# Patient Record
Sex: MALE | Race: WHITE | NOT HISPANIC OR LATINO | Employment: FULL TIME | URBAN - METROPOLITAN AREA
[De-identification: names, ages, dates, MRNs, and addresses within clinical notes are randomized per-mention and may not be internally consistent; named-entity substitution may affect disease eponyms.]

---

## 2019-10-17 PROBLEM — I73.9 CLAUDICATION: Status: ACTIVE | Noted: 2019-10-17

## 2019-11-22 ENCOUNTER — TELEPHONE (OUTPATIENT)
Dept: CARDIOTHORACIC SURGERY | Facility: CLINIC | Age: 59
End: 2019-11-22

## 2019-11-25 ENCOUNTER — TELEPHONE (OUTPATIENT)
Dept: VASCULAR SURGERY | Facility: CLINIC | Age: 59
End: 2019-11-25

## 2019-11-25 NOTE — TELEPHONE ENCOUNTER
----- Message from Joao Pulliam RN sent at 11/25/2019 12:32 PM CST -----  Contact: Pt wife Patricia    I believe this goes to Vascular Surgery..... Thanks.........SS    ----- Message -----  From: Renetta Grimm  Sent: 11/25/2019  12:06 PM CST  To: , #    Reason: Pt wife is looking to schedule pt a soon appt because he has a blockage on the left leg groin area and right as well. Pt is in a lot of pain may someone please call.     Communication:  [phone]

## 2019-11-26 ENCOUNTER — TELEPHONE (OUTPATIENT)
Dept: CARDIOTHORACIC SURGERY | Facility: CLINIC | Age: 59
End: 2019-11-26

## 2019-11-26 NOTE — TELEPHONE ENCOUNTER
Spoke with pt's wife and informed her the appt that's  scheduled with Dr. Gomez[facility] is the correct department and pt does not need to be seen in CV. Pts wife verbalized understanding.            ----- Message from Britney Lyle sent at 11/26/2019  1:24 PM CST -----  Contact: Pt request via [facility]   Message     Appointment Request From: Renan Britton    With Provider: Obdulio    Preferred Date Range: 11/25/2019 - 11/27/2019    Preferred Times: Any time    Reason for visit: Surgery consult    Comments:  Surgery for blocked artery in left leg

## 2019-12-04 ENCOUNTER — OFFICE VISIT (OUTPATIENT)
Dept: VASCULAR SURGERY | Facility: CLINIC | Age: 59
End: 2019-12-04
Attending: SURGERY
Payer: COMMERCIAL

## 2019-12-04 VITALS
BODY MASS INDEX: 37.65 KG/M2 | HEART RATE: 75 BPM | SYSTOLIC BLOOD PRESSURE: 166 MMHG | TEMPERATURE: 99 F | HEIGHT: 71 IN | DIASTOLIC BLOOD PRESSURE: 84 MMHG | WEIGHT: 268.94 LBS

## 2019-12-04 DIAGNOSIS — I70.202 FEMORAL ARTERY OCCLUSION, LEFT: Primary | ICD-10-CM

## 2019-12-04 DIAGNOSIS — I73.9 CLAUDICATION: Primary | ICD-10-CM

## 2019-12-04 DIAGNOSIS — I73.9 PAD (PERIPHERAL ARTERY DISEASE): ICD-10-CM

## 2019-12-04 DIAGNOSIS — Z01.818 OTHER SPECIFIED PRE-OPERATIVE EXAMINATION: ICD-10-CM

## 2019-12-04 DIAGNOSIS — I74.5 ILIAC ARTERY OCCLUSION, LEFT: ICD-10-CM

## 2019-12-04 PROCEDURE — 99244 OFF/OP CNSLTJ NEW/EST MOD 40: CPT | Mod: S$GLB,,, | Performed by: SURGERY

## 2019-12-04 PROCEDURE — 99244 PR OFFICE CONSULTATION,LEVEL IV: ICD-10-PCS | Mod: S$GLB,,, | Performed by: SURGERY

## 2019-12-04 PROCEDURE — 99999 PR PBB SHADOW E&M-EST. PATIENT-LVL III: CPT | Mod: PBBFAC,,, | Performed by: SURGERY

## 2019-12-04 PROCEDURE — 99999 PR PBB SHADOW E&M-EST. PATIENT-LVL III: ICD-10-PCS | Mod: PBBFAC,,, | Performed by: SURGERY

## 2019-12-04 NOTE — PROGRESS NOTES
Renankarolyn Britton  12/04/2019    HPI:  Mr. Britton is a 59 y.o. male with a h/o HLD, PAD, and aortic stenosis who is here today for evaluation of bilateral lower extremity claudication.  The patient states that he is only able to walk a hundred feet before he starts getting pain in his legs.  The left is worse than the right but they both start at the same time.  The pain begins in the calf and works its way up to his thighs.  It is relieved with rest.  He sometimes gets pain after standing for long periods of time.  He had a angiogram performed showing occlusion in the left common iliac and bilateral common femorals.    Denies any MI/stroke  Tobacco use: quit 8 years ago    Past Medical History:   Diagnosis Date    Diabetes mellitus     H/O brain tumor     HLD (hyperlipidemia)     Hypertension     PAD (peripheral artery disease)     Seizures     R/T BRAIN TUMOR- SURGICALLY EXCISED     Past Surgical History:   Procedure Laterality Date    BRAIN SURGERY  01/2011    TUMOR EXCISED    PERCUTANEOUS TRANSLUMINAL ANGIOPLASTY (PTA) OF PERIPHERAL VESSEL Left 10/17/2019    Procedure: PTA, PERIPHERAL VESSEL;  Surgeon: Rao Fisher MD;  [facility] C[facility];  Service: Cardiology;  Laterality: Left;     Family History   Problem Relation Age of Onset    Diabetes Mother     Hypertension Mother     Stroke Mother     Cancer Father         LUNG    Heart disease Father     Cancer Brother     Heart disease Brother     Heart disease Brother     Heart disease Brother      Social History     Socioeconomic History    Marital status:      Spouse name: Not on file    Number of children: Not on file    Years of education: Not on file    Highest education level: Not on file   Occupational History    Not on file   Social Needs    Financial resource strain: Not on file    Food insecurity:     Worry: Not on file     Inability: Not on file    Transportation needs:     Medical: Not on file     Non-medical: Not on  file   Tobacco Use    Smoking status: Former Smoker     Last attempt to quit: 2011     Years since quittin.8    Smokeless tobacco: Never Used   Substance and Sexual Activity    Alcohol use: Yes     Comment: 2-4 PER DAY    Drug use: Never    Sexual activity: Not on file   Lifestyle    Physical activity:     Days per week: Not on file     Minutes per session: Not on file    Stress: Not on file   Relationships    Social connections:     Talks on phone: Not on file     Gets together: Not on file     Attends Confucianist service: Not on file     Active member of club or organization: Not on file     Attends meetings of clubs or organizations: Not on file     Relationship status: Not on file   Other Topics Concern    Not on file   Social History Narrative    Not on file       Current Outpatient Medications:     aspirin (ECOTRIN) 81 MG EC tablet, Take 81 mg by mouth once daily., Disp: , Rfl:     atorvastatin (LIPITOR) 40 MG tablet, Take 40 mg by mouth once daily., Disp: , Rfl:     cetirizine 10 mg chewable tablet, Take 10 mg by mouth as needed., Disp: , Rfl:     cilostazol (PLETAL) 100 MG Tab, Take 100 mg by mouth 2 (two) times daily. , Disp: , Rfl:     coenzyme Q10 10 mg capsule, Take 10 mg by mouth once daily., Disp: , Rfl:     glyBURIDE-metformin 2.5-500 mg (GLUCOVANCE) 2.5-500 mg per tablet, Take 1 tablet by mouth 2 (two) times daily with meals., Disp: , Rfl:     magnesium chloride (SLOW-MAG) 71.5 mg TbEC, Take 2 tablets by mouth once., Disp: , Rfl:     mupirocin (BACTROBAN) 2 % ointment, Apply topically 2 (two) times daily. Swab inside each nare twice a day beginning 2 days before surgery., Disp: 15 g, Rfl: 0    pregabalin (LYRICA) 100 MG capsule, Take 100 mg by mouth 2 (two) times daily., Disp: , Rfl:     valsartan (DIOVAN) 160 MG tablet, Take 160 mg by mouth once daily., Disp: , Rfl:      REVIEW OF SYSTEMS:  General: negative; Respiratory: no cough, shortness of breath, or wheezing;  Cardiovascular: no chest pain or dyspnea on exertion; Gastrointestinal: no abdominal pain, change in bowel habits, or bloody stools; Genito-Urinary: no dysuria, trouble voiding, or hematuria; Musculoskeletal: no weakness or decreased range of motion, Neurological: no TIA or stroke symptoms; Psychiatric: no nervousness, anxiety or depression.    PHYSICAL EXAM:   Right Arm BP - Sittin/84 (19 1437)  Left Arm BP - Sittin/83 (19 1437)  Pulse: 75  Temp: 98.5 °F (36.9 °C)    General appearance: Alert, well-appearing, and in no distress.  Oriented to person, place, and time  Neurological: Normal speech, no focal findings noted; CN II - XII grossly intact  Musculoskeletal:Digits/nail without cyanosis/clubbing.  Normal muscle strength/tone.  Neck: Supple, no significant adenopathy, no carotid bruit can be auscultated  Chest:Clear to auscultation, no wheezes, rales or rhonchi, symmetric air entry, No use of accessory muscles   Cardiac:Normal rate and regular rhythm, S1 and S2 normal, Systolic ejection murmur  Abdomen:Soft, nontender, nondistended, no masses or organomegaly, No rebound tenderness noted; bowel sounds normal,  No groin adenopathy   Extremities: 2+ femoral pulses bilaterally, no pedal pulses palpable.no pre-tibial edema. No ulcerations,  LLE: monophasic PT signal, no DP signal found  RLE: Monophasic DP signal, Biphasic PT signal    LAB RESULTS:  No results found for: K, CREATININE  No results found for: WBC, HCT, PLT  No results found for: HGBA1C  IMAGING:  OSH CT reveiwed - high grade stenoses of L ICA, L CFA, R CFA, R SFA  LYNN R 0.55 L 0.45    IMP/PLAN:  59 y.o. male with Bilateral lower extremity claudication left worse than right and CT evidence of mult-level severe PAD that has prevented him from walking, ADL and working.  Will require complex hybrid intervention including bilateral common femoral endarterectomy, bilateral common iliac artery stent and possible R SFA stent.  OSH CTA is  incomplete, will require updated imaging.        - Will obtain a CTA with runnoff of bilateral lower extremity for pre-operative planning  - Will proceed with surgery - risks, including bleeding, infection, embolization, need for reoperation discussed in detail - the patient and his wife wish to proceed.     Kelby Gomez MD  Vascular & Endovascular Surgery

## 2019-12-04 NOTE — H&P (VIEW-ONLY)
Renankarolyn Britton  12/04/2019    HPI:  Mr. Britton is a 59 y.o. male with a h/o HLD, PAD, and aortic stenosis who is here today for evaluation of bilateral lower extremity claudication.  The patient states that he is only able to walk a hundred feet before he starts getting pain in his legs.  The left is worse than the right but they both start at the same time.  The pain begins in the calf and works its way up to his thighs.  It is relieved with rest.  He sometimes gets pain after standing for long periods of time.  He had a angiogram performed showing occlusion in the left common iliac and bilateral common femorals.    Denies any MI/stroke  Tobacco use: quit 8 years ago    Past Medical History:   Diagnosis Date    Diabetes mellitus     H/O brain tumor     HLD (hyperlipidemia)     Hypertension     PAD (peripheral artery disease)     Seizures     R/T BRAIN TUMOR- SURGICALLY EXCISED     Past Surgical History:   Procedure Laterality Date    BRAIN SURGERY  01/2011    TUMOR EXCISED    PERCUTANEOUS TRANSLUMINAL ANGIOPLASTY (PTA) OF PERIPHERAL VESSEL Left 10/17/2019    Procedure: PTA, PERIPHERAL VESSEL;  Surgeon: Rao Fisher MD;  Location: [facility] CATH;  Service: Cardiology;  Laterality: Left;     Family History   Problem Relation Age of Onset    Diabetes Mother     Hypertension Mother     Stroke Mother     Cancer Father         LUNG    Heart disease Father     Cancer Brother     Heart disease Brother     Heart disease Brother     Heart disease Brother      Social History     Socioeconomic History    Marital status:      Spouse name: Not on file    Number of children: Not on file    Years of education: Not on file    Highest education level: Not on file   Occupational History    Not on file   Social Needs    Financial resource strain: Not on file    Food insecurity:     Worry: Not on file     Inability: Not on file    Transportation needs:     Medical: Not on file     Non-medical: Not on  file   Tobacco Use    Smoking status: Former Smoker     Last attempt to quit: 2011     Years since quittin.8    Smokeless tobacco: Never Used   Substance and Sexual Activity    Alcohol use: Yes     Comment: 2-4 PER DAY    Drug use: Never    Sexual activity: Not on file   Lifestyle    Physical activity:     Days per week: Not on file     Minutes per session: Not on file    Stress: Not on file   Relationships    Social connections:     Talks on phone: Not on file     Gets together: Not on file     Attends Tenriism service: Not on file     Active member of club or organization: Not on file     Attends meetings of clubs or organizations: Not on file     Relationship status: Not on file   Other Topics Concern    Not on file   Social History Narrative    Not on file       Current Outpatient Medications:     aspirin (ECOTRIN) 81 MG EC tablet, Take 81 mg by mouth once daily., Disp: , Rfl:     atorvastatin (LIPITOR) 40 MG tablet, Take 40 mg by mouth once daily., Disp: , Rfl:     cetirizine 10 mg chewable tablet, Take 10 mg by mouth as needed., Disp: , Rfl:     cilostazol (PLETAL) 100 MG Tab, Take 100 mg by mouth 2 (two) times daily. , Disp: , Rfl:     coenzyme Q10 10 mg capsule, Take 10 mg by mouth once daily., Disp: , Rfl:     glyBURIDE-metformin 2.5-500 mg (GLUCOVANCE) 2.5-500 mg per tablet, Take 1 tablet by mouth 2 (two) times daily with meals., Disp: , Rfl:     magnesium chloride (SLOW-MAG) 71.5 mg TbEC, Take 2 tablets by mouth once., Disp: , Rfl:     mupirocin (BACTROBAN) 2 % ointment, Apply topically 2 (two) times daily. Swab inside each nare twice a day beginning 2 days before surgery., Disp: 15 g, Rfl: 0    pregabalin (LYRICA) 100 MG capsule, Take 100 mg by mouth 2 (two) times daily., Disp: , Rfl:     valsartan (DIOVAN) 160 MG tablet, Take 160 mg by mouth once daily., Disp: , Rfl:      REVIEW OF SYSTEMS:  General: negative; Respiratory: no cough, shortness of breath, or wheezing;  Cardiovascular: no chest pain or dyspnea on exertion; Gastrointestinal: no abdominal pain, change in bowel habits, or bloody stools; Genito-Urinary: no dysuria, trouble voiding, or hematuria; Musculoskeletal: no weakness or decreased range of motion, Neurological: no TIA or stroke symptoms; Psychiatric: no nervousness, anxiety or depression.    PHYSICAL EXAM:   Right Arm BP - Sittin/84 (19 1437)  Left Arm BP - Sittin/83 (19 1437)  Pulse: 75  Temp: 98.5 °F (36.9 °C)    General appearance: Alert, well-appearing, and in no distress.  Oriented to person, place, and time  Neurological: Normal speech, no focal findings noted; CN II - XII grossly intact  Musculoskeletal:Digits/nail without cyanosis/clubbing.  Normal muscle strength/tone.  Neck: Supple, no significant adenopathy, no carotid bruit can be auscultated  Chest:Clear to auscultation, no wheezes, rales or rhonchi, symmetric air entry, No use of accessory muscles   Cardiac:Normal rate and regular rhythm, S1 and S2 normal, Systolic ejection murmur  Abdomen:Soft, nontender, nondistended, no masses or organomegaly, No rebound tenderness noted; bowel sounds normal,  No groin adenopathy   Extremities: 2+ femoral pulses bilaterally, no pedal pulses palpable.no pre-tibial edema. No ulcerations,  LLE: monophasic PT signal, no DP signal found  RLE: Monophasic DP signal, Biphasic PT signal    LAB RESULTS:  No results found for: K, CREATININE  No results found for: WBC, HCT, PLT  No results found for: HGBA1C  IMAGING:  OSH CT reveiwed - high grade stenoses of L ICA, L CFA, R CFA, R SFA  LYNN R 0.55 L 0.45    IMP/PLAN:  59 y.o. male with Bilateral lower extremity claudication left worse than right and CT evidence of mult-level severe PAD that has prevented him from walking, ADL and working.  Will require complex hybrid intervention including bilateral common femoral endarterectomy, bilateral common iliac artery stent and possible R SFA stent.  OSH CTA is  incomplete, will require updated imaging.        - Will obtain a CTA with runnoff of bilateral lower extremity for pre-operative planning  - Will proceed with surgery - risks, including bleeding, infection, embolization, need for reoperation discussed in detail - the patient and his wife wish to proceed.     Kelby Gomez MD  Vascular & Endovascular Surgery

## 2019-12-06 ENCOUNTER — TELEPHONE (OUTPATIENT)
Dept: VASCULAR SURGERY | Facility: CLINIC | Age: 59
End: 2019-12-06

## 2019-12-06 NOTE — TELEPHONE ENCOUNTER
----- Message from Renetta Grimm sent at 12/6/2019  9:12 AM CST -----  Contact: Pt wife Patricia  Communication: Calling to see If you guys received a fax from [facility] in [place] and also do wife need to bring pt disc if so she stated she can bring it on today.    Communication: [phone]

## 2019-12-11 ENCOUNTER — TELEPHONE (OUTPATIENT)
Dept: VASCULAR SURGERY | Facility: CLINIC | Age: 59
End: 2019-12-11

## 2019-12-11 DIAGNOSIS — I74.3: Primary | ICD-10-CM

## 2019-12-19 ENCOUNTER — ANESTHESIA EVENT (OUTPATIENT)
Dept: SURGERY | Facility: HOSPITAL | Age: 59
DRG: 254 | End: 2019-12-19
Payer: COMMERCIAL

## 2019-12-19 ENCOUNTER — TELEPHONE (OUTPATIENT)
Dept: VASCULAR SURGERY | Facility: CLINIC | Age: 59
End: 2019-12-19

## 2019-12-19 NOTE — ANESTHESIA PREPROCEDURE EVALUATION
[facility]  Anesthesia Pre-Operative Evaluation         Patient Name: Renan Britton  YOB: 1960  MRN: [id number]    SUBJECTIVE:     Pre-operative evaluation for Procedure(s) (LRB):  ENDARTERECTOMY, FEMORAL (Bilateral)     12/19/2019    Renan Britton is a 59 y.o. male w/ a significant PMHx of HTN (on valsartan), HLD, T2DM, obesity (BMI 38), former smoker, PAD and moderate aortic stenosis.  Pt presented with bilateral lower extremity claudication and was started on cilostazol.  CTA with runoff showed greater than 90% stenosis of left common femoral artery and 50-75% stenosis lesions of right common femoral artery.    Patient now presents for the above procedure(s).    TTE on 9/24/2019: EF 50%, PASP 19 mmHg, aortic valve area 1.4 cm^2.    LDA: None documented.     Prev airway: None documented.    Drips: None documented.      Patient Active Problem List   Diagnosis    Claudication       Review of patient's allergies indicates:   Allergen Reactions    Penicillins Hives       Current Inpatient Medications:      No current facility-administered medications on file prior to encounter.      Current Outpatient Medications on File Prior to Encounter   Medication Sig Dispense Refill    aspirin (ECOTRIN) 81 MG EC tablet Take 81 mg by mouth once daily.      coenzyme Q10 10 mg capsule Take 10 mg by mouth once daily.      glyBURIDE-metformin 2.5-500 mg (GLUCOVANCE) 2.5-500 mg per tablet Take 1 tablet by mouth 2 (two) times daily with meals.      magnesium chloride (SLOW-MAG) 71.5 mg TbEC Take 2 tablets by mouth once.      pregabalin (LYRICA) 100 MG capsule Take 100 mg by mouth 2 (two) times daily.      atorvastatin (LIPITOR) 40 MG tablet Take 40 mg by mouth once daily.      cetirizine 10 mg chewable tablet Take 10 mg by mouth as needed.      cilostazol (PLETAL) 100 MG Tab Take 100 mg by mouth 2 (two) times daily.       mupirocin (BACTROBAN) 2 % ointment Apply topically 2 (two)  times daily. Swab inside each nare twice a day beginning 2 days before surgery. 15 g 0    valsartan (DIOVAN) 160 MG tablet Take 160 mg by mouth once daily.         Past Surgical History:   Procedure Laterality Date    BRAIN SURGERY  2011    TUMOR EXCISED    PERCUTANEOUS TRANSLUMINAL ANGIOPLASTY (PTA) OF PERIPHERAL VESSEL Left 10/17/2019    Procedure: PTA, PERIPHERAL VESSEL;  Surgeon: Rao Fisher MD;  Location: [facility];  Service: Cardiology;  Laterality: Left;       Social History     Socioeconomic History    Marital status:      Spouse name: Not on file    Number of children: Not on file    Years of education: Not on file    Highest education level: Not on file   Occupational History    Not on file   Social Needs    Financial resource strain: Not on file    Food insecurity:     Worry: Not on file     Inability: Not on file    Transportation needs:     Medical: Not on file     Non-medical: Not on file   Tobacco Use    Smoking status: Former Smoker     Last attempt to quit: 2011     Years since quittin.9    Smokeless tobacco: Never Used   Substance and Sexual Activity    Alcohol use: Yes     Comment: 2-4 PER DAY    Drug use: Never    Sexual activity: Not on file   Lifestyle    Physical activity:     Days per week: Not on file     Minutes per session: Not on file    Stress: Not on file   Relationships    Social connections:     Talks on phone: Not on file     Gets together: Not on file     Attends Cheondoism service: Not on file     Active member of club or organization: Not on file     Attends meetings of clubs or organizations: Not on file     Relationship status: Not on file   Other Topics Concern    Not on file   Social History Narrative    Not on file       OBJECTIVE:     Vital Signs Range (Last 24H):         Significant Labs:  No results found for: WBC, HGB, HCT, PLT, CHOL, TRIG, HDL, LDLDIRECT, ALT, AST, NA, K, CL, CREATININE, BUN, CO2, TSH, PSA, INR, GLUF, HGBA1C,  MICROALBUR    Diagnostic Studies:  EXAMINATION:  CTA RUNOFF ABD PEL BILAT LOWER EXT    CLINICAL HISTORY:  Ricco LE oclussion; Peripheral vascular disease, unspecified    TECHNIQUE:  1.25 mm contiguous axial images are performed through the abdominal aorta and both lower extremities following administration of IV contrast    COMPARISON:  None    FINDINGS:  CTA abdominal aorta:    The abdominal aorta is normal in caliber with significant atherosclerotic plaque within the infrarenal aspect.  The celiac axis, SMA, and DILLAN are patent.  There are single bilateral main renal arteries originating from the aorta without significant stenosis.      Impression     Impression:1.  Moderate atherosclerotic plaque within normal caliber abdominal aorta    CTA lower extremities:    The distal abdominal aorta and common iliac arteries show moderate atherosclerotic plaque.  There is a short segment significant 75% stenosis within the proximal left common iliac artery.    Both left and right external iliac arteries show multiple non critical stenosis, left and right common femoral arteries show extensive calcified plaque, with a greater than 90% stenosis of left common femoral artery, and multiple tandem 50-75% stenotic lesions within the right common femoral division.    The right superficial femoral artery demonstrates mild plaque formation proximal, with significant greater than 75% stenosis of the distal SFA in the region of the adductor canal.  The right popliteal artery shows a 75% stenosis, with trifurcation and three-vessel runoff to the level of ankle.    The left superficial femoral artery the shows calcified plaque throughout.  In the distal left SFA at the region of adductor canal there is a short segment 90% stenosis with tandem 75-90% lesions superior to popliteal fossa.  Left popliteal artery is patent showing moderate atherosclerotic plaque and trifurcation with three-vessel runoff to the level of ankle    Impression:1.   Bilateral extensive inflow stenosis including 75% stenosis of proximal left common iliac artery, 90% stenosis left common femoral artery, multiple tandem 50-75% stenotic lesions of right common femoral artery.    2.  Bilateral outflow stenosis with tandem 75-90% stenotic lesions in distal left and right SFA at level of adductor canal    CT abdomen and pelvis:    Scans through the lung bases are unremarkable.  Examination of the upper abdomen in early arterial phase imaging only shows a normal sized liver with mild nodularity to liver surface raising possibility of underlying cirrhosis.  The gallbladder is present containing gallstones there is no biliary dilatation.    The spleen, and pancreas are normal in size.  There are enlarged periportal lymph nodes.    Both adrenal glands are normal in size and shape.    Both kidneys are normal in size without evidence of hydronephrosis.    CT scan pelvis    Scans of the lower abdomen and pelvis show the large and small bowel to be normal in caliber.  Calcification of the vas deferens suggestive of underlying diabetes is noted.  Moderate prostatic enlargement consistent with age is present.    Bone windows appear normal for age.    Impression:1.  Nodularity to liver contour raising possibility of underlying cirrhosis, with enlarged periportal lymph nodes.      Electronically signed by: Larry Wellington MD  Date: 12/13/2019  Time: 12:10         EKG:   No results found for this or any previous visit.    2D ECHO:  TTE:  No results found for this or any previous visit.    MERI:  No results found for this or any previous visit.    ASSESSMENT/PLAN:     12/19/2019  Renan Britton is a 59 y.o., male.    Anesthesia Evaluation    I have reviewed the Patient Summary Reports.    I have reviewed the Nursing Notes.   I have reviewed the Medications.     Review of Systems  Anesthesia Hx:  No problems with previous Anesthesia  History of prior surgery of interest to airway management or  planning: Denies Family Hx of Anesthesia complications.   Denies Personal Hx of Anesthesia complications.   Social:  Former Smoker, Social Alcohol Use    Cardiovascular:   Hypertension, poorly controlled        Physical Exam  General:  Well nourished, Obesity    Airway/Jaw/Neck:  Airway Findings: Mouth Opening: Normal Tongue: Normal  General Airway Assessment: Adult, Possible difficult intubation, Possible difficult mask airway  Mallampati: III  Jaw/Neck Findings:  Neck ROM: Normal ROM  Neck Findings:  Girth Increased      Dental:  Dental Findings: In tact   Chest/Lungs:  Chest/Lungs Findings: Clear to auscultation, Normal Respiratory Rate         Mental Status:  Mental Status Findings:  Cooperative, Alert and Oriented         Anesthesia Plan  Type of Anesthesia, risks & benefits discussed:  Anesthesia Type:  general  Patient's Preference:   Intra-op Monitoring Plan: standard ASA monitors and arterial line  Intra-op Monitoring Plan Comments:   Post Op Pain Control Plan: multimodal analgesia, IV/PO Opioids PRN and per primary service following discharge from PACU  Post Op Pain Control Plan Comments:   Induction:   IV  Beta Blocker:  Patient is not currently on a Beta-Blocker (No further documentation required).       Informed Consent: Patient understands risks and agrees with Anesthesia plan.  Questions answered. Anesthesia consent signed with patient.  ASA Score: 3     Day of Surgery Review of History & Physical:    H&P update referred to the surgeon.         Ready For Surgery From Anesthesia Perspective.

## 2019-12-20 ENCOUNTER — ANESTHESIA (OUTPATIENT)
Dept: SURGERY | Facility: HOSPITAL | Age: 59
DRG: 254 | End: 2019-12-20
Payer: COMMERCIAL

## 2019-12-20 ENCOUNTER — HOSPITAL ENCOUNTER (INPATIENT)
Facility: HOSPITAL | Age: 59
LOS: 4 days | Discharge: HOME HEALTH | DRG: 254 | End: 2019-12-24
Attending: SURGERY | Admitting: SURGERY
Payer: COMMERCIAL

## 2019-12-20 DIAGNOSIS — I73.9 PAD (PERIPHERAL ARTERY DISEASE): Primary | ICD-10-CM

## 2019-12-20 LAB
ABO + RH BLD: NORMAL
BLD GP AB SCN CELLS X3 SERPL QL: NORMAL
GLUCOSE SERPL-MCNC: 126 MG/DL (ref 70–110)
HCO3 UR-SCNC: 24.1 MMOL/L (ref 24–28)
HCT VFR BLD CALC: 45 %PCV (ref 36–54)
PCO2 BLDA: 38.3 MMHG (ref 35–45)
PH SMN: 7.41 [PH] (ref 7.35–7.45)
PO2 BLDA: 189 MMHG (ref 80–100)
POC ACTIVATED CLOTTING TIME K: 120 SEC (ref 74–137)
POC ACTIVATED CLOTTING TIME K: 125 SEC (ref 74–137)
POC ACTIVATED CLOTTING TIME K: 197 SEC (ref 74–137)
POC ACTIVATED CLOTTING TIME K: 213 SEC (ref 74–137)
POC ACTIVATED CLOTTING TIME K: 224 SEC (ref 74–137)
POC ACTIVATED CLOTTING TIME K: 235 SEC (ref 74–137)
POC ACTIVATED CLOTTING TIME K: 241 SEC (ref 74–137)
POC ACTIVATED CLOTTING TIME K: 252 SEC (ref 74–137)
POC BE: -1 MMOL/L
POC IONIZED CALCIUM: 1.18 MMOL/L (ref 1.06–1.42)
POC SATURATED O2: 100 % (ref 95–100)
POC TCO2: 25 MMOL/L (ref 23–27)
POCT GLUCOSE: 132 MG/DL (ref 70–110)
POCT GLUCOSE: 174 MG/DL (ref 70–110)
POTASSIUM BLD-SCNC: 4.2 MMOL/L (ref 3.5–5.1)
SAMPLE: ABNORMAL
SAMPLE: NORMAL
SAMPLE: NORMAL
SODIUM BLD-SCNC: 141 MMOL/L (ref 136–145)

## 2019-12-20 PROCEDURE — 20600001 HC STEP DOWN PRIVATE ROOM

## 2019-12-20 PROCEDURE — 25000003 PHARM REV CODE 250: Performed by: SURGERY

## 2019-12-20 PROCEDURE — 94761 N-INVAS EAR/PLS OXIMETRY MLT: CPT

## 2019-12-20 PROCEDURE — 27201037 HC PRESSURE MONITORING SET UP

## 2019-12-20 PROCEDURE — 63600175 PHARM REV CODE 636 W HCPCS: Performed by: STUDENT IN AN ORGANIZED HEALTH CARE EDUCATION/TRAINING PROGRAM

## 2019-12-20 PROCEDURE — C1725 CATH, TRANSLUMIN NON-LASER: HCPCS | Performed by: SURGERY

## 2019-12-20 PROCEDURE — S0077 INJECTION, CLINDAMYCIN PHOSP: HCPCS | Performed by: STUDENT IN AN ORGANIZED HEALTH CARE EDUCATION/TRAINING PROGRAM

## 2019-12-20 PROCEDURE — 27201423 OPTIME MED/SURG SUP & DEVICES STERILE SUPPLY: Performed by: SURGERY

## 2019-12-20 PROCEDURE — 37221 PR REVASCULARIZE ILIAC ARTERY,ANGIOPLASTY/STENT, INITIAL VESSEL: ICD-10-PCS | Mod: 51,LT,, | Performed by: SURGERY

## 2019-12-20 PROCEDURE — C1887 CATHETER, GUIDING: HCPCS | Performed by: SURGERY

## 2019-12-20 PROCEDURE — C1769 GUIDE WIRE: HCPCS | Performed by: SURGERY

## 2019-12-20 PROCEDURE — C1874 STENT, COATED/COV W/DEL SYS: HCPCS | Performed by: SURGERY

## 2019-12-20 PROCEDURE — D9220A PRA ANESTHESIA: Mod: ,,, | Performed by: ANESTHESIOLOGY

## 2019-12-20 PROCEDURE — 25000003 PHARM REV CODE 250: Performed by: STUDENT IN AN ORGANIZED HEALTH CARE EDUCATION/TRAINING PROGRAM

## 2019-12-20 PROCEDURE — 36000706: Performed by: SURGERY

## 2019-12-20 PROCEDURE — 36620 INSERTION CATHETER ARTERY: CPT | Mod: 59,,, | Performed by: ANESTHESIOLOGY

## 2019-12-20 PROCEDURE — 71000033 HC RECOVERY, INTIAL HOUR: Performed by: SURGERY

## 2019-12-20 PROCEDURE — C1894 INTRO/SHEATH, NON-LASER: HCPCS | Performed by: SURGERY

## 2019-12-20 PROCEDURE — 36410 VNPNXR 3YR/> PHY/QHP DX/THER: CPT | Mod: 59,,, | Performed by: ANESTHESIOLOGY

## 2019-12-20 PROCEDURE — 36000707: Performed by: SURGERY

## 2019-12-20 PROCEDURE — 36410 PR VENIPUNC NEED PHYS SKILL,DX OR RX: ICD-10-PCS | Mod: 59,,, | Performed by: ANESTHESIOLOGY

## 2019-12-20 PROCEDURE — 25000003 PHARM REV CODE 250

## 2019-12-20 PROCEDURE — 86850 RBC ANTIBODY SCREEN: CPT

## 2019-12-20 PROCEDURE — 35371 RECHANNELING OF ARTERY: CPT | Mod: 50,,, | Performed by: SURGERY

## 2019-12-20 PROCEDURE — 63600175 PHARM REV CODE 636 W HCPCS: Performed by: SURGERY

## 2019-12-20 PROCEDURE — 37000008 HC ANESTHESIA 1ST 15 MINUTES: Performed by: SURGERY

## 2019-12-20 PROCEDURE — D9220A PRA ANESTHESIA: ICD-10-PCS | Mod: ,,, | Performed by: ANESTHESIOLOGY

## 2019-12-20 PROCEDURE — 82962 GLUCOSE BLOOD TEST: CPT | Performed by: SURGERY

## 2019-12-20 PROCEDURE — 37221 PR REVASCULARIZE ILIAC ARTERY,ANGIOPLASTY/STENT, INITIAL VESSEL: CPT | Mod: 51,LT,, | Performed by: SURGERY

## 2019-12-20 PROCEDURE — C1768 GRAFT, VASCULAR: HCPCS | Performed by: SURGERY

## 2019-12-20 PROCEDURE — 63600175 PHARM REV CODE 636 W HCPCS

## 2019-12-20 PROCEDURE — 35371 PR THROMBOENDARTECTMY FEMORAL COMMON: ICD-10-PCS | Mod: 50,,, | Performed by: SURGERY

## 2019-12-20 PROCEDURE — 25500020 PHARM REV CODE 255: Performed by: SURGERY

## 2019-12-20 PROCEDURE — 36620 ARTERIAL: ICD-10-PCS | Mod: 59,,, | Performed by: ANESTHESIOLOGY

## 2019-12-20 PROCEDURE — 37000009 HC ANESTHESIA EA ADD 15 MINS: Performed by: SURGERY

## 2019-12-20 DEVICE — GRAFT VAS GUARD 0.8X8CM BOVINE: Type: IMPLANTABLE DEVICE | Site: ARTERIAL | Status: FUNCTIONAL

## 2019-12-20 RX ORDER — HYDROMORPHONE HYDROCHLORIDE 1 MG/ML
0.2 INJECTION, SOLUTION INTRAMUSCULAR; INTRAVENOUS; SUBCUTANEOUS EVERY 5 MIN PRN
Status: DISCONTINUED | OUTPATIENT
Start: 2019-12-20 | End: 2019-12-20

## 2019-12-20 RX ORDER — IBUPROFEN 200 MG
16 TABLET ORAL
Status: DISCONTINUED | OUTPATIENT
Start: 2019-12-20 | End: 2019-12-24 | Stop reason: HOSPADM

## 2019-12-20 RX ORDER — ATORVASTATIN CALCIUM 20 MG/1
40 TABLET, FILM COATED ORAL DAILY
Status: DISCONTINUED | OUTPATIENT
Start: 2019-12-20 | End: 2019-12-24 | Stop reason: HOSPADM

## 2019-12-20 RX ORDER — IBUPROFEN 200 MG
24 TABLET ORAL
Status: DISCONTINUED | OUTPATIENT
Start: 2019-12-20 | End: 2019-12-24 | Stop reason: HOSPADM

## 2019-12-20 RX ORDER — PREGABALIN 50 MG/1
100 CAPSULE ORAL 2 TIMES DAILY
Status: DISCONTINUED | OUTPATIENT
Start: 2019-12-20 | End: 2019-12-24 | Stop reason: HOSPADM

## 2019-12-20 RX ORDER — MIDAZOLAM HYDROCHLORIDE 1 MG/ML
INJECTION, SOLUTION INTRAMUSCULAR; INTRAVENOUS
Status: DISCONTINUED | OUTPATIENT
Start: 2019-12-20 | End: 2019-12-20

## 2019-12-20 RX ORDER — LABETALOL HYDROCHLORIDE 5 MG/ML
INJECTION, SOLUTION INTRAVENOUS
Status: DISCONTINUED | OUTPATIENT
Start: 2019-12-20 | End: 2019-12-20

## 2019-12-20 RX ORDER — LABETALOL HCL 20 MG/4 ML
10 SYRINGE (ML) INTRAVENOUS EVERY 4 HOURS PRN
Status: DISCONTINUED | OUTPATIENT
Start: 2019-12-20 | End: 2019-12-24 | Stop reason: HOSPADM

## 2019-12-20 RX ORDER — NALOXONE HCL 0.4 MG/ML
0.02 VIAL (ML) INJECTION
Status: DISCONTINUED | OUTPATIENT
Start: 2019-12-20 | End: 2019-12-24 | Stop reason: HOSPADM

## 2019-12-20 RX ORDER — PROTAMINE SULFATE 10 MG/ML
INJECTION, SOLUTION INTRAVENOUS
Status: DISCONTINUED | OUTPATIENT
Start: 2019-12-20 | End: 2019-12-20

## 2019-12-20 RX ORDER — LABETALOL HCL 20 MG/4 ML
SYRINGE (ML) INTRAVENOUS
Status: COMPLETED
Start: 2019-12-20 | End: 2019-12-20

## 2019-12-20 RX ORDER — ACETAMINOPHEN 500 MG
1000 TABLET ORAL
Status: COMPLETED | OUTPATIENT
Start: 2019-12-20 | End: 2019-12-20

## 2019-12-20 RX ORDER — GLYCOPYRROLATE 0.2 MG/ML
INJECTION INTRAMUSCULAR; INTRAVENOUS
Status: DISCONTINUED | OUTPATIENT
Start: 2019-12-20 | End: 2019-12-20

## 2019-12-20 RX ORDER — MUPIROCIN 20 MG/G
1 OINTMENT TOPICAL 2 TIMES DAILY
Status: DISCONTINUED | OUTPATIENT
Start: 2019-12-20 | End: 2019-12-24 | Stop reason: HOSPADM

## 2019-12-20 RX ORDER — CILOSTAZOL 50 MG/1
100 TABLET ORAL 2 TIMES DAILY
Status: DISCONTINUED | OUTPATIENT
Start: 2019-12-20 | End: 2019-12-24 | Stop reason: HOSPADM

## 2019-12-20 RX ORDER — LIDOCAINE HYDROCHLORIDE 10 MG/ML
1 INJECTION, SOLUTION EPIDURAL; INFILTRATION; INTRACAUDAL; PERINEURAL ONCE
Status: COMPLETED | OUTPATIENT
Start: 2019-12-20 | End: 2019-12-20

## 2019-12-20 RX ORDER — CLINDAMYCIN PHOSPHATE 900 MG/50ML
900 INJECTION, SOLUTION INTRAVENOUS ONCE
Status: COMPLETED | OUTPATIENT
Start: 2019-12-20 | End: 2019-12-20

## 2019-12-20 RX ORDER — ONDANSETRON 2 MG/ML
INJECTION INTRAMUSCULAR; INTRAVENOUS
Status: DISCONTINUED | OUTPATIENT
Start: 2019-12-20 | End: 2019-12-20

## 2019-12-20 RX ORDER — PROPOFOL 10 MG/ML
VIAL (ML) INTRAVENOUS
Status: DISCONTINUED | OUTPATIENT
Start: 2019-12-20 | End: 2019-12-20

## 2019-12-20 RX ORDER — CLOPIDOGREL BISULFATE 75 MG/1
75 TABLET ORAL DAILY
Status: DISCONTINUED | OUTPATIENT
Start: 2019-12-21 | End: 2019-12-24 | Stop reason: HOSPADM

## 2019-12-20 RX ORDER — ONDANSETRON 2 MG/ML
4 INJECTION INTRAMUSCULAR; INTRAVENOUS EVERY 12 HOURS PRN
Status: DISCONTINUED | OUTPATIENT
Start: 2019-12-20 | End: 2019-12-24 | Stop reason: HOSPADM

## 2019-12-20 RX ORDER — PHENYLEPHRINE HYDROCHLORIDE 10 MG/ML
INJECTION INTRAVENOUS
Status: DISCONTINUED | OUTPATIENT
Start: 2019-12-20 | End: 2019-12-20

## 2019-12-20 RX ORDER — HYDROMORPHONE HCL IN 0.9% NACL 6 MG/30 ML
PATIENT CONTROLLED ANALGESIA SYRINGE INTRAVENOUS CONTINUOUS
Status: DISCONTINUED | OUTPATIENT
Start: 2019-12-20 | End: 2019-12-21

## 2019-12-20 RX ORDER — IODIXANOL 320 MG/ML
INJECTION, SOLUTION INTRAVASCULAR
Status: DISCONTINUED | OUTPATIENT
Start: 2019-12-20 | End: 2019-12-20 | Stop reason: HOSPADM

## 2019-12-20 RX ORDER — ACETAMINOPHEN 500 MG
1000 TABLET ORAL EVERY 8 HOURS
Status: DISCONTINUED | OUTPATIENT
Start: 2019-12-20 | End: 2019-12-24 | Stop reason: HOSPADM

## 2019-12-20 RX ORDER — SODIUM CHLORIDE 0.9 % (FLUSH) 0.9 %
10 SYRINGE (ML) INJECTION
Status: DISCONTINUED | OUTPATIENT
Start: 2019-12-20 | End: 2019-12-20

## 2019-12-20 RX ORDER — HEPARIN SODIUM 5000 [USP'U]/ML
5000 INJECTION, SOLUTION INTRAVENOUS; SUBCUTANEOUS EVERY 8 HOURS
Status: DISCONTINUED | OUTPATIENT
Start: 2019-12-21 | End: 2019-12-24 | Stop reason: HOSPADM

## 2019-12-20 RX ORDER — KETAMINE HCL IN 0.9 % NACL 50 MG/5 ML
SYRINGE (ML) INTRAVENOUS
Status: DISCONTINUED | OUTPATIENT
Start: 2019-12-20 | End: 2019-12-20

## 2019-12-20 RX ORDER — HEPARIN SODIUM 1000 [USP'U]/ML
INJECTION, SOLUTION INTRAVENOUS; SUBCUTANEOUS
Status: DISCONTINUED | OUTPATIENT
Start: 2019-12-20 | End: 2019-12-20

## 2019-12-20 RX ORDER — ASPIRIN 81 MG/1
81 TABLET ORAL DAILY
Status: DISCONTINUED | OUTPATIENT
Start: 2019-12-20 | End: 2019-12-24 | Stop reason: HOSPADM

## 2019-12-20 RX ORDER — SODIUM CHLORIDE 9 MG/ML
INJECTION, SOLUTION INTRAVENOUS CONTINUOUS PRN
Status: DISCONTINUED | OUTPATIENT
Start: 2019-12-20 | End: 2019-12-20

## 2019-12-20 RX ORDER — HYDRALAZINE HYDROCHLORIDE 20 MG/ML
10 INJECTION INTRAMUSCULAR; INTRAVENOUS EVERY 4 HOURS PRN
Status: DISCONTINUED | OUTPATIENT
Start: 2019-12-20 | End: 2019-12-24 | Stop reason: HOSPADM

## 2019-12-20 RX ORDER — GLUCAGON 1 MG
1 KIT INJECTION
Status: DISCONTINUED | OUTPATIENT
Start: 2019-12-20 | End: 2019-12-24 | Stop reason: HOSPADM

## 2019-12-20 RX ORDER — HYDROMORPHONE HCL IN 0.9% NACL 6 MG/30 ML
PATIENT CONTROLLED ANALGESIA SYRINGE INTRAVENOUS
Status: COMPLETED
Start: 2019-12-20 | End: 2019-12-20

## 2019-12-20 RX ORDER — FENTANYL CITRATE 50 UG/ML
INJECTION, SOLUTION INTRAMUSCULAR; INTRAVENOUS
Status: DISCONTINUED | OUTPATIENT
Start: 2019-12-20 | End: 2019-12-20

## 2019-12-20 RX ORDER — ROCURONIUM BROMIDE 10 MG/ML
INJECTION, SOLUTION INTRAVENOUS
Status: DISCONTINUED | OUTPATIENT
Start: 2019-12-20 | End: 2019-12-20

## 2019-12-20 RX ORDER — VALSARTAN 160 MG/1
160 TABLET ORAL DAILY
Status: DISCONTINUED | OUTPATIENT
Start: 2019-12-21 | End: 2019-12-24 | Stop reason: HOSPADM

## 2019-12-20 RX ORDER — HEPARIN SODIUM 1000 [USP'U]/ML
INJECTION, SOLUTION INTRAVENOUS; SUBCUTANEOUS
Status: DISCONTINUED | OUTPATIENT
Start: 2019-12-20 | End: 2019-12-20 | Stop reason: HOSPADM

## 2019-12-20 RX ORDER — OXYCODONE HYDROCHLORIDE 5 MG/1
5 TABLET ORAL EVERY 4 HOURS PRN
Status: DISCONTINUED | OUTPATIENT
Start: 2019-12-20 | End: 2019-12-20

## 2019-12-20 RX ORDER — BACITRACIN 50000 [IU]/1
INJECTION, POWDER, FOR SOLUTION INTRAMUSCULAR
Status: DISCONTINUED | OUTPATIENT
Start: 2019-12-20 | End: 2019-12-20 | Stop reason: HOSPADM

## 2019-12-20 RX ORDER — SODIUM CHLORIDE 9 MG/ML
INJECTION, SOLUTION INTRAVENOUS CONTINUOUS
Status: DISCONTINUED | OUTPATIENT
Start: 2019-12-20 | End: 2019-12-20

## 2019-12-20 RX ORDER — DEXAMETHASONE SODIUM PHOSPHATE 4 MG/ML
INJECTION, SOLUTION INTRA-ARTICULAR; INTRALESIONAL; INTRAMUSCULAR; INTRAVENOUS; SOFT TISSUE
Status: DISCONTINUED | OUTPATIENT
Start: 2019-12-20 | End: 2019-12-20

## 2019-12-20 RX ORDER — INSULIN ASPART 100 [IU]/ML
1-10 INJECTION, SOLUTION INTRAVENOUS; SUBCUTANEOUS
Status: DISCONTINUED | OUTPATIENT
Start: 2019-12-20 | End: 2019-12-24 | Stop reason: HOSPADM

## 2019-12-20 RX ORDER — LIDOCAINE HCL/PF 100 MG/5ML
SYRINGE (ML) INTRAVENOUS
Status: DISCONTINUED | OUTPATIENT
Start: 2019-12-20 | End: 2019-12-20

## 2019-12-20 RX ADMIN — ROCURONIUM BROMIDE 30 MG: 10 INJECTION, SOLUTION INTRAVENOUS at 11:12

## 2019-12-20 RX ADMIN — HEPARIN SODIUM 4000 UNITS: 1000 INJECTION, SOLUTION INTRAVENOUS; SUBCUTANEOUS at 12:12

## 2019-12-20 RX ADMIN — Medication 20 MG: at 08:12

## 2019-12-20 RX ADMIN — FENTANYL CITRATE 25 MCG: 50 INJECTION, SOLUTION INTRAMUSCULAR; INTRAVENOUS at 01:12

## 2019-12-20 RX ADMIN — PHENYLEPHRINE HYDROCHLORIDE 50 MCG: 10 INJECTION INTRAVENOUS at 12:12

## 2019-12-20 RX ADMIN — ROCURONIUM BROMIDE 10 MG: 10 INJECTION, SOLUTION INTRAVENOUS at 10:12

## 2019-12-20 RX ADMIN — GLYCOPYRROLATE 0.1 MG: 0.2 INJECTION, SOLUTION INTRAMUSCULAR; INTRAVENOUS at 01:12

## 2019-12-20 RX ADMIN — CILOSTAZOL 100 MG: 50 TABLET ORAL at 09:12

## 2019-12-20 RX ADMIN — FENTANYL CITRATE 100 MCG: 50 INJECTION, SOLUTION INTRAMUSCULAR; INTRAVENOUS at 08:12

## 2019-12-20 RX ADMIN — LABETALOL HYDROCHLORIDE 10 MG: 5 INJECTION, SOLUTION INTRAVENOUS at 02:12

## 2019-12-20 RX ADMIN — ROCURONIUM BROMIDE 10 MG: 10 INJECTION, SOLUTION INTRAVENOUS at 12:12

## 2019-12-20 RX ADMIN — SODIUM CHLORIDE: 0.9 INJECTION, SOLUTION INTRAVENOUS at 01:12

## 2019-12-20 RX ADMIN — LABETALOL HCL IV SOLN PREFILLED SYRINGE 20 MG/4ML (5 MG/ML) 10 MG: 20/4 SOLUTION PREFILLED SYRINGE at 04:12

## 2019-12-20 RX ADMIN — PROTAMINE SULFATE 10 MG: 10 INJECTION, SOLUTION INTRAVENOUS at 01:12

## 2019-12-20 RX ADMIN — ATORVASTATIN CALCIUM 40 MG: 20 TABLET, FILM COATED ORAL at 03:12

## 2019-12-20 RX ADMIN — PROPOFOL 200 MG: 10 INJECTION, EMULSION INTRAVENOUS at 08:12

## 2019-12-20 RX ADMIN — ASPIRIN 81 MG: 81 TABLET, COATED ORAL at 03:12

## 2019-12-20 RX ADMIN — HEPARIN SODIUM 2000 UNITS: 1000 INJECTION, SOLUTION INTRAVENOUS; SUBCUTANEOUS at 11:12

## 2019-12-20 RX ADMIN — Medication: at 08:12

## 2019-12-20 RX ADMIN — SODIUM CHLORIDE, SODIUM GLUCONATE, SODIUM ACETATE, POTASSIUM CHLORIDE, MAGNESIUM CHLORIDE, SODIUM PHOSPHATE, DIBASIC, AND POTASSIUM PHOSPHATE: .53; .5; .37; .037; .03; .012; .00082 INJECTION, SOLUTION INTRAVENOUS at 10:12

## 2019-12-20 RX ADMIN — PHENYLEPHRINE HYDROCHLORIDE 50 MCG: 10 INJECTION INTRAVENOUS at 11:12

## 2019-12-20 RX ADMIN — Medication: at 03:12

## 2019-12-20 RX ADMIN — ACETAMINOPHEN 1000 MG: 500 TABLET ORAL at 07:12

## 2019-12-20 RX ADMIN — ACETAMINOPHEN 1000 MG: 500 TABLET ORAL at 09:12

## 2019-12-20 RX ADMIN — CLINDAMYCIN IN 5 PERCENT DEXTROSE 900 MG: 18 INJECTION, SOLUTION INTRAVENOUS at 09:12

## 2019-12-20 RX ADMIN — PHENYLEPHRINE HYDROCHLORIDE 100 MCG: 10 INJECTION INTRAVENOUS at 09:12

## 2019-12-20 RX ADMIN — PROTAMINE SULFATE 70 MG: 10 INJECTION, SOLUTION INTRAVENOUS at 01:12

## 2019-12-20 RX ADMIN — Medication 5 MG: at 02:12

## 2019-12-20 RX ADMIN — FENTANYL CITRATE 50 MCG: 50 INJECTION, SOLUTION INTRAMUSCULAR; INTRAVENOUS at 09:12

## 2019-12-20 RX ADMIN — ROCURONIUM BROMIDE 50 MG: 10 INJECTION, SOLUTION INTRAVENOUS at 08:12

## 2019-12-20 RX ADMIN — Medication 10 MG: at 09:12

## 2019-12-20 RX ADMIN — MUPIROCIN 1 G: 20 OINTMENT TOPICAL at 09:12

## 2019-12-20 RX ADMIN — FENTANYL CITRATE 25 MCG: 50 INJECTION, SOLUTION INTRAMUSCULAR; INTRAVENOUS at 11:12

## 2019-12-20 RX ADMIN — HEPARIN SODIUM 10000 UNITS: 1000 INJECTION, SOLUTION INTRAVENOUS; SUBCUTANEOUS at 10:12

## 2019-12-20 RX ADMIN — DEXAMETHASONE SODIUM PHOSPHATE 8 MG: 4 INJECTION, SOLUTION INTRAMUSCULAR; INTRAVENOUS at 09:12

## 2019-12-20 RX ADMIN — Medication 20 MG: at 01:12

## 2019-12-20 RX ADMIN — INSULIN ASPART 2 UNITS: 100 INJECTION, SOLUTION INTRAVENOUS; SUBCUTANEOUS at 03:12

## 2019-12-20 RX ADMIN — Medication 10 MG: at 11:12

## 2019-12-20 RX ADMIN — SODIUM CHLORIDE: 0.9 INJECTION, SOLUTION INTRAVENOUS at 07:12

## 2019-12-20 RX ADMIN — LIDOCAINE HYDROCHLORIDE 0.2 MG: 10 INJECTION, SOLUTION EPIDURAL; INFILTRATION; INTRACAUDAL; PERINEURAL at 07:12

## 2019-12-20 RX ADMIN — ROCURONIUM BROMIDE 20 MG: 10 INJECTION, SOLUTION INTRAVENOUS at 09:12

## 2019-12-20 RX ADMIN — SODIUM CHLORIDE, SODIUM GLUCONATE, SODIUM ACETATE, POTASSIUM CHLORIDE, MAGNESIUM CHLORIDE, SODIUM PHOSPHATE, DIBASIC, AND POTASSIUM PHOSPHATE: .53; .5; .37; .037; .03; .012; .00082 INJECTION, SOLUTION INTRAVENOUS at 09:12

## 2019-12-20 RX ADMIN — PREGABALIN 100 MG: 50 CAPSULE ORAL at 09:12

## 2019-12-20 RX ADMIN — ONDANSETRON 4 MG: 2 INJECTION INTRAMUSCULAR; INTRAVENOUS at 01:12

## 2019-12-20 RX ADMIN — LIDOCAINE HYDROCHLORIDE 100 MG: 20 INJECTION, SOLUTION INTRAVENOUS at 08:12

## 2019-12-20 RX ADMIN — SUGAMMADEX 200 MG: 100 INJECTION, SOLUTION INTRAVENOUS at 02:12

## 2019-12-20 RX ADMIN — HYDRALAZINE HYDROCHLORIDE 10 MG: 20 INJECTION INTRAMUSCULAR; INTRAVENOUS at 04:12

## 2019-12-20 RX ADMIN — ROCURONIUM BROMIDE 20 MG: 10 INJECTION, SOLUTION INTRAVENOUS at 11:12

## 2019-12-20 RX ADMIN — MIDAZOLAM HYDROCHLORIDE 2 MG: 1 INJECTION, SOLUTION INTRAMUSCULAR; INTRAVENOUS at 08:12

## 2019-12-20 NOTE — ANESTHESIA PROCEDURE NOTES
Peripheral IV Insertion    Diagnosis: I99.8 Other disorder of circulatory system    Patient location during procedure: OR  Procedure start time: 12/20/2019 9:10 AM  Timeout: 12/20/2019 9:10 AM  Procedure end time: 12/20/2019 9:12 AM    Staffing  Authorizing Provider: Papito Clinton MD  Performing Provider: Papito Clinton MD    Staffing  Other anesthesia staff: Andrey Mason MD  Anesthesiologist was present at the time of the procedure.    Preanesthetic Checklist  Completed: patient identified, site marked, surgical consent, pre-op evaluation, timeout performed, IV checked, risks and benefits discussed, monitors and equipment checked and anesthesia consent givenPeripheral IV Insertion  Skin Prep: chlorhexidine gluconate  Local Infiltration: none  Orientation: left  Location: hand  Catheter Size: 18 G  Catheter placement by Anatomical landmarks. Heme positive aspiration all ports.Insertion Attempts: 1  Assessment  Dressing: secured with tape and tegaderm  Patient: Tolerated well  Line flushed easily.

## 2019-12-20 NOTE — INTERVAL H&P NOTE
The patient has been examined and the H&P has been reviewed:    I concur with the findings and no changes have occurred since H&P was written.    Anesthesia/Surgery risks, benefits and alternative options discussed and understood by patient/family.          Active Hospital Problems    Diagnosis  POA    PAD (peripheral artery disease) [I73.9]  Yes      Resolved Hospital Problems   No resolved problems to display.

## 2019-12-20 NOTE — ANESTHESIA PROCEDURE NOTES
Intubation  Performed by: Andrey Mason MD  Authorized by: Papito Clinton MD     Intubation:     Induction:  Intravenous    Intubated:  Postinduction    Mask Ventilation:  Moderately difficult with oral airway    Attempts:  1    Attempted By:  Resident anesthesiologist    Method of Intubation:  Direct    Blade:  Friedman 2    Laryngeal View Grade: Grade IIb - only the arytenoids and epiglottis seen      Difficult Airway Encountered?: No      Complications:  None    Airway Device:  Oral endotracheal tube    Airway Device Size:  7.5    Style/Cuff Inflation:  Cuffed (inflated to minimal occlusive pressure)    Inflation Amount (mL):  8    Tube secured:  23    Secured at:  The lips    Placement Verified By:  Capnometry (ETT condensation)    Complicating Factors:  Small mouth, short neck, obesity, anterior larynx and oropharyngeal edema or fat    Findings Post-Intubation:  BS equal bilateral  Notes:      Ramp used to position patient with effect.  VL available on standby.

## 2019-12-20 NOTE — ANESTHESIA PROCEDURE NOTES
Arterial    Diagnosis: Femoral thrombosis    Patient location during procedure: done in OR  Procedure start time: 12/20/2019 9:15 AM  Timeout: 12/20/2019 9:15 AM  Procedure end time: 12/20/2019 9:17 AM    Staffing  Authorizing Provider: Papito Clinton MD  Performing Provider: Andrey Mason MD    Anesthesiologist was present at the time of the procedure.    Preanesthetic Checklist  Completed: patient identified, site marked, surgical consent, pre-op evaluation, timeout performed, IV checked, risks and benefits discussed, monitors and equipment checked and anesthesia consent givenArterial  Skin Prep: chlorhexidine gluconate  Local Infiltration: none  Orientation: right  Location: radial  Catheter Size: 20 G  Catheter placement by Anatomical landmarks. Heme positive aspiration all ports.Insertion Attempts: 1  Assessment  Dressing: secured with tape and tegaderm  Patient: Tolerated well

## 2019-12-20 NOTE — TRANSFER OF CARE
Anesthesia Transfer of Care Note    Patient: Renan Britton    Procedure(s) Performed: Procedure(s) (LRB):  ENDARTERECTOMY, FEMORAL (Bilateral)  ANGIOGRAM, ABDOMINAL AORTA W/ EXTREMITY RUNOFF (Bilateral)  STENT, ILIAC ARTERY (Left)    Patient location: PACU    Anesthesia Type: general    Transport from OR: Transported from OR on 6-10 L/min O2 by face mask with adequate spontaneous ventilation    Post pain: adequate analgesia    Post assessment: no apparent anesthetic complications and tolerated procedure well    Post vital signs: stable    Level of consciousness: lethargic and responds to stimulation    Nausea/Vomiting: no nausea/vomiting    Complications: none    Transfer of care protocol was followed      Last vitals:   Visit Vitals  BP (!) 182/98 (BP Location: Left arm, Patient Position: Lying)   Pulse 70   Temp 36.4 °C (97.5 °F) (Oral)   Resp 20   Wt 121.6 kg (268 lb)   SpO2 97%   BMI 37.38 kg/m²

## 2019-12-20 NOTE — BRIEF OP NOTE
Brief Operative Note  Date: 12/20/2019    Surgeon(s) and Role:     * Kelby Gomez MD - Primary     * Mynor Feliz MD - Resident - Assisting     * Carolyn Pat MD - Fellow    Pre-op Diagnosis: Bilateral critical femoral artery stenosis; critical left common iliac artery stenosis    Post-op Diagnosis:  Same    Procedure(s):    1) ENDARTERECTOMY, FEMORAL (Bilateral)  2) ANGIOGRAM, PELVIC  3) Left common iliac artery PTA (6 x 40 [place])  4) Left common iliac artery stent (GORE VBX 10 x 39mm)  5) Prevena VAC placement, bilateral groins    Surgeon: Kelby Gomez MD  Vascular & Endovascular Surgery     Assistant: ODIN Pat MD    Anesthesia: General/MAC    Findings/Key Components:  Successful treatment of Bilateral critical femoral artery stenosis; critical left common iliac artery stenosis.  Biphasic bilateral pedal signals    EBL: 100cc         Specimens (From admission, onward)    None          I attest to being present for the procedure and performing the case.  Kelby Gomez MD  Vascular & Endovascular Surgery

## 2019-12-21 LAB
ANION GAP SERPL CALC-SCNC: 9 MMOL/L (ref 8–16)
BASOPHILS # BLD AUTO: 0.02 K/UL (ref 0–0.2)
BASOPHILS NFR BLD: 0.2 % (ref 0–1.9)
BUN SERPL-MCNC: 19 MG/DL (ref 6–20)
CALCIUM SERPL-MCNC: 8.8 MG/DL (ref 8.7–10.5)
CHLORIDE SERPL-SCNC: 103 MMOL/L (ref 95–110)
CO2 SERPL-SCNC: 26 MMOL/L (ref 23–29)
CREAT SERPL-MCNC: 0.8 MG/DL (ref 0.5–1.4)
DIFFERENTIAL METHOD: ABNORMAL
EOSINOPHIL # BLD AUTO: 0 K/UL (ref 0–0.5)
EOSINOPHIL NFR BLD: 0 % (ref 0–8)
ERYTHROCYTE [DISTWIDTH] IN BLOOD BY AUTOMATED COUNT: 12.9 % (ref 11.5–14.5)
EST. GFR  (AFRICAN AMERICAN): >60 ML/MIN/1.73 M^2
EST. GFR  (NON AFRICAN AMERICAN): >60 ML/MIN/1.73 M^2
GLUCOSE SERPL-MCNC: 149 MG/DL (ref 70–110)
HCT VFR BLD AUTO: 50.1 % (ref 40–54)
HGB BLD-MCNC: 16.2 G/DL (ref 14–18)
IMM GRANULOCYTES # BLD AUTO: 0.05 K/UL (ref 0–0.04)
IMM GRANULOCYTES NFR BLD AUTO: 0.4 % (ref 0–0.5)
LYMPHOCYTES # BLD AUTO: 0.9 K/UL (ref 1–4.8)
LYMPHOCYTES NFR BLD: 7.4 % (ref 18–48)
MCH RBC QN AUTO: 30.4 PG (ref 27–31)
MCHC RBC AUTO-ENTMCNC: 32.3 G/DL (ref 32–36)
MCV RBC AUTO: 94 FL (ref 82–98)
MONOCYTES # BLD AUTO: 0.9 K/UL (ref 0.3–1)
MONOCYTES NFR BLD: 6.8 % (ref 4–15)
NEUTROPHILS # BLD AUTO: 10.8 K/UL (ref 1.8–7.7)
NEUTROPHILS NFR BLD: 85.2 % (ref 38–73)
NRBC BLD-RTO: 0 /100 WBC
PLATELET # BLD AUTO: 167 K/UL (ref 150–350)
PMV BLD AUTO: 11.1 FL (ref 9.2–12.9)
POCT GLUCOSE: 142 MG/DL (ref 70–110)
POTASSIUM SERPL-SCNC: 4.5 MMOL/L (ref 3.5–5.1)
RBC # BLD AUTO: 5.33 M/UL (ref 4.6–6.2)
SODIUM SERPL-SCNC: 138 MMOL/L (ref 136–145)
WBC # BLD AUTO: 12.64 K/UL (ref 3.9–12.7)

## 2019-12-21 PROCEDURE — 63600175 PHARM REV CODE 636 W HCPCS: Performed by: STUDENT IN AN ORGANIZED HEALTH CARE EDUCATION/TRAINING PROGRAM

## 2019-12-21 PROCEDURE — 94770 HC EXHALED C02 TEST: CPT

## 2019-12-21 PROCEDURE — 25000003 PHARM REV CODE 250: Performed by: STUDENT IN AN ORGANIZED HEALTH CARE EDUCATION/TRAINING PROGRAM

## 2019-12-21 PROCEDURE — 99900035 HC TECH TIME PER 15 MIN (STAT)

## 2019-12-21 PROCEDURE — 85025 COMPLETE CBC W/AUTO DIFF WBC: CPT

## 2019-12-21 PROCEDURE — 20600001 HC STEP DOWN PRIVATE ROOM

## 2019-12-21 PROCEDURE — 80048 BASIC METABOLIC PNL TOTAL CA: CPT

## 2019-12-21 PROCEDURE — 94761 N-INVAS EAR/PLS OXIMETRY MLT: CPT

## 2019-12-21 PROCEDURE — S0077 INJECTION, CLINDAMYCIN PHOSP: HCPCS | Performed by: STUDENT IN AN ORGANIZED HEALTH CARE EDUCATION/TRAINING PROGRAM

## 2019-12-21 PROCEDURE — 36415 COLL VENOUS BLD VENIPUNCTURE: CPT

## 2019-12-21 RX ORDER — OXYCODONE HYDROCHLORIDE 5 MG/1
5 TABLET ORAL EVERY 4 HOURS PRN
Status: DISCONTINUED | OUTPATIENT
Start: 2019-12-21 | End: 2019-12-24 | Stop reason: HOSPADM

## 2019-12-21 RX ORDER — CLINDAMYCIN PHOSPHATE 900 MG/50ML
900 INJECTION, SOLUTION INTRAVENOUS
Status: COMPLETED | OUTPATIENT
Start: 2019-12-21 | End: 2019-12-21

## 2019-12-21 RX ORDER — HYDROMORPHONE HYDROCHLORIDE 1 MG/ML
0.5 INJECTION, SOLUTION INTRAMUSCULAR; INTRAVENOUS; SUBCUTANEOUS EVERY 6 HOURS PRN
Status: DISCONTINUED | OUTPATIENT
Start: 2019-12-21 | End: 2019-12-24 | Stop reason: HOSPADM

## 2019-12-21 RX ORDER — OXYCODONE HYDROCHLORIDE 10 MG/1
10 TABLET ORAL EVERY 4 HOURS PRN
Status: DISCONTINUED | OUTPATIENT
Start: 2019-12-21 | End: 2019-12-24 | Stop reason: HOSPADM

## 2019-12-21 RX ADMIN — HEPARIN SODIUM 5000 UNITS: 5000 INJECTION, SOLUTION INTRAVENOUS; SUBCUTANEOUS at 02:12

## 2019-12-21 RX ADMIN — ATORVASTATIN CALCIUM 40 MG: 20 TABLET, FILM COATED ORAL at 09:12

## 2019-12-21 RX ADMIN — LABETALOL HCL IV SOLN PREFILLED SYRINGE 20 MG/4ML (5 MG/ML) 10 MG: 20/4 SOLUTION PREFILLED SYRINGE at 07:12

## 2019-12-21 RX ADMIN — OXYCODONE HYDROCHLORIDE 10 MG: 10 TABLET ORAL at 05:12

## 2019-12-21 RX ADMIN — CILOSTAZOL 100 MG: 50 TABLET ORAL at 10:12

## 2019-12-21 RX ADMIN — CLOPIDOGREL BISULFATE 75 MG: 75 TABLET ORAL at 09:12

## 2019-12-21 RX ADMIN — MUPIROCIN 1 G: 20 OINTMENT TOPICAL at 09:12

## 2019-12-21 RX ADMIN — CLINDAMYCIN IN 5 PERCENT DEXTROSE 900 MG: 18 INJECTION, SOLUTION INTRAVENOUS at 05:12

## 2019-12-21 RX ADMIN — ACETAMINOPHEN 1000 MG: 500 TABLET ORAL at 09:12

## 2019-12-21 RX ADMIN — HYDRALAZINE HYDROCHLORIDE 10 MG: 20 INJECTION INTRAMUSCULAR; INTRAVENOUS at 02:12

## 2019-12-21 RX ADMIN — PREGABALIN 100 MG: 50 CAPSULE ORAL at 09:12

## 2019-12-21 RX ADMIN — HEPARIN SODIUM 5000 UNITS: 5000 INJECTION, SOLUTION INTRAVENOUS; SUBCUTANEOUS at 10:12

## 2019-12-21 RX ADMIN — OXYCODONE HYDROCHLORIDE 10 MG: 10 TABLET ORAL at 09:12

## 2019-12-21 RX ADMIN — CLINDAMYCIN IN 5 PERCENT DEXTROSE 900 MG: 18 INJECTION, SOLUTION INTRAVENOUS at 09:12

## 2019-12-21 RX ADMIN — VALSARTAN 160 MG: 160 TABLET ORAL at 11:12

## 2019-12-21 RX ADMIN — HYDROMORPHONE HYDROCHLORIDE 0.5 MG: 1 INJECTION, SOLUTION INTRAMUSCULAR; INTRAVENOUS; SUBCUTANEOUS at 07:12

## 2019-12-21 RX ADMIN — ACETAMINOPHEN 1000 MG: 500 TABLET ORAL at 02:12

## 2019-12-21 RX ADMIN — ASPIRIN 81 MG: 81 TABLET, COATED ORAL at 09:12

## 2019-12-21 RX ADMIN — CILOSTAZOL 100 MG: 50 TABLET ORAL at 09:12

## 2019-12-21 RX ADMIN — MUPIROCIN 1 G: 20 OINTMENT TOPICAL at 10:12

## 2019-12-21 RX ADMIN — OXYCODONE HYDROCHLORIDE 10 MG: 10 TABLET ORAL at 01:12

## 2019-12-21 RX ADMIN — ONDANSETRON 4 MG: 2 INJECTION INTRAMUSCULAR; INTRAVENOUS at 09:12

## 2019-12-21 NOTE — ASSESSMENT & PLAN NOTE
58yo male s/p bilateral femoral endarterectomy with left common iliac artery PTA (6 x 40 [place]), left common iliac artery stent (GORE VBX 10 x 39mm)    - nausea overnight, distention; NPO, ok for sips and ice chips  - aspirin, statin, plavix  - home meds  - biphasic R DP, will obtain arterial US of R leg; keep genaro for now  - mIVF  - d/c PCA, start PO pain meds  - q2hr neurovascular checks  - foy removed this morning, needs to void  - daily labs  - HTN overnight - restart home BP meds, PRN HTN meds for SBP>160  - encourage up and ambulation - PT/OT  - bowel reg    Dispo: continue care in PCU, will follow up arterial US

## 2019-12-21 NOTE — SUBJECTIVE & OBJECTIVE
Medications:  Continuous Infusions:   hydromorphone in 0.9 % NaCl 6 mg/30 ml       Scheduled Meds:   acetaminophen  1,000 mg Oral Q8H    aspirin  81 mg Oral Daily    atorvastatin  40 mg Oral Daily    cilostazol  100 mg Oral BID    clindamycin (CLEOCIN) IVPB  900 mg Intravenous Q8H    clopidogrel  75 mg Oral Daily    heparin (porcine)  5,000 Units Subcutaneous Q8H    mupirocin  1 g Nasal BID    pregabalin  100 mg Oral BID    valsartan  160 mg Oral Daily     PRN Meds:Dextrose 10% Bolus, Dextrose 10% Bolus, glucagon (human recombinant), glucose, glucose, hydrALAZINE, insulin aspart U-100, labetalol, naloxone, ondansetron     Objective:     Vital Signs (Most Recent):  Temp: 97.6 °F (36.4 °C) (12/21/19 0725)  Pulse: 78 (12/21/19 0757)  Resp: 12 (12/21/19 0757)  BP: (!) 187/93 (12/21/19 0725)  SpO2: 96 % (12/21/19 0757) Vital Signs (24h Range):  Temp:  [96.4 °F (35.8 °C)-98.4 °F (36.9 °C)] 97.6 °F (36.4 °C)  Pulse:  [78-93] 78  Resp:  [10-22] 12  SpO2:  [90 %-100 %] 96 %  BP: (126-187)/(72-94) 187/93  Arterial Line BP: (127-174)/(63-86) 163/78         Physical Exam   Constitutional: He is oriented to person, place, and time. He appears well-developed and well-nourished.   HENT:   Head: Normocephalic and atraumatic.   Cardiovascular: Normal rate.   Pulmonary/Chest: Effort normal.   Abdominal: Soft. He exhibits distension. There is no tenderness.   Musculoskeletal:   2+ L PT, biphasic L DP    Strong biphasic R PT,     Mild bilateral lower extremity edema       Neurological: He is alert and oriented to person, place, and time.   Skin: Skin is warm and dry.   Psychiatric: He has a normal mood and affect.   Nursing note and vitals reviewed.      Significant Labs:  CBC:   Recent Labs   Lab 12/21/19  0455   WBC 12.64   RBC 5.33   HGB 16.2   HCT 50.1      MCV 94   MCH 30.4   MCHC 32.3     CMP:   Recent Labs   Lab 12/21/19  0455   *   CALCIUM 8.8      K 4.5   CO2 26      BUN 19   CREATININE  0.8       Significant Diagnostics:  I have reviewed all pertinent imaging results/findings within the past 24 hours.

## 2019-12-21 NOTE — NURSING TRANSFER
Nursing Transfer Note      12/20/2019     Transfer to 1027 from PACU    Transfer via bed    Transfer with cardiac monitoring; Prevena Plus, holders and power cords x 2      Transported by PACU RN    Medicines sent:     Chart send with patient: Yes    Notified: son

## 2019-12-21 NOTE — CARE UPDATE
Rapid Response Respiratory Therapy ETCO2 Check     Time of visit: 075    Code Status: Prior   : 1960  Age: 59 y.o.  Weight:   Wt Readings from Last 1 Encounters:   19 121.6 kg (268 lb)     Sex: male  Race: White   Bed: [facility] A:   MRN: [id number]    SITUATION     Evaluated patient for: ETCO2 Compliance     BACKGROUND     Patient has a past medical history of Diabetes mellitus, H/O brain tumor, HLD (hyperlipidemia), Hypertension, PAD (peripheral artery disease), and Seizures.    ASSESSMENT     Is the ETCO2 monitor on? (Yes/No)  Yes  Is the patient wearing a cannula? (Yes/No) Yes  Are ETCO2 orders placed? (Yes/No) Yes  Is the patient on a PCA pump? (Yes/No) Yes  ETCO2 monitored: ETCO2 (mmHg): 43 mmHg  O2 Device/Concentration: RA  Pulse: 72 Respiratory rate: 12Temperature: Temp: 97.6 °F (36.4 °C) BP: BP: (!) 187/93 SpO2: 96  Level of Consciousness: Level of Consciousness (AVPU): alert  Respiratory Effort: Respiratory Effort: Unlabored  Expansion/Accessory Muscle Usage: Expansion/Accessory Muscles/Retractions: expansion symmetric, no retractions, no use of accessory muscles  All Lung Field Breath Sounds: All Lung Fields Breath Sounds: clear  Most recent blood gas:   Recent Labs     19  0941   PH 7.407   PCO2 38.3   PO2 189*   HCO3 24.1   POCSATURATED 100   BE -1     Ambu at bedside: Ambu bag with the patient?: Yes, Adult Ambu    INTERVENTIONS/RECOMMENDATIONS     Continue current POC and call with any acute issues.       PHYSICIAN ESCALATION     Physician Escalation (Yes/No): No     Care discussed with: N/A  Discussed plan of care primary RTGenesis     FOLLOW-UP     Please call back the Rapid Response RTMichelle CRT at [phone] for any questions or concerns.

## 2019-12-21 NOTE — OP NOTE
12/20/2019      Pre-operative Diagnosis:  Bilateral critical femoral artery stenosis; critical left common iliac artery stenosis  Post-operative Diagnosis: same.    Procedures:  1) ENDARTERECTOMY, FEMORAL (Bilateral)  2) ANGIOGRAM, PELVIC  3) Left common iliac artery PTA (6 x 40 [place])  4) Left common iliac artery stent (GORE VBX 10 x 39mm)  5) Prevena VAC placement, bilateral groins    Surgeon: Kelby Gomez MD    Assistants: MD Mynor Thomas MD    Anesthesia: General    Findings/Key elements:   Successful treatment of Bilateral critical femoral artery stenosis; critical left common iliac artery stenosis.  Biphasic bilateral pedal signals    Implants:   Implant Name Type Inv. Item Serial No.  Lot No. LRB No. Used   GRAFT VAS GUARD 0.8X8CM BOVINE - [id number]  GRAFT VAS GUARD 0.8X8CM BOVINE  [employer] [id number] Left 1   GRAFT VAS GUARD 0.8X8CM BOVINE - [id number]  GRAFT VAS GUARD 0.8X8CM BOVINE  [employer] [id number] Left 1   Snyder Viabahn VBX balloon expandable endoprosthesis   [id number]   Left 1             Indications: 59 y M with LLE rest pain, L MICHAEL stenosis, bilateral femoral artery stenosis, and R SFA stenosis. He presents for treatment    Procedure Details:  The patient was seen in the Holding Room. The risks, benefits, complications, treatment options, and expected outcomes were discussed with the patient. The patient concurred with the proposed plan, giving informed consent.  The site of surgery properly noted/marked.     Patient was brought to the OR and positioned supine on the table. General anesthesia was administered by anesthesia team. Patient's bilateral groins was prepped and draped in usual sterile fashion. A Time Out was held and the above information confirmed.    A longitudinal incision was made directly over the left common femoral artery and the dissection carried down the electrocautery to the level of the common  femoral with proximal extension to the level of the distal external iliac .  Dissection was carried distal to the level of the SFA and profunda and each was encircled with a vessel loop as well. The same was done for the right common femoral artery, SFA, and profunda. Systemic heparin was administered and ACT confirmed adequate anticoagulation. Vesseloops were used for occlusion of the left superficial femoral and profunda arteries. A Karshner clamp was placed on the L distal EIA proximally. An 11 blade used to make an arteriotomy and a densely calcified common femoral extended with the Jackman scissors with proximal extension up to the level of the proximal CFA and distal extension to the level of the distal SFA. The Lihue elevator was used to perform extensive endarterectomy in extensive amount of plaque was removed. Smooth end points were obtained proximally and distally. There was widely patent flow to the SFA and profunda. The arteriotomy was closed utilizing a bovine pericardial patch and a running continuous 5-0 prolene suture and prior to completion the arteriotomy site was allowed to backbleed and flushed antegrade and the closure completed. The same was then repeated on the R CFA, again performing a thorough endarterectomy and closing with a bovine pericardial patch and running continuous 5-0 prolene suture. Next the endovascular portion was prepared. A pursestring suture was pre-emptively placed, then the L CFA patch was accessed with a micropuncture needle, mandril wire, and micropuncture sheath. Fluoroscopy was used to watch the wire advanced to the L MICHAEL. The sheath was then upsized to 7 Fr, and the stenosis crossed using a stiff angled glidewire and angled glide cath. Angiogram confirmed location. The lesion was pre-dilated with a 6 mm balloon. The sheath was then upsized to 8 Fr and advanced across the stenosis. A 10x39 VBX was then positioned at the stenosis, and the sheath pulled back. The stent was  then deployed, inflating the balloon to 11 etta. Completion angiogram showed excellent flow. He had a palpable pulse at the femoral patch. Meticulous hemostasis was obtained with thrombin Gelfoam. The incision site was closed with multiple interrupted 2-0 Vicryl sutures followed by 3-0 Vicryl and the skin closed with monocryl. A prevena vac was applied. He had biphasic pedal signals bilaterally at the completion of surgery.    EBL:  100 mL           Complications:  None; patient tolerated the procedure well.           Disposition: Stable to recovery.      Dr Gomez was scrubbed and present for the entire procedure.    Carolyn Pat MD   Fellow, Vascular/Endovascular Surgery

## 2019-12-21 NOTE — PROGRESS NOTES
[facility]  Vascular Surgery  Progress Note    Patient Name: Renan Britton  MRN: [id number]  Admission Date: 12/20/2019  Primary Care Provider: Eren Becker MD    Subjective:     Interval History: No acute events overnight, had some nausea - no emesis. Patient states he feels bloated. Pain well controlled on dilaudid PCA. Packer removed this morning, has not voided.    Post-Op Info:  Procedure(s) (LRB):  ENDARTERECTOMY, FEMORAL (Bilateral)  ANGIOGRAM, ABDOMINAL AORTA W/ EXTREMITY RUNOFF (Bilateral)  STENT, ILIAC ARTERY (Left)   1 Day Post-Op       Medications:  Continuous Infusions:   hydromorphone in 0.9 % NaCl 6 mg/30 ml       Scheduled Meds:   acetaminophen  1,000 mg Oral Q8H    aspirin  81 mg Oral Daily    atorvastatin  40 mg Oral Daily    cilostazol  100 mg Oral BID    clindamycin (CLEOCIN) IVPB  900 mg Intravenous Q8H    clopidogrel  75 mg Oral Daily    heparin (porcine)  5,000 Units Subcutaneous Q8H    mupirocin  1 g Nasal BID    pregabalin  100 mg Oral BID    valsartan  160 mg Oral Daily     PRN Meds:Dextrose 10% Bolus, Dextrose 10% Bolus, glucagon (human recombinant), glucose, glucose, hydrALAZINE, insulin aspart U-100, labetalol, naloxone, ondansetron     Objective:     Vital Signs (Most Recent):  Temp: 97.6 °F (36.4 °C) (12/21/19 0725)  Pulse: 78 (12/21/19 0757)  Resp: 12 (12/21/19 0757)  BP: (!) 187/93 (12/21/19 0725)  SpO2: 96 % (12/21/19 0757) Vital Signs (24h Range):  Temp:  [96.4 °F (35.8 °C)-98.4 °F (36.9 °C)] 97.6 °F (36.4 °C)  Pulse:  [78-93] 78  Resp:  [10-22] 12  SpO2:  [90 %-100 %] 96 %  BP: (126-187)/(72-94) 187/93  Arterial Line BP: (127-174)/(63-86) 163/78         Physical Exam   Constitutional: He is oriented to person, place, and time. He appears well-developed and well-nourished.   HENT:   Head: Normocephalic and atraumatic.   Cardiovascular: Normal rate.   Pulmonary/Chest: Effort normal.   Abdominal: Soft. He exhibits distension. There is no tenderness.    Musculoskeletal:   2+ L PT, biphasic L DP    Strong biphasic R PT, weak biphasic DP    Trace bilateral lower extremity edema       Neurological: He is alert and oriented to person, place, and time.   Skin: Skin is warm and dry.   Psychiatric: He has a normal mood and affect.   Nursing note and vitals reviewed.      Significant Labs:  CBC:   Recent Labs   Lab 12/21/19  0455   WBC 12.64   RBC 5.33   HGB 16.2   HCT 50.1      MCV 94   MCH 30.4   MCHC 32.3     CMP:   Recent Labs   Lab 12/21/19  0455   *   CALCIUM 8.8      K 4.5   CO2 26      BUN 19   CREATININE 0.8       Significant Diagnostics:  I have reviewed all pertinent imaging results/findings within the past 24 hours.    Assessment/Plan:     PAD (peripheral artery disease)  58yo male s/p bilateral femoral endarterectomy with left common iliac artery PTA (6 x 40 [place]), left common iliac artery stent (GORE VBX 10 x 39mm)    - nausea overnight, distention; NPO, ok for sips and ice chips  - aspirin, statin, plavix  - home meds  - biphasic R DP; keep genaro for now  - mIVF  - d/c PCA, start PO pain meds  - q2hr neurovascular checks  - foy removed this morning, needs to void  - daily labs  - HTN overnight - restart home BP meds, PRN HTN meds for SBP>160  - encourage up and ambulation - PT/OT  - bowel reg    Dispo: continue care in PCU        Adelina Arboleda MD  Vascular Surgery  [facility]

## 2019-12-22 LAB
ANION GAP SERPL CALC-SCNC: 8 MMOL/L (ref 8–16)
BASOPHILS # BLD AUTO: 0.03 K/UL (ref 0–0.2)
BASOPHILS NFR BLD: 0.3 % (ref 0–1.9)
BUN SERPL-MCNC: 22 MG/DL (ref 6–20)
CALCIUM SERPL-MCNC: 8.9 MG/DL (ref 8.7–10.5)
CHLORIDE SERPL-SCNC: 102 MMOL/L (ref 95–110)
CO2 SERPL-SCNC: 25 MMOL/L (ref 23–29)
CREAT SERPL-MCNC: 0.8 MG/DL (ref 0.5–1.4)
DIFFERENTIAL METHOD: ABNORMAL
EOSINOPHIL # BLD AUTO: 0.1 K/UL (ref 0–0.5)
EOSINOPHIL NFR BLD: 0.7 % (ref 0–8)
ERYTHROCYTE [DISTWIDTH] IN BLOOD BY AUTOMATED COUNT: 12.9 % (ref 11.5–14.5)
EST. GFR  (AFRICAN AMERICAN): >60 ML/MIN/1.73 M^2
EST. GFR  (NON AFRICAN AMERICAN): >60 ML/MIN/1.73 M^2
GLUCOSE SERPL-MCNC: 126 MG/DL (ref 70–110)
HCT VFR BLD AUTO: 46.3 % (ref 40–54)
HGB BLD-MCNC: 15 G/DL (ref 14–18)
IMM GRANULOCYTES # BLD AUTO: 0.02 K/UL (ref 0–0.04)
IMM GRANULOCYTES NFR BLD AUTO: 0.2 % (ref 0–0.5)
LYMPHOCYTES # BLD AUTO: 1.9 K/UL (ref 1–4.8)
LYMPHOCYTES NFR BLD: 19.3 % (ref 18–48)
MCH RBC QN AUTO: 30.6 PG (ref 27–31)
MCHC RBC AUTO-ENTMCNC: 32.4 G/DL (ref 32–36)
MCV RBC AUTO: 95 FL (ref 82–98)
MONOCYTES # BLD AUTO: 1 K/UL (ref 0.3–1)
MONOCYTES NFR BLD: 10.3 % (ref 4–15)
NEUTROPHILS # BLD AUTO: 6.9 K/UL (ref 1.8–7.7)
NEUTROPHILS NFR BLD: 69.2 % (ref 38–73)
NRBC BLD-RTO: 0 /100 WBC
PLATELET # BLD AUTO: 149 K/UL (ref 150–350)
PMV BLD AUTO: 11.3 FL (ref 9.2–12.9)
POCT GLUCOSE: 109 MG/DL (ref 70–110)
POCT GLUCOSE: 111 MG/DL (ref 70–110)
POCT GLUCOSE: 128 MG/DL (ref 70–110)
POCT GLUCOSE: 140 MG/DL (ref 70–110)
POTASSIUM SERPL-SCNC: 3.9 MMOL/L (ref 3.5–5.1)
RBC # BLD AUTO: 4.9 M/UL (ref 4.6–6.2)
SODIUM SERPL-SCNC: 135 MMOL/L (ref 136–145)
WBC # BLD AUTO: 9.99 K/UL (ref 3.9–12.7)

## 2019-12-22 PROCEDURE — 63600175 PHARM REV CODE 636 W HCPCS: Performed by: STUDENT IN AN ORGANIZED HEALTH CARE EDUCATION/TRAINING PROGRAM

## 2019-12-22 PROCEDURE — 97161 PT EVAL LOW COMPLEX 20 MIN: CPT

## 2019-12-22 PROCEDURE — 85025 COMPLETE CBC W/AUTO DIFF WBC: CPT

## 2019-12-22 PROCEDURE — 25000003 PHARM REV CODE 250: Performed by: STUDENT IN AN ORGANIZED HEALTH CARE EDUCATION/TRAINING PROGRAM

## 2019-12-22 PROCEDURE — 20600001 HC STEP DOWN PRIVATE ROOM

## 2019-12-22 PROCEDURE — 94761 N-INVAS EAR/PLS OXIMETRY MLT: CPT

## 2019-12-22 PROCEDURE — 80048 BASIC METABOLIC PNL TOTAL CA: CPT

## 2019-12-22 PROCEDURE — 97110 THERAPEUTIC EXERCISES: CPT

## 2019-12-22 PROCEDURE — 36415 COLL VENOUS BLD VENIPUNCTURE: CPT

## 2019-12-22 RX ORDER — DIPHENHYDRAMINE HCL 25 MG
25 CAPSULE ORAL EVERY 6 HOURS PRN
Status: DISCONTINUED | OUTPATIENT
Start: 2019-12-22 | End: 2019-12-24 | Stop reason: HOSPADM

## 2019-12-22 RX ORDER — AMOXICILLIN 250 MG
1 CAPSULE ORAL DAILY
Status: DISCONTINUED | OUTPATIENT
Start: 2019-12-22 | End: 2019-12-24 | Stop reason: HOSPADM

## 2019-12-22 RX ORDER — POLYETHYLENE GLYCOL 3350 17 G/17G
17 POWDER, FOR SOLUTION ORAL DAILY
Status: DISCONTINUED | OUTPATIENT
Start: 2019-12-22 | End: 2019-12-24 | Stop reason: HOSPADM

## 2019-12-22 RX ORDER — CETIRIZINE HYDROCHLORIDE 10 MG/1
10 TABLET ORAL DAILY
Status: DISCONTINUED | OUTPATIENT
Start: 2019-12-22 | End: 2019-12-24 | Stop reason: HOSPADM

## 2019-12-22 RX ADMIN — PREGABALIN 100 MG: 50 CAPSULE ORAL at 09:12

## 2019-12-22 RX ADMIN — CLOPIDOGREL BISULFATE 75 MG: 75 TABLET ORAL at 09:12

## 2019-12-22 RX ADMIN — HEPARIN SODIUM 5000 UNITS: 5000 INJECTION, SOLUTION INTRAVENOUS; SUBCUTANEOUS at 02:12

## 2019-12-22 RX ADMIN — ACETAMINOPHEN 1000 MG: 500 TABLET ORAL at 09:12

## 2019-12-22 RX ADMIN — OXYCODONE HYDROCHLORIDE 10 MG: 10 TABLET ORAL at 02:12

## 2019-12-22 RX ADMIN — CILOSTAZOL 100 MG: 50 TABLET ORAL at 09:12

## 2019-12-22 RX ADMIN — HEPARIN SODIUM 5000 UNITS: 5000 INJECTION, SOLUTION INTRAVENOUS; SUBCUTANEOUS at 05:12

## 2019-12-22 RX ADMIN — OXYCODONE HYDROCHLORIDE 10 MG: 10 TABLET ORAL at 07:12

## 2019-12-22 RX ADMIN — ASPIRIN 81 MG: 81 TABLET, COATED ORAL at 09:12

## 2019-12-22 RX ADMIN — OXYCODONE HYDROCHLORIDE 5 MG: 5 TABLET ORAL at 11:12

## 2019-12-22 RX ADMIN — MUPIROCIN 1 G: 20 OINTMENT TOPICAL at 09:12

## 2019-12-22 RX ADMIN — SENNOSIDES AND DOCUSATE SODIUM 1 TABLET: 8.6; 5 TABLET ORAL at 09:12

## 2019-12-22 RX ADMIN — ACETAMINOPHEN 1000 MG: 500 TABLET ORAL at 02:12

## 2019-12-22 RX ADMIN — OXYCODONE HYDROCHLORIDE 10 MG: 10 TABLET ORAL at 06:12

## 2019-12-22 RX ADMIN — ATORVASTATIN CALCIUM 40 MG: 20 TABLET, FILM COATED ORAL at 09:12

## 2019-12-22 RX ADMIN — ACETAMINOPHEN 1000 MG: 500 TABLET ORAL at 05:12

## 2019-12-22 RX ADMIN — HEPARIN SODIUM 5000 UNITS: 5000 INJECTION, SOLUTION INTRAVENOUS; SUBCUTANEOUS at 09:12

## 2019-12-22 RX ADMIN — VALSARTAN 160 MG: 160 TABLET ORAL at 09:12

## 2019-12-22 RX ADMIN — OXYCODONE HYDROCHLORIDE 10 MG: 10 TABLET ORAL at 10:12

## 2019-12-22 RX ADMIN — POLYETHYLENE GLYCOL 3350 17 G: 17 POWDER, FOR SOLUTION ORAL at 09:12

## 2019-12-22 RX ADMIN — CETIRIZINE HYDROCHLORIDE 10 MG: 10 TABLET, FILM COATED ORAL at 09:12

## 2019-12-22 NOTE — PROGRESS NOTES
[facility]  Vascular Surgery  Progress Note    Patient Name: Renan Britton  MRN: [id number]  Admission Date: 12/20/2019  Primary Care Provider: Eren Becker MD    Subjective:     Interval History: No acute events overnight. Tolerated sips, no nausea/vomiting; passing gas now. Voided. Pain well controlled on PO PRN pain meds. Strong biphasic R PT and DP this morning.    Post-Op Info:  Procedure(s) (LRB):  ENDARTERECTOMY, FEMORAL (Bilateral)  ANGIOGRAM, ABDOMINAL AORTA W/ EXTREMITY RUNOFF (Bilateral)  STENT, ILIAC ARTERY (Left)   2 Days Post-Op       Medications:  Continuous Infusions:    Scheduled Meds:   acetaminophen  1,000 mg Oral Q8H    aspirin  81 mg Oral Daily    atorvastatin  40 mg Oral Daily    cetirizine  10 mg Oral Daily    cilostazol  100 mg Oral BID    clopidogrel  75 mg Oral Daily    heparin (porcine)  5,000 Units Subcutaneous Q8H    mupirocin  1 g Nasal BID    pregabalin  100 mg Oral BID    valsartan  160 mg Oral Daily     PRN Meds:artificial tears, Dextrose 10% Bolus, Dextrose 10% Bolus, diphenhydrAMINE, glucagon (human recombinant), glucose, glucose, hydrALAZINE, HYDROmorphone, insulin aspart U-100, labetalol, naloxone, ondansetron, oxyCODONE, oxyCODONE     Objective:     Vital Signs (Most Recent):  Temp: 96.9 °F (36.1 °C) (12/22/19 0455)  Pulse: 87 (12/22/19 0455)  Resp: 18 (12/22/19 0455)  BP: (!) 144/70 (12/22/19 0455)  SpO2: 95 % (12/22/19 0455) Vital Signs (24h Range):  Temp:  [96.7 °F (35.9 °C)-98.3 °F (36.8 °C)] 96.9 °F (36.1 °C)  Pulse:  [69-90] 87  Resp:  [12-20] 18  SpO2:  [95 %-97 %] 95 %  BP: (133-187)/(64-93) 144/70         Physical Exam   Constitutional: He is oriented to person, place, and time. He appears well-developed and well-nourished.   HENT:   Head: Normocephalic and atraumatic.   Cardiovascular: Normal rate.   Pulmonary/Chest: Effort normal.   Abdominal: Soft. He exhibits no distension. There is no tenderness.   Musculoskeletal:   2+ L PT,  biphasic L DP    Strong biphasic R PT/DP    No lower extremity edema. Provena wound vacs in place to groin bilaterally, adequate suction.       Neurological: He is alert and oriented to person, place, and time.   Skin: Skin is warm and dry.   Psychiatric: He has a normal mood and affect.   Nursing note and vitals reviewed.      Significant Labs:  CBC:   Recent Labs   Lab 12/21/19  0455   WBC 12.64   RBC 5.33   HGB 16.2   HCT 50.1      MCV 94   MCH 30.4   MCHC 32.3     CMP:   Recent Labs   Lab 12/21/19  0455   *   CALCIUM 8.8      K 4.5   CO2 26      BUN 19   CREATININE 0.8       Significant Diagnostics:  I have reviewed all pertinent imaging results/findings within the past 24 hours.    Assessment/Plan:     PAD (peripheral artery disease)  58yo male s/p bilateral femoral endarterectomy with left common iliac artery PTA (6 x 40 [place]), left common iliac artery stent (GORE VBX 10 x 39mm)    - advance to CLD, can advance to regular later today if tolerating clears  - aspirin, statin, plavix  - home meds  - d/c genaro   - PO PRN pain meds  - q4hr neurovascular checks   - daily labs  - home BP meds, PRN HTN meds for SBP>160  - encourage up and ambulation - PT/OT  - bowel reg    Dispo: sigifredo/[place] lam, stepdown to floor        Adelina Arboleda MD  Vascular Surgery  [facility]

## 2019-12-22 NOTE — PLAN OF CARE
Pt is A&Ox4 injury free. Breathing regular equal and unlabored bilaterally during the night shift. Wife spent the night. Pain was controlled with oral and IV medications. Legs were elevated during night shift. The right foot is cooler then the left. The dorsal pulse on the right foot is very weak and difficult to locate.

## 2019-12-22 NOTE — ASSESSMENT & PLAN NOTE
60yo male s/p bilateral femoral endarterectomy with left common iliac artery PTA (6 x 40 [place]), left common iliac artery stent (GORE VBX 10 x 39mm)    - advance to CLD, can advance to regular later today if tolerating clears  - aspirin, statin, plavix  - home meds  - d/c genaro   - PO PRN pain meds  - q2hr neurovascular checks - will discuss with staff stretching out to q4  - daily labs  - home BP meds, PRN HTN meds for SBP>160  - encourage up and ambulation - PT/OT  - bowel reg    Dispo: d/c genaro, possible stepdown to floor pending discussion with staff

## 2019-12-22 NOTE — PLAN OF CARE
Problem: Physical Therapy Goal  Goal: Physical Therapy Goal  Description  Goals to be met by: 2020     Patient will increase functional independence with mobility by performin. Supine to sit with Set-up [place]  2. Sit to stand transfer with Modified [place]  3. Gait  x 150 feet with Supervision using LRAD   4. Ascend/descend 3 stair with no Handrails Stand-by Assistance using LRAD     Outcome: Ongoing, Progressing    Pt evaluated and appropriate goals established.     Jennifer Boss, PT, DPT  2019  [phone]

## 2019-12-22 NOTE — PT/OT/SLP EVAL
"Physical Therapy Evaluation and Treatment    Patient Name:  Renan Britton   MRN:  [id number]    Recommendations:     Discharge Recommendations:  home health PT   Discharge Equipment Recommendations: none   Barriers to discharge: None    Assessment:     Renan Britton is a 59 y.o. male admitted with a medical diagnosis of PAD (peripheral artery disease).  He presents with the following impairments/functional limitations:  weakness, impaired functional mobilty, gait instability, impaired balance, pain. Pt tolerated activity with stand by assistance to transfer to chair. Pt participated in exercises, session limited by RN preparing to remove arterial line. Pt encouraged to ambulate with nursing staff or family daily. Pt would continue to benefit from acute skilled therapy intervention to address deficits and progress toward prior level of function.       Rehab Prognosis: Good; patient would benefit from acute skilled PT services to address these deficits and reach maximum level of function.    Recent Surgery: Procedure(s) (LRB):  ENDARTERECTOMY, FEMORAL (Bilateral)  ANGIOGRAM, ABDOMINAL AORTA W/ EXTREMITY RUNOFF (Bilateral)  STENT, ILIAC ARTERY (Left) 2 Days Post-Op    Plan:     During this hospitalization, patient to be seen 4 x/week to address the identified rehab impairments via gait training, therapeutic activities, therapeutic exercises, neuromuscular re-education and progress toward the following goals:    · Plan of Care Expires:  01/22/20    Subjective     Chief Complaint: pt states "I needed some help to get up yesterday"   Patient/Family Comments/goals: to get better and return home   Pain/Comfort:  · Pain Rating 1: 4/10  · Location - Side 1: Bilateral  · Location - Orientation 1: generalized  · Location 1: leg  · Pain Addressed 1: Reposition, Distraction, Cessation of Activity  · Pain Rating Post-Intervention 1: 4/10    Patients cultural, spiritual, Episcopal conflicts given the current situation: " no    Living Environment:  Pt lives with spouse in a H with 3 MILAN with no HR.   Prior to admission, patients level of function was independent with mobility and ADLs, was working.  Equipment used at home: none.  DME owned (not currently used): none.  Upon discharge, patient will have assistance from spouse.    Objective:     Communicated with RN prior to session.  Patient found HOB elevated with arterial line, blood pressure cuff, pulse ox (continuous), telemetry  upon PT entry to room.    General Precautions: Standard, fall   Orthopedic Precautions:N/A   Braces: N/A     Exams:  · Cognitive Exam:  Patient is AAOx4, followed all commands, communicates clearly and fluently  · Gross Motor Coordination:  WFL  · RUE ROM: WFL  · RUE Strength: WFL  · LUE ROM: WFL  · LUE Strength: WFL  · RLE ROM: WFL  · RLE Strength: WFL  · LLE ROM: WFL  · LLE Strength: WFL    Functional Mobility:  · Bed Mobility:     · Supine to Sit: minimum assistance  · Transfers:     · Sit to Stand:  stand by assistance with no AD  · Bed to Chair: stand by assistance with  no AD  using  Step Transfer  · Gait: Pt ambulated 8 steps from bed to chair with no AD and stand by assistance. Pt with no LOB, no SOB, no dizziness. Cuing required to prevent pt from holding breath.       Therapeutic Activities and Exercises:   Arterial line reading BP low following transfer to chair. While pt in chair, pt performed 10x B LAQ, 10x B seated marching. RN notified of BP, arrived and reported plan to remove arterial line. Session terminated 2/2 RN to remove arterial line.   Pt encouraged to ambulate daily with assistance/supervision from nursing/therapy. Pt agreeable.  Pt educated on seated exercises to perform 20x, 3x/day. Exercises included bilateral LAQ, marching, ankle DF/PF      AM-PAC 6 CLICK MOBILITY  Total Score:17     Patient left up in chair with all lines intact, call button in reach and RN present.    GOALS:   Multidisciplinary Problems     Physical  Therapy Goals        Problem: Physical Therapy Goal    Goal Priority Disciplines Outcome Goal Variances Interventions   Physical Therapy Goal     PT, PT/OT Ongoing, Progressing     Description:  Goals to be met by: 2020     Patient will increase functional independence with mobility by performin. Supine to sit with Set-up [place]  2. Sit to stand transfer with Modified [place]  3. Gait  x 150 feet with Supervision using LRAD   4. Ascend/descend 3 stair with no Handrails Stand-by Assistance using LRAD                      History:     Past Medical History:   Diagnosis Date    Diabetes mellitus     H/O brain tumor     HLD (hyperlipidemia)     Hypertension     PAD (peripheral artery disease)     Seizures     R/T BRAIN TUMOR- SURGICALLY EXCISED       Past Surgical History:   Procedure Laterality Date    BRAIN SURGERY  2011    TUMOR EXCISED    PERCUTANEOUS TRANSLUMINAL ANGIOPLASTY (PTA) OF PERIPHERAL VESSEL Left 10/17/2019    Procedure: PTA, PERIPHERAL VESSEL;  Surgeon: Rao Fisher MD;  [facility]: [facility] C[facility];  Service: Cardiology;  Laterality: Left;       Time Tracking:     PT Received On: 19  PT Start Time: 0833     PT Stop Time: 0858  PT Total Time (min): 25 min     Billable Minutes: Evaluation 10 mins  and Therapeutic Exercise 10 mins       Jennifer Boss PT  2019

## 2019-12-22 NOTE — SUBJECTIVE & OBJECTIVE
Medications:  Continuous Infusions:    Scheduled Meds:   acetaminophen  1,000 mg Oral Q8H    aspirin  81 mg Oral Daily    atorvastatin  40 mg Oral Daily    cetirizine  10 mg Oral Daily    cilostazol  100 mg Oral BID    clopidogrel  75 mg Oral Daily    heparin (porcine)  5,000 Units Subcutaneous Q8H    mupirocin  1 g Nasal BID    pregabalin  100 mg Oral BID    valsartan  160 mg Oral Daily     PRN Meds:artificial tears, Dextrose 10% Bolus, Dextrose 10% Bolus, diphenhydrAMINE, glucagon (human recombinant), glucose, glucose, hydrALAZINE, HYDROmorphone, insulin aspart U-100, labetalol, naloxone, ondansetron, oxyCODONE, oxyCODONE     Objective:     Vital Signs (Most Recent):  Temp: 96.9 °F (36.1 °C) (12/22/19 0455)  Pulse: 87 (12/22/19 0455)  Resp: 18 (12/22/19 0455)  BP: (!) 144/70 (12/22/19 0455)  SpO2: 95 % (12/22/19 0455) Vital Signs (24h Range):  Temp:  [96.7 °F (35.9 °C)-98.3 °F (36.8 °C)] 96.9 °F (36.1 °C)  Pulse:  [69-90] 87  Resp:  [12-20] 18  SpO2:  [95 %-97 %] 95 %  BP: (133-187)/(64-93) 144/70         Physical Exam   Constitutional: He is oriented to person, place, and time. He appears well-developed and well-nourished.   HENT:   Head: Normocephalic and atraumatic.   Cardiovascular: Normal rate.   Pulmonary/Chest: Effort normal.   Abdominal: Soft. He exhibits no distension. There is no tenderness.   Musculoskeletal:   2+ L PT, biphasic L DP    Strong biphasic R PT/DP    No lower extremity edema. Provena wound vacs in place to groin bilaterally, adequate suction.       Neurological: He is alert and oriented to person, place, and time.   Skin: Skin is warm and dry.   Psychiatric: He has a normal mood and affect.   Nursing note and vitals reviewed.      Significant Labs:  CBC:   Recent Labs   Lab 12/21/19 0455   WBC 12.64   RBC 5.33   HGB 16.2   HCT 50.1      MCV 94   MCH 30.4   MCHC 32.3     CMP:   Recent Labs   Lab 12/21/19  0455   *   CALCIUM 8.8      K 4.5   CO2 26       BUN 19   CREATININE 0.8       Significant Diagnostics:  I have reviewed all pertinent imaging results/findings within the past 24 hours.

## 2019-12-22 NOTE — NURSING
Patient stepped down from Progressive status today. A-line discontinued. Placed on a telemetry box. Ambulating in hallway with standby assistance. Tolerating 50% of clear liquid diet with no reported nausea. Pain controlled with medication and positioning. Bilateral Wound Vacs in place. Neurovascular checks done Q4h. Accuchecks done AC/HS. VSS. On room air. PT worked with patient today. He has been up in chair since 0830. RAMÓN.

## 2019-12-22 NOTE — NURSING
Bilateral groin incisions with each a Prevena wound vac with no drainage noted.  Pt up to chair twice through shift for few hrs each time. PCA d/c'd with oral Oxy IR administered as ordered with adequate pain control. NPO except sips with meds and ice. One small emesis episode early this morning, no nausea rest of shift. NV Q2 hrs- right foot cool to touch with difficulty locating dorsal ped but +1 pulse to post tib, MD aware with no change all shift. Left dorsal and post tib pulses doppler. Right radial A-line remains intact for SBP monitoring, keep <160, PRN IV Hydralazine and IV Labetalol PRN. No acute distress or needs at this time. Wife and son at bedside. WCTM

## 2019-12-23 LAB
ANION GAP SERPL CALC-SCNC: 8 MMOL/L (ref 8–16)
BASOPHILS # BLD AUTO: 0.04 K/UL (ref 0–0.2)
BASOPHILS NFR BLD: 0.4 % (ref 0–1.9)
BUN SERPL-MCNC: 16 MG/DL (ref 6–20)
CALCIUM SERPL-MCNC: 9.3 MG/DL (ref 8.7–10.5)
CHLORIDE SERPL-SCNC: 100 MMOL/L (ref 95–110)
CO2 SERPL-SCNC: 27 MMOL/L (ref 23–29)
CREAT SERPL-MCNC: 0.8 MG/DL (ref 0.5–1.4)
DIFFERENTIAL METHOD: ABNORMAL
EOSINOPHIL # BLD AUTO: 0.1 K/UL (ref 0–0.5)
EOSINOPHIL NFR BLD: 1.5 % (ref 0–8)
ERYTHROCYTE [DISTWIDTH] IN BLOOD BY AUTOMATED COUNT: 12.6 % (ref 11.5–14.5)
EST. GFR  (AFRICAN AMERICAN): >60 ML/MIN/1.73 M^2
EST. GFR  (NON AFRICAN AMERICAN): >60 ML/MIN/1.73 M^2
GLUCOSE SERPL-MCNC: 139 MG/DL (ref 70–110)
HCT VFR BLD AUTO: 45.4 % (ref 40–54)
HGB BLD-MCNC: 15.1 G/DL (ref 14–18)
IMM GRANULOCYTES # BLD AUTO: 0.02 K/UL (ref 0–0.04)
IMM GRANULOCYTES NFR BLD AUTO: 0.2 % (ref 0–0.5)
LYMPHOCYTES # BLD AUTO: 1.2 K/UL (ref 1–4.8)
LYMPHOCYTES NFR BLD: 13.2 % (ref 18–48)
MCH RBC QN AUTO: 31 PG (ref 27–31)
MCHC RBC AUTO-ENTMCNC: 33.3 G/DL (ref 32–36)
MCV RBC AUTO: 93 FL (ref 82–98)
MONOCYTES # BLD AUTO: 0.9 K/UL (ref 0.3–1)
MONOCYTES NFR BLD: 10 % (ref 4–15)
NEUTROPHILS # BLD AUTO: 6.7 K/UL (ref 1.8–7.7)
NEUTROPHILS NFR BLD: 74.7 % (ref 38–73)
NRBC BLD-RTO: 0 /100 WBC
PLATELET # BLD AUTO: 149 K/UL (ref 150–350)
PMV BLD AUTO: 11.1 FL (ref 9.2–12.9)
POCT GLUCOSE: 117 MG/DL (ref 70–110)
POCT GLUCOSE: 119 MG/DL (ref 70–110)
POCT GLUCOSE: 90 MG/DL (ref 70–110)
POCT GLUCOSE: 99 MG/DL (ref 70–110)
POTASSIUM SERPL-SCNC: 4.2 MMOL/L (ref 3.5–5.1)
RBC # BLD AUTO: 4.87 M/UL (ref 4.6–6.2)
SODIUM SERPL-SCNC: 135 MMOL/L (ref 136–145)
WBC # BLD AUTO: 8.96 K/UL (ref 3.9–12.7)

## 2019-12-23 PROCEDURE — 36415 COLL VENOUS BLD VENIPUNCTURE: CPT

## 2019-12-23 PROCEDURE — 20600001 HC STEP DOWN PRIVATE ROOM

## 2019-12-23 PROCEDURE — 25000003 PHARM REV CODE 250: Performed by: STUDENT IN AN ORGANIZED HEALTH CARE EDUCATION/TRAINING PROGRAM

## 2019-12-23 PROCEDURE — 80048 BASIC METABOLIC PNL TOTAL CA: CPT

## 2019-12-23 PROCEDURE — 85025 COMPLETE CBC W/AUTO DIFF WBC: CPT

## 2019-12-23 PROCEDURE — 97165 OT EVAL LOW COMPLEX 30 MIN: CPT

## 2019-12-23 PROCEDURE — 63600175 PHARM REV CODE 636 W HCPCS: Performed by: STUDENT IN AN ORGANIZED HEALTH CARE EDUCATION/TRAINING PROGRAM

## 2019-12-23 RX ORDER — BISACODYL 10 MG
10 SUPPOSITORY, RECTAL RECTAL DAILY
Status: DISCONTINUED | OUTPATIENT
Start: 2019-12-23 | End: 2019-12-24 | Stop reason: HOSPADM

## 2019-12-23 RX ADMIN — HEPARIN SODIUM 5000 UNITS: 5000 INJECTION, SOLUTION INTRAVENOUS; SUBCUTANEOUS at 11:12

## 2019-12-23 RX ADMIN — ONDANSETRON 4 MG: 2 INJECTION INTRAMUSCULAR; INTRAVENOUS at 04:12

## 2019-12-23 RX ADMIN — PREGABALIN 100 MG: 50 CAPSULE ORAL at 08:12

## 2019-12-23 RX ADMIN — VALSARTAN 160 MG: 160 TABLET ORAL at 08:12

## 2019-12-23 RX ADMIN — ACETAMINOPHEN 1000 MG: 500 TABLET ORAL at 11:12

## 2019-12-23 RX ADMIN — CLOPIDOGREL BISULFATE 75 MG: 75 TABLET ORAL at 08:12

## 2019-12-23 RX ADMIN — ACETAMINOPHEN 1000 MG: 500 TABLET ORAL at 05:12

## 2019-12-23 RX ADMIN — MUPIROCIN 1 G: 20 OINTMENT TOPICAL at 08:12

## 2019-12-23 RX ADMIN — BISACODYL 10 MG: 10 SUPPOSITORY RECTAL at 06:12

## 2019-12-23 RX ADMIN — CETIRIZINE HYDROCHLORIDE 10 MG: 10 TABLET, FILM COATED ORAL at 08:12

## 2019-12-23 RX ADMIN — HEPARIN SODIUM 5000 UNITS: 5000 INJECTION, SOLUTION INTRAVENOUS; SUBCUTANEOUS at 01:12

## 2019-12-23 RX ADMIN — SENNOSIDES AND DOCUSATE SODIUM 1 TABLET: 8.6; 5 TABLET ORAL at 08:12

## 2019-12-23 RX ADMIN — OXYCODONE HYDROCHLORIDE 10 MG: 10 TABLET ORAL at 08:12

## 2019-12-23 RX ADMIN — ATORVASTATIN CALCIUM 40 MG: 20 TABLET, FILM COATED ORAL at 08:12

## 2019-12-23 RX ADMIN — MUPIROCIN 1 G: 20 OINTMENT TOPICAL at 09:12

## 2019-12-23 RX ADMIN — HEPARIN SODIUM 5000 UNITS: 5000 INJECTION, SOLUTION INTRAVENOUS; SUBCUTANEOUS at 05:12

## 2019-12-23 RX ADMIN — ASPIRIN 81 MG: 81 TABLET, COATED ORAL at 08:12

## 2019-12-23 RX ADMIN — OXYCODONE HYDROCHLORIDE 10 MG: 10 TABLET ORAL at 05:12

## 2019-12-23 RX ADMIN — OXYCODONE HYDROCHLORIDE 10 MG: 10 TABLET ORAL at 11:12

## 2019-12-23 RX ADMIN — OXYCODONE HYDROCHLORIDE 10 MG: 10 TABLET ORAL at 04:12

## 2019-12-23 RX ADMIN — POLYETHYLENE GLYCOL 3350 17 G: 17 POWDER, FOR SOLUTION ORAL at 09:12

## 2019-12-23 RX ADMIN — CILOSTAZOL 100 MG: 50 TABLET ORAL at 09:12

## 2019-12-23 RX ADMIN — ACETAMINOPHEN 1000 MG: 500 TABLET ORAL at 01:12

## 2019-12-23 RX ADMIN — CILOSTAZOL 100 MG: 50 TABLET ORAL at 08:12

## 2019-12-23 NOTE — ASSESSMENT & PLAN NOTE
58yo male s/p bilateral femoral endarterectomy with left common iliac artery PTA (6 x 40 Brodnax), left common iliac artery stent (GORE VBX 10 x 39mm)    - regular diet  - aspirin, statin, plavix  - home meds  - PO PRN pain meds  - q4hr neurovascular checks   - daily labs  - home BP meds, PRN HTN meds for SBP>160  - continue provena wound vacs to suction, will likely remove prior to discharge tomorrow  - encourage up and ambulation - PT/OT  - bowel reg    Dispo: patient with some nausea this morning, will keep overnight to ensure tolerating diet - likely d/c home with home health tomorrow

## 2019-12-23 NOTE — PROGRESS NOTES
[facility]  Vascular Surgery  Progress Note    Patient Name: Renan Britton  MRN: [id number]  Admission Date: 12/20/2019  Primary Care Provider: Eren Becker MD    Subjective:     Interval History: No acute events overnight. Some nausea this morning, tolerated clears yesterday well. Passing gas. Walked the halls yesterday x4.    Post-Op Info:  Procedure(s) (LRB):  ENDARTERECTOMY, FEMORAL (Bilateral)  ANGIOGRAM, ABDOMINAL AORTA W/ EXTREMITY RUNOFF (Bilateral)  STENT, ILIAC ARTERY (Left)   3 Days Post-Op       Medications:  Continuous Infusions:    Scheduled Meds:   acetaminophen  1,000 mg Oral Q8H    aspirin  81 mg Oral Daily    atorvastatin  40 mg Oral Daily    bisacodyl  10 mg Rectal Daily    cetirizine  10 mg Oral Daily    cilostazol  100 mg Oral BID    clopidogrel  75 mg Oral Daily    heparin (porcine)  5,000 Units Subcutaneous Q8H    mupirocin  1 g Nasal BID    polyethylene glycol  17 g Oral Daily    pregabalin  100 mg Oral BID    senna-docusate 8.6-50 mg  1 tablet Oral Daily    valsartan  160 mg Oral Daily     PRN Meds:artificial tears, Dextrose 10% Bolus, Dextrose 10% Bolus, diphenhydrAMINE, glucagon (human recombinant), glucose, glucose, hydrALAZINE, HYDROmorphone, insulin aspart U-100, labetalol, naloxone, ondansetron, oxyCODONE, oxyCODONE     Objective:     Vital Signs (Most Recent):  Temp: 97.2 °F (36.2 °C) (12/23/19 0814)  Pulse: 100 (12/23/19 1113)  Resp: 18 (12/23/19 0814)  BP: 139/76 (12/23/19 0814)  SpO2: (!) 94 % (12/23/19 0814) Vital Signs (24h Range):  Temp:  [97.2 °F (36.2 °C)-99.1 °F (37.3 °C)] 97.2 °F (36.2 °C)  Pulse:  [] 100  Resp:  [16-18] 18  SpO2:  [94 %-97 %] 94 %  BP: (119-151)/(68-80) 139/76         Physical Exam   Constitutional: He is oriented to person, place, and time. He appears well-developed and well-nourished.   HENT:   Head: Normocephalic and atraumatic.   Cardiovascular: Normal rate.   Pulmonary/Chest: Effort normal.   Abdominal: Soft.  He exhibits no distension. There is no tenderness.   Musculoskeletal:   2+ L PT, biphasic L DP    Strong biphasic R PT/DP    No lower extremity edema. Provena wound vacs in place to groin bilaterally, adequate suction.       Neurological: He is alert and oriented to person, place, and time.   Skin: Skin is warm and dry.   Psychiatric: He has a normal mood and affect.   Nursing note and vitals reviewed.      Significant Labs:  CBC:   Recent Labs   Lab 12/23/19  0606   WBC 8.96   RBC 4.87   HGB 15.1   HCT 45.4   *   MCV 93   MCH 31.0   MCHC 33.3     CMP:   Recent Labs   Lab 12/23/19  0606   *   CALCIUM 9.3   *   K 4.2   CO2 27      BUN 16   CREATININE 0.8       Significant Diagnostics:  I have reviewed all pertinent imaging results/findings within the past 24 hours.  Medications:  Continuous Infusions:    Scheduled Meds:   acetaminophen  1,000 mg Oral Q8H    aspirin  81 mg Oral Daily    atorvastatin  40 mg Oral Daily    bisacodyl  10 mg Rectal Daily    cetirizine  10 mg Oral Daily    cilostazol  100 mg Oral BID    clopidogrel  75 mg Oral Daily    heparin (porcine)  5,000 Units Subcutaneous Q8H    mupirocin  1 g Nasal BID    polyethylene glycol  17 g Oral Daily    pregabalin  100 mg Oral BID    senna-docusate 8.6-50 mg  1 tablet Oral Daily    valsartan  160 mg Oral Daily     PRN Meds:artificial tears, Dextrose 10% Bolus, Dextrose 10% Bolus, diphenhydrAMINE, glucagon (human recombinant), glucose, glucose, hydrALAZINE, HYDROmorphone, insulin aspart U-100, labetalol, naloxone, ondansetron, oxyCODONE, oxyCODONE     Objective:     Vital Signs (Most Recent):  Temp: 97.2 °F (36.2 °C) (12/23/19 0421)  Pulse: 81 (12/23/19 0717)  Resp: 16 (12/23/19 0421)  BP: (!) 148/77 (12/23/19 0421)  SpO2: 95 % (12/23/19 0421) Vital Signs (24h Range):  Temp:  [97.1 °F (36.2 °C)-99.1 °F (37.3 °C)] 97.2 °F (36.2 °C)  Pulse:  [73-96] 81  Resp:  [15-18] 16  SpO2:  [95 %-97 %] 95 %  BP: (119-159)/(67-80)  148/77         Physical Exam   Constitutional: He is oriented to person, place, and time. He appears well-developed and well-nourished.   HENT:   Head: Normocephalic and atraumatic.   Cardiovascular: Normal rate.   Pulmonary/Chest: Effort normal.   Abdominal: Soft. He exhibits no distension. There is no tenderness.   Musculoskeletal:   2+ L PT, biphasic L DP    R PT 1+, DP biphasic    No lower extremity edema. Provena wound vacs in place to groin bilaterally, adequate suction.       Neurological: He is alert and oriented to person, place, and time.   Skin: Skin is warm and dry.   Psychiatric: He has a normal mood and affect.   Nursing note and vitals reviewed.      Significant Labs:  CBC:   Recent Labs   Lab 12/23/19  0606   WBC 8.96   RBC 4.87   HGB 15.1   HCT 45.4   *   MCV 93   MCH 31.0   MCHC 33.3     CMP:   Recent Labs   Lab 12/23/19  0606   *   CALCIUM 9.3   *   K 4.2   CO2 27      BUN 16   CREATININE 0.8       Significant Diagnostics:  I have reviewed all pertinent imaging results/findings within the past 24 hours.    Assessment/Plan:     * PAD (peripheral artery disease)  60yo male s/p bilateral femoral endarterectomy with left common iliac artery PTA (6 x 40 [place]), left common iliac artery stent (GORE VBX 10 x 39mm)    - regular diet  - aspirin, statin, plavix  - home meds  - PO PRN pain meds  - q4hr neurovascular checks   - daily labs  - home BP meds, PRN HTN meds for SBP>160  - continue provena wound vacs to suction, will likely remove prior to discharge tomorrow  - encourage up and ambulation - PT/OT  - bowel reg    Dispo: patient with some nausea this morning, will keep overnight to ensure tolerating diet - likely d/c home with home health tomorrow        Adelina Arboleda MD  Vascular Surgery  [facility]

## 2019-12-23 NOTE — PLAN OF CARE
[facility]    HOME HEALTH ORDERS  FACE TO FACE ENCOUNTER    Patient Name: Renan Britton  YOB: 1960    PCP: Eren Becker MD   PCP Address: [address] [place] [address] / [place] [id number]  PCP Phone Number: [phone]  PCP Fax: [phone]    Encounter Date: 12/23/2019    Admit to Home Health    Diagnoses:  Active Hospital Problems    Diagnosis  POA    *PAD (peripheral artery disease) [I73.9]  Yes      Resolved Hospital Problems   No resolved problems to display.       No future appointments.        I have seen and examined this patient face to face today. My clinical findings that support the need for the home health skilled services and home bound status are the following:  Weakness/numbness causing balance and gait disturbance due to Surgery making it taxing to leave home.    Allergies:  Review of patient's allergies indicates:   Allergen Reactions    Penicillins Hives       Diet: diabetic diet: 2000 calorie    Activities: activity as tolerated, ambulate in house, ambulate with assistance, no lifting, Driving, or Strenuous exercise for 2 weeks, no driving for 2 weeks, no driving while on analgesics and no heavy lifting over 10 lbs. for 2 weeks    Nursing:   SN to complete comprehensive assessment including routine vital signs. Instruct on disease process and s/s of complications to report to MD. Review/verify medication list sent home with the patient at time of discharge  and instruct patient/caregiver as needed. Frequency may be adjusted depending on start of care date.    Notify MD if SBP > 160 or < 90; DBP > 90 or < 50; HR > 120 or < 50; Temp > 101; Other: If there is breakdown of groin incisions.       CONSULTS:    Physical Therapy to evaluate and treat. Evaluate for home safety and equipment needs; Establish/upgrade home exercise program. Perform / instruct on therapeutic exercises, gait training, transfer training, and Range of Motion.  Occupational Therapy to evaluate and  treat. Evaluate home environment for safety and equipment needs. Perform/Instruct on transfers, ADL training, ROM, and therapeutic exercises.   to evaluate for community resources/long-range planning.    MISCELLANEOUS CARE:  N/A    WOUND CARE ORDERS  Place dry gauze dressing to groin incision two times a day.      Medications: Review discharge medications with patient and family and provide education.      Current Discharge Medication List      CONTINUE these medications which have NOT CHANGED    Details   aspirin (ECOTRIN) 81 MG EC tablet Take 81 mg by mouth once daily.      cetirizine 10 mg chewable tablet Take 10 mg by mouth as needed.      coenzyme Q10 10 mg capsule Take 10 mg by mouth once daily.      glyBURIDE-metformin 2.5-500 mg (GLUCOVANCE) 2.5-500 mg per tablet Take 1 tablet by mouth 2 (two) times daily with meals.      magnesium chloride (SLOW-MAG) 71.5 mg TbEC Take 2 tablets by mouth once.      pregabalin (LYRICA) 100 MG capsule Take 100 mg by mouth 2 (two) times daily.      valsartan (DIOVAN) 160 MG tablet Take 160 mg by mouth once daily.      atorvastatin (LIPITOR) 40 MG tablet Take 40 mg by mouth once daily.      cilostazol (PLETAL) 100 MG Tab Take 100 mg by mouth 2 (two) times daily.       mupirocin (BACTROBAN) 2 % ointment Apply topically 2 (two) times daily. Swab inside each nare twice a day beginning 2 days before surgery.  Qty: 15 g, Refills: 0    Associated Diagnoses: PVD (peripheral vascular disease)             I certify that this patient is confined to his home and needs intermittent skilled nursing care and physical therapy.

## 2019-12-23 NOTE — PLAN OF CARE
12/23/19 1049   Post-Acute Status   Post-Acute Authorization Home Health/Hospice   Home Health/Hospice Status Referrals Sent     Elle Rivero LMSW  [facility]  [id number]

## 2019-12-23 NOTE — PT/OT/SLP EVAL
Occupational Therapy   Evaluation and Discharge Note    Name: Renan Britton  MRN: [id number]  Admitting Diagnosis:  PAD (peripheral artery disease) 3 Days Post-Op  Procedure(s) (LRB):  ENDARTERECTOMY, FEMORAL (Bilateral)  ANGIOGRAM, ABDOMINAL AORTA W/ EXTREMITY RUNOFF (Bilateral)  STENT, ILIAC ARTERY (Left)    Recommendations:     Discharge Recommendations: home health PT  Discharge Equipment Recommendations:  none  Barriers to discharge:  None    Assessment:     Renan Britton is a 59 y.o. male with a medical diagnosis of PAD (peripheral artery disease). At this time, patient is functioning at their prior level of function and does not require further acute OT services.     Plan:     During this hospitalization, patient does not require further acute OT services.  Please re-consult if situation changes.    · Plan of Care Reviewed with: patient, spouse    Subjective     Chief Complaint: Discomfort incision sites  Patient/Family Comments/goals: Return home    Occupational Profile:  Living Environment: Pt lives with spouse in 1-story house with 3 MILAN.  Previous level of function: (I) with ADLs/IADLs and mobility; working  Equipment Used at home:  none  Assistance upon Discharge: Wife is able to assist    Pain/Comfort:  · Pain Rating 1: (Did not rate)  · Location - Side 1: Bilateral  · Location 1: groin(incision site)  · Pain Addressed 1: Reposition    Patients cultural, spiritual, Episcopalian conflicts given the current situation: no    Objective:     Communicated with: RN prior to session.  Patient found ambulating hallway with telemetry, peripheral IV(wound vacs x 2) upon OT entry to room, spouse present.    General Precautions: Standard, fall   Orthopedic Precautions:N/A   Braces: N/A     Occupational Performance:    Bed Mobility:    · NT    Functional Mobility/Transfers:  · Patient completed Sit <> Stand Transfer with supervision with no assistive device   · Functional Mobility: Within room and hallway  household distance with supervision no AD-- pt reports ambulating multiple times this morning    Activities of Daily Living:  · Max A to don socks due to B groin pain at incision site; avoid excessive hip flexion per pt  · Mod I for toileting as pt has been ambulating to bathroom    Cognitive/Visual Perceptual:  Intact    Physical Exam:  Pt demo good sitting and standing balance  B UE ROM and MMT WFL with intact coordination and sensation    AMPAC 6 Click ADL:  AMPAC Total Score: 21    Treatment & Education:  OT eval; educated on OT role and POC including no further acute OT needs; discussed mobility and ADL techniques to avoid excessive hip flexion  Education:    Patient left up in chair with all lines intact, call button in reach and spouse present    GOALS:   Multidisciplinary Problems     Occupational Therapy Goals     Not on file          Multidisciplinary Problems (Resolved)        Problem: Occupational Therapy Goal    Goal Priority Disciplines Outcome Interventions   Occupational Therapy Goal   (Resolved)     OT, PT/OT Met                    History:     Past Medical History:   Diagnosis Date    Diabetes mellitus     H/O brain tumor     HLD (hyperlipidemia)     Hypertension     PAD (peripheral artery disease)     Seizures     R/T BRAIN TUMOR- SURGICALLY EXCISED       Past Surgical History:   Procedure Laterality Date    BRAIN SURGERY  01/2011    TUMOR EXCISED    PERCUTANEOUS TRANSLUMINAL ANGIOPLASTY (PTA) OF PERIPHERAL VESSEL Left 10/17/2019    Procedure: PTA, PERIPHERAL VESSEL;  Surgeon: Rao Fisher MD;  Location: [facility] CATH;  Service: Cardiology;  Laterality: Left;       Time Tracking:     OT Date of Treatment: 12/23/19  OT Start Time: 1036  OT Stop Time: 1050  OT Total Time (min): 14 min    Billable Minutes:Evaluation 14 minutes    JESSIE Moscoso  12/23/2019

## 2019-12-23 NOTE — PLAN OF CARE
Plan of care reviewed with pt: AAOx4, on RA, VS stable. Neurovascular checked q4h, DP and PT pulses +2 with Doppler, feet warm, no discoloration, have numbness and tingling present which is his baseline. Bilateral groin incision to portable woundvac intact. Complained of pain around the incision site, pain under control with Oxy 10 q4h. Tolerated his diet well with no complains of nausea, consumed 25-50% each meal. Voided with no difficulty. Passed gas but no bowel movement. Ambulated in hallway with assistance standby multiple times. IS encouraged to use. Call light in reach. WCTM.

## 2019-12-23 NOTE — PLAN OF CARE
Plan of care reviewed with patient, who verbalized understanding. Pt describes pain as well controlled with regular administration of oral narcotic pain medications. Pt was able to ambulate in the halls several times during the day. Pt is tolerating his Clear diet without nausea or discomfort, but has not had a bowel movement since before surgery. Increased ambulation and use of the incentive spirometer should be encouraged again this morning after a restful sleep.

## 2019-12-23 NOTE — SUBJECTIVE & OBJECTIVE
Medications:  Continuous Infusions:    Scheduled Meds:   acetaminophen  1,000 mg Oral Q8H    aspirin  81 mg Oral Daily    atorvastatin  40 mg Oral Daily    bisacodyl  10 mg Rectal Daily    cetirizine  10 mg Oral Daily    cilostazol  100 mg Oral BID    clopidogrel  75 mg Oral Daily    heparin (porcine)  5,000 Units Subcutaneous Q8H    mupirocin  1 g Nasal BID    polyethylene glycol  17 g Oral Daily    pregabalin  100 mg Oral BID    senna-docusate 8.6-50 mg  1 tablet Oral Daily    valsartan  160 mg Oral Daily     PRN Meds:artificial tears, Dextrose 10% Bolus, Dextrose 10% Bolus, diphenhydrAMINE, glucagon (human recombinant), glucose, glucose, hydrALAZINE, HYDROmorphone, insulin aspart U-100, labetalol, naloxone, ondansetron, oxyCODONE, oxyCODONE     Objective:     Vital Signs (Most Recent):  Temp: 97.2 °F (36.2 °C) (12/23/19 0814)  Pulse: 100 (12/23/19 1113)  Resp: 18 (12/23/19 0814)  BP: 139/76 (12/23/19 0814)  SpO2: (!) 94 % (12/23/19 0814) Vital Signs (24h Range):  Temp:  [97.2 °F (36.2 °C)-99.1 °F (37.3 °C)] 97.2 °F (36.2 °C)  Pulse:  [] 100  Resp:  [16-18] 18  SpO2:  [94 %-97 %] 94 %  BP: (119-151)/(68-80) 139/76         Physical Exam   Constitutional: He is oriented to person, place, and time. He appears well-developed and well-nourished.   HENT:   Head: Normocephalic and atraumatic.   Cardiovascular: Normal rate.   Pulmonary/Chest: Effort normal.   Abdominal: Soft. He exhibits no distension. There is no tenderness.   Musculoskeletal:   2+ L PT, biphasic L DP    Strong biphasic R PT/DP    No lower extremity edema. Provena wound vacs in place to groin bilaterally, adequate suction.       Neurological: He is alert and oriented to person, place, and time.   Skin: Skin is warm and dry.   Psychiatric: He has a normal mood and affect.   Nursing note and vitals reviewed.      Significant Labs:  CBC:   Recent Labs   Lab 12/23/19  0606   WBC 8.96   RBC 4.87   HGB 15.1   HCT 45.4   *   MCV 93    MCH 31.0   MCHC 33.3     CMP:   Recent Labs   Lab 12/23/19  0606   *   CALCIUM 9.3   *   K 4.2   CO2 27      BUN 16   CREATININE 0.8       Significant Diagnostics:  I have reviewed all pertinent imaging results/findings within the past 24 hours.  Medications:  Continuous Infusions:    Scheduled Meds:   acetaminophen  1,000 mg Oral Q8H    aspirin  81 mg Oral Daily    atorvastatin  40 mg Oral Daily    bisacodyl  10 mg Rectal Daily    cetirizine  10 mg Oral Daily    cilostazol  100 mg Oral BID    clopidogrel  75 mg Oral Daily    heparin (porcine)  5,000 Units Subcutaneous Q8H    mupirocin  1 g Nasal BID    polyethylene glycol  17 g Oral Daily    pregabalin  100 mg Oral BID    senna-docusate 8.6-50 mg  1 tablet Oral Daily    valsartan  160 mg Oral Daily     PRN Meds:artificial tears, Dextrose 10% Bolus, Dextrose 10% Bolus, diphenhydrAMINE, glucagon (human recombinant), glucose, glucose, hydrALAZINE, HYDROmorphone, insulin aspart U-100, labetalol, naloxone, ondansetron, oxyCODONE, oxyCODONE     Objective:     Vital Signs (Most Recent):  Temp: 97.2 °F (36.2 °C) (12/23/19 0421)  Pulse: 81 (12/23/19 0717)  Resp: 16 (12/23/19 0421)  BP: (!) 148/77 (12/23/19 0421)  SpO2: 95 % (12/23/19 0421) Vital Signs (24h Range):  Temp:  [97.1 °F (36.2 °C)-99.1 °F (37.3 °C)] 97.2 °F (36.2 °C)  Pulse:  [73-96] 81  Resp:  [15-18] 16  SpO2:  [95 %-97 %] 95 %  BP: (119-159)/(67-80) 148/77         Physical Exam   Constitutional: He is oriented to person, place, and time. He appears well-developed and well-nourished.   HENT:   Head: Normocephalic and atraumatic.   Cardiovascular: Normal rate.   Pulmonary/Chest: Effort normal.   Abdominal: Soft. He exhibits no distension. There is no tenderness.   Musculoskeletal:   2+ L PT, biphasic L DP    R PT 1+, DP biphasic    No lower extremity edema. Provena wound vacs in place to groin bilaterally, adequate suction.       Neurological: He is alert and oriented to person,  place, and time.   Skin: Skin is warm and dry.   Psychiatric: He has a normal mood and affect.   Nursing note and vitals reviewed.      Significant Labs:  CBC:   Recent Labs   Lab 12/23/19  0606   WBC 8.96   RBC 4.87   HGB 15.1   HCT 45.4   *   MCV 93   MCH 31.0   MCHC 33.3     CMP:   Recent Labs   Lab 12/23/19  0606   *   CALCIUM 9.3   *   K 4.2   CO2 27      BUN 16   CREATININE 0.8       Significant Diagnostics:  I have reviewed all pertinent imaging results/findings within the past 24 hours.

## 2019-12-23 NOTE — ANESTHESIA POSTPROCEDURE EVALUATION
Anesthesia Post Evaluation    Patient: Renan Britton    Procedure(s) Performed: Procedure(s) (LRB):  ENDARTERECTOMY, FEMORAL (Bilateral)  ANGIOGRAM, ABDOMINAL AORTA W/ EXTREMITY RUNOFF (Bilateral)  STENT, ILIAC ARTERY (Left)    Final Anesthesia Type: general    Patient location during evaluation: PACU  Patient participation: Yes- Able to Participate  Level of consciousness: awake and alert and oriented  Post-procedure vital signs: reviewed and stable  Pain management: adequate  Airway patency: patent  KAMALA mitigation strategies: Intraoperative administration of CPAP, nasopharyngeal airway, or oral appliance during sedation, Multimodal analgesia, Extubation while patient is awake, Verification of full reversal of neuromuscular block and Extubation and recovery carried out in lateral, semiupright, or other nonsupine position  PONV status at discharge: No PONV  Anesthetic complications: no      Cardiovascular status: hemodynamically stable  Respiratory status: unassisted  Hydration status: euvolemic  Follow-up not needed.          Vitals Value Taken Time   /77 12/23/2019  4:21 AM   Temp 36.2 °C (97.2 °F) 12/23/2019  4:21 AM   Pulse 81 12/23/2019  7:17 AM   Resp 16 12/23/2019  4:21 AM   SpO2 95 % 12/23/2019  4:21 AM         Event Time     Out of Recovery 15:30:00          Pain/Cristy Score: Pain Rating Prior to Med Admin: 7 (12/23/2019  5:40 AM)  Pain Rating Post Med Admin: 0 (12/23/2019  4:21 AM)

## 2019-12-24 VITALS
OXYGEN SATURATION: 96 % | HEIGHT: 71 IN | RESPIRATION RATE: 18 BRPM | DIASTOLIC BLOOD PRESSURE: 75 MMHG | TEMPERATURE: 97 F | WEIGHT: 268 LBS | SYSTOLIC BLOOD PRESSURE: 134 MMHG | HEART RATE: 81 BPM | BODY MASS INDEX: 37.52 KG/M2

## 2019-12-24 DIAGNOSIS — I73.9 PAD (PERIPHERAL ARTERY DISEASE): Primary | ICD-10-CM

## 2019-12-24 LAB
ANION GAP SERPL CALC-SCNC: 8 MMOL/L (ref 8–16)
BASOPHILS # BLD AUTO: 0.04 K/UL (ref 0–0.2)
BASOPHILS NFR BLD: 0.6 % (ref 0–1.9)
BUN SERPL-MCNC: 20 MG/DL (ref 6–20)
CALCIUM SERPL-MCNC: 8.6 MG/DL (ref 8.7–10.5)
CHLORIDE SERPL-SCNC: 100 MMOL/L (ref 95–110)
CO2 SERPL-SCNC: 28 MMOL/L (ref 23–29)
CREAT SERPL-MCNC: 0.8 MG/DL (ref 0.5–1.4)
DIFFERENTIAL METHOD: ABNORMAL
EOSINOPHIL # BLD AUTO: 0.3 K/UL (ref 0–0.5)
EOSINOPHIL NFR BLD: 3.8 % (ref 0–8)
ERYTHROCYTE [DISTWIDTH] IN BLOOD BY AUTOMATED COUNT: 12.4 % (ref 11.5–14.5)
EST. GFR  (AFRICAN AMERICAN): >60 ML/MIN/1.73 M^2
EST. GFR  (NON AFRICAN AMERICAN): >60 ML/MIN/1.73 M^2
GLUCOSE SERPL-MCNC: 113 MG/DL (ref 70–110)
HCT VFR BLD AUTO: 42.1 % (ref 40–54)
HGB BLD-MCNC: 14 G/DL (ref 14–18)
IMM GRANULOCYTES # BLD AUTO: 0.03 K/UL (ref 0–0.04)
IMM GRANULOCYTES NFR BLD AUTO: 0.4 % (ref 0–0.5)
LYMPHOCYTES # BLD AUTO: 1.6 K/UL (ref 1–4.8)
LYMPHOCYTES NFR BLD: 23.3 % (ref 18–48)
MCH RBC QN AUTO: 31.1 PG (ref 27–31)
MCHC RBC AUTO-ENTMCNC: 33.3 G/DL (ref 32–36)
MCV RBC AUTO: 94 FL (ref 82–98)
MONOCYTES # BLD AUTO: 0.9 K/UL (ref 0.3–1)
MONOCYTES NFR BLD: 12.6 % (ref 4–15)
NEUTROPHILS # BLD AUTO: 4.1 K/UL (ref 1.8–7.7)
NEUTROPHILS NFR BLD: 59.3 % (ref 38–73)
NRBC BLD-RTO: 0 /100 WBC
PLATELET # BLD AUTO: 145 K/UL (ref 150–350)
PMV BLD AUTO: 11.3 FL (ref 9.2–12.9)
POCT GLUCOSE: 119 MG/DL (ref 70–110)
POTASSIUM SERPL-SCNC: 3.9 MMOL/L (ref 3.5–5.1)
RBC # BLD AUTO: 4.5 M/UL (ref 4.6–6.2)
SODIUM SERPL-SCNC: 136 MMOL/L (ref 136–145)
WBC # BLD AUTO: 6.92 K/UL (ref 3.9–12.7)

## 2019-12-24 PROCEDURE — 85025 COMPLETE CBC W/AUTO DIFF WBC: CPT

## 2019-12-24 PROCEDURE — 80048 BASIC METABOLIC PNL TOTAL CA: CPT

## 2019-12-24 PROCEDURE — 63600175 PHARM REV CODE 636 W HCPCS: Performed by: STUDENT IN AN ORGANIZED HEALTH CARE EDUCATION/TRAINING PROGRAM

## 2019-12-24 PROCEDURE — 25000003 PHARM REV CODE 250: Performed by: STUDENT IN AN ORGANIZED HEALTH CARE EDUCATION/TRAINING PROGRAM

## 2019-12-24 PROCEDURE — 36415 COLL VENOUS BLD VENIPUNCTURE: CPT

## 2019-12-24 RX ORDER — OXYCODONE HYDROCHLORIDE 5 MG/1
5 TABLET ORAL EVERY 4 HOURS PRN
Qty: 40 TABLET | Refills: 0 | Status: ON HOLD | OUTPATIENT
Start: 2019-12-24 | End: 2020-01-07 | Stop reason: SDUPTHER

## 2019-12-24 RX ORDER — CLOPIDOGREL BISULFATE 75 MG/1
75 TABLET ORAL DAILY
Qty: 30 TABLET | Refills: 11 | Status: ON HOLD | OUTPATIENT
Start: 2019-12-25 | End: 2020-01-07 | Stop reason: HOSPADM

## 2019-12-24 RX ADMIN — ATORVASTATIN CALCIUM 40 MG: 20 TABLET, FILM COATED ORAL at 08:12

## 2019-12-24 RX ADMIN — SENNOSIDES AND DOCUSATE SODIUM 1 TABLET: 8.6; 5 TABLET ORAL at 08:12

## 2019-12-24 RX ADMIN — HEPARIN SODIUM 5000 UNITS: 5000 INJECTION, SOLUTION INTRAVENOUS; SUBCUTANEOUS at 06:12

## 2019-12-24 RX ADMIN — ACETAMINOPHEN 1000 MG: 500 TABLET ORAL at 06:12

## 2019-12-24 RX ADMIN — CILOSTAZOL 100 MG: 50 TABLET ORAL at 08:12

## 2019-12-24 RX ADMIN — ASPIRIN 81 MG: 81 TABLET, COATED ORAL at 08:12

## 2019-12-24 RX ADMIN — VALSARTAN 160 MG: 160 TABLET ORAL at 08:12

## 2019-12-24 RX ADMIN — OXYCODONE HYDROCHLORIDE 10 MG: 10 TABLET ORAL at 08:12

## 2019-12-24 RX ADMIN — MUPIROCIN 1 G: 20 OINTMENT TOPICAL at 08:12

## 2019-12-24 RX ADMIN — PREGABALIN 100 MG: 50 CAPSULE ORAL at 08:12

## 2019-12-24 RX ADMIN — CETIRIZINE HYDROCHLORIDE 10 MG: 10 TABLET, FILM COATED ORAL at 08:12

## 2019-12-24 RX ADMIN — OXYCODONE HYDROCHLORIDE 10 MG: 10 TABLET ORAL at 03:12

## 2019-12-24 RX ADMIN — CLOPIDOGREL BISULFATE 75 MG: 75 TABLET ORAL at 08:12

## 2019-12-24 NOTE — DISCHARGE SUMMARY
[facility]  General Surgery  Discharge Summary      Patient Name: Renan Britton  MRN: [id number]  Admission Date: 12/20/2019  Hospital Length of Stay: 4 days  Discharge Date and Time: 12/24/2019 11:03 AM  Attending Physician: Kelby Gomez MD   Discharging Provider: Mynor Feliz MD  Primary Care Provider: Eren Becker MD     HPI: Mr. Britton is a 59 y.o. male with a h/o HLD, PAD, and aortic stenosis who is here today for evaluation of bilateral lower extremity claudication.  The patient states that he is only able to walk a hundred feet before he starts getting pain in his legs.  The left is worse than the right but they both start at the same time.  The pain begins in the calf and works its way up to his thighs.  It is relieved with rest.  He sometimes gets pain after standing for long periods of time.  He had a angiogram performed showing occlusion in the left common iliac and bilateral common femorals.     Denies any MI/stroke  Tobacco use: quit 8 years ago    Procedure(s) (LRB):  ENDARTERECTOMY, FEMORAL (Bilateral)  ANGIOGRAM, ABDOMINAL AORTA W/ EXTREMITY RUNOFF (Bilateral)  STENT, ILIAC ARTERY (Left)     Hospital Course: Patient went to the OR on 12/20 for a bilateral femoral endarterectomy and left common iliac PTA and stent placement secondary to PAD.  He tolerated the procedure well and was transferred to the PACU in stable condition.  His post op course was uncomplicated.  His provena vac was removed on POD 4 with the groin incisions clean, dry, and intact.  The patients pain was controlled with PO medications.  Ambulating without issue.  Voiding without issue with adequate urine output. Passing gas and stool.  Tolerating diet without nausea or vomiting. Discharged on POD 4 on 12/24/19 to home.    Physical Exam   Constitutional: He is oriented to person, place, and time and well-developed, well-nourished, and in no distress.   HENT:   Head: Normocephalic and atraumatic.    Eyes: EOM are normal.   Neck: Normal range of motion.   Cardiovascular: Normal rate, regular rhythm and normal heart sounds.   Pulmonary/Chest: Effort normal and breath sounds normal.   Abdominal: Soft. He exhibits no distension. There is no tenderness.   Musculoskeletal: Normal range of motion.   Groin incisions clean, dry, and intact  2+ L PT, biphasic L DP  Strong biphasic R PT/DP  No appreciable lower extremity edema   Neurological: He is alert and oriented to person, place, and time.   Skin: Skin is warm and dry.   Psychiatric: Mood, affect and judgment normal.     Significant Diagnostic Studies: Labs:   BMP:   Recent Labs   Lab 12/23/19  0606 12/24/19  0309   * 113*   * 136   K 4.2 3.9    100   CO2 27 28   BUN 16 20   CREATININE 0.8 0.8   CALCIUM 9.3 8.6*   , CMP   Recent Labs   Lab 12/23/19  0606 12/24/19  0309   * 136   K 4.2 3.9    100   CO2 27 28   * 113*   BUN 16 20   CREATININE 0.8 0.8   CALCIUM 9.3 8.6*   ANIONGAP 8 8   ESTGFRAFRICA >60.0 >60.0   EGFRNONAA >60.0 >60.0    and CBC   Recent Labs   Lab 12/23/19  0606 12/24/19  0309   WBC 8.96 6.92   HGB 15.1 14.0   HCT 45.4 42.1   * 145*       Pending Diagnostic Studies:     None        Final Active Diagnoses:    Diagnosis Date Noted POA    PRINCIPAL PROBLEM:  PAD (peripheral artery disease) [I73.9] 12/20/2019 Yes      Problems Resolved During this Admission:      Discharged Condition: good    Disposition: Home or Self Care    Follow Up:  Follow-up Information     Kelby Gomez MD In 4 weeks.    Specialty:  Vascular Surgery  Why:  For wound re-check.  For post op follow up.  Contact information:  [address]  [facility] [address] [phone]                 Patient Instructions:      Diet diabetic     Lifting restrictions   Order Comments: Don't lift more than 10lbs for 2 weeks.     No driving until:   Order Comments: No driving while using narcotics.     Notify your health care provider if you  experience any of the following:  temperature >100.4     Notify your health care provider if you experience any of the following:  persistent nausea and vomiting or diarrhea     Notify your health care provider if you experience any of the following:  severe uncontrolled pain     Notify your health care provider if you experience any of the following:  redness, tenderness, or signs of infection (pain, swelling, redness, odor or green/yellow discharge around incision site)     Notify your health care provider if you experience any of the following:  difficulty breathing or increased cough     Notify your health care provider if you experience any of the following:  severe persistent headache     Notify your health care provider if you experience any of the following:  worsening rash     Notify your health care provider if you experience any of the following:  persistent dizziness, light-headedness, or visual disturbances     Notify your health care provider if you experience any of the following:  increased confusion or weakness     Change dressing (specify)   Order Comments: Dressing change: 2 times per day using dry gauze and tape.  Keep incision site clean and dry.     Activity as tolerated     Shower on day dressing removed (No bath)   Order Comments: May shower and let water run over the incision site.  Do not soak in a bath, pool, lake, etc for at least 2 weeks.     Medications:  Reconciled Home Medications:      Medication List      START taking these medications    clopidogrel 75 mg tablet  Commonly known as:  PLAVIX  Take 1 tablet (75 mg total) by mouth once daily.  Start taking on:  December 25, 2019     oxyCODONE 5 MG immediate release tablet  Commonly known as:  ROXICODONE  Take 1 tablet (5 mg total) by mouth every 4 (four) hours as needed.        CONTINUE taking these medications    aspirin 81 MG EC tablet  Commonly known as:  ECOTRIN  Take 81 mg by mouth once daily.     atorvastatin 40 MG  tablet  Commonly known as:  LIPITOR  Take 40 mg by mouth once daily.     cetirizine 10 mg chewable tablet  Take 10 mg by mouth as needed.     cilostazol 100 MG Tab  Commonly known as:  PLETAL  Take 100 mg by mouth 2 (two) times daily.     coenzyme Q10 10 mg capsule  Take 10 mg by mouth once daily.     glyBURIDE-metformin 2.5-500 mg 2.5-500 mg per tablet  Commonly known as:  GLUCOVANCE  Take 1 tablet by mouth 2 (two) times daily with meals.     mupirocin 2 % ointment  Commonly known as:  BACTROBAN  Apply topically 2 (two) times daily. Swab inside each nare twice a day beginning 2 days before surgery.     pregabalin 100 MG capsule  Commonly known as:  LYRICA  Take 100 mg by mouth 2 (two) times daily.     Slow-Mag 71.5 mg Tbec  Generic drug:  magnesium chloride  Take 2 tablets by mouth once.     valsartan 160 MG tablet  Commonly known as:  DIOVAN  Take 160 mg by mouth once daily.            Mynor Feliz MD  General Surgery  [facility]

## 2019-12-24 NOTE — PLAN OF CARE
Plan of care reviewed with patient. Patient verbalized understanding. Patient is oriented x4. Patient wife is at bedside and active in patient's care. Patient received prn pain medication per MD mar. Patient ambulated to bathroom and inside room prn for ADL's. Patient tolerated diet. Patient bilateral groin incision remained intact to WV throughout shift. Patient NV checks were done q4. Patient achs BG was done without any coverage needed. Patient remained free of any falls or acute events. VSS, WCTM

## 2019-12-24 NOTE — NURSING
Discharge instructions given to pt and spouse. MD removed pressure woundvac this morning prior to discharge, incision sites cover with gauze and tape. Supplies including gauze, tape, glove given to the pt for dressing change at home. Medicine getting filled prior discharge. No further questions at this time.

## 2019-12-24 NOTE — PLAN OF CARE
12/24/19 1153   Final Note   Assessment Type Final Discharge Note   Anticipated Discharge Disposition Home-Health     Pt discharged home with family and [facility].    Elle Rivero, JANAK  [facility]  [id number]\

## 2019-12-27 ENCOUNTER — HOSPITAL ENCOUNTER (EMERGENCY)
Facility: HOSPITAL | Age: 59
Discharge: HOME | End: 2019-12-27
Attending: EMERGENCY MEDICINE
Payer: COMMERCIAL

## 2019-12-27 ENCOUNTER — PATIENT MESSAGE (OUTPATIENT)
Dept: VASCULAR SURGERY | Facility: CLINIC | Age: 59
End: 2019-12-27

## 2019-12-27 VITALS
HEIGHT: 71 IN | RESPIRATION RATE: 18 BRPM | BODY MASS INDEX: 37.52 KG/M2 | OXYGEN SATURATION: 98 % | HEART RATE: 95 BPM | SYSTOLIC BLOOD PRESSURE: 171 MMHG | TEMPERATURE: 99 F | WEIGHT: 268 LBS | DIASTOLIC BLOOD PRESSURE: 80 MMHG

## 2019-12-27 DIAGNOSIS — L03.90 CELLULITIS, UNSPECIFIED CELLULITIS SITE: Primary | ICD-10-CM

## 2019-12-27 LAB
BASOPHILS # BLD AUTO: 0.04 K/UL (ref 0–0.2)
BASOPHILS NFR BLD: 0.5 % (ref 0–1.9)
DIFFERENTIAL METHOD: ABNORMAL
EOSINOPHIL # BLD AUTO: 0.2 K/UL (ref 0–0.5)
EOSINOPHIL NFR BLD: 2.6 % (ref 0–8)
ERYTHROCYTE [DISTWIDTH] IN BLOOD BY AUTOMATED COUNT: 12.3 % (ref 11.5–14.5)
HCT VFR BLD AUTO: 42.8 % (ref 40–54)
HGB BLD-MCNC: 14.4 G/DL (ref 14–18)
IMM GRANULOCYTES # BLD AUTO: 0.03 K/UL (ref 0–0.04)
IMM GRANULOCYTES NFR BLD AUTO: 0.3 % (ref 0–0.5)
LYMPHOCYTES # BLD AUTO: 1.4 K/UL (ref 1–4.8)
LYMPHOCYTES NFR BLD: 15.8 % (ref 18–48)
MCH RBC QN AUTO: 30.8 PG (ref 27–31)
MCHC RBC AUTO-ENTMCNC: 33.6 G/DL (ref 32–36)
MCV RBC AUTO: 92 FL (ref 82–98)
MONOCYTES # BLD AUTO: 0.9 K/UL (ref 0.3–1)
MONOCYTES NFR BLD: 10.6 % (ref 4–15)
NEUTROPHILS # BLD AUTO: 6.2 K/UL (ref 1.8–7.7)
NEUTROPHILS NFR BLD: 70.2 % (ref 38–73)
NRBC BLD-RTO: 0 /100 WBC
PLATELET # BLD AUTO: 186 K/UL (ref 150–350)
PMV BLD AUTO: 10 FL (ref 9.2–12.9)
RBC # BLD AUTO: 4.68 M/UL (ref 4.6–6.2)
WBC # BLD AUTO: 8.76 K/UL (ref 3.9–12.7)

## 2019-12-27 PROCEDURE — 99284 PR EMERGENCY DEPT VISIT,LEVEL IV: ICD-10-PCS | Mod: ,,, | Performed by: EMERGENCY MEDICINE

## 2019-12-27 PROCEDURE — 99284 EMERGENCY DEPT VISIT MOD MDM: CPT | Mod: ,,, | Performed by: EMERGENCY MEDICINE

## 2019-12-27 PROCEDURE — 99283 EMERGENCY DEPT VISIT LOW MDM: CPT

## 2019-12-27 PROCEDURE — G0180 MD CERTIFICATION HHA PATIENT: HCPCS | Mod: ,,, | Performed by: SURGERY

## 2019-12-27 PROCEDURE — 85025 COMPLETE CBC W/AUTO DIFF WBC: CPT

## 2019-12-27 PROCEDURE — G0180 PR HOME HEALTH MD CERTIFICATION: ICD-10-PCS | Mod: ,,, | Performed by: SURGERY

## 2019-12-27 RX ORDER — CLINDAMYCIN HYDROCHLORIDE 300 MG/1
300 CAPSULE ORAL EVERY 6 HOURS
Qty: 28 CAPSULE | Refills: 0 | Status: SHIPPED | OUTPATIENT
Start: 2019-12-27 | End: 2019-12-27 | Stop reason: SDUPTHER

## 2019-12-27 RX ORDER — CLINDAMYCIN HYDROCHLORIDE 300 MG/1
300 CAPSULE ORAL EVERY 6 HOURS
Qty: 28 CAPSULE | Refills: 0 | Status: ON HOLD | OUTPATIENT
Start: 2019-12-27 | End: 2020-01-07 | Stop reason: HOSPADM

## 2019-12-27 NOTE — ED PROVIDER NOTES
Encounter Date: 12/27/2019    SCRIBE #1 NOTE: I, June Hester, am scribing for, and in the presence of,  Dr. Guallpa . I have scribed the entire note.       History     Chief Complaint   Patient presents with    Post-op Problem     femoral endarterectomy one week ago, 5 inch incision on each groin, left forming hematoma.      Time patient was seen by the provider: 2:05 PM      The patient is a 59 y.o. male with co-morbidities including: Pas, HLD, HTN, diabetes mellitus, who presents to the ED with a complaint of redness around surgical wound. Patient is s/p bilateral femoral endarterectomy from 7 days ago performed by vascular surgery. He states he began developing redness, warmth and swelling around the surgical sites by the groin bilaterally, however the left groin is worse than the right. There is a blood blister present to the left groin site. No pain to the calves. Denies any fever.     The history is provided by the patient, medical records and the spouse.     Review of patient's allergies indicates:   Allergen Reactions    Penicillins Hives     Past Medical History:   Diagnosis Date    Diabetes mellitus     H/O brain tumor     HLD (hyperlipidemia)     Hypertension     PAD (peripheral artery disease)     Seizures     R/T BRAIN TUMOR- SURGICALLY EXCISED     Past Surgical History:   Procedure Laterality Date    BRAIN SURGERY  01/2011    TUMOR EXCISED    ENDARTERECTOMY OF FEMORAL ARTERY Bilateral 12/20/2019    Procedure: ENDARTERECTOMY, FEMORAL;  Surgeon: Kelby Gomez MD;  Location: [facility] OR [address] [place] [address];  Service: Peripheral Vascular;  Laterality: Bilateral;  natalya common femoral endarterectomy with natalya. iliac stent placement    PERCUTANEOUS TRANSLUMINAL ANGIOPLASTY (PTA) OF PERIPHERAL VESSEL Left 10/17/2019    Procedure: PTA, PERIPHERAL VESSEL;  Surgeon: Rao Fisher MD;  Location: [facility] CATH;  Service: Cardiology;  Laterality: Left;     Family History   Problem Relation Age of Onset    Diabetes  Mother     Hypertension Mother     Stroke Mother     Cancer Father         LUNG    Heart disease Father     Cancer Brother     Heart disease Brother     Heart disease Brother     Heart disease Brother      Social History     Tobacco Use    Smoking status: Former Smoker     Packs/day: 2.00     Types: Cigarettes     Last attempt to quit: 2011     Years since quittin.9    Smokeless tobacco: Never Used   Substance Use Topics    Alcohol use: Yes     Comment: 2-4 PER DAY    Drug use: Never     Review of Systems   Constitutional: Negative for fever.   HENT: Negative for sore throat.    Respiratory: Negative for shortness of breath.    Cardiovascular: Negative for chest pain.   Gastrointestinal: Negative for nausea.   Genitourinary: Negative for dysuria.   Musculoskeletal: Negative for back pain.   Skin: Positive for color change (redness to the groin) and wound (to groin area). Negative for rash.   Neurological: Negative for weakness.   Hematological: Does not bruise/bleed easily.       Physical Exam     Initial Vitals [19 1353]   BP Pulse Resp Temp SpO2   (!) 145/74 107 18 98.4 °F (36.9 °C) 97 %      MAP       --         Physical Exam    Nursing note and vitals reviewed.  Constitutional: He appears well-developed and well-nourished. He is not diaphoretic. No distress.   HENT:   Head: Normocephalic and atraumatic.   Mouth/Throat: Oropharynx is clear and moist.   Eyes: Conjunctivae and EOM are normal. Pupils are equal, round, and reactive to light.   Neck: Normal range of motion. Neck supple. No JVD present.   Cardiovascular: Normal rate, regular rhythm and normal heart sounds.   No murmur heard.  He has palpable DP pulses bilaterally    Pulmonary/Chest: Breath sounds normal. No respiratory distress. He has no wheezes. He has no rhonchi. He has no rales.   Abdominal: Soft. Bowel sounds are normal. He exhibits no distension and no mass. There is no tenderness. There is no rebound and no guarding.    Musculoskeletal: Normal range of motion. He exhibits no edema or tenderness.   Legs are well perfused.    Neurological: He is alert and oriented to person, place, and time. He has normal strength. No cranial nerve deficit or sensory deficit.   Skin: Skin is warm and dry. No rash and no abscess noted. There is erythema.   Left groin incision surrounding erythema with no fluctuance or abscess appreciated. Minimal erythema to the right wound.    Psychiatric: He has a normal mood and affect.         ED Course   Procedures  Labs Reviewed   CBC W/ AUTO DIFFERENTIAL - Abnormal; Notable for the following components:       Result Value    Lymph% 15.8 (*)     All other components within normal limits          Imaging Results    None          Medical Decision Making:   History:   Old Medical Records: I decided to obtain old medical records.  Old Records Summarized: records from previous admission(s) and other records.       <> Summary of Records: Patient had a femoral endarterectomy 7 days ago by Dr. Gomez vascular surgery.   Initial Assessment:   Patient with erythema around surgical wound. Will check CBC and discuss with vascular surgery.   Clinical Tests:   Lab Tests: Ordered and Reviewed  ED Management:  5:59 PM   Vascular surgery recommend discharge home on clindamycin and iodine topical and follow up with them on Monday.   Other:   I have discussed this case with another health care provider.       <> Summary of the Discussion: Vascular surgery            Scribe Attestation:   Scribe #1: I performed the above scribed service and the documentation accurately describes the services I performed. I attest to the accuracy of the note.                          Clinical Impression:       ICD-10-CM ICD-9-CM   1. Cellulitis, unspecified cellulitis site L03.90 682.9         Disposition:   Disposition: Discharged  Condition: Stable                     Ten Guallpa MD  12/27/19 5008

## 2019-12-27 NOTE — ED NOTES
oriented to CCR area, wife at bedside, waiting for vascular consult. Reclining in recliner and resting quietly. Will,continue to monitor

## 2019-12-27 NOTE — PROGRESS NOTES
Full note to follow.  Seen with Dr. Prieto.  Superficial cellulitis of left groin s/p BLE CFA endarterectomy and L iliac stent.  Will Rx with Clinda PO x 7 days and topical iodine.  Follow up in clinic with Dr. Prieto Monday to check groins.    Jesus Judd MD PGY6  Vascular and Endovascular Surgery Fellow  12/27/2019

## 2019-12-27 NOTE — ED TRIAGE NOTES
Renan Britton, an 59 y.o. male presents to the ED with complaints of warmth to the incision site of his left groin secondary to a femoral endardectomy a week ago on the 20th.  Patient reports 5/10 pain to the site.        Review of patient's allergies indicates:   Allergen Reactions    Penicillins Hives     Chief Complaint   Patient presents with    Post-op Problem     femoral endarterectomy one week ago, 5 inch incision on each groin, left forming hematoma.      Past Medical History:   Diagnosis Date    Diabetes mellitus     H/O brain tumor     HLD (hyperlipidemia)     Hypertension     PAD (peripheral artery disease)     Seizures     R/T BRAIN TUMOR- SURGICALLY EXCISED

## 2019-12-30 ENCOUNTER — HOSPITAL ENCOUNTER (OUTPATIENT)
Dept: RADIOLOGY | Facility: HOSPITAL | Age: 59
Discharge: HOME | End: 2019-12-30
Attending: SURGERY
Payer: COMMERCIAL

## 2019-12-30 ENCOUNTER — OFFICE VISIT (OUTPATIENT)
Dept: VASCULAR SURGERY | Facility: CLINIC | Age: 59
End: 2019-12-30
Attending: SURGERY
Payer: COMMERCIAL

## 2019-12-30 ENCOUNTER — PATIENT MESSAGE (OUTPATIENT)
Dept: VASCULAR SURGERY | Facility: CLINIC | Age: 59
End: 2019-12-30

## 2019-12-30 ENCOUNTER — TELEPHONE (OUTPATIENT)
Dept: VASCULAR SURGERY | Facility: CLINIC | Age: 59
End: 2019-12-30

## 2019-12-30 VITALS
WEIGHT: 267.63 LBS | RESPIRATION RATE: 18 BRPM | BODY MASS INDEX: 37.47 KG/M2 | DIASTOLIC BLOOD PRESSURE: 77 MMHG | SYSTOLIC BLOOD PRESSURE: 147 MMHG | TEMPERATURE: 100 F | HEART RATE: 95 BPM | HEIGHT: 71 IN

## 2019-12-30 DIAGNOSIS — I73.9 PAD (PERIPHERAL ARTERY DISEASE): Primary | ICD-10-CM

## 2019-12-30 DIAGNOSIS — I73.9 PAD (PERIPHERAL ARTERY DISEASE): ICD-10-CM

## 2019-12-30 PROCEDURE — 72193 CT PELVIS W/DYE: CPT | Mod: 26,,, | Performed by: RADIOLOGY

## 2019-12-30 PROCEDURE — 25500020 PHARM REV CODE 255: Performed by: SURGERY

## 2019-12-30 PROCEDURE — 99024 PR POST-OP FOLLOW-UP VISIT: ICD-10-PCS | Mod: S$GLB,,, | Performed by: SURGERY

## 2019-12-30 PROCEDURE — 99999 PR PBB SHADOW E&M-EST. PATIENT-LVL III: ICD-10-PCS | Mod: PBBFAC,,, | Performed by: SURGERY

## 2019-12-30 PROCEDURE — 99999 PR PBB SHADOW E&M-EST. PATIENT-LVL III: CPT | Mod: PBBFAC,,, | Performed by: SURGERY

## 2019-12-30 PROCEDURE — 72193 CT PELVIS WITH  CONTRAST: ICD-10-PCS | Mod: 26,,, | Performed by: RADIOLOGY

## 2019-12-30 PROCEDURE — 72193 CT PELVIS W/DYE: CPT | Mod: TC

## 2019-12-30 PROCEDURE — 99024 POSTOP FOLLOW-UP VISIT: CPT | Mod: S$GLB,,, | Performed by: SURGERY

## 2019-12-30 RX ORDER — HYDROCODONE BITARTRATE AND ACETAMINOPHEN 5; 325 MG/1; MG/1
1 TABLET ORAL EVERY 6 HOURS PRN
Qty: 30 TABLET | Refills: 0 | Status: ON HOLD | OUTPATIENT
Start: 2019-12-30 | End: 2020-03-09

## 2019-12-30 RX ADMIN — IOHEXOL 15 ML: 350 INJECTION, SOLUTION INTRAVENOUS at 06:12

## 2019-12-30 RX ADMIN — IOHEXOL 75 ML: 350 INJECTION, SOLUTION INTRAVENOUS at 06:12

## 2019-12-30 NOTE — TELEPHONE ENCOUNTER
----- Message from ST Ronla sent at 12/30/2019  9:46 AM CST -----  Contact: pt wife [phone]      ----- Message -----  From: Ca Helton  Sent: 12/30/2019   9:29 AM CST  To: Conner Levi Staff    Pt wife called in requesting a same day appt for pt stated that Dr. Prieto told her that pt would be seen today by him.  Pt is Establish pt of Dr. Gomez.  Please give a call back

## 2019-12-31 ENCOUNTER — HOSPITAL ENCOUNTER (INPATIENT)
Facility: HOSPITAL | Age: 59
LOS: 7 days | Discharge: HOME HEALTH | DRG: 902 | End: 2020-01-07
Attending: SURGERY | Admitting: SURGERY
Payer: COMMERCIAL

## 2019-12-31 DIAGNOSIS — T81.31XA SURGICAL WOUND BREAKDOWN: ICD-10-CM

## 2019-12-31 DIAGNOSIS — I73.9 PAD (PERIPHERAL ARTERY DISEASE): Primary | ICD-10-CM

## 2019-12-31 DIAGNOSIS — I73.9 CLAUDICATION: ICD-10-CM

## 2019-12-31 LAB
ANION GAP SERPL CALC-SCNC: 8 MMOL/L (ref 8–16)
BUN SERPL-MCNC: 12 MG/DL (ref 6–20)
CALCIUM SERPL-MCNC: 10 MG/DL (ref 8.7–10.5)
CHLORIDE SERPL-SCNC: 98 MMOL/L (ref 95–110)
CO2 SERPL-SCNC: 31 MMOL/L (ref 23–29)
CREAT SERPL-MCNC: 0.9 MG/DL (ref 0.5–1.4)
EST. GFR  (AFRICAN AMERICAN): >60 ML/MIN/1.73 M^2
EST. GFR  (NON AFRICAN AMERICAN): >60 ML/MIN/1.73 M^2
GLUCOSE SERPL-MCNC: 123 MG/DL (ref 70–110)
POTASSIUM SERPL-SCNC: 4.9 MMOL/L (ref 3.5–5.1)
SODIUM SERPL-SCNC: 137 MMOL/L (ref 136–145)

## 2019-12-31 PROCEDURE — 63600175 PHARM REV CODE 636 W HCPCS: Performed by: SURGERY

## 2019-12-31 PROCEDURE — 80048 BASIC METABOLIC PNL TOTAL CA: CPT

## 2019-12-31 PROCEDURE — 25000003 PHARM REV CODE 250: Performed by: STUDENT IN AN ORGANIZED HEALTH CARE EDUCATION/TRAINING PROGRAM

## 2019-12-31 PROCEDURE — 20600001 HC STEP DOWN PRIVATE ROOM

## 2019-12-31 PROCEDURE — 36415 COLL VENOUS BLD VENIPUNCTURE: CPT

## 2019-12-31 RX ORDER — LIDOCAINE HYDROCHLORIDE 10 MG/ML
1 INJECTION, SOLUTION EPIDURAL; INFILTRATION; INTRACAUDAL; PERINEURAL ONCE
Status: DISCONTINUED | OUTPATIENT
Start: 2019-12-31 | End: 2020-01-07 | Stop reason: HOSPADM

## 2019-12-31 RX ORDER — TALC
6 POWDER (GRAM) TOPICAL NIGHTLY PRN
Status: DISCONTINUED | OUTPATIENT
Start: 2019-12-31 | End: 2020-01-07 | Stop reason: HOSPADM

## 2019-12-31 RX ORDER — ONDANSETRON 8 MG/1
8 TABLET, ORALLY DISINTEGRATING ORAL EVERY 8 HOURS PRN
Status: DISCONTINUED | OUTPATIENT
Start: 2019-12-31 | End: 2020-01-07 | Stop reason: HOSPADM

## 2019-12-31 RX ORDER — ACETAMINOPHEN 325 MG/1
650 TABLET ORAL EVERY 4 HOURS PRN
Status: DISCONTINUED | OUTPATIENT
Start: 2019-12-31 | End: 2020-01-07 | Stop reason: HOSPADM

## 2019-12-31 RX ORDER — ACETAMINOPHEN 325 MG/1
650 TABLET ORAL EVERY 8 HOURS PRN
Status: DISCONTINUED | OUTPATIENT
Start: 2019-12-31 | End: 2020-01-07 | Stop reason: HOSPADM

## 2019-12-31 RX ORDER — OXYCODONE HYDROCHLORIDE 5 MG/1
5 TABLET ORAL EVERY 4 HOURS PRN
Status: DISCONTINUED | OUTPATIENT
Start: 2019-12-31 | End: 2020-01-07 | Stop reason: HOSPADM

## 2019-12-31 RX ORDER — VANCOMYCIN 1.75 GRAM/500 ML IN 0.9 % SODIUM CHLORIDE INTRAVENOUS
15 ONCE
Status: COMPLETED | OUTPATIENT
Start: 2019-12-31 | End: 2019-12-31

## 2019-12-31 RX ORDER — VANCOMYCIN 1.75 GRAM/500 ML IN 0.9 % SODIUM CHLORIDE INTRAVENOUS
15
Status: DISCONTINUED | OUTPATIENT
Start: 2020-01-01 | End: 2020-01-02

## 2019-12-31 RX ORDER — SODIUM CHLORIDE 0.9 % (FLUSH) 0.9 %
10 SYRINGE (ML) INJECTION
Status: DISCONTINUED | OUTPATIENT
Start: 2019-12-31 | End: 2020-01-07 | Stop reason: HOSPADM

## 2019-12-31 RX ADMIN — OXYCODONE HYDROCHLORIDE 5 MG: 5 TABLET ORAL at 10:12

## 2019-12-31 RX ADMIN — VANCOMYCIN HYDROCHLORIDE 1750 MG: 100 INJECTION, POWDER, LYOPHILIZED, FOR SOLUTION INTRAVENOUS at 07:12

## 2019-12-31 NOTE — PROGRESS NOTES
VASCULAR SURGERY NOTE    Patient ID: Renan Britton is a 59 y.o. male.    I. HISTORY     Chief Complaint: bilateral groin incisions    HPI: Renan Britton is a 59 y.o. male who is here today for follow up appointment.  I saw him in the ER 3 days ago for complaints of bilateral pain in his groin incisions. He had bilateral femoral endarterectomies with Dr. Gomez on 19.  In the ER he was noted to have erythema and tenderness around his left groin incision with mild induration.  He also had cutaneous skin necrosis at the incision borders bilaterally. He was discharged from the ER with a prescription for p.o. Clindamycin.  He has been taking the medication as instructed.  He has noticed decreased tenderness around the groin incisions since starting antibiotics; however, he has also noticed increasing clear drainage from the left groin incision. He has not noticed any claudication symptoms since the operation.        Past Medical History:   Diagnosis Date    Diabetes mellitus     H/O brain tumor     HLD (hyperlipidemia)     Hypertension     PAD (peripheral artery disease)     Seizures     R/T BRAIN TUMOR- SURGICALLY EXCISED        Past Surgical History:   Procedure Laterality Date    BRAIN SURGERY  2011    TUMOR EXCISED    ENDARTERECTOMY OF FEMORAL ARTERY Bilateral 2019    Procedure: ENDARTERECTOMY, FEMORAL;  Surgeon: Kelby Gomez MD;  [facility] [place] [address] [place] [address];  Service: Peripheral Vascular;  Laterality: Bilateral;  natalya common femoral endarterectomy with natalya. iliac stent placement    PERCUTANEOUS TRANSLUMINAL ANGIOPLASTY (PTA) OF PERIPHERAL VESSEL Left 10/17/2019    Procedure: PTA, PERIPHERAL VESSEL;  Surgeon: Rao Fisher MD;  Location: [facility] CATH;  Service: Cardiology;  Laterality: Left;       Social History     Tobacco Use   Smoking Status Former Smoker    Packs/day: 2.00    Types: Cigarettes    Last attempt to quit: 2011    Years since quittin.9   Smokeless  Tobacco Never Used        II. PHYSICAL EXAM     Physical Exam  Left groin incision: fibrinous material along most of the incision with cutaneous skin necrosis at the most inferior portion, serous drainage on dressing, blanching erythema extending 2-3cm around the incision, mild induration on the medial aspect    Right groin incision: Fibrinous exudate along the inferior aspect of the incision, remainder of the incision intact with minimal exudate, mild erythema at inferior lateral aspect of the incision      III. ASSESSMENT & PLAN (MEDICAL DECISION MAKING)     1. PAD (peripheral artery disease)          Assessment/Diagnosis and Plan:  59 y.o. male with apparent soft tissue infection/inflammation around his left groin incision which has improved clinically (decreased pain/tenderness) since initiation of antibiotics.  I debrided the fibrinous exudate from both groin incisions in the office today. The remaining wounds were 0.5cm deep and 1cm wide. Both wound beds clean at base with no apparent necrotic tissue or purulence.  In light of the increasing serous drainage from the left groin, I felt that would be best to get a CT Pelvis with IV contrast to detect any underlying fluid collection. If significant fluid collection is detected then he will likely have to return for operative intervention. If not will have him follow up with Dr. Gomez next week.    -Dry 4x4  gauze to left groin wound changed at least twice daily and PRN for fluid saturation  -Moist to dry 4x4 gauze to right groin wound. Dry gauze over intact portion of the incision  -CT Pelvis with IV contrast (will call patient tomorrow with results and further instructions)  -Instructed to RTC if fluid from left groin increases or changing dressing more than 4x per day    SEBAS Prieto II, MD, VI  Vascular Surgeon  [facility]

## 2019-12-31 NOTE — PROGRESS NOTES
Pharmacokinetic Initial Assessment: IV Vancomycin    Assessment/Plan:  Initiate intravenous vancomycin 1750 mg IV Q12H  Desired empiric serum trough concentration is 10 to 20 mcg/mL  Obtain trough prior to fourth dose:  1/2 @ 0730    Pharmacy will continue to follow and monitor vancomycin.      Thank you for the consult,   Marcie Phelps, PharmD, [facility]  [id number]       Patient brief summary:  Renan Britton is a 59 y.o. male initiated on antimicrobial therapy with IV Vancomycin for treatment of suspected skin & soft tissue infection    Drug Allergies:   Review of patient's allergies indicates:   Allergen Reactions    Penicillins Hives       Actual Body Weight:   120.5 kg    Renal Function:   CrCl cannot be calculated (Patient's most recent lab result is older than the maximum 7 days allowed.).,     CBC (last 72 hours):  No results for input(s): WHITE BLOOD CELL COUNT, HEMOGLOBIN, HEMATOCRIT, PLATELETS, GRAN%, LYMPH%, MONO%, EOSINOPHIL%, BASOPHIL%, DIFFERENTIAL METHOD in the last 72 hours.    Metabolic Panel (last 72 hours):  No results for input(s): SODIUM, POTASSIUM, CHLORIDE, CO2, GLUCOSE, BUN BLD, CREATININE, ALBUMIN, BILIRUBIN TOTAL, ALK PHOS, AST, ALT, MAGNESIUM, PHOSPHORUS in the last 72 hours.    Drug levels (last 3 results):  No results for input(s): VANCOMYCINRA, VANCOMYCINPE, VANCOMYCINTR in the last 72 hours.    Microbiologic Results:  Microbiology Results (last 7 days)     ** No results found for the last 168 hours. **

## 2019-12-31 NOTE — H&P
[facility]  Vascular Surgery  History & Physical    Patient Name: Renan Britton  MRN: [id number]  Admission Date: 12/31/19  Attending Physician: SEBAS Prieto II, MD   Primary Care Provider: Eren Becker MD    Patient information was obtained from patient.     Subjective:     Chief Complaint/Reason for Admission: Bilateral Groin Incisions    History of Present Illness:  Patient is a 59 y.o. male who on 12/20/19 underwent a bilateral femoral endarterectomy with Dr. Gomez.  His post op course at that time was uncomplicated and he was discharged on 12/24/19.  However on 12/27/19 the patient returned to the ED with concerns of redness and pain at the groin incisions sites.  The left worse than the right.  He was sent home at that time with clindamycin and had a follow up appointment the next Monday.  On follow up the groin incisions were less tender but began to have increased clear drainage from the groin incisions.  The skin necrosis was also worse at that time.  He had the skin debrided in the office and he was subsequently sent for a CT scan.  The scan resulted with a fluid collection at the bilateral groin incision sites.  Likely a seroma collection.  He denies any fevers or chills.  No night sweats.  Is able to ambulate without issues.      Current Facility-Administered Medications on File Prior to Encounter   Medication    [COMPLETED] iohexol (OMNIPAQUE 350) injection 75 mL    [COMPLETED] iohexol (OMNIPAQUE) oral solution 15 mL    [COMPLETED] iohexol (OMNIPAQUE) oral solution 15 mL     Current Outpatient Medications on File Prior to Encounter   Medication Sig    aspirin (ECOTRIN) 81 MG EC tablet Take 81 mg by mouth once daily.    atorvastatin (LIPITOR) 40 MG tablet Take 40 mg by mouth once daily.    cetirizine 10 mg chewable tablet Take 10 mg by mouth as needed.    cilostazol (PLETAL) 100 MG Tab Take 100 mg by mouth 2 (two) times daily.     clindamycin (CLEOCIN) 300 MG  capsule Take 1 capsule (300 mg total) by mouth every 6 (six) hours. for 7 days    clindamycin (CLEOCIN) 300 MG capsule Take 1 capsule by mouth every 6 hours for 7 days starting today    clopidogrel (PLAVIX) 75 mg tablet Take 1 tablet (75 mg total) by mouth once daily.    coenzyme Q10 10 mg capsule Take 10 mg by mouth once daily.    glyBURIDE-metformin 2.5-500 mg (GLUCOVANCE) 2.5-500 mg per tablet Take 1 tablet by mouth 2 (two) times daily with meals.    HYDROcodone-acetaminophen (NORCO) 5-325 mg per tablet Take 1 tablet by mouth every 6 (six) hours as needed for Pain.    magnesium chloride (SLOW-MAG) 71.5 mg TbEC Take 2 tablets by mouth once.    mupirocin (BACTROBAN) 2 % ointment Apply topically 2 (two) times daily. Swab inside each nare twice a day beginning 2 days before surgery.    oxyCODONE (ROXICODONE) 5 MG immediate release tablet Take 1 tablet (5 mg total) by mouth every 4 (four) hours as needed.    pregabalin (LYRICA) 100 MG capsule Take 100 mg by mouth 2 (two) times daily.    valsartan (DIOVAN) 160 MG tablet Take 160 mg by mouth once daily.       Review of patient's allergies indicates:   Allergen Reactions    Penicillins Hives       Past Medical History:   Diagnosis Date    Diabetes mellitus     H/O brain tumor     HLD (hyperlipidemia)     Hypertension     PAD (peripheral artery disease)     Seizures     R/T BRAIN TUMOR- SURGICALLY EXCISED     Past Surgical History:   Procedure Laterality Date    BRAIN SURGERY  01/2011    TUMOR EXCISED    ENDARTERECTOMY OF FEMORAL ARTERY Bilateral 12/20/2019    Procedure: ENDARTERECTOMY, FEMORAL;  Surgeon: Kelby Gomez MD;  Location: [facility] OR [address] [place] [address];  Service: Peripheral Vascular;  Laterality: Bilateral;  natalya common femoral endarterectomy with natalya. iliac stent placement    PERCUTANEOUS TRANSLUMINAL ANGIOPLASTY (PTA) OF PERIPHERAL VESSEL Left 10/17/2019    Procedure: PTA, PERIPHERAL VESSEL;  Surgeon: Rao Fisher MD;  Location: [facility];   Service: Cardiology;  Laterality: Left;     Family History     Problem Relation (Age of Onset)    Cancer Father, Brother    Diabetes Mother    Heart disease Father, Brother, Brother, Brother    Hypertension Mother    Stroke Mother        Tobacco Use    Smoking status: Former Smoker     Packs/day: 2.00     Types: Cigarettes     Last attempt to quit: 2011     Years since quittin.9    Smokeless tobacco: Never Used   Substance and Sexual Activity    Alcohol use: Yes     Comment: 2-4 PER DAY    Drug use: Never    Sexual activity: Not on file     Review of Systems   Constitutional: Negative for chills, fatigue and fever.   HENT: Negative for congestion and nosebleeds.    Eyes: Negative for discharge and visual disturbance.   Respiratory: Negative for cough, chest tightness and shortness of breath.    Cardiovascular: Negative for chest pain, palpitations and leg swelling.   Gastrointestinal: Negative for abdominal distention, abdominal pain, blood in stool, diarrhea, nausea and vomiting.   Endocrine: Negative for cold intolerance, heat intolerance and polydipsia.   Genitourinary: Negative for difficulty urinating and hematuria.   Musculoskeletal: Negative for arthralgias and back pain.   Skin: Positive for wound (bilateral groin incisions). Negative for rash.   Neurological: Negative for dizziness, syncope, light-headedness and headaches.   Hematological: Does not bruise/bleed easily.     Objective:     Vital Signs (Most Recent):    Vital Signs (24h Range):           There is no height or weight on file to calculate BMI.    Physical Exam   Constitutional: He is oriented to person, place, and time. He appears well-developed and well-nourished.   HENT:   Head: Normocephalic and atraumatic.   Eyes: EOM are normal.   Neck: Normal range of motion.   Cardiovascular: Normal rate and regular rhythm.   Pulmonary/Chest: Effort normal and breath sounds normal.   Abdominal: Soft. He exhibits no distension. There is no  tenderness.   Musculoskeletal: Normal range of motion.   Neurological: He is alert and oriented to person, place, and time.   Skin: Skin is warm and dry.   Left groin incision: fibrinous material along most of the incision with cutaneous skin necrosis at the most inferior portion, serous drainage on dressing, blanching erythema extending 2-3cm around the incision, mild induration on the medial aspect  Right groin incision: Fibrinous exudate along the inferior aspect of the incision, remainder of the incision intact with minimal exudate, mild erythema at inferior lateral aspect of the incision   Psychiatric: He has a normal mood and affect.       Significant Labs:  CBC:   Recent Labs   Lab 12/27/19  1434   WBC 8.76   RBC 4.68   HGB 14.4   HCT 42.8      MCV 92   MCH 30.8   MCHC 33.6     BMP: No results for input(s): GLU, NA, K, CL, CO2, BUN, CREATININE, CALCIUM, MG in the last 168 hours.  CMP: No results for input(s): GLU, CALCIUM, ALBUMIN, PROT, NA, K, CO2, CL, BUN, CREATININE, ALKPHOS, ALT, AST, BILITOT in the last 168 hours.  LFTs: No results for input(s): ALT, AST, ALKPHOS, BILITOT, PROT, ALBUMIN in the last 168 hours.  ABGs: No results for input(s): PH, PCO2, PO2, HCO3, POCSATURATED, BE in the last 168 hours.    Significant Diagnostics:  I have reviewed all pertinent imaging results/findings within the past 24 hours.    Assessment/Plan:     There are no hospital problems to display for this patient.    VTE Risk Mitigation (From admission, onward)    None        - Admit to inpatient  - Consult plastic surgery for evaluation with possible groin flaps  - Diabetic diet  - PRN pain/nausea meds  - IS  - Ambulate, OOBTC  - Wet to dry dressing over groin incisions change at least 2x daily, change as needed for if saturated    Mynor Feliz MD  Vascular Surgery  [facility]

## 2019-12-31 NOTE — H&P
[facility]  Vascular Surgery  History & Physical    Patient Name: Renan Britton  MRN: [id number]  Admission Date: 12/31/19  Attending Physician: SEBAS Prieto II, MD   Primary Care Provider: Eren Becker MD    Patient information was obtained from patient.     Subjective:     Chief Complaint/Reason for Admission: Bilateral Groin Incisions    History of Present Illness:  Patient is a 59 y.o. male who on 12/20/19 underwent a bilateral femoral endarterectomy with Dr. Gomez.  His post op course at that time was uncomplicated and he was discharged on 12/24/19.  However on 12/27/19 the patient returned to the ED with concerns of redness and pain at the groin incisions sites.  The left worse than the right.  He was sent home at that time with clindamycin and had a follow up appointment the next Monday.  On follow up the groin incisions were less tender but began to have increased clear drainage from the groin incisions.  The skin necrosis was also worse at that time.  He had the skin debrided in the office and he was subsequently sent for a CT scan.  The scan resulted with a fluid collection at the bilateral groin incision sites.  Likely a seroma collection.  He denies any fevers or chills.  No night sweats.  Is able to ambulate without issues.      Current Facility-Administered Medications on File Prior to Encounter   Medication    [COMPLETED] iohexol (OMNIPAQUE 350) injection 75 mL    [COMPLETED] iohexol (OMNIPAQUE) oral solution 15 mL    [COMPLETED] iohexol (OMNIPAQUE) oral solution 15 mL     Current Outpatient Medications on File Prior to Encounter   Medication Sig    aspirin (ECOTRIN) 81 MG EC tablet Take 81 mg by mouth once daily.    atorvastatin (LIPITOR) 40 MG tablet Take 40 mg by mouth once daily.    cetirizine 10 mg chewable tablet Take 10 mg by mouth as needed.    cilostazol (PLETAL) 100 MG Tab Take 100 mg by mouth 2 (two) times daily.     clindamycin (CLEOCIN) 300 MG  capsule Take 1 capsule (300 mg total) by mouth every 6 (six) hours. for 7 days    clindamycin (CLEOCIN) 300 MG capsule Take 1 capsule by mouth every 6 hours for 7 days starting today    clopidogrel (PLAVIX) 75 mg tablet Take 1 tablet (75 mg total) by mouth once daily.    coenzyme Q10 10 mg capsule Take 10 mg by mouth once daily.    glyBURIDE-metformin 2.5-500 mg (GLUCOVANCE) 2.5-500 mg per tablet Take 1 tablet by mouth 2 (two) times daily with meals.    HYDROcodone-acetaminophen (NORCO) 5-325 mg per tablet Take 1 tablet by mouth every 6 (six) hours as needed for Pain.    magnesium chloride (SLOW-MAG) 71.5 mg TbEC Take 2 tablets by mouth once.    mupirocin (BACTROBAN) 2 % ointment Apply topically 2 (two) times daily. Swab inside each nare twice a day beginning 2 days before surgery.    oxyCODONE (ROXICODONE) 5 MG immediate release tablet Take 1 tablet (5 mg total) by mouth every 4 (four) hours as needed.    pregabalin (LYRICA) 100 MG capsule Take 100 mg by mouth 2 (two) times daily.    valsartan (DIOVAN) 160 MG tablet Take 160 mg by mouth once daily.       Review of patient's allergies indicates:   Allergen Reactions    Penicillins Hives       Past Medical History:   Diagnosis Date    Diabetes mellitus     H/O brain tumor     HLD (hyperlipidemia)     Hypertension     PAD (peripheral artery disease)     Seizures     R/T BRAIN TUMOR- SURGICALLY EXCISED     Past Surgical History:   Procedure Laterality Date    BRAIN SURGERY  01/2011    TUMOR EXCISED    ENDARTERECTOMY OF FEMORAL ARTERY Bilateral 12/20/2019    Procedure: ENDARTERECTOMY, FEMORAL;  Surgeon: Kelby Gomez MD;  Location: [facility] OR [address] [place] [address];  Service: Peripheral Vascular;  Laterality: Bilateral;  natalya common femoral endarterectomy with natalya. iliac stent placement    PERCUTANEOUS TRANSLUMINAL ANGIOPLASTY (PTA) OF PERIPHERAL VESSEL Left 10/17/2019    Procedure: PTA, PERIPHERAL VESSEL;  Surgeon: Rao Fisher MD;  Location: [facility];   Service: Cardiology;  Laterality: Left;     Family History     Problem Relation (Age of Onset)    Cancer Father, Brother    Diabetes Mother    Heart disease Father, Brother, Brother, Brother    Hypertension Mother    Stroke Mother        Tobacco Use    Smoking status: Former Smoker     Packs/day: 2.00     Types: Cigarettes     Last attempt to quit: 2011     Years since quittin.9    Smokeless tobacco: Never Used   Substance and Sexual Activity    Alcohol use: Yes     Comment: 2-4 PER DAY    Drug use: Never    Sexual activity: Not on file     Review of Systems   Constitutional: Negative for chills, fatigue and fever.   HENT: Negative for congestion and nosebleeds.    Eyes: Negative for discharge and visual disturbance.   Respiratory: Negative for cough, chest tightness and shortness of breath.    Cardiovascular: Negative for chest pain, palpitations and leg swelling.   Gastrointestinal: Negative for abdominal distention, abdominal pain, blood in stool, diarrhea, nausea and vomiting.   Endocrine: Negative for cold intolerance, heat intolerance and polydipsia.   Genitourinary: Negative for difficulty urinating and hematuria.   Musculoskeletal: Negative for arthralgias and back pain.   Skin: Positive for wound (bilateral groin incisions). Negative for rash.   Neurological: Negative for dizziness, syncope, light-headedness and headaches.   Hematological: Does not bruise/bleed easily.     Objective:     Vital Signs (Most Recent):    Vital Signs (24h Range):           There is no height or weight on file to calculate BMI.    Physical Exam   Constitutional: He is oriented to person, place, and time. He appears well-developed and well-nourished.   HENT:   Head: Normocephalic and atraumatic.   Eyes: EOM are normal.   Neck: Normal range of motion.   Cardiovascular: Normal rate and regular rhythm.   Pulmonary/Chest: Effort normal and breath sounds normal.   Abdominal: Soft. He exhibits no distension. There is no  tenderness.   Musculoskeletal: Normal range of motion.   Neurological: He is alert and oriented to person, place, and time.   Skin: Skin is warm and dry.   Left groin incision: fibrinous material along most of the incision with cutaneous skin necrosis at the most inferior portion, serous drainage on dressing, blanching erythema extending 2-3cm around the incision, mild induration on the medial aspect  Right groin incision: Fibrinous exudate along the inferior aspect of the incision, remainder of the incision intact with minimal exudate, mild erythema at inferior lateral aspect of the incision   Psychiatric: He has a normal mood and affect.       Significant Labs:  CBC:   Recent Labs   Lab 12/27/19  1434   WBC 8.76   RBC 4.68   HGB 14.4   HCT 42.8      MCV 92   MCH 30.8   MCHC 33.6     BMP: No results for input(s): GLU, NA, K, CL, CO2, BUN, CREATININE, CALCIUM, MG in the last 168 hours.  CMP: No results for input(s): GLU, CALCIUM, ALBUMIN, PROT, NA, K, CO2, CL, BUN, CREATININE, ALKPHOS, ALT, AST, BILITOT in the last 168 hours.  LFTs: No results for input(s): ALT, AST, ALKPHOS, BILITOT, PROT, ALBUMIN in the last 168 hours.  ABGs: No results for input(s): PH, PCO2, PO2, HCO3, POCSATURATED, BE in the last 168 hours.    Significant Diagnostics:  I have reviewed all pertinent imaging results/findings within the past 24 hours.    Assessment/Plan:     Active Diagnoses:    Diagnosis Date Noted POA    Surgical wound breakdown [T81.31XA] 12/31/2019 Yes      Problems Resolved During this Admission:     VTE Risk Mitigation (From admission, onward)         Ordered     IP VTE LOW RISK PATIENT  Once      12/31/19 1712     Place sequential compression device  Until discontinued      12/31/19 1712              - Admit to inpatient  - Consult plastic surgery for evaluation with possible groin flaps  - Diabetic diet  - PRN pain/nausea meds  - IS  - Ambulate, OOBTC  - Wet to dry dressing over groin incisions change at least 2x  daily, change as needed for if saturated    Adelina Arboleda MD  Vascular Surgery  [facility]

## 2020-01-01 ENCOUNTER — ANESTHESIA (OUTPATIENT)
Dept: SURGERY | Facility: HOSPITAL | Age: 60
DRG: 902 | End: 2020-01-01
Payer: COMMERCIAL

## 2020-01-01 ENCOUNTER — ANESTHESIA EVENT (OUTPATIENT)
Dept: SURGERY | Facility: HOSPITAL | Age: 60
DRG: 902 | End: 2020-01-01
Payer: COMMERCIAL

## 2020-01-01 LAB
ALBUMIN SERPL BCP-MCNC: 3 G/DL (ref 3.5–5.2)
ALP SERPL-CCNC: 43 U/L (ref 55–135)
ALT SERPL W/O P-5'-P-CCNC: 31 U/L (ref 10–44)
ANION GAP SERPL CALC-SCNC: 6 MMOL/L (ref 8–16)
AST SERPL-CCNC: 24 U/L (ref 10–40)
BASOPHILS # BLD AUTO: 0.04 K/UL (ref 0–0.2)
BASOPHILS NFR BLD: 0.5 % (ref 0–1.9)
BILIRUB SERPL-MCNC: 0.9 MG/DL (ref 0.1–1)
BUN SERPL-MCNC: 10 MG/DL (ref 6–20)
CALCIUM SERPL-MCNC: 10.1 MG/DL (ref 8.7–10.5)
CHLORIDE SERPL-SCNC: 99 MMOL/L (ref 95–110)
CO2 SERPL-SCNC: 31 MMOL/L (ref 23–29)
CREAT SERPL-MCNC: 0.9 MG/DL (ref 0.5–1.4)
DIFFERENTIAL METHOD: ABNORMAL
EOSINOPHIL # BLD AUTO: 0.3 K/UL (ref 0–0.5)
EOSINOPHIL NFR BLD: 4.3 % (ref 0–8)
ERYTHROCYTE [DISTWIDTH] IN BLOOD BY AUTOMATED COUNT: 11.9 % (ref 11.5–14.5)
EST. GFR  (AFRICAN AMERICAN): >60 ML/MIN/1.73 M^2
EST. GFR  (NON AFRICAN AMERICAN): >60 ML/MIN/1.73 M^2
GLUCOSE SERPL-MCNC: 139 MG/DL (ref 70–110)
GRAM STN SPEC: NORMAL
GRAM STN SPEC: NORMAL
HCT VFR BLD AUTO: 43.5 % (ref 40–54)
HGB BLD-MCNC: 14.3 G/DL (ref 14–18)
IMM GRANULOCYTES # BLD AUTO: 0.03 K/UL (ref 0–0.04)
IMM GRANULOCYTES NFR BLD AUTO: 0.4 % (ref 0–0.5)
LYMPHOCYTES # BLD AUTO: 0.9 K/UL (ref 1–4.8)
LYMPHOCYTES NFR BLD: 11.7 % (ref 18–48)
MAGNESIUM SERPL-MCNC: 2 MG/DL (ref 1.6–2.6)
MCH RBC QN AUTO: 30.2 PG (ref 27–31)
MCHC RBC AUTO-ENTMCNC: 32.9 G/DL (ref 32–36)
MCV RBC AUTO: 92 FL (ref 82–98)
MONOCYTES # BLD AUTO: 0.7 K/UL (ref 0.3–1)
MONOCYTES NFR BLD: 8.8 % (ref 4–15)
NEUTROPHILS # BLD AUTO: 5.5 K/UL (ref 1.8–7.7)
NEUTROPHILS NFR BLD: 74.3 % (ref 38–73)
NRBC BLD-RTO: 0 /100 WBC
PHOSPHATE SERPL-MCNC: 3.7 MG/DL (ref 2.7–4.5)
PLATELET # BLD AUTO: 276 K/UL (ref 150–350)
PMV BLD AUTO: 9.7 FL (ref 9.2–12.9)
POCT GLUCOSE: 108 MG/DL (ref 70–110)
POCT GLUCOSE: 118 MG/DL (ref 70–110)
POTASSIUM SERPL-SCNC: 4.2 MMOL/L (ref 3.5–5.1)
PROT SERPL-MCNC: 7.5 G/DL (ref 6–8.4)
RBC # BLD AUTO: 4.74 M/UL (ref 4.6–6.2)
SODIUM SERPL-SCNC: 136 MMOL/L (ref 136–145)
WBC # BLD AUTO: 7.36 K/UL (ref 3.9–12.7)

## 2020-01-01 PROCEDURE — 20600001 HC STEP DOWN PRIVATE ROOM

## 2020-01-01 PROCEDURE — 11042 PR DEBRIDEMENT, SKIN, SUB-Q TISSUE,=<20 SQ CM: ICD-10-PCS | Mod: 78,,, | Performed by: SURGERY

## 2020-01-01 PROCEDURE — 25000003 PHARM REV CODE 250: Performed by: SURGERY

## 2020-01-01 PROCEDURE — 80053 COMPREHEN METABOLIC PANEL: CPT

## 2020-01-01 PROCEDURE — 87147 CULTURE TYPE IMMUNOLOGIC: CPT

## 2020-01-01 PROCEDURE — 94761 N-INVAS EAR/PLS OXIMETRY MLT: CPT

## 2020-01-01 PROCEDURE — 36000707: Performed by: SURGERY

## 2020-01-01 PROCEDURE — 97605 PR NEG PRESS WOUND THERAPY (NPWT) W/NON-DISPOSABLE WOUND VAC DEVICE (DME), <=50 CM: ICD-10-PCS | Mod: ,,, | Performed by: SURGERY

## 2020-01-01 PROCEDURE — D9220A PRA ANESTHESIA: ICD-10-PCS | Mod: ,,, | Performed by: ANESTHESIOLOGY

## 2020-01-01 PROCEDURE — 84100 ASSAY OF PHOSPHORUS: CPT

## 2020-01-01 PROCEDURE — 37000009 HC ANESTHESIA EA ADD 15 MINS: Performed by: SURGERY

## 2020-01-01 PROCEDURE — 36000706: Performed by: SURGERY

## 2020-01-01 PROCEDURE — 37000008 HC ANESTHESIA 1ST 15 MINUTES: Performed by: SURGERY

## 2020-01-01 PROCEDURE — 87077 CULTURE AEROBIC IDENTIFY: CPT

## 2020-01-01 PROCEDURE — 25000003 PHARM REV CODE 250: Performed by: STUDENT IN AN ORGANIZED HEALTH CARE EDUCATION/TRAINING PROGRAM

## 2020-01-01 PROCEDURE — 25000003 PHARM REV CODE 250: Performed by: NURSE ANESTHETIST, CERTIFIED REGISTERED

## 2020-01-01 PROCEDURE — 36415 COLL VENOUS BLD VENIPUNCTURE: CPT

## 2020-01-01 PROCEDURE — D9220A PRA ANESTHESIA: Mod: ,,, | Performed by: ANESTHESIOLOGY

## 2020-01-01 PROCEDURE — 71000033 HC RECOVERY, INTIAL HOUR: Performed by: SURGERY

## 2020-01-01 PROCEDURE — 87186 SC STD MICRODIL/AGAR DIL: CPT

## 2020-01-01 PROCEDURE — 82962 GLUCOSE BLOOD TEST: CPT | Performed by: SURGERY

## 2020-01-01 PROCEDURE — 87116 MYCOBACTERIA CULTURE: CPT

## 2020-01-01 PROCEDURE — 99900035 HC TECH TIME PER 15 MIN (STAT)

## 2020-01-01 PROCEDURE — 11042 DBRDMT SUBQ TIS 1ST 20SQCM/<: CPT | Mod: 78,,, | Performed by: SURGERY

## 2020-01-01 PROCEDURE — 85025 COMPLETE CBC W/AUTO DIFF WBC: CPT

## 2020-01-01 PROCEDURE — 63600175 PHARM REV CODE 636 W HCPCS: Performed by: NURSE ANESTHETIST, CERTIFIED REGISTERED

## 2020-01-01 PROCEDURE — 87205 SMEAR GRAM STAIN: CPT

## 2020-01-01 PROCEDURE — 97605 NEG PRS WND THER DME<=50SQCM: CPT | Mod: ,,, | Performed by: SURGERY

## 2020-01-01 PROCEDURE — 83735 ASSAY OF MAGNESIUM: CPT

## 2020-01-01 PROCEDURE — 87102 FUNGUS ISOLATION CULTURE: CPT

## 2020-01-01 PROCEDURE — 63600175 PHARM REV CODE 636 W HCPCS: Performed by: SURGERY

## 2020-01-01 PROCEDURE — 87070 CULTURE OTHR SPECIMN AEROBIC: CPT

## 2020-01-01 PROCEDURE — 25000003 PHARM REV CODE 250: Performed by: ANESTHESIOLOGY

## 2020-01-01 PROCEDURE — 87075 CULTR BACTERIA EXCEPT BLOOD: CPT

## 2020-01-01 PROCEDURE — 87206 SMEAR FLUORESCENT/ACID STAI: CPT

## 2020-01-01 RX ORDER — PROCHLORPERAZINE EDISYLATE 5 MG/ML
5 INJECTION INTRAMUSCULAR; INTRAVENOUS
Status: DISCONTINUED | OUTPATIENT
Start: 2020-01-01 | End: 2020-01-01

## 2020-01-01 RX ORDER — MEPERIDINE HYDROCHLORIDE 50 MG/ML
12.5 INJECTION INTRAMUSCULAR; INTRAVENOUS; SUBCUTANEOUS EVERY 10 MIN PRN
Status: DISCONTINUED | OUTPATIENT
Start: 2020-01-01 | End: 2020-01-01

## 2020-01-01 RX ORDER — ASPIRIN 81 MG/1
81 TABLET ORAL DAILY
Status: DISCONTINUED | OUTPATIENT
Start: 2020-01-01 | End: 2020-01-07 | Stop reason: HOSPADM

## 2020-01-01 RX ORDER — PREGABALIN 50 MG/1
100 CAPSULE ORAL 2 TIMES DAILY
Status: DISCONTINUED | OUTPATIENT
Start: 2020-01-01 | End: 2020-01-07 | Stop reason: HOSPADM

## 2020-01-01 RX ORDER — OXYCODONE HYDROCHLORIDE 5 MG/1
10 TABLET ORAL ONCE AS NEEDED
Status: COMPLETED | OUTPATIENT
Start: 2020-01-01 | End: 2020-01-01

## 2020-01-01 RX ORDER — GLUCAGON 1 MG
1 KIT INJECTION
Status: DISCONTINUED | OUTPATIENT
Start: 2020-01-01 | End: 2020-01-07 | Stop reason: HOSPADM

## 2020-01-01 RX ORDER — INSULIN ASPART 100 [IU]/ML
1-10 INJECTION, SOLUTION INTRAVENOUS; SUBCUTANEOUS EVERY 6 HOURS PRN
Status: DISCONTINUED | OUTPATIENT
Start: 2020-01-01 | End: 2020-01-07 | Stop reason: HOSPADM

## 2020-01-01 RX ORDER — ROCURONIUM BROMIDE 10 MG/ML
INJECTION, SOLUTION INTRAVENOUS
Status: DISCONTINUED | OUTPATIENT
Start: 2020-01-01 | End: 2020-01-01

## 2020-01-01 RX ORDER — PROPOFOL 10 MG/ML
VIAL (ML) INTRAVENOUS
Status: DISCONTINUED | OUTPATIENT
Start: 2020-01-01 | End: 2020-01-01

## 2020-01-01 RX ORDER — METOCLOPRAMIDE HYDROCHLORIDE 5 MG/ML
10 INJECTION INTRAMUSCULAR; INTRAVENOUS EVERY 10 MIN PRN
Status: DISCONTINUED | OUTPATIENT
Start: 2020-01-01 | End: 2020-01-01

## 2020-01-01 RX ORDER — HYDROMORPHONE HYDROCHLORIDE 1 MG/ML
0.2 INJECTION, SOLUTION INTRAMUSCULAR; INTRAVENOUS; SUBCUTANEOUS EVERY 5 MIN PRN
Status: DISCONTINUED | OUTPATIENT
Start: 2020-01-01 | End: 2020-01-03

## 2020-01-01 RX ORDER — SUCCINYLCHOLINE CHLORIDE 20 MG/ML
INJECTION INTRAMUSCULAR; INTRAVENOUS
Status: DISCONTINUED | OUTPATIENT
Start: 2020-01-01 | End: 2020-01-01

## 2020-01-01 RX ORDER — FENTANYL CITRATE 50 UG/ML
50 INJECTION, SOLUTION INTRAMUSCULAR; INTRAVENOUS EVERY 5 MIN PRN
Status: DISCONTINUED | OUTPATIENT
Start: 2020-01-01 | End: 2020-01-01

## 2020-01-01 RX ORDER — ATORVASTATIN CALCIUM 20 MG/1
40 TABLET, FILM COATED ORAL DAILY
Status: DISCONTINUED | OUTPATIENT
Start: 2020-01-01 | End: 2020-01-07 | Stop reason: HOSPADM

## 2020-01-01 RX ORDER — MIDAZOLAM HYDROCHLORIDE 1 MG/ML
INJECTION, SOLUTION INTRAMUSCULAR; INTRAVENOUS
Status: DISCONTINUED | OUTPATIENT
Start: 2020-01-01 | End: 2020-01-01

## 2020-01-01 RX ORDER — FENTANYL CITRATE 50 UG/ML
INJECTION, SOLUTION INTRAMUSCULAR; INTRAVENOUS
Status: DISCONTINUED | OUTPATIENT
Start: 2020-01-01 | End: 2020-01-01

## 2020-01-01 RX ORDER — BACITRACIN 50000 [IU]/1
INJECTION, POWDER, FOR SOLUTION INTRAMUSCULAR
Status: DISCONTINUED | OUTPATIENT
Start: 2020-01-01 | End: 2020-01-01 | Stop reason: HOSPADM

## 2020-01-01 RX ORDER — GLYCOPYRROLATE 0.2 MG/ML
INJECTION INTRAMUSCULAR; INTRAVENOUS
Status: DISCONTINUED | OUTPATIENT
Start: 2020-01-01 | End: 2020-01-01

## 2020-01-01 RX ORDER — ETOMIDATE 2 MG/ML
INJECTION INTRAVENOUS
Status: DISCONTINUED | OUTPATIENT
Start: 2020-01-01 | End: 2020-01-01

## 2020-01-01 RX ADMIN — FENTANYL CITRATE 25 MCG: 50 INJECTION, SOLUTION INTRAMUSCULAR; INTRAVENOUS at 10:01

## 2020-01-01 RX ADMIN — DOCUSATE SODIUM 50 MG: 50 CAPSULE, LIQUID FILLED ORAL at 10:01

## 2020-01-01 RX ADMIN — MIDAZOLAM HYDROCHLORIDE 2 MG: 1 INJECTION, SOLUTION INTRAMUSCULAR; INTRAVENOUS at 08:01

## 2020-01-01 RX ADMIN — GLYCOPYRROLATE 0.4 MG: 0.2 INJECTION, SOLUTION INTRAMUSCULAR; INTRAVENOUS at 08:01

## 2020-01-01 RX ADMIN — SUCCINYLCHOLINE CHLORIDE 120 MG: 20 INJECTION, SOLUTION INTRAMUSCULAR; INTRAVENOUS at 09:01

## 2020-01-01 RX ADMIN — ASPIRIN 81 MG: 81 TABLET, COATED ORAL at 08:01

## 2020-01-01 RX ADMIN — VANCOMYCIN 1.75 GRAM/500 ML IN 0.9 % SODIUM CHLORIDE INTRAVENOUS 1750 MG: at 08:01

## 2020-01-01 RX ADMIN — ROCURONIUM BROMIDE 20 MG: 10 INJECTION, SOLUTION INTRAVENOUS at 09:01

## 2020-01-01 RX ADMIN — ACETAMINOPHEN 650 MG: 325 TABLET ORAL at 08:01

## 2020-01-01 RX ADMIN — FENTANYL CITRATE 50 MCG: 50 INJECTION, SOLUTION INTRAMUSCULAR; INTRAVENOUS at 08:01

## 2020-01-01 RX ADMIN — ETOMIDATE 30 MG: 2 INJECTION, SOLUTION INTRAVENOUS at 09:01

## 2020-01-01 RX ADMIN — ATORVASTATIN CALCIUM 40 MG: 20 TABLET, FILM COATED ORAL at 08:01

## 2020-01-01 RX ADMIN — PREGABALIN 100 MG: 50 CAPSULE ORAL at 08:01

## 2020-01-01 RX ADMIN — OXYCODONE HYDROCHLORIDE 10 MG: 5 TABLET ORAL at 11:01

## 2020-01-01 RX ADMIN — OXYCODONE HYDROCHLORIDE 5 MG: 5 TABLET ORAL at 08:01

## 2020-01-01 RX ADMIN — FENTANYL CITRATE 50 MCG: 50 INJECTION, SOLUTION INTRAMUSCULAR; INTRAVENOUS at 09:01

## 2020-01-01 RX ADMIN — SODIUM CHLORIDE, SODIUM GLUCONATE, SODIUM ACETATE, POTASSIUM CHLORIDE, MAGNESIUM CHLORIDE, SODIUM PHOSPHATE, DIBASIC, AND POTASSIUM PHOSPHATE: .53; .5; .37; .037; .03; .012; .00082 INJECTION, SOLUTION INTRAVENOUS at 08:01

## 2020-01-01 RX ADMIN — PROPOFOL 50 MG: 10 INJECTION, EMULSION INTRAVENOUS at 09:01

## 2020-01-01 NOTE — ANESTHESIA PREPROCEDURE EVALUATION
[facility]  Anesthesia Pre-Operative Evaluation         Patient Name: Renan Britton  YOB: 1960  MRN: [id number]    SUBJECTIVE:     Pre-operative evaluation for Procedure(s) (LRB):  Procedure(s) (LRB):  WASHOUT (Bilateral)     01/02/2020    Renan Britton is a 59 y.o. male w/ a significant PMHx of HTN, HLD, DM2, obesity, former smoker, PAD, and moderate aortic stenosis. Underwent a bilateral femoral endarterectomy on 12/20 for claudication. Postop course was uncomplicated and he was discharged 12/24. Presented to the ED on 12/27 with redness and swelling and was sent home with clindamycin. Follow up CT shows fluid collections at both groin sitse. S/p I&D w/wound vac placement 1/1.     Patient now presents for the above procedure(s).    LDA       Peripheral IV - Single Lumen 12/31/19 1759 18 G Anterior;Left Forearm (Active)   Site Assessment Clean;Dry;Intact;No redness;No swelling 1/2/2020 11:00 AM   Line Status Flushed;Saline locked 1/2/2020 11:00 AM   Dressing Status Clean;Dry;Intact 1/2/2020 11:00 AM   Dressing Change Due 01/04/20 1/2/2020 11:00 AM   Site Change Due 01/04/20 1/2/2020 11:00 AM   Reason Not Rotated Not due 1/2/2020 11:00 AM   Number of days: 1            Peripheral IV - Single Lumen 01/01/20 1037 18 G Right Forearm (Active)   Site Assessment Clean;Dry;Intact;No redness;No swelling 1/2/2020 11:00 AM   Line Status Flushed;Saline locked 1/2/2020 11:00 AM   Dressing Status Clean;Dry;Intact 1/2/2020 11:00 AM   Dressing Change Due 01/05/20 1/2/2020 11:00 AM   Site Change Due 01/05/20 1/2/2020 11:00 AM   Reason Not Rotated Not due 1/2/2020 11:00 AM   Number of days: 1         Prev airway: Placement Date: 01/01/20; Placement Time: 0903; Method of Intubation: [place]; Inserted by: CRNA; Airway Device: Endotracheal Tube; Intubated: Postinduction; Airway Device Size: 7.5; Style: Cuffed; Cuff Inflation: Minimal occlusive pressure; Inflation Amount: 5; Placement Verified  By: Auscultation, Capnometry, Colorimetric EtCO2 device; Grade: Grade I; Complicating Factors: Anterior larynx, Large/Floppy epiglottis, Short neck, Obesity; Intubation Findings: Positive EtCO2, Bilateral breath sounds, Atraumatic/Condition of teeth unchanged;  Depth of Insertion: 23; Securment: Lips; Complications: None; Breath Sounds: Equal Bilateral; Insertion Attempts: 1  Drips: None documented.      Patient Active Problem List   Diagnosis    Claudication    PAD (peripheral artery disease)    Surgical wound breakdown       Review of patient's allergies indicates:   Allergen Reactions    Penicillins Hives       Current Inpatient Medications:   aspirin  81 mg Oral Daily    atorvastatin  40 mg Oral Daily    ceFEPime (MAXIPIME) IVPB  2 g Intravenous Q8H    docusate sodium  50 mg Oral Daily    lidocaine (PF) 10 mg/ml (1%)  1 mL Other Once    pregabalin  100 mg Oral BID    vancomycin (VANCOCIN) IVPB  15 mg/kg (Dosing Weight) Intravenous Q12H       No current facility-administered medications on file prior to encounter.      Current Outpatient Medications on File Prior to Encounter   Medication Sig Dispense Refill    aspirin (ECOTRIN) 81 MG EC tablet Take 81 mg by mouth once daily.      atorvastatin (LIPITOR) 40 MG tablet Take 40 mg by mouth once daily.      cetirizine 10 mg chewable tablet Take 10 mg by mouth as needed.      cilostazol (PLETAL) 100 MG Tab Take 100 mg by mouth 2 (two) times daily.       clindamycin (CLEOCIN) 300 MG capsule Take 1 capsule (300 mg total) by mouth every 6 (six) hours. for 7 days 28 capsule 0    clindamycin (CLEOCIN) 300 MG capsule Take 1 capsule by mouth every 6 hours for 7 days starting today 28 capsule 0    clopidogrel (PLAVIX) 75 mg tablet Take 1 tablet (75 mg total) by mouth once daily. 30 tablet 11    coenzyme Q10 10 mg capsule Take 10 mg by mouth once daily.      glyBURIDE-metformin 2.5-500 mg (GLUCOVANCE) 2.5-500 mg per tablet Take 1 tablet by mouth 2 (two)  times daily with meals.      HYDROcodone-acetaminophen (NORCO) 5-325 mg per tablet Take 1 tablet by mouth every 6 (six) hours as needed for Pain. 30 tablet 0    magnesium chloride (SLOW-MAG) 71.5 mg TbEC Take 2 tablets by mouth once.      mupirocin (BACTROBAN) 2 % ointment Apply topically 2 (two) times daily. Swab inside each nare twice a day beginning 2 days before surgery. 15 g 0    oxyCODONE (ROXICODONE) 5 MG immediate release tablet Take 1 tablet (5 mg total) by mouth every 4 (four) hours as needed. 40 tablet 0    pregabalin (LYRICA) 100 MG capsule Take 100 mg by mouth 2 (two) times daily.      valsartan (DIOVAN) 160 MG tablet Take 160 mg by mouth once daily.         Past Surgical History:   Procedure Laterality Date    BRAIN SURGERY  2011    TUMOR EXCISED    ENDARTERECTOMY OF FEMORAL ARTERY Bilateral 2019    Procedure: ENDARTERECTOMY, FEMORAL;  Surgeon: Kelby Gomez MD;  Location: [address];  Service: Peripheral Vascular;  Laterality: Bilateral;  natalya common femoral endarterectomy with natalya. iliac stent placement    PERCUTANEOUS TRANSLUMINAL ANGIOPLASTY (PTA) OF PERIPHERAL VESSEL Left 10/17/2019    Procedure: PTA, PERIPHERAL VESSEL;  Surgeon: Rao Fisher MD;  Location: [facility];  Service: Cardiology;  Laterality: Left;       Social History     Socioeconomic History    Marital status:      Spouse name: Not on file    Number of children: Not on file    Years of education: Not on file    Highest education level: Not on file   Occupational History    Not on file   Social Needs    Financial resource strain: Not on file    Food insecurity:     Worry: Not on file     Inability: Not on file    Transportation needs:     Medical: Not on file     Non-medical: Not on file   Tobacco Use    Smoking status: Former Smoker     Packs/day: 2.00     Types: Cigarettes     Last attempt to quit: 2011     Years since quittin.9    Smokeless tobacco: Never Used   Substance and  Sexual Activity    Alcohol use: Yes     Comment: 2-4 PER DAY    Drug use: Never    Sexual activity: Not on file   Lifestyle    Physical activity:     Days per week: Not on file     Minutes per session: Not on file    Stress: Not on file   Relationships    Social connections:     Talks on phone: Not on file     Gets together: Not on file     Attends Yarsani service: Not on file     Active member of club or organization: Not on file     Attends meetings of clubs or organizations: Not on file     Relationship status: Not on file   Other Topics Concern    Not on file   Social History Narrative    Not on file       OBJECTIVE:     Vital Signs Range (Last 24H):  Temp:  [36.9 °C (98.4 °F)-37.8 °C (100 °F)]   Pulse:  [80-99]   Resp:  [17-19]   BP: (128-171)/(69-86)   SpO2:  [93 %-99 %]       Significant Labs:  Lab Results   Component Value Date    WBC 8.49 01/02/2020    HGB 13.4 (L) 01/02/2020    HCT 39.7 (L) 01/02/2020     01/02/2020    ALT 25 01/02/2020    AST 28 01/02/2020     (L) 01/02/2020    K 4.0 01/02/2020    CL 99 01/02/2020    CREATININE 0.8 01/02/2020    BUN 10 01/02/2020    CO2 26 01/02/2020       Diagnostic Studies: No relevant studies.    EKG: No results found for this or any previous visit.    ECHOCARDIOGRAM:  No results found for this or any previous visit.        ASSESSMENT/PLAN:         Anesthesia Evaluation    I have reviewed the Patient Summary Reports.    I have reviewed the Nursing Notes.   I have reviewed the Medications.     Review of Systems  Anesthesia Hx:  No problems with previous Anesthesia  History of prior surgery of interest to airway management or planning: Denies Family Hx of Anesthesia complications.   Denies Personal Hx of Anesthesia complications.   Social:  Former Smoker, Social Alcohol Use    Cardiovascular:   Hypertension, poorly controlled        Physical Exam  General:  Well nourished, Obesity    Airway/Jaw/Neck:  Airway Findings: Mouth Opening: Normal Tongue:  Normal  General Airway Assessment: Adult, Possible difficult intubation, Possible difficult mask airway  Mallampati: III  Jaw/Neck Findings:  Neck ROM: Normal ROM  Neck Findings:  Girth Increased      Dental:  Dental Findings: In tact   Chest/Lungs:  Chest/Lungs Findings: Clear to auscultation, Normal Respiratory Rate         Mental Status:  Mental Status Findings:  Cooperative, Alert and Oriented         Anesthesia Plan  Type of Anesthesia, risks & benefits discussed:  Anesthesia Type:  general  Patient's Preference: General   Intra-op Monitoring Plan: standard ASA monitors  Intra-op Monitoring Plan Comments: Per discussions with Vascular surgery, recommend holding off on a-line.  Post Op Pain Control Plan: multimodal analgesia, IV/PO Opioids PRN and per primary service following discharge from PACU  Post Op Pain Control Plan Comments:   Induction:   IV  Beta Blocker:  Patient is not currently on a Beta-Blocker (No further documentation required).       Informed Consent: Patient understands risks and agrees with Anesthesia plan.  Questions answered. Anesthesia consent signed with patient.  ASA Score: 3     Day of Surgery Review of History & Physical:    H&P update referred to the surgeon.     Anesthesia Plan Notes: NPO confirmed.   Thick neck noted. Intubated with single attempt via videolaryngoscope this admission.  Patient has two 18ga PIVs which should be adequate for expected 2hr case with low blood loss.          Ready For Surgery From Anesthesia Perspective.

## 2020-01-01 NOTE — HPI
Renan Britton is a 59 y.o. male with PMHx of bilateral lower extremity common femoral endarterectomy and Left iliac stent with Vascular surgery on 12/20/19. Patient had uneventful postop course and was discharged home. Patient presented to ER on 12/27 for bilateral groin cellulitis, was sent home with Clindamycin and f/u in Vascular clinic on 12/30. In clinic patient had new onset serous drainage from incision sites and skin necrosis. The wound was debrided in clinic and CT was ordered that showed bilateral fluid collections below incisions likely consistent with seromas. Patient denies fevers/chills, CP, SOB, abd pain, bowel/bladder changes, numbness/tingling or cold extremities.

## 2020-01-01 NOTE — ASSESSMENT & PLAN NOTE
58yo male with hx of PAD s/p bilateral femoral endarterectomies on 12/20 now with bilateral wound breakdown and concern for infected fluid collection L groin    - NPO  - to OR today for L groin washout and R groin debridement  - restart home meds - lyrica, daily aspirin 91mg, statin  - SSI  - hold plavix  - plastic surgery consult for muscle flap to L groin, eval for muscle flap to R groin - appreciate their assistance  - daily labs  - IV vanc daily, will obtain cultures in OR today and tailor antibiotics appropriately  - plan to place wound vacs to bilateral groin incisions in OR today    Dispo: to OR today for washout to bilateral groins, likely will require muscle flaps to bilateral groin incisions per plastics

## 2020-01-01 NOTE — SUBJECTIVE & OBJECTIVE
No current facility-administered medications on file prior to encounter.      Current Outpatient Medications on File Prior to Encounter   Medication Sig    aspirin (ECOTRIN) 81 MG EC tablet Take 81 mg by mouth once daily.    atorvastatin (LIPITOR) 40 MG tablet Take 40 mg by mouth once daily.    cetirizine 10 mg chewable tablet Take 10 mg by mouth as needed.    cilostazol (PLETAL) 100 MG Tab Take 100 mg by mouth 2 (two) times daily.     clindamycin (CLEOCIN) 300 MG capsule Take 1 capsule (300 mg total) by mouth every 6 (six) hours. for 7 days    clindamycin (CLEOCIN) 300 MG capsule Take 1 capsule by mouth every 6 hours for 7 days starting today    clopidogrel (PLAVIX) 75 mg tablet Take 1 tablet (75 mg total) by mouth once daily.    coenzyme Q10 10 mg capsule Take 10 mg by mouth once daily.    glyBURIDE-metformin 2.5-500 mg (GLUCOVANCE) 2.5-500 mg per tablet Take 1 tablet by mouth 2 (two) times daily with meals.    HYDROcodone-acetaminophen (NORCO) 5-325 mg per tablet Take 1 tablet by mouth every 6 (six) hours as needed for Pain.    magnesium chloride (SLOW-MAG) 71.5 mg TbEC Take 2 tablets by mouth once.    mupirocin (BACTROBAN) 2 % ointment Apply topically 2 (two) times daily. Swab inside each nare twice a day beginning 2 days before surgery.    oxyCODONE (ROXICODONE) 5 MG immediate release tablet Take 1 tablet (5 mg total) by mouth every 4 (four) hours as needed.    pregabalin (LYRICA) 100 MG capsule Take 100 mg by mouth 2 (two) times daily.    valsartan (DIOVAN) 160 MG tablet Take 160 mg by mouth once daily.       Review of patient's allergies indicates:   Allergen Reactions    Penicillins Hives       Past Medical History:   Diagnosis Date    Diabetes mellitus     H/O brain tumor     HLD (hyperlipidemia)     Hypertension     PAD (peripheral artery disease)     Seizures     R/T BRAIN TUMOR- SURGICALLY EXCISED     Past Surgical History:   Procedure Laterality Date    BRAIN SURGERY  01/2011     TUMOR EXCISED    ENDARTERECTOMY OF FEMORAL ARTERY Bilateral 2019    Procedure: ENDARTERECTOMY, FEMORAL;  Surgeon: Kelby Gomez MD;  Location: [facility] OR [address] [place] [address];  Service: Peripheral Vascular;  Laterality: Bilateral;  natalya common femoral endarterectomy with natalya. iliac stent placement    PERCUTANEOUS TRANSLUMINAL ANGIOPLASTY (PTA) OF PERIPHERAL VESSEL Left 10/17/2019    Procedure: PTA, PERIPHERAL VESSEL;  Surgeon: Rao Fisher MD;  Location: [facility] CATH;  Service: Cardiology;  Laterality: Left;     Family History     Problem Relation (Age of Onset)    Cancer Father, Brother    Diabetes Mother    Heart disease Father, Brother, Brother, Brother    Hypertension Mother    Stroke Mother        Tobacco Use    Smoking status: Former Smoker     Packs/day: 2.00     Types: Cigarettes     Last attempt to quit: 2011     Years since quittin.9    Smokeless tobacco: Never Used   Substance and Sexual Activity    Alcohol use: Yes     Comment: 2-4 PER DAY    Drug use: Never    Sexual activity: Not on file     Review of Systems   Review of systems negative except as pertinent positive and negatives listed above    Objective:     Vital Signs (Most Recent):  Temp: 97 °F (36.1 °C) (19)  Pulse: 91 (19)  Resp: 18 (19)  BP: (!) 141/80 (19)  SpO2: 96 % (19) Vital Signs (24h Range):  Temp:  [97 °F (36.1 °C)-98.4 °F (36.9 °C)] 97 °F (36.1 °C)  Pulse:  [91-95] 91  Resp:  [14-18] 18  SpO2:  [96 %-97 %] 96 %  BP: (141-163)/(80-83) 141/80     Weight: 120.5 kg (265 lb 10.5 oz)  Body mass index is 37.05 kg/m².    Physical Exam   Constitutional: He appears well-developed and well-nourished.   HENT:   Head: Normocephalic and atraumatic.   Mouth/Throat: No oropharyngeal exudate.   Eyes: EOM are normal.   Neck: Normal range of motion.   Cardiovascular: Normal rate and regular rhythm.   2+ DP and PTs bilaterally   Pulmonary/Chest: Effort normal.   Abdominal: Soft. He exhibits no  distension.   Musculoskeletal:   RLE: post debrided open incision with serous and scant purulent drainage with packing in place of endarterectomy incision. Erythematous borders. Mildly TTP. Good cap refill. No palpable fluid collections. SILT throughout and AROM intact throughout.     LLE: post debirded open incision with serous and scant purulent drainage with packing in place of endarterectomy incision that is more open than contralateral side. Erythematous borders. More TTP than contralateral side  Good cap refill. No palpable fluid collections. SILT throughout and AROM intact throughout.        Significant Labs:  CMP:   Recent Labs   Lab 12/31/19  1803   *   CALCIUM 10.0      K 4.9   CO2 31*   CL 98   BUN 12   CREATININE 0.9     CBC AM draw    Significant Diagnostics:  CT: I have reviewed all pertinent results/findings within the past 24 hours and my personal findings are:  Bilateral common femoral fluid collections at bifurcations (L>R)

## 2020-01-01 NOTE — TRANSFER OF CARE
"Anesthesia Transfer of Care Note    Patient: Renan Britton    Procedure(s) Performed: Procedure(s) (LRB):  IRRIGATION AND DEBRIDEMENT (Bilateral)  APPLICATION, WOUND VAC (Bilateral)    Patient location: PACU    Anesthesia Type: general    Transport from OR: Transported from OR on 6-10 L/min O2 by face mask with adequate spontaneous ventilation    Post pain: adequate analgesia    Post assessment: tolerated procedure well    Post vital signs: stable    Level of consciousness: awake and alert    Nausea/Vomiting: no nausea/vomiting    Complications: none    Transfer of care protocol was followed      Last vitals:   Visit Vitals  /77 (BP Location: Left arm, Patient Position: Sitting)   Pulse 102   Temp 36.2 °C (97.2 °F) (Temporal)   Resp 18   Ht 5' 11" (1.803 m)   Wt 120.5 kg (265 lb 10.5 oz)   SpO2 100%   BMI 37.05 kg/m²     "

## 2020-01-01 NOTE — PROGRESS NOTES
[facility]  Vascular Surgery  Progress Note    Patient Name: Renan Britton  MRN: [id number]  Admission Date: 12/31/2019  Primary Care Provider: Eren Becker MD    Subjective:     Interval History: No acute events overnight. NPO in anticipation of going to OR today for washout. Home meds restarted.    Post-Op Info:  Procedure(s) (LRB):  IRRIGATION AND DEBRIDEMENT, LOWER EXTREMITY (Left)   Day of Surgery       Medications:  Continuous Infusions:  Scheduled Meds:   aspirin  81 mg Oral Daily    atorvastatin  40 mg Oral Daily    lidocaine (PF) 10 mg/ml (1%)  1 mL Other Once    pregabalin  100 mg Oral BID    vancomycin (VANCOCIN) IVPB  15 mg/kg (Dosing Weight) Intravenous Q12H     PRN Meds:acetaminophen, acetaminophen, Dextrose 10% Bolus, glucagon (human recombinant), insulin aspart U-100, melatonin, ondansetron, oxyCODONE, sodium chloride 0.9%, Pharmacy to dose Vancomycin consult **AND** vancomycin - pharmacy to dose     Objective:     Vital Signs (Most Recent):  Temp: 98 °F (36.7 °C) (01/01/20 0719)  Pulse: 91 (01/01/20 0719)  Resp: 18 (01/01/20 0719)  BP: 125/77 (01/01/20 0719)  SpO2: 96 % (01/01/20 0719) Vital Signs (24h Range):  Temp:  [97 °F (36.1 °C)-98.4 °F (36.9 °C)] 98 °F (36.7 °C)  Pulse:  [84-95] 91  Resp:  [14-18] 18  SpO2:  [95 %-100 %] 96 %  BP: (125-163)/(75-83) 125/77         Physical Exam   Constitutional: He is oriented to person, place, and time. He appears well-developed and well-nourished.   HENT:   Head: Normocephalic and atraumatic.   Cardiovascular: Normal rate.   Pulmonary/Chest: Effort normal.   Abdominal: Soft.   Musculoskeletal:   Left groin incision: fibrinous material along most of the incision with cutaneous skin necrosis at the most inferior portion, serous drainage on dressing, blanching erythema extending 2-3cm around the incision, mild induration on the medial aspect  Right groin incision: Fibrinous exudate along the inferior aspect of the incision,  remainder of the incision intact with minimal exudate, mild erythema at inferior lateral aspect of the incision    Neurological: He is alert and oriented to person, place, and time.   Skin: Skin is warm and dry.   Nursing note and vitals reviewed.      Significant Labs:  BMP:   Recent Labs   Lab 01/01/20  0702   *      K 4.2   CL 99   CO2 31*   BUN 10   CREATININE 0.9   CALCIUM 10.1   MG 2.0     CBC:   Recent Labs   Lab 01/01/20  0702   WBC 7.36   RBC 4.74   HGB 14.3   HCT 43.5      MCV 92   MCH 30.2   MCHC 32.9       Significant Diagnostics:  I have reviewed all pertinent imaging results/findings within the past 24 hours.    Assessment/Plan:     Surgical wound breakdown  58yo male with hx of PAD s/p bilateral femoral endarterectomies on 12/20 now with bilateral wound breakdown and concern for infected fluid collection L groin    - NPO  - to OR today for L groin washout and R groin debridement  - restart home meds - lyrica, daily aspirin 91mg, statin  - SSI  - hold plavix  - plastic surgery consult for muscle flap to L groin, eval for muscle flap to R groin - appreciate their assistance  - daily labs  - IV vanc daily, will obtain cultures in OR today and tailor antibiotics appropriately  - plan to place wound vacs to bilateral groin incisions in OR today    Dispo: to OR today for washout to bilateral groins, likely will require muscle flaps to bilateral groin incisions per plastics        Adelina Arboleda MD  Vascular Surgery  [facility]

## 2020-01-01 NOTE — ANESTHESIA POSTPROCEDURE EVALUATION
Anesthesia Post Evaluation    Patient: Renan Britton    Procedure(s) Performed: Procedure(s) (LRB):  IRRIGATION AND DEBRIDEMENT (Bilateral)  APPLICATION, WOUND VAC (Bilateral)    Final Anesthesia Type: general    Patient location during evaluation: PACU  Patient participation: Yes- Able to Participate  Level of consciousness: awake and alert  Post-procedure vital signs: reviewed and stable  Pain management: adequate  Airway patency: patent    PONV status at discharge: No PONV  Anesthetic complications: no      Cardiovascular status: blood pressure returned to baseline  Respiratory status: unassisted  Hydration status: euvolemic  Follow-up not needed.          Vitals Value Taken Time   /86 1/1/2020 11:35 AM   Temp 36.4 °C (97.5 °F) 1/1/2020 11:35 AM   Pulse 85 1/1/2020 12:15 PM   Resp 16 1/1/2020 12:15 PM   SpO2 98 % 1/1/2020 12:15 PM         Event Time     Out of Recovery 01/01/2020 11:00:00          Pain/Cristy Score: Pain Rating Prior to Med Admin: 4 (1/1/2020 11:27 AM)  Pain Rating Post Med Admin: 5 (1/1/2020 12:16 PM)  Cristy Score: 10 (1/1/2020 11:27 AM)

## 2020-01-01 NOTE — ANESTHESIA POSTPROCEDURE EVALUATION
Anesthesia Post Evaluation    Patient: Renan Britton    Procedure(s) Performed: Procedure(s) (LRB):  IRRIGATION AND DEBRIDEMENT (Bilateral)  APPLICATION, WOUND VAC (Bilateral)    [facility] Anesthesia Post Op Evaluation      Vitals Value Taken Time   /86 1/1/2020 11:35 AM   Temp 36.4 °C (97.5 °F) 1/1/2020 11:35 AM   Pulse 85 1/1/2020 12:15 PM   Resp 16 1/1/2020 12:15 PM   SpO2 98 % 1/1/2020 12:15 PM         Event Time     Out of Recovery 01/01/2020 11:00:00          Pain/Cristy Score: Pain Rating Prior to Med Admin: 4 (1/1/2020 11:27 AM)  Pain Rating Post Med Admin: 5 (1/1/2020 12:16 PM)  Cristy Score: 10 (1/1/2020 11:27 AM)

## 2020-01-01 NOTE — SUBJECTIVE & OBJECTIVE
Medications:  Continuous Infusions:  Scheduled Meds:   aspirin  81 mg Oral Daily    atorvastatin  40 mg Oral Daily    lidocaine (PF) 10 mg/ml (1%)  1 mL Other Once    pregabalin  100 mg Oral BID    vancomycin (VANCOCIN) IVPB  15 mg/kg (Dosing Weight) Intravenous Q12H     PRN Meds:acetaminophen, acetaminophen, Dextrose 10% Bolus, glucagon (human recombinant), insulin aspart U-100, melatonin, ondansetron, oxyCODONE, sodium chloride 0.9%, Pharmacy to dose Vancomycin consult **AND** vancomycin - pharmacy to dose     Objective:     Vital Signs (Most Recent):  Temp: 98 °F (36.7 °C) (01/01/20 0719)  Pulse: 91 (01/01/20 0719)  Resp: 18 (01/01/20 0719)  BP: 125/77 (01/01/20 0719)  SpO2: 96 % (01/01/20 0719) Vital Signs (24h Range):  Temp:  [97 °F (36.1 °C)-98.4 °F (36.9 °C)] 98 °F (36.7 °C)  Pulse:  [84-95] 91  Resp:  [14-18] 18  SpO2:  [95 %-100 %] 96 %  BP: (125-163)/(75-83) 125/77         Physical Exam   Constitutional: He is oriented to person, place, and time. He appears well-developed and well-nourished.   HENT:   Head: Normocephalic and atraumatic.   Cardiovascular: Normal rate.   Pulmonary/Chest: Effort normal.   Abdominal: Soft.   Musculoskeletal:   Left groin incision: fibrinous material along most of the incision with cutaneous skin necrosis at the most inferior portion, serous drainage on dressing, blanching erythema extending 2-3cm around the incision, mild induration on the medial aspect  Right groin incision: Fibrinous exudate along the inferior aspect of the incision, remainder of the incision intact with minimal exudate, mild erythema at inferior lateral aspect of the incision    Neurological: He is alert and oriented to person, place, and time.   Skin: Skin is warm and dry.   Nursing note and vitals reviewed.      Significant Labs:  BMP:   Recent Labs   Lab 01/01/20 0702   *      K 4.2   CL 99   CO2 31*   BUN 10   CREATININE 0.9   CALCIUM 10.1   MG 2.0     CBC:   Recent Labs   Lab  01/01/20  0702   WBC 7.36   RBC 4.74   HGB 14.3   HCT 43.5      MCV 92   MCH 30.2   MCHC 32.9       Significant Diagnostics:  I have reviewed all pertinent imaging results/findings within the past 24 hours.

## 2020-01-01 NOTE — PROGRESS NOTES
Report called to Ace RN, pt made ready for transport to room via hosp bed per transport,AA&Ox4,VSS,resp unlabored, bilat groin sites and wound vac intact, pt osvaldo po water and pain med, wife updated on pt status via telephone,stable at transport.

## 2020-01-01 NOTE — ANESTHESIA PREPROCEDURE EVALUATION
[facility]  Anesthesia Pre-Operative Evaluation         Patient Name: Renan Britton  YOB: 1960  MRN: [id number]    SUBJECTIVE:     Pre-operative evaluation for Procedure(s) (LRB):  IRRIGATION AND DEBRIDEMENT, LOWER EXTREMITY (Left)     01/01/2020    Renan Britton is a 59 y.o. male w/ a significant PMHx of HTN, HLD, DM2, obesity, former smoker, PAD, and moderate aortic stenosis. Underwent a bilateral femoral endarterectomy on 12/20 for claudication. Postop course was uncomplicated and he was discharged 12/24. Presented to the ED on 12/27 with redness and swelling and was sent home with clindamycin. Follow up CT shows fluid collections at both groin sitse..    Patient now presents for the above procedure(s).      LDA:        Peripheral IV - Single Lumen 12/30/19 1820 20 G Left Antecubital (Active)       Prev airway:   Placement Date: 12/20/19; Placement Time: 1024 (created via procedure documentation); Method of Intubation: Direct laryngoscopy; Inserted by: Anesthesia Resident; Airway Device: Endotracheal Tube; Mask Ventilation: Mod Diff - oral; Intubated: Postinduction; Blade: Friedman #2; Airway Device Size: 7.5; Style: Cuffed; Cuff Inflation: Minimal occlusive pressure; Inflation Amount: 8; Placement Verified By: Capnometry (ETT condensation); Grade:  (Grade IIB); Complicating Factors: Anterior larynx, Small mouth, Oropharyngeal edema or fat, Obesity, Short neck; Intubation Findings: Bilateral breath sounds;  Depth of Insertion: 23; Securment: Lips; Complications: None; Breath Sounds: Equal Bilateral; Insertion Attempts: 1; Removal Date: 12/20/19;  Removal Time: 1439     Drips: None documented.      Patient Active Problem List   Diagnosis    Claudication    PAD (peripheral artery disease)    Surgical wound breakdown       Review of patient's allergies indicates:   Allergen Reactions    Penicillins Hives       Current Inpatient Medications:   lidocaine (PF) 10 mg/ml (1%)   1 mL Other Once    vancomycin (VANCOCIN) IVPB  15 mg/kg (Dosing Weight) Intravenous Q12H       No current facility-administered medications on file prior to encounter.      Current Outpatient Medications on File Prior to Encounter   Medication Sig Dispense Refill    aspirin (ECOTRIN) 81 MG EC tablet Take 81 mg by mouth once daily.      atorvastatin (LIPITOR) 40 MG tablet Take 40 mg by mouth once daily.      cetirizine 10 mg chewable tablet Take 10 mg by mouth as needed.      cilostazol (PLETAL) 100 MG Tab Take 100 mg by mouth 2 (two) times daily.       clindamycin (CLEOCIN) 300 MG capsule Take 1 capsule (300 mg total) by mouth every 6 (six) hours. for 7 days 28 capsule 0    clindamycin (CLEOCIN) 300 MG capsule Take 1 capsule by mouth every 6 hours for 7 days starting today 28 capsule 0    clopidogrel (PLAVIX) 75 mg tablet Take 1 tablet (75 mg total) by mouth once daily. 30 tablet 11    coenzyme Q10 10 mg capsule Take 10 mg by mouth once daily.      glyBURIDE-metformin 2.5-500 mg (GLUCOVANCE) 2.5-500 mg per tablet Take 1 tablet by mouth 2 (two) times daily with meals.      HYDROcodone-acetaminophen (NORCO) 5-325 mg per tablet Take 1 tablet by mouth every 6 (six) hours as needed for Pain. 30 tablet 0    magnesium chloride (SLOW-MAG) 71.5 mg TbEC Take 2 tablets by mouth once.      mupirocin (BACTROBAN) 2 % ointment Apply topically 2 (two) times daily. Swab inside each nare twice a day beginning 2 days before surgery. 15 g 0    oxyCODONE (ROXICODONE) 5 MG immediate release tablet Take 1 tablet (5 mg total) by mouth every 4 (four) hours as needed. 40 tablet 0    pregabalin (LYRICA) 100 MG capsule Take 100 mg by mouth 2 (two) times daily.      valsartan (DIOVAN) 160 MG tablet Take 160 mg by mouth once daily.         Past Surgical History:   Procedure Laterality Date    BRAIN SURGERY  01/2011    TUMOR EXCISED    ENDARTERECTOMY OF FEMORAL ARTERY Bilateral 12/20/2019    Procedure: ENDARTERECTOMY, FEMORAL;   Surgeon: Kelby Gomez MD;  Location: [facility] FLR;  Service: Peripheral Vascular;  Laterality: Bilateral;  natalya common femoral endarterectomy with natalya. iliac stent placement    PERCUTANEOUS TRANSLUMINAL ANGIOPLASTY (PTA) OF PERIPHERAL VESSEL Left 10/17/2019    Procedure: PTA, PERIPHERAL VESSEL;  Surgeon: Rao Fisher MD;  Location: [facility] CATH;  Service: Cardiology;  Laterality: Left;       Social History     Socioeconomic History    Marital status:      Spouse name: Not on file    Number of children: Not on file    Years of education: Not on file    Highest education level: Not on file   Occupational History    Not on file   Social Needs    Financial resource strain: Not on file    Food insecurity:     Worry: Not on file     Inability: Not on file    Transportation needs:     Medical: Not on file     Non-medical: Not on file   Tobacco Use    Smoking status: Former Smoker     Packs/day: 2.00     Types: Cigarettes     Last attempt to quit: 2011     Years since quittin.9    Smokeless tobacco: Never Used   Substance and Sexual Activity    Alcohol use: Yes     Comment: 2-4 PER DAY    Drug use: Never    Sexual activity: Not on file   Lifestyle    Physical activity:     Days per week: Not on file     Minutes per session: Not on file    Stress: Not on file   Relationships    Social connections:     Talks on phone: Not on file     Gets together: Not on file     Attends Anabaptist service: Not on file     Active member of club or organization: Not on file     Attends meetings of clubs or organizations: Not on file     Relationship status: Not on file   Other Topics Concern    Not on file   Social History Narrative    Not on file       OBJECTIVE:     Vital Signs Range (Last 24H):  Temp:  [36.1 °C (97 °F)-36.9 °C (98.4 °F)]   Pulse:  [84-95]   Resp:  [14-18]   BP: (138-163)/(75-83)   SpO2:  [95 %-100 %]       Significant Labs:  Lab Results   Component Value Date    WBC 8.76 2019     HGB 14.4 12/27/2019    HCT 42.8 12/27/2019     12/27/2019     12/31/2019    K 4.9 12/31/2019    CL 98 12/31/2019    CREATININE 0.9 12/31/2019    BUN 12 12/31/2019    CO2 31 (H) 12/31/2019       Diagnostic Studies: No relevant studies.    EKG: No results found for this or any previous visit.    ECHOCARDIOGRAM:  No results found for this or any previous visit.        ASSESSMENT/PLAN:         Anesthesia Evaluation    I have reviewed the Patient Summary Reports.    I have reviewed the Nursing Notes.   I have reviewed the Medications.     Review of Systems  Anesthesia Hx:  No problems with previous Anesthesia  History of prior surgery of interest to airway management or planning: Denies Family Hx of Anesthesia complications.   Denies Personal Hx of Anesthesia complications.   Social:  Former Smoker, Social Alcohol Use    Cardiovascular:   Hypertension, poorly controlled    Neurological:   History of brain tumor s/p remote resection   Endocrine:   Diabetes, well controlled, type 2        Physical Exam  General:  Well nourished, Obesity    Airway/Jaw/Neck:  Airway Findings: Mouth Opening: Normal Tongue: Normal  General Airway Assessment: Adult, Possible difficult intubation, Possible difficult mask airway  Mallampati: III  Jaw/Neck Findings:  Neck ROM: Normal ROM  Neck Findings:  Girth Increased      Dental:  Dental Findings: In tact   Chest/Lungs:  Chest/Lungs Findings: Clear to auscultation, Normal Respiratory Rate         Mental Status:  Mental Status Findings:  Cooperative, Alert and Oriented         Anesthesia Plan  Type of Anesthesia, risks & benefits discussed:  Anesthesia Type:  general  Patient's Preference:   Intra-op Monitoring Plan: standard ASA monitors and arterial line  Intra-op Monitoring Plan Comments:   Post Op Pain Control Plan: multimodal analgesia, IV/PO Opioids PRN and per primary service following discharge from PACU  Post Op Pain Control Plan Comments:   Induction:   IV  Beta  Blocker:  Patient is not currently on a Beta-Blocker (No further documentation required).       Informed Consent: Patient understands risks and agrees with Anesthesia plan.  Questions answered. Anesthesia consent signed with patient.  ASA Score: 3     Day of Surgery Review of History & Physical:    H&P update referred to the surgeon.         Ready For Surgery From Anesthesia Perspective.

## 2020-01-01 NOTE — PLAN OF CARE
Plan of care reviewed with pt/spouse verbalized understanding, vss, patient voids per urinal @ bedside, c/o of incisional pain controlled with prn pain medications, bandages to left and rt groin changed this shift, call-light within reach, will continue to monitor.

## 2020-01-01 NOTE — NURSING
I&D of bilateral groin incisions complete with wound vac placement to bilateral wounds. Advanced and tolerating 2000 ADA diet with no c/o nausea. Moderate pain managed well on oral Oxy as ordered. Afebrile with VSS on RA. Wife at bedside to assist patient's needs-- wife spoon feeds pt as total feed but pt has full ROM and only standby assist to bathroom. Adequate UOP via urinal. No acute distress or needs at this time. WCTM

## 2020-01-01 NOTE — CONSULTS
[facility]  Plastic Surgery  Consult Note    Patient Name: Renan Britton  MRN: [id number]  Code Status: Full Code  Admission Date: 12/31/2019  Hospital Length of Stay: 1 days  Attending Physician: SEBAS Prieto II, MD  Primary Care Provider: Eren Becker MD    Inpatient consult to Plastic Surgery  Consult performed by: Isael Jones MD  Consult ordered by: Adelina Arboleda MD        Subjective:     Principal Problem: <principal problem not specified>    History of Present Illness: Renan Britton is a 59 y.o. male with PMHx of bilateral lower extremity common femoral endarterectomy and Left iliac stent with Vascular surgery on 12/20/19. Patient had uneventful postop course and was discharged home. Patient presented to ER on 12/27 for bilateral groin cellulitis, was sent home with Clindamycin and f/u in Vascular clinic on 12/30. In clinic patient had new onset serous drainage from incision sites and skin necrosis. The wound was debrided in clinic and CT was ordered that showed bilateral fluid collections below incisions likely consistent with seromas. Patient denies fevers/chills, CP, SOB, abd pain, bowel/bladder changes, numbness/tingling or cold extremities.       No current facility-administered medications on file prior to encounter.      Current Outpatient Medications on File Prior to Encounter   Medication Sig    aspirin (ECOTRIN) 81 MG EC tablet Take 81 mg by mouth once daily.    atorvastatin (LIPITOR) 40 MG tablet Take 40 mg by mouth once daily.    cetirizine 10 mg chewable tablet Take 10 mg by mouth as needed.    cilostazol (PLETAL) 100 MG Tab Take 100 mg by mouth 2 (two) times daily.     clindamycin (CLEOCIN) 300 MG capsule Take 1 capsule (300 mg total) by mouth every 6 (six) hours. for 7 days    clindamycin (CLEOCIN) 300 MG capsule Take 1 capsule by mouth every 6 hours for 7 days starting today    clopidogrel (PLAVIX) 75 mg tablet Take 1 tablet (75 mg total) by  mouth once daily.    coenzyme Q10 10 mg capsule Take 10 mg by mouth once daily.    glyBURIDE-metformin 2.5-500 mg (GLUCOVANCE) 2.5-500 mg per tablet Take 1 tablet by mouth 2 (two) times daily with meals.    HYDROcodone-acetaminophen (NORCO) 5-325 mg per tablet Take 1 tablet by mouth every 6 (six) hours as needed for Pain.    magnesium chloride (SLOW-MAG) 71.5 mg TbEC Take 2 tablets by mouth once.    mupirocin (BACTROBAN) 2 % ointment Apply topically 2 (two) times daily. Swab inside each nare twice a day beginning 2 days before surgery.    oxyCODONE (ROXICODONE) 5 MG immediate release tablet Take 1 tablet (5 mg total) by mouth every 4 (four) hours as needed.    pregabalin (LYRICA) 100 MG capsule Take 100 mg by mouth 2 (two) times daily.    valsartan (DIOVAN) 160 MG tablet Take 160 mg by mouth once daily.       Review of patient's allergies indicates:   Allergen Reactions    Penicillins Hives       Past Medical History:   Diagnosis Date    Diabetes mellitus     H/O brain tumor     HLD (hyperlipidemia)     Hypertension     PAD (peripheral artery disease)     Seizures     R/T BRAIN TUMOR- SURGICALLY EXCISED     Past Surgical History:   Procedure Laterality Date    BRAIN SURGERY  01/2011    TUMOR EXCISED    ENDARTERECTOMY OF FEMORAL ARTERY Bilateral 12/20/2019    Procedure: ENDARTERECTOMY, FEMORAL;  Surgeon: Kelby Gomez MD;  Location: [address];  Service: Peripheral Vascular;  Laterality: Bilateral;  natalya common femoral endarterectomy with natalya. iliac stent placement    PERCUTANEOUS TRANSLUMINAL ANGIOPLASTY (PTA) OF PERIPHERAL VESSEL Left 10/17/2019    Procedure: PTA, PERIPHERAL VESSEL;  Surgeon: Rao Fisher MD;  Location: [facility] CATH;  Service: Cardiology;  Laterality: Left;     Family History     Problem Relation (Age of Onset)    Cancer Father, Brother    Diabetes Mother    Heart disease Father, Brother, Brother, Brother    Hypertension Mother    Stroke Mother        Tobacco Use     Smoking status: Former Smoker     Packs/day: 2.00     Types: Cigarettes     Last attempt to quit: 2011     Years since quittin.9    Smokeless tobacco: Never Used   Substance and Sexual Activity    Alcohol use: Yes     Comment: 2-4 PER DAY    Drug use: Never    Sexual activity: Not on file     Review of Systems   Review of systems negative except as pertinent positive and negatives listed above    Objective:     Vital Signs (Most Recent):  Temp: 97 °F (36.1 °C) (19)  Pulse: 91 (19)  Resp: 18 (19)  BP: (!) 141/80 (19)  SpO2: 96 % (19) Vital Signs (24h Range):  Temp:  [97 °F (36.1 °C)-98.4 °F (36.9 °C)] 97 °F (36.1 °C)  Pulse:  [91-95] 91  Resp:  [14-18] 18  SpO2:  [96 %-97 %] 96 %  BP: (141-163)/(80-83) 141/80     Weight: 120.5 kg (265 lb 10.5 oz)  Body mass index is 37.05 kg/m².    Physical Exam   Constitutional: He appears well-developed and well-nourished.   HENT:   Head: Normocephalic and atraumatic.   Mouth/Throat: No oropharyngeal exudate.   Eyes: EOM are normal.   Neck: Normal range of motion.   Cardiovascular: Normal rate and regular rhythm.   2+ DP and PTs bilaterally   Pulmonary/Chest: Effort normal.   Abdominal: Soft. He exhibits no distension.   Musculoskeletal:   RLE: post debrided open incision with serous and scant purulent drainage with packing in place of endarterectomy incision. Erythematous borders. Mildly TTP. Good cap refill. No palpable fluid collections. SILT throughout and AROM intact throughout.     LLE: post debirded open incision with serous and scant purulent drainage with packing in place of endarterectomy incision that is more open than contralateral side. Erythematous borders. More TTP than contralateral side  Good cap refill. No palpable fluid collections. SILT throughout and AROM intact throughout.        Significant Labs:  CMP:   Recent Labs   Lab 19  1803   *   CALCIUM 10.0      K 4.9   CO2 31*    CL 98   BUN 12   CREATININE 0.9     CBC AM draw    Significant Diagnostics:  CT: I have reviewed all pertinent results/findings within the past 24 hours and my personal findings are:  Bilateral common femoral fluid collections at bifurcations (L>R)    Assessment/Plan:     Surgical wound breakdown  59M s/p BLE CFE and L iliac stent with vascular surgery on 12/20/19 presents with post-op wound breakdown and drainage concerning for draining seroma. Plans for bilateral wound exploration and debridement in OR with Vascular surgery today. Plastic surgery consulted for possible bilateral rectus flaps s/p debridement.   - Pending findings intraop with vascular surgery, initial plan would recommend to place wound vacs after surgery today, then plan for bilateral rectus flaps to provide adequate soft tissue coverage to fill likely tissue void at a later date.   - Will continue to follow and will discuss with staff.       VTE Risk Mitigation (From admission, onward)         Ordered     IP VTE LOW RISK PATIENT  Once      12/31/19 1712     Place sequential compression device  Until discontinued      12/31/19 1712                Isael Jones MD  Plastic Surgery  [facility]

## 2020-01-01 NOTE — NURSING
Pt transferred to Pre-Op for surgery via bed. VSS with NAD noted on RA. IV Vanc infusing as ordered. Pt voided prior to transfer with adequate UOP per urinal. NPO. WCTM upon return from PACU.

## 2020-01-01 NOTE — ASSESSMENT & PLAN NOTE
59M s/p BLE CFE and L iliac stent with vascular surgery on 12/20/19 presents with post-op wound breakdown and drainage concerning for draining seroma. Plans for bilateral wound exploration and debridement in OR with Vascular surgery today. Plastic surgery consulted for possible bilateral rectus flaps s/p debridement.   - Pending findings intraop with vascular surgery, initial plan would recommend to place wound vacs after surgery today, then plan for bilateral rectus flaps to provide adequate soft tissue coverage to fill likely tissue void at a later date.   - Will continue to follow and will discuss with staff.

## 2020-01-02 LAB
ALBUMIN SERPL BCP-MCNC: 2.7 G/DL (ref 3.5–5.2)
ALP SERPL-CCNC: 44 U/L (ref 55–135)
ALT SERPL W/O P-5'-P-CCNC: 25 U/L (ref 10–44)
ANION GAP SERPL CALC-SCNC: 9 MMOL/L (ref 8–16)
AST SERPL-CCNC: 28 U/L (ref 10–40)
BASOPHILS # BLD AUTO: 0.04 K/UL (ref 0–0.2)
BASOPHILS NFR BLD: 0.5 % (ref 0–1.9)
BILIRUB SERPL-MCNC: 0.8 MG/DL (ref 0.1–1)
BUN SERPL-MCNC: 10 MG/DL (ref 6–20)
CALCIUM SERPL-MCNC: 9.3 MG/DL (ref 8.7–10.5)
CHLORIDE SERPL-SCNC: 99 MMOL/L (ref 95–110)
CO2 SERPL-SCNC: 26 MMOL/L (ref 23–29)
CREAT SERPL-MCNC: 0.8 MG/DL (ref 0.5–1.4)
DIFFERENTIAL METHOD: ABNORMAL
EOSINOPHIL # BLD AUTO: 0.2 K/UL (ref 0–0.5)
EOSINOPHIL NFR BLD: 2.2 % (ref 0–8)
ERYTHROCYTE [DISTWIDTH] IN BLOOD BY AUTOMATED COUNT: 12 % (ref 11.5–14.5)
EST. GFR  (AFRICAN AMERICAN): >60 ML/MIN/1.73 M^2
EST. GFR  (NON AFRICAN AMERICAN): >60 ML/MIN/1.73 M^2
GLUCOSE SERPL-MCNC: 121 MG/DL (ref 70–110)
HCT VFR BLD AUTO: 39.7 % (ref 40–54)
HGB BLD-MCNC: 13.4 G/DL (ref 14–18)
IMM GRANULOCYTES # BLD AUTO: 0.03 K/UL (ref 0–0.04)
IMM GRANULOCYTES NFR BLD AUTO: 0.4 % (ref 0–0.5)
LYMPHOCYTES # BLD AUTO: 0.6 K/UL (ref 1–4.8)
LYMPHOCYTES NFR BLD: 7.3 % (ref 18–48)
MAGNESIUM SERPL-MCNC: 1.8 MG/DL (ref 1.6–2.6)
MCH RBC QN AUTO: 31 PG (ref 27–31)
MCHC RBC AUTO-ENTMCNC: 33.8 G/DL (ref 32–36)
MCV RBC AUTO: 92 FL (ref 82–98)
MONOCYTES # BLD AUTO: 0.8 K/UL (ref 0.3–1)
MONOCYTES NFR BLD: 9.8 % (ref 4–15)
NEUTROPHILS # BLD AUTO: 6.8 K/UL (ref 1.8–7.7)
NEUTROPHILS NFR BLD: 79.8 % (ref 38–73)
NRBC BLD-RTO: 0 /100 WBC
PHOSPHATE SERPL-MCNC: 3.6 MG/DL (ref 2.7–4.5)
PLATELET # BLD AUTO: 258 K/UL (ref 150–350)
PMV BLD AUTO: 9.9 FL (ref 9.2–12.9)
POCT GLUCOSE: 112 MG/DL (ref 70–110)
POCT GLUCOSE: 120 MG/DL (ref 70–110)
POCT GLUCOSE: 143 MG/DL (ref 70–110)
POCT GLUCOSE: 98 MG/DL (ref 70–110)
POTASSIUM SERPL-SCNC: 4 MMOL/L (ref 3.5–5.1)
PROT SERPL-MCNC: 6.7 G/DL (ref 6–8.4)
RBC # BLD AUTO: 4.32 M/UL (ref 4.6–6.2)
SODIUM SERPL-SCNC: 134 MMOL/L (ref 136–145)
VANCOMYCIN TROUGH SERPL-MCNC: 8.6 UG/ML (ref 10–22)
WBC # BLD AUTO: 8.49 K/UL (ref 3.9–12.7)

## 2020-01-02 PROCEDURE — 83735 ASSAY OF MAGNESIUM: CPT

## 2020-01-02 PROCEDURE — 94761 N-INVAS EAR/PLS OXIMETRY MLT: CPT

## 2020-01-02 PROCEDURE — 25000003 PHARM REV CODE 250: Performed by: STUDENT IN AN ORGANIZED HEALTH CARE EDUCATION/TRAINING PROGRAM

## 2020-01-02 PROCEDURE — 85025 COMPLETE CBC W/AUTO DIFF WBC: CPT

## 2020-01-02 PROCEDURE — 80053 COMPREHEN METABOLIC PANEL: CPT

## 2020-01-02 PROCEDURE — 36415 COLL VENOUS BLD VENIPUNCTURE: CPT

## 2020-01-02 PROCEDURE — 20600001 HC STEP DOWN PRIVATE ROOM

## 2020-01-02 PROCEDURE — 80202 ASSAY OF VANCOMYCIN: CPT

## 2020-01-02 PROCEDURE — 63600175 PHARM REV CODE 636 W HCPCS: Performed by: SURGERY

## 2020-01-02 PROCEDURE — 84100 ASSAY OF PHOSPHORUS: CPT

## 2020-01-02 RX ORDER — LANOLIN ALCOHOL/MO/W.PET/CERES
800 CREAM (GRAM) TOPICAL ONCE
Status: COMPLETED | OUTPATIENT
Start: 2020-01-02 | End: 2020-01-02

## 2020-01-02 RX ORDER — SULFAMETHOXAZOLE AND TRIMETHOPRIM 800; 160 MG/1; MG/1
1 TABLET ORAL 2 TIMES DAILY
Status: DISCONTINUED | OUTPATIENT
Start: 2020-01-02 | End: 2020-01-02

## 2020-01-02 RX ORDER — CEFEPIME HYDROCHLORIDE 2 G/1
2 INJECTION, POWDER, FOR SOLUTION INTRAVENOUS
Status: DISCONTINUED | OUTPATIENT
Start: 2020-01-02 | End: 2020-01-02

## 2020-01-02 RX ORDER — CEFEPIME HYDROCHLORIDE 2 G/1
2 INJECTION, POWDER, FOR SOLUTION INTRAVENOUS
Status: DISCONTINUED | OUTPATIENT
Start: 2020-01-02 | End: 2020-01-05

## 2020-01-02 RX ORDER — VANCOMYCIN 2 GRAM/500 ML IN 0.9 % SODIUM CHLORIDE INTRAVENOUS
2000
Status: DISCONTINUED | OUTPATIENT
Start: 2020-01-03 | End: 2020-01-03

## 2020-01-02 RX ADMIN — OXYCODONE HYDROCHLORIDE 5 MG: 5 TABLET ORAL at 03:01

## 2020-01-02 RX ADMIN — OXYCODONE HYDROCHLORIDE 5 MG: 5 TABLET ORAL at 06:01

## 2020-01-02 RX ADMIN — CEFEPIME 2 G: 2 INJECTION, POWDER, FOR SOLUTION INTRAVENOUS at 11:01

## 2020-01-02 RX ADMIN — PREGABALIN 100 MG: 50 CAPSULE ORAL at 09:01

## 2020-01-02 RX ADMIN — VANCOMYCIN 1.75 GRAM/500 ML IN 0.9 % SODIUM CHLORIDE INTRAVENOUS 1750 MG: at 09:01

## 2020-01-02 RX ADMIN — ATORVASTATIN CALCIUM 40 MG: 20 TABLET, FILM COATED ORAL at 08:01

## 2020-01-02 RX ADMIN — VANCOMYCIN 1.75 GRAM/500 ML IN 0.9 % SODIUM CHLORIDE INTRAVENOUS 1750 MG: at 08:01

## 2020-01-02 RX ADMIN — DOCUSATE SODIUM 50 MG: 50 CAPSULE, LIQUID FILLED ORAL at 08:01

## 2020-01-02 RX ADMIN — ASPIRIN 81 MG: 81 TABLET, COATED ORAL at 08:01

## 2020-01-02 RX ADMIN — PREGABALIN 100 MG: 50 CAPSULE ORAL at 08:01

## 2020-01-02 RX ADMIN — OXYCODONE HYDROCHLORIDE 5 MG: 5 TABLET ORAL at 07:01

## 2020-01-02 RX ADMIN — Medication 800 MG: at 08:01

## 2020-01-02 RX ADMIN — CEFEPIME 2 G: 2 INJECTION, POWDER, FOR SOLUTION INTRAVENOUS at 07:01

## 2020-01-02 NOTE — PROGRESS NOTES
Pharmacist Renal Dose Adjustment Note    Renan Britton is a 59 y.o. male being treated with the medication cefepime    Patient Data:    Vital Signs (Most Recent):  Temp: 98.8 °F (37.1 °C) (01/02/20 0921)  Pulse: 88 (01/02/20 0749)  Resp: 19 (01/02/20 0749)  BP: 128/78 (01/02/20 0749)  SpO2: 96 % (01/02/20 0749) Vital Signs (72h Range):  Temp:  [97 °F (36.1 °C)-100 °F (37.8 °C)]   Pulse:  []   Resp:  [14-19]   BP: (125-171)/(69-89)   SpO2:  [93 %-100 %]      Recent Labs   Lab 12/31/19  1803 01/01/20  0702 01/02/20  0430   CREATININE 0.9 0.9 0.8     Serum creatinine: 0.8 mg/dL 01/02/20 0430  Estimated creatinine clearance: 134.6 mL/min    Medication: Cefepime 2 g IV q12h will be changed to cefepime 2 g IV q8h   Pharmacist's Name: Fred Ariza  Pharmacist's Extension: [phone]

## 2020-01-02 NOTE — SUBJECTIVE & OBJECTIVE
Interval History: NAEON. AFVSS. POD1 s/p superficial I&D of R wound and deeper seroma cavity evacuation I&D on L. Vacs in place. Intraop Cxs growing Proteus. Plans for OR tomorrow for repeat I&D with poss abx bead placement in L incision. Sitting up in chair this AM.     Medications:  Continuous Infusions:  Scheduled Meds:   aspirin  81 mg Oral Daily    atorvastatin  40 mg Oral Daily    ceFEPime (MAXIPIME) IVPB  2 g Intravenous Q8H    docusate sodium  50 mg Oral Daily    lidocaine (PF) 10 mg/ml (1%)  1 mL Other Once    pregabalin  100 mg Oral BID    vancomycin (VANCOCIN) IVPB  15 mg/kg (Dosing Weight) Intravenous Q12H     PRN Meds:acetaminophen, acetaminophen, Dextrose 10% Bolus, glucagon (human recombinant), HYDROmorphone, insulin aspart U-100, melatonin, ondansetron, oxyCODONE, sodium chloride 0.9%, Pharmacy to dose Vancomycin consult **AND** vancomycin - pharmacy to dose     Review of patient's allergies indicates:   Allergen Reactions    Penicillins Hives     Objective:     Vital Signs (Most Recent):  Temp: 98.8 °F (37.1 °C) (01/02/20 0921)  Pulse: 88 (01/02/20 0749)  Resp: 19 (01/02/20 0749)  BP: 128/78 (01/02/20 0749)  SpO2: 96 % (01/02/20 0749) Vital Signs (24h Range):  Temp:  [97.5 °F (36.4 °C)-100 °F (37.8 °C)] 98.8 °F (37.1 °C)  Pulse:  [80-99] 88  Resp:  [16-19] 19  SpO2:  [93 %-99 %] 96 %  BP: (128-171)/(69-86) 128/78     Weight: 120.5 kg (265 lb 10.5 oz)  Body mass index is 37.05 kg/m².    Intake/Output - Last 3 Shifts       12/31 0700 - 01/01 0659 01/01 0700 - 01/02 0659 01/02 0700 - 01/03 0659    P.O. 600 720     IV Piggyback 500 500     Total Intake(mL/kg) 1100 (9.1) 1220 (10.1)     Urine (mL/kg/hr) 1250 2200 (0.8)     Other  30     Stool 0 0     Total Output 1250 2230     Net -150 -1010            Stool Occurrence 0 x 1 x           Physical Exam   Gen:  No acute distress  CV:  Peripherally well-perfused.    Lungs:  Normal respiratory effort.  Head/Neck:  Normocephalic.  Atraumatic.    Groin:  Left- Vac in place over open incision w SS output, mild erythema, but no palpable fluid collections. Warm and well perfused extremity.  Right- Vac in place over open incision w SS output. More erythematous areas around incision compared to contralateral side. No palpable fluid collections. Warm and well perfused extremity.     Significant Labs:  CBC:   Recent Labs   Lab 01/02/20  0430   WBC 8.49   RBC 4.32*   HGB 13.4*   HCT 39.7*      MCV 92   MCH 31.0   MCHC 33.8     CMP:   Recent Labs   Lab 01/02/20  0430   *   CALCIUM 9.3   ALBUMIN 2.7*   PROT 6.7   *   K 4.0   CO2 26   CL 99   BUN 10   CREATININE 0.8   ALKPHOS 44*   ALT 25   AST 28   BILITOT 0.8       Significant Diagnostics:  I have reviewed all pertinent imaging results/findings within the past 24 hours.

## 2020-01-02 NOTE — PLAN OF CARE
01/02/20 1242   Post-Acute Status   Post-Acute Authorization Other   Other Status No Post-Acute Service Needs

## 2020-01-02 NOTE — PROGRESS NOTES
[facility]  Vascular Surgery  Progress Note    Patient Name: Renan Britton  MRN: [id number]  Admission Date: 12/31/2019  Primary Care Provider: Eren Becker MD    Subjective:     Interval History: No acute events overnight. Pain well controlled on PO pain meds. Bilateral wound vacs in place to adequate suction. Intraop cultures positive for proteus.    Post-Op Info:  Procedure(s) (LRB):  IRRIGATION AND DEBRIDEMENT (Bilateral)  APPLICATION, WOUND VAC (Bilateral)   1 Day Post-Op       Medications:  Continuous Infusions:  Scheduled Meds:   aspirin  81 mg Oral Daily    atorvastatin  40 mg Oral Daily    docusate sodium  50 mg Oral Daily    lidocaine (PF) 10 mg/ml (1%)  1 mL Other Once    magnesium oxide  800 mg Oral Once    pregabalin  100 mg Oral BID    vancomycin (VANCOCIN) IVPB  15 mg/kg (Dosing Weight) Intravenous Q12H     PRN Meds:acetaminophen, acetaminophen, Dextrose 10% Bolus, glucagon (human recombinant), HYDROmorphone, insulin aspart U-100, melatonin, ondansetron, oxyCODONE, sodium chloride 0.9%, Pharmacy to dose Vancomycin consult **AND** vancomycin - pharmacy to dose     Objective:     Vital Signs (Most Recent):  Temp: 98.4 °F (36.9 °C) (01/02/20 0514)  Pulse: 88 (01/02/20 0749)  Resp: 19 (01/02/20 0749)  BP: 128/78 (01/02/20 0749)  SpO2: 96 % (01/02/20 0749) Vital Signs (24h Range):  Temp:  [97.2 °F (36.2 °C)-100 °F (37.8 °C)] 98.4 °F (36.9 °C)  Pulse:  [] 88  Resp:  [16-19] 19  SpO2:  [93 %-100 %] 96 %  BP: (128-171)/(69-89) 128/78         Physical Exam   Constitutional: He is oriented to person, place, and time. He appears well-developed and well-nourished.   HENT:   Head: Normocephalic and atraumatic.   Cardiovascular: Normal rate.   Pulmonary/Chest: Effort normal.   Abdominal: Soft.   Musculoskeletal:   Bilateral wound vacs to groin incisions to adequate suction    1+ palpable L PT, biphasic L DP  Multiphasic R DP/PT   Neurological: He is alert and oriented to  person, place, and time.   Skin: Skin is warm and dry.   Nursing note and vitals reviewed.      Significant Labs:  BMP:   Recent Labs   Lab 01/02/20  0430   *   *   K 4.0   CL 99   CO2 26   BUN 10   CREATININE 0.8   CALCIUM 9.3   MG 1.8     CBC:   Recent Labs   Lab 01/02/20  0430   WBC 8.49   RBC 4.32*   HGB 13.4*   HCT 39.7*      MCV 92   MCH 31.0   MCHC 33.8       Significant Diagnostics:  I have reviewed all pertinent imaging results/findings within the past 24 hours.    Assessment/Plan:     Surgical wound breakdown  60yo male with hx of PAD s/p bilateral femoral endarterectomies on 12/20 now with bilateral wound breakdown and concern for infected fluid collection L groin now s/p bilateral groin washout with superficial debridement of R groin and placement of bilateral wound vacs 1/1/20    - diabetic diet  - wound vacs in place to bilateral groins - plan for wound vac change 1/3  - continue home meds - lyrica, daily aspirin 91mg, statin  - SSI  - hold plavix pending plastic surgery recommendations - will restart if not planning on doing muscle flaps  - plastic surgery consult for muscle flap to L groin, eval for muscle flap to R groin - appreciate their assistance  - daily labs  - intraoperative cultures positive for proteus - susceptibilities pending; will add cefepime and tailor antibiotics once susceptibilities and final cultures have resulted    Dispo: continue care in [facility], f/u plastic surgery recs        Adelina Arboleda MD  Vascular Surgery  [facility]

## 2020-01-02 NOTE — ASSESSMENT & PLAN NOTE
60yo male with hx of PAD s/p bilateral femoral endarterectomies on 12/20 now with bilateral wound breakdown and concern for infected fluid collection L groin now s/p bilateral groin washout with superficial debridement of R groin and placement of bilateral wound vacs 1/1/20    - diabetic diet  - wound vacs in place to bilateral groins - plan for wound vac change 1/3  - continue home meds - lyrica, daily aspirin 91mg, statin  - SSI  - hold plavix pending plastic surgery recommendations - will restart if not planning on doing muscle flaps  - plastic surgery consult for muscle flap to L groin, eval for muscle flap to R groin - appreciate their assistance  - daily labs  - intraoperative cultures positive for proteus - susceptibilities pending; will add cefepime and tailor antibiotics once susceptibilities and final cultures have resulted    Dispo: continue care in [facility], f/u plastic surgery recs

## 2020-01-02 NOTE — ASSESSMENT & PLAN NOTE
59M s/p BLE CFE and L iliac stent with vascular surgery on 12/20/19 presents with post-op wound breakdown and drainage concerning for draining seroma s/p bilateral groin incision I&D and wound vac placement with vascular surgery on 1/1/20. In OR superficial fat necrosis on R groin and deep serous fluid collection that was released on L. Per primary wound with limited physical signs of infection. Patient would likely benefit from possible rectus myocutaneous flap for L groin wound.   - Primary team to return to OR for wound vac removal, repeat I&Ds, and possible Abx bead placement in L groin wound tomorrow.  - Intraop Cxs positive for Proteus - on Vanc and Cefepime per primary  - Recommend continuing to hold home plavix  - Plan for possible rectus myocutaneous flap for L groin wound tomorrow in OR after vascular washout.   - Consent obtained at the bedside.

## 2020-01-02 NOTE — PROGRESS NOTES
[facility]  Plastic Surgery  Progress Note    Subjective:     History of Present Illness:  Renan Britton is a 59 y.o. male with PMHx of bilateral lower extremity common femoral endarterectomy and Left iliac stent with Vascular surgery on 12/20/19. Patient had uneventful postop course and was discharged home. Patient presented to ER on 12/27 for bilateral groin cellulitis, was sent home with Clindamycin and f/u in Vascular clinic on 12/30. In clinic patient had new onset serous drainage from incision sites and skin necrosis. The wound was debrided in clinic and CT was ordered that showed bilateral fluid collections below incisions likely consistent with seromas. Patient denies fevers/chills, CP, SOB, abd pain, bowel/bladder changes, numbness/tingling or cold extremities.       Post-Op Info:  Procedure(s) (LRB):  IRRIGATION AND DEBRIDEMENT (Bilateral)  APPLICATION, WOUND VAC (Bilateral)   1 Day Post-Op     Interval History: NAEON. AFVSS. POD1 s/p superficial I&D of R wound and deeper seroma cavity evacuation I&D on L. Vacs in place. Intraop Cxs growing Proteus. Plans for OR tomorrow for repeat I&D with poss abx bead placement in L incision. Sitting up in chair this AM.     Medications:  Continuous Infusions:  Scheduled Meds:   aspirin  81 mg Oral Daily    atorvastatin  40 mg Oral Daily    ceFEPime (MAXIPIME) IVPB  2 g Intravenous Q8H    docusate sodium  50 mg Oral Daily    lidocaine (PF) 10 mg/ml (1%)  1 mL Other Once    pregabalin  100 mg Oral BID    vancomycin (VANCOCIN) IVPB  15 mg/kg (Dosing Weight) Intravenous Q12H     PRN Meds:acetaminophen, acetaminophen, Dextrose 10% Bolus, glucagon (human recombinant), HYDROmorphone, insulin aspart U-100, melatonin, ondansetron, oxyCODONE, sodium chloride 0.9%, Pharmacy to dose Vancomycin consult **AND** vancomycin - pharmacy to dose     Review of patient's allergies indicates:   Allergen Reactions    Penicillins Hives     Objective:     Vital  Signs (Most Recent):  Temp: 98.8 °F (37.1 °C) (01/02/20 0921)  Pulse: 88 (01/02/20 0749)  Resp: 19 (01/02/20 0749)  BP: 128/78 (01/02/20 0749)  SpO2: 96 % (01/02/20 0749) Vital Signs (24h Range):  Temp:  [97.5 °F (36.4 °C)-100 °F (37.8 °C)] 98.8 °F (37.1 °C)  Pulse:  [80-99] 88  Resp:  [16-19] 19  SpO2:  [93 %-99 %] 96 %  BP: (128-171)/(69-86) 128/78     Weight: 120.5 kg (265 lb 10.5 oz)  Body mass index is 37.05 kg/m².    Intake/Output - Last 3 Shifts       12/31 0700 - 01/01 0659 01/01 0700 - 01/02 0659 01/02 0700 - 01/03 0659    P.O. 600 720     IV Piggyback 500 500     Total Intake(mL/kg) 1100 (9.1) 1220 (10.1)     Urine (mL/kg/hr) 1250 2200 (0.8)     Other  30     Stool 0 0     Total Output 1250 2230     Net -150 -1010            Stool Occurrence 0 x 1 x           Physical Exam   Gen:  No acute distress  CV:  Peripherally well-perfused.    Lungs:  Normal respiratory effort.  Head/Neck:  Normocephalic.  Atraumatic.    Groin:  Left- Vac in place over open incision w SS output, mild erythema, but no palpable fluid collections. Warm and well perfused extremity.  Right- Vac in place over open incision w SS output. More erythematous areas around incision compared to contralateral side. No palpable fluid collections. Warm and well perfused extremity.     Significant Labs:  CBC:   Recent Labs   Lab 01/02/20  0430   WBC 8.49   RBC 4.32*   HGB 13.4*   HCT 39.7*      MCV 92   MCH 31.0   MCHC 33.8     CMP:   Recent Labs   Lab 01/02/20  0430   *   CALCIUM 9.3   ALBUMIN 2.7*   PROT 6.7   *   K 4.0   CO2 26   CL 99   BUN 10   CREATININE 0.8   ALKPHOS 44*   ALT 25   AST 28   BILITOT 0.8       Significant Diagnostics:  I have reviewed all pertinent imaging results/findings within the past 24 hours.    Assessment/Plan:     Surgical wound breakdown  59M s/p BLE CFE and L iliac stent with vascular surgery on 12/20/19 presents with post-op wound breakdown and drainage concerning for draining seroma s/p bilateral  groin incision I&D and wound vac placement with vascular surgery on 1/1/20. In OR superficial fat necrosis on R groin and deep serous fluid collection that was released on L. Per primary wound with limited physical signs of infection. Patient would likely benefit from possible rectus myocutaneous flap for L groin wound.   - Primary team to return to OR for wound vac removal, repeat I&Ds, and possible Abx bead placement in L groin wound tomorrow.  - Intraop Cxs positive for Proteus - on Vanc and Cefepime per primary  - Recommend continuing to hold home plavix  - Plan for possible rectus myocutaneous flap for L groin wound tomorrow in OR after vascular washout.   - Consent obtained at the bedside.         Isael Jones MD  Plastic Surgery  [facility]

## 2020-01-02 NOTE — PLAN OF CARE
Plan of care reviewed with pt/spouse verbalized understanding, vss, wound vac to both leftn and right groin intact draining serosanguneoius drainage per canister, patient c/o of pain controlled with prn pain medications.

## 2020-01-02 NOTE — OP NOTE
Vascular Surgery Op Note    Date of Operation/Procedure: 1/1/20    Pre-operative Diagnosis:  Left groin seroma right groin incision breakdown    Post-operative Diagnosis:  Same    Anesthesia:  General    Operation/Procedure Performed:   1.  Left groin irrigation and debridement  2. Left groin evacuation of seroma.  3.  Right groin irrigation and debridement  4. Bilateral groin woundvac placement    Attending Surgeon: SEBAS Prieto II, MD    Resident/Fellow:  Jesus Judd MD PGY6    Indications:  59-year-old patient who had bilateral common femoral endarterectomy on 12/20/2019. He presented 12/27/2019 with pain and redness around his left groin with cutaneous skin necrosis on bilateral groin incisions. He was started on clindamycin for suspected soft tissue infection.  He returned to my clinic on 12/30/2019.  At this time he had new redness at his right groin and increasing drainage from his left groin site, therefore a CT was ordered of his pelvis.  This showed fluid collection around his left common femoral artery.  The patient returned and was admitted to the hospital for IV antibiotics and left groin exploration with right groin irrigation and debridement.  Risks benefits and alternatives were explained to the patient and his family who expressed full understanding wished to proceed.    Procedure in Detail:   After informed consent was not obtained the patient taken to the operating room in supine position.  Appropriate hemodynamic monitors were placed by the anesthesia team.  General anesthesia was administered by the anesthesia team.  Preoperative antibiotics were administered.  A time-out was performed to confirm patient identification, procedure, laterality.  Bilateral groins were prepped and draped in normal sterile fashion with chlorhexidine.    We opened up the left groin by dividing the sutures with Metzenbaum scissors.  We debrided the superficial necrotic and fibrinous tissue. We continued to  divide the sutures down the left groin incision until we encountered a large pocket of fluid which was just above the common femoral artery. We took wound cultures from the area of fluid. We could visualize the common femoral artery patch after the fluid was evacuated. There was palpable pulse in the common femoral artery. We took cultures from the deep tissues and from the fluid.  We then thoroughly irrigated the groin incision with brown volcano.  All fibrinous and necrotic tissues were debrided.  We placed several silk figure-of-eight sutures in the areas areas where we saw lymphatic drainage. Other areas were also treated with electrocautery. There was copious oozing from the raw tissue surfaces which was treated with electrocautery as best as possible. We again irrigated the entire wound with from volcano and then saline. We closed the femoral sheath and the layer just above it the with interrupted Vicryl sutures to cover the artery.  We then placed a wound VAC in the wound to suction at -125mmHg.    In the right groin we debrided the superficial fibrinous tissue. There was some fat necrosis beneath this which was debrideds down to a depth of approximately 1 cm.  The wound edges were freshened with a 15 blade.  There was healthy bleeding from the wound edges.  We irrigated the wound with from volcano and then saline.  We placed a wound VAC in the wound and connected to suction at -125mmHg.    All sponge needle instrument counts were correct at the close the case.  The patient tolerated the procedure well and was transported to the postoperative area for recovery.    Estimated Blood loss: 80ml    Drains: woundkervin Prieto II, MD, RPVI  Vascular Surgeon  [facility]

## 2020-01-02 NOTE — PLAN OF CARE
01/02/20 1243   Discharge Assessment   Assessment Type Discharge Planning Assessment   Confirmed/corrected address and phone number on facesheet? Yes   Assessment information obtained from? Patient;Caregiver  (Patricia wife)   Expected Length of Stay (days) 3   Communicated expected length of stay with patient/caregiver yes   Prior to hospitilization cognitive status: Alert/Oriented   Prior to hospitalization functional status: Independent   Current cognitive status: Alert/Oriented   Current Functional Status: Independent   Lives With spouse   Able to Return to Prior Arrangements yes   Is patient able to care for self after discharge? Unable to determine at this time (comments)   Who are your caregiver(s) and their phone number(s)? Patricia Britton spouse [phone]   Patient's perception of discharge disposition home or selfcare   Patient currently being followed by outpatient case management? Unable to determine (comments)   Patient currently receives any other outside agency services? No   Equipment Currently Used at Home none   Do you have any problems affording any of your prescribed medications? No   Is the patient taking medications as prescribed? yes   Does the patient have transportation home? Yes   Transportation Anticipated family or friend will provide   Dialysis Name and Scheduled days n/a   Does the patient receive services at the Coumadin Clinic? No   Discharge Plan A Home with family   Discharge Plan B Home Health;Home with family   DME Needed Upon Discharge  none   Patient/Family in Agreement with Plan yes

## 2020-01-03 LAB
ALBUMIN SERPL BCP-MCNC: 2.6 G/DL (ref 3.5–5.2)
ALP SERPL-CCNC: 42 U/L (ref 55–135)
ALT SERPL W/O P-5'-P-CCNC: 29 U/L (ref 10–44)
ANION GAP SERPL CALC-SCNC: 8 MMOL/L (ref 8–16)
AST SERPL-CCNC: 27 U/L (ref 10–40)
BACTERIA SPEC AEROBE CULT: ABNORMAL
BACTERIA SPEC AEROBE CULT: ABNORMAL
BASOPHILS # BLD AUTO: 0.04 K/UL (ref 0–0.2)
BASOPHILS NFR BLD: 0.4 % (ref 0–1.9)
BILIRUB SERPL-MCNC: 0.8 MG/DL (ref 0.1–1)
BUN SERPL-MCNC: 9 MG/DL (ref 6–20)
CALCIUM SERPL-MCNC: 9.5 MG/DL (ref 8.7–10.5)
CHLORIDE SERPL-SCNC: 102 MMOL/L (ref 95–110)
CO2 SERPL-SCNC: 29 MMOL/L (ref 23–29)
CREAT SERPL-MCNC: 0.9 MG/DL (ref 0.5–1.4)
DIFFERENTIAL METHOD: ABNORMAL
EOSINOPHIL # BLD AUTO: 0.3 K/UL (ref 0–0.5)
EOSINOPHIL NFR BLD: 3.5 % (ref 0–8)
ERYTHROCYTE [DISTWIDTH] IN BLOOD BY AUTOMATED COUNT: 11.9 % (ref 11.5–14.5)
EST. GFR  (AFRICAN AMERICAN): >60 ML/MIN/1.73 M^2
EST. GFR  (NON AFRICAN AMERICAN): >60 ML/MIN/1.73 M^2
GLUCOSE SERPL-MCNC: 117 MG/DL (ref 70–110)
HCT VFR BLD AUTO: 40.9 % (ref 40–54)
HGB BLD-MCNC: 13.4 G/DL (ref 14–18)
IMM GRANULOCYTES # BLD AUTO: 0.04 K/UL (ref 0–0.04)
IMM GRANULOCYTES NFR BLD AUTO: 0.4 % (ref 0–0.5)
LYMPHOCYTES # BLD AUTO: 0.9 K/UL (ref 1–4.8)
LYMPHOCYTES NFR BLD: 9.8 % (ref 18–48)
MAGNESIUM SERPL-MCNC: 2 MG/DL (ref 1.6–2.6)
MCH RBC QN AUTO: 30.8 PG (ref 27–31)
MCHC RBC AUTO-ENTMCNC: 32.8 G/DL (ref 32–36)
MCV RBC AUTO: 94 FL (ref 82–98)
MONOCYTES # BLD AUTO: 1 K/UL (ref 0.3–1)
MONOCYTES NFR BLD: 10 % (ref 4–15)
NEUTROPHILS # BLD AUTO: 7.2 K/UL (ref 1.8–7.7)
NEUTROPHILS NFR BLD: 75.9 % (ref 38–73)
NRBC BLD-RTO: 0 /100 WBC
PHOSPHATE SERPL-MCNC: 3.8 MG/DL (ref 2.7–4.5)
PLATELET # BLD AUTO: 245 K/UL (ref 150–350)
PMV BLD AUTO: 9.6 FL (ref 9.2–12.9)
POCT GLUCOSE: 103 MG/DL (ref 70–110)
POCT GLUCOSE: 115 MG/DL (ref 70–110)
POCT GLUCOSE: 116 MG/DL (ref 70–110)
POCT GLUCOSE: 131 MG/DL (ref 70–110)
POTASSIUM SERPL-SCNC: 4.7 MMOL/L (ref 3.5–5.1)
PROT SERPL-MCNC: 6.9 G/DL (ref 6–8.4)
RBC # BLD AUTO: 4.35 M/UL (ref 4.6–6.2)
SODIUM SERPL-SCNC: 139 MMOL/L (ref 136–145)
WBC # BLD AUTO: 9.47 K/UL (ref 3.9–12.7)

## 2020-01-03 PROCEDURE — 71000033 HC RECOVERY, INTIAL HOUR: Performed by: SURGERY

## 2020-01-03 PROCEDURE — 15002 PR WOUND PREP, PED, TRK/ARM/LG 1ST 100 CM: ICD-10-PCS | Mod: ,,, | Performed by: SURGERY

## 2020-01-03 PROCEDURE — 11042 DBRDMT SUBQ TIS 1ST 20SQCM/<: CPT | Mod: 78,,, | Performed by: SURGERY

## 2020-01-03 PROCEDURE — 20600001 HC STEP DOWN PRIVATE ROOM

## 2020-01-03 PROCEDURE — 15002 WOUND PREP TRK/ARM/LEG: CPT | Mod: ,,, | Performed by: SURGERY

## 2020-01-03 PROCEDURE — 15734 MUSCLE-SKIN GRAFT TRUNK: CPT | Mod: 51,,, | Performed by: SURGERY

## 2020-01-03 PROCEDURE — 15734 PR MUSCLE-SKIN FLAP,TRUNK: ICD-10-PCS | Mod: 51,,, | Performed by: SURGERY

## 2020-01-03 PROCEDURE — 11042 PR DEBRIDEMENT, SKIN, SUB-Q TISSUE,=<20 SQ CM: ICD-10-PCS | Mod: 78,,, | Performed by: SURGERY

## 2020-01-03 PROCEDURE — 25000003 PHARM REV CODE 250: Performed by: STUDENT IN AN ORGANIZED HEALTH CARE EDUCATION/TRAINING PROGRAM

## 2020-01-03 PROCEDURE — 37000009 HC ANESTHESIA EA ADD 15 MINS: Performed by: SURGERY

## 2020-01-03 PROCEDURE — 63600175 PHARM REV CODE 636 W HCPCS: Performed by: STUDENT IN AN ORGANIZED HEALTH CARE EDUCATION/TRAINING PROGRAM

## 2020-01-03 PROCEDURE — C1729 CATH, DRAINAGE: HCPCS | Performed by: SURGERY

## 2020-01-03 PROCEDURE — D9220A PRA ANESTHESIA: Mod: ,,, | Performed by: ANESTHESIOLOGY

## 2020-01-03 PROCEDURE — 63600175 PHARM REV CODE 636 W HCPCS: Performed by: NURSE ANESTHETIST, CERTIFIED REGISTERED

## 2020-01-03 PROCEDURE — 36000707: Performed by: SURGERY

## 2020-01-03 PROCEDURE — 84100 ASSAY OF PHOSPHORUS: CPT

## 2020-01-03 PROCEDURE — 25000003 PHARM REV CODE 250: Performed by: NURSE ANESTHETIST, CERTIFIED REGISTERED

## 2020-01-03 PROCEDURE — 97605 PR NEG PRESS WOUND THERAPY (NPWT) W/NON-DISPOSABLE WOUND VAC DEVICE (DME), <=50 CM: ICD-10-PCS | Mod: ,,, | Performed by: SURGERY

## 2020-01-03 PROCEDURE — D9220A PRA ANESTHESIA: ICD-10-PCS | Mod: ,,, | Performed by: ANESTHESIOLOGY

## 2020-01-03 PROCEDURE — 63600175 PHARM REV CODE 636 W HCPCS: Performed by: SURGERY

## 2020-01-03 PROCEDURE — 36415 COLL VENOUS BLD VENIPUNCTURE: CPT

## 2020-01-03 PROCEDURE — 37000008 HC ANESTHESIA 1ST 15 MINUTES: Performed by: SURGERY

## 2020-01-03 PROCEDURE — 15757 FREE SKIN FLAP MICROVASC: CPT | Mod: ,,, | Performed by: SURGERY

## 2020-01-03 PROCEDURE — 85025 COMPLETE CBC W/AUTO DIFF WBC: CPT

## 2020-01-03 PROCEDURE — 80053 COMPREHEN METABOLIC PANEL: CPT

## 2020-01-03 PROCEDURE — 36000706: Performed by: SURGERY

## 2020-01-03 PROCEDURE — 83735 ASSAY OF MAGNESIUM: CPT

## 2020-01-03 PROCEDURE — 82962 GLUCOSE BLOOD TEST: CPT | Performed by: SURGERY

## 2020-01-03 PROCEDURE — 97605 NEG PRS WND THER DME<=50SQCM: CPT | Mod: ,,, | Performed by: SURGERY

## 2020-01-03 PROCEDURE — 27201423 OPTIME MED/SURG SUP & DEVICES STERILE SUPPLY: Performed by: SURGERY

## 2020-01-03 PROCEDURE — 15757 PR FREE SKIN FLAP W MICROVASC ANAST: ICD-10-PCS | Mod: ,,, | Performed by: SURGERY

## 2020-01-03 PROCEDURE — 94761 N-INVAS EAR/PLS OXIMETRY MLT: CPT

## 2020-01-03 RX ORDER — PAPAVERINE HYDROCHLORIDE 30 MG/ML
INJECTION INTRAMUSCULAR; INTRAVENOUS
Status: DISCONTINUED | OUTPATIENT
Start: 2020-01-03 | End: 2020-01-03 | Stop reason: HOSPADM

## 2020-01-03 RX ORDER — SODIUM CHLORIDE, SODIUM LACTATE, POTASSIUM CHLORIDE, CALCIUM CHLORIDE 600; 310; 30; 20 MG/100ML; MG/100ML; MG/100ML; MG/100ML
INJECTION, SOLUTION INTRAVENOUS CONTINUOUS PRN
Status: DISCONTINUED | OUTPATIENT
Start: 2020-01-03 | End: 2020-01-03

## 2020-01-03 RX ORDER — SODIUM CHLORIDE, SODIUM LACTATE, POTASSIUM CHLORIDE, CALCIUM CHLORIDE 600; 310; 30; 20 MG/100ML; MG/100ML; MG/100ML; MG/100ML
INJECTION, SOLUTION INTRAVENOUS CONTINUOUS
Status: DISCONTINUED | OUTPATIENT
Start: 2020-01-03 | End: 2020-01-04

## 2020-01-03 RX ORDER — ACETAMINOPHEN 10 MG/ML
INJECTION, SOLUTION INTRAVENOUS
Status: DISCONTINUED | OUTPATIENT
Start: 2020-01-03 | End: 2020-01-03

## 2020-01-03 RX ORDER — LIDOCAINE HCL/PF 100 MG/5ML
SYRINGE (ML) INTRAVENOUS
Status: DISCONTINUED | OUTPATIENT
Start: 2020-01-03 | End: 2020-01-03

## 2020-01-03 RX ORDER — HYDROMORPHONE HYDROCHLORIDE 1 MG/ML
0.5 INJECTION, SOLUTION INTRAMUSCULAR; INTRAVENOUS; SUBCUTANEOUS
Status: DISCONTINUED | OUTPATIENT
Start: 2020-01-03 | End: 2020-01-07 | Stop reason: HOSPADM

## 2020-01-03 RX ORDER — FENTANYL CITRATE 50 UG/ML
INJECTION, SOLUTION INTRAMUSCULAR; INTRAVENOUS
Status: DISCONTINUED | OUTPATIENT
Start: 2020-01-03 | End: 2020-01-03

## 2020-01-03 RX ORDER — SODIUM CHLORIDE 9 MG/ML
INJECTION, SOLUTION INTRAVENOUS CONTINUOUS PRN
Status: DISCONTINUED | OUTPATIENT
Start: 2020-01-03 | End: 2020-01-03

## 2020-01-03 RX ORDER — NEOSTIGMINE METHYLSULFATE 1 MG/ML
INJECTION, SOLUTION INTRAVENOUS
Status: DISCONTINUED | OUTPATIENT
Start: 2020-01-03 | End: 2020-01-03

## 2020-01-03 RX ORDER — ONDANSETRON 2 MG/ML
INJECTION INTRAMUSCULAR; INTRAVENOUS
Status: DISCONTINUED | OUTPATIENT
Start: 2020-01-03 | End: 2020-01-03

## 2020-01-03 RX ORDER — ROCURONIUM BROMIDE 10 MG/ML
INJECTION, SOLUTION INTRAVENOUS
Status: DISCONTINUED | OUTPATIENT
Start: 2020-01-03 | End: 2020-01-03

## 2020-01-03 RX ORDER — SUCCINYLCHOLINE CHLORIDE 20 MG/ML
INJECTION INTRAMUSCULAR; INTRAVENOUS
Status: DISCONTINUED | OUTPATIENT
Start: 2020-01-03 | End: 2020-01-03

## 2020-01-03 RX ORDER — GLYCOPYRROLATE 0.2 MG/ML
INJECTION INTRAMUSCULAR; INTRAVENOUS
Status: DISCONTINUED | OUTPATIENT
Start: 2020-01-03 | End: 2020-01-03

## 2020-01-03 RX ORDER — CEFAZOLIN SODIUM 1 G/3ML
INJECTION, POWDER, FOR SOLUTION INTRAMUSCULAR; INTRAVENOUS
Status: DISCONTINUED | OUTPATIENT
Start: 2020-01-03 | End: 2020-01-03

## 2020-01-03 RX ORDER — PHENYLEPHRINE HYDROCHLORIDE 10 MG/ML
INJECTION INTRAVENOUS
Status: DISCONTINUED | OUTPATIENT
Start: 2020-01-03 | End: 2020-01-03

## 2020-01-03 RX ORDER — KETAMINE HCL IN 0.9 % NACL 50 MG/5 ML
SYRINGE (ML) INTRAVENOUS
Status: DISCONTINUED | OUTPATIENT
Start: 2020-01-03 | End: 2020-01-03

## 2020-01-03 RX ORDER — SODIUM CHLORIDE 0.9 % (FLUSH) 0.9 %
3 SYRINGE (ML) INJECTION
Status: DISCONTINUED | OUTPATIENT
Start: 2020-01-03 | End: 2020-01-03 | Stop reason: HOSPADM

## 2020-01-03 RX ORDER — MIDAZOLAM HYDROCHLORIDE 1 MG/ML
INJECTION, SOLUTION INTRAMUSCULAR; INTRAVENOUS
Status: DISCONTINUED | OUTPATIENT
Start: 2020-01-03 | End: 2020-01-03

## 2020-01-03 RX ORDER — OXYCODONE HYDROCHLORIDE 10 MG/1
10 TABLET ORAL EVERY 4 HOURS PRN
Status: DISCONTINUED | OUTPATIENT
Start: 2020-01-03 | End: 2020-01-07 | Stop reason: HOSPADM

## 2020-01-03 RX ORDER — HEPARIN SODIUM 1000 [USP'U]/ML
INJECTION, SOLUTION INTRAVENOUS; SUBCUTANEOUS
Status: DISCONTINUED | OUTPATIENT
Start: 2020-01-03 | End: 2020-01-03 | Stop reason: HOSPADM

## 2020-01-03 RX ORDER — PROPOFOL 10 MG/ML
VIAL (ML) INTRAVENOUS
Status: DISCONTINUED | OUTPATIENT
Start: 2020-01-03 | End: 2020-01-03

## 2020-01-03 RX ADMIN — OXYCODONE HYDROCHLORIDE 10 MG: 5 TABLET ORAL at 06:01

## 2020-01-03 RX ADMIN — ONDANSETRON 4 MG: 2 INJECTION INTRAMUSCULAR; INTRAVENOUS at 05:01

## 2020-01-03 RX ADMIN — CEFEPIME 2 G: 2 INJECTION, POWDER, FOR SOLUTION INTRAVENOUS at 11:01

## 2020-01-03 RX ADMIN — SODIUM CHLORIDE, SODIUM LACTATE, POTASSIUM CHLORIDE, AND CALCIUM CHLORIDE: .6; .31; .03; .02 INJECTION, SOLUTION INTRAVENOUS at 08:01

## 2020-01-03 RX ADMIN — SUCCINYLCHOLINE CHLORIDE 200 MG: 20 INJECTION, SOLUTION INTRAMUSCULAR; INTRAVENOUS at 01:01

## 2020-01-03 RX ADMIN — Medication 10 MG: at 04:01

## 2020-01-03 RX ADMIN — PHENYLEPHRINE HYDROCHLORIDE 100 MCG: 10 INJECTION INTRAVENOUS at 02:01

## 2020-01-03 RX ADMIN — PREGABALIN 100 MG: 50 CAPSULE ORAL at 09:01

## 2020-01-03 RX ADMIN — Medication 10 MG: at 03:01

## 2020-01-03 RX ADMIN — Medication 10 MG: at 05:01

## 2020-01-03 RX ADMIN — FENTANYL CITRATE 25 MCG: 50 INJECTION, SOLUTION INTRAMUSCULAR; INTRAVENOUS at 05:01

## 2020-01-03 RX ADMIN — DOCUSATE SODIUM 50 MG: 50 CAPSULE, LIQUID FILLED ORAL at 09:01

## 2020-01-03 RX ADMIN — ACETAMINOPHEN 1000 MG: 10 INJECTION, SOLUTION INTRAVENOUS at 02:01

## 2020-01-03 RX ADMIN — PROPOFOL 200 MG: 10 INJECTION, EMULSION INTRAVENOUS at 01:01

## 2020-01-03 RX ADMIN — MIDAZOLAM HYDROCHLORIDE 2 MG: 1 INJECTION, SOLUTION INTRAMUSCULAR; INTRAVENOUS at 01:01

## 2020-01-03 RX ADMIN — CEFEPIME 2 G: 2 INJECTION, POWDER, FOR SOLUTION INTRAVENOUS at 08:01

## 2020-01-03 RX ADMIN — ROCURONIUM BROMIDE 5 MG: 10 INJECTION, SOLUTION INTRAVENOUS at 01:01

## 2020-01-03 RX ADMIN — ASPIRIN 81 MG: 81 TABLET, COATED ORAL at 09:01

## 2020-01-03 RX ADMIN — PHENYLEPHRINE HYDROCHLORIDE 100 MCG: 10 INJECTION INTRAVENOUS at 01:01

## 2020-01-03 RX ADMIN — GLYCOPYRROLATE 0.6 MG: 0.2 INJECTION, SOLUTION INTRAMUSCULAR; INTRAVENOUS at 05:01

## 2020-01-03 RX ADMIN — SODIUM CHLORIDE, SODIUM LACTATE, POTASSIUM CHLORIDE, AND CALCIUM CHLORIDE: .6; .31; .03; .02 INJECTION, SOLUTION INTRAVENOUS at 09:01

## 2020-01-03 RX ADMIN — OXYCODONE HYDROCHLORIDE 5 MG: 5 TABLET ORAL at 08:01

## 2020-01-03 RX ADMIN — ROCURONIUM BROMIDE 20 MG: 10 INJECTION, SOLUTION INTRAVENOUS at 03:01

## 2020-01-03 RX ADMIN — SODIUM CHLORIDE, SODIUM LACTATE, POTASSIUM CHLORIDE, AND CALCIUM CHLORIDE: 600; 310; 30; 20 INJECTION, SOLUTION INTRAVENOUS at 01:01

## 2020-01-03 RX ADMIN — SODIUM CHLORIDE, SODIUM GLUCONATE, SODIUM ACETATE, POTASSIUM CHLORIDE, MAGNESIUM CHLORIDE, SODIUM PHOSPHATE, DIBASIC, AND POTASSIUM PHOSPHATE: .53; .5; .37; .037; .03; .012; .00082 INJECTION, SOLUTION INTRAVENOUS at 04:01

## 2020-01-03 RX ADMIN — ATORVASTATIN CALCIUM 40 MG: 20 TABLET, FILM COATED ORAL at 09:01

## 2020-01-03 RX ADMIN — PREGABALIN 100 MG: 50 CAPSULE ORAL at 08:01

## 2020-01-03 RX ADMIN — ROCURONIUM BROMIDE 45 MG: 10 INJECTION, SOLUTION INTRAVENOUS at 01:01

## 2020-01-03 RX ADMIN — HYDROMORPHONE HYDROCHLORIDE 0.5 MG: 1 INJECTION, SOLUTION INTRAMUSCULAR; INTRAVENOUS; SUBCUTANEOUS at 09:01

## 2020-01-03 RX ADMIN — CEFEPIME 2 G: 2 INJECTION, POWDER, FOR SOLUTION INTRAVENOUS at 03:01

## 2020-01-03 RX ADMIN — NEOSTIGMINE METHYLSULFATE 4 MG: 1 INJECTION INTRAVENOUS at 05:01

## 2020-01-03 RX ADMIN — FENTANYL CITRATE 50 MCG: 50 INJECTION, SOLUTION INTRAMUSCULAR; INTRAVENOUS at 02:01

## 2020-01-03 RX ADMIN — FENTANYL CITRATE 100 MCG: 50 INJECTION, SOLUTION INTRAMUSCULAR; INTRAVENOUS at 01:01

## 2020-01-03 RX ADMIN — CEFAZOLIN 3 G: 330 INJECTION, POWDER, FOR SOLUTION INTRAMUSCULAR; INTRAVENOUS at 02:01

## 2020-01-03 RX ADMIN — SODIUM CHLORIDE: 0.9 INJECTION, SOLUTION INTRAVENOUS at 01:01

## 2020-01-03 RX ADMIN — ROCURONIUM BROMIDE 20 MG: 10 INJECTION, SOLUTION INTRAVENOUS at 02:01

## 2020-01-03 RX ADMIN — ROCURONIUM BROMIDE 10 MG: 10 INJECTION, SOLUTION INTRAVENOUS at 03:01

## 2020-01-03 RX ADMIN — Medication 40 MG: at 02:01

## 2020-01-03 RX ADMIN — LIDOCAINE HYDROCHLORIDE 100 MG: 20 INJECTION, SOLUTION INTRAVENOUS at 01:01

## 2020-01-03 NOTE — PROGRESS NOTES
VANCOMYCIN DOSING BY PHARMACY DISCONTINUATION NOTE    Renan Britton is a 59 y.o. male who had been consulted for vancomycin dosing.    The pharmacy consult for vancomycin dosing has been discontinued.     Vancomycin Dosing by Pharmacy Consult will sign-off. Please reconsult if necessary. Thank you for allowing us to participate in this patient's care.       Rut BrightD  Ext [phone]

## 2020-01-03 NOTE — ANESTHESIA PROCEDURE NOTES
Intubation  Performed by: Dom Law MD  Authorized by: Idania Cook MD     Intubation:     Induction:  Rapid sequence induction    Intubated:  Postinduction    Mask Ventilation:  N/a    Attempts:  1    Attempted By:  Resident anesthesiologist    Method of Intubation:  Video laryngoscopy    Blade:  Glidescope 3    Laryngeal View Grade: Grade I - full view of chords      Difficult Airway Encountered?: No      Complications:  None    Airway Device:  Oral endotracheal tube    Airway Device Size:  7.5    Style/Cuff Inflation:  Cuffed    Tube secured:  22    Secured at:  The lips    Placement Verified By:  Capnometry    Complicating Factors:  None    Findings Post-Intubation:  BS equal bilateral

## 2020-01-03 NOTE — PROGRESS NOTES
[facility]  Plastic Surgery  Progress Note    Subjective:     History of Present Illness:  Renan Britton is a 59 y.o. male with PMHx of bilateral lower extremity common femoral endarterectomy and Left iliac stent with Vascular surgery on 12/20/19. Patient had uneventful postop course and was discharged home. Patient presented to ER on 12/27 for bilateral groin cellulitis, was sent home with Clindamycin and f/u in Vascular clinic on 12/30. In clinic patient had new onset serous drainage from incision sites and skin necrosis. The wound was debrided in clinic and CT was ordered that showed bilateral fluid collections below incisions likely consistent with seromas. Patient denies fevers/chills, CP, SOB, abd pain, bowel/bladder changes, numbness/tingling or cold extremities.       Post-Op Info:  Procedure(s) (LRB):  IRRIGATION AND DEBRIDEMENT (Bilateral)  APPLICATION, WOUND VAC (Bilateral)   2 Days Post-Op     Interval History: NAEON. AFVSS. NPO since midnight. To OR today for L rectus flap.    Medications:  Continuous Infusions:   lactated ringers       Scheduled Meds:   aspirin  81 mg Oral Daily    atorvastatin  40 mg Oral Daily    ceFEPime (MAXIPIME) IVPB  2 g Intravenous Q8H    docusate sodium  50 mg Oral Daily    lidocaine (PF) 10 mg/ml (1%)  1 mL Other Once    pregabalin  100 mg Oral BID    vancomycin (VANCOCIN) IVPB  2,000 mg Intravenous Q12H     PRN Meds:acetaminophen, acetaminophen, Dextrose 10% Bolus, glucagon (human recombinant), HYDROmorphone, insulin aspart U-100, melatonin, ondansetron, oxyCODONE, sodium chloride 0.9%, Pharmacy to dose Vancomycin consult **AND** vancomycin - pharmacy to dose     Review of patient's allergies indicates:   Allergen Reactions    Penicillins Hives     Objective:     Vital Signs (Most Recent):  Temp: 97.2 °F (36.2 °C) (01/03/20 0414)  Pulse: 78 (01/03/20 0414)  Resp: 16 (01/03/20 0414)  BP: (!) 141/76 (01/03/20 0414)  SpO2: 95 % (01/03/20 0414)  Vital Signs (24h Range):  Temp:  [97.2 °F (36.2 °C)-98.8 °F (37.1 °C)] 97.2 °F (36.2 °C)  Pulse:  [78-93] 78  Resp:  [12-19] 16  SpO2:  [94 %-98 %] 95 %  BP: (128-159)/(74-82) 141/76     Weight: 120.5 kg (265 lb 10.5 oz)  Body mass index is 37.05 kg/m².    Intake/Output - Last 3 Shifts       01/01 0700 - 01/02 0659 01/02 0700 - 01/03 0659    P.O. 720     IV Piggyback 500     Total Intake(mL/kg) 1220 (10.1)     Urine (mL/kg/hr) 2200 (0.8) 0 (0)    Other 30 30    Stool 0     Total Output 2230 30    Net -1010 -30          Urine Occurrence  2 x    Stool Occurrence 1 x 2 x          Physical Exam     Gen:  No acute distress  CV:  Peripherally well-perfused.    Lungs:  Normal respiratory effort.  Head/Neck:  Normocephalic.  Atraumatic.   Groin:  Left- Vac in place over open incision w SS output, mild erythema, but no palpable fluid collections. Warm and well perfused extremity.  Right- Vac in place over open incision w SS output. More erythematous areas around incision compared to contralateral side. No palpable fluid collections. Warm and well perfused extremity.     Significant Labs:  CBC:   Recent Labs   Lab 01/03/20  0506   WBC 9.47   RBC 4.35*   HGB 13.4*   HCT 40.9      MCV 94   MCH 30.8   MCHC 32.8     CMP:   Recent Labs   Lab 01/02/20  0430   *   CALCIUM 9.3   ALBUMIN 2.7*   PROT 6.7   *   K 4.0   CO2 26   CL 99   BUN 10   CREATININE 0.8   ALKPHOS 44*   ALT 25   AST 28   BILITOT 0.8       Significant Diagnostics:  I have reviewed all pertinent imaging results/findings within the past 24 hours.    Assessment/Plan:     Surgical wound breakdown  59M s/p BLE CFE and L iliac stent with vascular surgery on 12/20/19 presents with post-op wound breakdown and drainage concerning for draining seroma s/p bilateral groin incision I&D and wound vac placement with vascular surgery on 1/1/20. In OR superficial fat necrosis on R groin and deep serous fluid collection that was released on L. Per primary wound  with limited physical signs of infection. Patient would likely benefit from possible rectus myocutaneous flap for L groin wound.   - Primary team to return to OR for wound vac removal, repeat I&Ds, and possible Abx bead placement in L groin wound today.  - Intraop Cxs positive for Proteus - on Vanc and Cefepime per primary  - Recommend continuing to hold home plavix  - Possible rectus myocutaneous flap for L groin wound today in OR after vascular washout.   - NPO since midnight; okay for a diet after surgery from plastics perspective        Isael Jones MD  Plastic Surgery  [facility]

## 2020-01-03 NOTE — ASSESSMENT & PLAN NOTE
60yo male with hx of PAD s/p bilateral femoral endarterectomies on 12/20 now with bilateral wound breakdown and concern for infected fluid collection L groin now s/p bilateral groin washout with superficial debridement of R groin and placement of bilateral wound vacs 1/1/20    - NPO  - wound vacs in place to bilateral groins  - continue home meds - lyrica, daily aspirin 91mg, statin  - SSI  - hold plavix pending plastic surgery recommendations - will restart if not planning on doing muscle flaps  - plastic surgery consult for muscle flap to L groin, eval for muscle flap to R groin - appreciate their assistance  - daily labs  - intraoperative cultures positive for proteus - susceptibilities pending; continue cefepime and tailor antibiotics once susceptibilities and final cultures have resulted - d/c vanc  - to OR today for groin washout, plastics available to help    Dispo: continue care in [facility], OR today for groin washout

## 2020-01-03 NOTE — PLAN OF CARE
POC reviewed with patient, states understanding. AOx4. VS WDL. Diabetic diet, tolerated well. NPO after midnight. Wound vac to bilateral groin. Voids per urinal, stand by assist to bathroom. No complaints of nausea. Pain effectively managed per MAR. Significant other at bedside. Will continue to manage POC.

## 2020-01-03 NOTE — PLAN OF CARE
POC reviewed patient and spouse. Patient AAOX4. Patient sat up in chair today, toelrated fair. Wound vac in place with serosanguious drainage to bilateral groin.  Ivs intact and patent, saline flushed. Blood sugars monitored as ordered, no coverage needed. Patient instructed Npo after midnight due to surgery in am. Will receive Vancomycin trough tonight at 2015. Tolerating diabetic diet well. Pain managed with PRN pain meds with relief. Bed in low and locked position. Call light in reach. Wife at bedside. Will continue to monitor.

## 2020-01-03 NOTE — SUBJECTIVE & OBJECTIVE
Medications:  Continuous Infusions:   lactated ringers       Scheduled Meds:   aspirin  81 mg Oral Daily    atorvastatin  40 mg Oral Daily    ceFEPime (MAXIPIME) IVPB  2 g Intravenous Q8H    docusate sodium  50 mg Oral Daily    lidocaine (PF) 10 mg/ml (1%)  1 mL Other Once    pregabalin  100 mg Oral BID    vancomycin (VANCOCIN) IVPB  2,000 mg Intravenous Q12H     PRN Meds:acetaminophen, acetaminophen, Dextrose 10% Bolus, glucagon (human recombinant), HYDROmorphone, insulin aspart U-100, melatonin, ondansetron, oxyCODONE, sodium chloride 0.9%, Pharmacy to dose Vancomycin consult **AND** vancomycin - pharmacy to dose     Objective:     Vital Signs (Most Recent):  Temp: 97.6 °F (36.4 °C) (01/03/20 0800)  Pulse: 80 (01/03/20 0800)  Resp: 18 (01/03/20 0800)  BP: 131/72 (01/03/20 0800)  SpO2: 95 % (01/03/20 0800) Vital Signs (24h Range):  Temp:  [97.2 °F (36.2 °C)-98.8 °F (37.1 °C)] 97.6 °F (36.4 °C)  Pulse:  [78-93] 80  Resp:  [12-18] 18  SpO2:  [94 %-98 %] 95 %  BP: (131-159)/(72-82) 131/72         Physical Exam   Constitutional: He is oriented to person, place, and time. He appears well-developed and well-nourished.   HENT:   Head: Normocephalic and atraumatic.   Cardiovascular: Normal rate.   Pulmonary/Chest: Effort normal.   Abdominal: Soft.   Musculoskeletal:   Bilateral wound vacs to groin incisions to adequate suction       Neurological: He is alert and oriented to person, place, and time.   Skin: Skin is warm and dry.   Nursing note and vitals reviewed.      Significant Labs:  BMP:   Recent Labs   Lab 01/03/20  0506   *      K 4.7      CO2 29   BUN 9   CREATININE 0.9   CALCIUM 9.5   MG 2.0     CBC:   Recent Labs   Lab 01/03/20  0506   WBC 9.47   RBC 4.35*   HGB 13.4*   HCT 40.9      MCV 94   MCH 30.8   MCHC 32.8       Significant Diagnostics:  I have reviewed all pertinent imaging results/findings within the past 24 hours.

## 2020-01-03 NOTE — ASSESSMENT & PLAN NOTE
59M s/p BLE CFE and L iliac stent with vascular surgery on 12/20/19 presents with post-op wound breakdown and drainage concerning for draining seroma s/p bilateral groin incision I&D and wound vac placement with vascular surgery on 1/1/20. In OR superficial fat necrosis on R groin and deep serous fluid collection that was released on L. Per primary wound with limited physical signs of infection. Patient would likely benefit from possible rectus myocutaneous flap for L groin wound.   - Primary team to return to OR for wound vac removal, repeat I&Ds, and possible Abx bead placement in L groin wound today.  - Intraop Cxs positive for Proteus - on Vanc and Cefepime per primary  - Recommend continuing to hold home plavix  - Possible rectus myocutaneous flap for L groin wound today in OR after vascular washout.   - NPO since midnight; okay for a diet after surgery from plastics perspective

## 2020-01-03 NOTE — SUBJECTIVE & OBJECTIVE
Interval History: NAEON. AFVSS. NPO since midnight. To OR today for L rectus flap.    Medications:  Continuous Infusions:   lactated ringers       Scheduled Meds:   aspirin  81 mg Oral Daily    atorvastatin  40 mg Oral Daily    ceFEPime (MAXIPIME) IVPB  2 g Intravenous Q8H    docusate sodium  50 mg Oral Daily    lidocaine (PF) 10 mg/ml (1%)  1 mL Other Once    pregabalin  100 mg Oral BID    vancomycin (VANCOCIN) IVPB  2,000 mg Intravenous Q12H     PRN Meds:acetaminophen, acetaminophen, Dextrose 10% Bolus, glucagon (human recombinant), HYDROmorphone, insulin aspart U-100, melatonin, ondansetron, oxyCODONE, sodium chloride 0.9%, Pharmacy to dose Vancomycin consult **AND** vancomycin - pharmacy to dose     Review of patient's allergies indicates:   Allergen Reactions    Penicillins Hives     Objective:     Vital Signs (Most Recent):  Temp: 97.2 °F (36.2 °C) (01/03/20 0414)  Pulse: 78 (01/03/20 0414)  Resp: 16 (01/03/20 0414)  BP: (!) 141/76 (01/03/20 0414)  SpO2: 95 % (01/03/20 0414) Vital Signs (24h Range):  Temp:  [97.2 °F (36.2 °C)-98.8 °F (37.1 °C)] 97.2 °F (36.2 °C)  Pulse:  [78-93] 78  Resp:  [12-19] 16  SpO2:  [94 %-98 %] 95 %  BP: (128-159)/(74-82) 141/76     Weight: 120.5 kg (265 lb 10.5 oz)  Body mass index is 37.05 kg/m².    Intake/Output - Last 3 Shifts       01/01 0700 - 01/02 0659 01/02 0700 - 01/03 0659    P.O. 720     IV Piggyback 500     Total Intake(mL/kg) 1220 (10.1)     Urine (mL/kg/hr) 2200 (0.8) 0 (0)    Other 30 30    Stool 0     Total Output 2230 30    Net -1010 -30          Urine Occurrence  2 x    Stool Occurrence 1 x 2 x          Physical Exam     Gen:  No acute distress  CV:  Peripherally well-perfused.    Lungs:  Normal respiratory effort.  Head/Neck:  Normocephalic.  Atraumatic.   Groin:  Left- Vac in place over open incision w SS output, mild erythema, but no palpable fluid collections. Warm and well perfused extremity.  Right- Vac in place over open incision w SS output. More  erythematous areas around incision compared to contralateral side. No palpable fluid collections. Warm and well perfused extremity.     Significant Labs:  CBC:   Recent Labs   Lab 01/03/20  0506   WBC 9.47   RBC 4.35*   HGB 13.4*   HCT 40.9      MCV 94   MCH 30.8   MCHC 32.8     CMP:   Recent Labs   Lab 01/02/20  0430   *   CALCIUM 9.3   ALBUMIN 2.7*   PROT 6.7   *   K 4.0   CO2 26   CL 99   BUN 10   CREATININE 0.8   ALKPHOS 44*   ALT 25   AST 28   BILITOT 0.8       Significant Diagnostics:  I have reviewed all pertinent imaging results/findings within the past 24 hours.

## 2020-01-03 NOTE — PROGRESS NOTES
[facility]  Vascular Surgery  Progress Note    Patient Name: Renan Britton  MRN: [id number]  Admission Date: 12/31/2019  Primary Care Provider: Eren Becker MD    Subjective:     Interval History: No acute events overnight. NPO since midnight in anticipation of groin washout today given proteus growth. Patient walking the halls this morning, minimal pain. Wound vacs to adequate suction.    Post-Op Info:  Procedure(s) (LRB):  IRRIGATION AND DEBRIDEMENT (Bilateral)  APPLICATION, WOUND VAC (Bilateral)   2 Days Post-Op       Medications:  Continuous Infusions:   lactated ringers       Scheduled Meds:   aspirin  81 mg Oral Daily    atorvastatin  40 mg Oral Daily    ceFEPime (MAXIPIME) IVPB  2 g Intravenous Q8H    docusate sodium  50 mg Oral Daily    lidocaine (PF) 10 mg/ml (1%)  1 mL Other Once    pregabalin  100 mg Oral BID    vancomycin (VANCOCIN) IVPB  2,000 mg Intravenous Q12H     PRN Meds:acetaminophen, acetaminophen, Dextrose 10% Bolus, glucagon (human recombinant), HYDROmorphone, insulin aspart U-100, melatonin, ondansetron, oxyCODONE, sodium chloride 0.9%, Pharmacy to dose Vancomycin consult **AND** vancomycin - pharmacy to dose     Objective:     Vital Signs (Most Recent):  Temp: 97.6 °F (36.4 °C) (01/03/20 0800)  Pulse: 80 (01/03/20 0800)  Resp: 18 (01/03/20 0800)  BP: 131/72 (01/03/20 0800)  SpO2: 95 % (01/03/20 0800) Vital Signs (24h Range):  Temp:  [97.2 °F (36.2 °C)-98.8 °F (37.1 °C)] 97.6 °F (36.4 °C)  Pulse:  [78-93] 80  Resp:  [12-18] 18  SpO2:  [94 %-98 %] 95 %  BP: (131-159)/(72-82) 131/72         Physical Exam   Constitutional: He is oriented to person, place, and time. He appears well-developed and well-nourished.   HENT:   Head: Normocephalic and atraumatic.   Cardiovascular: Normal rate.   Pulmonary/Chest: Effort normal.   Abdominal: Soft.   Musculoskeletal:   Bilateral wound vacs to groin incisions to adequate suction       Neurological: He is alert and oriented to  person, place, and time.   Skin: Skin is warm and dry.   Nursing note and vitals reviewed.      Significant Labs:  BMP:   Recent Labs   Lab 01/03/20  0506   *      K 4.7      CO2 29   BUN 9   CREATININE 0.9   CALCIUM 9.5   MG 2.0     CBC:   Recent Labs   Lab 01/03/20  0506   WBC 9.47   RBC 4.35*   HGB 13.4*   HCT 40.9      MCV 94   MCH 30.8   MCHC 32.8       Significant Diagnostics:  I have reviewed all pertinent imaging results/findings within the past 24 hours.    Assessment/Plan:     Surgical wound breakdown  60yo male with hx of PAD s/p bilateral femoral endarterectomies on 12/20 now with bilateral wound breakdown and concern for infected fluid collection L groin now s/p bilateral groin washout with superficial debridement of R groin and placement of bilateral wound vacs 1/1/20    - NPO  - wound vacs in place to bilateral groins  - continue home meds - lyrica, daily aspirin 91mg, statin  - SSI  - hold plavix pending plastic surgery recommendations - will restart if not planning on doing muscle flaps  - plastic surgery consult for muscle flap to L groin, eval for muscle flap to R groin - appreciate their assistance  - daily labs  - intraoperative cultures positive for proteus - susceptibilities pending; continue cefepime and tailor antibiotics once susceptibilities and final cultures have resulted - d/c vanc  - to OR today for groin washout, plastics available to help    Dispo: continue care in [facility], OR today for groin washout        Adelina Arboleda MD  Vascular Surgery  [facility]

## 2020-01-03 NOTE — PROGRESS NOTES
Pharmacokinetic Assessment Follow Up: IV Vancomycin    Vancomycin serum concentration assessment(s):    · The trough level was drawn correctly and can be used to guide therapy at this time. The measurement is below the desired definitive target range of 10 to 20 mcg/mL.    Vancomycin Regimen Plan:    · Change regimen to Vancomycin 2000 mg IV every 12 hours with next serum trough concentration measured at 2030 prior to fourth dose on 1/4/20    Drug levels (last 3 results):  Recent Labs   Lab Result Units 01/02/20  1955   Vancomycin-Trough ug/mL 8.6*       Pharmacy will continue to follow and monitor vancomycin.    Please contact pharmacy at extension [phone] for questions regarding this assessment.    Thank you for the consult,   Ilsa Barry       Patient brief summary:  Renan Britton is a 59 y.o. male initiated on antimicrobial therapy with IV Vancomycin for treatment of skin & soft tissue infection    The patient's current regimen is Vancomycin 1750 mg every 12 hours    Drug Allergies:   Review of patient's allergies indicates:   Allergen Reactions    Penicillins Hives       Actual Body Weight:   120.5 kg    Renal Function:   Estimated Creatinine Clearance: 134.6 mL/min (based on SCr of 0.8 mg/dL).,     CBC (last 72 hours):  Recent Labs   Lab Result Units 01/01/20  0702 01/02/20  0430   WBC K/uL 7.36 8.49   Hemoglobin g/dL 14.3 13.4*   Hematocrit % 43.5 39.7*   Platelets K/uL 276 258   Gran% % 74.3* 79.8*   Lymph% % 11.7* 7.3*   Mono% % 8.8 9.8   Eosinophil% % 4.3 2.2   Basophil% % 0.5 0.5   Differential Method  Automated Automated       Metabolic Panel (last 72 hours):  Recent Labs   Lab Result Units 12/31/19  1803 01/01/20  0702 01/02/20  0430   Sodium mmol/L 137 136 134*   Potassium mmol/L 4.9 4.2 4.0   Chloride mmol/L 98 99 99   CO2 mmol/L 31* 31* 26   Glucose mg/dL 123* 139* 121*   BUN, Bld mg/dL 12 10 10   Creatinine mg/dL 0.9 0.9 0.8   Albumin g/dL  --  3.0* 2.7*   Total Bilirubin mg/dL  --  0.9 0.8    Alkaline Phosphatase U/L  --  43* 44*   AST U/L  --  24 28   ALT U/L  --  31 25   Magnesium mg/dL  --  2.0 1.8   Phosphorus mg/dL  --  3.7 3.6       Vancomycin Administrations:  vancomycin given in the last 96 hours                   vancomycin 1750 mg in 0.9% sodium chloride 500 mL IVPB (mg) 1,750 mg New Bag 01/02/20 2100     1,750 mg New Bag  0841     1,750 mg New Bag 01/01/20 2037     1,750 mg New Bag  0837    vancomycin 1750 mg in 0.9% sodium chloride 500 mL IVPB (mg) 1,750 mg New Bag 12/31/19 1933                Microbiologic Results:  Microbiology Results (last 7 days)     Procedure Component Value Units Date/Time    AFB Culture & Smear [[id number]] Collected:  01/01/20 1012    Order Status:  Completed Specimen:  Incision site from Groin Updated:  01/02/20 1544     AFB CULTURE STAIN No acid fast bacilli seen.    Narrative:       Left groin surgical wound/incision site    Fungus culture [[id number]] Collected:  01/01/20 1012    Order Status:  Completed Specimen:  Incision site from Groin Updated:  01/02/20 0954     Fungus (Mycology) Culture Culture in progress    Narrative:       Left groin surgical wound/incision site    Aerobic culture [[id number]]  (Abnormal) Collected:  01/01/20 1012    Order Status:  Completed Specimen:  Incision site from Groin Updated:  01/02/20 0753     Aerobic Bacterial Culture PRESUMPTIVE PROTEUS SPECIES  Few  Identification and susceptibility pending      Narrative:       Left groin surgical wound/incision site    Culture, Anaerobe [[id number]] Collected:  01/01/20 1012    Order Status:  Completed Specimen:  Incision site from Groin Updated:  01/02/20 0722     Anaerobic Culture Culture in progress    Narrative:       Left groin surgical wound/incision site    Gram stain [[id number]] Collected:  01/01/20 1012    Order Status:  Completed Specimen:  Incision site from Groin Updated:  01/01/20 1150     Gram Stain Result No WBC's      No organisms seen    Narrative:       Left groin surgical  wound/incision site

## 2020-01-03 NOTE — BRIEF OP NOTE
[facility]  Brief Operative Note    SUMMARY     Surgery Date: 1/3/2020     Surgeon(s) and Role:  Panel 1:     * SEBAS Prieto II, MD - Primary     * Carolyn Pat MD - Fellow  Panel 2:     * Brian Wong MD - Primary     * Papito Deng MD - Fellow    Assisting Surgeon: None    Pre-op Diagnosis:  Surgical wound breakdown [T81.31XA]    Post-op Diagnosis:  Post-Op Diagnosis Codes:     * Surgical wound breakdown [T81.31XA]    Procedure(s) (LRB):  WASHOUT (Bilateral)  CREATION, FLAP, MUSCLE ROTATION (N/A)  APPLICATION, WOUND VAC (N/A)    Anesthesia: General    Description of Procedure: pedicled ALT to left groin.    Description of the findings of the procedure: healthy ALT fasciocutaneous flap to left groin.    Estimated Blood Loss: 50 mL         Specimens:   Specimen (12h ago, onward)    None

## 2020-01-04 LAB
ALBUMIN SERPL BCP-MCNC: 2.3 G/DL (ref 3.5–5.2)
ALP SERPL-CCNC: 41 U/L (ref 55–135)
ALT SERPL W/O P-5'-P-CCNC: 21 U/L (ref 10–44)
ANION GAP SERPL CALC-SCNC: 8 MMOL/L (ref 8–16)
AST SERPL-CCNC: 20 U/L (ref 10–40)
BASOPHILS # BLD AUTO: 0.03 K/UL (ref 0–0.2)
BASOPHILS NFR BLD: 0.3 % (ref 0–1.9)
BILIRUB SERPL-MCNC: 0.7 MG/DL (ref 0.1–1)
BUN SERPL-MCNC: 10 MG/DL (ref 6–20)
CALCIUM SERPL-MCNC: 8.9 MG/DL (ref 8.7–10.5)
CHLORIDE SERPL-SCNC: 102 MMOL/L (ref 95–110)
CO2 SERPL-SCNC: 26 MMOL/L (ref 23–29)
CREAT SERPL-MCNC: 0.7 MG/DL (ref 0.5–1.4)
DIFFERENTIAL METHOD: ABNORMAL
EOSINOPHIL # BLD AUTO: 0.2 K/UL (ref 0–0.5)
EOSINOPHIL NFR BLD: 2.2 % (ref 0–8)
ERYTHROCYTE [DISTWIDTH] IN BLOOD BY AUTOMATED COUNT: 11.7 % (ref 11.5–14.5)
EST. GFR  (AFRICAN AMERICAN): >60 ML/MIN/1.73 M^2
EST. GFR  (NON AFRICAN AMERICAN): >60 ML/MIN/1.73 M^2
GLUCOSE SERPL-MCNC: 130 MG/DL (ref 70–110)
HCT VFR BLD AUTO: 37 % (ref 40–54)
HGB BLD-MCNC: 12.1 G/DL (ref 14–18)
IMM GRANULOCYTES # BLD AUTO: 0.03 K/UL (ref 0–0.04)
IMM GRANULOCYTES NFR BLD AUTO: 0.3 % (ref 0–0.5)
LYMPHOCYTES # BLD AUTO: 1 K/UL (ref 1–4.8)
LYMPHOCYTES NFR BLD: 10.2 % (ref 18–48)
MAGNESIUM SERPL-MCNC: 1.8 MG/DL (ref 1.6–2.6)
MCH RBC QN AUTO: 30.3 PG (ref 27–31)
MCHC RBC AUTO-ENTMCNC: 32.7 G/DL (ref 32–36)
MCV RBC AUTO: 93 FL (ref 82–98)
MONOCYTES # BLD AUTO: 0.7 K/UL (ref 0.3–1)
MONOCYTES NFR BLD: 7 % (ref 4–15)
NEUTROPHILS # BLD AUTO: 8 K/UL (ref 1.8–7.7)
NEUTROPHILS NFR BLD: 80 % (ref 38–73)
NRBC BLD-RTO: 0 /100 WBC
PHOSPHATE SERPL-MCNC: 3.5 MG/DL (ref 2.7–4.5)
PLATELET # BLD AUTO: 261 K/UL (ref 150–350)
PMV BLD AUTO: 9.7 FL (ref 9.2–12.9)
POCT GLUCOSE: 114 MG/DL (ref 70–110)
POCT GLUCOSE: 118 MG/DL (ref 70–110)
POCT GLUCOSE: 146 MG/DL (ref 70–110)
POCT GLUCOSE: 92 MG/DL (ref 70–110)
POTASSIUM SERPL-SCNC: 3.9 MMOL/L (ref 3.5–5.1)
PROT SERPL-MCNC: 6 G/DL (ref 6–8.4)
RBC # BLD AUTO: 4 M/UL (ref 4.6–6.2)
SODIUM SERPL-SCNC: 136 MMOL/L (ref 136–145)
WBC # BLD AUTO: 10.01 K/UL (ref 3.9–12.7)

## 2020-01-04 PROCEDURE — 80053 COMPREHEN METABOLIC PANEL: CPT

## 2020-01-04 PROCEDURE — 36415 COLL VENOUS BLD VENIPUNCTURE: CPT

## 2020-01-04 PROCEDURE — 25000003 PHARM REV CODE 250: Performed by: STUDENT IN AN ORGANIZED HEALTH CARE EDUCATION/TRAINING PROGRAM

## 2020-01-04 PROCEDURE — 83735 ASSAY OF MAGNESIUM: CPT

## 2020-01-04 PROCEDURE — 20600001 HC STEP DOWN PRIVATE ROOM

## 2020-01-04 PROCEDURE — 85025 COMPLETE CBC W/AUTO DIFF WBC: CPT

## 2020-01-04 PROCEDURE — 94761 N-INVAS EAR/PLS OXIMETRY MLT: CPT

## 2020-01-04 PROCEDURE — 63600175 PHARM REV CODE 636 W HCPCS: Performed by: SURGERY

## 2020-01-04 PROCEDURE — 84100 ASSAY OF PHOSPHORUS: CPT

## 2020-01-04 PROCEDURE — 63600175 PHARM REV CODE 636 W HCPCS: Performed by: STUDENT IN AN ORGANIZED HEALTH CARE EDUCATION/TRAINING PROGRAM

## 2020-01-04 PROCEDURE — 97161 PT EVAL LOW COMPLEX 20 MIN: CPT

## 2020-01-04 RX ORDER — LANOLIN ALCOHOL/MO/W.PET/CERES
800 CREAM (GRAM) TOPICAL ONCE
Status: COMPLETED | OUTPATIENT
Start: 2020-01-04 | End: 2020-01-04

## 2020-01-04 RX ORDER — VALSARTAN 160 MG/1
160 TABLET ORAL DAILY
Status: DISCONTINUED | OUTPATIENT
Start: 2020-01-04 | End: 2020-01-07 | Stop reason: HOSPADM

## 2020-01-04 RX ORDER — VALSARTAN 160 MG/1
160 TABLET ORAL DAILY
Status: DISCONTINUED | OUTPATIENT
Start: 2020-01-05 | End: 2020-01-04

## 2020-01-04 RX ORDER — POTASSIUM CHLORIDE 750 MG/1
10 CAPSULE, EXTENDED RELEASE ORAL ONCE
Status: COMPLETED | OUTPATIENT
Start: 2020-01-04 | End: 2020-01-04

## 2020-01-04 RX ADMIN — ATORVASTATIN CALCIUM 40 MG: 20 TABLET, FILM COATED ORAL at 09:01

## 2020-01-04 RX ADMIN — POTASSIUM CHLORIDE 10 MEQ: 750 CAPSULE, EXTENDED RELEASE ORAL at 09:01

## 2020-01-04 RX ADMIN — CEFEPIME 2 G: 2 INJECTION, POWDER, FOR SOLUTION INTRAVENOUS at 08:01

## 2020-01-04 RX ADMIN — DOCUSATE SODIUM 50 MG: 50 CAPSULE, LIQUID FILLED ORAL at 09:01

## 2020-01-04 RX ADMIN — Medication 800 MG: at 09:01

## 2020-01-04 RX ADMIN — CEFEPIME 2 G: 2 INJECTION, POWDER, FOR SOLUTION INTRAVENOUS at 10:01

## 2020-01-04 RX ADMIN — OXYCODONE HYDROCHLORIDE 5 MG: 5 TABLET ORAL at 05:01

## 2020-01-04 RX ADMIN — VALSARTAN 160 MG: 160 TABLET, FILM COATED ORAL at 04:01

## 2020-01-04 RX ADMIN — OXYCODONE HYDROCHLORIDE 5 MG: 5 TABLET ORAL at 07:01

## 2020-01-04 RX ADMIN — OXYCODONE HYDROCHLORIDE 5 MG: 5 TABLET ORAL at 02:01

## 2020-01-04 RX ADMIN — ASPIRIN 81 MG: 81 TABLET, COATED ORAL at 09:01

## 2020-01-04 RX ADMIN — PREGABALIN 100 MG: 50 CAPSULE ORAL at 09:01

## 2020-01-04 RX ADMIN — PREGABALIN 100 MG: 50 CAPSULE ORAL at 08:01

## 2020-01-04 RX ADMIN — CEFEPIME 2 G: 2 INJECTION, POWDER, FOR SOLUTION INTRAVENOUS at 03:01

## 2020-01-04 RX ADMIN — OXYCODONE HYDROCHLORIDE 5 MG: 5 TABLET ORAL at 12:01

## 2020-01-04 RX ADMIN — OXYCODONE HYDROCHLORIDE 5 MG: 5 TABLET ORAL at 10:01

## 2020-01-04 NOTE — SUBJECTIVE & OBJECTIVE
Medications:  Continuous Infusions:    Scheduled Meds:   aspirin  81 mg Oral Daily    atorvastatin  40 mg Oral Daily    ceFEPime (MAXIPIME) IVPB  2 g Intravenous Q8H    docusate sodium  50 mg Oral Daily    lidocaine (PF) 10 mg/ml (1%)  1 mL Other Once    magnesium oxide  800 mg Oral Once    potassium chloride  10 mEq Oral Once    pregabalin  100 mg Oral BID     PRN Meds:acetaminophen, acetaminophen, Dextrose 10% Bolus, glucagon (human recombinant), HYDROmorphone, influenza, insulin aspart U-100, melatonin, ondansetron, oxyCODONE, oxyCODONE, sodium chloride 0.9%     Objective:     Vital Signs (Most Recent):  Temp: 98.9 °F (37.2 °C) (01/04/20 0751)  Pulse: 74 (01/04/20 0751)  Resp: 18 (01/04/20 0751)  BP: (!) 158/77 (01/04/20 0751)  SpO2: (!) 94 % (01/04/20 0751) Vital Signs (24h Range):  Temp:  [97 °F (36.1 °C)-98.9 °F (37.2 °C)] 98.9 °F (37.2 °C)  Pulse:  [74-94] 74  Resp:  [13-18] 18  SpO2:  [94 %-98 %] 94 %  BP: (131-192)/(70-91) 158/77     Date 01/04/20 0700 - 01/05/20 0659   Shift 1027-9312 3508-9224 6209-2956 24 Hour Total   INTAKE   P.O. 120   120   I.V.(mL/kg) 250(2.1)   250(2.1)   Shift Total(mL/kg) 370(3.1)   370(3.1)   OUTPUT   Shift Total(mL/kg)       Weight (kg) 120.5 120.5 120.5 120.5       Physical Exam   Constitutional: He is oriented to person, place, and time. He appears well-developed and well-nourished.   HENT:   Head: Normocephalic and atraumatic.   Cardiovascular: Normal rate.   Pulmonary/Chest: Effort normal.   Abdominal: Soft.   Musculoskeletal:   L groin flap clean/dry/intact; L leg lateral incision covered with dressing - not saturated; two drains in place with serosanguinous output    R groin with wound vac in place to adequate suction       Neurological: He is alert and oriented to person, place, and time.   Skin: Skin is warm and dry.   Nursing note and vitals reviewed.      Significant Labs:  BMP:   Recent Labs   Lab 01/04/20  0456   *      K 3.9      CO2 26    BUN 10   CREATININE 0.7   CALCIUM 8.9   MG 1.8     CBC:   Recent Labs   Lab 01/04/20  0456   WBC 10.01   RBC 4.00*   HGB 12.1*   HCT 37.0*      MCV 93   MCH 30.3   MCHC 32.7       Significant Diagnostics:  I have reviewed all pertinent imaging results/findings within the past 24 hours.

## 2020-01-04 NOTE — PROGRESS NOTES
Plastic Surgery Progress Note    Renan Britton is a 59 y.o. male POD4 s/p ALT pedicled flap to right groin.    No acute issues overnight      Vitals:    01/03/20 2017 01/04/20 0131 01/04/20 0536 01/04/20 0751   BP: (!) 168/84 (!) 155/70 (!) 143/71 (!) 158/77   BP Location: Left arm Left arm Left arm Right arm   Patient Position: Lying Lying Lying Lying   Pulse: 88 74 83 74   Resp: 16 14 14 18   Temp: 97 °F (36.1 °C) 97.9 °F (36.6 °C) 98 °F (36.7 °C) 98.9 °F (37.2 °C)   TempSrc: Oral Oral Oral Oral   SpO2: 96% 97% 95% (!) 94%   Weight:       Height:           I/O last 3 completed shifts:  In: 3360 [P.O.:240; I.V.:3120]  Out: 1137.5 [Urine:960; Drains:127.5; Blood:50]  I/O this shift:  In: 370 [P.O.:120; I.V.:250]  Out: 375 [Urine:300; Drains:75]    Flap with biphasic signal in skin paddle at marked location.  Flap with mild edema, normal color and cap refill.  Flap edges approximated.  No evidence of congestion or dehiscence.    Lab Results   Component Value Date    WBC 10.01 01/04/2020    HGB 12.1 (L) 01/04/2020    HCT 37.0 (L) 01/04/2020    MCV 93 01/04/2020     01/04/2020     Lab Results   Component Value Date    CREATININE 0.7 01/04/2020    BUN 10 01/04/2020     01/04/2020    K 3.9 01/04/2020     01/04/2020    CO2 26 01/04/2020       A/P  60 yo male as above  -- may flex at the hip(sit up) three times a day for no more than 30 minutes.   --may get out of bed to use bed side toilet  --may work with PT only restricted activity is hip flexion     --if continues to do well will loosen hip flexion restrictions on 6 Jan    NICHOLAS Tai MD, FACS  Plastic Surgery Fellow

## 2020-01-04 NOTE — PLAN OF CARE
POC reviewed with patient, states understanding. AOx4. VS WDL. Diabetic diet, tolerated well. Wound vac to right  groin. Flap to left groin WDL, flap checks and neurovascular checks q2h. Voids per urinal. HOB maintained flat, patient compliant with POC. No complaints of nausea. Pain effectively managed per MAR. Significant other at bedside. Will continue to manage POC.

## 2020-01-04 NOTE — TRANSFER OF CARE
"Anesthesia Transfer of Care Note    Patient: Renan Britton    Procedure(s) Performed: Procedure(s) (LRB):  WASHOUT (Bilateral)  CREATION, FLAP, MUSCLE ROTATION (N/A)  APPLICATION, WOUND VAC (N/A)    Patient location: PACU    Anesthesia Type: general    Transport from OR: Transported from OR on 6-10 L/min O2 by face mask with adequate spontaneous ventilation    Post pain: adequate analgesia    Post assessment: no apparent anesthetic complications    Post vital signs: stable    Level of consciousness: awake and alert    Nausea/Vomiting: no nausea/vomiting    Complications: none    Transfer of care protocol was followed      Last vitals:   Visit Vitals  BP (!) 183/91   Pulse 94   Temp 36.7 °C (98.1 °F) (Temporal)   Resp 17   Ht 5' 11" (1.803 m)   Wt 120.5 kg (265 lb 10.5 oz)   SpO2 98%   BMI 37.05 kg/m²     "

## 2020-01-04 NOTE — ASSESSMENT & PLAN NOTE
58yo male with hx of PAD s/p bilateral femoral endarterectomies on 12/20 now with bilateral wound breakdown and concern for infected fluid collection L groin now s/p bilateral groin washout with superficial debridement of R groin and placement of bilateral wound vacs 1/1/20 now s/p L rectus muscle flap per plastics on 1/3    - diabetic diet  - wound vac in place to R groin - keep to 125mmHg suction  - q2hr flap checks per plastic surgery on the L, strip and record drain output; keep flap per plastics - appreciate their assistance; wound care per plastics on the L  - continue home meds - lyrica, daily aspirin 91mg, statin  - SSI  - hold plavix pending plastic surgery recommendations - restart pending their recommendations  - daily labs  - intraoperative cultures positive for proteus - pan sensitive; continue cefepime, plan to d/c on bactrim    Dispo: step up to PCU for flap checks

## 2020-01-04 NOTE — PROGRESS NOTES
[facility]  Vascular Surgery  Progress Note    Patient Name: Renan Britton  MRN: [id number]  Admission Date: 12/31/2019  Primary Care Provider: Eren Becker MD    Subjective:     Interval History: No acute events overnight. q2hr flap checks. Pain well controlled on PO pain meds.    Post-Op Info:  Procedure(s) (LRB):  WASHOUT (Bilateral)  CREATION, FLAP, MUSCLE ROTATION (N/A)  APPLICATION, WOUND VAC (N/A)   1 Day Post-Op       Medications:  Continuous Infusions:    Scheduled Meds:   aspirin  81 mg Oral Daily    atorvastatin  40 mg Oral Daily    ceFEPime (MAXIPIME) IVPB  2 g Intravenous Q8H    docusate sodium  50 mg Oral Daily    lidocaine (PF) 10 mg/ml (1%)  1 mL Other Once    magnesium oxide  800 mg Oral Once    potassium chloride  10 mEq Oral Once    pregabalin  100 mg Oral BID     PRN Meds:acetaminophen, acetaminophen, Dextrose 10% Bolus, glucagon (human recombinant), HYDROmorphone, influenza, insulin aspart U-100, melatonin, ondansetron, oxyCODONE, oxyCODONE, sodium chloride 0.9%     Objective:     Vital Signs (Most Recent):  Temp: 98.9 °F (37.2 °C) (01/04/20 0751)  Pulse: 74 (01/04/20 0751)  Resp: 18 (01/04/20 0751)  BP: (!) 158/77 (01/04/20 0751)  SpO2: (!) 94 % (01/04/20 0751) Vital Signs (24h Range):  Temp:  [97 °F (36.1 °C)-98.9 °F (37.2 °C)] 98.9 °F (37.2 °C)  Pulse:  [74-94] 74  Resp:  [13-18] 18  SpO2:  [94 %-98 %] 94 %  BP: (131-192)/(70-91) 158/77     Date 01/04/20 0700 - 01/05/20 0659   Shift 4451-6675 4457-9774 9028-9055 24 Hour Total   INTAKE   P.O. 120   120   I.V.(mL/kg) 250(2.1)   250(2.1)   Shift Total(mL/kg) 370(3.1)   370(3.1)   OUTPUT   Shift Total(mL/kg)       Weight (kg) 120.5 120.5 120.5 120.5       Physical Exam   Constitutional: He is oriented to person, place, and time. He appears well-developed and well-nourished.   HENT:   Head: Normocephalic and atraumatic.   Cardiovascular: Normal rate.   Pulmonary/Chest: Effort normal.   Abdominal: Soft.    Musculoskeletal:   L groin flap clean/dry/intact; L leg lateral incision covered with dressing - not saturated; two drains in place with serosanguinous output    R groin with wound vac in place to adequate suction       Neurological: He is alert and oriented to person, place, and time.   Skin: Skin is warm and dry.   Nursing note and vitals reviewed.      Significant Labs:  BMP:   Recent Labs   Lab 01/04/20  0456   *      K 3.9      CO2 26   BUN 10   CREATININE 0.7   CALCIUM 8.9   MG 1.8     CBC:   Recent Labs   Lab 01/04/20  0456   WBC 10.01   RBC 4.00*   HGB 12.1*   HCT 37.0*      MCV 93   MCH 30.3   MCHC 32.7       Significant Diagnostics:  I have reviewed all pertinent imaging results/findings within the past 24 hours.    Assessment/Plan:     * Surgical wound breakdown  58yo male with hx of PAD s/p bilateral femoral endarterectomies on 12/20 now with bilateral wound breakdown and concern for infected fluid collection L groin now s/p bilateral groin washout with superficial debridement of R groin and placement of bilateral wound vacs 1/1/20 now s/p L rectus muscle flap per plastics on 1/3    - diabetic diet  - wound vac in place to R groin - keep to 125mmHg suction  - q2hr flap checks per plastic surgery on the L, strip and record drain output; keep flap per plastics - appreciate their assistance; wound care per plastics on the L  - continue home meds - lyrica, daily aspirin 91mg, statin  - SSI  - hold plavix pending plastic surgery recommendations - restart pending their recommendations  - daily labs  - intraoperative cultures positive for proteus - pan sensitive; continue cefepime, plan to d/c on bactrim    Dispo: step up to PCU for flap checks        Adelina Arboleda MD  Vascular Surgery  [facility]

## 2020-01-04 NOTE — OP NOTE
[facility]  Plastic Surgery  Operative Note    SUMMARY     Date of Procedure: 1/3/2020     Procedure: Procedure(s) (LRB):  WASHOUT (Bilateral)  CREATION, FLAP, MUSCLE ROTATION (N/A)  APPLICATION, WOUND VAC (N/A)     1.  Sartorius muscle flap for deep space closure  2.  ALT flap pedicled for skin closure  3.  Excision of scar in preparation of flap 12 x 5 cm    Surgeon(s) and Role:  Panel 1:     * SEBAS Prieto II, MD - Primary     * Carolyn Pat MD - Fellow  Panel 2:     * Brian Wong MD - Primary     * Papito Deng MD - Fellow    Pre-Operative Diagnosis:   Surgical wound breakdown [T81.31XA]  History of endarterectomy of groin  History of seroma of left groin  Delayed healing of right groin wound    Post-Operative Diagnosis:   Same    Anesthesia:   General    Indications:   59 year old male with history of endarterectomy, complicated by delayed wound healing.  Consented for flap coverage of wound.  Risks benefits and alternatives were discussed including risk of bleeding, infection, hematoma, seroma, delayed healing, partial or complete flap loss, venous thromboembolism, need for further surgery.  We discussed activity modification post operatively.      Procedure:   I was called into the operating room following washout of left groin.  There was significant space over the endarterectomy site.  There was no residual grossly non viable fluid.  I proceeded with flap closure.      I elevated subcutaneous flaps over the sartorius muscle exposing its overlying fascia.  The fascia over it was mobilized.  The muscle was medialized into the groin defect to obliterate space and was anchored with interrupted 2-0 PDS, avoiding inguinal structures to jeovanny's in the wound margin.      We then raised an ALT in standard fashion, isolating it on a b type .  Retrograde dissection was performed and a healthy flap was raised and tunneled into the defect.  Vastus motor branches were  preserved.  Lateral femoral cutaneous nerve was spared.  A healthy flap with an audible skin signal was preserved.      Meticulous hemostasis was achieved.  surgicell was placed in the surgical sites.  Two jasen drains were placed, one in the flap donor site and one under the flap between the ALT and the sartorius.      Scar in the intervening skin bridge was released and a 5 x 5 cm full thickness section of skin was excised.  Phelgmon 12 x 10 cm was gently debrided.  The wound was washed out.  The flap was inset in a tension free manner with interrupted 2-0 PDS and 2-0 nylon.      A 15 [place] drain was placed in the thigh and secured with a 3-0 nylon suture.  Bulb suction was secured.  An aquacel ag surgical dressing was placed over the thigh.      All counts were correct.  He was transferred to recovery for ongoing management with limited hip flexion positioning.      1.  The health of the flap was confirmed in pacu  2.  Limited sitting no more than 30 minutes at meal times.    3.  JASEN drain care  4.  Bedrest for 24 hours.  5.  Will continue to follow    Complications: No    Estimated Blood Loss (EBL): 50 mL           Implants: * No implants in log *    Specimens:   Specimen (12h ago, onward)    None                  Condition: Good    Disposition: PACU - hemodynamically stable.    Attestation: I was present and scrubbed for the entire procedure.

## 2020-01-04 NOTE — PROGRESS NOTES
Dr Wogn @ bedside. Plan for pt will be to doppler flap pulses q2h and to keep pt as flat as possible in the bed to prevent compressing blood flow to the flap. Pt verbalizes understanding of this. Pt can sit up for very brief periods to eat, or in case of emergency, but must return flat ASAP.

## 2020-01-04 NOTE — PLAN OF CARE
Problem: Physical Therapy Goal  Goal: Physical Therapy Goal  Description  Goals to be met by: 20    Patient will increase functional independence with mobility by performin. Supine to sit with Contact Guard Assistance -not met  2. Sit to stand transfer with Contact Guard Assistance -not met  3. Gait  x 150 feet with Contact Guard Assistance using AD if needed.-not met       Outcome: Ongoing, Progressing   Evaluation completed and goals appropriate. Johanny Boggs, PT  2020

## 2020-01-04 NOTE — PT/OT/SLP EVAL
Physical Therapy Evaluation    Patient Name:  Renan Britton   MRN:  [id number]    Recommendations:     Discharge Recommendations:  home health PT   Discharge Equipment Recommendations: (will determine DME needs closer to discharge)   Barriers to discharge: None    Assessment:     Renan Britton is a 59 y.o. male admitted with a medical diagnosis of Surgical wound breakdown.  He presents with the following impairments/functional limitations:  impaired functional mobilty, gait instability, impaired endurance, impaired balance, decreased lower extremity function pt sovaldo treatment well but bleeding from graft site decreased functional mobility. Pt will benefit from cont skilled PT 4x/wk to progress physically. Pt should be able to discharge home with HHPT and DME to be determined closer to discharge.     Rehab Prognosis: Good; patient would benefit from acute skilled PT services to address these deficits and reach maximum level of function.    Recent Surgery: Procedure(s) (LRB):  WASHOUT (Bilateral)  CREATION, FLAP, MUSCLE ROTATION (N/A)  APPLICATION, WOUND VAC (N/A) 1 Day Post-Op    Plan:     During this hospitalization, patient to be seen 4 x/week to address the identified rehab impairments via gait training, therapeutic activities and progress toward the following goals:    · Plan of Care Expires:  02/03/20    Subjective     Chief Complaint: pt c/o slight pain during treatment.   Patient/Family Comments/goals:  To get better and go home.   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 They live in [address] [facility].   Prior to admission, patients level of function was independent .  Equipment used at home: none.  DME owned (not currently used): none.  Upon discharge,  patient will have assistance from wife who will take time off of work. .    Objective:     Communicated with nurse prior to session.  Patient found supine with KATHLEEN drain, SCD, wound vac, telemetry(hep lock IV)  upon PT entry to room.    General Precautions: Standard, fall(sit up 3x/day no more than 30 min)   Orthopedic Precautions:    Braces:       Exams:  · Cognitive Exam:  Patient is oriented to Person, Place, Time and Situation    Functional Mobility:  · Bed Mobility:  Pt needed verbal cues for hand placement and sequencing for functional mobility.    · Rolling Left:  moderate assistance  · Supine to Sit: moderate assistance  · Sit to Supine: moderate assistance    Pt was not able to come to upright sitting on EOB when R thigh skin graft had serousanguinous fluid pour out of wound. Pt was immediately returned to supine in bed and RN notified who came and assessed graft.  PT session was ended.         AM-PAC 6 CLICK MOBILITY  Total Score:9     Patient left supine with all lines intact, call button in reach and wife present.    GOALS:   Multidisciplinary Problems     Physical Therapy Goals        Problem: Physical Therapy Goal    Goal Priority Disciplines Outcome Goal Variances Interventions   Physical Therapy Goal     PT, PT/OT Ongoing, Progressing     Description:  Goals to be met by: 20    Patient will increase functional independence with mobility by performin. Supine to sit with Contact Guard Assistance -not met  2. Sit to stand transfer with Contact Guard Assistance -not met  3. Gait  x 150 feet with Contact Guard Assistance using AD if needed.-not met                        History:     Past Medical History:   Diagnosis Date    Diabetes mellitus     H/O brain tumor     HLD (hyperlipidemia)     Hypertension     PAD (peripheral artery disease)     Seizures     R/T BRAIN TUMOR- SURGICALLY EXCISED       Past Surgical History:   Procedure Laterality Date    APPLICATION OF WOUND VACUUM-ASSISTED  CLOSURE DEVICE Bilateral 1/1/2020    Procedure: APPLICATION, WOUND VAC;  Surgeon: SEBAS Prieto II, MD;  Location: [facility] OR [address] [place] [address];  Service: Cardiovascular;  Laterality: Bilateral;    BRAIN SURGERY  01/2011    TUMOR EXCISED    ENDARTERECTOMY OF FEMORAL ARTERY Bilateral 12/20/2019    Procedure: ENDARTERECTOMY, FEMORAL;  Surgeon: Kelby Gomez MD;  Location: [facility] OR [address] [place] [address];  Service: Peripheral Vascular;  Laterality: Bilateral;  natalya common femoral endarterectomy with natalya. iliac stent placement    PERCUTANEOUS TRANSLUMINAL ANGIOPLASTY (PTA) OF PERIPHERAL VESSEL Left 10/17/2019    Procedure: PTA, PERIPHERAL VESSEL;  Surgeon: Rao Fisher MD;  Location: [facility] CATH;  Service: Cardiology;  Laterality: Left;       Time Tracking:     PT Received On: 01/04/20  PT Start Time: 1428     PT Stop Time: 1442  PT Total Time (min): 14 min     Billable Minutes: Evaluation 14 min      Johanny Boggs, PT  01/04/2020

## 2020-01-04 NOTE — NURSING TRANSFER
Nursing Transfer Note      1/3/2020     Transfer To: 1041    Transfer via stretcher    Transfer with n/a    Transported by RN    Medicines sent: none    Chart send with patient: Yes    Notified: family @ bedside     Patient reassessed at: 1/3/2020 1900    Upon arrival to floor: Flap assessed and pulse checked with accepting nurse

## 2020-01-04 NOTE — ANESTHESIA POSTPROCEDURE EVALUATION
Anesthesia Post Evaluation    Patient: Renan Britton    Procedure(s) Performed: Procedure(s) (LRB):  WASHOUT (Bilateral)  CREATION, FLAP, MUSCLE ROTATION (N/A)  APPLICATION, WOUND VAC (N/A)    Final Anesthesia Type: general    Patient location during evaluation: PACU  Patient participation: Yes- Able to Participate  Level of consciousness: awake and alert and oriented  Post-procedure vital signs: reviewed and stable  Pain management: adequate  Airway patency: patent    PONV status at discharge: No PONV  Anesthetic complications: no      Cardiovascular status: blood pressure returned to baseline  Respiratory status: unassisted and spontaneous ventilation  Hydration status: euvolemic  Follow-up not needed.          Vitals Value Taken Time   /77 1/4/2020  7:51 AM   Temp 37.2 °C (98.9 °F) 1/4/2020  7:51 AM   Pulse 74 1/4/2020  7:51 AM   Resp 18 1/4/2020  7:51 AM   SpO2 94 % 1/4/2020  7:51 AM         Event Time     Out of Recovery 01/03/2020 18:15:00          Pain/Cristy Score: Pain Rating Prior to Med Admin: 4 (1/4/2020 10:39 AM)  Cristy Score: 10 (1/3/2020  7:00 PM)

## 2020-01-05 LAB
ALBUMIN SERPL BCP-MCNC: 2.5 G/DL (ref 3.5–5.2)
ALP SERPL-CCNC: 46 U/L (ref 55–135)
ALT SERPL W/O P-5'-P-CCNC: 24 U/L (ref 10–44)
ANION GAP SERPL CALC-SCNC: 7 MMOL/L (ref 8–16)
AST SERPL-CCNC: 26 U/L (ref 10–40)
BASOPHILS # BLD AUTO: 0.04 K/UL (ref 0–0.2)
BASOPHILS NFR BLD: 0.5 % (ref 0–1.9)
BILIRUB SERPL-MCNC: 0.6 MG/DL (ref 0.1–1)
BUN SERPL-MCNC: 9 MG/DL (ref 6–20)
CALCIUM SERPL-MCNC: 9.1 MG/DL (ref 8.7–10.5)
CHLORIDE SERPL-SCNC: 100 MMOL/L (ref 95–110)
CO2 SERPL-SCNC: 31 MMOL/L (ref 23–29)
CREAT SERPL-MCNC: 0.7 MG/DL (ref 0.5–1.4)
DIFFERENTIAL METHOD: ABNORMAL
EOSINOPHIL # BLD AUTO: 0.3 K/UL (ref 0–0.5)
EOSINOPHIL NFR BLD: 3.1 % (ref 0–8)
ERYTHROCYTE [DISTWIDTH] IN BLOOD BY AUTOMATED COUNT: 11.7 % (ref 11.5–14.5)
EST. GFR  (AFRICAN AMERICAN): >60 ML/MIN/1.73 M^2
EST. GFR  (NON AFRICAN AMERICAN): >60 ML/MIN/1.73 M^2
GLUCOSE SERPL-MCNC: 120 MG/DL (ref 70–110)
HCT VFR BLD AUTO: 37.1 % (ref 40–54)
HGB BLD-MCNC: 12.6 G/DL (ref 14–18)
IMM GRANULOCYTES # BLD AUTO: 0.03 K/UL (ref 0–0.04)
IMM GRANULOCYTES NFR BLD AUTO: 0.3 % (ref 0–0.5)
LYMPHOCYTES # BLD AUTO: 1 K/UL (ref 1–4.8)
LYMPHOCYTES NFR BLD: 11.4 % (ref 18–48)
MAGNESIUM SERPL-MCNC: 2.1 MG/DL (ref 1.6–2.6)
MCH RBC QN AUTO: 31 PG (ref 27–31)
MCHC RBC AUTO-ENTMCNC: 34 G/DL (ref 32–36)
MCV RBC AUTO: 91 FL (ref 82–98)
MONOCYTES # BLD AUTO: 0.8 K/UL (ref 0.3–1)
MONOCYTES NFR BLD: 8.6 % (ref 4–15)
NEUTROPHILS # BLD AUTO: 6.7 K/UL (ref 1.8–7.7)
NEUTROPHILS NFR BLD: 76.1 % (ref 38–73)
NRBC BLD-RTO: 0 /100 WBC
PHOSPHATE SERPL-MCNC: 3.9 MG/DL (ref 2.7–4.5)
PLATELET # BLD AUTO: 298 K/UL (ref 150–350)
PMV BLD AUTO: 9.6 FL (ref 9.2–12.9)
POCT GLUCOSE: 111 MG/DL (ref 70–110)
POCT GLUCOSE: 113 MG/DL (ref 70–110)
POCT GLUCOSE: 90 MG/DL (ref 70–110)
POCT GLUCOSE: 91 MG/DL (ref 70–110)
POTASSIUM SERPL-SCNC: 3.7 MMOL/L (ref 3.5–5.1)
PROT SERPL-MCNC: 6.8 G/DL (ref 6–8.4)
RBC # BLD AUTO: 4.07 M/UL (ref 4.6–6.2)
SODIUM SERPL-SCNC: 138 MMOL/L (ref 136–145)
WBC # BLD AUTO: 8.75 K/UL (ref 3.9–12.7)

## 2020-01-05 PROCEDURE — 99223 1ST HOSP IP/OBS HIGH 75: CPT | Mod: ,,, | Performed by: INTERNAL MEDICINE

## 2020-01-05 PROCEDURE — 99223 PR INITIAL HOSPITAL CARE,LEVL III: ICD-10-PCS | Mod: ,,, | Performed by: INTERNAL MEDICINE

## 2020-01-05 PROCEDURE — 63600175 PHARM REV CODE 636 W HCPCS: Performed by: STUDENT IN AN ORGANIZED HEALTH CARE EDUCATION/TRAINING PROGRAM

## 2020-01-05 PROCEDURE — 20600001 HC STEP DOWN PRIVATE ROOM

## 2020-01-05 PROCEDURE — 97530 THERAPEUTIC ACTIVITIES: CPT

## 2020-01-05 PROCEDURE — 84100 ASSAY OF PHOSPHORUS: CPT

## 2020-01-05 PROCEDURE — 83735 ASSAY OF MAGNESIUM: CPT

## 2020-01-05 PROCEDURE — 80053 COMPREHEN METABOLIC PANEL: CPT

## 2020-01-05 PROCEDURE — 25000003 PHARM REV CODE 250: Performed by: STUDENT IN AN ORGANIZED HEALTH CARE EDUCATION/TRAINING PROGRAM

## 2020-01-05 PROCEDURE — 63600175 PHARM REV CODE 636 W HCPCS: Performed by: PHYSICIAN ASSISTANT

## 2020-01-05 PROCEDURE — 97165 OT EVAL LOW COMPLEX 30 MIN: CPT

## 2020-01-05 PROCEDURE — 36415 COLL VENOUS BLD VENIPUNCTURE: CPT

## 2020-01-05 PROCEDURE — 85025 COMPLETE CBC W/AUTO DIFF WBC: CPT

## 2020-01-05 RX ORDER — LABETALOL HCL 20 MG/4 ML
20 SYRINGE (ML) INTRAVENOUS EVERY 4 HOURS PRN
Status: DISCONTINUED | OUTPATIENT
Start: 2020-01-05 | End: 2020-01-07 | Stop reason: HOSPADM

## 2020-01-05 RX ORDER — POTASSIUM CHLORIDE 750 MG/1
30 CAPSULE, EXTENDED RELEASE ORAL ONCE
Status: COMPLETED | OUTPATIENT
Start: 2020-01-05 | End: 2020-01-05

## 2020-01-05 RX ADMIN — POTASSIUM CHLORIDE 30 MEQ: 750 CAPSULE, EXTENDED RELEASE ORAL at 08:01

## 2020-01-05 RX ADMIN — PREGABALIN 100 MG: 50 CAPSULE ORAL at 08:01

## 2020-01-05 RX ADMIN — VALSARTAN 160 MG: 160 TABLET, FILM COATED ORAL at 08:01

## 2020-01-05 RX ADMIN — OXYCODONE HYDROCHLORIDE 5 MG: 5 TABLET ORAL at 04:01

## 2020-01-05 RX ADMIN — DOCUSATE SODIUM 50 MG: 50 CAPSULE, LIQUID FILLED ORAL at 08:01

## 2020-01-05 RX ADMIN — CEFTRIAXONE 2 G: 2 INJECTION, SOLUTION INTRAVENOUS at 12:01

## 2020-01-05 RX ADMIN — OXYCODONE HYDROCHLORIDE 5 MG: 5 TABLET ORAL at 01:01

## 2020-01-05 RX ADMIN — OXYCODONE HYDROCHLORIDE 5 MG: 5 TABLET ORAL at 08:01

## 2020-01-05 RX ADMIN — ASPIRIN 81 MG: 81 TABLET, COATED ORAL at 08:01

## 2020-01-05 RX ADMIN — CEFEPIME 2 G: 2 INJECTION, POWDER, FOR SOLUTION INTRAVENOUS at 04:01

## 2020-01-05 RX ADMIN — ATORVASTATIN CALCIUM 40 MG: 20 TABLET, FILM COATED ORAL at 08:01

## 2020-01-05 RX ADMIN — OXYCODONE HYDROCHLORIDE 5 MG: 5 TABLET ORAL at 12:01

## 2020-01-05 RX ADMIN — PREGABALIN 100 MG: 50 CAPSULE ORAL at 09:01

## 2020-01-05 NOTE — ASSESSMENT & PLAN NOTE
59 year old male with history of PAD s/p bilateral femoral endarterectomies December, 2019 c/b wound breakdown bilaterally. CT showed fluid collections bilaterally, L>R. S/p washout and flap left groin. Cultures with proteus mirabilis and group F strep.    Plan:  1. D/c cefepime and start rocephin 2 gram IV q 24 hours  2. Recommend 6 weeks of antibiotics for endovascular infection (estimated end date: 2/14/20); will need PICC line  3. Check weekly CBC, CMP, ESR, and CRP with results faxed to ID at [phone]  4. F/u with ID in clinic  5. Will sign off

## 2020-01-05 NOTE — SUBJECTIVE & OBJECTIVE
Past Medical History:   Diagnosis Date    Diabetes mellitus     H/O brain tumor     HLD (hyperlipidemia)     Hypertension     PAD (peripheral artery disease)     Seizures     R/T BRAIN TUMOR- SURGICALLY EXCISED       Past Surgical History:   Procedure Laterality Date    APPLICATION OF WOUND VACUUM-ASSISTED CLOSURE DEVICE Bilateral 2020    Procedure: APPLICATION, WOUND VAC;  Surgeon: SEBAS Prieto II, MD;  Location: [facility] OR [address] [place] [address];  Service: Cardiovascular;  Laterality: Bilateral;    BRAIN SURGERY  2011    TUMOR EXCISED    ENDARTERECTOMY OF FEMORAL ARTERY Bilateral 2019    Procedure: ENDARTERECTOMY, FEMORAL;  Surgeon: Kelby Gomez MD;  Location: [facility] OR [address] [place] [address];  Service: Peripheral Vascular;  Laterality: Bilateral;  natalya common femoral endarterectomy with natalya. iliac stent placement    PERCUTANEOUS TRANSLUMINAL ANGIOPLASTY (PTA) OF PERIPHERAL VESSEL Left 10/17/2019    Procedure: PTA, PERIPHERAL VESSEL;  Surgeon: Rao Fisher MD;  Location: [facility] CATH;  Service: Cardiology;  Laterality: Left;       Review of patient's allergies indicates:   Allergen Reactions    Penicillins Hives     Patient currently on cefepime without complications       Medications:  Medications Prior to Admission   Medication Sig    aspirin (ECOTRIN) 81 MG EC tablet Take 81 mg by mouth once daily.    atorvastatin (LIPITOR) 40 MG tablet Take 40 mg by mouth once daily.    cetirizine 10 mg chewable tablet Take 10 mg by mouth as needed.    cilostazol (PLETAL) 100 MG Tab Take 100 mg by mouth 2 (two) times daily.     [] clindamycin (CLEOCIN) 300 MG capsule Take 1 capsule (300 mg total) by mouth every 6 (six) hours. for 7 days    clindamycin (CLEOCIN) 300 MG capsule Take 1 capsule by mouth every 6 hours for 7 days starting today    clopidogrel (PLAVIX) 75 mg tablet Take 1 tablet (75 mg total) by mouth once daily.    coenzyme Q10 10 mg capsule Take 10 mg by mouth once daily.    glyBURIDE-metformin 2.5-500  mg (GLUCOVANCE) 2.5-500 mg per tablet Take 1 tablet by mouth 2 (two) times daily with meals.    HYDROcodone-acetaminophen (NORCO) 5-325 mg per tablet Take 1 tablet by mouth every 6 (six) hours as needed for Pain.    magnesium chloride (SLOW-MAG) 71.5 mg TbEC Take 2 tablets by mouth once.    mupirocin (BACTROBAN) 2 % ointment Apply topically 2 (two) times daily. Swab inside each nare twice a day beginning 2 days before surgery.    oxyCODONE (ROXICODONE) 5 MG immediate release tablet Take 1 tablet (5 mg total) by mouth every 4 (four) hours as needed.    pregabalin (LYRICA) 100 MG capsule Take 100 mg by mouth 2 (two) times daily.    valsartan (DIOVAN) 160 MG tablet Take 160 mg by mouth once daily.     Antibiotics (From admission, onward)    Start     Stop Route Frequency Ordered    20 1130  cefTRIAXone (ROCEPHIN) 2 g in dextrose 5 % 50 mL IVPB      -- IV Every 24 hours (non-standard times) 20 1017        Antifungals (From admission, onward)    None        Antivirals (From admission, onward)    None           There is no immunization history for the selected administration types on file for this patient.    Family History     Problem Relation (Age of Onset)    Cancer Father, Brother    Diabetes Mother    Heart disease Father, Brother, Brother, Brother    Hypertension Mother    Stroke Mother        Social History     Socioeconomic History    Marital status:      Spouse name: Not on file    Number of children: Not on file    Years of education: Not on file    Highest education level: Not on file   Occupational History    Not on file   Social Needs    Financial resource strain: Not on file    Food insecurity:     Worry: Not on file     Inability: Not on file    Transportation needs:     Medical: Not on file     Non-medical: Not on file   Tobacco Use    Smoking status: Former Smoker     Packs/day: 2.00     Types: Cigarettes     Last attempt to quit: 2011     Years since quittin.9     Smokeless tobacco: Never Used   Substance and Sexual Activity    Alcohol use: Yes     Comment: 2-4 PER DAY    Drug use: Never    Sexual activity: Not on file   Lifestyle    Physical activity:     Days per week: Not on file     Minutes per session: Not on file    Stress: Not on file   Relationships    Social connections:     Talks on phone: Not on file     Gets together: Not on file     Attends Anabaptist service: Not on file     Active member of club or organization: Not on file     Attends meetings of clubs or organizations: Not on file     Relationship status: Not on file   Other Topics Concern    Not on file   Social History Narrative    Not on file     Review of Systems   Constitutional: Negative for chills and fever.   Respiratory: Negative for shortness of breath.    Cardiovascular: Negative for chest pain.   Gastrointestinal: Negative for abdominal pain.   Skin: Positive for wound. Negative for rash.   Neurological: Negative for dizziness, weakness and numbness.     Objective:     Vital Signs (Most Recent):  Temp: 97.2 °F (36.2 °C) (01/05/20 0711)  Pulse: 73 (01/05/20 0711)  Resp: 18 (01/05/20 0711)  BP: (!) 183/91 (01/05/20 0711)  SpO2: 97 % (01/05/20 0711) Vital Signs (24h Range):  Temp:  [97.2 °F (36.2 °C)-100.1 °F (37.8 °C)] 97.2 °F (36.2 °C)  Pulse:  [71-86] 73  Resp:  [17-20] 18  SpO2:  [95 %-98 %] 97 %  BP: (139-183)/(77-91) 183/91     Weight: 120.5 kg (265 lb 10.5 oz)  Body mass index is 37.05 kg/m².    Estimated Creatinine Clearance: 153.8 mL/min (based on SCr of 0.7 mg/dL).    Physical Exam   Constitutional: He is oriented to person, place, and time. He appears well-developed and well-nourished. No distress.   Cardiovascular: Normal rate and regular rhythm.   No murmur heard.  Pulmonary/Chest: Effort normal and breath sounds normal. No respiratory distress.   Abdominal: Soft. Bowel sounds are normal. He exhibits no distension.   Musculoskeletal: Normal range of motion. He exhibits no  edema.   Neurological: He is alert and oriented to person, place, and time.   Skin: Skin is warm and dry.        Psychiatric: He has a normal mood and affect. His behavior is normal.       Significant Labs:   Blood Culture: No results for input(s): LABBLOO in the last 4320 hours.  CBC:   Recent Labs   Lab 01/04/20 0456 01/05/20  0352   WBC 10.01 8.75   HGB 12.1* 12.6*   HCT 37.0* 37.1*    298     CMP:   Recent Labs   Lab 01/04/20 0456 01/05/20  0352    138   K 3.9 3.7    100   CO2 26 31*   * 120*   BUN 10 9   CREATININE 0.7 0.7   CALCIUM 8.9 9.1   PROT 6.0 6.8   ALBUMIN 2.3* 2.5*   BILITOT 0.7 0.6   ALKPHOS 41* 46*   AST 20 26   ALT 21 24   ANIONGAP 8 7*   EGFRNONAA >60.0 >60.0     Wound Culture:   Recent Labs   Lab 01/01/20  1012   LABAERO PROTEUS MIRABILIS  Few  *  STREPTOCOCCUS GROUP F  Few  Beta-hemolytic streptococci are routinely susceptible to   penicillins,cephalosporins and carbapenems.  Susceptibility testing not routinely performed  *       Significant Imaging: I have reviewed all pertinent imaging results/findings within the past 24 hours.

## 2020-01-05 NOTE — PLAN OF CARE
Problem: Occupational Therapy Goal  Goal: Occupational Therapy Goal  Description  Goals to be met by: 1/12/20     Patient will increase functional independence with ADLs by performing:    LE Dressing with Stand-by Assistance and Assistive Devices as needed.  Toileting from bedside commode with SBA  for hygiene and clothing management.   Toilet transfer to bedside commode with Modified [place].     Outcome: Ongoing, Progressing   OT eval completed, and above goals established. JESSIE Chaudhary  1/5/2020

## 2020-01-05 NOTE — PLAN OF CARE
[facility]    HOME HEALTH ORDERS  FACE TO FACE ENCOUNTER    Patient Name: Renan Britton  YOB: 1960    PCP: Eren Becker MD   PCP Address: [facility] [address] [place] [address]  PCP Phone Number: [phone]  PCP Fax: [phone]    Encounter Date: 01/05/2020    Admit to Home Health    Diagnoses:  Active Hospital Problems    Diagnosis  POA    *Surgical wound breakdown [T81.31XA]  Yes      Resolved Hospital Problems   No resolved problems to display.       Future Appointments   Date Time Provider Department Center   1/22/2020  1:00 PM VASCULAR, LAB [facility]   1/22/2020  2:00 PM VASCULAR, LAB [facility]   1/22/2020  2:30 PM Kelby Gomez MD [facility]           I have seen and examined this patient face to face today. My clinical findings that support the need for the home health skilled services and home bound status are the following:  Weakness/numbness causing balance and gait disturbance due to Surgery making it taxing to leave home.    Allergies:  Review of patient's allergies indicates:   Allergen Reactions    Penicillins Hives     Patient currently on cefepime without complications       Diet: diabetic diet: 2000 calorie    Activities: activity as tolerated and may flex at the hip three times per day for no more than 30min    Nursing:   SN to complete comprehensive assessment including routine vital signs. Instruct on disease process and s/s of complications to report to MD. Review/verify medication list sent home with the patient at time of discharge  and instruct patient/caregiver as needed. Frequency may be adjusted depending on start of care date. If patient has enteral feeding tube (NG, PEG, J-tube, G-tube), flush tube before and after feeding and/or medication administration with 20-30 mL of water.    Notify MD if SBP > 160 or < 90; DBP > 90 or < 50; HR > 120 or < 50; Temp > 101       CONSULTS:    Physical Therapy to  evaluate and treat. Evaluate for home safety and equipment needs; Establish/upgrade home exercise program. Perform / instruct on therapeutic exercises, gait training, transfer training, and Range of Motion.  Occupational Therapy to evaluate and treat. Evaluate home environment for safety and equipment needs. Perform/Instruct on transfers, ADL training, ROM, and therapeutic exercises.   to evaluate for community resources/long-range planning.    MISCELLANEOUS CARE:  LABS:   SN to perform labs:  CBC, CMP, ESR and CRP;  Frequency: Weekly ; Duration: 6 week(s). Please fax results to [phone].    HOME INFUSION THERAPY:   SN to perform Infusion Therapy/Central Line Care.  Review Central Line Care & Central Line Flush with patient.    Administer (drug and dose): rocephin 2 gram IV q 24 hours   Anticipated course: Recommend 6 weeks of antibiotics for endovascular infection (estimated end date: 2/14/20)    Scrub the Hub: Prior to accessing the line, perform hand hygiene and always perform a 15-30 second chlor-hexadine scrub  Each lumen of the central line is to be flushed at least daily with 10 mL Normal Saline and 3 mL Heparin flush (100 units/mL)  Skilled Nurse (SN) may draw blood from IV access  Blood Draw Procedure:   - Aspirate at least 10 mL of blood   - Discard   - Obtain specimen   - Change posiflow cap   - Flush with 10 mL Normal Saline followed by a                 3-5 mL Heparin flush (100 units/mL)  Central :   - Sterile dressing changes are done weekly and as needed.   - Use chlor-hexadine scrub to cleanse site, apply Biopatch to insertion site,       apply securement device dressing   - Posi-flow caps are changed weekly and after EVERY lab draw.   - If sterile gauze is under dressing to control oozing,                 dressing change must be performed every 24 hours until gauze is not needed.    WOUND CARE ORDERS  yes:  If drain exists assess drain site for signs and symptoms of  infection. Empty drain and record output daily and PRN. Monitor for signs and symptoms of infection. Report as necessary. For peripheral wounds, wound spray or saline for wound(s) cleaning with all dressing changes. SN to assess wound(s) with each visit.  Instruct/teach patient/caregiver on wound care protocol.   Surgical Wound:  Location: left groin    Consult ET nurse        Apply the following to wound:   Other: leave left groin incision open to air - keep covered as needed if drainage present (frequency) and Wound Vac:    Location:  Right groin           Foam type: Black        Dressing changes every Monday, Wednesday and Friday.        If foam dressing loses suction, instruct family on wet to dry gauze until next nurse visit.        ET Consult      Medications: Review discharge medications with patient and family and provide education.      Current Discharge Medication List      CONTINUE these medications which have NOT CHANGED    Details   aspirin (ECOTRIN) 81 MG EC tablet Take 81 mg by mouth once daily.      atorvastatin (LIPITOR) 40 MG tablet Take 40 mg by mouth once daily.      cetirizine 10 mg chewable tablet Take 10 mg by mouth as needed.      cilostazol (PLETAL) 100 MG Tab Take 100 mg by mouth 2 (two) times daily.       clindamycin (CLEOCIN) 300 MG capsule Take 1 capsule by mouth every 6 hours for 7 days starting today  Qty: 28 capsule, Refills: 0      clopidogrel (PLAVIX) 75 mg tablet Take 1 tablet (75 mg total) by mouth once daily.  Qty: 30 tablet, Refills: 11      coenzyme Q10 10 mg capsule Take 10 mg by mouth once daily.      glyBURIDE-metformin 2.5-500 mg (GLUCOVANCE) 2.5-500 mg per tablet Take 1 tablet by mouth 2 (two) times daily with meals.      HYDROcodone-acetaminophen (NORCO) 5-325 mg per tablet Take 1 tablet by mouth every 6 (six) hours as needed for Pain.  Qty: 30 tablet, Refills: 0    Comments: Quantity prescribed more than 7 day supply? No  Associated Diagnoses: PAD (peripheral artery  disease)      magnesium chloride (SLOW-MAG) 71.5 mg TbEC Take 2 tablets by mouth once.      mupirocin (BACTROBAN) 2 % ointment Apply topically 2 (two) times daily. Swab inside each nare twice a day beginning 2 days before surgery.  Qty: 15 g, Refills: 0    Associated Diagnoses: PVD (peripheral vascular disease)      oxyCODONE (ROXICODONE) 5 MG immediate release tablet Take 1 tablet (5 mg total) by mouth every 4 (four) hours as needed.  Qty: 40 tablet, Refills: 0    Comments: Quantity prescribed more than 7 day supply? No      pregabalin (LYRICA) 100 MG capsule Take 100 mg by mouth 2 (two) times daily.      valsartan (DIOVAN) 160 MG tablet Take 160 mg by mouth once daily.             I certify that this patient is confined to his home and needs intermittent skilled nursing care, physical therapy and occupational therapy.    Adelina Arboleda MD  Pager: [phone]  General Surgery PGY-III  [facility]

## 2020-01-05 NOTE — PLAN OF CARE
POC reviewed with pt and pt's spouse, both verbalized understanding. NV Q2H. VSS on room air. Valsartan restarted. Pt remains flat, turning with pillow support. Pain controlled with prn pain meds. Flap checks Q2H, strong pulses. Sutures intact to flap, serosang drainage from flap when pt OOB to bedside commode, drainage controlled with gauze dressing. No skin breakdown. Spouse at bedside. Tolerating diet. BG Q6H, no coverage needed. Voiding per urinal. + flatus, no BM.

## 2020-01-05 NOTE — CONSULTS
[facility]  Infectious Disease  Consult Note    Patient Name: Renan Britton  MRN: [id number]  Admission Date: 12/31/2019  Hospital Length of Stay: 5 days  Attending Physician: SEBAS Prieto II, MD  Primary Care Provider: Eren Becker MD     Isolation Status: No active isolations    Patient information was obtained from patient and past medical records.      Inpatient consult to Infectious Diseases  Consult performed by: SRAVANI Vasquez  Consult ordered by: Adelina Arboleda MD        Assessment/Plan:     * Surgical wound breakdown  59 year old male with history of PAD s/p bilateral femoral endarterectomies December, 2019 c/b wound breakdown bilaterally. CT showed fluid collections bilaterally, L>R. S/p washout and flap left groin. Cultures with proteus mirabilis and group F strep.    Plan:  1. D/c cefepime and start rocephin 2 gram IV q 24 hours  2. Recommend 6 weeks of antibiotics for endovascular infection (estimated end date: 2/14/20); will need PICC line  3. Check weekly CBC, CMP, ESR, and CRP with results faxed to ID at [phone]  4. F/u with ID in clinic  5. Will sign off        Thank you for your consult. I will sign off. Please contact us if you have any additional questions.    SRAVANI Knox Pager: [phone]    Infectious Disease  [facility]    Subjective:     Principal Problem: Surgical wound breakdown    HPI: Patient is a 58 y/o male  with history of PAD s/p   bilateral femoral endarterectomy with Dr. Gomez 12/20/19.  His post op course at that time was uncomplicated and he was discharged on 12/24/19.  However on 12/27/19 the patient returned to the ED with concerns of redness and pain at the groin incisions sites, left worse than the right.  He was sent home at that time with clindamycin and had a follow up appointment the next Monday.   he had a CT scan that showed bilateral fluid collections at groin incision sites. He was admitted and is now  s/p washout of both sides with superficial debridement on right and s/p rectus flap on left with plastics. Cultures show proteus and group F strep.     Pt is afebrile without leukocytosis. He is currently on cefepime. ID consulted for further recs.    Past Medical History:   Diagnosis Date    Diabetes mellitus     H/O brain tumor     HLD (hyperlipidemia)     Hypertension     PAD (peripheral artery disease)     Seizures     R/T BRAIN TUMOR- SURGICALLY EXCISED       Past Surgical History:   Procedure Laterality Date    APPLICATION OF WOUND VACUUM-ASSISTED CLOSURE DEVICE Bilateral 2020    Procedure: APPLICATION, WOUND VAC;  Surgeon: SEBAS Prieto II, MD;  Location: [facility] OR [address] [place] [address];  Service: Cardiovascular;  Laterality: Bilateral;    BRAIN SURGERY  2011    TUMOR EXCISED    ENDARTERECTOMY OF FEMORAL ARTERY Bilateral 2019    Procedure: ENDARTERECTOMY, FEMORAL;  Surgeon: Kelby Gomez MD;  Location: [facility] OR [address] [place] [address];  Service: Peripheral Vascular;  Laterality: Bilateral;  natalya common femoral endarterectomy with natalya. iliac stent placement    PERCUTANEOUS TRANSLUMINAL ANGIOPLASTY (PTA) OF PERIPHERAL VESSEL Left 10/17/2019    Procedure: PTA, PERIPHERAL VESSEL;  Surgeon: Rao Fisher MD;  Location: [facility] CATH;  Service: Cardiology;  Laterality: Left;       Review of patient's allergies indicates:   Allergen Reactions    Penicillins Hives     Patient currently on cefepime without complications       Medications:  Medications Prior to Admission   Medication Sig    aspirin (ECOTRIN) 81 MG EC tablet Take 81 mg by mouth once daily.    atorvastatin (LIPITOR) 40 MG tablet Take 40 mg by mouth once daily.    cetirizine 10 mg chewable tablet Take 10 mg by mouth as needed.    cilostazol (PLETAL) 100 MG Tab Take 100 mg by mouth 2 (two) times daily.     [] clindamycin (CLEOCIN) 300 MG capsule Take 1 capsule (300 mg total) by mouth every 6 (six) hours. for 7 days    clindamycin (CLEOCIN) 300 MG  capsule Take 1 capsule by mouth every 6 hours for 7 days starting today    clopidogrel (PLAVIX) 75 mg tablet Take 1 tablet (75 mg total) by mouth once daily.    coenzyme Q10 10 mg capsule Take 10 mg by mouth once daily.    glyBURIDE-metformin 2.5-500 mg (GLUCOVANCE) 2.5-500 mg per tablet Take 1 tablet by mouth 2 (two) times daily with meals.    HYDROcodone-acetaminophen (NORCO) 5-325 mg per tablet Take 1 tablet by mouth every 6 (six) hours as needed for Pain.    magnesium chloride (SLOW-MAG) 71.5 mg TbEC Take 2 tablets by mouth once.    mupirocin (BACTROBAN) 2 % ointment Apply topically 2 (two) times daily. Swab inside each nare twice a day beginning 2 days before surgery.    oxyCODONE (ROXICODONE) 5 MG immediate release tablet Take 1 tablet (5 mg total) by mouth every 4 (four) hours as needed.    pregabalin (LYRICA) 100 MG capsule Take 100 mg by mouth 2 (two) times daily.    valsartan (DIOVAN) 160 MG tablet Take 160 mg by mouth once daily.     Antibiotics (From admission, onward)    Start     Stop Route Frequency Ordered    01/05/20 1130  cefTRIAXone (ROCEPHIN) 2 g in dextrose 5 % 50 mL IVPB      -- IV Every 24 hours (non-standard times) 01/05/20 1017        Antifungals (From admission, onward)    None        Antivirals (From admission, onward)    None           There is no immunization history for the selected administration types on file for this patient.    Family History     Problem Relation (Age of Onset)    Cancer Father, Brother    Diabetes Mother    Heart disease Father, Brother, Brother, Brother    Hypertension Mother    Stroke Mother        Social History     Socioeconomic History    Marital status:      Spouse name: Not on file    Number of children: Not on file    Years of education: Not on file    Highest education level: Not on file   Occupational History    Not on file   Social Needs    Financial resource strain: Not on file    Food insecurity:     Worry: Not on file      Inability: Not on file    Transportation needs:     Medical: Not on file     Non-medical: Not on file   Tobacco Use    Smoking status: Former Smoker     Packs/day: 2.00     Types: Cigarettes     Last attempt to quit: 2011     Years since quittin.9    Smokeless tobacco: Never Used   Substance and Sexual Activity    Alcohol use: Yes     Comment: 2-4 PER DAY    Drug use: Never    Sexual activity: Not on file   Lifestyle    Physical activity:     Days per week: Not on file     Minutes per session: Not on file    Stress: Not on file   Relationships    Social connections:     Talks on phone: Not on file     Gets together: Not on file     Attends Sikhism service: Not on file     Active member of club or organization: Not on file     Attends meetings of clubs or organizations: Not on file     Relationship status: Not on file   Other Topics Concern    Not on file   Social History Narrative    Not on file     Review of Systems   Constitutional: Negative for chills and fever.   Respiratory: Negative for shortness of breath.    Cardiovascular: Negative for chest pain.   Gastrointestinal: Negative for abdominal pain.   Skin: Positive for wound. Negative for rash.   Neurological: Negative for dizziness, weakness and numbness.     Objective:     Vital Signs (Most Recent):  Temp: 97.2 °F (36.2 °C) (20)  Pulse: 73 (20)  Resp: 18 (20)  BP: (!) 183/91 (20)  SpO2: 97 % (20) Vital Signs (24h Range):  Temp:  [97.2 °F (36.2 °C)-100.1 °F (37.8 °C)] 97.2 °F (36.2 °C)  Pulse:  [71-86] 73  Resp:  [17-20] 18  SpO2:  [95 %-98 %] 97 %  BP: (139-183)/(77-91) 183/91     Weight: 120.5 kg (265 lb 10.5 oz)  Body mass index is 37.05 kg/m².    Estimated Creatinine Clearance: 153.8 mL/min (based on SCr of 0.7 mg/dL).    Physical Exam   Constitutional: He is oriented to person, place, and time. He appears well-developed and well-nourished. No distress.   Cardiovascular: Normal  rate and regular rhythm.   No murmur heard.  Pulmonary/Chest: Effort normal and breath sounds normal. No respiratory distress.   Abdominal: Soft. Bowel sounds are normal. He exhibits no distension.   Musculoskeletal: Normal range of motion. He exhibits no edema.   Neurological: He is alert and oriented to person, place, and time.   Skin: Skin is warm and dry.        Psychiatric: He has a normal mood and affect. His behavior is normal.       Significant Labs:   Blood Culture: No results for input(s): LABBLOO in the last 4320 hours.  CBC:   Recent Labs   Lab 01/04/20 0456 01/05/20  0352   WBC 10.01 8.75   HGB 12.1* 12.6*   HCT 37.0* 37.1*    298     CMP:   Recent Labs   Lab 01/04/20 0456 01/05/20  0352    138   K 3.9 3.7    100   CO2 26 31*   * 120*   BUN 10 9   CREATININE 0.7 0.7   CALCIUM 8.9 9.1   PROT 6.0 6.8   ALBUMIN 2.3* 2.5*   BILITOT 0.7 0.6   ALKPHOS 41* 46*   AST 20 26   ALT 21 24   ANIONGAP 8 7*   EGFRNONAA >60.0 >60.0     Wound Culture:   Recent Labs   Lab 01/01/20  1012   LABAERO PROTEUS MIRABILIS  Few  *  STREPTOCOCCUS GROUP F  Few  Beta-hemolytic streptococci are routinely susceptible to   penicillins,cephalosporins and carbapenems.  Susceptibility testing not routinely performed  *       Significant Imaging: I have reviewed all pertinent imaging results/findings within the past 24 hours.

## 2020-01-05 NOTE — HPI
Patient is a 60 y/o male with history of PAD s/p  bilateral femoral endarterectomy with Dr. Gomez 12/20/19.  His post op course at that time was uncomplicated and he was discharged on 12/24/19.  However on 12/27/19 the patient returned to the ED with concerns of redness and pain at the groin incisions sites, left worse than the right.  He was sent home at that time with clindamycin and had a follow up appointment the next Monday.  he had a CT scan that showed bilateral fluid collections at groin incision sites. He was admitted and is now s/p washout of both sides with superficial debridement on right and s/p rectus flap on left with plastics. Cultures show proteus and group F strep.     Pt is afebrile without leukocytosis. He is currently on cefepime. ID consulted for further recs.

## 2020-01-05 NOTE — ASSESSMENT & PLAN NOTE
58yo male with hx of PAD s/p bilateral femoral endarterectomies on 12/20 now with bilateral wound breakdown and concern for infected fluid collection L groin now s/p bilateral groin washout with superficial debridement of R groin and placement of bilateral wound vacs 1/1/20 now s/p L rectus muscle flap per plastics on 1/3    - diabetic diet  - wound vac in place to R groin - keep to 125mmHg suction, plan for vac change per wound care nursing (consult placed) three times per week starting tomorrow  - q2hr flap checks per plastic surgery on the L, strip and record drain output; keep flap per plastics - appreciate their assistance; wound care per plastics on the L  - continue home meds - lyrica, daily aspirin 91mg, statin  - SSI  - hold plavix pending plastic surgery recommendations - restart pending their recommendations  - PT/OT - only restriction is to avoid hip flexion  - daily labs  - intraoperative cultures positive for proteus - pan sensitive; continue cefepime, ID consult for eval for need for IV antibiotics    Dispo: continue care in PCU for q2 flap checks

## 2020-01-05 NOTE — PROGRESS NOTES
[facility]  Vascular Surgery  Progress Note    Patient Name: Renan Britton  MRN: [id number]  Admission Date: 12/31/2019  Primary Care Provider: Eren Becker MD    Subjective:     Interval History: No acute events overnight. R groin wound vac to adequate suction. Pain well controlled. Has been able to get up and ambulate. PT recommending home health. Continues on q2hr flap checks.    Post-Op Info:  Procedure(s) (LRB):  WASHOUT (Bilateral)  CREATION, FLAP, MUSCLE ROTATION (N/A)  APPLICATION, WOUND VAC (N/A)   2 Days Post-Op       Medications:  Continuous Infusions:    Scheduled Meds:   aspirin  81 mg Oral Daily    atorvastatin  40 mg Oral Daily    ceFEPime (MAXIPIME) IVPB  2 g Intravenous Q8H    docusate sodium  50 mg Oral Daily    lidocaine (PF) 10 mg/ml (1%)  1 mL Other Once    pregabalin  100 mg Oral BID    valsartan  160 mg Oral Daily     PRN Meds:acetaminophen, acetaminophen, Dextrose 10% Bolus, glucagon (human recombinant), HYDROmorphone, influenza, insulin aspart U-100, melatonin, ondansetron, oxyCODONE, oxyCODONE, sodium chloride 0.9%     Objective:     Vital Signs (Most Recent):  Temp: 97.2 °F (36.2 °C) (01/05/20 0711)  Pulse: 73 (01/05/20 0711)  Resp: 18 (01/05/20 0711)  BP: (!) 183/91 (01/05/20 0711)  SpO2: 97 % (01/05/20 0711) Vital Signs (24h Range):  Temp:  [97.2 °F (36.2 °C)-100.1 °F (37.8 °C)] 97.2 °F (36.2 °C)  Pulse:  [71-86] 73  Resp:  [17-20] 18  SpO2:  [95 %-98 %] 97 %  BP: (139-183)/(77-91) 183/91         Physical Exam   Constitutional: He is oriented to person, place, and time. He appears well-developed and well-nourished.   HENT:   Head: Normocephalic and atraumatic.   Cardiovascular: Normal rate.   Pulmonary/Chest: Effort normal.   Abdominal: Soft.   Musculoskeletal:   L groin flap clean/dry/intact; L leg lateral incision covered with dressing - not saturated; two drains in place with serosanguinous output    R groin with wound vac in place to adequate  suction    2+ L PT, monophasic L DP  Monophasic R PT, monophasic R DP   Neurological: He is alert and oriented to person, place, and time.   Skin: Skin is warm and dry.   Nursing note and vitals reviewed.      Significant Labs:  BMP:   Recent Labs   Lab 01/05/20  0352   *      K 3.7      CO2 31*   BUN 9   CREATININE 0.7   CALCIUM 9.1   MG 2.1     CBC:   Recent Labs   Lab 01/05/20  0352   WBC 8.75   RBC 4.07*   HGB 12.6*   HCT 37.1*      MCV 91   MCH 31.0   MCHC 34.0       Significant Diagnostics:  I have reviewed all pertinent imaging results/findings within the past 24 hours.    Assessment/Plan:     * Surgical wound breakdown  60yo male with hx of PAD s/p bilateral femoral endarterectomies on 12/20 now with bilateral wound breakdown and concern for infected fluid collection L groin now s/p bilateral groin washout with superficial debridement of R groin and placement of bilateral wound vacs 1/1/20 now s/p L rectus muscle flap per plastics on 1/3    - diabetic diet  - wound vac in place to R groin - keep to 125mmHg suction, plan for vac change per wound care nursing (consult placed) three times per week starting tomorrow  - q2hr flap checks per plastic surgery on the L, strip and record drain output; keep flap per plastics - appreciate their assistance; wound care per plastics on the L  - continue home meds - lyrica, daily aspirin 91mg, statin  - SSI  - hold plavix pending plastic surgery recommendations - restart pending their recommendations  - PT/OT - only restriction is to avoid hip flexion  - daily labs  - intraoperative cultures positive for proteus - pan sensitive; continue cefepime, ID consult for eval for need for IV antibiotics    Dispo: continue care in PCU for q2 flap checks      Adelina Arboleda MD  Vascular Surgery  [facility]

## 2020-01-05 NOTE — PT/OT/SLP EVAL
"Occupational Therapy   Evaluation    Name: Renan Britton  MRN: [id number]  Admitting Diagnosis:  Surgical wound breakdown 2 Days Post-Op    Recommendations:     Discharge Recommendations: home health OT, home health PT  Discharge Equipment Recommendations:  walker, rolling, bedside commode  Barriers to discharge:  None    Assessment:     Renan Britton is a 59 y.o. male with a medical diagnosis of Surgical wound breakdown.  He presents with s/p washout, rectus flap to groin and wound vac placement . Performance deficits affecting function: impaired self care skills, impaired functional mobilty, impaired cardiopulmonary response to activity, pain, impaired skin.      Rehab Prognosis: Good; patient would benefit from acute skilled OT services to address these deficits and reach maximum level of function.       Plan:     Patient to be seen 3 x/week to address the above listed problems via self-care/home management, therapeutic activities, therapeutic exercises  · Plan of Care Expires: 02/04/20  · Plan of Care Reviewed with: patient, spouse    Subjective     Chief Complaint: "They said it is going to drain."  Patient/Family Comments/goals: to get better and go home    Occupational Profile:  Living Environment: lives with spouse in 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  Patient found supine in reverse trendelenburg with wound vac, telemetry, KATHLEEN drain, peripheral IV upon OT entry to room.    General Precautions: Standard, fall(avoid hip flexion; no sitting longer than 30 min )   Orthopedic Precautions:    Braces:   "     Occupational Performance:    Bed Mobility:    · Patient completed Supine to Sit with minimum assistance and with side rail  · Patient completed Sit to Supine with minimum assistance and with side rail    Functional Mobility/Transfers:  · Patient completed Sit <> Stand Transfer with contact guard assistance  with  rolling walker   · Functional Mobility: SBA using RW around room; limited 2* wound drainage    Activities of Daily Living:  · Feeding:  independence    · Grooming: set-up A supine in bed    · Upper Body Dressing: stand by assistance    · Lower Body Dressing: total assistance    · Toileting: minimum assistance      Cognitive/Visual Perceptual:  Oriented to: Person, Place, Time and Situation  Follows Commands/attention: Follows multistep  commands  Communication: clear/fluent  Memory:  No Deficits noted  Safety awareness/insight to disability: intact  Coping skills/emotional control: Appropriate to situation    Physical Exam:  Postural examination/scapula alignment:    -       No postural abnormalities identified  Sensation:    -       Intact  Upper Extremity Range of Motion:     -       Right Upper Extremity: WFL  -       Left Upper Extremity: WFL  Upper Extremity Strength:    -       Right Upper Extremity: WFL  -       Left Upper Extremity: WFL   Strength:    -       Right Upper Extremity: WFL  -       Left Upper Extremity: WFL  Fine Motor Coordination:    -       Intact  Gross motor coordination:   WFL    AMPAC 6 Click ADL:  AMPAC Total Score: 18    Treatment & Education:  Pt ed on OT POC  Pt sat briefly at EOB with posterior lean to avoid hip flexion; minimal serosanguinous drainage note; wife/pt state that MD says this will happen  During functional mobility around room, fransisco blood draining from L groin incision  Pt returned to bed and RN notified  Pt/spouse ed on continued mobility around room when up to bedside commode once bandage reinforced  Education:    Patient left supine in reverse  trendelenburg with all lines intact, call button in reach, RN notified and spouse present    GOALS:   Multidisciplinary Problems     Occupational Therapy Goals        Problem: Occupational Therapy Goal    Goal Priority Disciplines Outcome Interventions   Occupational Therapy Goal     OT, PT/OT Ongoing, Progressing    Description:  Goals to be met by: 1/12/20     Patient will increase functional independence with ADLs by performing:    LE Dressing with Stand-by Assistance and Assistive Devices as needed.  Toileting from bedside commode with SBA  for hygiene and clothing management.   Toilet transfer to bedside commode with Modified [place].                      History:     Past Medical History:   Diagnosis Date    Diabetes mellitus     H/O brain tumor     HLD (hyperlipidemia)     Hypertension     PAD (peripheral artery disease)     Seizures     R/T BRAIN TUMOR- SURGICALLY EXCISED       Past Surgical History:   Procedure Laterality Date    APPLICATION OF WOUND VACUUM-ASSISTED CLOSURE DEVICE Bilateral 1/1/2020    Procedure: APPLICATION, WOUND VAC;  Surgeon: SEBAS Prieto II, MD;  Location: [facility] OR [address] [place] [address];  Service: Cardiovascular;  Laterality: Bilateral;    BRAIN SURGERY  01/2011    TUMOR EXCISED    ENDARTERECTOMY OF FEMORAL ARTERY Bilateral 12/20/2019    Procedure: ENDARTERECTOMY, FEMORAL;  Surgeon: Kelby Gomez MD;  Location: [facility] OR [address] [place] [address];  Service: Peripheral Vascular;  Laterality: Bilateral;  natalya common femoral endarterectomy with natalya. iliac stent placement    PERCUTANEOUS TRANSLUMINAL ANGIOPLASTY (PTA) OF PERIPHERAL VESSEL Left 10/17/2019    Procedure: PTA, PERIPHERAL VESSEL;  Surgeon: Rao Fisher MD;  Location: [facility] CATH;  Service: Cardiology;  Laterality: Left;       Time Tracking:     OT Date of Treatment: 01/05/20  OT Start Time: 1115  OT Stop Time: 1140  OT Total Time (min): 25 min    Billable Minutes:Evaluation 10  Therapeutic Activity 15    Kerry Forte  LOTR  1/5/2020

## 2020-01-05 NOTE — SUBJECTIVE & OBJECTIVE
Medications:  Continuous Infusions:    Scheduled Meds:   aspirin  81 mg Oral Daily    atorvastatin  40 mg Oral Daily    ceFEPime (MAXIPIME) IVPB  2 g Intravenous Q8H    docusate sodium  50 mg Oral Daily    lidocaine (PF) 10 mg/ml (1%)  1 mL Other Once    pregabalin  100 mg Oral BID    valsartan  160 mg Oral Daily     PRN Meds:acetaminophen, acetaminophen, Dextrose 10% Bolus, glucagon (human recombinant), HYDROmorphone, influenza, insulin aspart U-100, melatonin, ondansetron, oxyCODONE, oxyCODONE, sodium chloride 0.9%     Objective:     Vital Signs (Most Recent):  Temp: 97.2 °F (36.2 °C) (01/05/20 0711)  Pulse: 73 (01/05/20 0711)  Resp: 18 (01/05/20 0711)  BP: (!) 183/91 (01/05/20 0711)  SpO2: 97 % (01/05/20 0711) Vital Signs (24h Range):  Temp:  [97.2 °F (36.2 °C)-100.1 °F (37.8 °C)] 97.2 °F (36.2 °C)  Pulse:  [71-86] 73  Resp:  [17-20] 18  SpO2:  [95 %-98 %] 97 %  BP: (139-183)/(77-91) 183/91         Physical Exam   Constitutional: He is oriented to person, place, and time. He appears well-developed and well-nourished.   HENT:   Head: Normocephalic and atraumatic.   Cardiovascular: Normal rate.   Pulmonary/Chest: Effort normal.   Abdominal: Soft.   Musculoskeletal:   L groin flap clean/dry/intact; L leg lateral incision covered with dressing - not saturated; two drains in place with serosanguinous output    R groin with wound vac in place to adequate suction    2+ L PT, monophasic L DP  Monophasic R PT, monophasic R DP   Neurological: He is alert and oriented to person, place, and time.   Skin: Skin is warm and dry.   Nursing note and vitals reviewed.      Significant Labs:  BMP:   Recent Labs   Lab 01/05/20  0352   *      K 3.7      CO2 31*   BUN 9   CREATININE 0.7   CALCIUM 9.1   MG 2.1     CBC:   Recent Labs   Lab 01/05/20  0352   WBC 8.75   RBC 4.07*   HGB 12.6*   HCT 37.1*      MCV 91   MCH 31.0   MCHC 34.0       Significant Diagnostics:  I have reviewed all pertinent imaging  results/findings within the past 24 hours.

## 2020-01-06 LAB
ALBUMIN SERPL BCP-MCNC: 2.5 G/DL (ref 3.5–5.2)
ALP SERPL-CCNC: 43 U/L (ref 55–135)
ALT SERPL W/O P-5'-P-CCNC: 30 U/L (ref 10–44)
ANION GAP SERPL CALC-SCNC: 9 MMOL/L (ref 8–16)
AST SERPL-CCNC: 30 U/L (ref 10–40)
BACTERIA SPEC ANAEROBE CULT: NORMAL
BASOPHILS # BLD AUTO: 0.06 K/UL (ref 0–0.2)
BASOPHILS NFR BLD: 0.8 % (ref 0–1.9)
BILIRUB SERPL-MCNC: 0.5 MG/DL (ref 0.1–1)
BUN SERPL-MCNC: 10 MG/DL (ref 6–20)
CALCIUM SERPL-MCNC: 9.4 MG/DL (ref 8.7–10.5)
CHLORIDE SERPL-SCNC: 101 MMOL/L (ref 95–110)
CO2 SERPL-SCNC: 31 MMOL/L (ref 23–29)
CREAT SERPL-MCNC: 0.8 MG/DL (ref 0.5–1.4)
DIFFERENTIAL METHOD: ABNORMAL
EOSINOPHIL # BLD AUTO: 0.4 K/UL (ref 0–0.5)
EOSINOPHIL NFR BLD: 5.1 % (ref 0–8)
ERYTHROCYTE [DISTWIDTH] IN BLOOD BY AUTOMATED COUNT: 11.7 % (ref 11.5–14.5)
EST. GFR  (AFRICAN AMERICAN): >60 ML/MIN/1.73 M^2
EST. GFR  (NON AFRICAN AMERICAN): >60 ML/MIN/1.73 M^2
GLUCOSE SERPL-MCNC: 116 MG/DL (ref 70–110)
HCT VFR BLD AUTO: 38.2 % (ref 40–54)
HGB BLD-MCNC: 12.6 G/DL (ref 14–18)
IMM GRANULOCYTES # BLD AUTO: 0.04 K/UL (ref 0–0.04)
IMM GRANULOCYTES NFR BLD AUTO: 0.5 % (ref 0–0.5)
LYMPHOCYTES # BLD AUTO: 1.4 K/UL (ref 1–4.8)
LYMPHOCYTES NFR BLD: 17.8 % (ref 18–48)
MAGNESIUM SERPL-MCNC: 2.1 MG/DL (ref 1.6–2.6)
MCH RBC QN AUTO: 30.1 PG (ref 27–31)
MCHC RBC AUTO-ENTMCNC: 33 G/DL (ref 32–36)
MCV RBC AUTO: 91 FL (ref 82–98)
MONOCYTES # BLD AUTO: 0.7 K/UL (ref 0.3–1)
MONOCYTES NFR BLD: 8.5 % (ref 4–15)
NEUTROPHILS # BLD AUTO: 5.3 K/UL (ref 1.8–7.7)
NEUTROPHILS NFR BLD: 67.3 % (ref 38–73)
NRBC BLD-RTO: 0 /100 WBC
PHOSPHATE SERPL-MCNC: 3.9 MG/DL (ref 2.7–4.5)
PLATELET # BLD AUTO: 317 K/UL (ref 150–350)
PMV BLD AUTO: 9.3 FL (ref 9.2–12.9)
POCT GLUCOSE: 106 MG/DL (ref 70–110)
POCT GLUCOSE: 111 MG/DL (ref 70–110)
POCT GLUCOSE: 96 MG/DL (ref 70–110)
POTASSIUM SERPL-SCNC: 4.1 MMOL/L (ref 3.5–5.1)
PROT SERPL-MCNC: 6.8 G/DL (ref 6–8.4)
RBC # BLD AUTO: 4.19 M/UL (ref 4.6–6.2)
SODIUM SERPL-SCNC: 141 MMOL/L (ref 136–145)
WBC # BLD AUTO: 7.81 K/UL (ref 3.9–12.7)

## 2020-01-06 PROCEDURE — 63600175 PHARM REV CODE 636 W HCPCS: Performed by: STUDENT IN AN ORGANIZED HEALTH CARE EDUCATION/TRAINING PROGRAM

## 2020-01-06 PROCEDURE — 25000003 PHARM REV CODE 250: Performed by: STUDENT IN AN ORGANIZED HEALTH CARE EDUCATION/TRAINING PROGRAM

## 2020-01-06 PROCEDURE — 85025 COMPLETE CBC W/AUTO DIFF WBC: CPT

## 2020-01-06 PROCEDURE — 63600175 PHARM REV CODE 636 W HCPCS: Performed by: PHYSICIAN ASSISTANT

## 2020-01-06 PROCEDURE — 36415 COLL VENOUS BLD VENIPUNCTURE: CPT

## 2020-01-06 PROCEDURE — 97535 SELF CARE MNGMENT TRAINING: CPT

## 2020-01-06 PROCEDURE — 25000003 PHARM REV CODE 250: Performed by: SURGERY

## 2020-01-06 PROCEDURE — C1751 CATH, INF, PER/CENT/MIDLINE: HCPCS

## 2020-01-06 PROCEDURE — 94761 N-INVAS EAR/PLS OXIMETRY MLT: CPT

## 2020-01-06 PROCEDURE — 97530 THERAPEUTIC ACTIVITIES: CPT | Mod: CQ

## 2020-01-06 PROCEDURE — 76937 US GUIDE VASCULAR ACCESS: CPT

## 2020-01-06 PROCEDURE — 97116 GAIT TRAINING THERAPY: CPT | Mod: CQ

## 2020-01-06 PROCEDURE — 36573 INSJ PICC RS&I 5 YR+: CPT

## 2020-01-06 PROCEDURE — 83735 ASSAY OF MAGNESIUM: CPT

## 2020-01-06 PROCEDURE — 20600001 HC STEP DOWN PRIVATE ROOM

## 2020-01-06 PROCEDURE — 84100 ASSAY OF PHOSPHORUS: CPT

## 2020-01-06 PROCEDURE — A4216 STERILE WATER/SALINE, 10 ML: HCPCS | Performed by: SURGERY

## 2020-01-06 PROCEDURE — 80053 COMPREHEN METABOLIC PANEL: CPT

## 2020-01-06 RX ORDER — SODIUM CHLORIDE 0.9 % (FLUSH) 0.9 %
10 SYRINGE (ML) INJECTION EVERY 6 HOURS
Status: DISCONTINUED | OUTPATIENT
Start: 2020-01-06 | End: 2020-01-07 | Stop reason: HOSPADM

## 2020-01-06 RX ORDER — SODIUM CHLORIDE 0.9 % (FLUSH) 0.9 %
10 SYRINGE (ML) INJECTION
Status: DISCONTINUED | OUTPATIENT
Start: 2020-01-06 | End: 2020-01-07 | Stop reason: HOSPADM

## 2020-01-06 RX ADMIN — VALSARTAN 160 MG: 160 TABLET, FILM COATED ORAL at 09:01

## 2020-01-06 RX ADMIN — PREGABALIN 100 MG: 50 CAPSULE ORAL at 09:01

## 2020-01-06 RX ADMIN — OXYCODONE HYDROCHLORIDE 5 MG: 5 TABLET ORAL at 02:01

## 2020-01-06 RX ADMIN — CEFTRIAXONE 2 G: 2 INJECTION, SOLUTION INTRAVENOUS at 12:01

## 2020-01-06 RX ADMIN — OXYCODONE HYDROCHLORIDE 5 MG: 5 TABLET ORAL at 06:01

## 2020-01-06 RX ADMIN — Medication 10 ML: at 12:01

## 2020-01-06 RX ADMIN — DOCUSATE SODIUM 50 MG: 50 CAPSULE, LIQUID FILLED ORAL at 09:01

## 2020-01-06 RX ADMIN — ASPIRIN 81 MG: 81 TABLET, COATED ORAL at 09:01

## 2020-01-06 RX ADMIN — Medication 10 ML: at 06:01

## 2020-01-06 RX ADMIN — ATORVASTATIN CALCIUM 40 MG: 20 TABLET, FILM COATED ORAL at 09:01

## 2020-01-06 RX ADMIN — OXYCODONE HYDROCHLORIDE 5 MG: 5 TABLET ORAL at 09:01

## 2020-01-06 RX ADMIN — HYDROMORPHONE HYDROCHLORIDE 0.5 MG: 1 INJECTION, SOLUTION INTRAMUSCULAR; INTRAVENOUS; SUBCUTANEOUS at 06:01

## 2020-01-06 NOTE — PROGRESS NOTES
[facility]  Plastic Surgery  Progress Note    Subjective:     History of Present Illness:  Renan Britton is a 59 y.o. male with PMHx of bilateral lower extremity common femoral endarterectomy and Left iliac stent with Vascular surgery on 12/20/19. Patient had uneventful postop course and was discharged home. Patient presented to ER on 12/27 for bilateral groin cellulitis, was sent home with Clindamycin and f/u in Vascular clinic on 12/30. In clinic patient had new onset serous drainage from incision sites and skin necrosis. The wound was debrided in clinic and CT was ordered that showed bilateral fluid collections below incisions likely consistent with seromas. Patient denies fevers/chills, CP, SOB, abd pain, bowel/bladder changes, numbness/tingling or cold extremities.       Post-Op Info:  Procedure(s) (LRB):  WASHOUT (Bilateral)  CREATION, FLAP, MUSCLE ROTATION (N/A)  APPLICATION, WOUND VAC (N/A)   3 Days Post-Op     Interval History: NOAH. KAILEE. PICC line today for home IV antibiotics    Medications:  Continuous Infusions:    Scheduled Meds:   aspirin  81 mg Oral Daily    atorvastatin  40 mg Oral Daily    cefTRIAXone (ROCEPHIN) IVPB  2 g Intravenous Q24H    docusate sodium  50 mg Oral Daily    lidocaine (PF) 10 mg/ml (1%)  1 mL Other Once    pregabalin  100 mg Oral BID    valsartan  160 mg Oral Daily     PRN Meds:acetaminophen, acetaminophen, Dextrose 10% Bolus, glucagon (human recombinant), HYDROmorphone, influenza, insulin aspart U-100, labetalol, melatonin, ondansetron, oxyCODONE, oxyCODONE, sodium chloride 0.9%     Review of patient's allergies indicates:   Allergen Reactions    Penicillins Hives     Patient currently on cefepime without complications     Objective:     Vital Signs (Most Recent):  Temp: 96.4 °F (35.8 °C) (01/06/20 0340)  Pulse: 75 (01/06/20 0340)  Resp: 17 (01/06/20 0340)  BP: (!) 160/87 (01/06/20 0340)  SpO2: 99 % (01/06/20 0340) Vital Signs (24h  Range):  Temp:  [96.4 °F (35.8 °C)-98.2 °F (36.8 °C)] 96.4 °F (35.8 °C)  Pulse:  [73-86] 75  Resp:  [17-18] 17  SpO2:  [96 %-99 %] 99 %  BP: (145-194)/(82-91) 160/87     Weight: 120.5 kg (265 lb 10.5 oz)  Body mass index is 37.05 kg/m².    Intake/Output - Last 3 Shifts       01/04 0700 - 01/05 0659 01/05 0700 - 01/06 0659 01/06 0700 - 01/07 0659    P.O. 1120 1210     I.V. (mL/kg) 250 (2.1)      IV Piggyback  50     Total Intake(mL/kg) 1370 (11.4) 1260 (10.5)     Urine (mL/kg/hr) 840 (0.3) 1350 (0.5)     Drains 298 90     Other 25 50     Stool 0      Blood       Total Output 1163 1490     Net +207 -230            Urine Occurrence 2 x      Stool Occurrence 0 x            Physical Exam     Gen:  No acute distress  CV:  Peripherally well-perfused.    Lungs:  Normal respiratory effort.  Head/Neck:  Normocephalic.  Atraumatic.   Groin:  Left- 2 drains in place SS output, mild erythema, but no palpable fluid collections. Warm and well perfused extremity.  Right- Vac in place over open incision w SS output. More erythematous areas around incision compared to contralateral side. No palpable fluid collections. Warm and well perfused extremity.     Significant Labs:  CBC:   Recent Labs   Lab 01/06/20  0412   WBC 7.81   RBC 4.19*   HGB 12.6*   HCT 38.2*      MCV 91   MCH 30.1   MCHC 33.0     CMP:   Recent Labs   Lab 01/06/20  0412   *   CALCIUM 9.4   ALBUMIN 2.5*   PROT 6.8      K 4.1   CO2 31*      BUN 10   CREATININE 0.8   ALKPHOS 43*   ALT 30   AST 30   BILITOT 0.5       Significant Diagnostics:  I have reviewed all pertinent imaging results/findings within the past 24 hours.    Assessment/Plan:     * Surgical wound breakdown  59M s/p BLE CFE and L iliac stent with vascular surgery on 12/20/19 presents with post-op wound breakdown and drainage concerning for draining seroma s/p bilateral groin incision I&D and wound vac placement with vascular surgery on 1/1/20. In OR superficial fat necrosis on R  groin and deep serous fluid collection that was released on L. Per primary wound with limited physical signs of infection. Now s/p Sartorius muscle flap for deep space closure, ALT flap pedicled for skin closure on 1/3    Flap healthy good doppler signal  Drains to suction, no hematoma. Will discharge with drains. No need for additional antibiotics  Ok for ambulation. Ok to stand for 30 minutes/day, three times daily.   Discharge per primary, cleared from plastics stand point.         Julianna Campbell MD  Plastic Surgery  [facility]

## 2020-01-06 NOTE — PLAN OF CARE
GLORY assigned to case today 1/6/2020. GLORY will assist team with DC needs. GLORY in communication with CM.    GLORY sent referral for Home Health to patient's previous HH agency: [facility]. [facility] accepted the patient.     GLORY sent referral for IVAB to [facility]. GLORY spoke to Monico who will have the office run the p[patient's insurance to check if they are in network.    GLORY spoke to Beth with [facility] (ph: [phone]) regarding the patient's wound vac. GLORY provided Beth with FS, H&P, OP Note and Progress Notes. Beth will have the wound vac form filled out. GLORY will have first page of wound vac form filled out by physician and send to Beth.      01/06/20 1534   Post-Acute Status   Post-Acute Authorization Home Health/Hospice;HME;Medications   HME Status Referrals Sent   Home Health/Hospice Status Set-up Complete   Medication Status   (Referral sent to [facility])     Aminah Rojas LCSW  [facility]  [id number]

## 2020-01-06 NOTE — PLAN OF CARE
Patient AOX4, all VSS, RA, Afebrile. Left flap with strong pulse doppler Q2. Serous drainage from incision noted. Changed Q4. Right groin with wound vac intact. Setting @100. 50 ml SS output. KATHLEEN to left leg intact. With minimal SS drainage. Surgical aqua cell dressing intact with small amount dried drainage marked. Patient ambulates with walker 1 assist. Bedrest maintained during night. Care clustered. Call light in reach. Wife at bedside. RN RAMÓN

## 2020-01-06 NOTE — ASSESSMENT & PLAN NOTE
59M s/p BLE CFE and L iliac stent with vascular surgery on 12/20/19 presents with post-op wound breakdown and drainage concerning for draining seroma s/p bilateral groin incision I&D and wound vac placement with vascular surgery on 1/1/20. In OR superficial fat necrosis on R groin and deep serous fluid collection that was released on L. Per primary wound with limited physical signs of infection. Now s/p Sartorius muscle flap for deep space closure, ALT flap pedicled for skin closure on 1/3    Flap healthy good doppler signal  Drains to suction, no hematoma. Will discharge with drains. No need for additional antibiotics  Ok for ambulation. Ok to stand for 30 minutes/day, three times daily.   Discharge per primary, cleared from plastics stand point.

## 2020-01-06 NOTE — PROGRESS NOTES
Plastic Update    Up out of bed today  Flap healthy good doppler signal  Drains to suction, no hematoma    Ok for ambulation  Limit sitting to no more than 1 hour at meal times    Will consider advancing sitting on POD 4.    Expect patient will be able to be discharged this week from my standpoint.    Plastic & Reconstructive Surgery  [facility]  c/o Brian Wong M.D.  [facility]  [address]  [address] [place] [address]    Work [phone]  Toll free [phone]  If no answer [phone]\

## 2020-01-06 NOTE — CONSULTS
Double lumen PICC placed in right brachial vein, 42 cm in length, 0 cm exposed with arm circumference 36 cm. Lot# [id number]

## 2020-01-06 NOTE — PT/OT/SLP PROGRESS
"Occupational Therapy   Treatment    Name: Renan Britton  MRN: [id number]  Admitting Diagnosis:  Surgical wound breakdown  3 Days Post-Op   WASHOUT (Bilateral)  CREATION, FLAP, MUSCLE ROTATION (N/A)  APPLICATION, WOUND VAC (N/A)     Recommendations:     Discharge Recommendations: home health OT, home health PT  Discharge Equipment Recommendations:  walker, rolling, bedside commode  Barriers to discharge:  None    Assessment:     Renan Britton is a 59 y.o. male with a medical diagnosis of Surgical wound breakdown.  He presents with good effort this day and tolerates session well. Patient is pleasant throughout therapy. Performance deficits affecting function are impaired self care skills, impaired functional mobilty, gait instability, impaired balance, impaired skin, impaired cardiopulmonary response to activity, impaired endurance.     Rehab Prognosis:  Good; patient would benefit from acute skilled OT services to address these deficits and reach maximum level of function.       Plan:     Patient to be seen 3 x/week to address the above listed problems via self-care/home management, therapeutic activities, therapeutic exercises  · Plan of Care Expires: 02/04/20  · Plan of Care Reviewed with: patient, spouse    Subjective     Wife states "His sarcasm is coming back, he must be feeling better!"    Pain/Comfort:  · Pain Rating 1: other (see comments)(Did not rate pain)    Objective:     Communicated with: RN prior to session.  Patient found sitting on BSC with telemetry, wound vac, KATHLEEN drain(hep lock IV) upon OT entry to room.    General Precautions: Standard, fall   Orthopedic Precautions:N/A   Braces: N/A     Occupational Performance:     Bed Mobility:    · Patient completed Sit to Supine with stand by assistance     Functional Mobility/Transfers:  · Patient completed Sit > Stand Transfer from [facility] with contact guard assistance  with  rolling walker   · Functional Mobility: Patient completed functional mobility " ~15' within room with CGA and RW. No LOB/SOB noted.  · Patient completed Stand > Sit Transfer onto bed with stand by assistance with rolling walker    Activities of Daily Living:  · Grooming: stand by assistance Patient participated in oral hygiene, face wash, and shave while standing at the sink in the restroom with SBA. Patient intermittently utilized unilateral UE support on counter.  · Toileting: Patient found sitting up on [facility] attempting to have BM but reported being unable to at this time and stated he just passed gas.      [facility] 6 Click ADL: 18    Treatment & Education:  Role of OT/POC  Call button for assistance    Patient left supine with all lines intact, call button in reach, RN notified and wife presentEducation:      GOALS:   Multidisciplinary Problems     Occupational Therapy Goals        Problem: Occupational Therapy Goal    Goal Priority Disciplines Outcome Interventions   Occupational Therapy Goal     OT, PT/OT Ongoing, Progressing    Description:  Goals to be met by: 1/12/20     Patient will increase functional independence with ADLs by performing:    LE Dressing with Stand-by Assistance and Assistive Devices as needed.  Toileting from bedside commode with SBA  for hygiene and clothing management.   Toilet transfer to bedside commode with Modified [place].                      Time Tracking:     OT Date of Treatment: 01/06/20  OT Start Time: 0905  OT Stop Time: 0928  OT Total Time (min): 23 min    Billable Minutes:Self Care/Home Management 23 minutes    Anitha Luna OT  1/6/2020

## 2020-01-06 NOTE — PROCEDURES
"Renan Britton is a 59 y.o. male patient.    Temp: 96.5 °F (35.8 °C) (01/06/20 0756)  Pulse: 78 (01/06/20 0756)  Resp: 16 (01/06/20 0756)  BP: (!) 140/89 (01/06/20 0756)  SpO2: (!) 94 % (01/06/20 0756)  Weight: 120.5 kg (265 lb 10.5 oz) (01/03/20 1212)  Height: 5' 11" (180.3 cm) (01/03/20 1212)    PICC  Performed by: John Moralez RN  Assisting provider: Joleen Mcdonald RN  Time out: Immediately prior to procedure a time out was called to verify the correct patient, procedure, equipment, support staff and site/side marked as required  Indications: med administration and vascular access  Anesthesia: local infiltration  Local anesthetic: lidocaine 1% without epinephrine  Anesthetic Total (mL): 2  Preparation: skin prepped with ChloraPrep  Skin prep agent dried: skin prep agent completely dried prior to procedure  Sterile barriers: all five maximum sterile barriers used - cap, mask, sterile gown, sterile gloves, and large sterile sheet  Hand hygiene: hand hygiene performed prior to central venous catheter insertion  Location details: right brachial  Catheter type: double lumen  Catheter size: 5 Fr  Catheter Length: 42cm    Ultrasound guidance: yes  Vessel Caliber: medium and patent, compressibility normal  Needle advanced into vessel with real time Ultrasound guidance.  Guidewire confirmed in vessel.  Image recorded and saved.  Sterile sheath used.  Number of attempts: 1  Post-procedure: blood return through all ports, chlorhexidine patch and sterile dressing applied  Technical procedures used: 3CG  Specimens: No  Implants: No  Assessment: placement verified by x-ray  Complications: none          Joleen Mcdonald  1/6/2020  "

## 2020-01-06 NOTE — SUBJECTIVE & OBJECTIVE
Medications:  Continuous Infusions:    Scheduled Meds:   aspirin  81 mg Oral Daily    atorvastatin  40 mg Oral Daily    cefTRIAXone (ROCEPHIN) IVPB  2 g Intravenous Q24H    docusate sodium  50 mg Oral Daily    lidocaine (PF) 10 mg/ml (1%)  1 mL Other Once    pregabalin  100 mg Oral BID    valsartan  160 mg Oral Daily     PRN Meds:acetaminophen, acetaminophen, Dextrose 10% Bolus, glucagon (human recombinant), HYDROmorphone, influenza, insulin aspart U-100, labetalol, melatonin, ondansetron, oxyCODONE, oxyCODONE, sodium chloride 0.9%     Objective:     Vital Signs (Most Recent):  Temp: 96.5 °F (35.8 °C) (01/06/20 0756)  Pulse: 78 (01/06/20 0756)  Resp: 16 (01/06/20 0756)  BP: (!) 140/89 (01/06/20 0756)  SpO2: (!) 94 % (01/06/20 0756) Vital Signs (24h Range):  Temp:  [96.4 °F (35.8 °C)-98.2 °F (36.8 °C)] 96.5 °F (35.8 °C)  Pulse:  [75-86] 78  Resp:  [16-18] 16  SpO2:  [94 %-99 %] 94 %  BP: (140-194)/(82-90) 140/89         Physical Exam   Constitutional: He is oriented to person, place, and time. He appears well-developed and well-nourished.   HENT:   Head: Normocephalic and atraumatic.   Cardiovascular: Normal rate.   Pulmonary/Chest: Effort normal.   Abdominal: Soft.   Musculoskeletal:   L groin flap clean/dry/intact; L leg lateral incision covered with dressing - not saturated; two drains in place with serosanguinous output    R groin with wound vac in place to adequate suction    2+ L PT, monophasic L DP  Monophasic R PT, monophasic R DP   Neurological: He is alert and oriented to person, place, and time.   Skin: Skin is warm and dry.   Nursing note and vitals reviewed.      Significant Labs:  BMP:   Recent Labs   Lab 01/06/20  0412   *      K 4.1      CO2 31*   BUN 10   CREATININE 0.8   CALCIUM 9.4   MG 2.1     CBC:   Recent Labs   Lab 01/06/20  0412   WBC 7.81   RBC 4.19*   HGB 12.6*   HCT 38.2*      MCV 91   MCH 30.1   MCHC 33.0       Significant Diagnostics:  I have reviewed  all pertinent imaging results/findings within the past 24 hours.

## 2020-01-06 NOTE — PLAN OF CARE
Problem: Occupational Therapy Goal  Goal: Occupational Therapy Goal  Description  Goals to be met by: 1/12/20     Patient will increase functional independence with ADLs by performing:    LE Dressing with Stand-by Assistance and Assistive Devices as needed.  Toileting from bedside commode with SBA  for hygiene and clothing management.   Toilet transfer to bedside commode with Modified [place].     Outcome: Ongoing, Progressing    Continue with POC.  Anitha Luna OT  1/6/2020

## 2020-01-06 NOTE — PLAN OF CARE
All goals met. Will inform and consult with PT for further goals of care for acute therapy needs.     Problem: Physical Therapy Goal  Goal: Physical Therapy Goal  Description  Goals to be met by: 20    Patient will increase functional independence with mobility by performin. Supine to sit with Contact Guard Assistance - met  2. Sit to stand transfer with Contact Guard Assistance - met  3. Gait  x 150 feet with Contact Guard Assistance using AD if needed.- met        Outcome: Met

## 2020-01-06 NOTE — PROGRESS NOTES
[facility]  Vascular Surgery  Progress Note    Patient Name: Renan Britton  MRN: [id number]  Admission Date: 12/31/2019  Primary Care Provider: Eren Becker MD    Subjective:     Interval History: NAEON. R groin WV remains in place with good suction, no leak. Pain well controlled on current regimen. Ambulated the hallways twice yesterday without difficulty. Home health orders placed for home IV antibiotics, WV and PT. PICC team to place PICC today.     Post-Op Info:  Procedure(s) (LRB):  WASHOUT (Bilateral)  CREATION, FLAP, MUSCLE ROTATION (N/A)  APPLICATION, WOUND VAC (N/A)   3 Days Post-Op       Medications:  Continuous Infusions:    Scheduled Meds:   aspirin  81 mg Oral Daily    atorvastatin  40 mg Oral Daily    cefTRIAXone (ROCEPHIN) IVPB  2 g Intravenous Q24H    docusate sodium  50 mg Oral Daily    lidocaine (PF) 10 mg/ml (1%)  1 mL Other Once    pregabalin  100 mg Oral BID    valsartan  160 mg Oral Daily     PRN Meds:acetaminophen, acetaminophen, Dextrose 10% Bolus, glucagon (human recombinant), HYDROmorphone, influenza, insulin aspart U-100, labetalol, melatonin, ondansetron, oxyCODONE, oxyCODONE, sodium chloride 0.9%     Objective:     Vital Signs (Most Recent):  Temp: 96.5 °F (35.8 °C) (01/06/20 0756)  Pulse: 78 (01/06/20 0756)  Resp: 16 (01/06/20 0756)  BP: (!) 140/89 (01/06/20 0756)  SpO2: (!) 94 % (01/06/20 0756) Vital Signs (24h Range):  Temp:  [96.4 °F (35.8 °C)-98.2 °F (36.8 °C)] 96.5 °F (35.8 °C)  Pulse:  [75-86] 78  Resp:  [16-18] 16  SpO2:  [94 %-99 %] 94 %  BP: (140-194)/(82-90) 140/89         Physical Exam   Constitutional: He is oriented to person, place, and time. He appears well-developed and well-nourished.   HENT:   Head: Normocephalic and atraumatic.   Cardiovascular: Normal rate.   Pulmonary/Chest: Effort normal.   Abdominal: Soft.   Musculoskeletal:   L groin flap clean/dry/intact; L leg lateral incision covered with dressing - not saturated; two drains in  place with serosanguinous output    R groin with wound vac in place to adequate suction    2+ L PT, monophasic L DP  Monophasic R PT, monophasic R DP   Neurological: He is alert and oriented to person, place, and time.   Skin: Skin is warm and dry.   Nursing note and vitals reviewed.      Significant Labs:  BMP:   Recent Labs   Lab 01/06/20  0412   *      K 4.1      CO2 31*   BUN 10   CREATININE 0.8   CALCIUM 9.4   MG 2.1     CBC:   Recent Labs   Lab 01/06/20  0412   WBC 7.81   RBC 4.19*   HGB 12.6*   HCT 38.2*      MCV 91   MCH 30.1   MCHC 33.0       Significant Diagnostics:  I have reviewed all pertinent imaging results/findings within the past 24 hours.    Assessment/Plan:     * Surgical wound breakdown  60yo male with hx of PAD s/p bilateral femoral endarterectomies on 12/20 now with bilateral wound breakdown and concern for infected fluid collection L groin now s/p bilateral groin washout with superficial debridement of R groin and placement of bilateral wound vacs 1/1/20 now s/p L rectus muscle flap per plastics on 1/3    - diabetic diet  - wound vac in place to R groin - keep to 125mmHg suction, plan for vac change per wound care nursing (consult placed) three times per week starting tomorrow  - q2hr flap checks per plastic surgery on the L, strip and record drain output; keep flap per plastics - appreciate their assistance; wound care per plastics on the L  - continue home meds - lyrica, daily aspirin 91mg, statin  - SSI  - hold plavix pending plastic surgery recommendations - restart pending their recommendations  - PT/OT - only restriction is to avoid hip flexion  - daily labs  - intraoperative cultures positive for proteus - pan sensitive; continue cefepime, ID following: recommends home with IV Ceftriaxone.  - home health orders for home ceftriaxone per ID recs and PT, PICC consult placed   - WC consult placed for WV changes while remains in hospital    Dispo: continue care in  PCU for q2 flap checks        Mela Stauffer MD  Vascular Surgery  [facility]

## 2020-01-06 NOTE — PHYSICIAN QUERY
"PT Name: Renan Britton  MR #: [id number]    Physician Query Form - Procedure Clarification     CDS/: Julianna Bermeo               Contact information:[email or web address]  This form is a permanent document in the medical record.     Query Date: January 6, 2020  By submitting this query, we are merely seeking further clarification of documentation. Please utilize your independent clinical judgment when addressing the question(s) below.    The Medical record contains the following:     Indicators       Supporting Clinical Findings   Location in Medical Record   x Documentation of "Debridement"   Operation/Procedure Performed:   1.  Left groin irrigation and debridement  2. Left groin evacuation of seroma.  3.  Right groin irrigation and debridement  4. Bilateral groin woundvac placement     Op note 1-2   x Documentation of "I & D" We opened up the left groin by dividing the sutures with Metzenbaum scissors.  We debrided the superficial necrotic and fibrinous tissue    In the right groin we debrided the superficial fibrinous tissue. There was some fat necrosis beneath this which was debrideds down to a depth of approximately 1 cm.  The wound edges were freshened with a 15 blade.  There was healthy bleeding from the wound edges   Op note 1-2   x EBL = EBL=80ml Op note 1-2    Other:       Excisional debridement is a surgical removal of  nonvitalized tissue, necrosis or slough. The use of a sharp instrument does not always indicate that an excisional debridement was performed.  Non excisional debridement is the scraping, washing, irrigating, brushing away or removal of loose tissue fragments.    Provider, please specify type of procedure(s) performed:    [ x   ]  Excisional Debridement (Specify site and depth of tissue removed)       * Site: (Specify) __groins____       *Depth of tissue excised:1cm       [    ] Skin [  x  ] Subcutaneous Tissue/Fascia [    ] Muscle [    ] Tendon [    ] Bone   [   "  ]  Non-excisional Debridement    *Site : (Specify): _______________       *Depth of tissue excised:       [    ] Skin [    ] Subcutaneous Tissue/Fascia [    ] Muscle [    ] Tendon [    ] Bone   [    ] Incision and Drainage only (specify site of drainage): ____wound vac_________________        [    ] Other Procedure (Specify) ________         [  ] Clinically Undetermined

## 2020-01-06 NOTE — SUBJECTIVE & OBJECTIVE
Interval History: NOAH. ALLISONS. PICC line today for home IV antibiotics    Medications:  Continuous Infusions:    Scheduled Meds:   aspirin  81 mg Oral Daily    atorvastatin  40 mg Oral Daily    cefTRIAXone (ROCEPHIN) IVPB  2 g Intravenous Q24H    docusate sodium  50 mg Oral Daily    lidocaine (PF) 10 mg/ml (1%)  1 mL Other Once    pregabalin  100 mg Oral BID    valsartan  160 mg Oral Daily     PRN Meds:acetaminophen, acetaminophen, Dextrose 10% Bolus, glucagon (human recombinant), HYDROmorphone, influenza, insulin aspart U-100, labetalol, melatonin, ondansetron, oxyCODONE, oxyCODONE, sodium chloride 0.9%     Review of patient's allergies indicates:   Allergen Reactions    Penicillins Hives     Patient currently on cefepime without complications     Objective:     Vital Signs (Most Recent):  Temp: 96.4 °F (35.8 °C) (01/06/20 0340)  Pulse: 75 (01/06/20 0340)  Resp: 17 (01/06/20 0340)  BP: (!) 160/87 (01/06/20 0340)  SpO2: 99 % (01/06/20 0340) Vital Signs (24h Range):  Temp:  [96.4 °F (35.8 °C)-98.2 °F (36.8 °C)] 96.4 °F (35.8 °C)  Pulse:  [73-86] 75  Resp:  [17-18] 17  SpO2:  [96 %-99 %] 99 %  BP: (145-194)/(82-91) 160/87     Weight: 120.5 kg (265 lb 10.5 oz)  Body mass index is 37.05 kg/m².    Intake/Output - Last 3 Shifts       01/04 0700 - 01/05 0659 01/05 0700 - 01/06 0659 01/06 0700 - 01/07 0659    P.O. 1120 1210     I.V. (mL/kg) 250 (2.1)      IV Piggyback  50     Total Intake(mL/kg) 1370 (11.4) 1260 (10.5)     Urine (mL/kg/hr) 840 (0.3) 1350 (0.5)     Drains 298 90     Other 25 50     Stool 0      Blood       Total Output 1163 1490     Net +207 -230            Urine Occurrence 2 x      Stool Occurrence 0 x            Physical Exam     Gen:  No acute distress  CV:  Peripherally well-perfused.    Lungs:  Normal respiratory effort.  Head/Neck:  Normocephalic.  Atraumatic.   Groin:  Left- 2 drains in place SS output, mild erythema, but no palpable fluid collections. Warm and well perfused  extremity.  Right- Vac in place over open incision w SS output. More erythematous areas around incision compared to contralateral side. No palpable fluid collections. Warm and well perfused extremity.     Significant Labs:  CBC:   Recent Labs   Lab 01/06/20 0412   WBC 7.81   RBC 4.19*   HGB 12.6*   HCT 38.2*      MCV 91   MCH 30.1   MCHC 33.0     CMP:   Recent Labs   Lab 01/06/20 0412   *   CALCIUM 9.4   ALBUMIN 2.5*   PROT 6.8      K 4.1   CO2 31*      BUN 10   CREATININE 0.8   ALKPHOS 43*   ALT 30   AST 30   BILITOT 0.5       Significant Diagnostics:  I have reviewed all pertinent imaging results/findings within the past 24 hours.

## 2020-01-06 NOTE — PT/OT/SLP PROGRESS
Physical Therapy Treatment    Patient Name:  Renan Britton   MRN:  [id number]    Recommendations:     Discharge Recommendations:  home health PT   Discharge Equipment Recommendations: walker, rolling, bedside commode   Barriers to discharge: None    Assessment:     Renan Britton is a 59 y.o. male admitted with a medical diagnosis of Surgical wound breakdown.  He presents with the following impairments/functional limitations:  impaired endurance, impaired self care skills, impaired functional mobilty, gait instability, impaired balance, decreased lower extremity function, impaired skin, impaired cardiopulmonary response to activity, weakness. Pt tolerated session well with focus on bed mobility, transfers, and gait training. Pt with minimal drainage from LLE wound site. Pt able to ambulate over 200 ft with RW and Supervision this day. Pt will continue to benefit from therapy services to address impairments listed above.     Rehab Prognosis: Good; patient would benefit from acute skilled PT services to address these deficits and reach maximum level of function.    Recent Surgery: Procedure(s) (LRB):  WASHOUT (Bilateral)  CREATION, FLAP, MUSCLE ROTATION (N/A)  APPLICATION, WOUND VAC (N/A) 3 Days Post-Op    Plan:     During this hospitalization, patient to be seen 4 x/week to address the identified rehab impairments via gait training, therapeutic activities and progress toward the following goals:    · Plan of Care Expires:  02/03/20    Subjective     Chief Complaint: no c/o  Pain/Comfort:  · Pain Rating 1: 0/10  · Pain Rating Post-Intervention 1: 0/10      Objective:     Communicated with NSG prior to session.  Patient found supine HOB minimally elevated with telemetry, wound vac, KATHLEEN drain upon PTA entry to room.     General Precautions: Standard, fall   Orthopedic Precautions:N/A   Braces: N/A     Functional Mobility:  · Bed Mobility:     · Supine to Sit: modified independence  · Sit to Supine: modified  independence  · Transfers:     · Sit to Stand:  stand by assistance with rolling walker; requires RW for leverage to elevate from EOB and maintain limited hip flexion  · Gait: Pt ambulates over 200 ft with RW and Supervision. Pt with antalgic step-to gait pattern. No LOB or instability noted.       AM-PAC 6 CLICK MOBILITY  Turning over in bed (including adjusting bedclothes, sheets and blankets)?: 4  Sitting down on and standing up from a chair with arms (e.g., wheelchair, bedside commode, etc.): 3  Moving from lying on back to sitting on the side of the bed?: 4  Moving to and from a bed to a chair (including a wheelchair)?: 3  Need to walk in hospital room?: 3  Climbing 3-5 steps with a railing?: 3  Basic Mobility Total Score: 20       Therapeutic Activities and Exercises:  Pt assisted with functional mobility as noted above.   Pt and spouse educated on curb step asc/dec with use of RW.   Questions and concerns regarding AD and mobility within home environment addressed within PTA scope.       Patient left supine with HOB minimally elevated with all lines intact, call button in reach and spouse present.    GOALS:   Multidisciplinary Problems     Physical Therapy Goals     Not on file          Multidisciplinary Problems (Resolved)        Problem: Physical Therapy Goal    Goal Priority Disciplines Outcome Goal Variances Interventions   Physical Therapy Goal   (Resolved)     PT, PT/OT Met     Description:  Goals to be met by: 20    Patient will increase functional independence with mobility by performin. Supine to sit with Contact Guard Assistance - met  2. Sit to stand transfer with Contact Guard Assistance - met  3. Gait  x 150 feet with Contact Guard Assistance using AD if needed.- met                         Time Tracking:     PT Received On: 20  PT Start Time: 1129     PT Stop Time: 1154  PT Total Time (min): 25 min     Billable Minutes: Gait Training 15 and Therapeutic Activity 10    Treatment  Type: Treatment  PT/PTA: PTA     PTA Visit Number: 1     Lauro Weston, PTA  01/06/2020

## 2020-01-06 NOTE — NURSING
Left groin FLAP with strong pulses, warm and health. FLAP checks Q2hr. Right groin site with wound vac closure CDI with no drainage. KATHLEEN x 2 to left leg intact with ss drainage. Left lateral leg incision dressed with surgical Aquacel dressing per MD. Pt ambulates with walker and standby assist. Small amounts of Serosanguinous drainage noted down pt's leg and on floor while standing and ambulating.. Mild pain managed well with Oral Oxy. VSS on RA. Advanced and tolerating 2000 ADA diet with no c/o nausea. Wife at bedside. No acute distress or needs at this time. WCTM

## 2020-01-06 NOTE — ASSESSMENT & PLAN NOTE
58yo male with hx of PAD s/p bilateral femoral endarterectomies on 12/20 now with bilateral wound breakdown and concern for infected fluid collection L groin now s/p bilateral groin washout with superficial debridement of R groin and placement of bilateral wound vacs 1/1/20 now s/p L rectus muscle flap per plastics on 1/3    - diabetic diet  - wound vac in place to R groin - keep to 125mmHg suction, plan for vac change per wound care nursing (consult placed) three times per week starting tomorrow  - q2hr flap checks per plastic surgery on the L, strip and record drain output; keep flap per plastics - appreciate their assistance; wound care per plastics on the L  - continue home meds - lyrica, daily aspirin 91mg, statin  - SSI  - hold plavix pending plastic surgery recommendations - restart pending their recommendations  - PT/OT - only restriction is to avoid hip flexion  - daily labs  - intraoperative cultures positive for proteus - pan sensitive; continue cefepime, ID following: recommends home with IV Ceftriaxone.  - home health orders for home ceftriaxone per ID recs and PT, PICC consult placed   - WC consult placed for WV changes while remains in hospital    Dispo: continue care in PCU for q2 flap checks

## 2020-01-06 NOTE — OP NOTE
Vascular Surgery Op Note    Date of Operation/Procedure: 1/3/20    Pre-operative Diagnosis: bilateral groin wounds    Post-operative Diagnosis: same    Anesthesia: general    Operation/Procedure Performed: bilateral groin irrigation and debridement, right groin woundvac application    Attending Surgeon: SEBAS Prieto II, MD    Resident/Fellow: Carolyn Pat MD PGY7    Indications: 60yo M with bilateral groin incision breakdown.  The right groin had superficial wound breakdown which did not penetrate to the deeper tissues.  The left groin had a deep seroma.  He had both groins I&D two days prior with woundvac application. There was no signs of infection although he had positive wound cultures. We took him back to the OR for washout with potential flap closure with plastic surgery.     Procedure in Detail:   The patient was brought to the operating room in supine position. General anesthesia was administered by the anesthesia team.  Preoperative antibiotics were administered.  Bilateral wound vacs were removed from the wounds.  The bilateral groins and left thigh was prepped and draped in normal sterile fashion in the whence prepped with Betadine.  The superficial fibrinous tissue was debrided from the right groin wound. The wound was irrigated with Betadine peroxide solution followed by saline. The tissue at the base of the wound appeared healthy.  The wound VAC was reapplied to the right groin.  The left groin wound was explored and the sutures holding the deep tissue together were divided.  There was no evidence of infection at the base of the wound.  The patch could be visualized at the base of the wound. There were some areas where we could visualize some serous drainage in these were treated with electrocautery and figure-of-eight silk sutures. The wound was then irrigated thoroughly with brown volcano solution followed by saline. The tissues at the base of the wound appeared healthy.  At this point we would  defer the remainder of the case to the Plastic surgery team for flap closure of the left groin. The remainder of the case will be dictated by the plastic surgery team.    Estimated Blood loss: 100ml

## 2020-01-07 VITALS
BODY MASS INDEX: 37.19 KG/M2 | TEMPERATURE: 98 F | SYSTOLIC BLOOD PRESSURE: 151 MMHG | HEIGHT: 71 IN | RESPIRATION RATE: 12 BRPM | HEART RATE: 79 BPM | WEIGHT: 265.63 LBS | DIASTOLIC BLOOD PRESSURE: 76 MMHG | OXYGEN SATURATION: 96 %

## 2020-01-07 LAB
ALBUMIN SERPL BCP-MCNC: 2.5 G/DL (ref 3.5–5.2)
ALP SERPL-CCNC: 46 U/L (ref 55–135)
ALT SERPL W/O P-5'-P-CCNC: 39 U/L (ref 10–44)
ANION GAP SERPL CALC-SCNC: 4 MMOL/L (ref 8–16)
AST SERPL-CCNC: 31 U/L (ref 10–40)
BASOPHILS # BLD AUTO: 0.05 K/UL (ref 0–0.2)
BASOPHILS NFR BLD: 0.7 % (ref 0–1.9)
BILIRUB SERPL-MCNC: 0.5 MG/DL (ref 0.1–1)
BUN SERPL-MCNC: 12 MG/DL (ref 6–20)
CALCIUM SERPL-MCNC: 9.2 MG/DL (ref 8.7–10.5)
CHLORIDE SERPL-SCNC: 100 MMOL/L (ref 95–110)
CO2 SERPL-SCNC: 32 MMOL/L (ref 23–29)
CREAT SERPL-MCNC: 0.7 MG/DL (ref 0.5–1.4)
DIFFERENTIAL METHOD: ABNORMAL
EOSINOPHIL # BLD AUTO: 0.4 K/UL (ref 0–0.5)
EOSINOPHIL NFR BLD: 5.8 % (ref 0–8)
ERYTHROCYTE [DISTWIDTH] IN BLOOD BY AUTOMATED COUNT: 11.8 % (ref 11.5–14.5)
EST. GFR  (AFRICAN AMERICAN): >60 ML/MIN/1.73 M^2
EST. GFR  (NON AFRICAN AMERICAN): >60 ML/MIN/1.73 M^2
GLUCOSE SERPL-MCNC: 110 MG/DL (ref 70–110)
HCT VFR BLD AUTO: 38.8 % (ref 40–54)
HGB BLD-MCNC: 12.8 G/DL (ref 14–18)
IMM GRANULOCYTES # BLD AUTO: 0.06 K/UL (ref 0–0.04)
IMM GRANULOCYTES NFR BLD AUTO: 0.9 % (ref 0–0.5)
LYMPHOCYTES # BLD AUTO: 1.3 K/UL (ref 1–4.8)
LYMPHOCYTES NFR BLD: 19.5 % (ref 18–48)
MAGNESIUM SERPL-MCNC: 1.9 MG/DL (ref 1.6–2.6)
MCH RBC QN AUTO: 30 PG (ref 27–31)
MCHC RBC AUTO-ENTMCNC: 33 G/DL (ref 32–36)
MCV RBC AUTO: 91 FL (ref 82–98)
MONOCYTES # BLD AUTO: 0.5 K/UL (ref 0.3–1)
MONOCYTES NFR BLD: 7.6 % (ref 4–15)
NEUTROPHILS # BLD AUTO: 4.5 K/UL (ref 1.8–7.7)
NEUTROPHILS NFR BLD: 65.5 % (ref 38–73)
NRBC BLD-RTO: 0 /100 WBC
PHOSPHATE SERPL-MCNC: 4.1 MG/DL (ref 2.7–4.5)
PLATELET # BLD AUTO: 335 K/UL (ref 150–350)
PMV BLD AUTO: 9.4 FL (ref 9.2–12.9)
POCT GLUCOSE: 114 MG/DL (ref 70–110)
POCT GLUCOSE: 121 MG/DL (ref 70–110)
POCT GLUCOSE: 124 MG/DL (ref 70–110)
POTASSIUM SERPL-SCNC: 3.9 MMOL/L (ref 3.5–5.1)
PROT SERPL-MCNC: 7 G/DL (ref 6–8.4)
RBC # BLD AUTO: 4.26 M/UL (ref 4.6–6.2)
SODIUM SERPL-SCNC: 136 MMOL/L (ref 136–145)
WBC # BLD AUTO: 6.88 K/UL (ref 3.9–12.7)

## 2020-01-07 PROCEDURE — A4216 STERILE WATER/SALINE, 10 ML: HCPCS | Performed by: SURGERY

## 2020-01-07 PROCEDURE — 25000003 PHARM REV CODE 250: Performed by: STUDENT IN AN ORGANIZED HEALTH CARE EDUCATION/TRAINING PROGRAM

## 2020-01-07 PROCEDURE — 63600175 PHARM REV CODE 636 W HCPCS: Performed by: STUDENT IN AN ORGANIZED HEALTH CARE EDUCATION/TRAINING PROGRAM

## 2020-01-07 PROCEDURE — 83735 ASSAY OF MAGNESIUM: CPT

## 2020-01-07 PROCEDURE — 25000003 PHARM REV CODE 250: Performed by: SURGERY

## 2020-01-07 PROCEDURE — 80053 COMPREHEN METABOLIC PANEL: CPT

## 2020-01-07 PROCEDURE — 84100 ASSAY OF PHOSPHORUS: CPT

## 2020-01-07 PROCEDURE — 85025 COMPLETE CBC W/AUTO DIFF WBC: CPT

## 2020-01-07 RX ORDER — OXYCODONE HYDROCHLORIDE 5 MG/1
5 TABLET ORAL EVERY 4 HOURS PRN
Qty: 30 TABLET | Refills: 0 | Status: ON HOLD | OUTPATIENT
Start: 2020-01-07 | End: 2020-03-09

## 2020-01-07 RX ADMIN — VALSARTAN 160 MG: 160 TABLET, FILM COATED ORAL at 09:01

## 2020-01-07 RX ADMIN — Medication 10 ML: at 05:01

## 2020-01-07 RX ADMIN — Medication 10 ML: at 12:01

## 2020-01-07 RX ADMIN — ATORVASTATIN CALCIUM 40 MG: 20 TABLET, FILM COATED ORAL at 09:01

## 2020-01-07 RX ADMIN — ASPIRIN 81 MG: 81 TABLET, COATED ORAL at 09:01

## 2020-01-07 RX ADMIN — PREGABALIN 100 MG: 50 CAPSULE ORAL at 09:01

## 2020-01-07 RX ADMIN — DOCUSATE SODIUM 50 MG: 50 CAPSULE, LIQUID FILLED ORAL at 09:01

## 2020-01-07 RX ADMIN — OXYCODONE HYDROCHLORIDE 5 MG: 5 TABLET ORAL at 02:01

## 2020-01-07 RX ADMIN — OXYCODONE HYDROCHLORIDE 5 MG: 5 TABLET ORAL at 07:01

## 2020-01-07 RX ADMIN — Medication 10 ML: at 07:01

## 2020-01-07 RX ADMIN — CEFTRIAXONE 2 G: 2 INJECTION, SOLUTION INTRAVENOUS at 12:01

## 2020-01-07 NOTE — PROGRESS NOTES
[facility]  Plastic Surgery  Progress Note    Subjective:     History of Present Illness:  Renan Britton is a 59 y.o. male with PMHx of bilateral lower extremity common femoral endarterectomy and Left iliac stent with Vascular surgery on 12/20/19. Patient had uneventful postop course and was discharged home. Patient presented to ER on 12/27 for bilateral groin cellulitis, was sent home with Clindamycin and f/u in Vascular clinic on 12/30. In clinic patient had new onset serous drainage from incision sites and skin necrosis. The wound was debrided in clinic and CT was ordered that showed bilateral fluid collections below incisions likely consistent with seromas. Patient denies fevers/chills, CP, SOB, abd pain, bowel/bladder changes, numbness/tingling or cold extremities.       Post-Op Info:  Procedure(s) (LRB):  WASHOUT (Bilateral)  CREATION, FLAP, MUSCLE ROTATION (N/A)  APPLICATION, WOUND VAC (N/A)   4 Days Post-Op     Interval History: NAEO. AF/VSS. Pain controlled    Medications:  Continuous Infusions:  Scheduled Meds:   aspirin  81 mg Oral Daily    atorvastatin  40 mg Oral Daily    cefTRIAXone (ROCEPHIN) IVPB  2 g Intravenous Q24H    docusate sodium  50 mg Oral Daily    lidocaine (PF) 10 mg/ml (1%)  1 mL Other Once    pregabalin  100 mg Oral BID    sodium chloride 0.9%  10 mL Intravenous Q6H    valsartan  160 mg Oral Daily     PRN Meds:acetaminophen, acetaminophen, Dextrose 10% Bolus, glucagon (human recombinant), HYDROmorphone, influenza, insulin aspart U-100, labetalol, melatonin, ondansetron, oxyCODONE, oxyCODONE, sodium chloride 0.9%, Flushing PICC Protocol **AND** sodium chloride 0.9% **AND** sodium chloride 0.9%     Review of patient's allergies indicates:   Allergen Reactions    Penicillins Hives     Patient currently on cefepime without complications     Objective:     Vital Signs (Most Recent):  Temp: 97.6 °F (36.4 °C) (01/07/20 0751)  Pulse: 71 (01/07/20 0751)  Resp: 17  (01/07/20 0751)  BP: (!) 170/86 (01/07/20 0751)  SpO2: 99 % (01/07/20 0751) Vital Signs (24h Range):  Temp:  [97.3 °F (36.3 °C)-98.4 °F (36.9 °C)] 97.6 °F (36.4 °C)  Pulse:  [71-79] 71  Resp:  [12-18] 17  SpO2:  [96 %-99 %] 99 %  BP: (149-170)/(77-86) 170/86     Weight: 120.5 kg (265 lb 10.5 oz)  Body mass index is 37.05 kg/m².    Intake/Output - Last 3 Shifts       01/05 0700 - 01/06 0659 01/06 0700 - 01/07 0659 01/07 0700 - 01/08 0659    P.O. 1210      I.V. (mL/kg)       IV Piggyback 50 50     Total Intake(mL/kg) 1260 (10.5) 50 (0.4)     Urine (mL/kg/hr) 1350 (0.5) 0 (0)     Drains 90 165     Other 50      Stool  0     Total Output 1490 165     Net -230 -115            Urine Occurrence  4 x     Stool Occurrence  0 x           Physical Exam   Constitutional: He is oriented to person, place, and time. He appears well-developed and well-nourished.   HENT:   Head: Normocephalic and atraumatic.   Cardiovascular: Normal rate and regular rhythm.   Pulmonary/Chest: Effort normal. No respiratory distress.   Genitourinary:   Genitourinary Comments: Left- 2 drains in place SS output, mild erythema, but no palpable fluid collections. Warm and well perfused extremity.  Right- Vac in place over open incision. No palpable fluid collections. Warm and well perfused extremity.    Neurological: He is alert and oriented to person, place, and time.   Skin: Skin is warm and dry.   Psychiatric: He has a normal mood and affect. His behavior is normal.   Nursing note and vitals reviewed.      Significant Labs:  CBC:   Recent Labs   Lab 01/07/20  0501   WBC 6.88   RBC 4.26*   HGB 12.8*   HCT 38.8*      MCV 91   MCH 30.0   MCHC 33.0     CMP:   Recent Labs   Lab 01/07/20  0501      CALCIUM 9.2   ALBUMIN 2.5*   PROT 7.0      K 3.9   CO2 32*      BUN 12   CREATININE 0.7   ALKPHOS 46*   ALT 39   AST 31   BILITOT 0.5       Significant Diagnostics:  none    Assessment/Plan:     * Surgical wound breakdown  59M s/p BLE CFE  and L iliac stent with vascular surgery on 12/20/19 presents with post-op wound breakdown and drainage concerning for draining seroma s/p bilateral groin incision I&D and wound vac placement with vascular surgery on 1/1/20. In OR superficial fat necrosis on R groin and deep serous fluid collection that was released on L. Per primary wound with limited physical signs of infection. Now s/p Sartorius muscle flap for deep space closure, ALT flap pedicled for skin closure on 1/3    Flap healthy good doppler signal  Drains to suction, no hematoma. Will discharge with drains. No need for additional antibiotics  Ok for ambulation. Ok to stand for 30 minutes/day, three times daily.    Discharge per primary, cleared from plastics stand point. Follow up in clinic in 1 week with Dr. Marvin Campbell MD  Plastic Surgery  [facility]

## 2020-01-07 NOTE — PLAN OF CARE
Patient A/O x4. Tolerating diet. Flap checks q2. Call light placed in reach. Hourly rounding done. Will continue to monitor.

## 2020-01-07 NOTE — PLAN OF CARE
01/07/20 1556   Post-Acute Status   Post-Acute Authorization HME;Home Health/Hospice   HME Status Pending Delivery Hospital  (wound vac auth pending; home infusion meds and supplies delivered to bedside)   Home Health/Hospice Status Set-up Complete  ([facility])     As of 3:50pm, [employer] still waiting for ins auth for wound vac. Vac will not be delivered until auth is received.    IV antibiotics and supplies for home have been delivered to bedside by [facility].     Jessica Graves, BREANA [phone]

## 2020-01-07 NOTE — PLAN OF CARE
01/07/20 1322   Post-Acute Status   Post-Acute Authorization HME;Home Health/Hospice   HME Status Pending Delivery Hospital  (IV antibiotics and wound vac)   Home Health/Hospice Status Set-up Complete  ([facility])       SW informed pt is ready for d/c today. Wound vac form sent to [facility], pending auth and bedside delivery. IV antibiotic for home will be provided by [employer]. Pts wife has been taught, pending bedside delviery.  set up completed, [facility] [place]. GLORY notified [facility] of d/c today.    Jessica Graves, [facility] [phone]

## 2020-01-07 NOTE — SUBJECTIVE & OBJECTIVE
Medications:  Continuous Infusions:    Scheduled Meds:   aspirin  81 mg Oral Daily    atorvastatin  40 mg Oral Daily    cefTRIAXone (ROCEPHIN) IVPB  2 g Intravenous Q24H    docusate sodium  50 mg Oral Daily    lidocaine (PF) 10 mg/ml (1%)  1 mL Other Once    pregabalin  100 mg Oral BID    sodium chloride 0.9%  10 mL Intravenous Q6H    valsartan  160 mg Oral Daily     PRN Meds:acetaminophen, acetaminophen, Dextrose 10% Bolus, glucagon (human recombinant), HYDROmorphone, influenza, insulin aspart U-100, labetalol, melatonin, ondansetron, oxyCODONE, oxyCODONE, sodium chloride 0.9%, Flushing PICC Protocol **AND** sodium chloride 0.9% **AND** sodium chloride 0.9%     Objective:     Vital Signs (Most Recent):  Temp: 97.7 °F (36.5 °C) (01/06/20 2308)  Pulse: 76 (01/06/20 2308)  Resp: 17 (01/06/20 2308)  BP: (!) 166/84 (01/06/20 2308)  SpO2: 97 % (01/06/20 2308) Vital Signs (24h Range):  Temp:  [96.5 °F (35.8 °C)-98.4 °F (36.9 °C)] 97.7 °F (36.5 °C)  Pulse:  [74-79] 76  Resp:  [12-18] 17  SpO2:  [94 %-98 %] 97 %  BP: (140-166)/(77-89) 166/84         Physical Exam   Constitutional: He is oriented to person, place, and time. He appears well-developed and well-nourished.   HENT:   Head: Normocephalic and atraumatic.   Cardiovascular: Normal rate.   Pulmonary/Chest: Effort normal.   Abdominal: Soft.   Musculoskeletal:   L groin flap clean/dry/intact; L leg lateral incision covered with dressing - not saturated; two drains in place with serosanguinous output    R groin with wound vac in place to adequate suction    2+ L PT, monophasic L DP  Monophasic R PT, monophasic R DP   Neurological: He is alert and oriented to person, place, and time.   Skin: Skin is warm and dry.   Nursing note and vitals reviewed.      Significant Labs:  BMP:   Recent Labs   Lab 01/07/20  0501         K 3.9      CO2 32*   BUN 12   CREATININE 0.7   CALCIUM 9.2   MG 1.9     CBC:   Recent Labs   Lab 01/07/20  0501   WBC 6.88    RBC 4.26*   HGB 12.8*   HCT 38.8*      MCV 91   MCH 30.0   MCHC 33.0       Significant Diagnostics:  I have reviewed all pertinent imaging results/findings within the past 24 hours.

## 2020-01-07 NOTE — PROGRESS NOTES
Wound care consult received to begin managing vac changes to R groin wound.  Spoke with  who reports vac was changed yesterday and approved future vac changes to be performed 2x/wk, Monday and Thursday.  Will assess R groin wound and change vac dressing on Thursday, 1/9.  [id number]

## 2020-01-07 NOTE — PLAN OF CARE
Covering sw informed pt will need wound vac for d/c; pt is ready for d/c today. Wound vac form placed in chart at nurses station.    GLORY f/u with Monico with [employer] for home iv antibiotics-she will meet with pt today to discuss his insurance benefits.     set up complete with [facility].    Jessica Graves, BREANA [id number]

## 2020-01-07 NOTE — ASSESSMENT & PLAN NOTE
59M s/p BLE CFE and L iliac stent with vascular surgery on 12/20/19 presents with post-op wound breakdown and drainage concerning for draining seroma s/p bilateral groin incision I&D and wound vac placement with vascular surgery on 1/1/20. In OR superficial fat necrosis on R groin and deep serous fluid collection that was released on L. Per primary wound with limited physical signs of infection. Now s/p Sartorius muscle flap for deep space closure, ALT flap pedicled for skin closure on 1/3    Flap healthy good doppler signal  Drains to suction, no hematoma. Will discharge with drains. No need for additional antibiotics  Ok for ambulation. Ok to stand for 30 minutes/day, three times daily.    Discharge per primary, cleared from plastics stand point. Follow up in clinic in 1 week with Dr. Wong

## 2020-01-07 NOTE — ASSESSMENT & PLAN NOTE
58yo male with hx of PAD s/p bilateral femoral endarterectomies on 12/20 now with bilateral wound breakdown and concern for infected fluid collection L groin now s/p bilateral groin washout with superficial debridement of R groin and placement of bilateral wound vacs 1/1/20 now s/p L rectus muscle flap per plastics on 1/3    - diabetic diet  - wound vac in place to R groin - keep to 125mmHg suction, plan for vac change per wound care nursing (consult placed) three times per week   - q4h flap checks on the L, strip and record drain output; keep flap per plastics - appreciate their assistance; wound care per plastics on the L  - continue home meds - lyrica, daily aspirin 91mg, statin  - SSI  - hold plavix pending plastic surgery recommendations - restart pending their recommendations  - PT/OT - only restriction is to avoid hip flexion  - daily labs  - intraoperative cultures positive for proteus - pan sensitive; continue cefepime, ID following: recommends home with IV Ceftriaxone.  - home health orders for home ceftriaxone per ID recs and PT, PICC placed  - WC consult placed for [place] changes while remains in hospital    Dispo: anticipate DC home once HH vac change, abx, and PT arranged

## 2020-01-07 NOTE — CARE UPDATE
Asked by vascular team to changed pts wound vac. Saw patient at bedside and discussed plan. He reported his pain is currently well controlled, but that previous dressing changes have been painful. Asked nurse to pre-medicate patient with 0.5 mg dilaudid.     Upon evaluation, his right groin wound has a wound vac in place with a single small black sponge. Minimal SS output noted in the cannister. Dressing taken down revealing fibrinous exudate and healthy appearing granulation tissue. Wound vac exchanged and placed back to suction, no leak noted. Extra cannister left in the room. Pt tolerated vac exchange well.    Nathaly Babb MD  General Surgery, PGY-1

## 2020-01-07 NOTE — PLAN OF CARE
01/07/20 1723   Post-Acute Status   Post-Acute Authorization HME   HME Status Pending Delivery Hospital       Was called by the floor nurse to see if and when the wound vac would be delivered.  Called [facility] and talked with OCTAVIANO at [phone]. She stated that it was approved and she will have a local rep from [facility] call me. She gave me reference #[id number]. Called the floor nurse to inform him of the situation. As soon as I hear from [facility] I will let the nurse know.  SW in contact with CM and Medical staff. Will continue to follow and offer support as needed.     Rip Garcia, JANAK  [facility]   Ext. [phone]

## 2020-01-07 NOTE — SUBJECTIVE & OBJECTIVE
Interval History: NAEO. AF/VSS. Pain controlled    Medications:  Continuous Infusions:  Scheduled Meds:   aspirin  81 mg Oral Daily    atorvastatin  40 mg Oral Daily    cefTRIAXone (ROCEPHIN) IVPB  2 g Intravenous Q24H    docusate sodium  50 mg Oral Daily    lidocaine (PF) 10 mg/ml (1%)  1 mL Other Once    pregabalin  100 mg Oral BID    sodium chloride 0.9%  10 mL Intravenous Q6H    valsartan  160 mg Oral Daily     PRN Meds:acetaminophen, acetaminophen, Dextrose 10% Bolus, glucagon (human recombinant), HYDROmorphone, influenza, insulin aspart U-100, labetalol, melatonin, ondansetron, oxyCODONE, oxyCODONE, sodium chloride 0.9%, Flushing PICC Protocol **AND** sodium chloride 0.9% **AND** sodium chloride 0.9%     Review of patient's allergies indicates:   Allergen Reactions    Penicillins Hives     Patient currently on cefepime without complications     Objective:     Vital Signs (Most Recent):  Temp: 97.6 °F (36.4 °C) (01/07/20 0751)  Pulse: 71 (01/07/20 0751)  Resp: 17 (01/07/20 0751)  BP: (!) 170/86 (01/07/20 0751)  SpO2: 99 % (01/07/20 0751) Vital Signs (24h Range):  Temp:  [97.3 °F (36.3 °C)-98.4 °F (36.9 °C)] 97.6 °F (36.4 °C)  Pulse:  [71-79] 71  Resp:  [12-18] 17  SpO2:  [96 %-99 %] 99 %  BP: (149-170)/(77-86) 170/86     Weight: 120.5 kg (265 lb 10.5 oz)  Body mass index is 37.05 kg/m².    Intake/Output - Last 3 Shifts       01/05 0700 - 01/06 0659 01/06 0700 - 01/07 0659 01/07 0700 - 01/08 0659    P.O. 1210      I.V. (mL/kg)       IV Piggyback 50 50     Total Intake(mL/kg) 1260 (10.5) 50 (0.4)     Urine (mL/kg/hr) 1350 (0.5) 0 (0)     Drains 90 165     Other 50      Stool  0     Total Output 1490 165     Net -230 -115            Urine Occurrence  4 x     Stool Occurrence  0 x           Physical Exam   Constitutional: He is oriented to person, place, and time. He appears well-developed and well-nourished.   HENT:   Head: Normocephalic and atraumatic.   Cardiovascular: Normal rate and regular rhythm.    Pulmonary/Chest: Effort normal. No respiratory distress.   Genitourinary:   Genitourinary Comments: Left- 2 drains in place SS output, mild erythema, but no palpable fluid collections. Warm and well perfused extremity.  Right- Vac in place over open incision. No palpable fluid collections. Warm and well perfused extremity.    Neurological: He is alert and oriented to person, place, and time.   Skin: Skin is warm and dry.   Psychiatric: He has a normal mood and affect. His behavior is normal.   Nursing note and vitals reviewed.      Significant Labs:  CBC:   Recent Labs   Lab 01/07/20  0501   WBC 6.88   RBC 4.26*   HGB 12.8*   HCT 38.8*      MCV 91   MCH 30.0   MCHC 33.0     CMP:   Recent Labs   Lab 01/07/20  0501      CALCIUM 9.2   ALBUMIN 2.5*   PROT 7.0      K 3.9   CO2 32*      BUN 12   CREATININE 0.7   ALKPHOS 46*   ALT 39   AST 31   BILITOT 0.5       Significant Diagnostics:  none

## 2020-01-07 NOTE — PROGRESS NOTES
[facility]  Vascular Surgery  Progress Note    Patient Name: Renan Britton  MRN: [id number]  Admission Date: 12/31/2019  Primary Care Provider: Eren Becker MD    Subjective:     Interval History: no events. Vac changed yesterday. Pain controlled    Post-Op Info:  Procedure(s) (LRB):  WASHOUT (Bilateral)  CREATION, FLAP, MUSCLE ROTATION (N/A)  APPLICATION, WOUND VAC (N/A)   4 Days Post-Op       Medications:  Continuous Infusions:    Scheduled Meds:   aspirin  81 mg Oral Daily    atorvastatin  40 mg Oral Daily    cefTRIAXone (ROCEPHIN) IVPB  2 g Intravenous Q24H    docusate sodium  50 mg Oral Daily    lidocaine (PF) 10 mg/ml (1%)  1 mL Other Once    pregabalin  100 mg Oral BID    sodium chloride 0.9%  10 mL Intravenous Q6H    valsartan  160 mg Oral Daily     PRN Meds:acetaminophen, acetaminophen, Dextrose 10% Bolus, glucagon (human recombinant), HYDROmorphone, influenza, insulin aspart U-100, labetalol, melatonin, ondansetron, oxyCODONE, oxyCODONE, sodium chloride 0.9%, Flushing PICC Protocol **AND** sodium chloride 0.9% **AND** sodium chloride 0.9%     Objective:     Vital Signs (Most Recent):  Temp: 97.7 °F (36.5 °C) (01/06/20 2308)  Pulse: 76 (01/06/20 2308)  Resp: 17 (01/06/20 2308)  BP: (!) 166/84 (01/06/20 2308)  SpO2: 97 % (01/06/20 2308) Vital Signs (24h Range):  Temp:  [96.5 °F (35.8 °C)-98.4 °F (36.9 °C)] 97.7 °F (36.5 °C)  Pulse:  [74-79] 76  Resp:  [12-18] 17  SpO2:  [94 %-98 %] 97 %  BP: (140-166)/(77-89) 166/84         Physical Exam   Constitutional: He is oriented to person, place, and time. He appears well-developed and well-nourished.   HENT:   Head: Normocephalic and atraumatic.   Cardiovascular: Normal rate.   Pulmonary/Chest: Effort normal.   Abdominal: Soft.   Musculoskeletal:   L groin flap clean/dry/intact; L leg lateral incision covered with dressing - not saturated; two drains in place with serosanguinous output    R groin with wound vac in place to adequate  suction    2+ L PT, monophasic L DP  Monophasic R PT, monophasic R DP   Neurological: He is alert and oriented to person, place, and time.   Skin: Skin is warm and dry.   Nursing note and vitals reviewed.      Significant Labs:  BMP:   Recent Labs   Lab 01/07/20  0501         K 3.9      CO2 32*   BUN 12   CREATININE 0.7   CALCIUM 9.2   MG 1.9     CBC:   Recent Labs   Lab 01/07/20  0501   WBC 6.88   RBC 4.26*   HGB 12.8*   HCT 38.8*      MCV 91   MCH 30.0   MCHC 33.0       Significant Diagnostics:  I have reviewed all pertinent imaging results/findings within the past 24 hours.    Assessment/Plan:     * Surgical wound breakdown  60yo male with hx of PAD s/p bilateral femoral endarterectomies on 12/20 now with bilateral wound breakdown and concern for infected fluid collection L groin now s/p bilateral groin washout with superficial debridement of R groin and placement of bilateral wound vacs 1/1/20 now s/p L rectus muscle flap per plastics on 1/3    - diabetic diet  - wound vac in place to R groin - keep to 125mmHg suction, plan for vac change per wound care nursing (consult placed) three times per week   - q4h flap checks on the L, strip and record drain output; keep flap per plastics - appreciate their assistance; wound care per plastics on the L  - continue home meds - lyrica, daily aspirin 91mg, statin  - SSI  - hold plavix pending plastic surgery recommendations - restart pending their recommendations  - PT/OT - only restriction is to avoid hip flexion  - daily labs  - intraoperative cultures positive for proteus - pan sensitive; continue cefepime, ID following: recommends home with IV Ceftriaxone.  - home health orders for home ceftriaxone per ID recs and PT, PICC placed  - WC consult placed for [place] changes while remains in hospital    Dispo: anticipate DC home once HH vac change, abx, and PT arranged        Carolyn Pat MD  Vascular Surgery  [facility]

## 2020-01-08 ENCOUNTER — TELEPHONE (OUTPATIENT)
Dept: INFECTIOUS DISEASES | Facility: CLINIC | Age: 60
End: 2020-01-08

## 2020-01-08 NOTE — PHYSICIAN QUERY
"PT Name: Renan Britton  MR #: [id number]    Physician Query Form - Procedure Clarification     CDS/: Julianna Bermeo               Contact information:[email or web address]  This form is a permanent document in the medical record.     Query Date: January 8, 2020  By submitting this query, we are merely seeking further clarification of documentation. Please utilize your independent clinical judgment when addressing the question(s) below.    The Medical record contains the following:     Indicators       Supporting Clinical Findings   Location in Medical Record   x Documentation of "Debridement"   The superficial fibrinous tissue was debrided from the right groin wound. Op note 1-3    Documentation of "I & D"     x EBL = EBL=100 Op note 1-3   x Other: The patient was brought to the operating room in supine position. General anesthesia was administered by the anesthesia team.  Preoperative antibiotics were administered.  Bilateral wound vacs were removed from the wounds.  The bilateral groins and left thigh was prepped and draped in normal sterile fashion in the whence prepped with Betadine.  The superficial fibrinous tissue was debrided from the right groin wound. The wound was irrigated with Betadine peroxide solution followed by saline. The tissue at the base of the wound appeared healthy.  The wound VAC was reapplied to the right groin.  The left groin wound was explored and the sutures holding the deep tissue together were divided.  There was no evidence of infection at the base of the wound.  The patch could be visualized at the base of the wound. There were some areas where we could visualize some serous drainage in these were treated with electrocautery and figure-of-eight silk sutures.  Op note 1-3     Excisional debridement is a surgical removal of  nonvitalized tissue, necrosis or slough. The use of a sharp instrument does not always indicate that an excisional debridement was " performed.  Non excisional debridement is the scraping, washing, irrigating, brushing away or removal of loose tissue fragments.    Provider, please specify type of procedure(s) performed:    [ x   ]  Excisional Debridement (Specify site and depth of tissue removed)       * Site: (Specify) __bilateral groin___       *Depth of tissue excised: 1cm       [    ] Skin [ x   ] Subcutaneous Tissue/Fascia [    ] Muscle [    ] Tendon [    ] Bone   [    ]  Non-excisional Debridement    *Site : (Specify): _______________       *Depth of tissue excised:       [    ] Skin [    ] Subcutaneous Tissue/Fascia [    ] Muscle [    ] Tendon [    ] Bone   [    ] Incision and Drainage only (specify site of drainage): _____________________        [    ] Other Procedure (Specify) ________         [  ] Clinically Undetermined

## 2020-01-08 NOTE — TELEPHONE ENCOUNTER
----- Message from Bhargavi Carrizales MA sent at 1/8/2020  9:39 AM CST -----  Contact: [phone]  Pt wife is requesting a call in reference to moving the PT appt to 1/22 along with his other appts

## 2020-01-08 NOTE — PLAN OF CARE
Patient discharged home 1/7/2020 with [facility], IV antibiotics from [facility], I wound vac.    Future Appointments   Date Time Provider Department Center   1/17/2020  9:45 AM Brian Wong MD [facility] PLASTE [facility]   1/22/2020  1:00 PM VASCULAR, LAB [facility] VASCLAB [facility]   1/22/2020  2:00 PM VASCULAR, LAB [facility] VASCLAB [facility]   1/22/2020  2:30 PM Kelby Gomez MD [facility] JOCELYNEUR [facility]   1/27/2020 11:30 AM Marcelle Hunt MD [facility] ID [facility]          01/08/20 0739   Final Note   Assessment Type Final Discharge Note   Anticipated Discharge Disposition Home-Health   Hospital Follow Up  Appt(s) scheduled? Yes   [facility] Referral Info   Post Acute Recommendation Home-care

## 2020-01-08 NOTE — NURSING
KATHLEEN drain care and measurement. PICC line flushes with antibiotic administration education with return demo successful per wife and patient. Drain and wound dressing supplies given patient for home use prior to follow-up appointment. Wound vac delivered and placed for discharge home with wife.Home health setup per GLORY. No acute distress or needs at this time. Transport off unit with wheelchair per transport staff with cart.

## 2020-01-09 ENCOUNTER — PATIENT OUTREACH (OUTPATIENT)
Dept: ADMINISTRATIVE | Facility: CLINIC | Age: 60
End: 2020-01-09

## 2020-01-09 NOTE — PATIENT INSTRUCTIONS
Sepsis     To treat sepsis, antibiotics and fluids may by given through an intravenous (IV) line.     Sepsis happens when your body responds with widespread inflammation to a bad infection or bacteremia--the presence of bacteria in your bloodstream. Sepsis can be deadly. Blood pressure may drop and the lungs and kidneys may start to fail. Emergency care for sepsis is crucial.  Risk factors  Those most at risk for sepsis are:  · Infants or older adults  · People who have an illness that weakens their immune system, such as cancer, AIDS, or diabetes  · People being treated with chemotherapy medicines or radiation, which weakens the immune system  · People who have had a transplant  · People with a very severe infection such as pneumonia, meningitis, or a urinary tract infection  When to go to the emergency department (ED)  Sepsis is an emergency. Go to the nearest ED if you have a fever with any of these symptoms:  · Chills and shaking  · Rapid heartbeat and breathing  · Trouble breathing  · Severe nausea or uncontrolled vomiting  · Confusion, disorientation, drowsiness, or dizziness  · Decreased urination  · Severe pain, including in the back or joints   What to expect in the ED  · Blood and urine tests are done to look for bacteria. They also check for organ failure.  · Blood, urine, or sputum cultures may be taken. The samples are sent to a lab. They are placed in a special container. Any bacteria should grow in 24 hours.  · X-rays or other imaging tests may be done.  A person with sepsis will be admitted to the hospital and treated with antibiotics. Treatment may also include oxygen and intravenous fluids.  Date Last Reviewed: 10/1/2016  © 3902-3552 [employer]. [address] [place] [address], [place] [address]. All rights reserved. This information is not intended as a substitute for professional medical care. Always follow your healthcare professional's instructions.

## 2020-01-10 ENCOUNTER — EXTERNAL HOME HEALTH (OUTPATIENT)
Dept: HOME HEALTH SERVICES | Facility: HOSPITAL | Age: 60
End: 2020-01-10
Payer: COMMERCIAL

## 2020-01-10 NOTE — DISCHARGE SUMMARY
[facility]  Vascular Surgery  Discharge Summary      Patient Name: Renan Lindsey  MRN: [id number]  Admission Date: 12/31/2019  Hospital Length of Stay: 7 days  Discharge Date and Time: 1/7/2020  7:53 PM  Attending Physician: No att. providers found   Discharging Provider: Mela Stauffer MD  Primary Care Provider: Eren Becker MD    HPI:   Patient is a 59 y.o. male who on 12/20/19 underwent a bilateral femoral endarterectomy with Dr. Gomez.  His post op course at that time was uncomplicated and he was discharged on 12/24/19.  However on 12/27/19 the patient returned to the ED with concerns of redness and pain at the groin incisions sites.  The left worse than the right.  He was sent home at that time with clindamycin and had a follow up appointment the next Monday.  On follow up the groin incisions were less tender but began to have increased clear drainage from the groin incisions.  The skin necrosis was also worse at that time.  He had the skin debrided in the office and he was subsequently sent for a CT scan.  The scan resulted with a fluid collection at the bilateral groin incision sites.  Likely a seroma collection.  He denies any fevers or chills.  No night sweats.  Is able to ambulate without issues.     Procedure(s) (LRB):  WASHOUT (Bilateral)  CREATION, FLAP, MUSCLE ROTATION (N/A)  APPLICATION, WOUND VAC (N/A)     Hospital Course: RENAN LINDSEY 59 y.o.male underwent: Procedure(s) (LRB):  WASHOUT (Bilateral)  CREATION, FLAP, MUSCLE ROTATION (N/A)  APPLICATION, WOUND VAC (N/A) for bilateral groin surgical wound breakdown. Tolerated procedure well, was transferred to  then to regular floor post op. Please see the dictated operative note for further procedure details.   Hospital course was uncomplicated, he underwent multiple washouts of bilateral wounds until cultures were clear, he was treated with appropriate antibiotics based on culture sensitivites and recommendations  from infectious disease team.   Vitals remained stable, afebrile. Labs were reviewed and electrolytes replaced appropriately.   Physical exam was appropriate for post operative state.   Was able to tolerate a regular diet as it was advanced in an appropriate surgical manner.   Was able to ambulate and void without difficulty prior to discharge, following movement restrictions placed by plastic surgery team.  Pain and nausea controlled with PRN medications.   Deemed suitable for discharge on 7 Days Post-Op        Consults:   Consults (From admission, onward)        Status Ordering Provider     Inpatient consult to Infectious Diseases  Once     Provider:  (Not yet assigned)    Completed NATE GUERRERO     Inpatient consult to PICC team ([facility])  Once     Provider:  (Not yet assigned)    Completed PANKAJ BELTRE     Inpatient consult to Plastic Surgery  Once     Provider:  (Not yet assigned)    Completed NATE GUERRERO          Significant Diagnostic Studies: Labs: CMP No results for input(s): NA, K, CL, CO2, GLU, BUN, CREATININE, CALCIUM, PROT, ALBUMIN, BILITOT, ALKPHOS, AST, ALT, ANIONGAP, ESTGFRAFRICA, EGFRNONAA in the last 48 hours. and CBC No results for input(s): WBC, HGB, HCT, PLT in the last 48 hours.    Pending Diagnostic Studies:     None        Final Active Diagnoses:    Diagnosis Date Noted POA    PRINCIPAL PROBLEM:  Surgical wound breakdown [T81.31XA] 12/31/2019 Yes      Problems Resolved During this Admission:      Discharged Condition: good    Disposition: [place]-Health Care [facility]    Follow Up:  Follow-up Information     Brian Wong MD In 1 week.    Specialty:  Plastic Surgery  Contact information:  [address] [place] [address]  [phone]             SEBAS Prieto II, MD. Schedule an appointment as soon as possible for a visit in 2 weeks.    Specialty:  Vascular Surgery  Why:  Post-operative follow up with LYNN  Contact information:  [facility]  [address]  [phone]             [facility]. Schedule an appointment as soon as possible for a visit in 6 weeks.    Specialty:  Infectious Diseases  Why:  Follow up,   Contact information:  [address] [place]  [place] [address]  [phone]                   Patient Instructions:      US Lower Extrem Arteries Bilat with LYNN (xpd)   Standing Status: Future Standing Exp. Date: 01/07/21     Order Specific Question Answer Comments   May the Radiologist modify the order per protocol to meet the clinical needs of the patient? Yes      Diet diabetic     No driving until:   Order Comments: No driving while still taking pain medications daily and while drains are in place.   Take a stool softener while taking pain medications daily.     Other restrictions (specify):   Order Comments: Okay for ambulation. Okay to stand for 30 minutes/day, three times daily.     Notify your health care provider if you experience any of the following:  increased confusion or weakness     Notify your health care provider if you experience any of the following:  persistent dizziness, light-headedness, or visual disturbances     Notify your health care provider if you experience any of the following:  worsening rash     Notify your health care provider if you experience any of the following:  severe persistent headache     Notify your health care provider if you experience any of the following:  difficulty breathing or increased cough     Notify your health care provider if you experience any of the following:  redness, tenderness, or signs of infection (pain, swelling, redness, odor or green/yellow discharge around incision site)     Notify your health care provider if you experience any of the following:  severe uncontrolled pain     Notify your health care provider if you experience any of the following:  persistent nausea and vomiting or diarrhea     Notify your health care provider if you experience any of the following:   temperature >100.4     Leave dressing on - Keep it clean, dry, and intact until clinic visit   Order Comments: Leave dressing on that is currently covering the outside of your left hip (donor site)     Medications:  Reconciled Home Medications:      Medication List      CONTINUE taking these medications    aspirin 81 MG EC tablet  Commonly known as:  ECOTRIN  Take 81 mg by mouth once daily.     atorvastatin 40 MG tablet  Commonly known as:  LIPITOR  Take 40 mg by mouth once daily.     cetirizine 10 mg chewable tablet  Take 10 mg by mouth as needed.     coenzyme Q10 10 mg capsule  Take 10 mg by mouth once daily.     glyBURIDE-metformin 2.5-500 mg 2.5-500 mg per tablet  Commonly known as:  GLUCOVANCE  Take 1 tablet by mouth 2 (two) times daily with meals.     HYDROcodone-acetaminophen 5-325 mg per tablet  Commonly known as:  NORCO  Take 1 tablet by mouth every 6 (six) hours as needed for Pain.     oxyCODONE 5 MG immediate release tablet  Commonly known as:  ROXICODONE  Take 1 tablet (5 mg total) by mouth every 4 (four) hours as needed.     pregabalin 100 MG capsule  Commonly known as:  LYRICA  Take 100 mg by mouth 2 (two) times daily.     valsartan 160 MG tablet  Commonly known as:  DIOVAN  Take 160 mg by mouth once daily.        STOP taking these medications    cilostazol 100 MG Tab  Commonly known as:  PLETAL     clindamycin 300 MG capsule  Commonly known as:  CLEOCIN     clopidogrel 75 mg tablet  Commonly known as:  PLAVIX     mupirocin 2 % ointment  Commonly known as:  BACTROBAN     Slow-Mag 71.5 mg Tbec  Generic drug:  magnesium chloride            Mela Stauffer MD  Vascular Surgery  [facility]

## 2020-01-10 NOTE — HPI
Patient is a 59 y.o. male who on 12/20/19 underwent a bilateral femoral endarterectomy with Dr. Gomez.  His post op course at that time was uncomplicated and he was discharged on 12/24/19.  However on 12/27/19 the patient returned to the ED with concerns of redness and pain at the groin incisions sites.  The left worse than the right.  He was sent home at that time with clindamycin and had a follow up appointment the next Monday.  On follow up the groin incisions were less tender but began to have increased clear drainage from the groin incisions.  The skin necrosis was also worse at that time.  He had the skin debrided in the office and he was subsequently sent for a CT scan.  The scan resulted with a fluid collection at the bilateral groin incision sites.  Likely a seroma collection.  He denies any fevers or chills.  No night sweats.  Is able to ambulate without issues.

## 2020-01-10 NOTE — HOSPITAL COURSE
JAIRO LINDSEY 59 y.o.male underwent: Procedure(s) (LRB):  WASHOUT (Bilateral)  CREATION, FLAP, MUSCLE ROTATION (N/A)  APPLICATION, WOUND VAC (N/A) for bilateral groin surgical wound breakdown. Tolerated procedure well, was transferred to  then to regular floor post op. Please see the dictated operative note for further procedure details.   Hospital course was uncomplicated, he underwent multiple washouts of bilateral wounds until cultures were clear, he was treated with appropriate antibiotics based on culture sensitivites and recommendations from infectious disease team.   Vitals remained stable, afebrile. Labs were reviewed and electrolytes replaced appropriately.   Physical exam was appropriate for post operative state.   Was able to tolerate a regular diet as it was advanced in an appropriate surgical manner.   Was able to ambulate and void without difficulty prior to discharge, following movement restrictions placed by plastic surgery team.  Pain and nausea controlled with PRN medications.   Deemed suitable for discharge on 7 Days Post-Op

## 2020-01-15 ENCOUNTER — TELEPHONE (OUTPATIENT)
Dept: VASCULAR SURGERY | Facility: CLINIC | Age: 60
End: 2020-01-15

## 2020-01-15 NOTE — TELEPHONE ENCOUNTER
Contacted patient's wife in response to message. Notified wife that patient does not need to be seen for FU by both Dr. Gomez and Dr. Prieto and that appts with Dr. Gomez will suffice. Wife verbalized understanding.. ----- Message from Jeanie Dickens sent at 1/15/2020  3:08 PM CST -----  Contact: Wife Patricia  Good Afternoon,    Patricia Britton called on behalf of her , Renan Britton. He had a procedure with Dr. Gomez and have an appointment to see him on the 22nd. She wanted to know if he will still need to see Dr. Prieto. She can be reached at [phone].    Thanks,    Jeanie Dickens

## 2020-01-17 ENCOUNTER — OFFICE VISIT (OUTPATIENT)
Dept: PLASTIC SURGERY | Facility: CLINIC | Age: 60
End: 2020-01-17
Payer: COMMERCIAL

## 2020-01-17 VITALS
BODY MASS INDEX: 34.45 KG/M2 | DIASTOLIC BLOOD PRESSURE: 94 MMHG | WEIGHT: 247 LBS | SYSTOLIC BLOOD PRESSURE: 147 MMHG | HEART RATE: 79 BPM

## 2020-01-17 DIAGNOSIS — T81.31XD DEHISCENCE OF OPERATIVE WOUND, SUBSEQUENT ENCOUNTER: Primary | ICD-10-CM

## 2020-01-17 PROCEDURE — 99999 PR PBB SHADOW E&M-EST. PATIENT-LVL III: CPT | Mod: PBBFAC,,, | Performed by: SURGERY

## 2020-01-17 PROCEDURE — 99024 POSTOP FOLLOW-UP VISIT: CPT | Mod: S$GLB,,, | Performed by: SURGERY

## 2020-01-17 PROCEDURE — 99999 PR PBB SHADOW E&M-EST. PATIENT-LVL III: ICD-10-PCS | Mod: PBBFAC,,, | Performed by: SURGERY

## 2020-01-17 PROCEDURE — 99024 PR POST-OP FOLLOW-UP VISIT: ICD-10-PCS | Mod: S$GLB,,, | Performed by: SURGERY

## 2020-01-22 ENCOUNTER — OFFICE VISIT (OUTPATIENT)
Dept: INFECTIOUS DISEASES | Facility: CLINIC | Age: 60
End: 2020-01-22
Payer: COMMERCIAL

## 2020-01-22 ENCOUNTER — HOSPITAL ENCOUNTER (OUTPATIENT)
Dept: VASCULAR SURGERY | Facility: CLINIC | Age: 60
Discharge: HOME | End: 2020-01-22
Attending: SURGERY
Payer: COMMERCIAL

## 2020-01-22 ENCOUNTER — OFFICE VISIT (OUTPATIENT)
Dept: PLASTIC SURGERY | Facility: CLINIC | Age: 60
End: 2020-01-22
Payer: COMMERCIAL

## 2020-01-22 VITALS
WEIGHT: 249 LBS | SYSTOLIC BLOOD PRESSURE: 141 MMHG | TEMPERATURE: 98 F | DIASTOLIC BLOOD PRESSURE: 82 MMHG | BODY MASS INDEX: 34.9 KG/M2 | HEART RATE: 82 BPM | BODY MASS INDEX: 34.73 KG/M2 | HEART RATE: 80 BPM | WEIGHT: 249.31 LBS | DIASTOLIC BLOOD PRESSURE: 82 MMHG | HEIGHT: 71 IN | SYSTOLIC BLOOD PRESSURE: 118 MMHG

## 2020-01-22 DIAGNOSIS — A49.8 PROTEUS INFECTION: ICD-10-CM

## 2020-01-22 DIAGNOSIS — I77.6 VASCULAR INFLAMMATION OR INFECTION: Primary | ICD-10-CM

## 2020-01-22 DIAGNOSIS — Z79.2 RECEIVING INTRAVENOUS ANTIBIOTIC TREATMENT AS OUTPATIENT: ICD-10-CM

## 2020-01-22 DIAGNOSIS — I73.9 PAD (PERIPHERAL ARTERY DISEASE): ICD-10-CM

## 2020-01-22 DIAGNOSIS — T81.31XD DEHISCENCE OF OPERATIVE WOUND, SUBSEQUENT ENCOUNTER: Primary | ICD-10-CM

## 2020-01-22 DIAGNOSIS — A49.1 STREPTOCOCCAL INFECTION: ICD-10-CM

## 2020-01-22 PROCEDURE — 99999 PR PBB SHADOW E&M-EST. PATIENT-LVL III: ICD-10-PCS | Mod: PBBFAC,,, | Performed by: SURGERY

## 2020-01-22 PROCEDURE — 99213 OFFICE O/P EST LOW 20 MIN: CPT | Mod: S$GLB,,, | Performed by: INTERNAL MEDICINE

## 2020-01-22 PROCEDURE — 93925 LOWER EXTREMITY STUDY: CPT | Mod: S$GLB,,, | Performed by: SURGERY

## 2020-01-22 PROCEDURE — 99999 PR PBB SHADOW E&M-EST. PATIENT-LVL III: CPT | Mod: PBBFAC,,, | Performed by: INTERNAL MEDICINE

## 2020-01-22 PROCEDURE — 93923 PR NON-INVASIVE PHYSIOLOGIC STUDY EXTREMITY 3 LEVELS: ICD-10-PCS | Mod: S$GLB,,, | Performed by: SURGERY

## 2020-01-22 PROCEDURE — 99999 PR PBB SHADOW E&M-EST. PATIENT-LVL III: ICD-10-PCS | Mod: PBBFAC,,, | Performed by: INTERNAL MEDICINE

## 2020-01-22 PROCEDURE — 99213 PR OFFICE/OUTPT VISIT, EST, LEVL III, 20-29 MIN: ICD-10-PCS | Mod: S$GLB,,, | Performed by: INTERNAL MEDICINE

## 2020-01-22 PROCEDURE — 93925 PR DUPLEX LO EXTREM ART BILAT: ICD-10-PCS | Mod: S$GLB,,, | Performed by: SURGERY

## 2020-01-22 PROCEDURE — 93923 UPR/LXTR ART STDY 3+ LVLS: CPT | Mod: S$GLB,,, | Performed by: SURGERY

## 2020-01-22 PROCEDURE — 99999 PR PBB SHADOW E&M-EST. PATIENT-LVL III: CPT | Mod: PBBFAC,,, | Performed by: SURGERY

## 2020-01-22 PROCEDURE — 99024 POSTOP FOLLOW-UP VISIT: CPT | Mod: S$GLB,,, | Performed by: SURGERY

## 2020-01-22 PROCEDURE — 99024 PR POST-OP FOLLOW-UP VISIT: ICD-10-PCS | Mod: S$GLB,,, | Performed by: SURGERY

## 2020-01-22 NOTE — PROGRESS NOTES
Subjective:      Patient ID: Renan Britton is a 59 y.o. male.    Chief Complaint:Hospital Follow Up      History of Present Illness    A 59-year-old man with PAD, S/P bilateral femoral endarterectomy on 12/20/19, post operative course complicated by erythema at the groin incision site treated with clindamycin, fluid collections at the surgical site, s/p surgical wound breakdown, s/p wound washout with left groin flap on 1/3/2020 who is seen as a hospital follow up. Mr. Britton surgical cultures were positive for Group F Streptococcus and P mirabilis. He was discharged on a 6 week course of Ceftriaxone scheduled to end on 2/14/2020 due to endovascular infection with above organisms. He has no complaints. He has intentionally lost around 30 lbs and has noted decreased swelling about his groin. Review of wound photographs provided by his wife show clean wound base with beefy red tissue. He has no complaints.      Review of Systems   Constitution: Negative for chills, decreased appetite, fever, malaise/fatigue, night sweats, weight gain and weight loss.   HENT: Negative for congestion, ear pain, hearing loss, hoarse voice, sore throat and tinnitus.    Eyes: Negative for blurred vision, redness and visual disturbance.   Cardiovascular: Negative for chest pain, leg swelling and palpitations.   Respiratory: Negative for cough, hemoptysis, shortness of breath and sputum production.    Hematologic/Lymphatic: Negative for adenopathy. Does not bruise/bleed easily.   Skin: Negative for dry skin, itching, rash and suspicious lesions.   Musculoskeletal: Negative for back pain, joint pain, myalgias and neck pain.   Gastrointestinal: Negative for abdominal pain, constipation, diarrhea, heartburn, nausea and vomiting.   Genitourinary: Negative for dysuria, flank pain, frequency, hematuria, hesitancy and urgency.   Neurological: Negative for dizziness, headaches, numbness, paresthesias and weakness.   Psychiatric/Behavioral:  Negative for depression and memory loss. The patient does not have insomnia and is not nervous/anxious.      Objective:   Physical Exam   Constitutional: He is oriented to person, place, and time. He appears well-developed and well-nourished.   HENT:   Head: Normocephalic and atraumatic.   Right Ear: External ear normal.   Left Ear: External ear normal.   Mouth/Throat: Oropharynx is clear and moist.   Eyes: Pupils are equal, round, and reactive to light. Conjunctivae and EOM are normal.   Neck: Normal range of motion. Neck supple. No thyromegaly present.   Cardiovascular: Normal rate and regular rhythm. Gallop: heard best at the second right ICS.   Murmur heard.   Systolic murmur is present with a grade of 3/6.  Pulmonary/Chest: Effort normal and breath sounds normal. He has no wheezes. He has no rales.   Abdominal: Soft. Bowel sounds are normal. He exhibits no mass. There is no tenderness. There is no rebound.   Musculoskeletal: Normal range of motion.        Legs:  Lymphadenopathy:     He has no cervical adenopathy.   Neurological: He is alert and oriented to person, place, and time.   Skin: Skin is warm and dry.   Psychiatric: He has a normal mood and affect. His behavior is normal.   Vitals reviewed.      Date 1/7 1/13 1/20   WBC 6.8 6.7 5.8   BUN 12 15 14   Creat 0.7 0.6 0.6   AST 31 44 36   ALT 39 52 37   ALP 46 61 45   ESR - 45 21   CRP - 24.9 < 5     Assessment:       1. Vascular inflammation or infection    2. Proteus infection    3. Streptococcal infection    4. 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 Tolerating ceftriaxone without adverse effects. Laboratory results reviewed and ESR plus CRP improving.   · Continue ceftriaxone 2 gm daily.  · EOC: 2/14/2020.  · Continue routine laboratory monitoring.   · Signs and symptoms of recurrent infection discussed. They will call if any symptoms develop.   · RTC  in 2-3 weeks.

## 2020-01-24 ENCOUNTER — OFFICE VISIT (OUTPATIENT)
Dept: VASCULAR SURGERY | Facility: CLINIC | Age: 60
End: 2020-01-24
Attending: SURGERY
Payer: COMMERCIAL

## 2020-01-24 VITALS
DIASTOLIC BLOOD PRESSURE: 85 MMHG | HEART RATE: 75 BPM | TEMPERATURE: 98 F | HEIGHT: 71 IN | BODY MASS INDEX: 34.57 KG/M2 | SYSTOLIC BLOOD PRESSURE: 155 MMHG | WEIGHT: 246.94 LBS

## 2020-01-24 DIAGNOSIS — I74.5 ILIAC ARTERY OCCLUSION, LEFT: Primary | ICD-10-CM

## 2020-01-24 DIAGNOSIS — I70.201 FEMORAL ARTERY STENOSIS, RIGHT: ICD-10-CM

## 2020-01-24 PROCEDURE — 99024 POSTOP FOLLOW-UP VISIT: CPT | Mod: S$GLB,,, | Performed by: SURGERY

## 2020-01-24 PROCEDURE — 99999 PR PBB SHADOW E&M-EST. PATIENT-LVL III: CPT | Mod: PBBFAC,,, | Performed by: SURGERY

## 2020-01-24 PROCEDURE — 99999 PR PBB SHADOW E&M-EST. PATIENT-LVL III: ICD-10-PCS | Mod: PBBFAC,,, | Performed by: SURGERY

## 2020-01-24 PROCEDURE — 99024 PR POST-OP FOLLOW-UP VISIT: ICD-10-PCS | Mod: S$GLB,,, | Performed by: SURGERY

## 2020-01-26 NOTE — PROGRESS NOTES
Patient here for follow up  Denies drainage from his left groin or incisions.  Suprapubic edema, erythema improved since last visit.    Drain output is less than 10 cc per day for last 3 days.  Sutures removed, remaining drain removed  Instructed on scar care, aquaphor for moisturizing    Lab Results   Component Value Date    CRP <5.0 01/20/2020     Lab Results   Component Value Date    SEDRATE 21 (H) 01/20/2020     Lab Results   Component Value Date    WBC 5.80 01/20/2020    HGB 13.5 (L) 01/20/2020    HCT 39.4 (L) 01/20/2020    MCV 88 01/20/2020     01/20/2020     Take antibiotics as prescirbed  Perform limiting sitting 30 minutes at meal time 3 times a day for a total of 6 weeks    Plastic & Reconstructive Surgery  [facility]  c/o Brian Wong M.D.  [facility]  [address]  [address] [place] [address]    Work [phone]  Toll free [phone]  If no answer [phone]

## 2020-01-28 ENCOUNTER — TELEPHONE (OUTPATIENT)
Dept: INFECTIOUS DISEASES | Facility: CLINIC | Age: 60
End: 2020-01-28

## 2020-01-28 NOTE — TELEPHONE ENCOUNTER
Weekly laboratory work up reviewed.   Date 1/7 1/13 1/20 1/27   WBC 6.8 6.7 5.8 -   BUN 12 15 14 16   Creat 0.7 0.6 0.6 0.6   AST 31 44 36 31   ALT 39 52 37 31   ALP 46 61 45 45   ESR - 45 21 -   CRP - 24.9 < 5 < 5         Assessment:  Proteus and Group F Streptococcus endovascular infection     Plan:  · Continue ceftriaxone 2 gm daily.  · EOC: 2/14/2020.  · Continue routine laboratory monitoring.   · [facility] follow up established

## 2020-01-31 LAB — FUNGUS SPEC CULT: NORMAL

## 2020-02-04 NOTE — PROGRESS NOTES
Postop Evaluation:    Doing quite well s/p     1) ENDARTERECTOMY, FEMORAL (Bilateral)  2) ANGIOGRAM, PELVIC  3) Left common iliac artery PTA (6 x 40 [place])  4) Left common iliac artery stent (GORE VBX 10 x 39mm)  5) Prevena VAC placement, bilateral groins    With subsequent left groin muscle flap coverage by plastic surgery and right groin wahsout and VAC placement.  He is currently still receiving IV ABX via PICC.   Ambulating well.      1/2020 Imaging  Peripheral Vascular Disease       Lower Extremity Arterial Imaging  ========================                 RIGHT                               LEFT                 PS         Details                 PS         Details                                                                           cm/s                                  cm/s  MICHAEL                                                   80  EIA                                                   170  mid  CFA         140         triphasic flow        140        triphasic flow  mid  DFA         42                                     50  prox  SFA         94                                     124  prox  SFA         64                                     128  mid  SFA         41                                     279  distal  POP        37                                     58  mid  POP        60                                     62  distal  ELISHA          24                                     46  prox  ELISHA          14                                     37  mid  ELISHA          22                                     29  distal  PTA         41                                      67  mid  PTA         39                                      62  distal  DPA         25                                     23       Comment  ========  Calcifications are noted throughout the entire arterial tree, which denies complete visualization of the vessels.       Impression  =========  Right leg: Duplex imaging of the right lower  extremity arterial tree reveals triphasic waveforms throughout and a velocity elevation of 64 cm/sec to 450 cm/sec in the Right  mid/distal SFA with a visual narrowing. These findings suggest a > 75% hemodynamically significant stenosis. A branch is noted at the Right mid SFA proximal to the  narrowing. A visual narrowing is seen at the distal popliteal artery with slightly elevated velocities that do not suggest a focal stenosis. Wound vac at the right groin denied  complete visualization of the distal CFA, most prox SFA, and most prox DFA. Shadowing denied visualization of the prox Right PTA.  Left leg: Duplex imaging of the left lower extremity arterial tree reveals triphasic waveforms throughout and a velocity elevation of 128 cm/sec to 279 cm/sec in the Left distal  SFA with a visual narrowing. These findings suggest a > 50% hemodynamically significant stenosis. A branch is noted at the Left distal SFA proximal to the narrowing. The  Left MICHAEL stent appears to be patent with no evidence of a focal stenosis. The Left distal CFA, most prox SFA, and most prox DFA were not well visualized due to edema at  the groin/upper thigh region. Shadowing denied visualization of the prox Left PTA.    Peripheral Vascular Disease       Pressure Lower  ============                 RIGHT                        LEFT                 Pressure                     Pressure                 mmHg                        mmHg  Brac                                          119  hial  Calf          139                            168  Post         107                            135  erior  tibial  Dors         81                             93  kayli  pedis  LYNN          0.90                           1.13  post.  tibial  LYNN          0.68                           0.78  dors.  ped.  LYNN          0.90                           1.13       Impression  =========  Right Leg: Segmental pressures and PVR waveforms suggest minimal peripheral arterial  occlusive disease.  Left Leg: Segmental pressures and PVR waveforms suggest minimal peripheral arterial occlusive disease.    A/P    1) continue asa, statin, daily walking   2) RTC in 2 months with LYNN    Kelby Gomez MD  Vascular & Endovascular Surgery

## 2020-02-12 ENCOUNTER — INFUSION (OUTPATIENT)
Dept: INFECTIOUS DISEASES | Facility: HOSPITAL | Age: 60
End: 2020-02-12
Attending: INTERNAL MEDICINE
Payer: COMMERCIAL

## 2020-02-12 ENCOUNTER — OFFICE VISIT (OUTPATIENT)
Dept: INFECTIOUS DISEASES | Facility: CLINIC | Age: 60
End: 2020-02-12
Payer: COMMERCIAL

## 2020-02-12 ENCOUNTER — OFFICE VISIT (OUTPATIENT)
Dept: PLASTIC SURGERY | Facility: CLINIC | Age: 60
End: 2020-02-12
Payer: COMMERCIAL

## 2020-02-12 VITALS
BODY MASS INDEX: 35.12 KG/M2 | HEIGHT: 71 IN | DIASTOLIC BLOOD PRESSURE: 68 MMHG | SYSTOLIC BLOOD PRESSURE: 118 MMHG | HEART RATE: 76 BPM | WEIGHT: 250.88 LBS

## 2020-02-12 VITALS
DIASTOLIC BLOOD PRESSURE: 88 MMHG | HEIGHT: 71 IN | HEART RATE: 88 BPM | SYSTOLIC BLOOD PRESSURE: 127 MMHG | TEMPERATURE: 99 F | BODY MASS INDEX: 35.37 KG/M2 | WEIGHT: 252.63 LBS

## 2020-02-12 DIAGNOSIS — Z79.2 RECEIVING INTRAVENOUS ANTIBIOTIC TREATMENT AS OUTPATIENT: ICD-10-CM

## 2020-02-12 DIAGNOSIS — I77.6 VASCULAR INFLAMMATION OR INFECTION: Primary | ICD-10-CM

## 2020-02-12 DIAGNOSIS — A49.8 PROTEUS INFECTION: ICD-10-CM

## 2020-02-12 DIAGNOSIS — T81.31XD DEHISCENCE OF OPERATIVE WOUND, SUBSEQUENT ENCOUNTER: Primary | ICD-10-CM

## 2020-02-12 DIAGNOSIS — A49.1 STREPTOCOCCAL INFECTION: ICD-10-CM

## 2020-02-12 PROCEDURE — 99024 POSTOP FOLLOW-UP VISIT: CPT | Mod: S$GLB,,, | Performed by: SURGERY

## 2020-02-12 PROCEDURE — 99999 PR PBB SHADOW E&M-EST. PATIENT-LVL III: ICD-10-PCS | Mod: PBBFAC,,, | Performed by: SURGERY

## 2020-02-12 PROCEDURE — 99999 PR PBB SHADOW E&M-EST. PATIENT-LVL III: CPT | Mod: PBBFAC,,, | Performed by: INTERNAL MEDICINE

## 2020-02-12 PROCEDURE — 99999 PR PBB SHADOW E&M-EST. PATIENT-LVL III: ICD-10-PCS | Mod: PBBFAC,,, | Performed by: INTERNAL MEDICINE

## 2020-02-12 PROCEDURE — 99024 PR POST-OP FOLLOW-UP VISIT: ICD-10-PCS | Mod: S$GLB,,, | Performed by: SURGERY

## 2020-02-12 PROCEDURE — 99213 OFFICE O/P EST LOW 20 MIN: CPT | Mod: S$GLB,,, | Performed by: INTERNAL MEDICINE

## 2020-02-12 PROCEDURE — 99999 PR PBB SHADOW E&M-EST. PATIENT-LVL III: CPT | Mod: PBBFAC,,, | Performed by: SURGERY

## 2020-02-12 PROCEDURE — 99213 PR OFFICE/OUTPT VISIT, EST, LEVL III, 20-29 MIN: ICD-10-PCS | Mod: S$GLB,,, | Performed by: INTERNAL MEDICINE

## 2020-02-12 RX ORDER — CEPHALEXIN 500 MG/1
500 CAPSULE ORAL 4 TIMES DAILY
Qty: 8 CAPSULE | Refills: 0 | Status: SHIPPED | OUTPATIENT
Start: 2020-02-12 | End: 2020-02-14

## 2020-02-12 RX ORDER — CLOPIDOGREL BISULFATE 75 MG/1
75 TABLET ORAL DAILY
COMMUNITY
End: 2020-02-12 | Stop reason: SDUPTHER

## 2020-02-12 NOTE — PROGRESS NOTES
PICC line to ELEUTERIO removed as ordered, vaseline gauze, 2x2 pads and coban applied to site, tolerated well.  Observed x 30 min. Understood to remove dressing in 24 hrs., hold pressure if bleeding occurs and call MD.  Left unit in NAD with family member at side.

## 2020-02-12 NOTE — PROGRESS NOTES
Subjective:      Patient ID: Renan Britton is a 59 y.o. male.    Chief Complaint:Follow-up      History of Present Illness    A 59-year-old man with PAD, S/P bilateral femoral endarterectomy on 12/20/19, post operative course complicated by erythema at the groin incision site treated with clindamycin, fluid collections at the surgical site, s/p surgical wound breakdown, s/p wound washout with left groin flap on 1/3/2020 who is seen as a follow up. He has no complaints today. His erythema has fully resolved and his flap is healing. The right groin wound is no longer being packed due to shallow depth. He has not had issues with his PICC line or noted any adverse effect related to his antibiotics.     Mr. Britton surgical cultures were positive for Group F Streptococcus and P mirabilis. He was discharged on a 6 week course of Ceftriaxone scheduled to end on 2/14/2020 due to endovascular infection with above organisms.    Review of Systems   Constitution: Negative for chills, decreased appetite, fever, malaise/fatigue, night sweats, weight gain and weight loss.   HENT: Negative for congestion, ear pain, hearing loss, hoarse voice, sore throat and tinnitus.    Eyes: Negative for blurred vision, redness and visual disturbance.   Cardiovascular: Negative for chest pain, leg swelling and palpitations.   Respiratory: Negative for cough, hemoptysis, shortness of breath and sputum production.    Hematologic/Lymphatic: Negative for adenopathy. Does not bruise/bleed easily.   Skin: Negative for dry skin, itching, rash and suspicious lesions.   Musculoskeletal: Negative for back pain, joint pain, myalgias and neck pain.   Gastrointestinal: Negative for abdominal pain, constipation, diarrhea, heartburn, nausea and vomiting.   Genitourinary: Negative for dysuria, flank pain, frequency, hematuria, hesitancy and urgency.   Neurological: Negative for dizziness, headaches, numbness, paresthesias and weakness.    Psychiatric/Behavioral: Negative for depression and memory loss. The patient does not have insomnia and is not nervous/anxious.      Objective:   Physical Exam   Constitutional: He is oriented to person, place, and time. He appears well-developed and well-nourished.   HENT:   Head: Normocephalic and atraumatic.   Right Ear: External ear normal.   Left Ear: External ear normal.   Mouth/Throat: Oropharynx is clear and moist.   Eyes: Pupils are equal, round, and reactive to light. Conjunctivae and EOM are normal.   Neck: Normal range of motion. Neck supple. No thyromegaly present.   Cardiovascular: Normal rate and regular rhythm. Gallop: heard best at the second right ICS.   Murmur heard.   Systolic murmur is present with a grade of 3/6.  Pulmonary/Chest: Effort normal and breath sounds normal. He has no wheezes. He has no rales.   Abdominal: Soft. Bowel sounds are normal. He exhibits no mass. There is no tenderness. There is no rebound.   Musculoskeletal: Normal range of motion.   RUE PICC without erythema. Right groin wound with beefy red base and without surrounding erythema or purulence. Left groin flap healed without erythema, purulence, or dehiscence.    Lymphadenopathy:     He has no cervical adenopathy.   Neurological: He is alert and oriented to person, place, and time.   Skin: Skin is warm and dry.   Psychiatric: He has a normal mood and affect. His behavior is normal.   Vitals reviewed.          Date 1/7 1/13 1/20 1/27 2/3 2/10   WBC 6.8 6.7 5.8 - 4.98 6.1   BUN 12 15 14 16 16 18   Creat 0.7 0.6 0.6 0.6 0.6 0.6   AST 31 44 36 31 31 35   ALT 39 52 37 31 31 33   ALP 46 61 45 45 45 41   ESR - 45 21 - 8 4   CRP - 24.9 < 5 < 5 < 5 < 5     Assessment:       1. Vascular inflammation or infection    2. Proteus infection    3. Streptococcal infection    4. 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 Tolerating  ceftriaxone without adverse effects. Laboratory results reviewed and ESR plus CRP now within normal range.   · Complete ceftriaxone 2 gm daily today.  · Start keflex 500 mg every 6 hours.  · EOC: 2/14/2020.  · Signs and symptoms of recurrent infection discussed. They will call if any symptoms develop.   · RTC as needed.

## 2020-02-17 RX ORDER — CLOPIDOGREL BISULFATE 75 MG/1
75 TABLET ORAL DAILY
Qty: 30 TABLET | Refills: 10 | Status: SHIPPED | OUTPATIENT
Start: 2020-02-17 | End: 2021-03-18 | Stop reason: SDUPTHER

## 2020-02-18 ENCOUNTER — TELEPHONE (OUTPATIENT)
Dept: PLASTIC SURGERY | Facility: CLINIC | Age: 60
End: 2020-02-18

## 2020-02-18 NOTE — TELEPHONE ENCOUNTER
Spoke with pts wife and let her know that husbands appt on the 24th wasn't necessary as Dr Wong saw him already on the 17th. I told her if they had any concerns moving forward to give us a call , She verbalized understanding.               ----- Message from Papito Melvin sent at 2/18/2020 11:04 AM CST -----  Contact: Pt  Patient's wife called to inquired if the patient's appt on 2/24 was still necessary after seeing Marvin on 2/12      Callback:[phone](Patricia)

## 2020-03-01 NOTE — PROGRESS NOTES
Patient returns for wound check.  He is still doing limited sitting.  Groin incision and clean dry and intact and he is with minimal discomfort.  There is no evidence of fluid collection in his thigh or groin.  His flap is soft and with good capillary refill.  He has no evidence of deep fluid collection. Seroma or infection.  There is no visible suture material in or around the wound.      Lab Results   Component Value Date    CRP <5.0 02/10/2020     Lab Results   Component Value Date    SEDRATE 0 02/10/2020     Lab Results   Component Value Date    WBC 5.20 02/10/2020    HGB 14.7 02/10/2020    HCT 44.3 02/10/2020    MCV 90 02/10/2020     02/10/2020     He will follow up as needed in the future.  He has no further sitting or lifting restrictions from my standpoint.  He can perform scar massage and practice sun avoidance if needed.  If he is comfortable he can wear lower extremity compression (bilateral above the knee) for edema control.  Sun screen can be used to avoid scar pigmentation (50 spf or greater) and he can use aquaphor to moisturize surgical incisions.  If there is any spitting suture material in the future he can follow up as needed.  All of his questions were answered.  Follow up prn.  If he is having any difficulty or pain with sitting or mobilizing, I can send him for PT.      Plastic & Reconstructive Surgery  [facility]  c/o Brian Wong M.D.  [facility]  [address]  [address] [place] [address]    Work [phone]  Toll free [phone]  If no answer [phone]

## 2020-03-04 ENCOUNTER — PATIENT MESSAGE (OUTPATIENT)
Dept: VASCULAR SURGERY | Facility: CLINIC | Age: 60
End: 2020-03-04

## 2020-03-04 DIAGNOSIS — I73.9 CLAUDICATION: ICD-10-CM

## 2020-03-04 DIAGNOSIS — I73.9 PAD (PERIPHERAL ARTERY DISEASE): Primary | ICD-10-CM

## 2020-03-04 LAB
ACID FAST MOD KINY STN SPEC: NORMAL
MYCOBACTERIUM SPEC QL CULT: NORMAL

## 2020-03-08 ENCOUNTER — PATIENT MESSAGE (OUTPATIENT)
Dept: INFECTIOUS DISEASES | Facility: CLINIC | Age: 60
End: 2020-03-08

## 2020-03-08 ENCOUNTER — PATIENT MESSAGE (OUTPATIENT)
Dept: VASCULAR SURGERY | Facility: CLINIC | Age: 60
End: 2020-03-08

## 2020-03-08 PROCEDURE — 99285 EMERGENCY DEPT VISIT HI MDM: CPT | Mod: ,,, | Performed by: EMERGENCY MEDICINE

## 2020-03-08 PROCEDURE — 99285 EMERGENCY DEPT VISIT HI MDM: CPT | Mod: 25

## 2020-03-08 PROCEDURE — 99285 PR EMERGENCY DEPT VISIT,LEVEL V: ICD-10-PCS | Mod: ,,, | Performed by: EMERGENCY MEDICINE

## 2020-03-09 ENCOUNTER — HOSPITAL ENCOUNTER (INPATIENT)
Facility: HOSPITAL | Age: 60
LOS: 2 days | Discharge: HOME HEALTH | DRG: 872 | End: 2020-03-12
Attending: EMERGENCY MEDICINE | Admitting: HOSPITALIST
Payer: COMMERCIAL

## 2020-03-09 DIAGNOSIS — I70.202 FEMORAL ARTERY OCCLUSION, LEFT: ICD-10-CM

## 2020-03-09 DIAGNOSIS — R78.81 GRAM-NEGATIVE BACTEREMIA: ICD-10-CM

## 2020-03-09 DIAGNOSIS — R50.9 FEVER: ICD-10-CM

## 2020-03-09 DIAGNOSIS — A49.8 PROTEUS MIRABILIS INFECTION: ICD-10-CM

## 2020-03-09 DIAGNOSIS — R00.0 TACHYCARDIA: ICD-10-CM

## 2020-03-09 DIAGNOSIS — R01.1 SYSTOLIC MURMUR: ICD-10-CM

## 2020-03-09 DIAGNOSIS — A41.9 SEPSIS: ICD-10-CM

## 2020-03-09 DIAGNOSIS — A41.50 GRAM NEGATIVE SEPTICEMIA: Primary | ICD-10-CM

## 2020-03-09 PROBLEM — E11.9 TYPE 2 DIABETES MELLITUS, WITHOUT LONG-TERM CURRENT USE OF INSULIN: Status: ACTIVE | Noted: 2020-03-09

## 2020-03-09 PROBLEM — N17.9 AKI (ACUTE KIDNEY INJURY): Status: ACTIVE | Noted: 2020-03-09

## 2020-03-09 PROBLEM — Z87.898 HISTORY OF BRAIN TUMOR: Status: ACTIVE | Noted: 2020-03-09

## 2020-03-09 PROBLEM — R56.9 SEIZURES: Status: ACTIVE | Noted: 2020-03-09

## 2020-03-09 PROBLEM — I10 ESSENTIAL HYPERTENSION: Status: ACTIVE | Noted: 2020-03-09

## 2020-03-09 PROBLEM — D69.6 THROMBOCYTOPENIA, UNSPECIFIED: Status: ACTIVE | Noted: 2020-03-09

## 2020-03-09 PROBLEM — D69.6 THROMBOCYTOPENIA: Status: ACTIVE | Noted: 2020-03-09

## 2020-03-09 LAB
ADENOVIRUS: NOT DETECTED
ALBUMIN SERPL BCP-MCNC: 3.6 G/DL (ref 3.5–5.2)
ALP SERPL-CCNC: 50 U/L (ref 55–135)
ALT SERPL W/O P-5'-P-CCNC: 37 U/L (ref 10–44)
ANION GAP SERPL CALC-SCNC: 10 MMOL/L (ref 8–16)
ANION GAP SERPL CALC-SCNC: 12 MMOL/L (ref 8–16)
ANISOCYTOSIS BLD QL SMEAR: SLIGHT
ASCENDING AORTA: 2.84 CM
AST SERPL-CCNC: 33 U/L (ref 10–40)
AV INDEX (PROSTH): 0.36
AV MEAN GRADIENT: 37 MMHG
AV PEAK GRADIENT: 50 MMHG
AV VALVE AREA: 1.4 CM2
AV VELOCITY RATIO: 0.42
BACTERIA #/AREA URNS AUTO: NORMAL /HPF
BASOPHILS # BLD AUTO: 0.02 K/UL (ref 0–0.2)
BASOPHILS # BLD AUTO: 0.03 K/UL (ref 0–0.2)
BASOPHILS NFR BLD: 0.2 % (ref 0–1.9)
BASOPHILS NFR BLD: 0.3 % (ref 0–1.9)
BILIRUB SERPL-MCNC: 0.7 MG/DL (ref 0.1–1)
BILIRUB UR QL STRIP: NEGATIVE
BORDETELLA PARAPERTUSSIS (IS1001): NOT DETECTED
BORDETELLA PERTUSSIS (PTXP): NOT DETECTED
BSA FOR ECHO PROCEDURE: 2.43 M2
BUN SERPL-MCNC: 16 MG/DL (ref 6–20)
BUN SERPL-MCNC: 22 MG/DL (ref 6–20)
CALCIUM SERPL-MCNC: 9.3 MG/DL (ref 8.7–10.5)
CALCIUM SERPL-MCNC: 9.7 MG/DL (ref 8.7–10.5)
CHLAMYDIA PNEUMONIAE: NOT DETECTED
CHLORIDE SERPL-SCNC: 101 MMOL/L (ref 95–110)
CHLORIDE SERPL-SCNC: 101 MMOL/L (ref 95–110)
CLARITY UR REFRACT.AUTO: ABNORMAL
CO2 SERPL-SCNC: 20 MMOL/L (ref 23–29)
CO2 SERPL-SCNC: 24 MMOL/L (ref 23–29)
COLOR UR AUTO: YELLOW
CORONAVIRUS 229E, COMMON COLD VIRUS: NOT DETECTED
CORONAVIRUS HKU1, COMMON COLD VIRUS: NOT DETECTED
CORONAVIRUS NL63, COMMON COLD VIRUS: NOT DETECTED
CORONAVIRUS OC43, COMMON COLD VIRUS: NOT DETECTED
CREAT SERPL-MCNC: 1 MG/DL (ref 0.5–1.4)
CREAT SERPL-MCNC: 1.4 MG/DL (ref 0.5–1.4)
CRP SERPL-MCNC: 113.2 MG/L (ref 0–8.2)
CV ECHO LV RWT: 0.56 CM
DIFFERENTIAL METHOD: ABNORMAL
DIFFERENTIAL METHOD: ABNORMAL
DOP CALC AO PEAK VEL: 3.53 M/S
DOP CALC AO VTI: 71.1 CM
DOP CALC LVOT AREA: 3.9 CM2
DOP CALC LVOT DIAMETER: 2.22 CM
DOP CALC LVOT PEAK VEL: 1.48 M/S
DOP CALC LVOT STROKE VOLUME: 99.74 CM3
DOP CALCLVOT PEAK VEL VTI: 25.78 CM
E WAVE DECELERATION TIME: 213.99 MSEC
E/A RATIO: 1.13
E/E' RATIO: 13.89 M/S
ECHO LV POSTERIOR WALL: 1.3 CM (ref 0.6–1.1)
EOSINOPHIL # BLD AUTO: 0 K/UL (ref 0–0.5)
EOSINOPHIL # BLD AUTO: 0 K/UL (ref 0–0.5)
EOSINOPHIL NFR BLD: 0 % (ref 0–8)
EOSINOPHIL NFR BLD: 0 % (ref 0–8)
ERYTHROCYTE [DISTWIDTH] IN BLOOD BY AUTOMATED COUNT: 13.2 % (ref 11.5–14.5)
ERYTHROCYTE [DISTWIDTH] IN BLOOD BY AUTOMATED COUNT: 13.2 % (ref 11.5–14.5)
ERYTHROCYTE [SEDIMENTATION RATE] IN BLOOD BY WESTERGREN METHOD: 23 MM/HR (ref 0–23)
EST. GFR  (AFRICAN AMERICAN): >60 ML/MIN/1.73 M^2
EST. GFR  (AFRICAN AMERICAN): >60 ML/MIN/1.73 M^2
EST. GFR  (NON AFRICAN AMERICAN): 55.4 ML/MIN/1.73 M^2
EST. GFR  (NON AFRICAN AMERICAN): >60 ML/MIN/1.73 M^2
ESTIMATED AVG GLUCOSE: 105 MG/DL (ref 68–131)
FLUBV RNA NPH QL NAA+NON-PROBE: NOT DETECTED
FRACTIONAL SHORTENING: 35 % (ref 28–44)
GLUCOSE SERPL-MCNC: 177 MG/DL (ref 70–110)
GLUCOSE SERPL-MCNC: 194 MG/DL (ref 70–110)
GLUCOSE UR QL STRIP: NEGATIVE
HBA1C MFR BLD HPLC: 5.3 % (ref 4–5.6)
HCT VFR BLD AUTO: 43.4 % (ref 40–54)
HCT VFR BLD AUTO: 46.2 % (ref 40–54)
HGB BLD-MCNC: 14.1 G/DL (ref 14–18)
HGB BLD-MCNC: 14.9 G/DL (ref 14–18)
HGB UR QL STRIP: NEGATIVE
HPIV1 RNA NPH QL NAA+NON-PROBE: NOT DETECTED
HPIV2 RNA NPH QL NAA+NON-PROBE: NOT DETECTED
HPIV3 RNA NPH QL NAA+NON-PROBE: NOT DETECTED
HPIV4 RNA NPH QL NAA+NON-PROBE: NOT DETECTED
HUMAN METAPNEUMOVIRUS: NOT DETECTED
HYALINE CASTS UR QL AUTO: 0 /LPF
HYPOCHROMIA BLD QL SMEAR: ABNORMAL
IMM GRANULOCYTES # BLD AUTO: 0.03 K/UL (ref 0–0.04)
IMM GRANULOCYTES # BLD AUTO: 0.05 K/UL (ref 0–0.04)
IMM GRANULOCYTES NFR BLD AUTO: 0.3 % (ref 0–0.5)
IMM GRANULOCYTES NFR BLD AUTO: 0.5 % (ref 0–0.5)
INFLUENZA A (SUBTYPES H1,H1-2009,H3): NOT DETECTED
INFLUENZA A, MOLECULAR: NEGATIVE
INFLUENZA B, MOLECULAR: NEGATIVE
INTERVENTRICULAR SEPTUM: 1.26 CM (ref 0.6–1.1)
KETONES UR QL STRIP: NEGATIVE
LA MAJOR: 4.49 CM
LA MINOR: 4.57 CM
LA WIDTH: 3.99 CM
LACTATE SERPL-SCNC: 2 MMOL/L (ref 0.5–2.2)
LEFT ATRIUM SIZE: 3.76 CM
LEFT ATRIUM VOLUME INDEX: 24.4 ML/M2
LEFT ATRIUM VOLUME: 57.76 CM3
LEFT INTERNAL DIMENSION IN SYSTOLE: 3 CM (ref 2.1–4)
LEFT VENTRICLE DIASTOLIC VOLUME INDEX: 41.89 ML/M2
LEFT VENTRICLE DIASTOLIC VOLUME: 99.12 ML
LEFT VENTRICLE MASS INDEX: 96 G/M2
LEFT VENTRICLE SYSTOLIC VOLUME INDEX: 14.8 ML/M2
LEFT VENTRICLE SYSTOLIC VOLUME: 35.03 ML
LEFT VENTRICULAR INTERNAL DIMENSION IN DIASTOLE: 4.64 CM (ref 3.5–6)
LEFT VENTRICULAR MASS: 228.03 G
LEUKOCYTE ESTERASE UR QL STRIP: ABNORMAL
LV LATERAL E/E' RATIO: 13.89 M/S
LV SEPTAL E/E' RATIO: 13.89 M/S
LYMPHOCYTES # BLD AUTO: 0.1 K/UL (ref 1–4.8)
LYMPHOCYTES # BLD AUTO: 0.3 K/UL (ref 1–4.8)
LYMPHOCYTES NFR BLD: 1.3 % (ref 18–48)
LYMPHOCYTES NFR BLD: 2.6 % (ref 18–48)
MCH RBC QN AUTO: 29 PG (ref 27–31)
MCH RBC QN AUTO: 29.2 PG (ref 27–31)
MCHC RBC AUTO-ENTMCNC: 32.3 G/DL (ref 32–36)
MCHC RBC AUTO-ENTMCNC: 32.5 G/DL (ref 32–36)
MCV RBC AUTO: 90 FL (ref 82–98)
MCV RBC AUTO: 90 FL (ref 82–98)
MICROSCOPIC COMMENT: NORMAL
MONOCYTES # BLD AUTO: 0.1 K/UL (ref 0.3–1)
MONOCYTES # BLD AUTO: 0.3 K/UL (ref 0.3–1)
MONOCYTES NFR BLD: 1 % (ref 4–15)
MONOCYTES NFR BLD: 3.4 % (ref 4–15)
MV PEAK A VEL: 1.11 M/S
MV PEAK E VEL: 1.25 M/S
MYCOPLASMA PNEUMONIAE: NOT DETECTED
NEUTROPHILS # BLD AUTO: 10.7 K/UL (ref 1.8–7.7)
NEUTROPHILS # BLD AUTO: 8.9 K/UL (ref 1.8–7.7)
NEUTROPHILS NFR BLD: 93.5 % (ref 38–73)
NEUTROPHILS NFR BLD: 96.9 % (ref 38–73)
NITRITE UR QL STRIP: NEGATIVE
NRBC BLD-RTO: 0 /100 WBC
NRBC BLD-RTO: 0 /100 WBC
OVALOCYTES BLD QL SMEAR: ABNORMAL
PH UR STRIP: 5 [PH] (ref 5–8)
PISA TR MAX VEL: 2.28 M/S
PLATELET # BLD AUTO: 112 K/UL (ref 150–350)
PLATELET # BLD AUTO: 89 K/UL (ref 150–350)
PMV BLD AUTO: 11 FL (ref 9.2–12.9)
PMV BLD AUTO: 11.5 FL (ref 9.2–12.9)
POCT GLUCOSE: 164 MG/DL (ref 70–110)
POCT GLUCOSE: 181 MG/DL (ref 70–110)
POIKILOCYTOSIS BLD QL SMEAR: SLIGHT
POLYCHROMASIA BLD QL SMEAR: ABNORMAL
POTASSIUM SERPL-SCNC: 3.6 MMOL/L (ref 3.5–5.1)
POTASSIUM SERPL-SCNC: 4.2 MMOL/L (ref 3.5–5.1)
PROCALCITONIN SERPL IA-MCNC: 1.25 NG/ML
PROT SERPL-MCNC: 7.3 G/DL (ref 6–8.4)
PROT UR QL STRIP: ABNORMAL
RA MAJOR: 4.52 CM
RA PRESSURE: 3 MMHG
RA WIDTH: 3.69 CM
RBC # BLD AUTO: 4.83 M/UL (ref 4.6–6.2)
RBC # BLD AUTO: 5.14 M/UL (ref 4.6–6.2)
RBC #/AREA URNS AUTO: 1 /HPF (ref 0–4)
RESPIRATORY INFECTION PANEL SOURCE: NORMAL
RIGHT VENTRICULAR END-DIASTOLIC DIMENSION: 3.84 CM
RSV RNA NPH QL NAA+NON-PROBE: NOT DETECTED
RV+EV RNA NPH QL NAA+NON-PROBE: NOT DETECTED
SINUS: 3.16 CM
SODIUM SERPL-SCNC: 133 MMOL/L (ref 136–145)
SODIUM SERPL-SCNC: 135 MMOL/L (ref 136–145)
SP GR UR STRIP: 1.02 (ref 1–1.03)
SPECIMEN SOURCE: NORMAL
SQUAMOUS #/AREA URNS AUTO: 0 /HPF
STJ: 2.49 CM
TDI LATERAL: 0.09 M/S
TDI SEPTAL: 0.09 M/S
TDI: 0.09 M/S
TR MAX PG: 21 MMHG
TRICUSPID ANNULAR PLANE SYSTOLIC EXCURSION: 2.44 CM
TV REST PULMONARY ARTERY PRESSURE: 24 MMHG
URN SPEC COLLECT METH UR: ABNORMAL
WBC # BLD AUTO: 11 K/UL (ref 3.9–12.7)
WBC # BLD AUTO: 9.54 K/UL (ref 3.9–12.7)
WBC #/AREA URNS AUTO: 3 /HPF (ref 0–5)

## 2020-03-09 PROCEDURE — 93005 ELECTROCARDIOGRAM TRACING: CPT

## 2020-03-09 PROCEDURE — G0378 HOSPITAL OBSERVATION PER HR: HCPCS

## 2020-03-09 PROCEDURE — 25000003 PHARM REV CODE 250: Performed by: SURGERY

## 2020-03-09 PROCEDURE — 87502 INFLUENZA DNA AMP PROBE: CPT

## 2020-03-09 PROCEDURE — 87186 SC STD MICRODIL/AGAR DIL: CPT

## 2020-03-09 PROCEDURE — 84145 PROCALCITONIN (PCT): CPT

## 2020-03-09 PROCEDURE — 80048 BASIC METABOLIC PNL TOTAL CA: CPT

## 2020-03-09 PROCEDURE — 96376 TX/PRO/DX INJ SAME DRUG ADON: CPT

## 2020-03-09 PROCEDURE — 63600175 PHARM REV CODE 636 W HCPCS: Performed by: STUDENT IN AN ORGANIZED HEALTH CARE EDUCATION/TRAINING PROGRAM

## 2020-03-09 PROCEDURE — 80053 COMPREHEN METABOLIC PANEL: CPT

## 2020-03-09 PROCEDURE — 25000003 PHARM REV CODE 250: Performed by: EMERGENCY MEDICINE

## 2020-03-09 PROCEDURE — 81001 URINALYSIS AUTO W/SCOPE: CPT

## 2020-03-09 PROCEDURE — 87077 CULTURE AEROBIC IDENTIFY: CPT

## 2020-03-09 PROCEDURE — 93005 ELECTROCARDIOGRAM TRACING: CPT | Performed by: INTERNAL MEDICINE

## 2020-03-09 PROCEDURE — 83036 HEMOGLOBIN GLYCOSYLATED A1C: CPT

## 2020-03-09 PROCEDURE — 85652 RBC SED RATE AUTOMATED: CPT

## 2020-03-09 PROCEDURE — 96361 HYDRATE IV INFUSION ADD-ON: CPT

## 2020-03-09 PROCEDURE — 87040 BLOOD CULTURE FOR BACTERIA: CPT

## 2020-03-09 PROCEDURE — 25000003 PHARM REV CODE 250: Performed by: STUDENT IN AN ORGANIZED HEALTH CARE EDUCATION/TRAINING PROGRAM

## 2020-03-09 PROCEDURE — 93010 ELECTROCARDIOGRAM REPORT: CPT | Mod: ,,, | Performed by: INTERNAL MEDICINE

## 2020-03-09 PROCEDURE — 83605 ASSAY OF LACTIC ACID: CPT

## 2020-03-09 PROCEDURE — 86140 C-REACTIVE PROTEIN: CPT

## 2020-03-09 PROCEDURE — 96374 THER/PROPH/DIAG INJ IV PUSH: CPT

## 2020-03-09 PROCEDURE — 93010 EKG 12-LEAD: ICD-10-PCS | Mod: ,,, | Performed by: INTERNAL MEDICINE

## 2020-03-09 PROCEDURE — 96375 TX/PRO/DX INJ NEW DRUG ADDON: CPT

## 2020-03-09 PROCEDURE — 36415 COLL VENOUS BLD VENIPUNCTURE: CPT

## 2020-03-09 PROCEDURE — S0030 INJECTION, METRONIDAZOLE: HCPCS | Performed by: STUDENT IN AN ORGANIZED HEALTH CARE EDUCATION/TRAINING PROGRAM

## 2020-03-09 PROCEDURE — 99223 1ST HOSP IP/OBS HIGH 75: CPT | Mod: ,,,

## 2020-03-09 PROCEDURE — 99223 PR INITIAL HOSPITAL CARE,LEVL III: ICD-10-PCS | Mod: ,,,

## 2020-03-09 PROCEDURE — 63600175 PHARM REV CODE 636 W HCPCS: Performed by: HOSPITALIST

## 2020-03-09 PROCEDURE — 85025 COMPLETE CBC W/AUTO DIFF WBC: CPT | Mod: 91

## 2020-03-09 PROCEDURE — 87633 RESP VIRUS 12-25 TARGETS: CPT

## 2020-03-09 RX ORDER — GLUCAGON 1 MG
1 KIT INJECTION
Status: DISCONTINUED | OUTPATIENT
Start: 2020-03-09 | End: 2020-03-12 | Stop reason: HOSPADM

## 2020-03-09 RX ORDER — ACETAMINOPHEN 325 MG/1
650 TABLET ORAL EVERY 8 HOURS PRN
Status: DISCONTINUED | OUTPATIENT
Start: 2020-03-09 | End: 2020-03-12 | Stop reason: HOSPADM

## 2020-03-09 RX ORDER — IBUPROFEN 200 MG
16 TABLET ORAL
Status: DISCONTINUED | OUTPATIENT
Start: 2020-03-09 | End: 2020-03-12 | Stop reason: HOSPADM

## 2020-03-09 RX ORDER — METRONIDAZOLE 500 MG/100ML
500 INJECTION, SOLUTION INTRAVENOUS
Status: DISCONTINUED | OUTPATIENT
Start: 2020-03-09 | End: 2020-03-09

## 2020-03-09 RX ORDER — ACETAMINOPHEN 325 MG/1
650 TABLET ORAL
Status: DISCONTINUED | OUTPATIENT
Start: 2020-03-09 | End: 2020-03-09

## 2020-03-09 RX ORDER — ONDANSETRON 2 MG/ML
4 INJECTION INTRAMUSCULAR; INTRAVENOUS EVERY 8 HOURS PRN
Status: DISCONTINUED | OUTPATIENT
Start: 2020-03-09 | End: 2020-03-12 | Stop reason: HOSPADM

## 2020-03-09 RX ORDER — HYDRALAZINE HYDROCHLORIDE 25 MG/1
25 TABLET, FILM COATED ORAL EVERY 8 HOURS PRN
Status: DISCONTINUED | OUTPATIENT
Start: 2020-03-09 | End: 2020-03-12 | Stop reason: HOSPADM

## 2020-03-09 RX ORDER — CEFEPIME HYDROCHLORIDE 1 G/1
2 INJECTION, POWDER, FOR SOLUTION INTRAMUSCULAR; INTRAVENOUS
Status: DISCONTINUED | OUTPATIENT
Start: 2020-03-09 | End: 2020-03-10

## 2020-03-09 RX ORDER — ECHINACEA 400 MG
2 CAPSULE ORAL EVERY MORNING
COMMUNITY
End: 2023-05-03

## 2020-03-09 RX ORDER — ASPIRIN 81 MG/1
81 TABLET ORAL DAILY
Status: DISCONTINUED | OUTPATIENT
Start: 2020-03-09 | End: 2020-03-12 | Stop reason: HOSPADM

## 2020-03-09 RX ORDER — IBUPROFEN 200 MG
24 TABLET ORAL
Status: DISCONTINUED | OUTPATIENT
Start: 2020-03-09 | End: 2020-03-12 | Stop reason: HOSPADM

## 2020-03-09 RX ORDER — METFORMIN HYDROCHLORIDE 500 MG/1
1000 TABLET, EXTENDED RELEASE ORAL
Status: ON HOLD | COMMUNITY
End: 2023-12-11 | Stop reason: HOSPADM

## 2020-03-09 RX ORDER — CLOPIDOGREL BISULFATE 75 MG/1
75 TABLET ORAL DAILY
Status: DISCONTINUED | OUTPATIENT
Start: 2020-03-09 | End: 2020-03-12 | Stop reason: HOSPADM

## 2020-03-09 RX ORDER — ATORVASTATIN CALCIUM 20 MG/1
40 TABLET, FILM COATED ORAL DAILY
Status: DISCONTINUED | OUTPATIENT
Start: 2020-03-09 | End: 2020-03-12 | Stop reason: HOSPADM

## 2020-03-09 RX ORDER — ACETAMINOPHEN 500 MG
1000 TABLET ORAL NIGHTLY
Status: ON HOLD | COMMUNITY
End: 2020-03-12 | Stop reason: HOSPADM

## 2020-03-09 RX ORDER — INSULIN ASPART 100 [IU]/ML
0-5 INJECTION, SOLUTION INTRAVENOUS; SUBCUTANEOUS
Status: DISCONTINUED | OUTPATIENT
Start: 2020-03-09 | End: 2020-03-12 | Stop reason: HOSPADM

## 2020-03-09 RX ORDER — PREGABALIN 50 MG/1
100 CAPSULE ORAL 2 TIMES DAILY
Status: DISCONTINUED | OUTPATIENT
Start: 2020-03-09 | End: 2020-03-12 | Stop reason: HOSPADM

## 2020-03-09 RX ORDER — ACETAMINOPHEN 325 MG/1
650 TABLET ORAL ONCE
Status: COMPLETED | OUTPATIENT
Start: 2020-03-09 | End: 2020-03-09

## 2020-03-09 RX ORDER — POLYETHYLENE GLYCOL 3350 17 G/17G
17 POWDER, FOR SOLUTION ORAL DAILY
Status: DISCONTINUED | OUTPATIENT
Start: 2020-03-09 | End: 2020-03-12 | Stop reason: HOSPADM

## 2020-03-09 RX ORDER — ACETAMINOPHEN 500 MG
1000 TABLET ORAL
Status: DISCONTINUED | OUTPATIENT
Start: 2020-03-09 | End: 2020-03-09

## 2020-03-09 RX ORDER — IBUPROFEN 600 MG/1
600 TABLET ORAL
Status: COMPLETED | OUTPATIENT
Start: 2020-03-09 | End: 2020-03-09

## 2020-03-09 RX ORDER — VANCOMYCIN 2 GRAM/500 ML IN 0.9 % SODIUM CHLORIDE INTRAVENOUS
2000 ONCE
Status: COMPLETED | OUTPATIENT
Start: 2020-03-09 | End: 2020-03-09

## 2020-03-09 RX ORDER — PROCHLORPERAZINE MALEATE 10 MG
10 TABLET ORAL EVERY 6 HOURS PRN
Status: DISCONTINUED | OUTPATIENT
Start: 2020-03-09 | End: 2020-03-12 | Stop reason: HOSPADM

## 2020-03-09 RX ORDER — CETIRIZINE HYDROCHLORIDE 5 MG/1
5 TABLET ORAL DAILY
Status: DISCONTINUED | OUTPATIENT
Start: 2020-03-09 | End: 2020-03-12 | Stop reason: HOSPADM

## 2020-03-09 RX ADMIN — SODIUM CHLORIDE, SODIUM LACTATE, POTASSIUM CHLORIDE, AND CALCIUM CHLORIDE 1000 ML: .6; .31; .03; .02 INJECTION, SOLUTION INTRAVENOUS at 01:03

## 2020-03-09 RX ADMIN — IBUPROFEN 600 MG: 600 TABLET, FILM COATED ORAL at 01:03

## 2020-03-09 RX ADMIN — CEFEPIME 2 G: 1 INJECTION, POWDER, FOR SOLUTION INTRAMUSCULAR; INTRAVENOUS at 05:03

## 2020-03-09 RX ADMIN — POLYETHYLENE GLYCOL 3350 17 G: 17 POWDER, FOR SOLUTION ORAL at 09:03

## 2020-03-09 RX ADMIN — SODIUM CHLORIDE, SODIUM LACTATE, POTASSIUM CHLORIDE, AND CALCIUM CHLORIDE 1000 ML: .6; .31; .03; .02 INJECTION, SOLUTION INTRAVENOUS at 03:03

## 2020-03-09 RX ADMIN — PREGABALIN 100 MG: 50 CAPSULE ORAL at 09:03

## 2020-03-09 RX ADMIN — ACETAMINOPHEN 650 MG: 325 TABLET ORAL at 12:03

## 2020-03-09 RX ADMIN — ATORVASTATIN CALCIUM 40 MG: 20 TABLET, FILM COATED ORAL at 10:03

## 2020-03-09 RX ADMIN — ASPIRIN 81 MG: 81 TABLET, COATED ORAL at 09:03

## 2020-03-09 RX ADMIN — PREGABALIN 100 MG: 50 CAPSULE ORAL at 08:03

## 2020-03-09 RX ADMIN — CETIRIZINE HYDROCHLORIDE 5 MG: 5 TABLET, FILM COATED ORAL at 09:03

## 2020-03-09 RX ADMIN — METRONIDAZOLE 500 MG: 500 INJECTION, SOLUTION INTRAVENOUS at 08:03

## 2020-03-09 RX ADMIN — CLOPIDOGREL BISULFATE 75 MG: 75 TABLET ORAL at 09:03

## 2020-03-09 RX ADMIN — ACETAMINOPHEN 650 MG: 325 TABLET ORAL at 05:03

## 2020-03-09 RX ADMIN — SODIUM CHLORIDE, SODIUM LACTATE, POTASSIUM CHLORIDE, AND CALCIUM CHLORIDE 1300 ML: .6; .31; .03; .02 INJECTION, SOLUTION INTRAVENOUS at 05:03

## 2020-03-09 RX ADMIN — VANCOMYCIN HYDROCHLORIDE 2000 MG: 1 INJECTION, POWDER, LYOPHILIZED, FOR SOLUTION INTRAVENOUS at 05:03

## 2020-03-09 NOTE — PROGRESS NOTES
VANCOMYCIN DOSING BY PHARMACY DISCONTINUATION NOTE    Renan Britton is a 59 y.o. male who had been consulted for vancomycin dosing.    The pharmacy consult for vancomycin dosing has been discontinued.     Vancomycin Dosing by Pharmacy Consult will sign-off. Please reconsult if necessary. Thank you for allowing us to participate in this patient's care.       Lela Ariza  [id number]

## 2020-03-09 NOTE — HOSPITAL COURSE
58 y/o male with DM (A1c 5.3), HTN, sz disorder (on Lyrica) and PAD s/p B femoral endarterectomy 12/20/19 complicated by infected seromas (Proteus, Strep) now s/p B groin wound wash outs/flap to L groin and 6 weeks IV abx presented with fever and myalgias. RIP negative. Proteus bacteremia - ID consulted, on cefepime. Fluid collections noted B groins on US; CTA A/P with BLE runoff shows inflammation - no intervention per VASCULAR SURGERY. Switched to ceftriaxone by ID - plan total of 4 weeks followed by chronic suppressive TX. Midline placed 3/12 and will follow-up with ID and VASCULAR SURGERY.

## 2020-03-09 NOTE — HPI
59M with DM2, PAD s/p bilateral femoral 12/2019 c/b R side incisional infected seroma (p mirabilis, group F strep) s/p 6 weeks antibiotics (EOT 02/14/20) admitted with chief complaint of fever and chills for approx 24 hours. Pt reports that he was in his usual state of health (ambulatory, takes care of own ADLs independently, etc) when he woke up with chills and had bilateral leg soreness. His wife took his temp, which was 102.7F so he went to an urgent care where he tested negative for the flu. He was dx with a viral syndrome and sent home; however, he had continued malaise and fever which prompted a return to the ER given his hx of DM and recent infected seroma. Denies cough, sinus pain, postnasal drip, diplopia or photophobia, new neck pain, chest pain, dyspnea, abdominal pain, n/v, diarrhea, dysuria, hematuria. Does report that he has been profusely sweating and has had a mild headache. No new rash; has gone to [place] recently for camping but no insect exposure. No new drainage or opening of either wound, no pain from either surgical site. No prior history of surgical implantation or ports    ED: BP in the 140s/80s, HR in the 110s, at TMax 100.6. He was on room air. WBC was normal but CRP elevated at 113.2, ESR WNL 23, procal elevated 1.25. Metabolic panel revealed JJ

## 2020-03-09 NOTE — PLAN OF CARE
CM met with patient at the bedside to discuss discharge planning assessment. Pt lives with spouse and has transportation at discharge. Pt verified PCP and Pharmacy. CM will continue to follow for discharge needs.        03/09/20 1037   Discharge Assessment   Assessment Type Discharge Planning Assessment   Confirmed/corrected address and phone number on facesheet? Yes   Assessment information obtained from? Patient   Expected Length of Stay (days) 2   Communicated expected length of stay with patient/caregiver yes   Prior to hospitilization cognitive status: Alert/Oriented   Prior to hospitalization functional status: Independent   Current cognitive status: Alert/Oriented   Current Functional Status: Independent   Lives With spouse   Able to Return to Prior Arrangements yes   Is patient able to care for self after discharge? Yes   Who are your caregiver(s) and their phone number(s)? Patricia [phone]   Readmission Within the Last 30 Days no previous admission in last 30 days   Patient currently being followed by outpatient case management? No   Patient currently receives any other outside agency services? No   Equipment Currently Used at Home glucometer   Do you have any problems affording any of your prescribed medications? No   Is the patient taking medications as prescribed? yes   Does the patient have transportation home? Yes   Transportation Anticipated family or friend will provide   Does the patient receive services at the [facility]? No   Discharge Plan A Home with family   Discharge Plan B Home Health   DME Needed Upon Discharge  none   Patient/Family in Agreement with Plan yes

## 2020-03-09 NOTE — ED NOTES
Telemetry Verification   Patient placed on Telemetry Box  Verified with War Room  Box # [id number]       Rate 106   Rhythm Sinus tachycardia

## 2020-03-09 NOTE — ASSESSMENT & PLAN NOTE
Valsartan 160 mg daily at home. Hold while has JJ; PRN hydralazine 25 mg for hypertension in the interim.

## 2020-03-09 NOTE — ASSESSMENT & PLAN NOTE
Pt reports he knows he does have a murmur but is unsure of the details. Quite loud on exam; will get TTE to investigate.     DDx congenital abnormality vs flow murmur (hypovolemia) vs vegetation

## 2020-03-09 NOTE — PROGRESS NOTES
Pharmacokinetic Initial Assessment: IV Vancomycin    Assessment/Plan:    Initiate intravenous vancomycin with loading dose of 2000 mg once(JJ) ; followed by a maintenance dose of vancomycin 1250mg IV every 12 hours  Desired empiric serum trough concentration is 15 to 20 mcg/mL  Draw vancomycin trough level 30 min prior to fourth dose on 03/10 at approximately 1730  Pharmacy will continue to follow and monitor vancomycin.      Please contact pharmacy at extension [phone] with any questions regarding this assessment.     Thank you for the consult,   Fahad Sarkar       Patient brief summary:  Renan Britton is a 59 y.o. male initiated on antimicrobial therapy with IV Vancomycin for treatment of suspected skin & soft tissue infection    Drug Allergies:   Review of patient's allergies indicates:   Allergen Reactions    Penicillins Hives     Patient currently on cefepime without complications       Actual Body Weight:   116.3 kg    Renal Function:   Estimated Creatinine Clearance: 75.5 mL/min (based on SCr of 1.4 mg/dL).,     Dialysis Method (if applicable):  N/A    CBC (last 72 hours):  Recent Labs   Lab Result Units 03/09/20  0103   WBC K/uL 9.54   Hemoglobin g/dL 14.9   Hematocrit % 46.2   Platelets K/uL 112*   Gran% % 93.5*   Lymph% % 2.6*   Mono% % 3.4*   Eosinophil% % 0.0   Basophil% % 0.2   Differential Method  Automated       Metabolic Panel (last 72 hours):  Recent Labs   Lab Result Units 03/09/20  0103   Sodium mmol/L 133*   Potassium mmol/L 3.6   Chloride mmol/L 101   CO2 mmol/L 20*   Glucose mg/dL 177*   Glucose, UA  Negative   BUN, Bld mg/dL 22*   Creatinine mg/dL 1.4   Albumin g/dL 3.6   Total Bilirubin mg/dL 0.7   Alkaline Phosphatase U/L 50*   AST U/L 33   ALT U/L 37       Drug levels (last 3 results):  No results for input(s): VANCOMYCINRA, VANCOMYCINPE, VANCOMYCINTR in the last 72 hours.    Microbiologic Results:  Microbiology Results (last 7 days)     Procedure Component Value Units Date/Time     Respiratory Infection Panel, Nasopharyngeal [[id number]]     Order Status:  No result Specimen:  Nasopharyngeal Swab     Blood culture #2 **CANNOT BE ORDERED STAT** [[id number]] Collected:  03/09/20 0103    Order Status:  Sent Specimen:  Blood from Peripheral, Antecubital, Left Updated:  03/09/20 0112    Blood culture #1 **CANNOT BE ORDERED STAT** [[id number]] Collected:  03/09/20 0103    Order Status:  Sent Specimen:  Blood from Peripheral, Antecubital, Right Updated:  03/09/20 0112    Influenza A & B by Molecular [[id number]] Collected:  03/09/20 0027    Order Status:  Completed Specimen:  Nasopharyngeal Swab from Nasal Swab Updated:  03/09/20 0050     Influenza A, Molecular Negative     Influenza B, Molecular Negative     Flu A & B Source NP

## 2020-03-09 NOTE — ED PROVIDER NOTES
Encounter Date: 3/8/2020       History     Chief Complaint   Patient presents with    Fever     Fever and tachycardia since this morning     59M with known PAD s/p b/l femoral endarterectomy c/b incision site fluid collected and 6wks IV antibiotics is here for fever and chills x <24hrs.  Patient reports this am he started noticing fever, measured a temp which was 102.7 and reported to the ER where a Flu test was negative and he was sent back with diagnosis of viral syndrome. Pt was administered ibuprofen which broke the fever and patient returned home and then became more concerned and returned to ER for blood workup. Upon presentation T 100.6 and patient noted to having mild headache and mild neck pain. Throughout he had intermittent chills and diaphoresis. Patient also reports recent travel to [place] for outdoor camping but denies insect/tick bite or rash.   Reports incision on right thigh was closed last Wednesday.  Denies cough, sob, nausea, vomiting, dysuria, abd pain, diarrhea, confusion, dizziness, myalgias, rhinorrhea, sore throat, congestion, photophobia, vision changes, ulcers, new rash, leg pain/swelling, pain around incision site from prior surgeries, sick contacts.        Review of patient's allergies indicates:   Allergen Reactions    Penicillins Hives     Patient currently on cefepime without complications     Past Medical History:   Diagnosis Date    Diabetes mellitus     H/O brain tumor     HLD (hyperlipidemia)     Hypertension     PAD (peripheral artery disease)     Seizures     R/T BRAIN TUMOR- SURGICALLY EXCISED     Past Surgical History:   Procedure Laterality Date    APPLICATION OF WOUND VACUUM-ASSISTED CLOSURE DEVICE Bilateral 1/1/2020    Procedure: APPLICATION, WOUND VAC;  Surgeon: SEBAS Prieto II, MD;  Location: [facility] OR [address] [place] [address];  Service: Cardiovascular;  Laterality: Bilateral;    APPLICATION OF WOUND VACUUM-ASSISTED CLOSURE DEVICE N/A 1/3/2020    Procedure: APPLICATION,  WOUND VAC;  Surgeon: Brian Wong MD;  Location: [address];  Service: Plastics;  Laterality: N/A;    BRAIN SURGERY  2011    TUMOR EXCISED    CREATION OF MUSCLE ROTATIONAL FLAP N/A 1/3/2020    Procedure: CREATION, FLAP, MUSCLE ROTATION;  Surgeon: Brian Wong MD;  Location: [address];  Service: Plastics;  Laterality: N/A;    ENDARTERECTOMY OF FEMORAL ARTERY Bilateral 2019    Procedure: ENDARTERECTOMY, FEMORAL;  Surgeon: Kelby Gomez MD;  Location: [address];  Service: Peripheral Vascular;  Laterality: Bilateral;  natalya common femoral endarterectomy with natalya. iliac stent placement    PERCUTANEOUS TRANSLUMINAL ANGIOPLASTY (PTA) OF PERIPHERAL VESSEL Left 10/17/2019    Procedure: PTA, PERIPHERAL VESSEL;  Surgeon: Rao Fisher MD;  Location: [facility] CATH;  Service: Cardiology;  Laterality: Left;     Family History   Problem Relation Age of Onset    Diabetes Mother     Hypertension Mother     Stroke Mother     Cancer Father         LUNG    Heart disease Father     Cancer Brother     Heart disease Brother     Heart disease Brother     Heart disease Brother      Social History     Tobacco Use    Smoking status: Former Smoker     Packs/day: 2.00     Types: Cigarettes     Last attempt to quit: 2011     Years since quittin.1    Smokeless tobacco: Never Used   Substance Use Topics    Alcohol use: Yes     Comment: 2-4 PER DAY    Drug use: Never     Review of Systems   Constitutional: Positive for chills, diaphoresis and fever. Negative for activity change, appetite change and fatigue.   HENT: Negative for congestion, postnasal drip, rhinorrhea, sinus pain and sore throat.    Eyes: Negative for visual disturbance.   Respiratory: Negative for cough and shortness of breath.    Cardiovascular: Negative for chest pain and leg swelling.   Gastrointestinal: Negative for abdominal pain, blood in stool, diarrhea, nausea and vomiting.   Genitourinary: Negative for dysuria,  frequency and urgency.   Musculoskeletal: Positive for neck pain. Negative for arthralgias, back pain and myalgias.   Skin: Negative for rash.   Neurological: Positive for headaches. Negative for dizziness, syncope, weakness, light-headedness and numbness.   Hematological: Does not bruise/bleed easily.   Psychiatric/Behavioral: Negative for confusion.       Physical Exam     Initial Vitals [03/09/20 0001]   BP Pulse Resp Temp SpO2   137/73 (!) 128 14 (!) 100.6 °F (38.1 °C) 97 %      MAP       --         Physical Exam    Constitutional: He appears well-developed and well-nourished.   Flushed, sweating   HENT:   Head: Normocephalic and atraumatic.   Mouth/Throat: Oropharynx is clear and moist. No oropharyngeal exudate.   Eyes: Conjunctivae and EOM are normal. No scleral icterus.   Neck: Normal range of motion. Neck supple. No thyromegaly present. No neck rigidity. No JVD present.   Cardiovascular: Regular rhythm and intact distal pulses.   Murmur heard.  tachycardic   Pulmonary/Chest: Breath sounds normal. No respiratory distress. He has no wheezes. He has no rales.   Abdominal: Soft. Normal appearance and bowel sounds are normal. He exhibits no distension. There is no tenderness.   Musculoskeletal: Normal range of motion. He exhibits no edema or tenderness.   Lymphadenopathy:     He has no cervical adenopathy.   Neurological: He is alert and oriented to person, place, and time. He has normal strength. No sensory deficit.   Skin: Skin is warm. Capillary refill takes less than 2 seconds. No rash and no abscess noted. No erythema. No pallor.   Incision site examined and no there is no drainage or tenderness or redness or induration  Good capillary refill on flaps at surgical site         ED Course   Procedures  Labs Reviewed   CBC W/ AUTO DIFFERENTIAL - Abnormal; Notable for the following components:       Result Value    Platelets 112 (*)     Gran # (ANC) 8.9 (*)     Lymph # 0.3 (*)     Gran% 93.5 (*)     Lymph% 2.6  "(*)     Mono% 3.4 (*)     All other components within normal limits   INFLUENZA A & B BY MOLECULAR   CULTURE, BLOOD   CULTURE, BLOOD   COMPREHENSIVE METABOLIC PANEL   LACTIC ACID, PLASMA   URINALYSIS, REFLEX TO URINE CULTURE   PROCALCITONIN   SEDIMENTATION RATE   C-REACTIVE PROTEIN          Imaging Results          X-Ray Chest PA And Lateral (Final result)  Result time 03/09/20 01:27:59    Final result by Igor Weldon MD (03/09/20 01:27:59)                 Impression:      No acute cardiopulmonary finding.      Electronically signed by: Igor Weldon MD  Date:    03/09/2020  Time:    01:27             Narrative:    EXAMINATION:  XR CHEST PA AND LATERAL    CLINICAL HISTORY:  Provided history is "  Fever, unspecified".    TECHNIQUE:  Frontal and lateral views of the chest were performed.    COMPARISON:  01/06/2020.    FINDINGS:  Cardiomediastinal silhouette is not enlarged.  No focal consolidation.  No sizable pleural effusion.  No pneumothorax.                              X-Rays:   Independently Interpreted Readings:   Chest X-Ray: No acute abnormalities. No consolidation, pleural effusion, cardiomegaly, infilitrates     Medical Decision Making:   Initial Assessment:   59M with recent surgery for PAD and surgical site infection s/p prolonged Abx is here for new fever and chills <24hrs  Differential Diagnosis:   Surgical site infection vs Influenza/URTI vs PNA vs UTI vs Gastroenteritis vs Meningitis   Clinical Tests:   Lab Tests: Ordered  Radiological Study: Ordered  Medical Tests: Ordered  ED Management:  Initial sepsis work up  CBC, CMP, Lactate, CXR, UA, Pro-dave, Blood cultures  Patient was administered 1L LR and one ibuprofen for fever and tachycardia  Will get an EKG for tachycardia and administer 1 LR again and some tylenol    Dispo pending results  If labs are normal and patient's status worsens - will think about meningitis given mild headache and neck pain however very low suspicion. More likely to " be viral syndrome.         Patient has elevated CRP to 113 and Pro dave to 1.25  Other labs and CXR unremarkable for any focus of infection  Lactate wnl  Flu negative    However persistent tachycardia even after being afebrile    Will admit patient to observation under Dr SINDY Sahni hosp med team 2              Attending Attestation:   Physician Attestation Statement for Resident:  As the supervising MD   Physician Attestation Statement: I have personally seen and examined this patient.   I agree with the above history. -:   As the supervising MD I agree with the above PE.    As the supervising MD I agree with the above treatment, course, plan, and disposition.  I have reviewed and agree with the residents interpretation of the following: lab data and EKG.                    ED Course as of Mar 09 0302   Mon Mar 09, 2020   0012 Pulse(!): 128 [NS]   0013 Temp(!): 100.6 °F (38.1 °C) [NS]   0236 CRP(!): 113.2 [NS]   0237 Procalcitonin(!): 1.25 [NS]      ED Course User Index  [NS] Boris Varela MD                Clinical Impression:       ICD-10-CM ICD-9-CM   1. Fever R50.9 780.60         Disposition:   Disposition: Placed in Observation  Condition: Fair                        Taye Tai MD  Resident  03/09/20 0155       Taye Tai MD  Resident  03/09/20 0304       Gabrielle Ponce MD  03/11/20 0019

## 2020-03-09 NOTE — ED TRIAGE NOTES
Pt reports flu-like symptoms, fever--highest of 102 at home, chills, HA, body aches. Pt denies cough but reports nasal congestion with clear mucous. Pt reports going to urgent care and was given motrin there, when he went home after, he states his fever returned and he took 1g tylenol around 2000    Patient Identifiers for Renan Britton checked and correct  LOC: The patient is awake, alert and aware of environment with an appropriate affect, the patient is oriented x 3 and speaking appropriate.  APPEARANCE: Patient resting comfortably and in no acute distress, patient is clean and well groomed, patient's clothing is properly fastened.  SKIN: The skin is warm and dry, patient has normal skin turgor and moist mucus membranes,no rashes or lesions.Skin Intact , No Breakdown Noted  Musculoskeletal :  Normal range of motion noted. Moves all extremeties well, No swelling or tenderness noted  RESPIRATORY: Airway is open and patent, respirations are spontaneous, patient has a normal effort and rate  CARDIAC: Patient has a normal rate and rhythm, no periphreal edema noted, capillary refill < 3 seconds.   ABDOMEN: Soft and non tender to palpation, no distention noted.  PULSES: 2+  And symmetrical in all extremeties  NEUROLOGIC: PERRL. facial expression is symmetrical, patient moving all extremities, normal sensation in all extremities when touched with a finger.The patient is awake, alert and cooperative with a calm affect, patient is aware of environment.    Will continue to monitor

## 2020-03-09 NOTE — ASSESSMENT & PLAN NOTE
59M with bilateral femoral endarterectomy c/b infected L groin seroma s/p 6 weeks antibiotics on 02/14/20 for p mirabilis and group F strep infection. Now with 1 day of fevers, chills, malaise, LE myalgias. No other focal symptoms; rapid flu negative at urgent care.    JJ on exam, diaphoretic, tachycardic, febrile. Sepsis source unlikely respiratory or urinary given lack of findings on CXR and UA respectively and lack of symptoms; possible surgical site re-infection but exam not very suggestive of this. Does have endovascular stent in L common iliac artery which may be a potential source of infection as well. No s/s meningitis, no other skin breakdown, no other known ports, catheters, etc.    ESR 23 (WNL, was 43 01/13/20 when infection identified )   (elevated, 24.9 01/13/20)  Procalcitonin 1.25    Plan  - Blood cultures drawn  - Finish 30 cc/kg with 1.3L LR (got 2L LR in ED)  - Start vanc + cefepime + metronidazole. This is aggressive and may likely be de-escalated pending clinical improvement  - Ultrasound L thigh seroma / surgical site to ensure no new pockets of fluid forming.

## 2020-03-09 NOTE — ASSESSMENT & PLAN NOTE
History of Brain Tumor    Continue lyrica 100 mg BID for AED; pt reports he was put on this after he was allergic to most common AEDs

## 2020-03-09 NOTE — ASSESSMENT & PLAN NOTE
Normal Anion Gap Metabolic Acidosis    Baseline creatinine 0.6, 1.4 on admission. Suspect JJ and NAGMA are in the setting of hypoperfusion in sepsis. Will hold off on further workup unless fail to improve with volume resuscitation antibiotics.

## 2020-03-09 NOTE — SUBJECTIVE & OBJECTIVE
Past Medical History:   Diagnosis Date    Diabetes mellitus     H/O brain tumor     HLD (hyperlipidemia)     Hypertension     PAD (peripheral artery disease)     Seizures     R/T BRAIN TUMOR- SURGICALLY EXCISED       Past Surgical History:   Procedure Laterality Date    APPLICATION OF WOUND VACUUM-ASSISTED CLOSURE DEVICE Bilateral 1/1/2020    Procedure: APPLICATION, WOUND VAC;  Surgeon: SEBAS Prieto II, MD;  Location: [address];  Service: Cardiovascular;  Laterality: Bilateral;    APPLICATION OF WOUND VACUUM-ASSISTED CLOSURE DEVICE N/A 1/3/2020    Procedure: APPLICATION, WOUND VAC;  Surgeon: Brian Wong MD;  Location: [address];  Service: Plastics;  Laterality: N/A;    BRAIN SURGERY  01/2011    TUMOR EXCISED    CREATION OF MUSCLE ROTATIONAL FLAP N/A 1/3/2020    Procedure: CREATION, FLAP, MUSCLE ROTATION;  Surgeon: Brian Wong MD;  Location: [address];  Service: Plastics;  Laterality: N/A;    ENDARTERECTOMY OF FEMORAL ARTERY Bilateral 12/20/2019    Procedure: ENDARTERECTOMY, FEMORAL;  Surgeon: Kelby Gomez MD;  Location: [address];  Service: Peripheral Vascular;  Laterality: Bilateral;  natalya common femoral endarterectomy with natalya. iliac stent placement    PERCUTANEOUS TRANSLUMINAL ANGIOPLASTY (PTA) OF PERIPHERAL VESSEL Left 10/17/2019    Procedure: PTA, PERIPHERAL VESSEL;  Surgeon: Rao Fisher MD;  [facility];  Service: Cardiology;  Laterality: Left;       Review of patient's allergies indicates:   Allergen Reactions    Penicillins Hives     Patient currently on cefepime without complications       No current facility-administered medications on file prior to encounter.      Current Outpatient Medications on File Prior to Encounter   Medication Sig    aspirin (ECOTRIN) 81 MG EC tablet Take 81 mg by mouth once daily.    atorvastatin (LIPITOR) 40 MG tablet Take 40 mg by mouth once daily.    cetirizine 10 mg chewable tablet Take 10 mg by mouth as  needed.    clopidogreL (PLAVIX) 75 mg tablet Take 1 tablet (75 mg total) by mouth once daily.    metFORMIN (GLUCOPHAGE-XR) 500 MG XR 24hr tablet Take 500 mg by mouth daily with breakfast.    pregabalin (LYRICA) 100 MG capsule Take 100 mg by mouth 2 (two) times daily.    valsartan (DIOVAN) 160 MG tablet Take 160 mg by mouth once daily.    glyBURIDE-metformin 2.5-500 mg (GLUCOVANCE) 2.5-500 mg per tablet Take 1 tablet by mouth 2 (two) times daily with meals.    HYDROcodone-acetaminophen (NORCO) 5-325 mg per tablet Take 1 tablet by mouth every 6 (six) hours as needed for Pain.    oxyCODONE (ROXICODONE) 5 MG immediate release tablet Take 1 tablet (5 mg total) by mouth every 4 (four) hours as needed.    [DISCONTINUED] coenzyme Q10 10 mg capsule Take 10 mg by mouth once daily.     Family History     Problem Relation (Age of Onset)    Cancer Father, Brother    Diabetes Mother    Heart disease Father, Brother, Brother, Brother    Hypertension Mother    Stroke Mother        Tobacco Use    Smoking status: Former Smoker     Packs/day: 2.00     Types: Cigarettes     Last attempt to quit: 2011     Years since quittin.1    Smokeless tobacco: Never Used   Substance and Sexual Activity    Alcohol use: Yes     Comment: 2-4 PER DAY    Drug use: Never    Sexual activity: Not on file     Review of Systems   Constitutional: Positive for activity change, appetite change, chills, fatigue and fever. Negative for unexpected weight change.   HENT: Negative for congestion.    Eyes: Negative for visual disturbance.   Respiratory: Negative for shortness of breath.    Cardiovascular: Negative for chest pain.   Gastrointestinal: Negative for abdominal pain.   Musculoskeletal: Positive for myalgias (legs). Negative for arthralgias.   Skin: Negative for wound.        diaphoretic   Neurological: Positive for headaches. Negative for speech difficulty and light-headedness.   Psychiatric/Behavioral: Negative for sleep disturbance.      Objective:     Vital Signs (Most Recent):  Temp: 99.4 °F (37.4 °C) (03/09/20 0416)  Pulse: 105 (03/09/20 0416)  Resp: 20 (03/09/20 0416)  BP: 98/63 (03/09/20 0416)  SpO2: 96 % (03/09/20 0416) Vital Signs (24h Range):  Temp:  [99.1 °F (37.3 °C)-100.6 °F (38.1 °C)] 99.4 °F (37.4 °C)  Pulse:  [105-128] 105  Resp:  [14-20] 20  SpO2:  [94 %-98 %] 96 %  BP: ()/(63-81) 98/63     Weight: 116.3 kg (256 lb 6.3 oz)  Body mass index is 35.76 kg/m².    Physical Exam   Constitutional: He is oriented to person, place, and time. No distress.   Lying in bed in no acute distress, diaphoretic.   HENT:   Head: Normocephalic and atraumatic.   Right Ear: External ear normal.   Left Ear: External ear normal.   Eyes: Pupils are equal, round, and reactive to light. EOM are normal. No scleral icterus.   Neck: No JVD present.   Cardiovascular: Intact distal pulses.   Tachycardic. Systolic ejection murmur 4/6 loudest at aortic window   Pulmonary/Chest: Effort normal and breath sounds normal. He has no rales.   Abdominal: Soft. Bowel sounds are normal. He exhibits distension (obese). There is no tenderness.   Musculoskeletal: He exhibits no edema or deformity.   R groin incision site completely healed, no underlying knots or induration palpated.    L groin incision closed, dry, intact. Some knots palpated underlying the skin at the superomedial aspect of the incision without tenderness, erythema, or fluctuance. No pain to palpation.    Neurological: He is alert and oriented to person, place, and time.   Skin: Capillary refill takes less than 2 seconds. No rash noted. He is diaphoretic.         CRANIAL NERVES     CN III, IV, VI   Pupils are equal, round, and reactive to light.  Extraocular motions are normal.        Significant Labs:   Blood Culture: No results for input(s): LABBLOO in the last 48 hours.  CBC:   Recent Labs   Lab 03/09/20 0103   WBC 9.54   HGB 14.9   HCT 46.2   *     CMP:   Recent Labs   Lab 03/09/20 0103   NA  133*   K 3.6      CO2 20*   *   BUN 22*   CREATININE 1.4   CALCIUM 9.7   PROT 7.3   ALBUMIN 3.6   BILITOT 0.7   ALKPHOS 50*   AST 33   ALT 37   ANIONGAP 12   EGFRNONAA 55.4*     Lactic Acid:   Recent Labs   Lab 03/09/20  0103   LACTATE 2.0     All pertinent labs within the past 24 hours have been reviewed.    Significant Imaging: I have reviewed all pertinent imaging results/findings within the past 24 hours.

## 2020-03-09 NOTE — PHARMACY MED REC
"Admission Medication Reconciliation - Pharmacy Consult Note    The home medication history was taken by Heydi Downing, pharmacy technician.  Based on information gathered and subsequent review by the clinical pharmacist, the items below may need attention.    You may go to "Admission" then "Reconcile Home Medications" tabs to review and/or act upon these items.      No issues noted with the medication reconciliation.      Please address this information as you see fit.  Feel free to contact us if you have any questions or require assistance.  Dilcia Michaels  EXT [phone]   .    .          "

## 2020-03-09 NOTE — ASSESSMENT & PLAN NOTE
Unclear significance / mechanism. No signs of consumptive coagulopathy on exam, no hematoma noted. Monitor for now.

## 2020-03-09 NOTE — H&P
[facility] Hospital Medicine  History & Physical    Patient Name: Renan Britton  MRN: [id number]  Admission Date: 3/9/2020  Attending Physician: Adelina Sahni MD   Primary Care Provider: Eren Becker MD    [facility] Medicine Team: [facility] [place] 2 Marixa Strong MD     Patient information was obtained from patient, past medical records and ER records.     Subjective:     Principal Problem:Sepsis    Chief Complaint:   Chief Complaint   Patient presents with    Fever     Fever and tachycardia since this morning        HPI: 59M with DM2, PAD s/p bilateral femoral 12/2019 c/b R side incisional infected seroma (p mirabilis, group F strep) s/p 6 weeks antibiotics (EOT 02/14/20) admitted with chief complaint of fever and chills for approx 24 hours. Pt reports that he was in his usual state of health (ambulatory, takes care of own ADLs independently, etc) when he woke up with chills and had bilateral leg soreness. His wife took his temp, which was 102.7F so he went to an urgent care where he tested negative for the flu. He was dx with a viral syndrome and sent home; however, he had continued malaise and fever which prompted a return to the ER given his hx of DM and recent infected seroma. Denies cough, sinus pain, postnasal drip, diplopia or photophobia, new neck pain, chest pain, dyspnea, abdominal pain, n/v, diarrhea, dysuria, hematuria. Does report that he has been profusely sweating and has had a mild headache. No new rash; has gone to [place] recently for camping but no insect exposure. No new drainage or opening of either wound, no pain from either surgical site. No prior history of surgical implantation or ports    ED: BP in the 140s/80s, HR in the 110s, at TMax 100.6. He was on room air. WBC was normal but CRP elevated at 113.2, ESR WNL 23, procal elevated 1.25. Metabolic panel revealed JJ    Past Medical History:   Diagnosis Date    Diabetes mellitus     H/O brain tumor     HLD  (hyperlipidemia)     Hypertension     PAD (peripheral artery disease)     Seizures     R/T BRAIN TUMOR- SURGICALLY EXCISED       Past Surgical History:   Procedure Laterality Date    APPLICATION OF WOUND VACUUM-ASSISTED CLOSURE DEVICE Bilateral 1/1/2020    Procedure: APPLICATION, WOUND VAC;  Surgeon: SEBAS Prieto II, MD;  Location: [address];  Service: Cardiovascular;  Laterality: Bilateral;    APPLICATION OF WOUND VACUUM-ASSISTED CLOSURE DEVICE N/A 1/3/2020    Procedure: APPLICATION, WOUND VAC;  Surgeon: Brian Wong MD;  Location: [address];  Service: Plastics;  Laterality: N/A;    BRAIN SURGERY  01/2011    TUMOR EXCISED    CREATION OF MUSCLE ROTATIONAL FLAP N/A 1/3/2020    Procedure: CREATION, FLAP, MUSCLE ROTATION;  Surgeon: Brian Wong MD;  Location: [address];  Service: Plastics;  Laterality: N/A;    ENDARTERECTOMY OF FEMORAL ARTERY Bilateral 12/20/2019    Procedure: ENDARTERECTOMY, FEMORAL;  Surgeon: Kelby Gomez MD;  Location: [address];  Service: Peripheral Vascular;  Laterality: Bilateral;  natalya common femoral endarterectomy with natalya. iliac stent placement    PERCUTANEOUS TRANSLUMINAL ANGIOPLASTY (PTA) OF PERIPHERAL VESSEL Left 10/17/2019    Procedure: PTA, PERIPHERAL VESSEL;  Surgeon: Rao Fisher MD;  [facility]: [facility] CATH;  Service: Cardiology;  Laterality: Left;       Review of patient's allergies indicates:   Allergen Reactions    Penicillins Hives     Patient currently on cefepime without complications       No current facility-administered medications on file prior to encounter.      Current Outpatient Medications on File Prior to Encounter   Medication Sig    aspirin (ECOTRIN) 81 MG EC tablet Take 81 mg by mouth once daily.    atorvastatin (LIPITOR) 40 MG tablet Take 40 mg by mouth once daily.    cetirizine 10 mg chewable tablet Take 10 mg by mouth as needed.    clopidogreL (PLAVIX) 75 mg tablet Take 1 tablet (75 mg total) by mouth once  daily.    metFORMIN (GLUCOPHAGE-XR) 500 MG XR 24hr tablet Take 500 mg by mouth daily with breakfast.    pregabalin (LYRICA) 100 MG capsule Take 100 mg by mouth 2 (two) times daily.    valsartan (DIOVAN) 160 MG tablet Take 160 mg by mouth once daily.    glyBURIDE-metformin 2.5-500 mg (GLUCOVANCE) 2.5-500 mg per tablet Take 1 tablet by mouth 2 (two) times daily with meals.    HYDROcodone-acetaminophen (NORCO) 5-325 mg per tablet Take 1 tablet by mouth every 6 (six) hours as needed for Pain.    oxyCODONE (ROXICODONE) 5 MG immediate release tablet Take 1 tablet (5 mg total) by mouth every 4 (four) hours as needed.    [DISCONTINUED] coenzyme Q10 10 mg capsule Take 10 mg by mouth once daily.     Family History     Problem Relation (Age of Onset)    Cancer Father, Brother    Diabetes Mother    Heart disease Father, Brother, Brother, Brother    Hypertension Mother    Stroke Mother        Tobacco Use    Smoking status: Former Smoker     Packs/day: 2.00     Types: Cigarettes     Last attempt to quit: 2011     Years since quittin.1    Smokeless tobacco: Never Used   Substance and Sexual Activity    Alcohol use: Yes     Comment: 2-4 PER DAY    Drug use: Never    Sexual activity: Not on file     Review of Systems   Constitutional: Positive for activity change, appetite change, chills, fatigue and fever. Negative for unexpected weight change.   HENT: Negative for congestion.    Eyes: Negative for visual disturbance.   Respiratory: Negative for shortness of breath.    Cardiovascular: Negative for chest pain.   Gastrointestinal: Negative for abdominal pain.   Musculoskeletal: Positive for myalgias (legs). Negative for arthralgias.   Skin: Negative for wound.        diaphoretic   Neurological: Positive for headaches. Negative for speech difficulty and light-headedness.   Psychiatric/Behavioral: Negative for sleep disturbance.     Objective:     Vital Signs (Most Recent):  Temp: 99.4 °F (37.4 °C) (20  0416)  Pulse: 105 (03/09/20 0416)  Resp: 20 (03/09/20 0416)  BP: 98/63 (03/09/20 0416)  SpO2: 96 % (03/09/20 0416) Vital Signs (24h Range):  Temp:  [99.1 °F (37.3 °C)-100.6 °F (38.1 °C)] 99.4 °F (37.4 °C)  Pulse:  [105-128] 105  Resp:  [14-20] 20  SpO2:  [94 %-98 %] 96 %  BP: ()/(63-81) 98/63     Weight: 116.3 kg (256 lb 6.3 oz)  Body mass index is 35.76 kg/m².    Physical Exam   Constitutional: He is oriented to person, place, and time. No distress.   Lying in bed in no acute distress, diaphoretic.   HENT:   Head: Normocephalic and atraumatic.   Right Ear: External ear normal.   Left Ear: External ear normal.   Eyes: Pupils are equal, round, and reactive to light. EOM are normal. No scleral icterus.   Neck: No JVD present.   Cardiovascular: Intact distal pulses.   Tachycardic. Systolic ejection murmur 4/6 loudest at aortic window   Pulmonary/Chest: Effort normal and breath sounds normal. He has no rales.   Abdominal: Soft. Bowel sounds are normal. He exhibits distension (obese). There is no tenderness.   Musculoskeletal: He exhibits no edema or deformity.   R groin incision site completely healed, no underlying knots or induration palpated.    L groin incision closed, dry, intact. Some knots palpated underlying the skin at the superomedial aspect of the incision without tenderness, erythema, or fluctuance. No pain to palpation.    Neurological: He is alert and oriented to person, place, and time.   Skin: Capillary refill takes less than 2 seconds. No rash noted. He is diaphoretic.         CRANIAL NERVES     CN III, IV, VI   Pupils are equal, round, and reactive to light.  Extraocular motions are normal.        Significant Labs:   Blood Culture: No results for input(s): LABBLOO in the last 48 hours.  CBC:   Recent Labs   Lab 03/09/20  0103   WBC 9.54   HGB 14.9   HCT 46.2   *     CMP:   Recent Labs   Lab 03/09/20  0103   *   K 3.6      CO2 20*   *   BUN 22*   CREATININE 1.4   CALCIUM  9.7   PROT 7.3   ALBUMIN 3.6   BILITOT 0.7   ALKPHOS 50*   AST 33   ALT 37   ANIONGAP 12   EGFRNONAA 55.4*     Lactic Acid:   Recent Labs   Lab 03/09/20  0103   LACTATE 2.0     All pertinent labs within the past 24 hours have been reviewed.    Significant Imaging: I have reviewed all pertinent imaging results/findings within the past 24 hours.    Assessment/Plan:     * Sepsis  59M with bilateral femoral endarterectomy c/b infected L groin seroma s/p 6 weeks antibiotics on 02/14/20 for p mirabilis and group F strep infection. Now with 1 day of fevers, chills, malaise, LE myalgias. No other focal symptoms; rapid flu negative at urgent care.    JJ on exam, diaphoretic, tachycardic, febrile. Sepsis source unlikely respiratory or urinary given lack of findings on CXR and UA respectively and lack of symptoms; possible surgical site re-infection but exam not very suggestive of this. Does have endovascular stent in L common iliac artery which may be a potential source of infection as well. No s/s meningitis, no other skin breakdown, no other known ports, catheters, etc.    ESR 23 (WNL, was 43 01/13/20 when infection identified )   (elevated, 24.9 01/13/20)  Procalcitonin 1.25    Plan  - Blood cultures drawn  - Finish 30 cc/kg with 1.3L LR (got 2L LR in ED)  - Start vanc + cefepime + metronidazole. This is aggressive but pt does have endovascular graft and has recently finished antibiotics; may likely be de-escalated pending clinical improvement  - Ultrasound L thigh seroma / surgical site to ensure no new pockets of fluid forming.  - Resp Inf Panel to r/o atypical viral infection or co-infection    Essential hypertension  Valsartan 160 mg daily at home. Hold while has JJ; PRN hydralazine 25 mg for hypertension in the interim.    Systolic murmur  Pt reports he knows he does have a murmur but is unsure of the details. Quite loud on exam; will get TTE to investigate.     DDx congenital abnormality vs flow murmur  (hypovolemia) vs vegetation    JJ (acute kidney injury)  Normal Anion Gap Metabolic Acidosis    Baseline creatinine 0.6, 1.4 on admission. Suspect JJ and NAGMA are in the setting of hypoperfusion in sepsis. Will hold off on further workup unless fail to improve with volume resuscitation antibiotics.    Thrombocytopenia  Unclear significance / mechanism. No signs of consumptive coagulopathy on exam, no hematoma noted. Monitor for now.    Seizures  History of Brain Tumor    Continue lyrica 100 mg BID for AED; pt reports he was put on this after he was allergic to most common AEDs    Type 2 diabetes mellitus, without long-term current use of insulin  On metformin at home. LDSSI + Diabetic Diet + AC/HS BG checks    PAD (peripheral artery disease)  Continue DAPT, Atorvastatin 40      VTE Risk Mitigation (From admission, onward)         Ordered     IP VTE HIGH RISK PATIENT  Once      03/09/20 0433     Place sequential compression device  Until discontinued      03/09/20 0433                   Marixa Strong MD  Department of Hospital Medicine   [facility]

## 2020-03-10 PROBLEM — A49.8 PROTEUS MIRABILIS INFECTION: Status: ACTIVE | Noted: 2020-03-10

## 2020-03-10 PROBLEM — R78.81 GRAM-NEGATIVE BACTEREMIA: Status: ACTIVE | Noted: 2020-03-10

## 2020-03-10 PROBLEM — A41.50 GRAM NEGATIVE SEPTICEMIA: Status: ACTIVE | Noted: 2020-03-10

## 2020-03-10 LAB
ANION GAP SERPL CALC-SCNC: 8 MMOL/L (ref 8–16)
APTT BLDCRRT: 28.2 SEC (ref 21–32)
BASOPHILS # BLD AUTO: 0.02 K/UL (ref 0–0.2)
BASOPHILS NFR BLD: 0.4 % (ref 0–1.9)
BUN SERPL-MCNC: 12 MG/DL (ref 6–20)
CALCIUM SERPL-MCNC: 9.3 MG/DL (ref 8.7–10.5)
CHLORIDE SERPL-SCNC: 104 MMOL/L (ref 95–110)
CO2 SERPL-SCNC: 24 MMOL/L (ref 23–29)
CREAT SERPL-MCNC: 0.8 MG/DL (ref 0.5–1.4)
DIFFERENTIAL METHOD: ABNORMAL
EOSINOPHIL # BLD AUTO: 0 K/UL (ref 0–0.5)
EOSINOPHIL NFR BLD: 0 % (ref 0–8)
ERYTHROCYTE [DISTWIDTH] IN BLOOD BY AUTOMATED COUNT: 13 % (ref 11.5–14.5)
EST. GFR  (AFRICAN AMERICAN): >60 ML/MIN/1.73 M^2
EST. GFR  (NON AFRICAN AMERICAN): >60 ML/MIN/1.73 M^2
GLUCOSE SERPL-MCNC: 126 MG/DL (ref 70–110)
HCT VFR BLD AUTO: 44.4 % (ref 40–54)
HGB BLD-MCNC: 14.5 G/DL (ref 14–18)
IMM GRANULOCYTES # BLD AUTO: 0.03 K/UL (ref 0–0.04)
IMM GRANULOCYTES NFR BLD AUTO: 0.6 % (ref 0–0.5)
INR PPP: 1.5 (ref 0.8–1.2)
LYMPHOCYTES # BLD AUTO: 0.4 K/UL (ref 1–4.8)
LYMPHOCYTES NFR BLD: 8.4 % (ref 18–48)
MAGNESIUM SERPL-MCNC: 1.6 MG/DL (ref 1.6–2.6)
MCH RBC QN AUTO: 29.6 PG (ref 27–31)
MCHC RBC AUTO-ENTMCNC: 32.7 G/DL (ref 32–36)
MCV RBC AUTO: 91 FL (ref 82–98)
MONOCYTES # BLD AUTO: 0.3 K/UL (ref 0.3–1)
MONOCYTES NFR BLD: 6.3 % (ref 4–15)
NEUTROPHILS # BLD AUTO: 4 K/UL (ref 1.8–7.7)
NEUTROPHILS NFR BLD: 84.3 % (ref 38–73)
NRBC BLD-RTO: 0 /100 WBC
PHOSPHATE SERPL-MCNC: 1.5 MG/DL (ref 2.7–4.5)
PLATELET # BLD AUTO: 82 K/UL (ref 150–350)
PMV BLD AUTO: 11.4 FL (ref 9.2–12.9)
POCT GLUCOSE: 131 MG/DL (ref 70–110)
POCT GLUCOSE: 149 MG/DL (ref 70–110)
POCT GLUCOSE: 171 MG/DL (ref 70–110)
POCT GLUCOSE: 171 MG/DL (ref 70–110)
POTASSIUM SERPL-SCNC: 3.9 MMOL/L (ref 3.5–5.1)
PROTHROMBIN TIME: 15 SEC (ref 9–12.5)
RBC # BLD AUTO: 4.9 M/UL (ref 4.6–6.2)
SODIUM SERPL-SCNC: 136 MMOL/L (ref 136–145)
WBC # BLD AUTO: 4.79 K/UL (ref 3.9–12.7)

## 2020-03-10 PROCEDURE — 87040 BLOOD CULTURE FOR BACTERIA: CPT

## 2020-03-10 PROCEDURE — 99254 PR INITIAL INPATIENT CONSULT,LEVL IV: ICD-10-PCS | Mod: ,,, | Performed by: INTERNAL MEDICINE

## 2020-03-10 PROCEDURE — 63600175 PHARM REV CODE 636 W HCPCS: Performed by: STUDENT IN AN ORGANIZED HEALTH CARE EDUCATION/TRAINING PROGRAM

## 2020-03-10 PROCEDURE — 96376 TX/PRO/DX INJ SAME DRUG ADON: CPT

## 2020-03-10 PROCEDURE — 84100 ASSAY OF PHOSPHORUS: CPT

## 2020-03-10 PROCEDURE — 94761 N-INVAS EAR/PLS OXIMETRY MLT: CPT

## 2020-03-10 PROCEDURE — 99232 SBSQ HOSP IP/OBS MODERATE 35: CPT | Mod: ,,,

## 2020-03-10 PROCEDURE — 25000003 PHARM REV CODE 250: Performed by: STUDENT IN AN ORGANIZED HEALTH CARE EDUCATION/TRAINING PROGRAM

## 2020-03-10 PROCEDURE — 99254 IP/OBS CNSLTJ NEW/EST MOD 60: CPT | Mod: ,,, | Performed by: INTERNAL MEDICINE

## 2020-03-10 PROCEDURE — 82962 GLUCOSE BLOOD TEST: CPT

## 2020-03-10 PROCEDURE — 85025 COMPLETE CBC W/AUTO DIFF WBC: CPT

## 2020-03-10 PROCEDURE — 85730 THROMBOPLASTIN TIME PARTIAL: CPT

## 2020-03-10 PROCEDURE — 11000001 HC ACUTE MED/SURG PRIVATE ROOM

## 2020-03-10 PROCEDURE — 80048 BASIC METABOLIC PNL TOTAL CA: CPT

## 2020-03-10 PROCEDURE — 85610 PROTHROMBIN TIME: CPT

## 2020-03-10 PROCEDURE — 99024 POSTOP FOLLOW-UP VISIT: CPT | Mod: ,,, | Performed by: SURGERY

## 2020-03-10 PROCEDURE — 83735 ASSAY OF MAGNESIUM: CPT

## 2020-03-10 PROCEDURE — 99024 PR POST-OP FOLLOW-UP VISIT: ICD-10-PCS | Mod: ,,, | Performed by: SURGERY

## 2020-03-10 PROCEDURE — 36415 COLL VENOUS BLD VENIPUNCTURE: CPT

## 2020-03-10 PROCEDURE — 63600175 PHARM REV CODE 636 W HCPCS

## 2020-03-10 PROCEDURE — 99232 PR SUBSEQUENT HOSPITAL CARE,LEVL II: ICD-10-PCS | Mod: ,,,

## 2020-03-10 RX ORDER — MAGNESIUM SULFATE HEPTAHYDRATE 40 MG/ML
2 INJECTION, SOLUTION INTRAVENOUS ONCE
Status: COMPLETED | OUTPATIENT
Start: 2020-03-10 | End: 2020-03-10

## 2020-03-10 RX ORDER — CEFEPIME HYDROCHLORIDE 1 G/1
2 INJECTION, POWDER, FOR SOLUTION INTRAMUSCULAR; INTRAVENOUS
Status: DISCONTINUED | OUTPATIENT
Start: 2020-03-10 | End: 2020-03-11

## 2020-03-10 RX ORDER — SODIUM,POTASSIUM PHOSPHATES 280-250MG
2 POWDER IN PACKET (EA) ORAL ONCE
Status: COMPLETED | OUTPATIENT
Start: 2020-03-10 | End: 2020-03-10

## 2020-03-10 RX ADMIN — CLOPIDOGREL BISULFATE 75 MG: 75 TABLET ORAL at 08:03

## 2020-03-10 RX ADMIN — ATORVASTATIN CALCIUM 40 MG: 20 TABLET, FILM COATED ORAL at 08:03

## 2020-03-10 RX ADMIN — CEFEPIME 2 G: 1 INJECTION, POWDER, FOR SOLUTION INTRAMUSCULAR; INTRAVENOUS at 09:03

## 2020-03-10 RX ADMIN — CETIRIZINE HYDROCHLORIDE 5 MG: 5 TABLET, FILM COATED ORAL at 08:03

## 2020-03-10 RX ADMIN — ACETAMINOPHEN 650 MG: 325 TABLET ORAL at 01:03

## 2020-03-10 RX ADMIN — ASPIRIN 81 MG: 81 TABLET, COATED ORAL at 08:03

## 2020-03-10 RX ADMIN — PREGABALIN 100 MG: 50 CAPSULE ORAL at 08:03

## 2020-03-10 RX ADMIN — PREGABALIN 100 MG: 50 CAPSULE ORAL at 09:03

## 2020-03-10 RX ADMIN — POLYETHYLENE GLYCOL 3350 17 G: 17 POWDER, FOR SOLUTION ORAL at 08:03

## 2020-03-10 RX ADMIN — MAGNESIUM SULFATE HEPTAHYDRATE 2 G: 40 INJECTION, SOLUTION INTRAVENOUS at 09:03

## 2020-03-10 RX ADMIN — POTASSIUM & SODIUM PHOSPHATES POWDER PACK 280-160-250 MG 2 PACKET: 280-160-250 PACK at 09:03

## 2020-03-10 RX ADMIN — CEFEPIME 2 G: 1 INJECTION, POWDER, FOR SOLUTION INTRAMUSCULAR; INTRAVENOUS at 05:03

## 2020-03-10 RX ADMIN — CEFEPIME 2 G: 1 INJECTION, POWDER, FOR SOLUTION INTRAMUSCULAR; INTRAVENOUS at 12:03

## 2020-03-10 NOTE — SUBJECTIVE & OBJECTIVE
Medications Prior to Admission   Medication Sig Dispense Refill Last Dose    acetaminophen (TYLENOL) 500 MG tablet Take 1,000 mg by mouth every evening.       ascorbic acid (VITAMIN C ORAL) Take 2 capsules by mouth every morning.       aspirin (ECOTRIN) 81 MG EC tablet Take 81 mg by mouth once daily.   3/8/2020    atorvastatin (LIPITOR) 40 MG tablet Take 40 mg by mouth once daily.   3/8/2020    cetirizine 10 mg chewable tablet Take 10 mg by mouth once daily.    3/8/2020    clopidogreL (PLAVIX) 75 mg tablet Take 1 tablet (75 mg total) by mouth once daily. 30 tablet 10 3/8/2020    elderberry fruit and flower 460-115 mg Cap Take 2 capsules by mouth every morning.       metFORMIN (GLUCOPHAGE-XR) 500 MG XR 24hr tablet Take 1,000 mg by mouth daily with breakfast.    3/8/2020    pregabalin (LYRICA) 100 MG capsule Take 100 mg by mouth 2 (two) times daily.   3/8/2020    valsartan (DIOVAN) 160 MG tablet Take 160 mg by mouth once daily.   3/8/2020    [DISCONTINUED] glyBURIDE-metformin 2.5-500 mg (GLUCOVANCE) 2.5-500 mg per tablet Take 1 tablet by mouth 2 (two) times daily with meals.   Taking    [DISCONTINUED] HYDROcodone-acetaminophen (NORCO) 5-325 mg per tablet Take 1 tablet by mouth every 6 (six) hours as needed for Pain. 30 tablet 0 Taking    [DISCONTINUED] oxyCODONE (ROXICODONE) 5 MG immediate release tablet Take 1 tablet (5 mg total) by mouth every 4 (four) hours as needed. 30 tablet 0 Taking       Review of patient's allergies indicates:   Allergen Reactions    Penicillins Hives     Patient currently on cefepime without complications       Past Medical History:   Diagnosis Date    Diabetes mellitus     H/O brain tumor     HLD (hyperlipidemia)     Hypertension     PAD (peripheral artery disease)     Seizures     R/T BRAIN TUMOR- SURGICALLY EXCISED     Past Surgical History:   Procedure Laterality Date    APPLICATION OF WOUND VACUUM-ASSISTED CLOSURE DEVICE Bilateral 1/1/2020    Procedure: APPLICATION,  WOUND VAC;  Surgeon: SEBAS Prieto II, MD;  Location: [address];  Service: Cardiovascular;  Laterality: Bilateral;    APPLICATION OF WOUND VACUUM-ASSISTED CLOSURE DEVICE N/A 1/3/2020    Procedure: APPLICATION, WOUND VAC;  Surgeon: Brian Wong MD;  Location: [address];  Service: Plastics;  Laterality: N/A;    BRAIN SURGERY  2011    TUMOR EXCISED    CREATION OF MUSCLE ROTATIONAL FLAP N/A 1/3/2020    Procedure: CREATION, FLAP, MUSCLE ROTATION;  Surgeon: Brian Wong MD;  Location: [address];  Service: Plastics;  Laterality: N/A;    ENDARTERECTOMY OF FEMORAL ARTERY Bilateral 2019    Procedure: ENDARTERECTOMY, FEMORAL;  Surgeon: Kelby Gomez MD;  Location: [address];  Service: Peripheral Vascular;  Laterality: Bilateral;  natalya common femoral endarterectomy with natalya. iliac stent placement    PERCUTANEOUS TRANSLUMINAL ANGIOPLASTY (PTA) OF PERIPHERAL VESSEL Left 10/17/2019    Procedure: PTA, PERIPHERAL VESSEL;  Surgeon: Rao Fisher MD;  Location: [facility] CATH;  Service: Cardiology;  Laterality: Left;     Family History     Problem Relation (Age of Onset)    Cancer Father, Brother    Diabetes Mother    Heart disease Father, Brother, Brother, Brother    Hypertension Mother    Stroke Mother        Tobacco Use    Smoking status: Former Smoker     Packs/day: 2.00     Types: Cigarettes     Last attempt to quit: 2011     Years since quittin.1    Smokeless tobacco: Never Used   Substance and Sexual Activity    Alcohol use: Yes     Comment: 2-4 PER DAY    Drug use: Never    Sexual activity: Not on file     Review of Systems   Constitutional: Positive for activity change, chills, fatigue and fever. Negative for appetite change and unexpected weight change.   Respiratory: Negative for cough, chest tightness and shortness of breath.    Cardiovascular: Negative for chest pain, palpitations and leg swelling.   Gastrointestinal: Negative for abdominal distention,  nausea and vomiting.   Genitourinary: Negative for difficulty urinating and dysuria.   Musculoskeletal: Negative for arthralgias.   Skin: Negative for color change and wound.   Neurological: Negative for dizziness and weakness.   Hematological: Negative for adenopathy. Does not bruise/bleed easily.     Objective:     Vital Signs (Most Recent):  Temp: 99.6 °F (37.6 °C) (03/10/20 1520)  Pulse: (!) 111 (03/10/20 1606)  Resp: 18 (03/10/20 1520)  BP: (!) 142/91 (03/10/20 1520)  SpO2: 100 % (03/10/20 1520) Vital Signs (24h Range):  Temp:  [97.8 °F (36.6 °C)-101.1 °F (38.4 °C)] 99.6 °F (37.6 °C)  Pulse:  [] 111  Resp:  [16-18] 18  SpO2:  [97 %-100 %] 100 %  BP: ()/(61-91) 142/91     Weight: 116.1 kg (256 lb)  Body mass index is 34.72 kg/m².    Physical Exam   Constitutional: He is oriented to person, place, and time. He appears well-developed and well-nourished. No distress.   Patient well appearing-sitting up in bed and eating   Cardiovascular: Normal rate and regular rhythm.   Pulmonary/Chest: Effort normal. No respiratory distress.   Abdominal: Soft. He exhibits no distension.   Musculoskeletal: Normal range of motion. He exhibits no edema or deformity.   Neurological: He is alert and oriented to person, place, and time.   Dopplerable DP and PT bilaterally   Skin: Skin is warm and dry. He is not diaphoretic.   Right groin wound intact. No erythema. No drainage. No palpable seroma. Dopplerable pulse  Left groin wound intact (previous flap). No erythema. No drainage. No palpable seroma. Dopplerable pulse.   Slight discoloration of groin tracking toward penis, no appreciable association with incision.     Nursing note and vitals reviewed.      Significant Labs:  ABGs: No results for input(s): PH, PCO2, PO2, HCO3, POCSATURATED, BE in the last 48 hours.  BMP:   Recent Labs   Lab 03/10/20  0345   *      K 3.9      CO2 24   BUN 12   CREATININE 0.8   CALCIUM 9.3   MG 1.6     Cardiac markers: No  results for input(s): CKMB, CPKMB, TROPONINT, TROPONINI, MYOGLOBIN in the last 48 hours.  CBC:   Recent Labs   Lab 03/10/20  0345   WBC 4.79   RBC 4.90   HGB 14.5   HCT 44.4   PLT 82*   MCV 91   MCH 29.6   MCHC 32.7     CMP:   Recent Labs   Lab 03/09/20  0103  03/10/20  0345   *   < > 126*   CALCIUM 9.7   < > 9.3   ALBUMIN 3.6  --   --    PROT 7.3  --   --    *   < > 136   K 3.6   < > 3.9   CO2 20*   < > 24      < > 104   BUN 22*   < > 12   CREATININE 1.4   < > 0.8   ALKPHOS 50*  --   --    ALT 37  --   --    AST 33  --   --    BILITOT 0.7  --   --     < > = values in this interval not displayed.     Coagulation:   Recent Labs   Lab 03/10/20  0345   LABPROT 15.0*   INR 1.5*   APTT 28.2     Hemoglobin:   Recent Labs   Lab 03/09/20  0457   HGBA1C 5.3     Lactic Acid:   Recent Labs   Lab 03/09/20  0103   LACTATE 2.0     LFTs:   Recent Labs   Lab 03/09/20  0103   ALT 37   AST 33   ALKPHOS 50*   BILITOT 0.7   PROT 7.3   ALBUMIN 3.6       Significant Diagnostics:  US EXTREMITY NON VASCULAR LIMITED RIGHT    CLINICAL HISTORY:  bacteremia - recent R groin infected seroma;    TECHNIQUE:  Limited ultrasound of the right groin was performed.    COMPARISON:  Ultrasound extremity 03/09/2020, CT pelvis with contrast 12/30/2019.    FINDINGS:  There is a large lymph node in the right groin measuring 4.0 x 0.7 x 2.6 cm.    There is a small hypoechoic collection just superficial to the right iliac vessels measuring approximately 1.1 x 1.0 x 1.4 cm.  Considerations include evolving seroma or hematoma, with abscess felt less likely.  This is likely related to small collection seen on CT pelvis 12/30/2019 in the same region.  A small tract can be seen extending towards the superficial soft tissues on image 15.      Impression       Small hypoechoic collection just superficial to the right iliac vessels measuring approximately 1.1 x 1.0 x 1.4 cm.  Considerations include evolving seroma or hematoma, with abscess felt  less likely.  Correlation is advised.    Enlarged right groin lymph node as above.

## 2020-03-10 NOTE — SUBJECTIVE & OBJECTIVE
Interval History: Pt febrile again overnight. Blood cultures with GNR    Review of Systems   Constitutional: Positive for activity change, appetite change, chills, fatigue and fever. Negative for unexpected weight change.   HENT: Negative for congestion.    Eyes: Negative for visual disturbance.   Respiratory: Negative for shortness of breath.    Cardiovascular: Negative for chest pain.   Gastrointestinal: Negative for abdominal pain.   Musculoskeletal: Positive for myalgias (legs). Negative for arthralgias.   Skin: Negative for wound.        diaphoretic   Neurological: Positive for headaches. Negative for speech difficulty and light-headedness.   Psychiatric/Behavioral: Negative for sleep disturbance.     Objective:     Vital Signs (Most Recent):  Temp: 99.6 °F (37.6 °C) (03/10/20 1051)  Pulse: 92 (03/10/20 1051)  Resp: 18 (03/10/20 1051)  BP: 134/82 (03/10/20 1051)  SpO2: 100 % (03/10/20 1051) Vital Signs (24h Range):  Temp:  [97.8 °F (36.6 °C)-101.2 °F (38.4 °C)] 99.6 °F (37.6 °C)  Pulse:  [] 92  Resp:  [16-18] 18  SpO2:  [96 %-100 %] 100 %  BP: ()/(60-82) 134/82     Weight: 116.1 kg (256 lb)  Body mass index is 34.72 kg/m².    Intake/Output Summary (Last 24 hours) at 3/10/2020 1329  Last data filed at 3/10/2020 0928  Gross per 24 hour   Intake 530 ml   Output --   Net 530 ml      Physical Exam   Constitutional: He is oriented to person, place, and time. No distress.   Lying in bed in no acute distress, diaphoretic.   HENT:   Head: Normocephalic and atraumatic.   Right Ear: External ear normal.   Left Ear: External ear normal.   Eyes: Pupils are equal, round, and reactive to light. EOM are normal. No scleral icterus.   Neck: No JVD present.   Cardiovascular: Intact distal pulses.   Tachycardic. Systolic ejection murmur 4/6 loudest at aortic window   Pulmonary/Chest: Effort normal and breath sounds normal. He has no rales.   Abdominal: Soft. Bowel sounds are normal. He exhibits distension (obese).  There is no tenderness.   Musculoskeletal: He exhibits no edema or deformity.   R groin incision site completely healed, no underlying knots or induration palpated.    L groin incision closed, dry, intact. Some knots palpated underlying the skin at the superomedial aspect of the incision without tenderness, erythema, or fluctuance. No pain to palpation.    Neurological: He is alert and oriented to person, place, and time.   Skin: Capillary refill takes less than 2 seconds. No rash noted. He is diaphoretic.       Significant Labs:   Blood Culture:   Recent Labs   Lab 03/09/20  0103   LABBLOO Gram stain reyna bottle: Gram negative rods   Positive results previously called 03/09/2020  15:45  Gram stain aer bottle: Gram negative rods   Positive results previously called 03/09/2020  16:54  GRAM NEGATIVE QIANA  Identification pending  For susceptibility see order #[id number]  *  Gram stain reyna bottle: Gram negative rods   Results called to and read back by: Charlee Rosen RN 03/09/2020  15:44  Gram stain aer bottle: Gram negative rods   Positive results previously called 03/09/2020  17:01  GRAM NEGATIVE QIANA  Identification and susceptibility pending  *     CBC:   Recent Labs   Lab 03/09/20  0103 03/09/20  1329 03/10/20  0345   WBC 9.54 11.00 4.79   HGB 14.9 14.1 14.5   HCT 46.2 43.4 44.4   * 89* 82*     CMP:   Recent Labs   Lab 03/09/20  0103 03/09/20  1329 03/10/20  0345   * 135* 136   K 3.6 4.2 3.9    101 104   CO2 20* 24 24   * 194* 126*   BUN 22* 16 12   CREATININE 1.4 1.0 0.8   CALCIUM 9.7 9.3 9.3   PROT 7.3  --   --    ALBUMIN 3.6  --   --    BILITOT 0.7  --   --    ALKPHOS 50*  --   --    AST 33  --   --    ALT 37  --   --    ANIONGAP 12 10 8   EGFRNONAA 55.4* >60.0 >60.0     All pertinent labs within the past 24 hours have been reviewed.    Significant Imaging: I have reviewed all pertinent imaging results/findings within the past 24 hours.

## 2020-03-10 NOTE — ASSESSMENT & PLAN NOTE
59M with bilateral femoral endarterectomy c/b infected L groin seroma s/p 6 weeks antibiotics on 02/14/20 for p mirabilis and group F strep infection. Now with 1 day of fevers, chills, malaise, LE myalgias. No other focal symptoms; rapid flu negative at urgent care.    JJ on exam, diaphoretic, tachycardic, febrile. Sepsis source unlikely respiratory or urinary given lack of findings on CXR and UA respectively and lack of symptoms; possible surgical site re-infection but exam not very suggestive of this. Does have endovascular stent in L common iliac artery which may be a potential source of infection as well. No s/s meningitis, no other skin breakdown, no other known ports, catheters, etc. Pt does report recent dental cleaning a few days prior to symptom onset.    ESR 23 (WNL, was 43 01/13/20 when infection identified )   (elevated, 24.9 01/13/20)  Procalcitonin 1.25    Plan  - Blood cultures drawn, showing GNR  - Continue cefepime, ID consulted given previous surgical site infection and Bacteremia.   - Ultrasound L thigh seroma / surgical site showing 5cm complex collection, consistent with seroma or hematoma, with abscess felt less likely. US of R thigh shows 1cm^3 hypoechoic collection which may represent evolving seroma or hematoma, with abscess felt less likely. If pt persistently febrile/bacteremic, will consider IR aspiration of collections.

## 2020-03-10 NOTE — ASSESSMENT & PLAN NOTE
60yo male with hx of PAD s/p bilateral femoral endarterectomies on 12/20 with course complicated by bilateral wound breakdown and concern for infected fluid collection L groin now s/p bilateral groin washout with superficial debridement of R groin and placement of bilateral wound vacs 1/1/20 now s/p L rectus muscle flap per plastics on 1/3. Completed 6 weeks home IV abx. Now presenting with proteus bacteremia (same bacteria as previous) with concern for groin as source.    -Concern for groin wound infection vs. Infection of arterial patch.   -Please order CTA abdomen and pelvis with run off to assess vessels.   -Continue ASA, plavix and statin   -Vascular surgery will continue to follow along   -Plan discussed with Dr. Gomez

## 2020-03-10 NOTE — SUBJECTIVE & OBJECTIVE
Past Medical History:   Diagnosis Date    Diabetes mellitus     H/O brain tumor     HLD (hyperlipidemia)     Hypertension     PAD (peripheral artery disease)     Seizures     R/T BRAIN TUMOR- SURGICALLY EXCISED       Past Surgical History:   Procedure Laterality Date    APPLICATION OF WOUND VACUUM-ASSISTED CLOSURE DEVICE Bilateral 1/1/2020    Procedure: APPLICATION, WOUND VAC;  Surgeon: SEBAS Prieto II, MD;  Location: [address];  Service: Cardiovascular;  Laterality: Bilateral;    APPLICATION OF WOUND VACUUM-ASSISTED CLOSURE DEVICE N/A 1/3/2020    Procedure: APPLICATION, WOUND VAC;  Surgeon: Brian Wong MD;  Location: [address];  Service: Plastics;  Laterality: N/A;    BRAIN SURGERY  01/2011    TUMOR EXCISED    CREATION OF MUSCLE ROTATIONAL FLAP N/A 1/3/2020    Procedure: CREATION, FLAP, MUSCLE ROTATION;  Surgeon: Brian Wong MD;  Location: [address];  Service: Plastics;  Laterality: N/A;    ENDARTERECTOMY OF FEMORAL ARTERY Bilateral 12/20/2019    Procedure: ENDARTERECTOMY, FEMORAL;  Surgeon: Kelby Gomez MD;  Location: [address];  Service: Peripheral Vascular;  Laterality: Bilateral;  natalya common femoral endarterectomy with natalya. iliac stent placement    PERCUTANEOUS TRANSLUMINAL ANGIOPLASTY (PTA) OF PERIPHERAL VESSEL Left 10/17/2019    Procedure: PTA, PERIPHERAL VESSEL;  Surgeon: Rao Fisher MD;  Location: [facility] CATH;  Service: Cardiology;  Laterality: Left;       Review of patient's allergies indicates:   Allergen Reactions    Penicillins Hives     Patient currently on cefepime without complications       Medications:  Medications Prior to Admission   Medication Sig    acetaminophen (TYLENOL) 500 MG tablet Take 1,000 mg by mouth every evening.    ascorbic acid (VITAMIN C ORAL) Take 2 capsules by mouth every morning.    aspirin (ECOTRIN) 81 MG EC tablet Take 81 mg by mouth once daily.    atorvastatin (LIPITOR) 40 MG tablet Take 40 mg by mouth once  daily.    cetirizine 10 mg chewable tablet Take 10 mg by mouth once daily.     clopidogreL (PLAVIX) 75 mg tablet Take 1 tablet (75 mg total) by mouth once daily.    elderberry fruit and flower 460-115 mg Cap Take 2 capsules by mouth every morning.    metFORMIN (GLUCOPHAGE-XR) 500 MG XR 24hr tablet Take 1,000 mg by mouth daily with breakfast.     pregabalin (LYRICA) 100 MG capsule Take 100 mg by mouth 2 (two) times daily.    valsartan (DIOVAN) 160 MG tablet Take 160 mg by mouth once daily.    [DISCONTINUED] glyBURIDE-metformin 2.5-500 mg (GLUCOVANCE) 2.5-500 mg per tablet Take 1 tablet by mouth 2 (two) times daily with meals.    [DISCONTINUED] HYDROcodone-acetaminophen (NORCO) 5-325 mg per tablet Take 1 tablet by mouth every 6 (six) hours as needed for Pain.    [DISCONTINUED] oxyCODONE (ROXICODONE) 5 MG immediate release tablet Take 1 tablet (5 mg total) by mouth every 4 (four) hours as needed.     Antibiotics (From admission, onward)    Start     Stop Route Frequency Ordered    03/10/20 1310  ceFEPIme injection 2 g      -- IV Every 8 hours (non-standard times) 03/10/20 0909        Antifungals (From admission, onward)    None        Antivirals (From admission, onward)    None           There is no immunization history for the selected administration types on file for this patient.    Family History     Problem Relation (Age of Onset)    Cancer Father, Brother    Diabetes Mother    Heart disease Father, Brother, Brother, Brother    Hypertension Mother    Stroke Mother        Social History     Socioeconomic History    Marital status:      Spouse name: Not on file    Number of children: Not on file    Years of education: Not on file    Highest education level: Not on file   Occupational History    Not on file   Social Needs    Financial resource strain: Not on file    Food insecurity:     Worry: Not on file     Inability: Not on file    Transportation needs:     Medical: Not on file      Non-medical: Not on file   Tobacco Use    Smoking status: Former Smoker     Packs/day: 2.00     Types: Cigarettes     Last attempt to quit: 2011     Years since quittin.1    Smokeless tobacco: Never Used   Substance and Sexual Activity    Alcohol use: Yes     Comment: 2-4 PER DAY    Drug use: Never    Sexual activity: Not on file   Lifestyle    Physical activity:     Days per week: Not on file     Minutes per session: Not on file    Stress: Not on file   Relationships    Social connections:     Talks on phone: Not on file     Gets together: Not on file     Attends Scientologist service: Not on file     Active member of club or organization: Not on file     Attends meetings of clubs or organizations: Not on file     Relationship status: Not on file   Other Topics Concern    Not on file   Social History Narrative    Not on file     Review of Systems   Constitutional: Positive for chills, fatigue and fever.   HENT: Negative for ear pain, mouth sores, nosebleeds, postnasal drip, rhinorrhea, sinus pressure, sore throat, tinnitus, trouble swallowing and voice change.    Eyes: Negative for photophobia, pain, redness and visual disturbance.   Respiratory: Negative for apnea, cough, chest tightness, shortness of breath and wheezing.    Cardiovascular: Negative for chest pain, palpitations and leg swelling.   Gastrointestinal: Negative for abdominal pain, blood in stool, constipation, diarrhea, nausea and vomiting.   Endocrine: Negative for cold intolerance, heat intolerance, polydipsia and polyuria.   Genitourinary: Negative for decreased urine volume, difficulty urinating, discharge, dysuria, flank pain, frequency, genital sores, hematuria, penile pain, penile swelling, scrotal swelling, testicular pain and urgency.   Musculoskeletal: Negative for arthralgias, back pain, joint swelling, myalgias and neck pain.   Skin: Negative for color change and rash.   Allergic/Immunologic: Negative for environmental  allergies and food allergies.   Neurological: Negative for dizziness, seizures, syncope, weakness, light-headedness, numbness and headaches.   Hematological: Negative for adenopathy. Does not bruise/bleed easily.   Psychiatric/Behavioral: Negative for agitation, confusion, decreased concentration, hallucinations, self-injury, sleep disturbance and suicidal ideas. The patient is not nervous/anxious.      Objective:     Vital Signs (Most Recent):  Temp: 99.6 °F (37.6 °C) (03/10/20 1051)  Pulse: 92 (03/10/20 1051)  Resp: 18 (03/10/20 1051)  BP: 134/82 (03/10/20 1051)  SpO2: 100 % (03/10/20 1051) Vital Signs (24h Range):  Temp:  [97.8 °F (36.6 °C)-101.2 °F (38.4 °C)] 99.6 °F (37.6 °C)  Pulse:  [] 92  Resp:  [16-18] 18  SpO2:  [96 %-100 %] 100 %  BP: ()/(60-82) 134/82     Weight: 116.1 kg (256 lb)  Body mass index is 34.72 kg/m².    Estimated Creatinine Clearance: 134 mL/min (based on SCr of 0.8 mg/dL).    Physical Exam   Constitutional: He is oriented to person, place, and time. He appears well-developed and well-nourished.   HENT:   Head: Normocephalic and atraumatic.   Right Ear: External ear normal.   Left Ear: External ear normal.   Eyes: Conjunctivae are normal. Right eye exhibits no discharge. Left eye exhibits no discharge.   Cardiovascular: Normal rate, regular rhythm and normal heart sounds.   No murmur heard.  Pulmonary/Chest: Effort normal and breath sounds normal. He has no wheezes. He has no rales.   Abdominal: Soft. Bowel sounds are normal. He exhibits no mass. There is no tenderness. There is no rebound.   Musculoskeletal: Normal range of motion.   Right groin healed incision. Left groin healed incision with dark erythema at the medio-proximal site with induration.    Neurological: He is alert and oriented to person, place, and time.   Skin: Skin is warm and dry.   Psychiatric: He has a normal mood and affect. His behavior is normal.   Vitals reviewed.      Significant Labs:   Blood Culture:    Recent Labs   Lab 03/09/20  0103   LABBLOO Gram stain reyna bottle: Gram negative rods   Positive results previously called 03/09/2020  15:45  Gram stain aer bottle: Gram negative rods   Positive results previously called 03/09/2020  16:54  PROTEUS MIRABILIS  For susceptibility see order #[id number]  *  Gram stain reyna bottle: Gram negative rods   Results called to and read back by: Cahrlee Rosen RN 03/09/2020  15:44  Gram stain aer bottle: Gram negative rods   Positive results previously called 03/09/2020  17:01  PROTEUS MIRABILIS  Susceptibility pending  *     BMP:   Recent Labs   Lab 03/10/20  0345   *      K 3.9      CO2 24   BUN 12   CREATININE 0.8   CALCIUM 9.3   MG 1.6     CBC:   Recent Labs   Lab 03/09/20  0103 03/09/20  1329 03/10/20  0345   WBC 9.54 11.00 4.79   HGB 14.9 14.1 14.5   HCT 46.2 43.4 44.4   * 89* 82*     Urine Culture: No results for input(s): LABURIN in the last 4320 hours.  Urine Studies:   Recent Labs   Lab 03/09/20  0103   COLORU Yellow   APPEARANCEUA Hazy*   PHUR 5.0   SPECGRAV 1.025   PROTEINUA 1+*   GLUCUA Negative   KETONESU Negative   BILIRUBINUA Negative   OCCULTUA Negative   NITRITE Negative   LEUKOCYTESUR Trace*   RBCUA 1   WBCUA 3   BACTERIA Occasional   SQUAMEPITHEL 0   HYALINECASTS 0     Wound Culture:   Recent Labs   Lab 01/01/20  1012   LABAERO PROTEUS MIRABILIS  Few  *  STREPTOCOCCUS GROUP F  Few  Beta-hemolytic streptococci are routinely susceptible to   penicillins,cephalosporins and carbapenems.  Susceptibility testing not routinely performed  *       Significant Imaging: I have reviewed all pertinent imaging results/findings within the past 24 hours.

## 2020-03-10 NOTE — CARE UPDATE
Rapid Response Nurse Chart Check     Chart check completed, abnormal VS noted. Charge RN Domonique contacted, no concerns verbalized at this time, instructed to call [phone] for further concerns or assistance.

## 2020-03-10 NOTE — SUBJECTIVE & OBJECTIVE
Past Medical History:   Diagnosis Date    Diabetes mellitus     H/O brain tumor     HLD (hyperlipidemia)     Hypertension     PAD (peripheral artery disease)     Seizures     R/T BRAIN TUMOR- SURGICALLY EXCISED       Past Surgical History:   Procedure Laterality Date    APPLICATION OF WOUND VACUUM-ASSISTED CLOSURE DEVICE Bilateral 1/1/2020    Procedure: APPLICATION, WOUND VAC;  Surgeon: SEBAS Prieto II, MD;  Location: [address];  Service: Cardiovascular;  Laterality: Bilateral;    APPLICATION OF WOUND VACUUM-ASSISTED CLOSURE DEVICE N/A 1/3/2020    Procedure: APPLICATION, WOUND VAC;  Surgeon: Brian Wong MD;  Location: [address];  Service: Plastics;  Laterality: N/A;    BRAIN SURGERY  01/2011    TUMOR EXCISED    CREATION OF MUSCLE ROTATIONAL FLAP N/A 1/3/2020    Procedure: CREATION, FLAP, MUSCLE ROTATION;  Surgeon: Brian Wong MD;  Location: [address];  Service: Plastics;  Laterality: N/A;    ENDARTERECTOMY OF FEMORAL ARTERY Bilateral 12/20/2019    Procedure: ENDARTERECTOMY, FEMORAL;  Surgeon: Kelby Gomez MD;  Location: [address];  Service: Peripheral Vascular;  Laterality: Bilateral;  natalya common femoral endarterectomy with natalya. iliac stent placement    PERCUTANEOUS TRANSLUMINAL ANGIOPLASTY (PTA) OF PERIPHERAL VESSEL Left 10/17/2019    Procedure: PTA, PERIPHERAL VESSEL;  Surgeon: Rao Fisher MD;  Location: [facility];  Service: Cardiology;  Laterality: Left;       Review of patient's allergies indicates:   Allergen Reactions    Penicillins Hives     Patient currently on cefepime without complications       Medications:  Medications Prior to Admission   Medication Sig    acetaminophen (TYLENOL) 500 MG tablet Take 1,000 mg by mouth every evening.    ascorbic acid (VITAMIN C ORAL) Take 2 capsules by mouth every morning.    aspirin (ECOTRIN) 81 MG EC tablet Take 81 mg by mouth once daily.    atorvastatin (LIPITOR) 40 MG tablet Take 40 mg by mouth once  daily.    cetirizine 10 mg chewable tablet Take 10 mg by mouth once daily.     clopidogreL (PLAVIX) 75 mg tablet Take 1 tablet (75 mg total) by mouth once daily.    elderberry fruit and flower 460-115 mg Cap Take 2 capsules by mouth every morning.    metFORMIN (GLUCOPHAGE-XR) 500 MG XR 24hr tablet Take 1,000 mg by mouth daily with breakfast.     pregabalin (LYRICA) 100 MG capsule Take 100 mg by mouth 2 (two) times daily.    valsartan (DIOVAN) 160 MG tablet Take 160 mg by mouth once daily.    [DISCONTINUED] glyBURIDE-metformin 2.5-500 mg (GLUCOVANCE) 2.5-500 mg per tablet Take 1 tablet by mouth 2 (two) times daily with meals.    [DISCONTINUED] HYDROcodone-acetaminophen (NORCO) 5-325 mg per tablet Take 1 tablet by mouth every 6 (six) hours as needed for Pain.    [DISCONTINUED] oxyCODONE (ROXICODONE) 5 MG immediate release tablet Take 1 tablet (5 mg total) by mouth every 4 (four) hours as needed.     Antibiotics (From admission, onward)    Start     Stop Route Frequency Ordered    03/10/20 1310  ceFEPIme injection 2 g      -- IV Every 8 hours (non-standard times) 03/10/20 0909        Antifungals (From admission, onward)    None        Antivirals (From admission, onward)    None           There is no immunization history for the selected administration types on file for this patient.    Family History     Problem Relation (Age of Onset)    Cancer Father, Brother    Diabetes Mother    Heart disease Father, Brother, Brother, Brother    Hypertension Mother    Stroke Mother        Social History     Socioeconomic History    Marital status:      Spouse name: Not on file    Number of children: Not on file    Years of education: Not on file    Highest education level: Not on file   Occupational History    Not on file   Social Needs    Financial resource strain: Not on file    Food insecurity:     Worry: Not on file     Inability: Not on file    Transportation needs:     Medical: Not on file      Non-medical: Not on file   Tobacco Use    Smoking status: Former Smoker     Packs/day: 2.00     Types: Cigarettes     Last attempt to quit: 2011     Years since quittin.1    Smokeless tobacco: Never Used   Substance and Sexual Activity    Alcohol use: Yes     Comment: 2-4 PER DAY    Drug use: Never    Sexual activity: Not on file   Lifestyle    Physical activity:     Days per week: Not on file     Minutes per session: Not on file    Stress: Not on file   Relationships    Social connections:     Talks on phone: Not on file     Gets together: Not on file     Attends Mosque service: Not on file     Active member of club or organization: Not on file     Attends meetings of clubs or organizations: Not on file     Relationship status: Not on file   Other Topics Concern    Not on file   Social History Narrative    Not on file     Review of Systems  Objective:     Vital Signs (Most Recent):  Temp: 99.6 °F (37.6 °C) (03/10/20 1051)  Pulse: 92 (03/10/20 1051)  Resp: 18 (03/10/20 1051)  BP: 134/82 (03/10/20 1051)  SpO2: 100 % (03/10/20 1051) Vital Signs (24h Range):  Temp:  [97.8 °F (36.6 °C)-101.2 °F (38.4 °C)] 99.6 °F (37.6 °C)  Pulse:  [] 92  Resp:  [16-18] 18  SpO2:  [96 %-100 %] 100 %  BP: ()/(60-82) 134/82     Weight: 116.1 kg (256 lb)  Body mass index is 34.72 kg/m².    Estimated Creatinine Clearance: 134 mL/min (based on SCr of 0.8 mg/dL).    Physical Exam    Significant Labs: {Results:60920}    Significant Imaging: {Imaging Review:78004}

## 2020-03-10 NOTE — ASSESSMENT & PLAN NOTE
Baseline creatinine 0.6, 1.4 on admission with improvement to 0.8 after initial fluid recussitation

## 2020-03-10 NOTE — CONSULTS
[facility]  Infectious Disease  Consult Note    Patient Name: Renan Britton  MRN: [id number]  Admission Date: 3/9/2020  Hospital Length of Stay: 0 days  Attending Physician: Reji Warner MD  Primary Care Provider: Eren Becker MD     Isolation Status: No active isolations    Patient information was obtained from patient, past medical records and ER records.      Inpatient consult to Infectious Diseases  Consult performed by: Marcelle Hunt MD  Consult ordered by: Juan Miguel Cooper DO        Assessment/Plan:     Gram negative septicemia  Gram negative septicemia due to P mirabilis bacteremia. Suspect likely source is fluid collection near vascular site. He is febrile and without leukocytosis. Pending sensitivities.   · Agree with cefepime.   · Repeat blood cultures.   · Recommend vascular surgery evaluation of fluid collection as suspect this is likely source of his bacteremia.   · Further antibiotic recommendations pending above.     Gram-negative bacteremia  See above      Proteus mirabilis infection  See above        Thank you for your consult. I will follow-up with patient. Please contact us if you have any additional questions.    Marcelle Hunt MD  Infectious Disease  [facility]    Subjective:     Principal Problem: Sepsis    HPI: A 59-year-old man with PAD, S/P bilateral femoral endarterectomy on 12/20/19, post operative course complicated by erythema at the groin incision site treated with clindamycin, fluid collections at the surgical site, s/p surgical wound breakdown, Group F Streptococcus plus P mirabilis endovascular infection, s/p wound washout with left groin flap on 1/3/2020, and s/p 6 weeks of ceftriaxone completed on 2/14 who developed fever to 102.7 F, chills and bilateral leg soreness one day prior to admission. He was evaluated at Urgent Care with negative flu testing. He was discharged however upon returning to his home developed  malaise with fever and chills prompting ED evaluation. At the time he was febrile to 100.6 F. Laboratory work up revealed elevated CRP, creatinine and procalcitonin level. He was admitted to  service for further management and started on empiric cefepime, metronidazole, and vancomycin. Admission blood cultures subsequently became positive for GNR.     Infectious Diseases consulted for management of gram negative septicemia.           Past Medical History:   Diagnosis Date    Diabetes mellitus     H/O brain tumor     HLD (hyperlipidemia)     Hypertension     PAD (peripheral artery disease)     Seizures     R/T BRAIN TUMOR- SURGICALLY EXCISED       Past Surgical History:   Procedure Laterality Date    APPLICATION OF WOUND VACUUM-ASSISTED CLOSURE DEVICE Bilateral 1/1/2020    Procedure: APPLICATION, WOUND VAC;  Surgeon: SEBAS Prieto II, MD;  Location: [address];  Service: Cardiovascular;  Laterality: Bilateral;    APPLICATION OF WOUND VACUUM-ASSISTED CLOSURE DEVICE N/A 1/3/2020    Procedure: APPLICATION, WOUND VAC;  Surgeon: Brian Wong MD;  Location: [address];  Service: Plastics;  Laterality: N/A;    BRAIN SURGERY  01/2011    TUMOR EXCISED    CREATION OF MUSCLE ROTATIONAL FLAP N/A 1/3/2020    Procedure: CREATION, FLAP, MUSCLE ROTATION;  Surgeon: Brian Wong MD;  Location: [address];  Service: Plastics;  Laterality: N/A;    ENDARTERECTOMY OF FEMORAL ARTERY Bilateral 12/20/2019    Procedure: ENDARTERECTOMY, FEMORAL;  Surgeon: Kelby Gomez MD;  Location: [address];  Service: Peripheral Vascular;  Laterality: Bilateral;  natalya common femoral endarterectomy with natalya. iliac stent placement    PERCUTANEOUS TRANSLUMINAL ANGIOPLASTY (PTA) OF PERIPHERAL VESSEL Left 10/17/2019    Procedure: PTA, PERIPHERAL VESSEL;  Surgeon: Rao Fisher MD;  Location: [facility];  Service: Cardiology;  Laterality: Left;       Review of patient's allergies indicates:   Allergen  Reactions    Penicillins Hives     Patient currently on cefepime without complications       Medications:  Medications Prior to Admission   Medication Sig    acetaminophen (TYLENOL) 500 MG tablet Take 1,000 mg by mouth every evening.    ascorbic acid (VITAMIN C ORAL) Take 2 capsules by mouth every morning.    aspirin (ECOTRIN) 81 MG EC tablet Take 81 mg by mouth once daily.    atorvastatin (LIPITOR) 40 MG tablet Take 40 mg by mouth once daily.    cetirizine 10 mg chewable tablet Take 10 mg by mouth once daily.     clopidogreL (PLAVIX) 75 mg tablet Take 1 tablet (75 mg total) by mouth once daily.    elderberry fruit and flower 460-115 mg Cap Take 2 capsules by mouth every morning.    metFORMIN (GLUCOPHAGE-XR) 500 MG XR 24hr tablet Take 1,000 mg by mouth daily with breakfast.     pregabalin (LYRICA) 100 MG capsule Take 100 mg by mouth 2 (two) times daily.    valsartan (DIOVAN) 160 MG tablet Take 160 mg by mouth once daily.    [DISCONTINUED] glyBURIDE-metformin 2.5-500 mg (GLUCOVANCE) 2.5-500 mg per tablet Take 1 tablet by mouth 2 (two) times daily with meals.    [DISCONTINUED] HYDROcodone-acetaminophen (NORCO) 5-325 mg per tablet Take 1 tablet by mouth every 6 (six) hours as needed for Pain.    [DISCONTINUED] oxyCODONE (ROXICODONE) 5 MG immediate release tablet Take 1 tablet (5 mg total) by mouth every 4 (four) hours as needed.     Antibiotics (From admission, onward)    Start     Stop Route Frequency Ordered    03/10/20 1310  ceFEPIme injection 2 g      -- IV Every 8 hours (non-standard times) 03/10/20 0909        Antifungals (From admission, onward)    None        Antivirals (From admission, onward)    None           There is no immunization history for the selected administration types on file for this patient.    Family History     Problem Relation (Age of Onset)    Cancer Father, Brother    Diabetes Mother    Heart disease Father, Brother, Brother, Brother    Hypertension Mother    Stroke Mother         Social History     Socioeconomic History    Marital status:      Spouse name: Not on file    Number of children: Not on file    Years of education: Not on file    Highest education level: Not on file   Occupational History    Not on file   Social Needs    Financial resource strain: Not on file    Food insecurity:     Worry: Not on file     Inability: Not on file    Transportation needs:     Medical: Not on file     Non-medical: Not on file   Tobacco Use    Smoking status: Former Smoker     Packs/day: 2.00     Types: Cigarettes     Last attempt to quit: 2011     Years since quittin.1    Smokeless tobacco: Never Used   Substance and Sexual Activity    Alcohol use: Yes     Comment: 2-4 PER DAY    Drug use: Never    Sexual activity: Not on file   Lifestyle    Physical activity:     Days per week: Not on file     Minutes per session: Not on file    Stress: Not on file   Relationships    Social connections:     Talks on phone: Not on file     Gets together: Not on file     Attends Anabaptist service: Not on file     Active member of club or organization: Not on file     Attends meetings of clubs or organizations: Not on file     Relationship status: Not on file   Other Topics Concern    Not on file   Social History Narrative    Not on file     Review of Systems   Constitutional: Positive for chills, fatigue and fever.   HENT: Negative for ear pain, mouth sores, nosebleeds, postnasal drip, rhinorrhea, sinus pressure, sore throat, tinnitus, trouble swallowing and voice change.    Eyes: Negative for photophobia, pain, redness and visual disturbance.   Respiratory: Negative for apnea, cough, chest tightness, shortness of breath and wheezing.    Cardiovascular: Negative for chest pain, palpitations and leg swelling.   Gastrointestinal: Negative for abdominal pain, blood in stool, constipation, diarrhea, nausea and vomiting.   Endocrine: Negative for cold intolerance, heat intolerance,  polydipsia and polyuria.   Genitourinary: Negative for decreased urine volume, difficulty urinating, discharge, dysuria, flank pain, frequency, genital sores, hematuria, penile pain, penile swelling, scrotal swelling, testicular pain and urgency.   Musculoskeletal: Negative for arthralgias, back pain, joint swelling, myalgias and neck pain.   Skin: Negative for color change and rash.   Allergic/Immunologic: Negative for environmental allergies and food allergies.   Neurological: Negative for dizziness, seizures, syncope, weakness, light-headedness, numbness and headaches.   Hematological: Negative for adenopathy. Does not bruise/bleed easily.   Psychiatric/Behavioral: Negative for agitation, confusion, decreased concentration, hallucinations, self-injury, sleep disturbance and suicidal ideas. The patient is not nervous/anxious.      Objective:     Vital Signs (Most Recent):  Temp: 99.6 °F (37.6 °C) (03/10/20 1051)  Pulse: 92 (03/10/20 1051)  Resp: 18 (03/10/20 1051)  BP: 134/82 (03/10/20 1051)  SpO2: 100 % (03/10/20 1051) Vital Signs (24h Range):  Temp:  [97.8 °F (36.6 °C)-101.2 °F (38.4 °C)] 99.6 °F (37.6 °C)  Pulse:  [] 92  Resp:  [16-18] 18  SpO2:  [96 %-100 %] 100 %  BP: ()/(60-82) 134/82     Weight: 116.1 kg (256 lb)  Body mass index is 34.72 kg/m².    Estimated Creatinine Clearance: 134 mL/min (based on SCr of 0.8 mg/dL).    Physical Exam   Constitutional: He is oriented to person, place, and time. He appears well-developed and well-nourished.   HENT:   Head: Normocephalic and atraumatic.   Right Ear: External ear normal.   Left Ear: External ear normal.   Eyes: Conjunctivae are normal. Right eye exhibits no discharge. Left eye exhibits no discharge.   Cardiovascular: Normal rate, regular rhythm and normal heart sounds.   No murmur heard.  Pulmonary/Chest: Effort normal and breath sounds normal. He has no wheezes. He has no rales.   Abdominal: Soft. Bowel sounds are normal. He exhibits no mass.  There is no tenderness. There is no rebound.   Musculoskeletal: Normal range of motion.   Right groin healed incision. Left groin healed incision with dark erythema at the medio-proximal site with induration.    Neurological: He is alert and oriented to person, place, and time.   Skin: Skin is warm and dry.   Psychiatric: He has a normal mood and affect. His behavior is normal.   Vitals reviewed.      Significant Labs:   Blood Culture:   Recent Labs   Lab 03/09/20  0103   LABBLOO Gram stain reyna bottle: Gram negative rods   Positive results previously called 03/09/2020  15:45  Gram stain aer bottle: Gram negative rods   Positive results previously called 03/09/2020  16:54  PROTEUS MIRABILIS  For susceptibility see order #[id number]  *  Gram stain reyna bottle: Gram negative rods   Results called to and read back by: Charlee Rosen RN 03/09/2020  15:44  Gram stain aer bottle: Gram negative rods   Positive results previously called 03/09/2020  17:01  PROTEUS MIRABILIS  Susceptibility pending  *     BMP:   Recent Labs   Lab 03/10/20  0345   *      K 3.9      CO2 24   BUN 12   CREATININE 0.8   CALCIUM 9.3   MG 1.6     CBC:   Recent Labs   Lab 03/09/20  0103 03/09/20  1329 03/10/20  0345   WBC 9.54 11.00 4.79   HGB 14.9 14.1 14.5   HCT 46.2 43.4 44.4   * 89* 82*     Urine Culture: No results for input(s): LABURIN in the last 4320 hours.  Urine Studies:   Recent Labs   Lab 03/09/20  0103   COLORU Yellow   APPEARANCEUA Hazy*   PHUR 5.0   SPECGRAV 1.025   PROTEINUA 1+*   GLUCUA Negative   KETONESU Negative   BILIRUBINUA Negative   OCCULTUA Negative   NITRITE Negative   LEUKOCYTESUR Trace*   RBCUA 1   WBCUA 3   BACTERIA Occasional   SQUAMEPITHEL 0   HYALINECASTS 0     Wound Culture:   Recent Labs   Lab 01/01/20  1012   LABAERO PROTEUS MIRABILIS  Few  *  STREPTOCOCCUS GROUP F  Few  Beta-hemolytic streptococci are routinely susceptible to   penicillins,cephalosporins and  carbapenems.  Susceptibility testing not routinely performed  *       Significant Imaging: I have reviewed all pertinent imaging results/findings within the past 24 hours.

## 2020-03-10 NOTE — PROGRESS NOTES
[facility] Hospital Medicine  Progress Note    Patient Name: Renan Britton  MRN: [id number]  Patient Class: IP- Inpatient   Admission Date: 3/9/2020  Length of Stay: 0 days  Attending Physician: Reji Warner MD  Primary Care Provider: Eren Becker MD    [facility] Hospital Medicine Team: [facility] 3 Juan Miguel Cooper DO    Subjective:     Principal Problem:Sepsis        HPI:  59M with DM2, PAD s/p bilateral femoral 12/2019 c/b R side incisional infected seroma (p mirabilis, group F strep) s/p 6 weeks antibiotics (EOT 02/14/20) admitted with chief complaint of fever and chills for approx 24 hours. Pt reports that he was in his usual state of health (ambulatory, takes care of own ADLs independently, etc) when he woke up with chills and had bilateral leg soreness. His wife took his temp, which was 102.7F so he went to an urgent care where he tested negative for the flu. He was dx with a viral syndrome and sent home; however, he had continued malaise and fever which prompted a return to the ER given his hx of DM and recent infected seroma. Denies cough, sinus pain, postnasal drip, diplopia or photophobia, new neck pain, chest pain, dyspnea, abdominal pain, n/v, diarrhea, dysuria, hematuria. Does report that he has been profusely sweating and has had a mild headache. No new rash; has gone to [place] recently for camping but no insect exposure. No new drainage or opening of either wound, no pain from either surgical site. No prior history of surgical implantation or ports    ED: BP in the 140s/80s, HR in the 110s, at TMax 100.6. He was on room air. WBC was normal but CRP elevated at 113.2, ESR WNL 23, procal elevated 1.25. Metabolic panel revealed JJ    Overview/Hospital Course:  58 y/o male with DM (A1c 5.3), HTN, sz disorder (on Lyrica) and PAD s/p B femoral endarterectomy 12/20/19 complicated by infected seromas (Proteus, Strep) now s/p B groin wound wash outs/flap to L groin and 6 weeks IV  abx presented with fever and myalgias. RIP negative. GNRs in blood; on cefepime while awaiting speciation. ID consulted 3/10    Interval History: Pt febrile again overnight. Blood cultures with GNR    Review of Systems   Constitutional: Positive for activity change, appetite change, chills, fatigue and fever. Negative for unexpected weight change.   HENT: Negative for congestion.    Eyes: Negative for visual disturbance.   Respiratory: Negative for shortness of breath.    Cardiovascular: Negative for chest pain.   Gastrointestinal: Negative for abdominal pain.   Musculoskeletal: Positive for myalgias (legs). Negative for arthralgias.   Skin: Negative for wound.        diaphoretic   Neurological: Positive for headaches. Negative for speech difficulty and light-headedness.   Psychiatric/Behavioral: Negative for sleep disturbance.     Objective:     Vital Signs (Most Recent):  Temp: 99.6 °F (37.6 °C) (03/10/20 1051)  Pulse: 92 (03/10/20 1051)  Resp: 18 (03/10/20 1051)  BP: 134/82 (03/10/20 1051)  SpO2: 100 % (03/10/20 1051) Vital Signs (24h Range):  Temp:  [97.8 °F (36.6 °C)-101.2 °F (38.4 °C)] 99.6 °F (37.6 °C)  Pulse:  [] 92  Resp:  [16-18] 18  SpO2:  [96 %-100 %] 100 %  BP: ()/(60-82) 134/82     Weight: 116.1 kg (256 lb)  Body mass index is 34.72 kg/m².    Intake/Output Summary (Last 24 hours) at 3/10/2020 1329  Last data filed at 3/10/2020 0950  Gross per 24 hour   Intake 530 ml   Output --   Net 530 ml      Physical Exam   Constitutional: He is oriented to person, place, and time. No distress.   Lying in bed in no acute distress, diaphoretic.   HENT:   Head: Normocephalic and atraumatic.   Right Ear: External ear normal.   Left Ear: External ear normal.   Eyes: Pupils are equal, round, and reactive to light. EOM are normal. No scleral icterus.   Neck: No JVD present.   Cardiovascular: Intact distal pulses.   Tachycardic. Systolic ejection murmur 4/6 loudest at aortic window   Pulmonary/Chest: Effort  normal and breath sounds normal. He has no rales.   Abdominal: Soft. Bowel sounds are normal. He exhibits distension (obese). There is no tenderness.   Musculoskeletal: He exhibits no edema or deformity.   R groin incision site completely healed, no underlying knots or induration palpated.    L groin incision closed, dry, intact. Some knots palpated underlying the skin at the superomedial aspect of the incision without tenderness, erythema, or fluctuance. No pain to palpation.    Neurological: He is alert and oriented to person, place, and time.   Skin: Capillary refill takes less than 2 seconds. No rash noted. He is diaphoretic.       Significant Labs:   Blood Culture:   Recent Labs   Lab 03/09/20  0103   LABBLOO Gram stain reyna bottle: Gram negative rods   Positive results previously called 03/09/2020  15:45  Gram stain aer bottle: Gram negative rods   Positive results previously called 03/09/2020  16:54  GRAM NEGATIVE QIANA  Identification pending  For susceptibility see order #[id number]  *  Gram stain reyna bottle: Gram negative rods   Results called to and read back by: Charlee Rosen RN 03/09/2020  15:44  Gram stain aer bottle: Gram negative rods   Positive results previously called 03/09/2020  17:01  GRAM NEGATIVE QIANA  Identification and susceptibility pending  *     CBC:   Recent Labs   Lab 03/09/20  0103 03/09/20  1329 03/10/20  0345   WBC 9.54 11.00 4.79   HGB 14.9 14.1 14.5   HCT 46.2 43.4 44.4   * 89* 82*     CMP:   Recent Labs   Lab 03/09/20  0103 03/09/20  1329 03/10/20  0345   * 135* 136   K 3.6 4.2 3.9    101 104   CO2 20* 24 24   * 194* 126*   BUN 22* 16 12   CREATININE 1.4 1.0 0.8   CALCIUM 9.7 9.3 9.3   PROT 7.3  --   --    ALBUMIN 3.6  --   --    BILITOT 0.7  --   --    ALKPHOS 50*  --   --    AST 33  --   --    ALT 37  --   --    ANIONGAP 12 10 8   EGFRNONAA 55.4* >60.0 >60.0     All pertinent labs within the past 24 hours have been reviewed.    Significant Imaging:  I have reviewed all pertinent imaging results/findings within the past 24 hours.      Assessment/Plan:      * Sepsis  59M with bilateral femoral endarterectomy c/b infected L groin seroma s/p 6 weeks antibiotics on 02/14/20 for p mirabilis and group F strep infection. Now with 1 day of fevers, chills, malaise, LE myalgias. No other focal symptoms; rapid flu negative at urgent care.    JJ on exam, diaphoretic, tachycardic, febrile. Sepsis source unlikely respiratory or urinary given lack of findings on CXR and UA respectively and lack of symptoms; possible surgical site re-infection but exam not very suggestive of this. Does have endovascular stent in L common iliac artery which may be a potential source of infection as well. No s/s meningitis, no other skin breakdown, no other known ports, catheters, etc. Pt does report recent dental cleaning a few days prior to symptom onset.    ESR 23 (WNL, was 43 01/13/20 when infection identified )   (elevated, 24.9 01/13/20)  Procalcitonin 1.25    Plan  - Blood cultures drawn, showing GNR  - Continue cefepime, ID consulted given previous surgical site infection and Bacteremia.   - Ultrasound L thigh seroma / surgical site showing 5cm complex collection, consistent with seroma or hematoma, with abscess felt less likely. US of R thigh shows 1cm^3 hypoechoic collection which may represent evolving seroma or hematoma, with abscess felt less likely. If pt persistently febrile/bacteremic, will consider IR aspiration of collections.    Essential hypertension  Valsartan 160 mg daily at home. Hold while has JJ; PRN hydralazine 25 mg for hypertension in the interim.    Systolic murmur  Pt reports he knows he does have a murmur but is unsure of the details. Quite loud on exam; will get TTE to investigate.     · Concentric left ventricular remodeling.  · Normal left ventricular systolic function. The estimated ejection fraction is 68%.  · Normal right ventricular systolic  function.  · Normal LV diastolic function.  · Mild-to-moderate aortic valve stenosis. Aortic valve area is 1.40 cm2; peak velocity is 3.53 m/s; mean gradient is 37 mmHg.  · Mild tricuspid regurgitation.  · The estimated PA systolic pressure is 24 mmHg.  · Normal central venous pressure (3 mmHg).    If persistently febrile/bacteremic, will consider MERI    JJ (acute kidney injury)  Baseline creatinine 0.6, 1.4 on admission with improvement to 0.8 after initial fluid recussitation    Thrombocytopenia  Unclear significance / mechanism. No signs of consumptive coagulopathy on exam, no hematoma noted. Monitor for now.    Seizures  History of Brain Tumor    Continue lyrica 100 mg BID for AED; pt reports he was put on this after he was allergic to most common AEDs    Type 2 diabetes mellitus, without long-term current use of insulin  On metformin at home. LDSSI + Diabetic Diet + AC/HS BG checks    PAD (peripheral artery disease)  Continue DAPT, Atorvastatin 40      VTE Risk Mitigation (From admission, onward)         Ordered     IP VTE HIGH RISK PATIENT  Once      03/09/20 0433     Place sequential compression device  Until discontinued      03/09/20 0433                      Juan Miguel Cooper DO  Department of Hospital Medicine   [facility]

## 2020-03-10 NOTE — CARE UPDATE
Rapid Response Nurse Chart Check     Chart check completed, abnormal VS noted. Charge RN Domonique contacted. Pt with T101.1. Per Charge RN, patient was provided with ordered PRN tylenol. RRN requested for repeat temp check after tylenol admin, please notify team and/or RRN if patient still febrile. No additional concerns verbalized at this time, instructed to call [phone] for further concerns or assistance.

## 2020-03-10 NOTE — HPI
A 59-year-old man with PAD, S/P bilateral femoral endarterectomy on 12/20/19, post operative course complicated by erythema at the groin incision site treated with clindamycin, fluid collections at the surgical site, s/p surgical wound breakdown, Group F Streptococcus plus P mirabilis endovascular infection, s/p wound washout with left groin flap on 1/3/2020, and s/p 6 weeks of ceftriaxone completed on 2/14 who developed fever to 102.7 F, chills and bilateral leg soreness one day prior to admission. He was evaluated at Urgent Care with negative flu testing. He was discharged however upon returning to his home developed malaise with fever and chills prompting ED evaluation. At the time he was febrile to 100.6 F. Laboratory work up revealed elevated CRP, creatinine and procalcitonin level. He was admitted to  service for further management and started on empiric cefepime, metronidazole, and vancomycin. Admission blood cultures subsequently became positive for GNR.     Infectious Diseases consulted for management of gram negative septicemia.

## 2020-03-10 NOTE — ASSESSMENT & PLAN NOTE
Pt reports he knows he does have a murmur but is unsure of the details. Quite loud on exam; will get TTE to investigate.     · Concentric left ventricular remodeling.  · Normal left ventricular systolic function. The estimated ejection fraction is 68%.  · Normal right ventricular systolic function.  · Normal LV diastolic function.  · Mild-to-moderate aortic valve stenosis. Aortic valve area is 1.40 cm2; peak velocity is 3.53 m/s; mean gradient is 37 mmHg.  · Mild tricuspid regurgitation.  · The estimated PA systolic pressure is 24 mmHg.  · Normal central venous pressure (3 mmHg).    If persistently febrile/bacteremic, will consider MERI

## 2020-03-10 NOTE — HPI
Patient is a 59 y.o. male who on 12/20/19 underwent a bilateral femoral endarterectomy with Dr. Gomez.  His post op course at that time was uncomplicated and he was discharged on 12/24/19.  However on 12/27/19 the patient returned to the ED with concerns of redness and pain at the groin incisions sites, L>R.  He was sent home at that time with clindamycin and had a follow up appointment the next Monday. However he presented soon after with bilateral groin incision breakdown. The right groin had superficial wound breakdown which did not penetrate to the deeper tissues.  The left groin had a deep seroma. He had both groins I&D in the OR on 1/1 with woundvac application. There was no signs of infection although he had positive wound cultures. He was then taken back to the OR on 1/3 for washout and left sartorius muscle flap with plastic surgery. He was discharged home soon after on 6 weeks of IV abx through a PICC line. He completed his abx on 2/14. Patient reports feeling well until Sunday night 3/8. States he started having fever and chills. No change in his groin incisions. No redness, pain or drainage. No nausea or vomiting. He presented to the ED and noted to be febrile to 103.   Since admission, he has been started on IV abx. WBC WNL, however elevated CRP and procal. HDS with intermittent tachycardia.

## 2020-03-10 NOTE — CONSULTS
[facility]  Vascular Surgery  Consult Note    Inpatient consult to Vascular Surgery  Consult performed by: Terese Nava MD  Consult ordered by: Juan Miguel Cooper DO        Subjective:     Chief Complaint/Reason for Admission: bacteremia    History of Present Illness: Patient is a 59 y.o. male who on 12/20/19 underwent a bilateral femoral endarterectomy with Dr. Gomez.  His post op course at that time was uncomplicated and he was discharged on 12/24/19.  However on 12/27/19 the patient returned to the ED with concerns of redness and pain at the groin incisions sites, L>R.  He was sent home at that time with clindamycin and had a follow up appointment the next Monday. However he presented soon after with bilateral groin incision breakdown. The right groin had superficial wound breakdown which did not penetrate to the deeper tissues.  The left groin had a deep seroma. He had both groins I&D in the OR on 1/1 with woundvac application. There was no signs of infection although he had positive wound cultures.  He was then taken back to the OR on 1/3 for washout and left sartorius muscle flap with plastic surgery. He was discharged home soon after on 6 weeks of IV abx through a PICC line. He completed his abx on 2/14. Patient reports feeling well until Sunday night 3/8. States he started having fever and chills. No change in his groin incisions. No redness, pain or drainage. No nausea or vomiting. He presented to the ED and noted to be febrile to 103.   Since admission, he has been started on IV abx. WBC WNL, however elevated CRP and procal. HDS with intermittent tachycardia.     Medications Prior to Admission   Medication Sig Dispense Refill Last Dose    acetaminophen (TYLENOL) 500 MG tablet Take 1,000 mg by mouth every evening.       ascorbic acid (VITAMIN C ORAL) Take 2 capsules by mouth every morning.       aspirin (ECOTRIN) 81 MG EC tablet Take 81 mg by mouth once daily.   3/8/2020     atorvastatin (LIPITOR) 40 MG tablet Take 40 mg by mouth once daily.   3/8/2020    cetirizine 10 mg chewable tablet Take 10 mg by mouth once daily.    3/8/2020    clopidogreL (PLAVIX) 75 mg tablet Take 1 tablet (75 mg total) by mouth once daily. 30 tablet 10 3/8/2020    elderberry fruit and flower 460-115 mg Cap Take 2 capsules by mouth every morning.       metFORMIN (GLUCOPHAGE-XR) 500 MG XR 24hr tablet Take 1,000 mg by mouth daily with breakfast.    3/8/2020    pregabalin (LYRICA) 100 MG capsule Take 100 mg by mouth 2 (two) times daily.   3/8/2020    valsartan (DIOVAN) 160 MG tablet Take 160 mg by mouth once daily.   3/8/2020    [DISCONTINUED] glyBURIDE-metformin 2.5-500 mg (GLUCOVANCE) 2.5-500 mg per tablet Take 1 tablet by mouth 2 (two) times daily with meals.   Taking    [DISCONTINUED] HYDROcodone-acetaminophen (NORCO) 5-325 mg per tablet Take 1 tablet by mouth every 6 (six) hours as needed for Pain. 30 tablet 0 Taking    [DISCONTINUED] oxyCODONE (ROXICODONE) 5 MG immediate release tablet Take 1 tablet (5 mg total) by mouth every 4 (four) hours as needed. 30 tablet 0 Taking       Review of patient's allergies indicates:   Allergen Reactions    Penicillins Hives     Patient currently on cefepime without complications       Past Medical History:   Diagnosis Date    Diabetes mellitus     H/O brain tumor     HLD (hyperlipidemia)     Hypertension     PAD (peripheral artery disease)     Seizures     R/T BRAIN TUMOR- SURGICALLY EXCISED     Past Surgical History:   Procedure Laterality Date    APPLICATION OF WOUND VACUUM-ASSISTED CLOSURE DEVICE Bilateral 1/1/2020    Procedure: APPLICATION, WOUND VAC;  Surgeon: SEBAS Prieto II, MD;  Location: [facility] OR [address] [place] [address];  Service: Cardiovascular;  Laterality: Bilateral;    APPLICATION OF WOUND VACUUM-ASSISTED CLOSURE DEVICE N/A 1/3/2020    Procedure: APPLICATION, WOUND VAC;  Surgeon: Brian Wong MD;  Location: [facility] OR [address] [place] [address];  Service: Plastics;   Laterality: N/A;    BRAIN SURGERY  2011    TUMOR EXCISED    CREATION OF MUSCLE ROTATIONAL FLAP N/A 1/3/2020    Procedure: CREATION, FLAP, MUSCLE ROTATION;  Surgeon: Brian Wong MD;  Location: [facility] OR [address] [place] [address];  Service: Plastics;  Laterality: N/A;    ENDARTERECTOMY OF FEMORAL ARTERY Bilateral 2019    Procedure: ENDARTERECTOMY, FEMORAL;  Surgeon: Kelby Gomez MD;  Location: [facility] OR [address] [place] [address];  Service: Peripheral Vascular;  Laterality: Bilateral;  natalya common femoral endarterectomy with natalya. iliac stent placement    PERCUTANEOUS TRANSLUMINAL ANGIOPLASTY (PTA) OF PERIPHERAL VESSEL Left 10/17/2019    Procedure: PTA, PERIPHERAL VESSEL;  Surgeon: Rao Fisher MD;  Location: [facility] CATH;  Service: Cardiology;  Laterality: Left;     Family History     Problem Relation (Age of Onset)    Cancer Father, Brother    Diabetes Mother    Heart disease Father, Brother, Brother, Brother    Hypertension Mother    Stroke Mother        Tobacco Use    Smoking status: Former Smoker     Packs/day: 2.00     Types: Cigarettes     Last attempt to quit: 2011     Years since quittin.1    Smokeless tobacco: Never Used   Substance and Sexual Activity    Alcohol use: Yes     Comment: 2-4 PER DAY    Drug use: Never    Sexual activity: Not on file     Review of Systems   Constitutional: Positive for activity change, chills, fatigue and fever. Negative for appetite change and unexpected weight change.   Respiratory: Negative for cough, chest tightness and shortness of breath.    Cardiovascular: Negative for chest pain, palpitations and leg swelling.   Gastrointestinal: Negative for abdominal distention, nausea and vomiting.   Genitourinary: Negative for difficulty urinating and dysuria.   Musculoskeletal: Negative for arthralgias.   Skin: Negative for color change and wound.   Neurological: Negative for dizziness and weakness.   Hematological: Negative for adenopathy. Does not bruise/bleed easily.     Objective:      Vital Signs (Most Recent):  Temp: 99.6 °F (37.6 °C) (03/10/20 1520)  Pulse: (!) 111 (03/10/20 1606)  Resp: 18 (03/10/20 1520)  BP: (!) 142/91 (03/10/20 1520)  SpO2: 100 % (03/10/20 1520) Vital Signs (24h Range):  Temp:  [97.8 °F (36.6 °C)-101.1 °F (38.4 °C)] 99.6 °F (37.6 °C)  Pulse:  [] 111  Resp:  [16-18] 18  SpO2:  [97 %-100 %] 100 %  BP: ()/(61-91) 142/91     Weight: 116.1 kg (256 lb)  Body mass index is 34.72 kg/m².    Physical Exam   Constitutional: He is oriented to person, place, and time. He appears well-developed and well-nourished. No distress.   Patient well appearing-sitting up in bed and eating   Cardiovascular: Normal rate and regular rhythm.   Pulmonary/Chest: Effort normal. No respiratory distress.   Abdominal: Soft. He exhibits no distension.   Musculoskeletal: Normal range of motion. He exhibits no edema or deformity.   Neurological: He is alert and oriented to person, place, and time.   Dopplerable DP and PT bilaterally   Skin: Skin is warm and dry. He is not diaphoretic.   Right groin wound intact. No erythema. No drainage. No palpable seroma. Dopplerable pulse  Left groin wound intact (previous flap). No erythema. No drainage. No palpable seroma. Dopplerable pulse.   Slight discoloration of groin tracking toward penis, no appreciable association with incision.     Nursing note and vitals reviewed.      Significant Labs:  ABGs: No results for input(s): PH, PCO2, PO2, HCO3, POCSATURATED, BE in the last 48 hours.  BMP:   Recent Labs   Lab 03/10/20  0345   *      K 3.9      CO2 24   BUN 12   CREATININE 0.8   CALCIUM 9.3   MG 1.6     Cardiac markers: No results for input(s): CKMB, CPKMB, TROPONINT, TROPONINI, MYOGLOBIN in the last 48 hours.  CBC:   Recent Labs   Lab 03/10/20  0345   WBC 4.79   RBC 4.90   HGB 14.5   HCT 44.4   PLT 82*   MCV 91   MCH 29.6   MCHC 32.7     CMP:   Recent Labs   Lab 03/09/20  0103  03/10/20  0345   *   < > 126*   CALCIUM 9.7   < >  9.3   ALBUMIN 3.6  --   --    PROT 7.3  --   --    *   < > 136   K 3.6   < > 3.9   CO2 20*   < > 24      < > 104   BUN 22*   < > 12   CREATININE 1.4   < > 0.8   ALKPHOS 50*  --   --    ALT 37  --   --    AST 33  --   --    BILITOT 0.7  --   --     < > = values in this interval not displayed.     Coagulation:   Recent Labs   Lab 03/10/20  0345   LABPROT 15.0*   INR 1.5*   APTT 28.2     Hemoglobin:   Recent Labs   Lab 03/09/20  0457   HGBA1C 5.3     Lactic Acid:   Recent Labs   Lab 03/09/20  0103   LACTATE 2.0     LFTs:   Recent Labs   Lab 03/09/20  0103   ALT 37   AST 33   ALKPHOS 50*   BILITOT 0.7   PROT 7.3   ALBUMIN 3.6       Significant Diagnostics:  US EXTREMITY NON VASCULAR LIMITED RIGHT    CLINICAL HISTORY:  bacteremia - recent R groin infected seroma;    TECHNIQUE:  Limited ultrasound of the right groin was performed.    COMPARISON:  Ultrasound extremity 03/09/2020, CT pelvis with contrast 12/30/2019.    FINDINGS:  There is a large lymph node in the right groin measuring 4.0 x 0.7 x 2.6 cm.    There is a small hypoechoic collection just superficial to the right iliac vessels measuring approximately 1.1 x 1.0 x 1.4 cm.  Considerations include evolving seroma or hematoma, with abscess felt less likely.  This is likely related to small collection seen on CT pelvis 12/30/2019 in the same region.  A small tract can be seen extending towards the superficial soft tissues on image 15.      Impression       Small hypoechoic collection just superficial to the right iliac vessels measuring approximately 1.1 x 1.0 x 1.4 cm.  Considerations include evolving seroma or hematoma, with abscess felt less likely.  Correlation is advised.    Enlarged right groin lymph node as above.         Assessment/Plan:     PAD (peripheral artery disease)  58yo male with hx of PAD s/p bilateral femoral endarterectomies on 12/20 with course complicated by bilateral wound breakdown and concern for infected fluid collection L groin  now s/p bilateral groin washout with superficial debridement of R groin and placement of bilateral wound vacs 1/1/20 now s/p L rectus muscle flap per plastics on 1/3. Completed 6 weeks home IV abx. Now presenting with proteus bacteremia (same bacteria as previous) with concern for groin as source.    -Concern for groin wound infection vs. Infection of arterial patch.   -Please order CTA abdomen and pelvis with run off to assess vessels.   -Continue ASA, plavix and statin   -Vascular surgery will continue to follow along   -Plan discussed with Dr. Gomez        Thank you for your consult. I will follow-up with patient. Please contact us if you have any additional questions.    Terese Nava MD  Vascular Surgery  [facility]

## 2020-03-10 NOTE — PROGRESS NOTES
Pharmacist Renal Dose Adjustment Note    Renan Britton is a 59 y.o. male being treated with the medication Cefepime     Patient Data:    Vital Signs (Most Recent):  Temp: 98.3 °F (36.8 °C) (03/10/20 0705)  Pulse: 87 (03/10/20 0728)  Resp: 18 (03/10/20 0705)  BP: 123/79 (03/10/20 0705)  SpO2: 97 % (03/10/20 0705) Vital Signs (72h Range):  Temp:  [97.8 °F (36.6 °C)-101.2 °F (38.4 °C)]   Pulse:  []   Resp:  [14-20]   BP: ()/(60-82)   SpO2:  [94 %-99 %]      Recent Labs   Lab 03/09/20  0103 03/09/20  1329 03/10/20  0345   CREATININE 1.4 1.0 0.8     Serum creatinine: 0.8 mg/dL 03/10/20 0345  Estimated creatinine clearance: 134 mL/min    Medication:Cefepime 2 g Q 12 hr will be changed to Cefepime 2 g Q 8 hr     Pharmacist's Name: Dilcia Michaels  Pharmacist's Extension: [phone]

## 2020-03-10 NOTE — ASSESSMENT & PLAN NOTE
Gram negative septicemia due to P mirabilis bacteremia. Suspect likely source is fluid collection near vascular site. He is febrile and without leukocytosis. Pending sensitivities.   · Agree with cefepime.   · Repeat blood cultures.   · Recommend vascular surgery evaluation of fluid collection as suspect this is likely source of his bacteremia.   · Further antibiotic recommendations pending above.

## 2020-03-11 PROBLEM — Z75.8 DISCHARGE PLANNING ISSUES: Status: ACTIVE | Noted: 2020-03-11

## 2020-03-11 PROBLEM — Z02.9 DISCHARGE PLANNING ISSUES: Status: ACTIVE | Noted: 2020-03-11

## 2020-03-11 LAB
ANION GAP SERPL CALC-SCNC: 8 MMOL/L (ref 8–16)
BACTERIA BLD CULT: ABNORMAL
BASOPHILS # BLD AUTO: 0.02 K/UL (ref 0–0.2)
BASOPHILS NFR BLD: 0.6 % (ref 0–1.9)
BUN SERPL-MCNC: 11 MG/DL (ref 6–20)
CALCIUM SERPL-MCNC: 8.5 MG/DL (ref 8.7–10.5)
CHLORIDE SERPL-SCNC: 104 MMOL/L (ref 95–110)
CO2 SERPL-SCNC: 27 MMOL/L (ref 23–29)
CREAT SERPL-MCNC: 0.8 MG/DL (ref 0.5–1.4)
DIFFERENTIAL METHOD: ABNORMAL
EOSINOPHIL # BLD AUTO: 0.1 K/UL (ref 0–0.5)
EOSINOPHIL NFR BLD: 1.5 % (ref 0–8)
ERYTHROCYTE [DISTWIDTH] IN BLOOD BY AUTOMATED COUNT: 13.1 % (ref 11.5–14.5)
EST. GFR  (AFRICAN AMERICAN): >60 ML/MIN/1.73 M^2
EST. GFR  (NON AFRICAN AMERICAN): >60 ML/MIN/1.73 M^2
GLUCOSE SERPL-MCNC: 129 MG/DL (ref 70–110)
HCT VFR BLD AUTO: 43.4 % (ref 40–54)
HGB BLD-MCNC: 13.9 G/DL (ref 14–18)
IMM GRANULOCYTES # BLD AUTO: 0.01 K/UL (ref 0–0.04)
IMM GRANULOCYTES NFR BLD AUTO: 0.3 % (ref 0–0.5)
LYMPHOCYTES # BLD AUTO: 0.9 K/UL (ref 1–4.8)
LYMPHOCYTES NFR BLD: 27 % (ref 18–48)
MAGNESIUM SERPL-MCNC: 1.7 MG/DL (ref 1.6–2.6)
MCH RBC QN AUTO: 29 PG (ref 27–31)
MCHC RBC AUTO-ENTMCNC: 32 G/DL (ref 32–36)
MCV RBC AUTO: 91 FL (ref 82–98)
MONOCYTES # BLD AUTO: 0.5 K/UL (ref 0.3–1)
MONOCYTES NFR BLD: 15.2 % (ref 4–15)
NEUTROPHILS # BLD AUTO: 1.9 K/UL (ref 1.8–7.7)
NEUTROPHILS NFR BLD: 55.4 % (ref 38–73)
NRBC BLD-RTO: 0 /100 WBC
PHOSPHATE SERPL-MCNC: 3 MG/DL (ref 2.7–4.5)
PLATELET # BLD AUTO: 94 K/UL (ref 150–350)
PMV BLD AUTO: 11.8 FL (ref 9.2–12.9)
POCT GLUCOSE: 127 MG/DL (ref 70–110)
POCT GLUCOSE: 166 MG/DL (ref 70–110)
POTASSIUM SERPL-SCNC: 4.1 MMOL/L (ref 3.5–5.1)
RBC # BLD AUTO: 4.79 M/UL (ref 4.6–6.2)
SODIUM SERPL-SCNC: 139 MMOL/L (ref 136–145)
WBC # BLD AUTO: 3.41 K/UL (ref 3.9–12.7)

## 2020-03-11 PROCEDURE — 36415 COLL VENOUS BLD VENIPUNCTURE: CPT

## 2020-03-11 PROCEDURE — 25500020 PHARM REV CODE 255

## 2020-03-11 PROCEDURE — 99232 PR SUBSEQUENT HOSPITAL CARE,LEVL II: ICD-10-PCS | Mod: ,,, | Performed by: INTERNAL MEDICINE

## 2020-03-11 PROCEDURE — 80048 BASIC METABOLIC PNL TOTAL CA: CPT

## 2020-03-11 PROCEDURE — 99232 SBSQ HOSP IP/OBS MODERATE 35: CPT | Mod: ,,,

## 2020-03-11 PROCEDURE — 83735 ASSAY OF MAGNESIUM: CPT

## 2020-03-11 PROCEDURE — 99232 PR SUBSEQUENT HOSPITAL CARE,LEVL II: ICD-10-PCS | Mod: ,,,

## 2020-03-11 PROCEDURE — 63600175 PHARM REV CODE 636 W HCPCS: Performed by: STUDENT IN AN ORGANIZED HEALTH CARE EDUCATION/TRAINING PROGRAM

## 2020-03-11 PROCEDURE — 84100 ASSAY OF PHOSPHORUS: CPT

## 2020-03-11 PROCEDURE — 99232 SBSQ HOSP IP/OBS MODERATE 35: CPT | Mod: ,,, | Performed by: INTERNAL MEDICINE

## 2020-03-11 PROCEDURE — 85025 COMPLETE CBC W/AUTO DIFF WBC: CPT

## 2020-03-11 PROCEDURE — 99024 POSTOP FOLLOW-UP VISIT: CPT | Mod: ,,, | Performed by: SURGERY

## 2020-03-11 PROCEDURE — 99024 PR POST-OP FOLLOW-UP VISIT: ICD-10-PCS | Mod: ,,, | Performed by: SURGERY

## 2020-03-11 PROCEDURE — 94761 N-INVAS EAR/PLS OXIMETRY MLT: CPT

## 2020-03-11 PROCEDURE — 11000001 HC ACUTE MED/SURG PRIVATE ROOM

## 2020-03-11 PROCEDURE — 25000003 PHARM REV CODE 250: Performed by: STUDENT IN AN ORGANIZED HEALTH CARE EDUCATION/TRAINING PROGRAM

## 2020-03-11 PROCEDURE — 63600175 PHARM REV CODE 636 W HCPCS

## 2020-03-11 RX ORDER — VALSARTAN 80 MG/1
160 TABLET ORAL DAILY
Status: DISCONTINUED | OUTPATIENT
Start: 2020-03-11 | End: 2020-03-12 | Stop reason: HOSPADM

## 2020-03-11 RX ADMIN — PREGABALIN 100 MG: 50 CAPSULE ORAL at 08:03

## 2020-03-11 RX ADMIN — ASPIRIN 81 MG: 81 TABLET, COATED ORAL at 08:03

## 2020-03-11 RX ADMIN — ATORVASTATIN CALCIUM 40 MG: 20 TABLET, FILM COATED ORAL at 08:03

## 2020-03-11 RX ADMIN — CEFTRIAXONE 2 G: 2 INJECTION, SOLUTION INTRAVENOUS at 12:03

## 2020-03-11 RX ADMIN — CETIRIZINE HYDROCHLORIDE 5 MG: 5 TABLET, FILM COATED ORAL at 08:03

## 2020-03-11 RX ADMIN — CEFEPIME 2 G: 1 INJECTION, POWDER, FOR SOLUTION INTRAMUSCULAR; INTRAVENOUS at 06:03

## 2020-03-11 RX ADMIN — IOHEXOL 125 ML: 350 INJECTION, SOLUTION INTRAVENOUS at 02:03

## 2020-03-11 RX ADMIN — CLOPIDOGREL BISULFATE 75 MG: 75 TABLET ORAL at 08:03

## 2020-03-11 NOTE — SUBJECTIVE & OBJECTIVE
Medications Prior to Admission   Medication Sig Dispense Refill Last Dose    acetaminophen (TYLENOL) 500 MG tablet Take 1,000 mg by mouth every evening.       ascorbic acid (VITAMIN C ORAL) Take 2 capsules by mouth every morning.       aspirin (ECOTRIN) 81 MG EC tablet Take 81 mg by mouth once daily.   3/8/2020    atorvastatin (LIPITOR) 40 MG tablet Take 40 mg by mouth once daily.   3/8/2020    cetirizine 10 mg chewable tablet Take 10 mg by mouth once daily.    3/8/2020    clopidogreL (PLAVIX) 75 mg tablet Take 1 tablet (75 mg total) by mouth once daily. 30 tablet 10 3/8/2020    elderberry fruit and flower 460-115 mg Cap Take 2 capsules by mouth every morning.       metFORMIN (GLUCOPHAGE-XR) 500 MG XR 24hr tablet Take 1,000 mg by mouth daily with breakfast.    3/8/2020    pregabalin (LYRICA) 100 MG capsule Take 100 mg by mouth 2 (two) times daily.   3/8/2020    valsartan (DIOVAN) 160 MG tablet Take 160 mg by mouth once daily.   3/8/2020    [DISCONTINUED] glyBURIDE-metformin 2.5-500 mg (GLUCOVANCE) 2.5-500 mg per tablet Take 1 tablet by mouth 2 (two) times daily with meals.   Taking    [DISCONTINUED] HYDROcodone-acetaminophen (NORCO) 5-325 mg per tablet Take 1 tablet by mouth every 6 (six) hours as needed for Pain. 30 tablet 0 Taking    [DISCONTINUED] oxyCODONE (ROXICODONE) 5 MG immediate release tablet Take 1 tablet (5 mg total) by mouth every 4 (four) hours as needed. 30 tablet 0 Taking       Review of patient's allergies indicates:   Allergen Reactions    Penicillins Hives     Patient currently on cefepime without complications       Past Medical History:   Diagnosis Date    Diabetes mellitus     H/O brain tumor     HLD (hyperlipidemia)     Hypertension     PAD (peripheral artery disease)     Seizures     R/T BRAIN TUMOR- SURGICALLY EXCISED     Past Surgical History:   Procedure Laterality Date    APPLICATION OF WOUND VACUUM-ASSISTED CLOSURE DEVICE Bilateral 1/1/2020    Procedure: APPLICATION,  WOUND VAC;  Surgeon: SEBAS Prieto II, MD;  Location: [address];  Service: Cardiovascular;  Laterality: Bilateral;    APPLICATION OF WOUND VACUUM-ASSISTED CLOSURE DEVICE N/A 1/3/2020    Procedure: APPLICATION, WOUND VAC;  Surgeon: Brian Wong MD;  Location: [address];  Service: Plastics;  Laterality: N/A;    BRAIN SURGERY  2011    TUMOR EXCISED    CREATION OF MUSCLE ROTATIONAL FLAP N/A 1/3/2020    Procedure: CREATION, FLAP, MUSCLE ROTATION;  Surgeon: Brian Wong MD;  Location: [address];  Service: Plastics;  Laterality: N/A;    ENDARTERECTOMY OF FEMORAL ARTERY Bilateral 2019    Procedure: ENDARTERECTOMY, FEMORAL;  Surgeon: Kelby Gomez MD;  Location: [address];  Service: Peripheral Vascular;  Laterality: Bilateral;  natalya common femoral endarterectomy with natalya. iliac stent placement    PERCUTANEOUS TRANSLUMINAL ANGIOPLASTY (PTA) OF PERIPHERAL VESSEL Left 10/17/2019    Procedure: PTA, PERIPHERAL VESSEL;  Surgeon: Rao Fisher MD;  Location: [facility];  Service: Cardiology;  Laterality: Left;     Family History     Problem Relation (Age of Onset)    Cancer Father, Brother    Diabetes Mother    Heart disease Father, Brother, Brother, Brother    Hypertension Mother    Stroke Mother        Tobacco Use    Smoking status: Former Smoker     Packs/day: 2.00     Types: Cigarettes     Last attempt to quit: 2011     Years since quittin.1    Smokeless tobacco: Never Used   Substance and Sexual Activity    Alcohol use: Yes     Comment: 2-4 PER DAY    Drug use: Never    Sexual activity: Not on file     Review of Systems   Constitutional: Positive for activity change, chills, fatigue and fever. Negative for appetite change and unexpected weight change.   Respiratory: Negative for cough, chest tightness and shortness of breath.    Cardiovascular: Negative for chest pain, palpitations and leg swelling.   Gastrointestinal: Negative for abdominal distention,  nausea and vomiting.   Genitourinary: Negative for difficulty urinating and dysuria.   Musculoskeletal: Negative for arthralgias.   Skin: Negative for color change and wound.   Neurological: Negative for dizziness and weakness.   Hematological: Negative for adenopathy. Does not bruise/bleed easily.     Objective:     Vital Signs (Most Recent):  Temp: 96.5 °F (35.8 °C) (03/11/20 0734)  Pulse: 80 (03/11/20 0734)  Resp: 18 (03/11/20 0734)  BP: (!) 152/84 (03/11/20 0734)  SpO2: 97 % (03/11/20 0734) Vital Signs (24h Range):  Temp:  [96.5 °F (35.8 °C)-99.6 °F (37.6 °C)] 96.5 °F (35.8 °C)  Pulse:  [] 80  Resp:  [16-18] 18  SpO2:  [97 %-100 %] 97 %  BP: (134-154)/(70-91) 152/84     Weight: 116.1 kg (256 lb)  Body mass index is 34.72 kg/m².    Physical Exam   Constitutional: He is oriented to person, place, and time. He appears well-developed and well-nourished. No distress.   Patient well appearing-sitting up in bed and eating   Cardiovascular: Normal rate and regular rhythm.   Pulmonary/Chest: Effort normal. No respiratory distress.   Abdominal: Soft. He exhibits no distension.   Musculoskeletal: Normal range of motion. He exhibits no edema or deformity.   Neurological: He is alert and oriented to person, place, and time.   Dopplerable DP and PT bilaterally   Skin: Skin is warm and dry. He is not diaphoretic.   Right groin wound intact. No erythema. No drainage. No palpable seroma. Dopplerable pulse  Left groin wound intact (previous flap). No erythema. No drainage. No palpable seroma. Dopplerable pulse.   Slight discoloration of groin tracking toward penis, no appreciable association with incision.     Nursing note and vitals reviewed.      Significant Labs:  ABGs: No results for input(s): PH, PCO2, PO2, HCO3, POCSATURATED, BE in the last 48 hours.  BMP:   Recent Labs   Lab 03/11/20  0428   *      K 4.1      CO2 27   BUN 11   CREATININE 0.8   CALCIUM 8.5*   MG 1.7     Cardiac markers: No results  for input(s): CKMB, CPKMB, TROPONINT, TROPONINI, MYOGLOBIN in the last 48 hours.  CBC:   Recent Labs   Lab 03/11/20  0428   WBC 3.41*   RBC 4.79   HGB 13.9*   HCT 43.4   PLT 94*   MCV 91   MCH 29.0   MCHC 32.0     CMP:   Recent Labs   Lab 03/11/20  0428   *   CALCIUM 8.5*      K 4.1   CO2 27      BUN 11   CREATININE 0.8     Coagulation:   Recent Labs   Lab 03/10/20  0345   LABPROT 15.0*   INR 1.5*   APTT 28.2     Hemoglobin:   No results for input(s): HGBA1C in the last 48 hours.  Lactic Acid:   No results for input(s): LACTATE in the last 48 hours.  LFTs:   No results for input(s): ALT, AST, ALKPHOS, BILITOT, PROT, ALBUMIN in the last 48 hours.    Significant Diagnostics:  US EXTREMITY NON VASCULAR LIMITED RIGHT    CLINICAL HISTORY:  bacteremia - recent R groin infected seroma;    TECHNIQUE:  Limited ultrasound of the right groin was performed.    COMPARISON:  Ultrasound extremity 03/09/2020, CT pelvis with contrast 12/30/2019.    FINDINGS:  There is a large lymph node in the right groin measuring 4.0 x 0.7 x 2.6 cm.    There is a small hypoechoic collection just superficial to the right iliac vessels measuring approximately 1.1 x 1.0 x 1.4 cm.  Considerations include evolving seroma or hematoma, with abscess felt less likely.  This is likely related to small collection seen on CT pelvis 12/30/2019 in the same region.  A small tract can be seen extending towards the superficial soft tissues on image 15.      Impression       Small hypoechoic collection just superficial to the right iliac vessels measuring approximately 1.1 x 1.0 x 1.4 cm.  Considerations include evolving seroma or hematoma, with abscess felt less likely.  Correlation is advised.    Enlarged right groin lymph node as above.

## 2020-03-11 NOTE — PROGRESS NOTES
[facility]  Infectious Disease  Progress Note    Patient Name: Renan Britton  MRN: [id number]  Admission Date: 3/9/2020  Length of Stay: 1 days  Attending Physician: Reji Warner MD  Primary Care Provider: Eren Becker MD    Isolation Status: No active isolations  Assessment/Plan:      Gram negative septicemia  Gram negative septicemia due to P mirabilis bacteremia. Suspect likely source is fluid collection near vascular site. Sepsis resolving. He is afebrile and without leukocytosis. Repeat blood cultures on 3/10 remain NGTD.   · De-escalate cefepime to ceftriaxone 2 gm IV dailu.   · Will follow up cultures.   · Pending imaging to assess further management.   · Discussed with patient and his wife at bedside.     Gram-negative bacteremia  See above      Proteus mirabilis infection  See above        Anticipated Disposition: per primary    Thank you for your consult. I will follow-up with patient. Please contact us if you have any additional questions.    Marcelle Hunt MD  Infectious Disease  [facility]    Subjective:     Principal Problem:Sepsis    HPI: A 59-year-old man with PAD, S/P bilateral femoral endarterectomy on 12/20/19, post operative course complicated by erythema at the groin incision site treated with clindamycin, fluid collections at the surgical site, s/p surgical wound breakdown, Group F Streptococcus plus P mirabilis endovascular infection, s/p wound washout with left groin flap on 1/3/2020, and s/p 6 weeks of ceftriaxone completed on 2/14 who developed fever to 102.7 F, chills and bilateral leg soreness one day prior to admission. He was evaluated at Urgent Care with negative flu testing. He was discharged however upon returning to his home developed malaise with fever and chills prompting ED evaluation. At the time he was febrile to 100.6 F. Laboratory work up revealed elevated CRP, creatinine and procalcitonin level. He was admitted to   "service for further management and started on empiric cefepime, metronidazole, and vancomycin. Admission blood cultures subsequently became positive for GNR.     Infectious Diseases consulted for management of gram negative septicemia.         Interval History: "OK". Recurrent P mirabilis bacteremia suspected to be from fluid collection in left groin. Unclear if endovascular graft involved. On cefepime. Repeat blood cultures NGTD. De-escalated to ceftriaxone based on sensitivities on 3/11.     Review of Systems   Constitutional: Negative for chills, fatigue and fever.   HENT: Negative for ear pain, mouth sores, nosebleeds, postnasal drip, rhinorrhea, sinus pressure, sore throat, tinnitus, trouble swallowing and voice change.    Eyes: Negative for photophobia, pain, redness and visual disturbance.   Respiratory: Negative for apnea, cough, chest tightness, shortness of breath and wheezing.    Cardiovascular: Negative for chest pain, palpitations and leg swelling.   Gastrointestinal: Negative for abdominal pain, blood in stool, constipation, diarrhea, nausea and vomiting.   Endocrine: Negative for cold intolerance, heat intolerance, polydipsia and polyuria.   Genitourinary: Negative for decreased urine volume, difficulty urinating, discharge, dysuria, flank pain, frequency, genital sores, hematuria, penile pain, penile swelling, scrotal swelling, testicular pain and urgency.   Musculoskeletal: Negative for arthralgias, back pain, joint swelling, myalgias and neck pain.   Skin: Negative for color change and rash.   Allergic/Immunologic: Negative for environmental allergies and food allergies.   Neurological: Negative for dizziness, seizures, syncope, weakness, light-headedness, numbness and headaches.   Hematological: Negative for adenopathy. Does not bruise/bleed easily.   Psychiatric/Behavioral: Negative for agitation, confusion, decreased concentration, hallucinations, self-injury, sleep disturbance and suicidal " ideas. The patient is not nervous/anxious.      Objective:     Vital Signs (Most Recent):  Temp: 98 °F (36.7 °C) (03/11/20 1503)  Pulse: 76 (03/11/20 1557)  Resp: 18 (03/11/20 1503)  BP: (!) 157/83 (03/11/20 1503)  SpO2: 99 % (03/11/20 1503) Vital Signs (24h Range):  Temp:  [96.5 °F (35.8 °C)-99 °F (37.2 °C)] 98 °F (36.7 °C)  Pulse:  [] 76  Resp:  [16-18] 18  SpO2:  [97 %-99 %] 99 %  BP: (133-157)/(70-90) 157/83     Weight: 116.1 kg (256 lb)  Body mass index is 34.72 kg/m².    Estimated Creatinine Clearance: 134 mL/min (based on SCr of 0.8 mg/dL).    Physical Exam   Constitutional: He is oriented to person, place, and time. He appears well-developed and well-nourished.   HENT:   Head: Normocephalic and atraumatic.   Right Ear: External ear normal.   Left Ear: External ear normal.   Eyes: Conjunctivae are normal. Right eye exhibits no discharge. Left eye exhibits no discharge.   Cardiovascular: Normal rate, regular rhythm and normal heart sounds.   No murmur heard.  Pulmonary/Chest: Effort normal and breath sounds normal. He has no wheezes. He has no rales.   Abdominal: Soft. Bowel sounds are normal. He exhibits no mass. There is no tenderness. There is no rebound.   Musculoskeletal: Normal range of motion.   Neurological: He is alert and oriented to person, place, and time.   Skin: Skin is warm and dry.   Psychiatric: He has a normal mood and affect. His behavior is normal.   Vitals reviewed.      Significant Labs:   Blood Culture:   Recent Labs   Lab 03/09/20  0103 03/10/20  0837   LABBLOO Gram stain reyna bottle: Gram negative rods   Positive results previously called 03/09/2020  15:45  Gram stain aer bottle: Gram negative rods   Positive results previously called 03/09/2020  16:54  PROTEUS MIRABILIS  For susceptibility see order #[id number]  *  Gram stain reyna bottle: Gram negative rods   Results called to and read back by: Charlee Rosen RN 03/09/2020  15:44  Gram stain aer bottle: Gram negative rods    Positive results previously called 03/09/2020  17:01  PROTEUS MIRABILIS* No Growth to date  No Growth to date  No Growth to date  No Growth to date     BMP:   Recent Labs   Lab 03/11/20  0428   *      K 4.1      CO2 27   BUN 11   CREATININE 0.8   CALCIUM 8.5*   MG 1.7     CBC:   Recent Labs   Lab 03/10/20  0345 03/11/20  0428   WBC 4.79 3.41*   HGB 14.5 13.9*   HCT 44.4 43.4   PLT 82* 94*     Urine Culture: No results for input(s): LABURIN in the last 4320 hours.  Urine Studies:   Recent Labs   Lab 03/09/20  0103   COLORU Yellow   APPEARANCEUA Hazy*   PHUR 5.0   SPECGRAV 1.025   PROTEINUA 1+*   GLUCUA Negative   KETONESU Negative   BILIRUBINUA Negative   OCCULTUA Negative   NITRITE Negative   LEUKOCYTESUR Trace*   RBCUA 1   WBCUA 3   BACTERIA Occasional   SQUAMEPITHEL 0   HYALINECASTS 0     Wound Culture:   Recent Labs   Lab 01/01/20  1012   LABAERO PROTEUS MIRABILIS  Few  *  STREPTOCOCCUS GROUP F  Few  Beta-hemolytic streptococci are routinely susceptible to   penicillins,cephalosporins and carbapenems.  Susceptibility testing not routinely performed  *       Significant Imaging: I have reviewed all pertinent imaging results/findings within the past 24 hours.

## 2020-03-11 NOTE — ASSESSMENT & PLAN NOTE
Gram negative septicemia due to P mirabilis bacteremia. Suspect likely source is fluid collection near vascular site. Sepsis resolving. He is afebrile and without leukocytosis. Repeat blood cultures on 3/10 remain NGTD.   · De-escalate cefepime to ceftriaxone 2 gm IV dailu.   · Will follow up cultures.   · Pending imaging to assess further management.   · Discussed with patient and his wife at bedside.

## 2020-03-11 NOTE — NURSING
No acute events today. VSS, NSR on tele, no ST noted today. Afebrile. IV ABx given. To CTA. Tolerated well. Walked around unit w/ wife, no issues. Updated re: POC. Safety maintained throughout. Fall precautions reviewed w/ pt who indicated understanding. See flowsheet for detail.

## 2020-03-11 NOTE — ASSESSMENT & PLAN NOTE
58 y/o male s/p B femoral endarterectomy/L MICHAEL stent 12/20/19 who developed B wound infections and underwent washout/drainage and eventual L sartorius/GONZALEZ flap on 1/3/20. Wound cx grew Proteus/group F streptococcus - treated for 6 weeks with abx (ended 2/14/20). Presented to ED with fever/myalgias. WBC normal, , ESR WNL, procalcitonin 1.25.    Blood cx (3/9) = Proteus  Blood cx (3/10) = NGTD    R groin US with 1 cm fluid collection, L groin US with 5 cm fluid collection    PLAN:  - ID following - continue cefepime, f/u blood cx and sensitivities  - VASCULAR SURGERY following - CTA A/P with BLE runoff ordered

## 2020-03-11 NOTE — ASSESSMENT & PLAN NOTE
S/p B femoral endarterectomies and L MICHAEL stent 12/20/19  HOME MED = ASA, Plavix, atorvastatin    PLAN:  - continue ASA/Plavix  - continue home atorvastatin  - VASCULAR SURGERY following - CTA A/P with BLE runoff pending

## 2020-03-11 NOTE — ASSESSMENT & PLAN NOTE
TTE = EF 68%, normal diastolic function, mild-to-moderate AS, mild TR    PLAN:  - no current intervention, follow-up with CARDIOLOGY as outpatient

## 2020-03-11 NOTE — PROGRESS NOTES
[facility] Hospital Medicine  Progress Note    Patient Name: Renan Britton  MRN: [id number]  Patient Class: IP- Inpatient   Admission Date: 3/9/2020  Length of Stay: 1 days  Attending Physician: Reji Warner MD  Primary Care Provider: Eren Becker MD    [facility] Medicine Team: [facility] 3 Eduardo Becker MD    Subjective:     Principal Problem:Sepsis    HPI:  59M with DM2, PAD s/p bilateral femoral 12/2019 c/b R side incisional infected seroma (p mirabilis, group F strep) s/p 6 weeks antibiotics (EOT 02/14/20) admitted with chief complaint of fever and chills for approx 24 hours. Pt reports that he was in his usual state of health (ambulatory, takes care of own ADLs independently, etc) when he woke up with chills and had bilateral leg soreness. His wife took his temp, which was 102.7F so he went to an urgent care where he tested negative for the flu. He was dx with a viral syndrome and sent home; however, he had continued malaise and fever which prompted a return to the ER given his hx of DM and recent infected seroma. Denies cough, sinus pain, postnasal drip, diplopia or photophobia, new neck pain, chest pain, dyspnea, abdominal pain, n/v, diarrhea, dysuria, hematuria. Does report that he has been profusely sweating and has had a mild headache. No new rash; has gone to [place] recently for camping but no insect exposure. No new drainage or opening of either wound, no pain from either surgical site. No prior history of surgical implantation or ports    ED: BP in the 140s/80s, HR in the 110s, at TMax 100.6. He was on room air. WBC was normal but CRP elevated at 113.2, ESR WNL 23, procal elevated 1.25. Metabolic panel revealed JJ    Overview/Hospital Course:  58 y/o male with DM (A1c 5.3), HTN, sz disorder (on Lyrica) and PAD s/p B femoral endarterectomy 12/20/19 complicated by infected seromas (Proteus, Strep) now s/p B groin wound wash outs/flap to L groin and 6 weeks IV abx  presented with fever and myalgias. RIP negative. Proteus bacteremia - ID consulted, on cefepime. Fluid collections noted B groins.    Interval History:  No major issues overnight. No further fever. No complaints.    Review of Systems   Constitutional: Negative for chills and fever.   HENT: Negative for trouble swallowing.    Respiratory: Negative for shortness of breath.    Cardiovascular: Negative for chest pain and leg swelling.     Objective:     Vital Signs (Most Recent):  Temp: 96.5 °F (35.8 °C) (03/11/20 0734)  Pulse: 80 (03/11/20 0734)  Resp: 18 (03/11/20 0734)  BP: (!) 152/84 (03/11/20 0734)  SpO2: 97 % (03/11/20 0734) Vital Signs (24h Range):  Temp:  [96.5 °F (35.8 °C)-99.6 °F (37.6 °C)] 96.5 °F (35.8 °C)  Pulse:  [] 80  Resp:  [16-18] 18  SpO2:  [97 %-100 %] 97 %  BP: (134-154)/(70-91) 152/84     Weight: 116.1 kg (256 lb)  Body mass index is 34.72 kg/m².    Intake/Output Summary (Last 24 hours) at 3/11/2020 0948  Last data filed at 3/10/2020 1402  Gross per 24 hour   Intake 240 ml   Output --   Net 240 ml      Physical Exam   Constitutional: He appears well-developed and well-nourished. No distress.   HENT:   Head: Normocephalic and atraumatic.   Well-healed L cranial incision   Eyes: Conjunctivae are normal. No scleral icterus.   Cardiovascular: Normal rate and regular rhythm. Exam reveals no gallop and no friction rub.   Murmur (systolic) heard.  Pulmonary/Chest: Effort normal. No stridor. No respiratory distress. He has no wheezes. He has no rales.   Abdominal: Soft. He exhibits no distension and no mass. There is no tenderness. There is no guarding.   Musculoskeletal: He exhibits no edema.   Flap to L groin healthy, incisions well-healed  R groin wound healing - skin closed   Skin: Skin is warm and dry. He is not diaphoretic.     Significant Labs:   BMP:   Recent Labs   Lab 03/11/20  0428   *      K 4.1      CO2 27   BUN 11   CREATININE 0.8   CALCIUM 8.5*   MG 1.7     CBC:    Recent Labs   Lab 03/09/20  1329 03/10/20  0345 03/11/20  0428   WBC 11.00 4.79 3.41*   HGB 14.1 14.5 13.9*   HCT 43.4 44.4 43.4   PLT 89* 82* 94*     Significant Imaging: no new imaging in last 24 hours      Assessment/Plan:      * Sepsis  58 y/o male s/p B femoral endarterectomy/L MICHAEL stent 12/20/19 who developed B wound infections and underwent washout/drainage and eventual L sartorius/GONZALEZ flap on 1/3/20. Wound cx grew Proteus/group F streptococcus - treated for 6 weeks with abx (ended 2/14/20). Presented to ED with fever/myalgias. WBC normal, , ESR WNL, procalcitonin 1.25.    Blood cx (3/9) = Proteus  Blood cx (3/10) = NGTD    R groin US with 1 cm fluid collection, L groin US with 5 cm fluid collection    PLAN:  - ID following - continue cefepime, f/u blood cx and sensitivities  - VASCULAR SURGERY following - CTA A/P with BLE runoff ordered    Proteus mirabilis infection  See Sepsis    Gram-negative bacteremia  See Sepsis    PAD (peripheral artery disease)  S/p B femoral endarterectomies and L MICHAEL stent 12/20/19  HOME MED = ASA, Plavix, atorvastatin    PLAN:  - continue ASA/Plavix  - continue home atorvastatin  - VASCULAR SURGERY following - CTA A/P with BLE runoff pending    Type 2 diabetes mellitus, without long-term current use of insulin  A1c = 5.3  HOME MED = metformin    PLAN:  - diabetic diet  - LDSSI, POCT glucose AC/HS    Essential hypertension  HOME MED = valsartan    PLAN:  - initially holding valsartan in setting of mild JJ - restart today    JJ (acute kidney injury)  Cr 1.4 at admit. Baseline Cr 0.6. Etiology likely prerenal 2/2 dehydration    Cr 0.8 <- 1.0 <- 1.4    PLAN:  - resolved  - continue to monitor renal function s/p CTA    Systolic murmur  TTE = EF 68%, normal diastolic function, mild-to-moderate AS, mild TR    PLAN:  - no current intervention, follow-up with CARDIOLOGY as outpatient    History of brain tumor  See Seizures    Seizures  History of brain tumor s/p L  craniotomy/resection  HOME MED = Lyrica (allergies to AEDs)    PLAN:  - continue home Lyrica    Thrombocytopenia  Plt 112 on admit. Unclear etiology.    Plt 94 <- 82 <- 89 <- 112    PLAN:  - continue to monitor daily CBC    Discharge planning issues  PLAN:  - no post discharge needs identified      VTE Risk Mitigation (From admission, onward)         Ordered     IP VTE HIGH RISK PATIENT  Once      03/09/20 0433     Place sequential compression device  Until discontinued      03/09/20 0433              Eduardo Becker MD  Department of Hospital Medicine   [facility] [id number]

## 2020-03-11 NOTE — ASSESSMENT & PLAN NOTE
Cr 1.4 at admit. Baseline Cr 0.6. Etiology likely prerenal 2/2 dehydration    Cr 0.8 <- 1.0 <- 1.4    PLAN:  - resolved  - continue to monitor renal function s/p CTA

## 2020-03-11 NOTE — PROGRESS NOTES
[facility]  Vascular Surgery  Progress note     Patient Name: Renan Britton  MRN: [id number]  Admission Date: 3/9/2020  Code Status: Full Code   Primary Care Physician: Eren Becker MD    Subjective:     Interval History:   No acute changes overnight, pending CTA.     HPI:  Patient is a 59 y.o. male who on 12/20/19 underwent a bilateral femoral endarterectomy with Dr. Gomez.  His post op course at that time was uncomplicated and he was discharged on 12/24/19.  However on 12/27/19 the patient returned to the ED with concerns of redness and pain at the groin incisions sites, L>R.  He was sent home at that time with clindamycin and had a follow up appointment the next Monday. However he presented soon after with bilateral groin incision breakdown. The right groin had superficial wound breakdown which did not penetrate to the deeper tissues.  The left groin had a deep seroma. He had both groins I&D in the OR on 1/1 with woundvac application. There was no signs of infection although he had positive wound cultures.  He was then taken back to the OR on 1/3 for washout and left sartorius muscle flap with plastic surgery. He was discharged home soon after on 6 weeks of IV abx through a PICC line. He completed his abx on 2/14. Patient reports feeling well until Sunday night 3/8. States he started having fever and chills. No change in his groin incisions. No redness, pain or drainage. No nausea or vomiting. He presented to the ED and noted to be febrile to 103.   Since admission, he has been started on IV abx. WBC WNL, however elevated CRP and procal. HDS with intermittent tachycardia.     Medications Prior to Admission   Medication Sig Dispense Refill Last Dose    acetaminophen (TYLENOL) 500 MG tablet Take 1,000 mg by mouth every evening.       ascorbic acid (VITAMIN C ORAL) Take 2 capsules by mouth every morning.       aspirin (ECOTRIN) 81 MG EC tablet Take 81 mg by mouth once daily.   3/8/2020     atorvastatin (LIPITOR) 40 MG tablet Take 40 mg by mouth once daily.   3/8/2020    cetirizine 10 mg chewable tablet Take 10 mg by mouth once daily.    3/8/2020    clopidogreL (PLAVIX) 75 mg tablet Take 1 tablet (75 mg total) by mouth once daily. 30 tablet 10 3/8/2020    elderberry fruit and flower 460-115 mg Cap Take 2 capsules by mouth every morning.       metFORMIN (GLUCOPHAGE-XR) 500 MG XR 24hr tablet Take 1,000 mg by mouth daily with breakfast.    3/8/2020    pregabalin (LYRICA) 100 MG capsule Take 100 mg by mouth 2 (two) times daily.   3/8/2020    valsartan (DIOVAN) 160 MG tablet Take 160 mg by mouth once daily.   3/8/2020    [DISCONTINUED] glyBURIDE-metformin 2.5-500 mg (GLUCOVANCE) 2.5-500 mg per tablet Take 1 tablet by mouth 2 (two) times daily with meals.   Taking    [DISCONTINUED] HYDROcodone-acetaminophen (NORCO) 5-325 mg per tablet Take 1 tablet by mouth every 6 (six) hours as needed for Pain. 30 tablet 0 Taking    [DISCONTINUED] oxyCODONE (ROXICODONE) 5 MG immediate release tablet Take 1 tablet (5 mg total) by mouth every 4 (four) hours as needed. 30 tablet 0 Taking       Review of patient's allergies indicates:   Allergen Reactions    Penicillins Hives     Patient currently on cefepime without complications       Past Medical History:   Diagnosis Date    Diabetes mellitus     H/O brain tumor     HLD (hyperlipidemia)     Hypertension     PAD (peripheral artery disease)     Seizures     R/T BRAIN TUMOR- SURGICALLY EXCISED     Past Surgical History:   Procedure Laterality Date    APPLICATION OF WOUND VACUUM-ASSISTED CLOSURE DEVICE Bilateral 1/1/2020    Procedure: APPLICATION, WOUND VAC;  Surgeon: SEBAS Prieto II, MD;  Location: [facility] OR [address] [place] [address];  Service: Cardiovascular;  Laterality: Bilateral;    APPLICATION OF WOUND VACUUM-ASSISTED CLOSURE DEVICE N/A 1/3/2020    Procedure: APPLICATION, WOUND VAC;  Surgeon: Brian Wong MD;  Location: [facility] OR [address] [place] [address];  Service: Plastics;   Laterality: N/A;    BRAIN SURGERY  2011    TUMOR EXCISED    CREATION OF MUSCLE ROTATIONAL FLAP N/A 1/3/2020    Procedure: CREATION, FLAP, MUSCLE ROTATION;  Surgeon: Brian Wong MD;  Location: [facility] OR [address] [place] [address];  Service: Plastics;  Laterality: N/A;    ENDARTERECTOMY OF FEMORAL ARTERY Bilateral 2019    Procedure: ENDARTERECTOMY, FEMORAL;  Surgeon: Kelby Gomez MD;  Location: [facility] OR [address] [place] [address];  Service: Peripheral Vascular;  Laterality: Bilateral;  natalya common femoral endarterectomy with natalya. iliac stent placement    PERCUTANEOUS TRANSLUMINAL ANGIOPLASTY (PTA) OF PERIPHERAL VESSEL Left 10/17/2019    Procedure: PTA, PERIPHERAL VESSEL;  Surgeon: Rao Fisher MD;  Location: [facility] CATH;  Service: Cardiology;  Laterality: Left;     Family History     Problem Relation (Age of Onset)    Cancer Father, Brother    Diabetes Mother    Heart disease Father, Brother, Brother, Brother    Hypertension Mother    Stroke Mother        Tobacco Use    Smoking status: Former Smoker     Packs/day: 2.00     Types: Cigarettes     Last attempt to quit: 2011     Years since quittin.1    Smokeless tobacco: Never Used   Substance and Sexual Activity    Alcohol use: Yes     Comment: 2-4 PER DAY    Drug use: Never    Sexual activity: Not on file     Review of Systems   Constitutional: Positive for activity change, chills, fatigue and fever. Negative for appetite change and unexpected weight change.   Respiratory: Negative for cough, chest tightness and shortness of breath.    Cardiovascular: Negative for chest pain, palpitations and leg swelling.   Gastrointestinal: Negative for abdominal distention, nausea and vomiting.   Genitourinary: Negative for difficulty urinating and dysuria.   Musculoskeletal: Negative for arthralgias.   Skin: Negative for color change and wound.   Neurological: Negative for dizziness and weakness.   Hematological: Negative for adenopathy. Does not bruise/bleed easily.     Objective:      Vital Signs (Most Recent):  Temp: 96.5 °F (35.8 °C) (03/11/20 0734)  Pulse: 80 (03/11/20 0734)  Resp: 18 (03/11/20 0734)  BP: (!) 152/84 (03/11/20 0734)  SpO2: 97 % (03/11/20 0734) Vital Signs (24h Range):  Temp:  [96.5 °F (35.8 °C)-99.6 °F (37.6 °C)] 96.5 °F (35.8 °C)  Pulse:  [] 80  Resp:  [16-18] 18  SpO2:  [97 %-100 %] 97 %  BP: (134-154)/(70-91) 152/84     Weight: 116.1 kg (256 lb)  Body mass index is 34.72 kg/m².    Physical Exam   Constitutional: He is oriented to person, place, and time. He appears well-developed and well-nourished. No distress.   Patient well appearing-sitting up in bed and eating   Cardiovascular: Normal rate and regular rhythm.   Pulmonary/Chest: Effort normal. No respiratory distress.   Abdominal: Soft. He exhibits no distension.   Musculoskeletal: Normal range of motion. He exhibits no edema or deformity.   Neurological: He is alert and oriented to person, place, and time.   Dopplerable DP and PT bilaterally   Skin: Skin is warm and dry. He is not diaphoretic.   Right groin wound intact. No erythema. No drainage. No palpable seroma. Dopplerable pulse  Left groin wound intact (previous flap). No erythema. No drainage. No palpable seroma. Dopplerable pulse.   Slight discoloration of groin tracking toward penis, no appreciable association with incision.     Nursing note and vitals reviewed.      Significant Labs:  ABGs: No results for input(s): PH, PCO2, PO2, HCO3, POCSATURATED, BE in the last 48 hours.  BMP:   Recent Labs   Lab 03/11/20 0428   *      K 4.1      CO2 27   BUN 11   CREATININE 0.8   CALCIUM 8.5*   MG 1.7     Cardiac markers: No results for input(s): CKMB, CPKMB, TROPONINT, TROPONINI, MYOGLOBIN in the last 48 hours.  CBC:   Recent Labs   Lab 03/11/20 0428   WBC 3.41*   RBC 4.79   HGB 13.9*   HCT 43.4   PLT 94*   MCV 91   MCH 29.0   MCHC 32.0     CMP:   Recent Labs   Lab 03/11/20 0428   *   CALCIUM 8.5*      K 4.1   CO2 27       BUN 11   CREATININE 0.8     Coagulation:   Recent Labs   Lab 03/10/20  0345   LABPROT 15.0*   INR 1.5*   APTT 28.2     Hemoglobin:   No results for input(s): HGBA1C in the last 48 hours.  Lactic Acid:   No results for input(s): LACTATE in the last 48 hours.  LFTs:   No results for input(s): ALT, AST, ALKPHOS, BILITOT, PROT, ALBUMIN in the last 48 hours.    Significant Diagnostics:  US EXTREMITY NON VASCULAR LIMITED RIGHT    CLINICAL HISTORY:  bacteremia - recent R groin infected seroma;    TECHNIQUE:  Limited ultrasound of the right groin was performed.    COMPARISON:  Ultrasound extremity 03/09/2020, CT pelvis with contrast 12/30/2019.    FINDINGS:  There is a large lymph node in the right groin measuring 4.0 x 0.7 x 2.6 cm.    There is a small hypoechoic collection just superficial to the right iliac vessels measuring approximately 1.1 x 1.0 x 1.4 cm.  Considerations include evolving seroma or hematoma, with abscess felt less likely.  This is likely related to small collection seen on CT pelvis 12/30/2019 in the same region.  A small tract can be seen extending towards the superficial soft tissues on image 15.      Impression       Small hypoechoic collection just superficial to the right iliac vessels measuring approximately 1.1 x 1.0 x 1.4 cm.  Considerations include evolving seroma or hematoma, with abscess felt less likely.  Correlation is advised.    Enlarged right groin lymph node as above.         Assessment and Plan:     PAD (peripheral artery disease)  58yo male with hx of PAD s/p bilateral femoral endarterectomies on 12/20 with course complicated by bilateral wound breakdown and concern for infected fluid collection L groin now s/p bilateral groin washout with superficial debridement of R groin and placement of bilateral wound vacs 1/1/20 now s/p L rectus muscle flap per plastics on 1/3. Completed 6 weeks home IV abx. Now presenting with proteus bacteremia (same bacteria as previous) with concern for  groin as source.    -Concern for groin wound infection vs. Infection of arterial patch.   -Pending CTA abdomen and pelvis with run off to assess vessels.   -Continue ASA, plavix and statin   -Vascular surgery will continue to follow along         John Fontanez MD  Vascular Surgery  [facility]     n/a

## 2020-03-11 NOTE — SUBJECTIVE & OBJECTIVE
Interval History:  No major issues overnight. No further fever. No complaints.    Review of Systems   Constitutional: Negative for chills and fever.   HENT: Negative for trouble swallowing.    Respiratory: Negative for shortness of breath.    Cardiovascular: Negative for chest pain and leg swelling.     Objective:     Vital Signs (Most Recent):  Temp: 96.5 °F (35.8 °C) (03/11/20 0734)  Pulse: 80 (03/11/20 0734)  Resp: 18 (03/11/20 0734)  BP: (!) 152/84 (03/11/20 0734)  SpO2: 97 % (03/11/20 0734) Vital Signs (24h Range):  Temp:  [96.5 °F (35.8 °C)-99.6 °F (37.6 °C)] 96.5 °F (35.8 °C)  Pulse:  [] 80  Resp:  [16-18] 18  SpO2:  [97 %-100 %] 97 %  BP: (134-154)/(70-91) 152/84     Weight: 116.1 kg (256 lb)  Body mass index is 34.72 kg/m².    Intake/Output Summary (Last 24 hours) at 3/11/2020 0948  Last data filed at 3/10/2020 1402  Gross per 24 hour   Intake 240 ml   Output --   Net 240 ml      Physical Exam   Constitutional: He appears well-developed and well-nourished. No distress.   HENT:   Head: Normocephalic and atraumatic.   Well-healed L cranial incision   Eyes: Conjunctivae are normal. No scleral icterus.   Cardiovascular: Normal rate and regular rhythm. Exam reveals no gallop and no friction rub.   Murmur (systolic) heard.  Pulmonary/Chest: Effort normal. No stridor. No respiratory distress. He has no wheezes. He has no rales.   Abdominal: Soft. He exhibits no distension and no mass. There is no tenderness. There is no guarding.   Musculoskeletal: He exhibits no edema.   Flap to L groin healthy, incisions well-healed  R groin wound healing - skin closed   Skin: Skin is warm and dry. He is not diaphoretic.     Significant Labs:   BMP:   Recent Labs   Lab 03/11/20  0428   *      K 4.1      CO2 27   BUN 11   CREATININE 0.8   CALCIUM 8.5*   MG 1.7     CBC:   Recent Labs   Lab 03/09/20  1329 03/10/20  0345 03/11/20  0428   WBC 11.00 4.79 3.41*   HGB 14.1 14.5 13.9*   HCT 43.4 44.4 43.4   PLT  89* 82* 94*     Significant Imaging: no new imaging in last 24 hours

## 2020-03-11 NOTE — ASSESSMENT & PLAN NOTE
HOME MED = valsartan    PLAN:  - initially holding valsartan in setting of mild JJ - restart today

## 2020-03-11 NOTE — SUBJECTIVE & OBJECTIVE
"Interval History: "OK". Recurrent P mirabilis bacteremia suspected to be from fluid collection in left groin. Unclear if endovascular graft involved. On cefepime. Repeat blood cultures NGTD. De-escalated to ceftriaxone based on sensitivities on 3/11.     Review of Systems   Constitutional: Negative for chills, fatigue and fever.   HENT: Negative for ear pain, mouth sores, nosebleeds, postnasal drip, rhinorrhea, sinus pressure, sore throat, tinnitus, trouble swallowing and voice change.    Eyes: Negative for photophobia, pain, redness and visual disturbance.   Respiratory: Negative for apnea, cough, chest tightness, shortness of breath and wheezing.    Cardiovascular: Negative for chest pain, palpitations and leg swelling.   Gastrointestinal: Negative for abdominal pain, blood in stool, constipation, diarrhea, nausea and vomiting.   Endocrine: Negative for cold intolerance, heat intolerance, polydipsia and polyuria.   Genitourinary: Negative for decreased urine volume, difficulty urinating, discharge, dysuria, flank pain, frequency, genital sores, hematuria, penile pain, penile swelling, scrotal swelling, testicular pain and urgency.   Musculoskeletal: Negative for arthralgias, back pain, joint swelling, myalgias and neck pain.   Skin: Negative for color change and rash.   Allergic/Immunologic: Negative for environmental allergies and food allergies.   Neurological: Negative for dizziness, seizures, syncope, weakness, light-headedness, numbness and headaches.   Hematological: Negative for adenopathy. Does not bruise/bleed easily.   Psychiatric/Behavioral: Negative for agitation, confusion, decreased concentration, hallucinations, self-injury, sleep disturbance and suicidal ideas. The patient is not nervous/anxious.      Objective:     Vital Signs (Most Recent):  Temp: 98 °F (36.7 °C) (03/11/20 1503)  Pulse: 76 (03/11/20 1557)  Resp: 18 (03/11/20 1503)  BP: (!) 157/83 (03/11/20 1503)  SpO2: 99 % (03/11/20 1503) Vital " Signs (24h Range):  Temp:  [96.5 °F (35.8 °C)-99 °F (37.2 °C)] 98 °F (36.7 °C)  Pulse:  [] 76  Resp:  [16-18] 18  SpO2:  [97 %-99 %] 99 %  BP: (133-157)/(70-90) 157/83     Weight: 116.1 kg (256 lb)  Body mass index is 34.72 kg/m².    Estimated Creatinine Clearance: 134 mL/min (based on SCr of 0.8 mg/dL).    Physical Exam   Constitutional: He is oriented to person, place, and time. He appears well-developed and well-nourished.   HENT:   Head: Normocephalic and atraumatic.   Right Ear: External ear normal.   Left Ear: External ear normal.   Eyes: Conjunctivae are normal. Right eye exhibits no discharge. Left eye exhibits no discharge.   Cardiovascular: Normal rate, regular rhythm and normal heart sounds.   No murmur heard.  Pulmonary/Chest: Effort normal and breath sounds normal. He has no wheezes. He has no rales.   Abdominal: Soft. Bowel sounds are normal. He exhibits no mass. There is no tenderness. There is no rebound.   Musculoskeletal: Normal range of motion.   Neurological: He is alert and oriented to person, place, and time.   Skin: Skin is warm and dry.   Psychiatric: He has a normal mood and affect. His behavior is normal.   Vitals reviewed.      Significant Labs:   Blood Culture:   Recent Labs   Lab 03/09/20  0103 03/10/20  0837   LABBLOO Gram stain reyna bottle: Gram negative rods   Positive results previously called 03/09/2020  15:45  Gram stain aer bottle: Gram negative rods   Positive results previously called 03/09/2020  16:54  PROTEUS MIRABILIS  For susceptibility see order #[id number]  *  Gram stain reyna bottle: Gram negative rods   Results called to and read back by: Charlee Rosen RN 03/09/2020  15:44  Gram stain aer bottle: Gram negative rods   Positive results previously called 03/09/2020  17:01  PROTEUS MIRABILIS* No Growth to date  No Growth to date  No Growth to date  No Growth to date     BMP:   Recent Labs   Lab 03/11/20  0428   *      K 4.1      CO2 27   BUN  11   CREATININE 0.8   CALCIUM 8.5*   MG 1.7     CBC:   Recent Labs   Lab 03/10/20  0345 03/11/20  0428   WBC 4.79 3.41*   HGB 14.5 13.9*   HCT 44.4 43.4   PLT 82* 94*     Urine Culture: No results for input(s): LABURIN in the last 4320 hours.  Urine Studies:   Recent Labs   Lab 03/09/20  0103   COLORU Yellow   APPEARANCEUA Hazy*   PHUR 5.0   SPECGRAV 1.025   PROTEINUA 1+*   GLUCUA Negative   KETONESU Negative   BILIRUBINUA Negative   OCCULTUA Negative   NITRITE Negative   LEUKOCYTESUR Trace*   RBCUA 1   WBCUA 3   BACTERIA Occasional   SQUAMEPITHEL 0   HYALINECASTS 0     Wound Culture:   Recent Labs   Lab 01/01/20  1012   LABAERO PROTEUS MIRABILIS  Few  *  STREPTOCOCCUS GROUP F  Few  Beta-hemolytic streptococci are routinely susceptible to   penicillins,cephalosporins and carbapenems.  Susceptibility testing not routinely performed  *       Significant Imaging: I have reviewed all pertinent imaging results/findings within the past 24 hours.

## 2020-03-11 NOTE — ASSESSMENT & PLAN NOTE
Plt 112 on admit. Unclear etiology.    Plt 94 <- 82 <- 89 <- 112    PLAN:  - continue to monitor daily CBC

## 2020-03-11 NOTE — ASSESSMENT & PLAN NOTE
60yo male with hx of PAD s/p bilateral femoral endarterectomies on 12/20 with course complicated by bilateral wound breakdown and concern for infected fluid collection L groin now s/p bilateral groin washout with superficial debridement of R groin and placement of bilateral wound vacs 1/1/20 now s/p L rectus muscle flap per plastics on 1/3. Completed 6 weeks home IV abx. Now presenting with proteus bacteremia (same bacteria as previous) with concern for groin as source.    -Concern for groin wound infection vs. Infection of arterial patch.   -Pending CTA abdomen and pelvis with run off to assess vessels.   -Continue ASA, plavix and statin   -Vascular surgery will continue to follow along

## 2020-03-11 NOTE — ASSESSMENT & PLAN NOTE
History of brain tumor s/p L craniotomy/resection  HOME MED = Lyrica (allergies to AEDs)    PLAN:  - continue home Lyrica

## 2020-03-12 ENCOUNTER — PATIENT MESSAGE (OUTPATIENT)
Dept: VASCULAR SURGERY | Facility: CLINIC | Age: 60
End: 2020-03-12

## 2020-03-12 VITALS
OXYGEN SATURATION: 99 % | HEIGHT: 72 IN | BODY MASS INDEX: 34.67 KG/M2 | HEART RATE: 77 BPM | DIASTOLIC BLOOD PRESSURE: 92 MMHG | WEIGHT: 256 LBS | TEMPERATURE: 97 F | RESPIRATION RATE: 18 BRPM | SYSTOLIC BLOOD PRESSURE: 157 MMHG

## 2020-03-12 PROBLEM — Z02.9 DISCHARGE PLANNING ISSUES: Status: RESOLVED | Noted: 2020-03-11 | Resolved: 2020-03-12

## 2020-03-12 PROBLEM — A41.50 GRAM NEGATIVE SEPTICEMIA: Status: RESOLVED | Noted: 2020-03-10 | Resolved: 2020-03-12

## 2020-03-12 PROBLEM — R78.81 GRAM-NEGATIVE BACTEREMIA: Status: RESOLVED | Noted: 2020-03-10 | Resolved: 2020-03-12

## 2020-03-12 PROBLEM — A41.9 SEPSIS: Status: RESOLVED | Noted: 2020-03-09 | Resolved: 2020-03-12

## 2020-03-12 PROBLEM — Z75.8 DISCHARGE PLANNING ISSUES: Status: RESOLVED | Noted: 2020-03-11 | Resolved: 2020-03-12

## 2020-03-12 LAB
ANION GAP SERPL CALC-SCNC: 12 MMOL/L (ref 8–16)
BASOPHILS # BLD AUTO: 0.03 K/UL (ref 0–0.2)
BASOPHILS NFR BLD: 0.8 % (ref 0–1.9)
BUN SERPL-MCNC: 12 MG/DL (ref 6–20)
CALCIUM SERPL-MCNC: 9.2 MG/DL (ref 8.7–10.5)
CHLORIDE SERPL-SCNC: 105 MMOL/L (ref 95–110)
CO2 SERPL-SCNC: 24 MMOL/L (ref 23–29)
CREAT SERPL-MCNC: 0.7 MG/DL (ref 0.5–1.4)
DIFFERENTIAL METHOD: ABNORMAL
EOSINOPHIL # BLD AUTO: 0.1 K/UL (ref 0–0.5)
EOSINOPHIL NFR BLD: 3.8 % (ref 0–8)
ERYTHROCYTE [DISTWIDTH] IN BLOOD BY AUTOMATED COUNT: 13.2 % (ref 11.5–14.5)
EST. GFR  (AFRICAN AMERICAN): >60 ML/MIN/1.73 M^2
EST. GFR  (NON AFRICAN AMERICAN): >60 ML/MIN/1.73 M^2
GLUCOSE SERPL-MCNC: 121 MG/DL (ref 70–110)
HCT VFR BLD AUTO: 44.5 % (ref 40–54)
HGB BLD-MCNC: 14.2 G/DL (ref 14–18)
IMM GRANULOCYTES # BLD AUTO: 0.01 K/UL (ref 0–0.04)
IMM GRANULOCYTES NFR BLD AUTO: 0.3 % (ref 0–0.5)
LYMPHOCYTES # BLD AUTO: 0.9 K/UL (ref 1–4.8)
LYMPHOCYTES NFR BLD: 25.5 % (ref 18–48)
MAGNESIUM SERPL-MCNC: 2 MG/DL (ref 1.6–2.6)
MCH RBC QN AUTO: 28.9 PG (ref 27–31)
MCHC RBC AUTO-ENTMCNC: 31.9 G/DL (ref 32–36)
MCV RBC AUTO: 90 FL (ref 82–98)
MONOCYTES # BLD AUTO: 0.4 K/UL (ref 0.3–1)
MONOCYTES NFR BLD: 11.4 % (ref 4–15)
NEUTROPHILS # BLD AUTO: 2.2 K/UL (ref 1.8–7.7)
NEUTROPHILS NFR BLD: 58.2 % (ref 38–73)
NRBC BLD-RTO: 0 /100 WBC
PHOSPHATE SERPL-MCNC: 4 MG/DL (ref 2.7–4.5)
PLATELET # BLD AUTO: 111 K/UL (ref 150–350)
PMV BLD AUTO: 11.5 FL (ref 9.2–12.9)
POCT GLUCOSE: 110 MG/DL (ref 70–110)
POCT GLUCOSE: 161 MG/DL (ref 70–110)
POTASSIUM SERPL-SCNC: 4 MMOL/L (ref 3.5–5.1)
RBC # BLD AUTO: 4.92 M/UL (ref 4.6–6.2)
SODIUM SERPL-SCNC: 141 MMOL/L (ref 136–145)
WBC # BLD AUTO: 3.69 K/UL (ref 3.9–12.7)

## 2020-03-12 PROCEDURE — 84100 ASSAY OF PHOSPHORUS: CPT

## 2020-03-12 PROCEDURE — 99232 SBSQ HOSP IP/OBS MODERATE 35: CPT | Mod: ,,, | Performed by: INTERNAL MEDICINE

## 2020-03-12 PROCEDURE — 36410 VNPNXR 3YR/> PHY/QHP DX/THER: CPT

## 2020-03-12 PROCEDURE — 99238 HOSP IP/OBS DSCHRG MGMT 30/<: CPT | Mod: ,,, | Performed by: HOSPITALIST

## 2020-03-12 PROCEDURE — 99232 PR SUBSEQUENT HOSPITAL CARE,LEVL II: ICD-10-PCS | Mod: ,,, | Performed by: INTERNAL MEDICINE

## 2020-03-12 PROCEDURE — 83735 ASSAY OF MAGNESIUM: CPT

## 2020-03-12 PROCEDURE — C1751 CATH, INF, PER/CENT/MIDLINE: HCPCS

## 2020-03-12 PROCEDURE — 99024 PR POST-OP FOLLOW-UP VISIT: ICD-10-PCS | Mod: ,,, | Performed by: SURGERY

## 2020-03-12 PROCEDURE — 25000003 PHARM REV CODE 250: Performed by: SURGERY

## 2020-03-12 PROCEDURE — 25000003 PHARM REV CODE 250: Performed by: STUDENT IN AN ORGANIZED HEALTH CARE EDUCATION/TRAINING PROGRAM

## 2020-03-12 PROCEDURE — 99024 POSTOP FOLLOW-UP VISIT: CPT | Mod: ,,, | Performed by: SURGERY

## 2020-03-12 PROCEDURE — 36415 COLL VENOUS BLD VENIPUNCTURE: CPT

## 2020-03-12 PROCEDURE — 85025 COMPLETE CBC W/AUTO DIFF WBC: CPT

## 2020-03-12 PROCEDURE — 80048 BASIC METABOLIC PNL TOTAL CA: CPT

## 2020-03-12 PROCEDURE — 99238 PR HOSPITAL DISCHARGE DAY,<30 MIN: ICD-10-PCS | Mod: ,,, | Performed by: HOSPITALIST

## 2020-03-12 PROCEDURE — 63600175 PHARM REV CODE 636 W HCPCS: Performed by: STUDENT IN AN ORGANIZED HEALTH CARE EDUCATION/TRAINING PROGRAM

## 2020-03-12 PROCEDURE — 76937 US GUIDE VASCULAR ACCESS: CPT

## 2020-03-12 RX ADMIN — CEFTRIAXONE 2 G: 2 INJECTION, SOLUTION INTRAVENOUS at 03:03

## 2020-03-12 RX ADMIN — VALSARTAN 160 MG: 80 TABLET, FILM COATED ORAL at 10:03

## 2020-03-12 RX ADMIN — ASPIRIN 81 MG: 81 TABLET, COATED ORAL at 10:03

## 2020-03-12 RX ADMIN — CLOPIDOGREL BISULFATE 75 MG: 75 TABLET ORAL at 10:03

## 2020-03-12 RX ADMIN — ATORVASTATIN CALCIUM 40 MG: 20 TABLET, FILM COATED ORAL at 03:03

## 2020-03-12 RX ADMIN — CETIRIZINE HYDROCHLORIDE 5 MG: 5 TABLET, FILM COATED ORAL at 10:03

## 2020-03-12 RX ADMIN — PREGABALIN 100 MG: 50 CAPSULE ORAL at 10:03

## 2020-03-12 NOTE — CONSULTS
Placed 18g, 8 cm Midline in left cephalic vein using u/s guidance.  Max dwell date 4/10/2020.  Lot # [id number].

## 2020-03-12 NOTE — PROGRESS NOTES
[facility]  Infectious Disease  Progress Note    Patient Name: Renan Britton  MRN: [id number]  Admission Date: 3/9/2020  Length of Stay: 2 days  Attending Physician: Cherie Douglas MD  Primary Care Provider: Eren Becker MD    Isolation Status: No active isolations  Assessment/Plan:      Gram negative septicemia  Gram negative septicemia due to P mirabilis bacteremia. Suspect likely source is fluid collection near vascular site. Sepsis resolving. He is afebrile and without leukocytosis. Repeat blood cultures on 3/10 remain NGTD. CTA with run off without evidence of abscess however with extensive inflammatory changes about the soft tissues of the groin with soft tissue stranding/thickening.   · Continue ceftriaxone 2 gm IV daily.   · Will plan for 4 weeks of ceftriaxone. End date: 4/6/2020.  · Monitor weekly CBC, CMP, ESR plus CRP and fax to ID clinic at [phone].  · Will plan for antibiotic suppression at next clinic visit.   · [facility] ID clinic follow up on 4/2 at 1:30 pm.     Gram-negative bacteremia  See above      Proteus mirabilis infection  See above        Anticipated Disposition: home with home health.    Thank you for your consult. I will sign off. Please contact us if you have any additional questions.    Marcelle Hunt MD  Infectious Disease  [facility]    Subjective:     Principal Problem:Sepsis    HPI: A 59-year-old man with PAD, S/P bilateral femoral endarterectomy on 12/20/19, post operative course complicated by erythema at the groin incision site treated with clindamycin, fluid collections at the surgical site, s/p surgical wound breakdown, Group F Streptococcus plus P mirabilis endovascular infection, s/p wound washout with left groin flap on 1/3/2020, and s/p 6 weeks of ceftriaxone completed on 2/14 who developed fever to 102.7 F, chills and bilateral leg soreness one day prior to admission. He was evaluated at Urgent Care with negative flu  "testing. He was discharged however upon returning to his home developed malaise with fever and chills prompting ED evaluation. At the time he was febrile to 100.6 F. Laboratory work up revealed elevated CRP, creatinine and procalcitonin level. He was admitted to  service for further management and started on empiric cefepime, metronidazole, and vancomycin. Admission blood cultures subsequently became positive for GNR.     Infectious Diseases consulted for management of gram negative septicemia.         Interval History: "Better but not there yet". Recurrent P mirabilis bacteremia suspected to be from fluid collection in left groin. Unclear if endovascular graft involved. On cefepime. Repeat blood cultures NGTD. De-escalated to ceftriaxone based on sensitivities on 3/11. CTA with run off with inflammation and no abscess.     Review of Systems   Constitutional: Negative for chills, fatigue and fever.   HENT: Negative for ear pain, mouth sores, nosebleeds, postnasal drip, rhinorrhea, sinus pressure, sore throat, tinnitus, trouble swallowing and voice change.    Eyes: Negative for photophobia, pain, redness and visual disturbance.   Respiratory: Negative for apnea, cough, chest tightness, shortness of breath and wheezing.    Cardiovascular: Negative for chest pain, palpitations and leg swelling.   Gastrointestinal: Negative for abdominal pain, blood in stool, constipation, diarrhea, nausea and vomiting.   Endocrine: Negative for cold intolerance, heat intolerance, polydipsia and polyuria.   Genitourinary: Negative for decreased urine volume, difficulty urinating, discharge, dysuria, flank pain, frequency, genital sores, hematuria, penile pain, penile swelling, scrotal swelling, testicular pain and urgency.   Musculoskeletal: Negative for arthralgias, back pain, joint swelling, myalgias and neck pain.   Skin: Negative for color change and rash.   Allergic/Immunologic: Negative for environmental allergies and food " allergies.   Neurological: Negative for dizziness, seizures, syncope, weakness, light-headedness, numbness and headaches.   Hematological: Negative for adenopathy. Does not bruise/bleed easily.   Psychiatric/Behavioral: Negative for agitation, confusion, decreased concentration, hallucinations, self-injury, sleep disturbance and suicidal ideas. The patient is not nervous/anxious.      Objective:     Vital Signs (Most Recent):  Temp: 97.4 °F (36.3 °C) (03/12/20 0709)  Pulse: 75 (03/12/20 0709)  Resp: 16 (03/12/20 0709)  BP: (!) 142/86 (03/12/20 0829)  SpO2: 97 % (03/12/20 0709) Vital Signs (24h Range):  Temp:  [96.3 °F (35.7 °C)-98.2 °F (36.8 °C)] 97.4 °F (36.3 °C)  Pulse:  [74-94] 75  Resp:  [16-18] 16  SpO2:  [97 %-99 %] 97 %  BP: (133-190)/() 142/86     Weight: 116.1 kg (256 lb)  Body mass index is 34.72 kg/m².    Estimated Creatinine Clearance: 153.2 mL/min (based on SCr of 0.7 mg/dL).    Physical Exam   Constitutional: He is oriented to person, place, and time. He appears well-developed and well-nourished.   HENT:   Head: Normocephalic and atraumatic.   Right Ear: External ear normal.   Left Ear: External ear normal.   Eyes: Conjunctivae are normal. Right eye exhibits no discharge. Left eye exhibits no discharge.   Cardiovascular: Normal rate, regular rhythm and normal heart sounds.   No murmur heard.  Pulmonary/Chest: Effort normal and breath sounds normal. He has no wheezes. He has no rales.   Abdominal: Soft. Bowel sounds are normal. He exhibits no mass. There is no tenderness. There is no rebound.   Musculoskeletal: Normal range of motion.   Neurological: He is alert and oriented to person, place, and time.   Skin: Skin is warm and dry.   Psychiatric: He has a normal mood and affect. His behavior is normal.   Vitals reviewed.      Significant Labs:   Blood Culture:   Recent Labs   Lab 03/09/20  0103 03/10/20  0837   LABBLOO Gram stain reyna bottle: Gram negative rods   Positive results previously called  03/09/2020  15:45  Gram stain aer bottle: Gram negative rods   Positive results previously called 03/09/2020  16:54  PROTEUS MIRABILIS  For susceptibility see order #[id number]  *  Gram stain reyna bottle: Gram negative rods   Results called to and read back by: Charlee Rosen RN 03/09/2020  15:44  Gram stain aer bottle: Gram negative rods   Positive results previously called 03/09/2020  17:01  PROTEUS MIRABILIS* No Growth to date  No Growth to date  No Growth to date  No Growth to date     BMP:   Recent Labs   Lab 03/12/20  0509   *      K 4.0      CO2 24   BUN 12   CREATININE 0.7   CALCIUM 9.2   MG 2.0     CBC:   Recent Labs   Lab 03/11/20  0428 03/12/20  0509   WBC 3.41* 3.69*   HGB 13.9* 14.2   HCT 43.4 44.5   PLT 94* 111*     Urine Culture: No results for input(s): LABURIN in the last 4320 hours.  Urine Studies:   Recent Labs   Lab 03/09/20  0103   COLORU Yellow   APPEARANCEUA Hazy*   PHUR 5.0   SPECGRAV 1.025   PROTEINUA 1+*   GLUCUA Negative   KETONESU Negative   BILIRUBINUA Negative   OCCULTUA Negative   NITRITE Negative   LEUKOCYTESUR Trace*   RBCUA 1   WBCUA 3   BACTERIA Occasional   SQUAMEPITHEL 0   HYALINECASTS 0     Wound Culture:   Recent Labs   Lab 01/01/20  1012   LABAERO PROTEUS MIRABILIS  Few  *  STREPTOCOCCUS GROUP F  Few  Beta-hemolytic streptococci are routinely susceptible to   penicillins,cephalosporins and carbapenems.  Susceptibility testing not routinely performed  *       Significant Imaging: I have reviewed all pertinent imaging results/findings within the past 24 hours.

## 2020-03-12 NOTE — PLAN OF CARE
Pt slept most of shift. Wife at bedside. Pt ambulated to shower to bathe independently. No acute distress noted. Will continue to monitor.

## 2020-03-12 NOTE — SUBJECTIVE & OBJECTIVE
Medications Prior to Admission   Medication Sig Dispense Refill Last Dose    acetaminophen (TYLENOL) 500 MG tablet Take 1,000 mg by mouth every evening.       ascorbic acid (VITAMIN C ORAL) Take 2 capsules by mouth every morning.       aspirin (ECOTRIN) 81 MG EC tablet Take 81 mg by mouth once daily.   3/8/2020    atorvastatin (LIPITOR) 40 MG tablet Take 40 mg by mouth once daily.   3/8/2020    cetirizine 10 mg chewable tablet Take 10 mg by mouth once daily.    3/8/2020    clopidogreL (PLAVIX) 75 mg tablet Take 1 tablet (75 mg total) by mouth once daily. 30 tablet 10 3/8/2020    elderberry fruit and flower 460-115 mg Cap Take 2 capsules by mouth every morning.       metFORMIN (GLUCOPHAGE-XR) 500 MG XR 24hr tablet Take 1,000 mg by mouth daily with breakfast.    3/8/2020    pregabalin (LYRICA) 100 MG capsule Take 100 mg by mouth 2 (two) times daily.   3/8/2020    valsartan (DIOVAN) 160 MG tablet Take 160 mg by mouth once daily.   3/8/2020    [DISCONTINUED] glyBURIDE-metformin 2.5-500 mg (GLUCOVANCE) 2.5-500 mg per tablet Take 1 tablet by mouth 2 (two) times daily with meals.   Taking    [DISCONTINUED] HYDROcodone-acetaminophen (NORCO) 5-325 mg per tablet Take 1 tablet by mouth every 6 (six) hours as needed for Pain. 30 tablet 0 Taking    [DISCONTINUED] oxyCODONE (ROXICODONE) 5 MG immediate release tablet Take 1 tablet (5 mg total) by mouth every 4 (four) hours as needed. 30 tablet 0 Taking       Review of patient's allergies indicates:   Allergen Reactions    Penicillins Hives     Patient currently on cefepime without complications       Past Medical History:   Diagnosis Date    Diabetes mellitus     H/O brain tumor     HLD (hyperlipidemia)     Hypertension     PAD (peripheral artery disease)     Seizures     R/T BRAIN TUMOR- SURGICALLY EXCISED     Past Surgical History:   Procedure Laterality Date    APPLICATION OF WOUND VACUUM-ASSISTED CLOSURE DEVICE Bilateral 1/1/2020    Procedure: APPLICATION,  WOUND VAC;  Surgeon: SEBAS Prieto II, MD;  Location: [address];  Service: Cardiovascular;  Laterality: Bilateral;    APPLICATION OF WOUND VACUUM-ASSISTED CLOSURE DEVICE N/A 1/3/2020    Procedure: APPLICATION, WOUND VAC;  Surgeon: Brian Wong MD;  Location: [address];  Service: Plastics;  Laterality: N/A;    BRAIN SURGERY  2011    TUMOR EXCISED    CREATION OF MUSCLE ROTATIONAL FLAP N/A 1/3/2020    Procedure: CREATION, FLAP, MUSCLE ROTATION;  Surgeon: Brian Wong MD;  Location: [address];  Service: Plastics;  Laterality: N/A;    ENDARTERECTOMY OF FEMORAL ARTERY Bilateral 2019    Procedure: ENDARTERECTOMY, FEMORAL;  Surgeon: Kelby Gomez MD;  Location: [address];  Service: Peripheral Vascular;  Laterality: Bilateral;  natalya common femoral endarterectomy with natalya. iliac stent placement    PERCUTANEOUS TRANSLUMINAL ANGIOPLASTY (PTA) OF PERIPHERAL VESSEL Left 10/17/2019    Procedure: PTA, PERIPHERAL VESSEL;  Surgeon: Rao Fisher MD;  Location: [facility] C[facility];  Service: Cardiology;  Laterality: Left;     Family History     Problem Relation (Age of Onset)    Cancer Father, Brother    Diabetes Mother    Heart disease Father, Brother, Brother, Brother    Hypertension Mother    Stroke Mother        Tobacco Use    Smoking status: Former Smoker     Packs/day: 2.00     Types: Cigarettes     Last attempt to quit: 2011     Years since quittin.1    Smokeless tobacco: Never Used   Substance and Sexual Activity    Alcohol use: Yes     Comment: 2-4 PER DAY    Drug use: Never    Sexual activity: Not on file     Review of Systems   Constitutional: Positive for activity change, chills, fatigue and fever. Negative for appetite change and unexpected weight change.   Respiratory: Negative for cough, chest tightness and shortness of breath.    Cardiovascular: Negative for chest pain, palpitations and leg swelling.   Gastrointestinal: Negative for abdominal distention,  nausea and vomiting.   Genitourinary: Negative for difficulty urinating and dysuria.   Musculoskeletal: Negative for arthralgias.   Skin: Negative for color change and wound.   Neurological: Negative for dizziness and weakness.   Hematological: Negative for adenopathy. Does not bruise/bleed easily.     Objective:     Vital Signs (Most Recent):  Temp: 97.4 °F (36.3 °C) (03/12/20 0709)  Pulse: 75 (03/12/20 0709)  Resp: 16 (03/12/20 0709)  BP: (!) 190/100 (03/12/20 0709)  SpO2: 97 % (03/12/20 0709) Vital Signs (24h Range):  Temp:  [96.3 °F (35.7 °C)-98.2 °F (36.8 °C)] 97.4 °F (36.3 °C)  Pulse:  [74-94] 75  Resp:  [16-18] 16  SpO2:  [97 %-99 %] 97 %  BP: (133-190)/() 190/100     Weight: 116.1 kg (256 lb)  Body mass index is 34.72 kg/m².    Physical Exam   Constitutional: He is oriented to person, place, and time. He appears well-developed and well-nourished. No distress.   Patient well appearing-sitting up in bed and eating   Cardiovascular: Normal rate and regular rhythm.   Pulmonary/Chest: Effort normal. No respiratory distress.   Abdominal: Soft. He exhibits no distension.   Musculoskeletal: Normal range of motion. He exhibits no edema or deformity.   Neurological: He is alert and oriented to person, place, and time.   Dopplerable DP and PT bilaterally   Skin: Skin is warm and dry. He is not diaphoretic.   Right groin wound intact. No erythema. No drainage. No palpable seroma. Dopplerable pulse  Left groin wound intact (previous flap). No erythema. No drainage. No palpable seroma. Dopplerable pulse.   Slight discoloration of groin tracking toward penis, no appreciable association with incision.     Nursing note and vitals reviewed.      Significant Labs:  ABGs: No results for input(s): PH, PCO2, PO2, HCO3, POCSATURATED, BE in the last 48 hours.  BMP:   Recent Labs   Lab 03/12/20  0509   *      K 4.0      CO2 24   BUN 12   CREATININE 0.7   CALCIUM 9.2   MG 2.0     Cardiac markers: No results  for input(s): CKMB, CPKMB, TROPONINT, TROPONINI, MYOGLOBIN in the last 48 hours.  CBC:   Recent Labs   Lab 03/12/20  0509   WBC 3.69*   RBC 4.92   HGB 14.2   HCT 44.5   *   MCV 90   MCH 28.9   MCHC 31.9*     CMP:   Recent Labs   Lab 03/12/20  0509   *   CALCIUM 9.2      K 4.0   CO2 24      BUN 12   CREATININE 0.7     Coagulation:   No results for input(s): LABPROT, INR, APTT in the last 48 hours.  Hemoglobin:   No results for input(s): HGBA1C in the last 48 hours.  Lactic Acid:   No results for input(s): LACTATE in the last 48 hours.  LFTs:   No results for input(s): ALT, AST, ALKPHOS, BILITOT, PROT, ALBUMIN in the last 48 hours.    Significant Diagnostics:  US EXTREMITY NON VASCULAR LIMITED RIGHT    CLINICAL HISTORY:  bacteremia - recent R groin infected seroma;    TECHNIQUE:  Limited ultrasound of the right groin was performed.    COMPARISON:  Ultrasound extremity 03/09/2020, CT pelvis with contrast 12/30/2019.    FINDINGS:  There is a large lymph node in the right groin measuring 4.0 x 0.7 x 2.6 cm.    There is a small hypoechoic collection just superficial to the right iliac vessels measuring approximately 1.1 x 1.0 x 1.4 cm.  Considerations include evolving seroma or hematoma, with abscess felt less likely.  This is likely related to small collection seen on CT pelvis 12/30/2019 in the same region.  A small tract can be seen extending towards the superficial soft tissues on image 15.      Impression       Small hypoechoic collection just superficial to the right iliac vessels measuring approximately 1.1 x 1.0 x 1.4 cm.  Considerations include evolving seroma or hematoma, with abscess felt less likely.  Correlation is advised.    Enlarged right groin lymph node as above.

## 2020-03-12 NOTE — ASSESSMENT & PLAN NOTE
PLAN:  - needs 4 weeks IV ceftriaxone 2 g qday (stop date 4/6/20) with weekly labs (CBC, CMP, ESR, CRP) sent to ID (fax [phone]); midline to be placed today  - [facility] ID clinic follow-up 4/2 @ 13:30  - will need outpatient f/u with VASCULAR SURGERY

## 2020-03-12 NOTE — NURSING
Order to d/c home today. Saline lock removed from rfa. VSS. Discharge instructions given to pt. Pt verbalized understanding. Pt discharged from OBS unit via w/c. Pt accompanied by transport associate. And spouse. All personal belongings taken home by pt.

## 2020-03-12 NOTE — PLAN OF CARE
[facility]    HOME HEALTH ORDERS  FACE TO FACE ENCOUNTER    Patient Name: Renan Britton  YOB: 1960    PCP: Eren Becker MD   PCP Address: [address] [place] [address] [place] [address]  PCP Phone Number: [phone]  PCP Fax: [phone]    Encounter Date: 03/12/2020    Admit to Home Health    Diagnoses:  Active Hospital Problems    Diagnosis  POA    *Sepsis [A41.9]  Yes     Priority: 1 - High    Gram-negative bacteremia [R78.81]  Yes     Priority: 1 - High    Proteus mirabilis infection [A49.8]  Yes     Priority: 1 - High    PAD (peripheral artery disease) [I73.9]  Yes     Priority: 2     Type 2 diabetes mellitus, without long-term current use of insulin [E11.9]  Yes     Priority: 3     Essential hypertension [I10]  Yes     Priority: 4     JJ (acute kidney injury) [N17.9]  Yes     Priority: 5     Systolic murmur [R01.1]  Yes     Priority: 6     Seizures [R56.9]  Yes     Priority: 7     History of brain tumor [Z87.898]  Not Applicable     Priority: 7     Thrombocytopenia [D69.6]  Yes     Priority: 8     Discharge planning issues [Z02.9]  Not Applicable     Priority: 25 - Low    Femoral artery occlusion, 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       Future Appointments   Date Time Provider Department Center   4/2/2020 11:00 AM VASCULAR, LAB [facility] MATTEOAB Ivan New   4/2/2020  1:30 PM Marcelle Hunt MD [facility] ID Ivan New   4/2/2020  2:15 PM Kelby Gomez MD [facility] ALISSON New     Follow-up Information     Marcelle Hunt MD On 4/2/2020.    Specialty:  Infectious Diseases  Why:  at 1:30 pm.   Contact information:  [address] [place]  [facility] [address] [phone]                 I have seen and examined this patient face to face today. My clinical findings that support the need for the home health skilled services and home bound status are the  following:  Weakness/numbness causing balance and gait disturbance due to Infection making it taxing to leave home.    Allergies:  Review of patient's allergies indicates:   Allergen Reactions    Penicillins Hives     Patient currently on cefepime without complications       Diet: diabetic diet: 2000 calorie    Activities: activity as tolerated    · [facility] ID clinic follow up on 4/2 at 1:30 pm.    Nursing:   SN to complete comprehensive assessment including routine vital signs. Instruct on disease process and s/s of complications to report to MD. Review/verify medication list sent home with the patient at time of discharge  and instruct patient/caregiver as needed. Frequency may be adjusted depending on start of care date.    Notify MD if SBP > 160 or < 90; DBP > 90 or < 50; HR > 120 or < 50; Temp > 101      CONSULTS:    Aide to provide assistance with personal care, ADLs, and vital signs.    MISCELLANEOUS CARE:    weekly CBC, CMP, ESR plus CRP and fax to ID clinic at [phone]    Home Infusion Therapy:   SN to perform Infusion Therapy/Central Line Care.  Review Central Line Care & Central Line Flush with patient.    Administer (drug and dose): ceftriaxone  2g IV  Last dose given: 3/12/20                      Home dose due: 3/13/20    END date: 4/6/2020    Scrub the Hub: Prior to accessing the line, always perform a 30 second alcohol scrub  Each lumen of the central line is to be flushed at least daily with 10 mL Normal Saline and 3 mL Heparin flush (10 units/mL)  Skilled Nurse (SN) may draw blood from IV access  Blood Draw Procedure:   - Aspirate at least 5 mL of blood   - Discard   - Obtain specimen   - Change injection cap   - Flush with 20 mL Normal Saline followed by a                 3-5 mL Heparin flush (10 units/mL)  Central :   - Sterile dressing changes are done weekly and as needed.   - Use chlor-hexadine scrub to cleanse site, apply Biopatch to insertion site,       apply securement  device dressing   - Injection caps are changed weekly and after EVERY lab draw.   - If sterile gauze is under dressing to control oozing,                 dressing change must be performed every 24 hours until gauze is not needed.    WOUND CARE ORDERS  n/a      Medications: Review discharge medications with patient and family and provide education.      Current Discharge Medication List      START taking these medications    Details   cefTRIAXone 2 g in dextrose 5 % 50 mL (ROCEPHIN) 2 g/50 mL PgBk IVPB Inject 50 mLs (2 g total) into the vein once daily. for 25 days         CONTINUE these medications which have NOT CHANGED    Details   ascorbic acid (VITAMIN C ORAL) Take 2 capsules by mouth every morning.      aspirin (ECOTRIN) 81 MG EC tablet Take 81 mg by mouth once daily.      atorvastatin (LIPITOR) 40 MG tablet Take 40 mg by mouth once daily.      cetirizine 10 mg chewable tablet Take 10 mg by mouth once daily.       clopidogreL (PLAVIX) 75 mg tablet Take 1 tablet (75 mg total) by mouth once daily.  Qty: 30 tablet, Refills: 10      elderberry fruit and flower 460-115 mg Cap Take 2 capsules by mouth every morning.      metFORMIN (GLUCOPHAGE-XR) 500 MG XR 24hr tablet Take 1,000 mg by mouth daily with breakfast.       pregabalin (LYRICA) 100 MG capsule Take 100 mg by mouth 2 (two) times daily.      valsartan (DIOVAN) 160 MG tablet Take 160 mg by mouth once daily.         STOP taking these medications       acetaminophen (TYLENOL) 500 MG tablet Comments:   Reason for Stopping:         glyBURIDE-metformin 2.5-500 mg (GLUCOVANCE) 2.5-500 mg per tablet Comments:   Reason for Stopping:         HYDROcodone-acetaminophen (NORCO) 5-325 mg per tablet Comments:   Reason for Stopping:         oxyCODONE (ROXICODONE) 5 MG immediate release tablet Comments:   Reason for Stopping:               I certify that this patient is confined to his home and needs intermittent skilled nursing care.

## 2020-03-12 NOTE — PLAN OF CARE
Pt with no falls or injuries this shift. Pt ambulating in room independently. Pt afebrile, continues on IV antibiotics. Midline placed for home antibiotics for 25days. Pt with no s/s hypo or hyperglycemia this shift.

## 2020-03-12 NOTE — ASSESSMENT & PLAN NOTE
Cr 1.4 at admit. Baseline Cr 0.6. Etiology likely prerenal 2/2 dehydration    Cr 0.7 <- 0.8 <- 1.0 <- 1.4    PLAN:  - resolved

## 2020-03-12 NOTE — ASSESSMENT & PLAN NOTE
Gram negative septicemia due to P mirabilis bacteremia. Suspect likely source is fluid collection near vascular site. Sepsis resolving. He is afebrile and without leukocytosis. Repeat blood cultures on 3/10 remain NGTD. CTA with run off without evidence of abscess however with extensive inflammatory changes about the soft tissues of the groin with soft tissue stranding/thickening.   · Continue ceftriaxone 2 gm IV daily.   · Will plan for 4 weeks of ceftriaxone. End date: 4/6/2020.  · Monitor weekly CBC, CMP and fax to ID clinic at [phone].  · Will plan for antibiotic suppression at next clinic visit.   · [facility] ID clinic follow up on 4/2 at 1:30 pm.

## 2020-03-12 NOTE — DISCHARGE SUMMARY
[facility] Hospital Medicine  Discharge Summary      Patient Name: Renan Britton  MRN: [id number]  Admission Date: 3/9/2020  Hospital Length of Stay: 2 days  Discharge Date and Time:  03/12/2020 3:45 PM  Attending Physician: Cherie Douglas MD   Discharging Provider: Eduardo Becker MD  Primary Care Provider: Eren Becker MD  [facility] Medicine Team: [facility] MED 3 Eduardo Becker MD    HPI:   59M with DM2, PAD s/p bilateral femoral 12/2019 c/b R side incisional infected seroma (p mirabilis, group F strep) s/p 6 weeks antibiotics (EOT 02/14/20) admitted with chief complaint of fever and chills for approx 24 hours. Pt reports that he was in his usual state of health (ambulatory, takes care of own ADLs independently, etc) when he woke up with chills and had bilateral leg soreness. His wife took his temp, which was 102.7F so he went to an urgent care where he tested negative for the flu. He was dx with a viral syndrome and sent home; however, he had continued malaise and fever which prompted a return to the ER given his hx of DM and recent infected seroma. Denies cough, sinus pain, postnasal drip, diplopia or photophobia, new neck pain, chest pain, dyspnea, abdominal pain, n/v, diarrhea, dysuria, hematuria. Does report that he has been profusely sweating and has had a mild headache. No new rash; has gone to [place] recently for camping but no insect exposure. No new drainage or opening of either wound, no pain from either surgical site. No prior history of surgical implantation or ports    ED: BP in the 140s/80s, HR in the 110s, at TMax 100.6. He was on room air. WBC was normal but CRP elevated at 113.2, ESR WNL 23, procal elevated 1.25. Metabolic panel revealed JJ    * No surgery found *      Hospital Course:   60 y/o male with DM (A1c 5.3), HTN, sz disorder (on Lyrica) and PAD s/p B femoral endarterectomy 12/20/19 complicated by infected seromas (Proteus, Strep) now s/p B groin wound wash  outs/flap to L groin and 6 weeks IV abx presented with fever and myalgias. RIP negative. Proteus bacteremia - ID consulted, on cefepime. Fluid collections noted B groins on US; CTA A/P with BLE runoff shows inflammation - no intervention per VASCULAR SURGERY. Switched to ceftriaxone by ID - plan total of 4 weeks followed by chronic suppressive TX. Midline placed 3/12 and will follow-up with ID and VASCULAR SURGERY.     Consults:   Consults (From admission, onward)        Status Ordering Provider     Inpatient consult to Infectious Diseases  Once     Provider:  (Not yet assigned)    Completed OPAL PIERRE     [facility]  Once     Provider:  (Not yet assigned)    Completed ROXANN LAWRENCE     Inpatient consult to Vascular Surgery  Once     Provider:  (Not yet assigned)    Completed OPAL PIERRE          No new Assessment & Plan notes have been filed under this hospital service since the last note was generated.  Service: Hospital Medicine    Final Active Diagnoses:    Diagnosis Date Noted POA    Proteus mirabilis infection [A49.8] 03/10/2020 Yes    PAD (peripheral artery disease) [I73.9] 12/20/2019 Yes    Type 2 diabetes mellitus, without long-term current use of insulin [E11.9] 03/09/2020 Yes    Essential hypertension [I10] 03/09/2020 Yes    JJ (acute kidney injury) [N17.9] 03/09/2020 Yes    Systolic murmur [R01.1] 03/09/2020 Yes    Seizures [R56.9] 03/09/2020 Yes    History of brain tumor [Z87.898] 03/09/2020 Not Applicable    Thrombocytopenia [D69.6] 03/09/2020 Yes      Problems Resolved During this Admission:    Diagnosis Date Noted Date Resolved POA    PRINCIPAL PROBLEM:  Sepsis [A41.9] 03/09/2020 03/12/2020 Yes    Gram-negative bacteremia [R78.81] 03/10/2020 03/12/2020 Yes    Discharge planning issues [Z02.9] 03/11/2020 03/12/2020 Not Applicable    Femoral artery occlusion, left [I70.202]  03/12/2020 Yes    Gram negative septicemia [A41.50] 03/10/2020  03/12/2020 Yes       Discharged Condition: stable    Disposition: Home or Self Care    Follow Up:  Follow-up Information     Marcelle Hunt MD On 4/2/2020.    Specialty:  Infectious Diseases  Why:  at 1:30 pm.   Contact information:  [address] [place]  [place] [id number] [phone]                 Patient Instructions:      CBC auto differential   Standing Status: Standing Number of Occurrences: 4 Standing Exp. Date: 03/12/22     Comprehensive metabolic panel   Standing Status: Standing Number of Occurrences: 4 Standing Exp. Date: 05/11/21     C-reactive protein   Standing Status: Standing Number of Occurrences: 4 Standing Exp. Date: 05/11/21     Sedimentation rate   Standing Status: Standing Number of Occurrences: 4 Standing Exp. Date: 05/11/21       Significant Diagnostic Studies: Labs:   CMP   Recent Labs   Lab 03/11/20  0428 03/12/20  0509    141   K 4.1 4.0    105   CO2 27 24   * 121*   BUN 11 12   CREATININE 0.8 0.7   CALCIUM 8.5* 9.2   ANIONGAP 8 12   ESTGFRAFRICA >60.0 >60.0   EGFRNONAA >60.0 >60.0    and CBC   Recent Labs   Lab 03/11/20  0428 03/12/20  0509   WBC 3.41* 3.69*   HGB 13.9* 14.2   HCT 43.4 44.5   PLT 94* 111*       Pending Diagnostic Studies:     None         Medications:  Reconciled Home Medications:      Medication List      START taking these medications    cefTRIAXone 2 g in dextrose 5 % 50 mL 2 g/50 mL Pgbk IVPB  Commonly known as:  ROCEPHIN  Inject 50 mLs (2 g total) into the vein once daily. for 25 days        CONTINUE taking these medications    aspirin 81 MG EC tablet  Commonly known as:  ECOTRIN  Take 81 mg by mouth once daily.     atorvastatin 40 MG tablet  Commonly known as:  LIPITOR  Take 40 mg by mouth once daily.     cetirizine 10 mg chewable tablet  Take 10 mg by mouth once daily.     clopidogreL 75 mg tablet  Commonly known as:  PLAVIX  Take 1 tablet (75 mg total) by mouth once daily.     elderberry fruit and flower 460-115 mg Cap  Take 2 capsules  by mouth every morning.     metFORMIN 500 MG XR 24hr tablet  Commonly known as:  GLUCOPHAGE-XR  Take 1,000 mg by mouth daily with breakfast.     pregabalin 100 MG capsule  Commonly known as:  LYRICA  Take 100 mg by mouth 2 (two) times daily.     valsartan 160 MG tablet  Commonly known as:  DIOVAN  Take 160 mg by mouth once daily.     VITAMIN C ORAL  Take 2 capsules by mouth every morning.        STOP taking these medications    acetaminophen 500 MG tablet  Commonly known as:  TYLENOL     glyBURIDE-metformin 2.5-500 mg 2.5-500 mg per tablet  Commonly known as:  GLUCOVANCE     HYDROcodone-acetaminophen 5-325 mg per tablet  Commonly known as:  NORCO     oxyCODONE 5 MG immediate release tablet  Commonly known as:  ROXICODONE            Indwelling Lines/Drains at time of discharge:   Lines/Drains/Airways     None                 Time spent on the discharge of patient: 50 minutes  Patient was seen and examined on the date of discharge and determined to be suitable for discharge.         Eduardo Becker MD  Department of Hospital Medicine  [facility]

## 2020-03-12 NOTE — PLAN OF CARE
03/12/20 1428   Final Note   Assessment Type Final Discharge Note   Anticipated Discharge Disposition Home-Health   Discharge plans and expectations educations in teach back method with documentation complete? Yes   Right Care Referral Info   Post Acute Recommendation Home-care   Referral Type home health/IV infusion   Facility [facility]         Future Appointments   Date Time Provider Department Center   4/2/2020 11:00 AM VASCULAR, LAB [facility] PROMISE Reich   4/2/2020  1:30 PM Marcelle Hunt MD [facility] ID Ivan Reich   4/2/2020  2:15 PM Kelby Gomez MD [facility] ALISSON Reich

## 2020-03-12 NOTE — ASSESSMENT & PLAN NOTE
58 y/o male s/p B femoral endarterectomy/L MICHAEL stent 12/20/19 who developed B wound infections and underwent washout/drainage and eventual L sartorius/GONZALEZ flap on 1/3/20. Wound cx grew Proteus/group F streptococcus - treated for 6 weeks with abx (ended 2/14/20). Presented to ED with fever/myalgias. WBC normal, , ESR WNL, procalcitonin 1.25.    Blood cx (3/9) = Proteus mirabilis (pan-sensitive)  Blood cx (3/10) = NGTD    R groin US with 1 cm fluid collection, L groin US with 5 cm fluid collection    PLAN:  - ID following - continue ceftriaxone 2 g IV qday x 4 weeks (stop date 4/6/20)  - no intervention per VASCULAR SURGERY

## 2020-03-12 NOTE — PROGRESS NOTES
[facility] Hospital Medicine  Progress Note    Patient Name: Renan Britton  MRN: [id number]  Patient Class: IP- Inpatient   Admission Date: 3/9/2020  Length of Stay: 2 days  Attending Physician: Cherie Douglas MD  Primary Care Provider: Eren Becker MD    [facility] Medicine Team: [facility] 3 Eduardo Becker MD    Subjective:     Principal Problem:Sepsis    HPI:  59M with DM2, PAD s/p bilateral femoral 12/2019 c/b R side incisional infected seroma (p mirabilis, group F strep) s/p 6 weeks antibiotics (EOT 02/14/20) admitted with chief complaint of fever and chills for approx 24 hours. Pt reports that he was in his usual state of health (ambulatory, takes care of own ADLs independently, etc) when he woke up with chills and had bilateral leg soreness. His wife took his temp, which was 102.7F so he went to an urgent care where he tested negative for the flu. He was dx with a viral syndrome and sent home; however, he had continued malaise and fever which prompted a return to the ER given his hx of DM and recent infected seroma. Denies cough, sinus pain, postnasal drip, diplopia or photophobia, new neck pain, chest pain, dyspnea, abdominal pain, n/v, diarrhea, dysuria, hematuria. Does report that he has been profusely sweating and has had a mild headache. No new rash; has gone to [place] recently for camping but no insect exposure. No new drainage or opening of either wound, no pain from either surgical site. No prior history of surgical implantation or ports    ED: BP in the 140s/80s, HR in the 110s, at TMax 100.6. He was on room air. WBC was normal but CRP elevated at 113.2, ESR WNL 23, procal elevated 1.25. Metabolic panel revealed JJ    Overview/Hospital Course:  58 y/o male with DM (A1c 5.3), HTN, sz disorder (on Lyrica) and PAD s/p B femoral endarterectomy 12/20/19 complicated by infected seromas (Proteus, Strep) now s/p B groin wound wash outs/flap to L groin and 6 weeks IV abx  presented with fever and myalgias. RIP negative. Proteus bacteremia - ID consulted, on cefepime. Fluid collections noted B groins on US; CTA A/P with BLE runoff shows inflammation - no intervention per VASCULAR SURGERY. Switched to ceftriaxone by ID - plan total of 4 weeks followed by chronic suppressive TX.    Interval History:  No major issues overnight. Feels up to going home.    Review of Systems   Constitutional: Negative for chills and fever.   Respiratory: Negative for shortness of breath.    Cardiovascular: Positive for leg swelling (mild BLE).   Gastrointestinal: Negative for abdominal pain, nausea and vomiting.     Objective:     Vital Signs (Most Recent):  Temp: 97.4 °F (36.3 °C) (03/12/20 0709)  Pulse: 75 (03/12/20 0709)  Resp: 16 (03/12/20 0709)  BP: (!) 142/86 (03/12/20 0829)  SpO2: 97 % (03/12/20 0709) Vital Signs (24h Range):  Temp:  [96.3 °F (35.7 °C)-98.2 °F (36.8 °C)] 97.4 °F (36.3 °C)  Pulse:  [75-94] 75  Resp:  [16-18] 16  SpO2:  [97 %-99 %] 97 %  BP: (142-190)/() 142/86     Weight: 116.1 kg (256 lb)  Body mass index is 34.72 kg/m².    Intake/Output Summary (Last 24 hours) at 3/12/2020 1128  Last data filed at 3/11/2020 1833  Gross per 24 hour   Intake 770 ml   Output --   Net 770 ml      Physical Exam   Constitutional: He appears well-developed and well-nourished. No distress.   HENT:   Head: Normocephalic and atraumatic.   Well-healed L cranial incision   Eyes: Conjunctivae are normal. No scleral icterus.   Cardiovascular: Normal rate and regular rhythm. Exam reveals no gallop and no friction rub.   Murmur (systolic) heard.  Pulmonary/Chest: Effort normal. No stridor. No respiratory distress. He has no wheezes. He has no rales.   Abdominal: Soft. He exhibits no distension and no mass. There is no tenderness. There is no guarding.   Musculoskeletal: He exhibits edema (mild BLE).   Flap to L groin healthy, incisions well-healed  R groin wound healing - skin closed   Skin: Skin is warm and  dry. He is not diaphoretic.     Significant Labs:   CBC:   Recent Labs   Lab 03/11/20  0428 03/12/20  0509   WBC 3.41* 3.69*   HGB 13.9* 14.2   HCT 43.4 44.5   PLT 94* 111*     CMP:   Recent Labs   Lab 03/11/20  0428 03/12/20  0509    141   K 4.1 4.0    105   CO2 27 24   * 121*   BUN 11 12   CREATININE 0.8 0.7   CALCIUM 8.5* 9.2   ANIONGAP 8 12   EGFRNONAA >60.0 >60.0       Significant Imaging: CT: I have reviewed all pertinent results/findings within the past 24 hours and my personal findings are:  inflammation B groins, atherosclerotic disease throughout BLE aa.      Assessment/Plan:      * Sepsis  60 y/o male s/p B femoral endarterectomy/L MICHAEL stent 12/20/19 who developed B wound infections and underwent washout/drainage and eventual L sartorius/GONZALEZ flap on 1/3/20. Wound cx grew Proteus/group F streptococcus - treated for 6 weeks with abx (ended 2/14/20). Presented to ED with fever/myalgias. WBC normal, , ESR WNL, procalcitonin 1.25.    Blood cx (3/9) = Proteus mirabilis (pan-sensitive)  Blood cx (3/10) = NGTD    R groin US with 1 cm fluid collection, L groin US with 5 cm fluid collection    PLAN:  - ID following - continue ceftriaxone 2 g IV qday x 4 weeks (stop date 4/6/20)  - no intervention per VASCULAR SURGERY    Proteus mirabilis infection  See Sepsis    Gram-negative bacteremia  See Sepsis    PAD (peripheral artery disease)  S/p B femoral endarterectomies and L MICHAEL stent 12/20/19  HOME MED = ASA, Plavix, atorvastatin    PLAN:  - continue ASA/Plavix  - continue home atorvastatin    Type 2 diabetes mellitus, without long-term current use of insulin  A1c = 5.3  HOME MED = metformin    PLAN:  - diabetic diet  - LDSSI, POCT glucose AC/HS    Essential hypertension  HOME MED = valsartan    PLAN:  - continue home valsartan    JJ (acute kidney injury)  Cr 1.4 at admit. Baseline Cr 0.6. Etiology likely prerenal 2/2 dehydration    Cr 0.7 <- 0.8 <- 1.0 <- 1.4    PLAN:  - resolved    Systolic  murmur  TTE = EF 68%, normal diastolic function, mild-to-moderate AS, mild TR    PLAN:  - no current intervention, follow-up with CARDIOLOGY as outpatient    History of brain tumor  See Seizures    Seizures  History of brain tumor s/p L craniotomy/resection  HOME MED = Lyrica (allergies to AEDs)    PLAN:  - continue home Lyrica    Thrombocytopenia  Plt 112 on admit. Unclear etiology.    Plt 111 <- 94 <- 82 <- 89 <- 112    PLAN:  - continue to monitor    Discharge planning issues  PLAN:  - needs 4 weeks IV ceftriaxone 2 g qday (stop date 4/6/20) with weekly labs (CBC, CMP, ESR, CRP) sent to ID (fax [phone]); midline to be placed today  - [facility] ID clinic follow-up 4/2 @ 13:30  - will need outpatient f/u with VASCULAR SURGERY      Anticipate discharge when midline placed and HH IV abx set up, potentially later today.    VTE Risk Mitigation (From admission, onward)         Ordered     IP VTE HIGH RISK PATIENT  Once      03/09/20 0433     Place sequential compression device  Until discontinued      03/09/20 0433              Eduardo Becker MD  Department of Hospital Medicine   [facility] [id number]

## 2020-03-12 NOTE — ASSESSMENT & PLAN NOTE
S/p B femoral endarterectomies and L MICHAEL stent 12/20/19  HOME MED = ASA, Plavix, atorvastatin    PLAN:  - continue ASA/Plavix  - continue home atorvastatin

## 2020-03-12 NOTE — SUBJECTIVE & OBJECTIVE
Interval History:  No major issues overnight. Feels up to going home.    Review of Systems   Constitutional: Negative for chills and fever.   Respiratory: Negative for shortness of breath.    Cardiovascular: Positive for leg swelling (mild BLE).   Gastrointestinal: Negative for abdominal pain, nausea and vomiting.     Objective:     Vital Signs (Most Recent):  Temp: 97.4 °F (36.3 °C) (03/12/20 0709)  Pulse: 75 (03/12/20 0709)  Resp: 16 (03/12/20 0709)  BP: (!) 142/86 (03/12/20 0829)  SpO2: 97 % (03/12/20 0709) Vital Signs (24h Range):  Temp:  [96.3 °F (35.7 °C)-98.2 °F (36.8 °C)] 97.4 °F (36.3 °C)  Pulse:  [75-94] 75  Resp:  [16-18] 16  SpO2:  [97 %-99 %] 97 %  BP: (142-190)/() 142/86     Weight: 116.1 kg (256 lb)  Body mass index is 34.72 kg/m².    Intake/Output Summary (Last 24 hours) at 3/12/2020 1128  Last data filed at 3/11/2020 1833  Gross per 24 hour   Intake 770 ml   Output --   Net 770 ml      Physical Exam   Constitutional: He appears well-developed and well-nourished. No distress.   HENT:   Head: Normocephalic and atraumatic.   Well-healed L cranial incision   Eyes: Conjunctivae are normal. No scleral icterus.   Cardiovascular: Normal rate and regular rhythm. Exam reveals no gallop and no friction rub.   Murmur (systolic) heard.  Pulmonary/Chest: Effort normal. No stridor. No respiratory distress. He has no wheezes. He has no rales.   Abdominal: Soft. He exhibits no distension and no mass. There is no tenderness. There is no guarding.   Musculoskeletal: He exhibits edema (mild BLE).   Flap to L groin healthy, incisions well-healed  R groin wound healing - skin closed   Skin: Skin is warm and dry. He is not diaphoretic.     Significant Labs:   CBC:   Recent Labs   Lab 03/11/20  0428 03/12/20  0509   WBC 3.41* 3.69*   HGB 13.9* 14.2   HCT 43.4 44.5   PLT 94* 111*     CMP:   Recent Labs   Lab 03/11/20  0428 03/12/20  0509    141   K 4.1 4.0    105   CO2 27 24   * 121*   BUN 11 12    CREATININE 0.8 0.7   CALCIUM 8.5* 9.2   ANIONGAP 8 12   EGFRNONAA >60.0 >60.0       Significant Imaging: CT: I have reviewed all pertinent results/findings within the past 24 hours and my personal findings are:  inflammation B groins, atherosclerotic disease throughout BLE aa.

## 2020-03-12 NOTE — ASSESSMENT & PLAN NOTE
Gram negative septicemia due to P mirabilis bacteremia. Suspect likely source is fluid collection near vascular site. Sepsis resolving. He is afebrile and without leukocytosis. Repeat blood cultures on 3/10 remain NGTD. CTA with run off without evidence of abscess however with extensive inflammatory changes about the soft tissues of the groin with soft tissue stranding/thickening.   · Continue ceftriaxone 2 gm IV daily.   · Will plan for 4 weeks of ceftriaxone. End date: 4/6/2020.  · Monitor weekly CBC, CMP, ESR plus CRP and fax to ID clinic at [phone].  · Will plan for antibiotic suppression at next clinic visit.   · [facility] ID clinic follow up on 4/2 at 1:30 pm.

## 2020-03-12 NOTE — PROGRESS NOTES
[facility]  Vascular Surgery  Progress Note    Patient Name: Renan Britton  MRN: [id number]  Admission Date: 3/9/2020  Primary Care Provider: Eren Becker MD    Subjective:     Interval History:   No acute events overnight. CTA showed no fluid collection, signs of inflammation, at the moment on IV antibiotics.     CTA:     Postoperative change of bilateral femoral endarterectomy with extensive inflammatory changes in the soft tissues of the left and right groin, including edema, soft tissue stranding/thickening, and multiple prominent pelvic lymph nodes.  No definite fluid collection to suggest abscess.  Correlation is advised.    Multifocal areas of narrowing throughout the arteries of the lower extremities as described above with no definite areas of occlusion.      Post-Op Info:  * No surgery found *         Medications Prior to Admission   Medication Sig Dispense Refill Last Dose    acetaminophen (TYLENOL) 500 MG tablet Take 1,000 mg by mouth every evening.       ascorbic acid (VITAMIN C ORAL) Take 2 capsules by mouth every morning.       aspirin (ECOTRIN) 81 MG EC tablet Take 81 mg by mouth once daily.   3/8/2020    atorvastatin (LIPITOR) 40 MG tablet Take 40 mg by mouth once daily.   3/8/2020    cetirizine 10 mg chewable tablet Take 10 mg by mouth once daily.    3/8/2020    clopidogreL (PLAVIX) 75 mg tablet Take 1 tablet (75 mg total) by mouth once daily. 30 tablet 10 3/8/2020    elderberry fruit and flower 460-115 mg Cap Take 2 capsules by mouth every morning.       metFORMIN (GLUCOPHAGE-XR) 500 MG XR 24hr tablet Take 1,000 mg by mouth daily with breakfast.    3/8/2020    pregabalin (LYRICA) 100 MG capsule Take 100 mg by mouth 2 (two) times daily.   3/8/2020    valsartan (DIOVAN) 160 MG tablet Take 160 mg by mouth once daily.   3/8/2020    [DISCONTINUED] glyBURIDE-metformin 2.5-500 mg (GLUCOVANCE) 2.5-500 mg per tablet Take 1 tablet by mouth 2 (two) times daily with meals.    Taking    [DISCONTINUED] HYDROcodone-acetaminophen (NORCO) 5-325 mg per tablet Take 1 tablet by mouth every 6 (six) hours as needed for Pain. 30 tablet 0 Taking    [DISCONTINUED] oxyCODONE (ROXICODONE) 5 MG immediate release tablet Take 1 tablet (5 mg total) by mouth every 4 (four) hours as needed. 30 tablet 0 Taking       Review of patient's allergies indicates:   Allergen Reactions    Penicillins Hives     Patient currently on cefepime without complications       Past Medical History:   Diagnosis Date    Diabetes mellitus     H/O brain tumor     HLD (hyperlipidemia)     Hypertension     PAD (peripheral artery disease)     Seizures     R/T BRAIN TUMOR- SURGICALLY EXCISED     Past Surgical History:   Procedure Laterality Date    APPLICATION OF WOUND VACUUM-ASSISTED CLOSURE DEVICE Bilateral 1/1/2020    Procedure: APPLICATION, WOUND VAC;  Surgeon: SEBAS Prieto II, MD;  Location: [address];  Service: Cardiovascular;  Laterality: Bilateral;    APPLICATION OF WOUND VACUUM-ASSISTED CLOSURE DEVICE N/A 1/3/2020    Procedure: APPLICATION, WOUND VAC;  Surgeon: Brian Wong MD;  Location: [address];  Service: Plastics;  Laterality: N/A;    BRAIN SURGERY  01/2011    TUMOR EXCISED    CREATION OF MUSCLE ROTATIONAL FLAP N/A 1/3/2020    Procedure: CREATION, FLAP, MUSCLE ROTATION;  Surgeon: Brian Wong MD;  Location: [address];  Service: Plastics;  Laterality: N/A;    ENDARTERECTOMY OF FEMORAL ARTERY Bilateral 12/20/2019    Procedure: ENDARTERECTOMY, FEMORAL;  Surgeon: Kelby Gomez MD;  Location: [address];  Service: Peripheral Vascular;  Laterality: Bilateral;  natalya common femoral endarterectomy with natalya. iliac stent placement    PERCUTANEOUS TRANSLUMINAL ANGIOPLASTY (PTA) OF PERIPHERAL VESSEL Left 10/17/2019    Procedure: PTA, PERIPHERAL VESSEL;  Surgeon: Rao Fisher MD;  Location: [facility] CATH;  Service: Cardiology;  Laterality: Left;     Family History     Problem  Relation (Age of Onset)    Cancer Father, Brother    Diabetes Mother    Heart disease Father, Brother, Brother, Brother    Hypertension Mother    Stroke Mother        Tobacco Use    Smoking status: Former Smoker     Packs/day: 2.00     Types: Cigarettes     Last attempt to quit: 2011     Years since quittin.1    Smokeless tobacco: Never Used   Substance and Sexual Activity    Alcohol use: Yes     Comment: 2-4 PER DAY    Drug use: Never    Sexual activity: Not on file     Review of Systems   Constitutional: Positive for activity change, chills, fatigue and fever. Negative for appetite change and unexpected weight change.   Respiratory: Negative for cough, chest tightness and shortness of breath.    Cardiovascular: Negative for chest pain, palpitations and leg swelling.   Gastrointestinal: Negative for abdominal distention, nausea and vomiting.   Genitourinary: Negative for difficulty urinating and dysuria.   Musculoskeletal: Negative for arthralgias.   Skin: Negative for color change and wound.   Neurological: Negative for dizziness and weakness.   Hematological: Negative for adenopathy. Does not bruise/bleed easily.     Objective:     Vital Signs (Most Recent):  Temp: 97.4 °F (36.3 °C) (20)  Pulse: 75 (20)  Resp: 16 (20)  BP: (!) 190/100 (20)  SpO2: 97 % (20) Vital Signs (24h Range):  Temp:  [96.3 °F (35.7 °C)-98.2 °F (36.8 °C)] 97.4 °F (36.3 °C)  Pulse:  [74-94] 75  Resp:  [16-18] 16  SpO2:  [97 %-99 %] 97 %  BP: (133-190)/() 190/100     Weight: 116.1 kg (256 lb)  Body mass index is 34.72 kg/m².    Physical Exam   Constitutional: He is oriented to person, place, and time. He appears well-developed and well-nourished. No distress.   Patient well appearing-sitting up in bed and eating   Cardiovascular: Normal rate and regular rhythm.   Pulmonary/Chest: Effort normal. No respiratory distress.   Abdominal: Soft. He exhibits no distension.    Musculoskeletal: Normal range of motion. He exhibits no edema or deformity.   Neurological: He is alert and oriented to person, place, and time.   Dopplerable DP and PT bilaterally   Skin: Skin is warm and dry. He is not diaphoretic.   Right groin wound intact. No erythema. No drainage. No palpable seroma. Dopplerable pulse  Left groin wound intact (previous flap). No erythema. No drainage. No palpable seroma. Dopplerable pulse.   Slight discoloration of groin tracking toward penis, no appreciable association with incision.     Nursing note and vitals reviewed.      Significant Labs:  ABGs: No results for input(s): PH, PCO2, PO2, HCO3, POCSATURATED, BE in the last 48 hours.  BMP:   Recent Labs   Lab 03/12/20  0509   *      K 4.0      CO2 24   BUN 12   CREATININE 0.7   CALCIUM 9.2   MG 2.0     Cardiac markers: No results for input(s): CKMB, CPKMB, TROPONINT, TROPONINI, MYOGLOBIN in the last 48 hours.  CBC:   Recent Labs   Lab 03/12/20  0509   WBC 3.69*   RBC 4.92   HGB 14.2   HCT 44.5   *   MCV 90   MCH 28.9   MCHC 31.9*     CMP:   Recent Labs   Lab 03/12/20  0509   *   CALCIUM 9.2      K 4.0   CO2 24      BUN 12   CREATININE 0.7     Coagulation:   No results for input(s): LABPROT, INR, APTT in the last 48 hours.  Hemoglobin:   No results for input(s): HGBA1C in the last 48 hours.  Lactic Acid:   No results for input(s): LACTATE in the last 48 hours.  LFTs:   No results for input(s): ALT, AST, ALKPHOS, BILITOT, PROT, ALBUMIN in the last 48 hours.    Significant Diagnostics:  US EXTREMITY NON VASCULAR LIMITED RIGHT    CLINICAL HISTORY:  bacteremia - recent R groin infected seroma;    TECHNIQUE:  Limited ultrasound of the right groin was performed.    COMPARISON:  Ultrasound extremity 03/09/2020, CT pelvis with contrast 12/30/2019.    FINDINGS:  There is a large lymph node in the right groin measuring 4.0 x 0.7 x 2.6 cm.    There is a small hypoechoic collection just  superficial to the right iliac vessels measuring approximately 1.1 x 1.0 x 1.4 cm.  Considerations include evolving seroma or hematoma, with abscess felt less likely.  This is likely related to small collection seen on CT pelvis 12/30/2019 in the same region.  A small tract can be seen extending towards the superficial soft tissues on image 15.      Impression       Small hypoechoic collection just superficial to the right iliac vessels measuring approximately 1.1 x 1.0 x 1.4 cm.  Considerations include evolving seroma or hematoma, with abscess felt less likely.  Correlation is advised.    Enlarged right groin lymph node as above.         Assessment/Plan:     PAD (peripheral artery disease)  58yo male with hx of PAD s/p bilateral femoral endarterectomies on 12/20 with course complicated by bilateral wound breakdown and concern for infected fluid collection L groin now s/p bilateral groin washout with superficial debridement of R groin and placement of bilateral wound vacs 1/1/20 now s/p L rectus muscle flap per plastics on 1/3. Completed 6 weeks home IV abx. Now presenting with proteus bacteremia (same bacteria as previous) with concern for groin as source.    -CTA showed no fluid collection, signs of inflammation  - Patient will probably need antibiotics for lifelong   - Ok to dc from vascular standpoint when ambulatory antibiotic plan is set up.   - Appreciate ID recs   -Continue ASA, plavix and statin   -Vascular surgery will continue to follow along         John Fontanez MD  Vascular Surgery  [facility]

## 2020-03-12 NOTE — ASSESSMENT & PLAN NOTE
60yo male with hx of PAD s/p bilateral femoral endarterectomies on 12/20 with course complicated by bilateral wound breakdown and concern for infected fluid collection L groin now s/p bilateral groin washout with superficial debridement of R groin and placement of bilateral wound vacs 1/1/20 now s/p L rectus muscle flap per plastics on 1/3. Completed 6 weeks home IV abx. Now presenting with proteus bacteremia (same bacteria as previous) with concern for groin as source.    -CTA showed no fluid collection, signs of inflammation  - Patient will probably need antibiotics for lifelong   - Ok to dc from vascular standpoint when ambulatory antibiotic plan is set up.   - Appreciate ID recs   -Continue ASA, plavix and statin   -Vascular surgery will continue to follow along

## 2020-03-12 NOTE — ASSESSMENT & PLAN NOTE
Plt 112 on admit. Unclear etiology.    Plt 111 <- 94 <- 82 <- 89 <- 112    PLAN:  - continue to monitor

## 2020-03-13 PROCEDURE — G0180 PR HOME HEALTH MD CERTIFICATION: ICD-10-PCS | Mod: ,,, | Performed by: HOSPITALIST

## 2020-03-13 PROCEDURE — G0180 MD CERTIFICATION HHA PATIENT: HCPCS | Mod: ,,, | Performed by: HOSPITALIST

## 2020-03-15 LAB
BACTERIA BLD CULT: NORMAL
BACTERIA BLD CULT: NORMAL

## 2020-03-18 ENCOUNTER — TELEPHONE (OUTPATIENT)
Dept: INFECTIOUS DISEASES | Facility: CLINIC | Age: 60
End: 2020-03-18

## 2020-03-18 NOTE — TELEPHONE ENCOUNTER
----- Message from Marcelle Hunt MD sent at 3/18/2020  3:39 PM CDT -----  Contact: Patricia       tel: [phone]   That decision will be made once he is seen on that day. Letter says he may return after he is evaluated by his physicians on that day. I don't know if he will be cleared to return to work on that day. Depends on my evaluation and his surgeon's.     ----- Message -----  From: Autumn Roth  Sent: 3/18/2020   2:40 PM CDT  To: Gilbert Ibanez Staff    Caller says she wanted the letter to state a date .    Cannot return on 4/2.       Needs you to explain to her about when he is ok to go back to work , or do you know;    trying to fill out disability papers and needs help with this for his job.     Pls call .

## 2020-03-20 ENCOUNTER — TELEPHONE (OUTPATIENT)
Dept: INFECTIOUS DISEASES | Facility: CLINIC | Age: 60
End: 2020-03-20

## 2020-03-20 ENCOUNTER — PATIENT MESSAGE (OUTPATIENT)
Dept: VASCULAR SURGERY | Facility: CLINIC | Age: 60
End: 2020-03-20

## 2020-03-20 NOTE — TELEPHONE ENCOUNTER
Infusion Company: [employer]  [facility]: [facility]    Weekly laboratory work up reviewed.     Date 3/19   WBC 6.1   BUN 19   Creat 0.7   AST 35   ALT 33   ALP 51   ESR 18 (0-20)   CRP 15.6 (0-10)     Assessment:  P mirabilis bacteremia  Fluid collection near vascular site left groin     Plan:  · Continue ceftriaxone 2 gm IV daily.   · Will plan for 4 weeks of ceftriaxone. End date: 4/6/2020.  · Monitor weekly CBC, CMP, ESR plus CRP and fax to ID clinic at [phone].  · Will plan for antibiotic suppression at next clinic visit.   · [facility] ID clinic follow up on 4/2 at 1:30 pm.

## 2020-03-26 ENCOUNTER — TELEPHONE (OUTPATIENT)
Dept: INFECTIOUS DISEASES | Facility: CLINIC | Age: 60
End: 2020-03-26

## 2020-03-26 NOTE — TELEPHONE ENCOUNTER
Infusion Company: [employer]  [facility]: [facility]    Weekly laboratory work up reviewed.     Date 3/19 3/26   WBC 6.1 5.7   BUN 19 20   Creat 0.7 0.7   AST 35 41   ALT 33 42   ALP 51 55   ESR 18 (0-20) 7   CRP 15.6 (0-10) 11     Assessment:  P mirabilis bacteremia  Fluid collection near vascular site left groin     Plan:  · Continue ceftriaxone 2 gm IV daily.   · Will plan for 4 weeks of ceftriaxone. End date: 4/6/2020.  · Monitor weekly CBC, CMP, ESR plus CRP and fax to ID clinic at [phone].  · Will plan for antibiotic suppression at next clinic visit.   · [facility] ID clinic follow up on 4/2 at 1:30 pm.

## 2020-03-29 ENCOUNTER — PATIENT MESSAGE (OUTPATIENT)
Dept: INFECTIOUS DISEASES | Facility: CLINIC | Age: 60
End: 2020-03-29

## 2020-03-29 ENCOUNTER — PATIENT MESSAGE (OUTPATIENT)
Dept: VASCULAR SURGERY | Facility: CLINIC | Age: 60
End: 2020-03-29

## 2020-04-01 ENCOUNTER — PATIENT MESSAGE (OUTPATIENT)
Dept: VASCULAR SURGERY | Facility: CLINIC | Age: 60
End: 2020-04-01

## 2020-04-02 ENCOUNTER — OFFICE VISIT (OUTPATIENT)
Dept: INFECTIOUS DISEASES | Facility: CLINIC | Age: 60
End: 2020-04-02
Payer: COMMERCIAL

## 2020-04-02 ENCOUNTER — INFUSION (OUTPATIENT)
Dept: INFECTIOUS DISEASES | Facility: HOSPITAL | Age: 60
End: 2020-04-02
Attending: INTERNAL MEDICINE
Payer: COMMERCIAL

## 2020-04-02 ENCOUNTER — TELEPHONE (OUTPATIENT)
Dept: VASCULAR SURGERY | Facility: CLINIC | Age: 60
End: 2020-04-02

## 2020-04-02 ENCOUNTER — HOSPITAL ENCOUNTER (OUTPATIENT)
Dept: VASCULAR SURGERY | Facility: CLINIC | Age: 60
Discharge: HOME | End: 2020-04-02
Attending: SURGERY
Payer: COMMERCIAL

## 2020-04-02 ENCOUNTER — OFFICE VISIT (OUTPATIENT)
Dept: VASCULAR SURGERY | Facility: CLINIC | Age: 60
End: 2020-04-02
Attending: SURGERY
Payer: COMMERCIAL

## 2020-04-02 VITALS
BODY MASS INDEX: 35.22 KG/M2 | HEIGHT: 71 IN | SYSTOLIC BLOOD PRESSURE: 128 MMHG | DIASTOLIC BLOOD PRESSURE: 86 MMHG | WEIGHT: 251.56 LBS | TEMPERATURE: 99 F | HEART RATE: 87 BPM

## 2020-04-02 DIAGNOSIS — R19.09 LEFT GROIN MASS: ICD-10-CM

## 2020-04-02 DIAGNOSIS — I73.9 PAD (PERIPHERAL ARTERY DISEASE): Primary | ICD-10-CM

## 2020-04-02 DIAGNOSIS — A49.8 PROTEUS INFECTION: ICD-10-CM

## 2020-04-02 DIAGNOSIS — I73.9 CLAUDICATION: ICD-10-CM

## 2020-04-02 DIAGNOSIS — I77.6 VASCULAR INFLAMMATION OR INFECTION: Primary | ICD-10-CM

## 2020-04-02 DIAGNOSIS — I73.9 PAD (PERIPHERAL ARTERY DISEASE): ICD-10-CM

## 2020-04-02 DIAGNOSIS — Z79.2 RECEIVING INTRAVENOUS ANTIBIOTIC TREATMENT AS OUTPATIENT: ICD-10-CM

## 2020-04-02 PROCEDURE — 93923 PR NON-INVASIVE PHYSIOLOGIC STUDY EXTREMITY 3 LEVELS: ICD-10-PCS | Mod: S$GLB,,, | Performed by: SURGERY

## 2020-04-02 PROCEDURE — 99213 PR OFFICE/OUTPT VISIT, EST, LEVL III, 20-29 MIN: ICD-10-PCS | Mod: S$GLB,,, | Performed by: INTERNAL MEDICINE

## 2020-04-02 PROCEDURE — 99214 OFFICE O/P EST MOD 30 MIN: CPT | Mod: S$GLB,,, | Performed by: SURGERY

## 2020-04-02 PROCEDURE — 99999 PR PBB SHADOW E&M-EST. PATIENT-LVL III: ICD-10-PCS | Mod: PBBFAC,,, | Performed by: INTERNAL MEDICINE

## 2020-04-02 PROCEDURE — 93923 UPR/LXTR ART STDY 3+ LVLS: CPT | Mod: S$GLB,,, | Performed by: SURGERY

## 2020-04-02 PROCEDURE — 99213 OFFICE O/P EST LOW 20 MIN: CPT | Mod: S$GLB,,, | Performed by: INTERNAL MEDICINE

## 2020-04-02 PROCEDURE — 99999 PR PBB SHADOW E&M-EST. PATIENT-LVL III: CPT | Mod: PBBFAC,,, | Performed by: INTERNAL MEDICINE

## 2020-04-02 PROCEDURE — 99214 PR OFFICE/OUTPT VISIT, EST, LEVL IV, 30-39 MIN: ICD-10-PCS | Mod: S$GLB,,, | Performed by: SURGERY

## 2020-04-02 PROCEDURE — 99999 PR PBB SHADOW E&M-EST. PATIENT-LVL I: ICD-10-PCS | Mod: PBBFAC,,, | Performed by: SURGERY

## 2020-04-02 PROCEDURE — 99999 PR PBB SHADOW E&M-EST. PATIENT-LVL I: CPT | Mod: PBBFAC,,, | Performed by: SURGERY

## 2020-04-02 RX ORDER — LEVOFLOXACIN 500 MG/1
500 TABLET, FILM COATED ORAL DAILY
Qty: 30 TABLET | Refills: 2 | Status: ON HOLD | OUTPATIENT
Start: 2020-04-02 | End: 2020-07-06 | Stop reason: HOSPADM

## 2020-04-02 NOTE — PROGRESS NOTES
Midline removal from left upper arm, tip intact, removed without any difficulty, no redness/swelling/bleeding/drainage to site, arm cleaned with saline and gauze, Vaseline gauze to site, 2x2 followed with coban to secure, pt tolerated without any difficulty, pt instructed to keep dressing in place for twenty four hours, pt instructed to call with any questions/concerns, pt verbalized understanding

## 2020-04-02 NOTE — H&P (VIEW-ONLY)
Renan Britton  04/02/2020    HPI:  Mr. Britton is a 59 y.o. male with a h/o HLD, PAD who is s/p:    1) ENDARTERECTOMY, FEMORAL (Bilateral)  2) ANGIOGRAM, PELVIC  3) Left common iliac artery PTA (6 x 40 [place])  4) Left common iliac artery stent (GORE VBX 10 x 39mm)  5) Prevena VAC placement, bilateral groins    With subsequent left sartorius muscle flap coverage by plastic surgery and right groin wahsout and VAC placement.  He has been doing quite well, with increasing activity and ambulation.  He represented about a month prior to this visit with bacteremia and has continued with IV ABX via a left midline cath. To follow-up with ID today.     1/2020 Imaging    Denies any MI/stroke  Tobacco use: quit 8 years ago    Past Medical History:   Diagnosis Date    Diabetes mellitus     H/O brain tumor     HLD (hyperlipidemia)     Hypertension     PAD (peripheral artery disease)     Seizures     R/T BRAIN TUMOR- SURGICALLY EXCISED     Past Surgical History:   Procedure Laterality Date    APPLICATION OF WOUND VACUUM-ASSISTED CLOSURE DEVICE Bilateral 1/1/2020    Procedure: APPLICATION, WOUND VAC;  Surgeon: SEBAS Prieto II, MD;  Location: [address];  Service: Cardiovascular;  Laterality: Bilateral;    APPLICATION OF WOUND VACUUM-ASSISTED CLOSURE DEVICE N/A 1/3/2020    Procedure: APPLICATION, WOUND VAC;  Surgeon: Brian Wong MD;  Location: [facility] OR [address] [place] [address];  Service: Plastics;  Laterality: N/A;    BRAIN SURGERY  01/2011    TUMOR EXCISED    CREATION OF MUSCLE ROTATIONAL FLAP N/A 1/3/2020    Procedure: CREATION, FLAP, MUSCLE ROTATION;  Surgeon: Brian Wong MD;  Location: [address];  Service: Plastics;  Laterality: N/A;    ENDARTERECTOMY OF FEMORAL ARTERY Bilateral 12/20/2019    Procedure: ENDARTERECTOMY, FEMORAL;  Surgeon: Kelby Gomez MD;  Location: [facility] OR [address] [place] [address];  Service: Peripheral Vascular;  Laterality: Bilateral;  natalya common femoral endarterectomy with natalya. iliac  stent placement    PERCUTANEOUS TRANSLUMINAL ANGIOPLASTY (PTA) OF PERIPHERAL VESSEL Left 10/17/2019    Procedure: PTA, PERIPHERAL VESSEL;  Surgeon: Rao Fisher MD;  Location: [facility] CATH;  Service: Cardiology;  Laterality: Left;     Family History   Problem Relation Age of Onset    Diabetes Mother     Hypertension Mother     Stroke Mother     Cancer Father         LUNG    Heart disease Father     Cancer Brother     Heart disease Brother     Heart disease Brother     Heart disease Brother      Social History     Socioeconomic History    Marital status:      Spouse name: Not on file    Number of children: Not on file    Years of education: Not on file    Highest education level: Not on file   Occupational History    Not on file   Social Needs    Financial resource strain: Not on file    Food insecurity:     Worry: Not on file     Inability: Not on file    Transportation needs:     Medical: Not on file     Non-medical: Not on file   Tobacco Use    Smoking status: Former Smoker     Packs/day: 2.00     Types: Cigarettes     Last attempt to quit: 2011     Years since quittin.2    Smokeless tobacco: Never Used   Substance and Sexual Activity    Alcohol use: Yes     Comment: 2-4 PER DAY    Drug use: Never    Sexual activity: Not on file   Lifestyle    Physical activity:     Days per week: Not on file     Minutes per session: Not on file    Stress: Not on file   Relationships    Social connections:     Talks on phone: Not on file     Gets together: Not on file     Attends Hindu service: Not on file     Active member of club or organization: Not on file     Attends meetings of clubs or organizations: Not on file     Relationship status: Not on file   Other Topics Concern    Not on file   Social History Narrative    Not on file       Current Outpatient Medications:     ascorbic acid (VITAMIN C ORAL), Take 2 capsules by mouth every morning., Disp: , Rfl:     aspirin (ECOTRIN) 81  MG EC tablet, Take 81 mg by mouth once daily., Disp: , Rfl:     atorvastatin (LIPITOR) 40 MG tablet, Take 40 mg by mouth once daily., Disp: , Rfl:     cefTRIAXone 2 g in dextrose 5 % 50 mL (ROCEPHIN) 2 g/50 mL PgBk IVPB, Inject 50 mLs (2 g total) into the vein once daily. for 25 days, Disp: , Rfl:     cetirizine 10 mg chewable tablet, Take 10 mg by mouth once daily. , Disp: , Rfl:     clopidogreL (PLAVIX) 75 mg tablet, Take 1 tablet (75 mg total) by mouth once daily., Disp: 30 tablet, Rfl: 10    elderberry fruit and flower 460-115 mg Cap, Take 2 capsules by mouth every morning., Disp: , Rfl:     levoFLOXacin (LEVAQUIN) 500 MG tablet, Take 1 tablet (500 mg total) by mouth once daily., Disp: 30 tablet, Rfl: 2    metFORMIN (GLUCOPHAGE-XR) 500 MG XR 24hr tablet, Take 1,000 mg by mouth daily with breakfast. , Disp: , Rfl:     pregabalin (LYRICA) 100 MG capsule, Take 100 mg by mouth 2 (two) times daily., Disp: , Rfl:     valsartan (DIOVAN) 160 MG tablet, Take 160 mg by mouth once daily., Disp: , Rfl:      REVIEW OF SYSTEMS:  General: negative; Respiratory: no cough, shortness of breath, or wheezing; Cardiovascular: no chest pain or dyspnea on exertion; Gastrointestinal: no abdominal pain, change in bowel habits, or bloody stools; Genito-Urinary: no dysuria, trouble voiding, or hematuria; Musculoskeletal: no weakness or decreased range of motion, Neurological: no TIA or stroke symptoms; Psychiatric: no nervousness, anxiety or depression.    PHYSICAL EXAM:       General appearance: Alert, well-appearing, and in no distress.  Oriented to person, place, and time  Neurological: Normal speech, no focal findings noted; CN II - XII grossly intact  Musculoskeletal:Digits/nail without cyanosis/clubbing.  Normal muscle strength/tone.  Neck: Supple, no significant adenopathy, no carotid bruit can be auscultated  Chest:Clear to auscultation, no wheezes, rales or rhonchi, symmetric air entry, No use of accessory muscles    Cardiac:Normal rate and regular rhythm, S1 and S2 normal, Systolic ejection murmur  Abdomen:Soft, nontender, nondistended, no masses or organomegaly, No rebound tenderness noted; bowel sounds normal,  No groin adenopathy   Extremities: 2+ femoral pulses bilaterally, no pedal pulses palpable.no pre-tibial edema. No ulcerations,  LLE: biphasic PT signal, biphasic DP signal   RLE: Triphasic DP signal, triphasic PT signal  Incisions are clean and dry  Left groin with 5 x 5 cm nontender medial mass consistent with limited alexandra-incisional hematoma.  Non-erythematous, no drainage.     LAB RESULTS:  Lab Results   Component Value Date    K 4.2 03/26/2020    K 4.5 03/19/2020    K 4.0 03/12/2020    CREATININE 0.70 (L) 03/26/2020    CREATININE 0.70 (L) 03/19/2020    CREATININE 0.7 03/12/2020     Lab Results   Component Value Date    WBC 5.70 03/26/2020    WBC 6.10 03/19/2020    WBC 3.69 (L) 03/12/2020    HCT 43.6 03/26/2020    HCT 43.1 03/19/2020    HCT 44.5 03/12/2020     03/26/2020     03/19/2020     (L) 03/12/2020     Lab Results   Component Value Date    HGBA1C 5.3 03/09/2020     IMAGING:  Pressure Lower  ============    Rt brachial A syst BP  129 mmHg  Rt PTA BP  119 mmHg  Rt dors pedis A  mmHg  Rt LYNN post tibial (rt post tib A BP / max brach A BP) 0.92  Rt LYNN (rt dors ped A BP / max brach A BP) 0.96  Rt ankle BP / max brach A BP   0.96  Lt PTA BP  131 mmHg  Lt dors pedis A  mmHg  Lt LYNN (lt post tibial A BP / max brach A BP)  1.02  Lt LYNN dors ped (lt dors ped A BP / max brach A BP)    1.01  Lt ankle BP / max brach A BP   1.02    Impression  =========    Right Leg: Segmental pressures and PVR waveforms suggest minimal peripheral arterial occlusive disease.    Left Leg: Segmental pressures and PVR waveforms suggest minimal to mild peripheral arterial occlusive disease.        IMP/PLAN:  59 y.o. male with recent history as noted above, doing well with normal LYNN bilaterally.  He is doing  quite well and has gradually increased his level of activity, although he is still somewhat limited and cannot safely return to work yet.  He has a medial left groin mass consistent with hematoma, but given recent presentation (1 to 2 weeks) and bacteremia in 3/2020, will obtain U/S to r/o other more concerning etiology.     1) continue asa, plavix, statin  2) daily and progressively increasing ambulation  3) scheduled follow-up with ID, plastic surgery  4) L groin U/S of new mass.  5) F/u by phone with US result  6) RTC in 3 mo with LYNN/LE arterial duplex      Kelby Gomez MD  Vascular & Endovascular Surgery     Addendum:    Patient with left groin duplex showing large left femoral PSA. CTA shows

## 2020-04-02 NOTE — PROGRESS NOTES
Subjective:      Patient ID: Renan Britton is a 59 y.o. male.    Chief Complaint:Follow-up      History of Present Illness    A 59-year-old man with PAD, S/P bilateral femoral endarterectomy on 12/20/19, post operative course complicated by erythema at the groin incision site treated with clindamycin, fluid collections at the surgical site, s/p surgical wound breakdown, Group F Streptococcus plus P mirabilis endovascular infection, s/p wound washout with left groin flap on 1/3/2020, s/p 6 weeks of ceftriaxone completed on 2/14, and P mirabilis bacteremia who is seen as a hospital follow up for continued management of his infection and antibiotics therapy. Mr. Britton was discharged on a 4 week course of ceftriaxone with a scheduled end date of 4/6. He has had issues infusing antibiotics thru his access. He continues to experience swelling and pain.     Review of Systems   Constitution: Negative for chills, decreased appetite, fever, malaise/fatigue, night sweats, weight gain and weight loss.   HENT: Negative for congestion, ear pain, hearing loss, hoarse voice, sore throat and tinnitus.    Eyes: Negative for blurred vision, redness and visual disturbance.   Cardiovascular: Negative for chest pain, leg swelling and palpitations.   Respiratory: Negative for cough, hemoptysis, shortness of breath and sputum production.    Hematologic/Lymphatic: Negative for adenopathy. Does not bruise/bleed easily.   Skin: Negative for dry skin, itching, rash and suspicious lesions.   Musculoskeletal: Negative for back pain, joint pain, myalgias and neck pain.   Gastrointestinal: Negative for abdominal pain, constipation, diarrhea, heartburn, nausea and vomiting.   Genitourinary: Negative for dysuria, flank pain, frequency, hematuria, hesitancy and urgency.   Neurological: Negative for dizziness, headaches, numbness, paresthesias and weakness.   Psychiatric/Behavioral: Negative for depression and memory loss. The patient does  not have insomnia and is not nervous/anxious.      Objective:   Physical Exam   Constitutional: He is oriented to person, place, and time. He appears well-developed and well-nourished.   HENT:   Head: Normocephalic and atraumatic.   Right Ear: External ear normal.   Left Ear: External ear normal.   Mouth/Throat: Oropharynx is clear and moist.   Eyes: Pupils are equal, round, and reactive to light. Conjunctivae and EOM are normal.   Neck: Normal range of motion. Neck supple. No thyromegaly present.   Cardiovascular: Normal rate and regular rhythm.   Murmur (heard best at the second right ICS ) heard.   Systolic murmur is present with a grade of 3/6.  Pulmonary/Chest: Effort normal and breath sounds normal. He has no wheezes. He has no rales.   Abdominal: Soft. Bowel sounds are normal. He exhibits no mass. There is no tenderness. There is no rebound.   Musculoskeletal: Normal range of motion.   Groin swelling.    Lymphadenopathy:     He has no cervical adenopathy.   Neurological: He is alert and oriented to person, place, and time.   Skin: Skin is warm and dry.   Psychiatric: He has a normal mood and affect. His behavior is normal.   Vitals reviewed.    Assessment:       1. Vascular inflammation or infection    2. Proteus infection    3. Receiving intravenous antibiotic treatment as outpatient          Plan:   Patient with recent P mirabilis bacteremia likely from groin site. Has ongoing swelling and tenderness.   · Complete ceftriaxone today.   · Remove PICC line.   · Start Levaquin 500 mg daily x 30 days with 2 refill.  · Will follow up ultrasound scheduled for tomorrow by Vascular team.   · RTC in 4 weeks via virtual visit.

## 2020-04-02 NOTE — PROGRESS NOTES
Renan Britton  04/02/2020    HPI:  Mr. Britton is a 59 y.o. male with a h/o HLD, PAD who is s/p:    1) ENDARTERECTOMY, FEMORAL (Bilateral)  2) ANGIOGRAM, PELVIC  3) Left common iliac artery PTA (6 x 40 [place])  4) Left common iliac artery stent (GORE VBX 10 x 39mm)  5) Prevena VAC placement, bilateral groins    With subsequent left sartorius muscle flap coverage by plastic surgery and right groin wahsout and VAC placement.  He has been doing quite well, with increasing activity and ambulation.  He represented about a month prior to this visit with bacteremia and has continued with IV ABX via a left midline cath. To follow-up with ID today.     1/2020 Imaging    Denies any MI/stroke  Tobacco use: quit 8 years ago    Past Medical History:   Diagnosis Date    Diabetes mellitus     H/O brain tumor     HLD (hyperlipidemia)     Hypertension     PAD (peripheral artery disease)     Seizures     R/T BRAIN TUMOR- SURGICALLY EXCISED     Past Surgical History:   Procedure Laterality Date    APPLICATION OF WOUND VACUUM-ASSISTED CLOSURE DEVICE Bilateral 1/1/2020    Procedure: APPLICATION, WOUND VAC;  Surgeon: SEBAS Prieto II, MD;  Location: [address];  Service: Cardiovascular;  Laterality: Bilateral;    APPLICATION OF WOUND VACUUM-ASSISTED CLOSURE DEVICE N/A 1/3/2020    Procedure: APPLICATION, WOUND VAC;  Surgeon: Brian Wong MD;  Location: [facility] OR [address] [place] [address];  Service: Plastics;  Laterality: N/A;    BRAIN SURGERY  01/2011    TUMOR EXCISED    CREATION OF MUSCLE ROTATIONAL FLAP N/A 1/3/2020    Procedure: CREATION, FLAP, MUSCLE ROTATION;  Surgeon: Brian Wong MD;  Location: [address];  Service: Plastics;  Laterality: N/A;    ENDARTERECTOMY OF FEMORAL ARTERY Bilateral 12/20/2019    Procedure: ENDARTERECTOMY, FEMORAL;  Surgeon: Kelby Gomez MD;  Location: [facility] OR [address] [place] [address];  Service: Peripheral Vascular;  Laterality: Bilateral;  natalya common femoral endarterectomy with natalya. iliac  stent placement    PERCUTANEOUS TRANSLUMINAL ANGIOPLASTY (PTA) OF PERIPHERAL VESSEL Left 10/17/2019    Procedure: PTA, PERIPHERAL VESSEL;  Surgeon: Rao Fisher MD;  Location: [facility] CATH;  Service: Cardiology;  Laterality: Left;     Family History   Problem Relation Age of Onset    Diabetes Mother     Hypertension Mother     Stroke Mother     Cancer Father         LUNG    Heart disease Father     Cancer Brother     Heart disease Brother     Heart disease Brother     Heart disease Brother      Social History     Socioeconomic History    Marital status:      Spouse name: Not on file    Number of children: Not on file    Years of education: Not on file    Highest education level: Not on file   Occupational History    Not on file   Social Needs    Financial resource strain: Not on file    Food insecurity:     Worry: Not on file     Inability: Not on file    Transportation needs:     Medical: Not on file     Non-medical: Not on file   Tobacco Use    Smoking status: Former Smoker     Packs/day: 2.00     Types: Cigarettes     Last attempt to quit: 2011     Years since quittin.2    Smokeless tobacco: Never Used   Substance and Sexual Activity    Alcohol use: Yes     Comment: 2-4 PER DAY    Drug use: Never    Sexual activity: Not on file   Lifestyle    Physical activity:     Days per week: Not on file     Minutes per session: Not on file    Stress: Not on file   Relationships    Social connections:     Talks on phone: Not on file     Gets together: Not on file     Attends Zoroastrian service: Not on file     Active member of club or organization: Not on file     Attends meetings of clubs or organizations: Not on file     Relationship status: Not on file   Other Topics Concern    Not on file   Social History Narrative    Not on file       Current Outpatient Medications:     ascorbic acid (VITAMIN C ORAL), Take 2 capsules by mouth every morning., Disp: , Rfl:     aspirin (ECOTRIN) 81  MG EC tablet, Take 81 mg by mouth once daily., Disp: , Rfl:     atorvastatin (LIPITOR) 40 MG tablet, Take 40 mg by mouth once daily., Disp: , Rfl:     cefTRIAXone 2 g in dextrose 5 % 50 mL (ROCEPHIN) 2 g/50 mL PgBk IVPB, Inject 50 mLs (2 g total) into the vein once daily. for 25 days, Disp: , Rfl:     cetirizine 10 mg chewable tablet, Take 10 mg by mouth once daily. , Disp: , Rfl:     clopidogreL (PLAVIX) 75 mg tablet, Take 1 tablet (75 mg total) by mouth once daily., Disp: 30 tablet, Rfl: 10    elderberry fruit and flower 460-115 mg Cap, Take 2 capsules by mouth every morning., Disp: , Rfl:     levoFLOXacin (LEVAQUIN) 500 MG tablet, Take 1 tablet (500 mg total) by mouth once daily., Disp: 30 tablet, Rfl: 2    metFORMIN (GLUCOPHAGE-XR) 500 MG XR 24hr tablet, Take 1,000 mg by mouth daily with breakfast. , Disp: , Rfl:     pregabalin (LYRICA) 100 MG capsule, Take 100 mg by mouth 2 (two) times daily., Disp: , Rfl:     valsartan (DIOVAN) 160 MG tablet, Take 160 mg by mouth once daily., Disp: , Rfl:      REVIEW OF SYSTEMS:  General: negative; Respiratory: no cough, shortness of breath, or wheezing; Cardiovascular: no chest pain or dyspnea on exertion; Gastrointestinal: no abdominal pain, change in bowel habits, or bloody stools; Genito-Urinary: no dysuria, trouble voiding, or hematuria; Musculoskeletal: no weakness or decreased range of motion, Neurological: no TIA or stroke symptoms; Psychiatric: no nervousness, anxiety or depression.    PHYSICAL EXAM:       General appearance: Alert, well-appearing, and in no distress.  Oriented to person, place, and time  Neurological: Normal speech, no focal findings noted; CN II - XII grossly intact  Musculoskeletal:Digits/nail without cyanosis/clubbing.  Normal muscle strength/tone.  Neck: Supple, no significant adenopathy, no carotid bruit can be auscultated  Chest:Clear to auscultation, no wheezes, rales or rhonchi, symmetric air entry, No use of accessory muscles    Cardiac:Normal rate and regular rhythm, S1 and S2 normal, Systolic ejection murmur  Abdomen:Soft, nontender, nondistended, no masses or organomegaly, No rebound tenderness noted; bowel sounds normal,  No groin adenopathy   Extremities: 2+ femoral pulses bilaterally, no pedal pulses palpable.no pre-tibial edema. No ulcerations,  LLE: biphasic PT signal, biphasic DP signal   RLE: Triphasic DP signal, triphasic PT signal  Incisions are clean and dry  Left groin with 5 x 5 cm nontender medial mass consistent with limited alexandra-incisional hematoma.  Non-erythematous, no drainage.     LAB RESULTS:  Lab Results   Component Value Date    K 4.2 03/26/2020    K 4.5 03/19/2020    K 4.0 03/12/2020    CREATININE 0.70 (L) 03/26/2020    CREATININE 0.70 (L) 03/19/2020    CREATININE 0.7 03/12/2020     Lab Results   Component Value Date    WBC 5.70 03/26/2020    WBC 6.10 03/19/2020    WBC 3.69 (L) 03/12/2020    HCT 43.6 03/26/2020    HCT 43.1 03/19/2020    HCT 44.5 03/12/2020     03/26/2020     03/19/2020     (L) 03/12/2020     Lab Results   Component Value Date    HGBA1C 5.3 03/09/2020     IMAGING:  Pressure Lower  ============    Rt brachial A syst BP  129 mmHg  Rt PTA BP  119 mmHg  Rt dors pedis A  mmHg  Rt LYNN post tibial (rt post tib A BP / max brach A BP) 0.92  Rt LYNN (rt dors ped A BP / max brach A BP) 0.96  Rt ankle BP / max brach A BP   0.96  Lt PTA BP  131 mmHg  Lt dors pedis A  mmHg  Lt LYNN (lt post tibial A BP / max brach A BP)  1.02  Lt LYNN dors ped (lt dors ped A BP / max brach A BP)    1.01  Lt ankle BP / max brach A BP   1.02    Impression  =========    Right Leg: Segmental pressures and PVR waveforms suggest minimal peripheral arterial occlusive disease.    Left Leg: Segmental pressures and PVR waveforms suggest minimal to mild peripheral arterial occlusive disease.        IMP/PLAN:  59 y.o. male with recent history as noted above, doing well with normal LYNN bilaterally.  He is doing  quite well and has gradually increased his level of activity, although he is still somewhat limited and cannot safely return to work yet.  He has a medial left groin mass consistent with hematoma, but given recent presentation (1 to 2 weeks) and bacteremia in 3/2020, will obtain U/S to r/o other more concerning etiology.     1) continue asa, plavix, statin  2) daily and progressively increasing ambulation  3) scheduled follow-up with ID, plastic surgery  4) L groin U/S of new mass.  5) F/u by phone with US result  6) RTC in 3 mo with LYNN/LE arterial duplex      Kelby Gomez MD  Vascular & Endovascular Surgery     Addendum:    Patient with left groin duplex showing large left femoral PSA. CTA shows

## 2020-04-02 NOTE — TELEPHONE ENCOUNTER
----- Message from Chloé Munguia RN sent at 4/2/2020  9:47 AM CDT -----  Swetha,  I have another one for you. Mr. Britton was seen this morning by Dr. Gomez. He would like him to have a left lower extremity arterial duplex tomorrow in [place]. We need to look at his left groin to R/O pseudoaneurysm or hematoma. Can you contact Mr. Britton to get this scheduled?    Chloé Munguia RN

## 2020-04-03 ENCOUNTER — PATIENT MESSAGE (OUTPATIENT)
Dept: VASCULAR SURGERY | Facility: CLINIC | Age: 60
End: 2020-04-03

## 2020-04-03 ENCOUNTER — ANESTHESIA EVENT (OUTPATIENT)
Dept: SURGERY | Facility: HOSPITAL | Age: 60
DRG: 254 | End: 2020-04-03
Payer: COMMERCIAL

## 2020-04-03 ENCOUNTER — PATIENT MESSAGE (OUTPATIENT)
Dept: SURGERY | Facility: HOSPITAL | Age: 60
End: 2020-04-03

## 2020-04-03 DIAGNOSIS — I73.9 PAD (PERIPHERAL ARTERY DISEASE): Primary | ICD-10-CM

## 2020-04-03 DIAGNOSIS — I72.4 PSEUDOANEURYSM OF FEMORAL ARTERY: ICD-10-CM

## 2020-04-03 DIAGNOSIS — I72.4 PSEUDOANEURYSM OF LEFT FEMORAL ARTERY: ICD-10-CM

## 2020-04-03 RX ORDER — SODIUM CHLORIDE 0.9 % (FLUSH) 0.9 %
10 SYRINGE (ML) INJECTION
Status: DISCONTINUED | OUTPATIENT
Start: 2020-04-03 | End: 2020-04-03 | Stop reason: HOSPADM

## 2020-04-03 RX ORDER — LIDOCAINE HYDROCHLORIDE 10 MG/ML
1 INJECTION, SOLUTION EPIDURAL; INFILTRATION; INTRACAUDAL; PERINEURAL ONCE
Status: CANCELLED | OUTPATIENT
Start: 2020-04-03 | End: 2020-04-03

## 2020-04-03 NOTE — CARE UPDATE
Vascular Surgery Note    Notified that US showed large pseudoaneurysm. CTA shows 8x8 cm pseudoaneurysm arising from proximal CFA. Afebrile, no leukocytosis. Will plan for covered stent placement to temporize. Completed 6 wk course of IV abx, PICC was removed yesterday per ID. Will need new PICC placed Monday 4/3 after procedure. Discussed with Joao from PICC team, will need consult, admit to obs order, and CBC/BMP that morning.    Carolyn Pat MD   Fellow, Vascular/Endovascular Surgery

## 2020-04-05 NOTE — ANESTHESIA PREPROCEDURE EVALUATION
[facility]  Anesthesia Pre-Operative Evaluation         Patient Name: Renan Britton  YOB: 1960  MRN: [id number]    SUBJECTIVE:     Pre-operative evaluation for Procedure(s) (LRB):  PLACEMENT-STENT (N/A)     04/05/2020    Renan Britton is a 59 y.o. male w/ a significant PMHx of DM, claudication, HLD, PAD, HTN, JJ and remote hx of brain tumor s/p resection who presents with large pseudoaneurysm. CTA shows 8x8 cm pseudoaneurysm arising from proximal CFA. Afebrile, no leukocytosis. Vascular planning for covered stent placement to temporize.     Patient now presents for the above procedure(s).      LDA:       Midline Catheter Insertion/Assessment  - Single Lumen 03/12/20 1321 Left cephalic vein (lateral side of arm) 18g x 8cm (Active)   Number of days: 24       Prev airway: Placement Date: 01/03/20; Placement Time: 1421 (created via procedure documentation); Method of Intubation: Video Laryngoscopy; Mask Ventilation: Not Attempted; Intubated: Postinduction; Airway Device Size: 7.5; Placement Verified By: Capnometry; Complicating Factors: None; Intubation Findings: Bilateral breath sounds; Securment: Lips; Complications: None; Removal Date: 01/03/20;  Removal Time: 1756    Drips: None documented.      Patient Active Problem List   Diagnosis    Claudication    PAD (peripheral artery disease)    Surgical wound breakdown    Type 2 diabetes mellitus, without long-term current use of insulin    Seizures    History of brain tumor    Thrombocytopenia    JJ (acute kidney injury)    Systolic murmur    Essential hypertension    Proteus mirabilis infection       Review of patient's allergies indicates:   Allergen Reactions    Penicillins Hives     Patient currently on cefepime without complications  Tolerated ceftriaxone 3/2020       Current Inpatient Medications:      No current facility-administered medications on file prior to encounter.      Current Outpatient Medications  on File Prior to Encounter   Medication Sig Dispense Refill    ascorbic acid (VITAMIN C ORAL) Take 2 capsules by mouth every morning.      aspirin (ECOTRIN) 81 MG EC tablet Take 81 mg by mouth once daily.      atorvastatin (LIPITOR) 40 MG tablet Take 40 mg by mouth once daily.      cefTRIAXone 2 g in dextrose 5 % 50 mL (ROCEPHIN) 2 g/50 mL PgBk IVPB Inject 50 mLs (2 g total) into the vein once daily. for 25 days      cetirizine 10 mg chewable tablet Take 10 mg by mouth once daily.       clopidogreL (PLAVIX) 75 mg tablet Take 1 tablet (75 mg total) by mouth once daily. 30 tablet 10    elderberry fruit and flower 460-115 mg Cap Take 2 capsules by mouth every morning.      levoFLOXacin (LEVAQUIN) 500 MG tablet Take 1 tablet (500 mg total) by mouth once daily. 30 tablet 2    metFORMIN (GLUCOPHAGE-XR) 500 MG XR 24hr tablet Take 1,000 mg by mouth daily with breakfast.       pregabalin (LYRICA) 100 MG capsule Take 100 mg by mouth 2 (two) times daily.      valsartan (DIOVAN) 160 MG tablet Take 160 mg by mouth once daily.         Past Surgical History:   Procedure Laterality Date    APPLICATION OF WOUND VACUUM-ASSISTED CLOSURE DEVICE Bilateral 1/1/2020    Procedure: APPLICATION, WOUND VAC;  Surgeon: SEBAS Prieto II, MD;  Location: [address];  Service: Cardiovascular;  Laterality: Bilateral;    APPLICATION OF WOUND VACUUM-ASSISTED CLOSURE DEVICE N/A 1/3/2020    Procedure: APPLICATION, WOUND VAC;  Surgeon: Brian Wong MD;  Location: [address];  Service: Plastics;  Laterality: N/A;    BRAIN SURGERY  01/2011    TUMOR EXCISED    CREATION OF MUSCLE ROTATIONAL FLAP N/A 1/3/2020    Procedure: CREATION, FLAP, MUSCLE ROTATION;  Surgeon: Brian Wong MD;  Location: [facility] OR [address] [place] [address];  Service: Plastics;  Laterality: N/A;    ENDARTERECTOMY OF FEMORAL ARTERY Bilateral 12/20/2019    Procedure: ENDARTERECTOMY, FEMORAL;  Surgeon: Kelby Gomez MD;  Location: [address];  Service:  Peripheral Vascular;  Laterality: Bilateral;  natalya common femoral endarterectomy with natalya. iliac stent placement    PERCUTANEOUS TRANSLUMINAL ANGIOPLASTY (PTA) OF PERIPHERAL VESSEL Left 10/17/2019    Procedure: PTA, PERIPHERAL VESSEL;  Surgeon: Rao Fisher MD;  Location: [facility] CATH;  Service: Cardiology;  Laterality: Left;       Social History     Socioeconomic History    Marital status:      Spouse name: Not on file    Number of children: Not on file    Years of education: Not on file    Highest education level: Not on file   Occupational History    Not on file   Social Needs    Financial resource strain: Not on file    Food insecurity:     Worry: Not on file     Inability: Not on file    Transportation needs:     Medical: Not on file     Non-medical: Not on file   Tobacco Use    Smoking status: Former Smoker     Packs/day: 2.00     Types: Cigarettes     Last attempt to quit: 2011     Years since quittin.2    Smokeless tobacco: Never Used   Substance and Sexual Activity    Alcohol use: Yes     Comment: 2-4 PER DAY    Drug use: Never    Sexual activity: Not on file   Lifestyle    Physical activity:     Days per week: Not on file     Minutes per session: Not on file    Stress: Not on file   Relationships    Social connections:     Talks on phone: Not on file     Gets together: Not on file     Attends Yazdanism service: Not on file     Active member of club or organization: Not on file     Attends meetings of clubs or organizations: Not on file     Relationship status: Not on file   Other Topics Concern    Not on file   Social History Narrative    Not on file       OBJECTIVE:     Vital Signs Range (Last 24H):  BP: ()/()   Arterial Line BP: ()/()       Significant Labs:  Lab Results   Component Value Date    WBC 6.79 2020    HGB 14.5 2020    HCT 44.8 2020     2020    ALT 42 2020    AST 41 2020     (L) 2020    K 4.2 2020    CL  96 03/26/2020    CREATININE 0.70 (L) 03/26/2020    BUN 20 03/26/2020    CO2 27 03/26/2020    INR 1.5 (H) 03/10/2020    HGBA1C 5.3 03/09/2020       Diagnostic Studies: No relevant studies.    EKG:   Results for orders placed or performed during the hospital encounter of 03/09/20   EKG 12-lead    Collection Time: 03/09/20  2:06 AM    Narrative    Test Reason : R00.0,    Vent. Rate : 125 BPM     Atrial Rate : 125 BPM     P-R Int : 160 ms          QRS Dur : 086 ms      QT Int : 292 ms       P-R-T Axes : 071 074 101 degrees     QTc Int : 421 ms    Sinus tachycardia  Nonspecific ST abnormality  Abnormal ECG  No previous ECGs available  Confirmed by FRANKIE TIWARI MD ([id number]) on 3/9/2020 1:53:43 PM    Referred By: [email or web address]   SELF           Confirmed By:FRANKIE TIWARI MD       2D ECHO:  TTE:  Results for orders placed or performed during the hospital encounter of 03/09/20   Echo Color Flow Doppler? Yes   Result Value Ref Range    BSA 2.43 m2    TDI SEPTAL 0.09 m/s    LV LATERAL E/E' RATIO 13.89 m/s    LV SEPTAL E/E' RATIO 13.89 m/s    LA WIDTH 3.99 cm    TDI LATERAL 0.09 m/s    LVIDD 4.64 3.5 - 6.0 cm    IVS 1.26 (A) 0.6 - 1.1 cm    PW 1.30 (A) 0.6 - 1.1 cm    LVIDS 3.00 2.1 - 4.0 cm    FS 35 28 - 44 %    LA volume 57.76 cm3    Sinus 3.16 cm    STJ 2.49 cm    Ascending aorta 2.84 cm    LV mass 228.03 g    LA size 3.76 cm    RVDD 3.84 cm    TAPSE 2.44 cm    Left Ventricle Relative Wall Thickness 0.56 cm    AV mean gradient 37 mmHg    AV valve area 1.40 cm2    AV Velocity Ratio 0.42     AV index (prosthetic) 0.36     E/A ratio 1.13     Mean e' 0.09 m/s    E wave decelartion time 213.99 msec    LVOT diameter 2.22 cm    LVOT area 3.9 cm2    LVOT peak toney 1.48 m/s    LVOT peak VTI 25.78 cm    Ao peak toney 3.53 m/s    Ao VTI 71.10 cm    LVOT stroke volume 99.74 cm3    AV peak gradient 50 mmHg    E/E' ratio 13.89 m/s    MV Peak E Toney 1.25 m/s    TR Max Toney 2.28 m/s    MV Peak A Toney 1.11 m/s    LV Systolic Volume 35.03 mL    LV  Systolic Volume Index 14.8 mL/m2    LV Diastolic Volume 99.12 mL    LV Diastolic Volume Index 41.89 mL/m2    LA Volume Index 24.4 mL/m2    LV Mass Index 96 g/m2    RA Major Axis 4.52 cm    Left Atrium Minor Axis 4.57 cm    Left Atrium Major Axis 4.49 cm    Triscuspid Valve Regurgitation Peak Gradient 21 mmHg    RA Width 3.69 cm    Right Atrial Pressure (from IVC) 3 mmHg    TV rest pulmonary artery pressure 24 mmHg    Narrative    · Concentric left ventricular remodeling.  · Normal left ventricular systolic function. The estimated ejection   fraction is 65%.  · Normal right ventricular systolic function.  · Normal LV diastolic function.  · No wall motion abnormalities.  · Mild tricuspid regurgitation.  · The estimated PA systolic pressure is 24 mmHg.  · Normal central venous pressure (3 mmHg).          MERI:  No results found for this or any previous visit.    ASSESSMENT/PLAN:                                                                                                                  04/05/2020  Renan Britton is a 59 y.o., male.    Anesthesia Evaluation    I have reviewed the Patient Summary Reports.    I have reviewed the Nursing Notes.   I have reviewed the Medications.     Review of Systems  Anesthesia Hx:  No problems with previous Anesthesia  History of prior surgery of interest to airway management or planning: Previous anesthesia: General Denies Family Hx of Anesthesia complications.   Denies Personal Hx of Anesthesia complications.   Social:  Former Smoker, Social Alcohol Use    Cardiovascular:   Hypertension, poorly controlled    Renal/:   Chronic Renal Disease (JJ)    Neurological:   History of brain tumor s/p remote resection   Endocrine:   Diabetes, well controlled, type 2        Physical Exam  General:  Obesity    Airway/Jaw/Neck:  Airway Findings: Mouth Opening: Normal Tongue: Normal  General Airway Assessment: Adult, Possible difficult intubation, Possible difficult mask airway  Mallampati:  III  TM Distance: Normal, at least 6 cm  Jaw/Neck Findings:  Neck ROM: Normal ROM  Neck Findings:  Girth Increased      Dental:  Dental Findings: In tact   Chest/Lungs:  Chest/Lungs Findings: Clear to auscultation, Normal Respiratory Rate     Heart/Vascular:  Heart Findings: Rate: Normal  Rhythm: Regular Rhythm  Sounds: Normal        Mental Status:  Mental Status Findings:  Cooperative, Alert and Oriented         Anesthesia Plan  Type of Anesthesia, risks & benefits discussed:  Anesthesia Type:  MAC  Patient's Preference:   Intra-op Monitoring Plan: standard ASA monitors and arterial line  Intra-op Monitoring Plan Comments:   Post Op Pain Control Plan: multimodal analgesia and IV/PO Opioids PRN  Post Op Pain Control Plan Comments:   Induction:   IV  Beta Blocker:  Patient is not currently on a Beta-Blocker (No further documentation required).       Informed Consent: Patient understands risks and agrees with Anesthesia plan.  Questions answered. Anesthesia consent signed with patient.  ASA Score: 3     Day of Surgery Review of History & Physical:    H&P update referred to the surgeon.         Ready For Surgery From Anesthesia Perspective.

## 2020-04-06 ENCOUNTER — TELEPHONE (OUTPATIENT)
Dept: VASCULAR SURGERY | Facility: CLINIC | Age: 60
End: 2020-04-06

## 2020-04-06 ENCOUNTER — HOSPITAL ENCOUNTER (INPATIENT)
Facility: HOSPITAL | Age: 60
LOS: 1 days | Discharge: HOME HEALTH | DRG: 254 | End: 2020-04-06
Attending: SURGERY | Admitting: SURGERY
Payer: COMMERCIAL

## 2020-04-06 ENCOUNTER — ANESTHESIA (OUTPATIENT)
Dept: SURGERY | Facility: HOSPITAL | Age: 60
DRG: 254 | End: 2020-04-06
Payer: COMMERCIAL

## 2020-04-06 VITALS
TEMPERATURE: 98 F | HEIGHT: 71 IN | DIASTOLIC BLOOD PRESSURE: 86 MMHG | WEIGHT: 251 LBS | SYSTOLIC BLOOD PRESSURE: 143 MMHG | OXYGEN SATURATION: 98 % | BODY MASS INDEX: 35.14 KG/M2 | HEART RATE: 80 BPM | RESPIRATION RATE: 12 BRPM

## 2020-04-06 DIAGNOSIS — I72.4 PSEUDOANEURYSM OF LEFT FEMORAL ARTERY: Primary | ICD-10-CM

## 2020-04-06 LAB — POCT GLUCOSE: 148 MG/DL (ref 70–110)

## 2020-04-06 PROCEDURE — C1751 CATH, INF, PER/CENT/MIDLINE: HCPCS

## 2020-04-06 PROCEDURE — 36247 PR PLACE CATH SUBSUBSELECT ART,ABD/PEL: ICD-10-PCS | Mod: 51,LT,, | Performed by: SURGERY

## 2020-04-06 PROCEDURE — C1894 INTRO/SHEATH, NON-LASER: HCPCS | Performed by: SURGERY

## 2020-04-06 PROCEDURE — 25500020 PHARM REV CODE 255: Performed by: SURGERY

## 2020-04-06 PROCEDURE — 63600175 PHARM REV CODE 636 W HCPCS: Performed by: SURGERY

## 2020-04-06 PROCEDURE — 37000009 HC ANESTHESIA EA ADD 15 MINS: Performed by: SURGERY

## 2020-04-06 PROCEDURE — 63600175 PHARM REV CODE 636 W HCPCS: Performed by: STUDENT IN AN ORGANIZED HEALTH CARE EDUCATION/TRAINING PROGRAM

## 2020-04-06 PROCEDURE — C1760 CLOSURE DEV, VASC: HCPCS | Performed by: SURGERY

## 2020-04-06 PROCEDURE — 27201423 OPTIME MED/SURG SUP & DEVICES STERILE SUPPLY: Performed by: SURGERY

## 2020-04-06 PROCEDURE — C1769 GUIDE WIRE: HCPCS | Performed by: SURGERY

## 2020-04-06 PROCEDURE — 63600175 PHARM REV CODE 636 W HCPCS: Performed by: ANESTHESIOLOGY

## 2020-04-06 PROCEDURE — C1874 STENT, COATED/COV W/DEL SYS: HCPCS | Performed by: SURGERY

## 2020-04-06 PROCEDURE — 25000003 PHARM REV CODE 250: Performed by: STUDENT IN AN ORGANIZED HEALTH CARE EDUCATION/TRAINING PROGRAM

## 2020-04-06 PROCEDURE — 71000015 HC POSTOP RECOV 1ST HR: Performed by: SURGERY

## 2020-04-06 PROCEDURE — 37000008 HC ANESTHESIA 1ST 15 MINUTES: Performed by: SURGERY

## 2020-04-06 PROCEDURE — 99253 IP/OBS CNSLTJ NEW/EST LOW 45: CPT | Mod: ,,, | Performed by: INTERNAL MEDICINE

## 2020-04-06 PROCEDURE — 12000002 HC ACUTE/MED SURGE SEMI-PRIVATE ROOM

## 2020-04-06 PROCEDURE — 36000707: Performed by: SURGERY

## 2020-04-06 PROCEDURE — 37236 OPEN/PERQ PLACE STENT 1ST: CPT | Mod: LT,,, | Performed by: SURGERY

## 2020-04-06 PROCEDURE — C1725 CATH, TRANSLUMIN NON-LASER: HCPCS | Performed by: SURGERY

## 2020-04-06 PROCEDURE — 99253 PR INITIAL INPATIENT CONSULT,LEVL III: ICD-10-PCS | Mod: ,,, | Performed by: INTERNAL MEDICINE

## 2020-04-06 PROCEDURE — C1887 CATHETER, GUIDING: HCPCS | Performed by: SURGERY

## 2020-04-06 PROCEDURE — 82962 GLUCOSE BLOOD TEST: CPT | Performed by: SURGERY

## 2020-04-06 PROCEDURE — 25000003 PHARM REV CODE 250: Performed by: SURGERY

## 2020-04-06 PROCEDURE — D9220A PRA ANESTHESIA: Mod: ,,, | Performed by: ANESTHESIOLOGY

## 2020-04-06 PROCEDURE — D9220A PRA ANESTHESIA: ICD-10-PCS | Mod: ,,, | Performed by: ANESTHESIOLOGY

## 2020-04-06 PROCEDURE — 76937 US GUIDE VASCULAR ACCESS: CPT

## 2020-04-06 PROCEDURE — 36247 INS CATH ABD/L-EXT ART 3RD: CPT | Mod: 51,LT,, | Performed by: SURGERY

## 2020-04-06 PROCEDURE — 94761 N-INVAS EAR/PLS OXIMETRY MLT: CPT

## 2020-04-06 PROCEDURE — 37236 PR OPEN/PERQ TRANSCATH PLACE STENT; INITIAL ARTERY: ICD-10-PCS | Mod: LT,,, | Performed by: SURGERY

## 2020-04-06 PROCEDURE — 36573 INSJ PICC RS&I 5 YR+: CPT

## 2020-04-06 PROCEDURE — 36000706: Performed by: SURGERY

## 2020-04-06 PROCEDURE — 71000016 HC POSTOP RECOV ADDL HR: Performed by: SURGERY

## 2020-04-06 DEVICE — IMPLANTABLE DEVICE: Type: IMPLANTABLE DEVICE | Site: ARTERIAL | Status: FUNCTIONAL

## 2020-04-06 RX ORDER — LIDOCAINE HYDROCHLORIDE 10 MG/ML
1 INJECTION, SOLUTION EPIDURAL; INFILTRATION; INTRACAUDAL; PERINEURAL ONCE
Status: COMPLETED | OUTPATIENT
Start: 2020-04-06 | End: 2020-04-06

## 2020-04-06 RX ORDER — HYDROCODONE BITARTRATE AND ACETAMINOPHEN 5; 325 MG/1; MG/1
1 TABLET ORAL EVERY 6 HOURS PRN
Qty: 15 TABLET | Refills: 0 | Status: ON HOLD | OUTPATIENT
Start: 2020-04-06 | End: 2020-07-06 | Stop reason: HOSPADM

## 2020-04-06 RX ORDER — SODIUM CHLORIDE 0.9 % (FLUSH) 0.9 %
10 SYRINGE (ML) INJECTION
Status: DISCONTINUED | OUTPATIENT
Start: 2020-04-06 | End: 2020-04-06 | Stop reason: HOSPADM

## 2020-04-06 RX ORDER — IODIXANOL 320 MG/ML
INJECTION, SOLUTION INTRAVASCULAR
Status: DISCONTINUED | OUTPATIENT
Start: 2020-04-06 | End: 2020-04-06 | Stop reason: HOSPADM

## 2020-04-06 RX ORDER — MEPERIDINE HYDROCHLORIDE 50 MG/ML
12.5 INJECTION INTRAMUSCULAR; INTRAVENOUS; SUBCUTANEOUS ONCE
Status: COMPLETED | OUTPATIENT
Start: 2020-04-06 | End: 2020-04-06

## 2020-04-06 RX ORDER — KETAMINE HCL IN 0.9 % NACL 50 MG/5 ML
SYRINGE (ML) INTRAVENOUS
Status: DISCONTINUED | OUTPATIENT
Start: 2020-04-06 | End: 2020-04-06

## 2020-04-06 RX ORDER — PROPOFOL 10 MG/ML
VIAL (ML) INTRAVENOUS
Status: DISCONTINUED | OUTPATIENT
Start: 2020-04-06 | End: 2020-04-06

## 2020-04-06 RX ORDER — PROTAMINE SULFATE 10 MG/ML
INJECTION, SOLUTION INTRAVENOUS
Status: DISCONTINUED | OUTPATIENT
Start: 2020-04-06 | End: 2020-04-06

## 2020-04-06 RX ORDER — SODIUM CHLORIDE 9 MG/ML
INJECTION, SOLUTION INTRAVENOUS CONTINUOUS PRN
Status: DISCONTINUED | OUTPATIENT
Start: 2020-04-06 | End: 2020-04-06

## 2020-04-06 RX ORDER — SODIUM CHLORIDE 0.9 % (FLUSH) 0.9 %
10 SYRINGE (ML) INJECTION EVERY 6 HOURS
Status: DISCONTINUED | OUTPATIENT
Start: 2020-04-06 | End: 2020-04-06 | Stop reason: HOSPADM

## 2020-04-06 RX ORDER — PROPOFOL 10 MG/ML
VIAL (ML) INTRAVENOUS CONTINUOUS PRN
Status: DISCONTINUED | OUTPATIENT
Start: 2020-04-06 | End: 2020-04-06

## 2020-04-06 RX ORDER — LIDOCAINE HCL/PF 100 MG/5ML
SYRINGE (ML) INTRAVENOUS
Status: DISCONTINUED | OUTPATIENT
Start: 2020-04-06 | End: 2020-04-06

## 2020-04-06 RX ORDER — HEPARIN SODIUM 1000 [USP'U]/ML
INJECTION, SOLUTION INTRAVENOUS; SUBCUTANEOUS
Status: DISCONTINUED | OUTPATIENT
Start: 2020-04-06 | End: 2020-04-06 | Stop reason: HOSPADM

## 2020-04-06 RX ORDER — MIDAZOLAM HYDROCHLORIDE 1 MG/ML
INJECTION, SOLUTION INTRAMUSCULAR; INTRAVENOUS
Status: DISCONTINUED | OUTPATIENT
Start: 2020-04-06 | End: 2020-04-06

## 2020-04-06 RX ORDER — ONDANSETRON 2 MG/ML
4 INJECTION INTRAMUSCULAR; INTRAVENOUS DAILY PRN
Status: DISCONTINUED | OUTPATIENT
Start: 2020-04-06 | End: 2020-04-06 | Stop reason: HOSPADM

## 2020-04-06 RX ORDER — HEPARIN SODIUM 1000 [USP'U]/ML
INJECTION, SOLUTION INTRAVENOUS; SUBCUTANEOUS
Status: DISCONTINUED | OUTPATIENT
Start: 2020-04-06 | End: 2020-04-06

## 2020-04-06 RX ORDER — FENTANYL CITRATE 50 UG/ML
INJECTION, SOLUTION INTRAMUSCULAR; INTRAVENOUS
Status: DISCONTINUED | OUTPATIENT
Start: 2020-04-06 | End: 2020-04-06

## 2020-04-06 RX ORDER — MEPERIDINE HYDROCHLORIDE 50 MG/ML
INJECTION INTRAMUSCULAR; INTRAVENOUS; SUBCUTANEOUS
Status: DISCONTINUED
Start: 2020-04-06 | End: 2020-04-06 | Stop reason: HOSPADM

## 2020-04-06 RX ORDER — HYDROMORPHONE HYDROCHLORIDE 1 MG/ML
0.2 INJECTION, SOLUTION INTRAMUSCULAR; INTRAVENOUS; SUBCUTANEOUS EVERY 5 MIN PRN
Status: DISCONTINUED | OUTPATIENT
Start: 2020-04-06 | End: 2020-04-06 | Stop reason: HOSPADM

## 2020-04-06 RX ORDER — LIDOCAINE HYDROCHLORIDE 10 MG/ML
INJECTION, SOLUTION EPIDURAL; INFILTRATION; INTRACAUDAL; PERINEURAL
Status: DISCONTINUED | OUTPATIENT
Start: 2020-04-06 | End: 2020-04-06 | Stop reason: HOSPADM

## 2020-04-06 RX ORDER — ONDANSETRON 2 MG/ML
INJECTION INTRAMUSCULAR; INTRAVENOUS
Status: DISCONTINUED | OUTPATIENT
Start: 2020-04-06 | End: 2020-04-06

## 2020-04-06 RX ADMIN — HEPARIN SODIUM 8000 UNITS: 1000 INJECTION, SOLUTION INTRAVENOUS; SUBCUTANEOUS at 07:04

## 2020-04-06 RX ADMIN — Medication 10 MG: at 07:04

## 2020-04-06 RX ADMIN — MEPERIDINE HYDROCHLORIDE 12.5 MG: 50 INJECTION INTRAMUSCULAR; INTRAVENOUS; SUBCUTANEOUS at 09:04

## 2020-04-06 RX ADMIN — CEFTRIAXONE 2 G: 2 INJECTION, SOLUTION INTRAVENOUS at 07:04

## 2020-04-06 RX ADMIN — PROPOFOL 10 MG: 10 INJECTION, EMULSION INTRAVENOUS at 07:04

## 2020-04-06 RX ADMIN — FENTANYL CITRATE 50 MCG: 50 INJECTION, SOLUTION INTRAMUSCULAR; INTRAVENOUS at 07:04

## 2020-04-06 RX ADMIN — PROTAMINE SULFATE 20 MG: 10 INJECTION, SOLUTION INTRAVENOUS at 08:04

## 2020-04-06 RX ADMIN — PROPOFOL 50 MCG/KG/MIN: 10 INJECTION, EMULSION INTRAVENOUS at 07:04

## 2020-04-06 RX ADMIN — ONDANSETRON 4 MG: 2 INJECTION, SOLUTION INTRAMUSCULAR; INTRAVENOUS at 08:04

## 2020-04-06 RX ADMIN — PROTAMINE SULFATE 5 MG: 10 INJECTION, SOLUTION INTRAVENOUS at 08:04

## 2020-04-06 RX ADMIN — MIDAZOLAM HYDROCHLORIDE 1 MG: 1 INJECTION, SOLUTION INTRAMUSCULAR; INTRAVENOUS at 07:04

## 2020-04-06 RX ADMIN — LIDOCAINE HYDROCHLORIDE 40 MG: 20 INJECTION, SOLUTION INTRAVENOUS at 07:04

## 2020-04-06 RX ADMIN — SODIUM CHLORIDE: 0.9 INJECTION, SOLUTION INTRAVENOUS at 06:04

## 2020-04-06 RX ADMIN — LIDOCAINE HYDROCHLORIDE 10 MG: 10 INJECTION, SOLUTION EPIDURAL; INFILTRATION; INTRACAUDAL; PERINEURAL at 05:04

## 2020-04-06 RX ADMIN — PROTAMINE SULFATE 25 MG: 10 INJECTION, SOLUTION INTRAVENOUS at 08:04

## 2020-04-06 RX ADMIN — MIDAZOLAM HYDROCHLORIDE 1 MG: 1 INJECTION, SOLUTION INTRAMUSCULAR; INTRAVENOUS at 06:04

## 2020-04-06 NOTE — TRANSFER OF CARE
"Anesthesia Transfer of Care Note    Patient: Renan Britton    Procedure(s) Performed: Procedure(s) (LRB):  PLACEMENT-STENT--endo, LCFA, right groin access, 7.3 mins fluoro, 1012.85 mGy (Left)  ANGIOGRAM, Left lower extremity (Left)  PTA (ANGIOPLASTY, PERCUTANEOUS, TRANSLUMINAL), left lower extremity (Left)    Patient location: PACU    Anesthesia Type: MAC    Transport from OR: Transported from OR on 6-10 L/min O2 by face mask with adequate spontaneous ventilation    Post pain: adequate analgesia    Post assessment: no apparent anesthetic complications    Post vital signs: stable    Level of consciousness: awake and alert    Nausea/Vomiting: no nausea/vomiting    Complications: none    Transfer of care protocol was followed      Last vitals:   Visit Vitals  BP (!) 164/96 (BP Location: Left arm, Patient Position: Lying)   Pulse 91   Temp 37 °C (98.6 °F) (Oral)   Resp 17   Ht 5' 11" (1.803 m)   Wt 113.9 kg (251 lb)   SpO2 99%   BMI 35.01 kg/m²     "

## 2020-04-06 NOTE — DISCHARGE SUMMARY
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    Specialty:  Vascular Surgery  Contact information:  [address]  [facility] [address]  [phone]                 Patient Instructions:      Diet Adult Regular     Notify your health care provider if you experience any of the following:  increased confusion or weakness     Notify your health care provider if you experience any of the following:  persistent dizziness, light-headedness, or visual disturbances     Notify your health care provider if you experience any of the following:  worsening rash     Notify your health care provider if you experience any of the following:  severe persistent headache     Notify your health care provider if you experience any of the following:  difficulty breathing or increased cough     Notify your health care provider if you experience any of the following:  redness, tenderness, or signs of infection (pain, swelling, redness, odor or green/yellow discharge around incision site)     Notify your health care provider if you experience any of the following:  severe uncontrolled pain     Notify your health care provider if you experience any of the following:  persistent nausea and vomiting or diarrhea     Notify your health care provider if you experience any of the following:  temperature >100.4     Remove dressing in 48 hours     Activity as tolerated     Shower on day dressing removed (No bath)   Order Comments: Okay to take a shower in 48 hours after surgery. Do not soak/submerge incision (aka  no bathing, swimming)until cleared in clinic.     Medications:  Reconciled Home Medications:      Medication List      START taking these medications    HYDROcodone-acetaminophen 5-325 mg per tablet  Commonly known as:  NORCO  Take 1 tablet by mouth every 6 (six) hours as needed.        CONTINUE taking these medications    aspirin 81 MG EC tablet  Commonly known as:  ECOTRIN  Take 81 mg by mouth once daily.     atorvastatin 40 MG tablet  Commonly known as:  LIPITOR  Take 40 mg by mouth once daily.     cefTRIAXone 2 g in dextrose 5 % 50 mL 2 g/50 mL Pgbk IVPB  Commonly known as:  ROCEPHIN  Inject 50 mLs (2 g total) into the vein once daily.     cetirizine 10 mg chewable tablet  Take 10 mg by mouth once daily.     clopidogreL 75 mg tablet  Commonly known as:  PLAVIX  Take 1 tablet (75 mg total) by mouth once daily.     elderberry fruit and flower 460-115 mg Cap  Take 2 capsules by mouth every morning.     levoFLOXacin 500 MG tablet  Commonly known as:  LEVAQUIN  Take 1 tablet (500 mg total) by mouth once daily.     metFORMIN 500 MG XR 24hr tablet  Commonly known as:  GLUCOPHAGE-XR  Take 1,000 mg by mouth daily with breakfast.     pregabalin 100 MG capsule  Commonly known as:  LYRICA  Take 100 mg by mouth 2 (two) times daily.     valsartan 160 MG tablet  Commonly known as:  DIOVAN  Take 160 mg by mouth once daily.     VITAMIN C ORAL  Take 2 capsules by mouth every morning.            Terese Nava MD  General Surgery  [facility]

## 2020-04-06 NOTE — PROCEDURES
"Renan Britton is a 59 y.o. male patient.    Temp: 98.3 °F (36.8 °C) (04/06/20 0930)  Pulse: 107 (04/06/20 1000)  Resp: 14 (04/06/20 1000)  BP: 108/73 (04/06/20 1000)  SpO2: 100 % (04/06/20 1000)  Weight: 113.9 kg (251 lb) (04/06/20 0530)  Height: 5' 11" (180.3 cm) (04/06/20 0530)    PICC  Date/Time: 4/6/2020 9:48 AM  Performed by: Joao Cruz RN  Assisting provider: Della Osullivan LPN  Consent Done: Yes  Time out: Immediately prior to procedure a time out was called to verify the correct patient, procedure, equipment, support staff and site/side marked as required  Indications: med administration and vascular access  Anesthesia: local infiltration  Local anesthetic: lidocaine 1% without epinephrine  Anesthetic Total (mL): 2  Preparation: skin prepped with ChloraPrep  Skin prep agent dried: skin prep agent completely dried prior to procedure  Sterile barriers: all five maximum sterile barriers used - cap, mask, sterile gown, sterile gloves, and large sterile sheet  Hand hygiene: hand hygiene performed prior to central venous catheter insertion  Location details: right brachial  Catheter type: double lumen  Catheter size: 5 Fr  Catheter Length: 40cm    Ultrasound guidance: yes  Vessel Caliber: medium and patent, compressibility normal  Vascular Doppler: not done  Needle advanced into vessel with real time Ultrasound guidance.  Guidewire confirmed in vessel.  Image recorded and saved.  Sterile sheath used.  Number of attempts: 1  Post-procedure: blood return through all ports, chlorhexidine patch and sterile dressing applied  Technical procedures used: 3CG  Specimens: No  Implants: No  Assessment: placement verified by x-ray  Complications: none          Della Osullivan  4/6/2020    "

## 2020-04-06 NOTE — CONSULTS
D/L PICC placed in R BRACHIAL vein, 40cm in length with 0cm exposed. Arm circumference 38cm. Lot#[id number]

## 2020-04-06 NOTE — OP NOTE
"  OPERATIVE REPORT    Patient: Renan Britton    Date: 04/06/2020     Surgeon(s) and Role:     * Kelby Gomez MD - Primary     * Jesus Judd MD - Resident - Assisting     Pre-op Diagnosis:  Pseudoaneurysm of femoral artery [I72.4]; left     Post-op Diagnosis:  Same    In my medical opinion and judgment, this medical or surgical procedure was not able to be safely postponed until April 22, 2020 ([place] Department of Health, Healthcare Facility Notice #[id number]).     Procedure(s):  1) Viabahn covered stent placement (8 x 50mm) left common femoral artery  2) PTA left femoral artery (9x60 [place])  3) LLE angiogram  4) Perclose R CFA  5) US guided access R CFA      7.3 mins fluoro, 1012.85 mGy (Left)      Anesthesia: Local MAC     Findings/Key Components:  Successful treatment of left CFA PSA, no flow noted in pseudoaneurysm after stent deployment.  Left foot with triphasic pedal signals following treatment.      EBL: Minimal             Specimens (From admission, onward)     None          Indications for Procedure:  Renan Britton is a 59 y.o. male with a h/o HLD, PAD who is s/p:     1) ENDARTERECTOMY, FEMORAL (Bilateral)  2) ANGIOGRAM, PELVIC  3) Left common iliac artery PTA (6 x 40 [place])  4) Left common iliac artery stent (GORE VBX 10 x 39mm)  5) Prevena VAC placement, bilateral groins     With subsequent left sartorius muscle flap coverage by plastic surgery and right groin wahsout and VAC placement.  He has been doing quite well, with increasing activity and ambulation.  He represented about a month prior to this visit with bacteremia and has continued with IV ABX via a left midline cath. He was found to have a "bulge" in his groin on follow up 4/2/20. This proved to be a large left common femoral pseudoaneurysm on CTA. The patient was offered endovascular treatment with a covered stent. All risks, benefits, and alternatives were discussed. The patient understood and wished " "to proceed.     Description of procedure:  Patient identified in the preoperative holding area. The patient was taken to the hybrid OR and placed supine on the operating room table. Anesthesia was induced. Bilateral groins were prepped and draped in the usual standard fashion. A surgical pause was conducted confirming correct patient, correct site, correct pre-operative preparations.    We began by accessing the right common femoral artery with Micropuncture technique. A wheel of 1% lidocaine was raised over the area of the common femoral artery. A 21GA needle was advanced under ultrasound guidance into the lumen of the common femoral artery. A 0.018" wire was advanced in to the artery without difficulty or resistance. A 4F sheath was placed over the wire and an angiogram performed. There appeared to be no complications with access. The 4F sheath track was serially dilated to 8F, and then exchanged for a 8F sheath.    Next we advanced a guidewire in to the distal aorta under fluoroscopic guidance. We next advanced a universal flush catheter in to the lumen of the aorta. We pulled the wire back in to the catheter and directed the catheter towards the contralateral common iliac artery. The wire was then passed and the common iliac artery selected. We advanced the wire in to the common femoral artery and advanced the catheter in to the external iliac artery. Angiography was performed revealing left common femoral artery pseudoaneurysm. The wire was advanced into the superficial femoral artery. The omniflush was exchanged for a glidecath and this was advanced into the SFA. The wire was exchanged for an Amplatz wire, and the glidecath removed.    We positioned a 8F x 45cm sheath in the left external iliac artery. A 8x50mm Viabahn covered stent was positioned over the pseudoaneurysm neck and deployed under radiographic guidance. The stent was post-dilated with 9x60mm [place] balloon. Completion angiography revealed good " seal and exclusion of the pseudoaneurysm from the circulation.    The 8F x 45cm sheath was exchanged for a 8F x 11cm sheath. This was removed over the wire. A perclose device was used to close the arteriotomy with excellent hemostasis.     Dr. Gomez was present for all aspects of the case.    All instrument and sponge counts were confirmed correct x 2.    Jesus Judd MD PGY6  Vascular and Endovascular Surgery Fellow

## 2020-04-06 NOTE — PLAN OF CARE
04/06/20 1120   Post-Acute Status   Post-Acute Authorization Home Health;HME   HME Status Patient List Provided   Home Health Status Awaiting Orders for    Pt [facility] [place]. Will send  orders once written.    UPDATE:2:25 PM  SW sent home health orders to the above agencies.     UPDATE:3:55 PM  Pt and wife will  IVAbx from Infusion office. Pt accepted back to [facility] [place].     Gabrielle Johnson LMSW  [facility]  [phone]

## 2020-04-06 NOTE — INTERVAL H&P NOTE
The patient has been examined and the H&P has been reviewed:    I concur with the findings and changes have been noted since the H&P was written: large left common femoral pseudoaneurysm. Concern for infectious etiology. Will plan on placement of covered stent today in hybrid OR to temporize during COVID pandemic. Definitive surgery later.     Anesthesia/Surgery risks, benefits and alternative options discussed and understood by patient/family.          Active Hospital Problems    Diagnosis  POA    Pseudoaneurysm of left femoral artery [I72.4]  Yes      Resolved Hospital Problems   No resolved problems to display.

## 2020-04-06 NOTE — PLAN OF CARE
[facility]    HOME HEALTH ORDERS  FACE TO FACE ENCOUNTER    Patient Name: Renan Britton  YOB: 1960    PCP: Eren Becker MD   PCP Address: [address] [place] [address] / [place] [id number]  PCP Phone Number: [phone]  PCP Fax: [phone]    Encounter Date: 04/06/2020    Admit to Home Health    Diagnoses:  Active Hospital Problems    Diagnosis  POA    Pseudoaneurysm of left femoral artery [I72.4]  Yes      Resolved Hospital Problems   No resolved problems to display.       Future Appointments   Date Time Provider Department Center   5/5/2020  2:00 PM Marcelle Hunt MD [facility] [address]           I have seen and examined this patient face to face today. My clinical findings that support the need for the home health skilled services and home bound status are the following:  Medical restrictions requiring assistance of another human to leave home due to IV infusion Needs.    Allergies:  Review of patient's allergies indicates:   Allergen Reactions    Penicillins Hives     Patient currently on cefepime without complications  Tolerated ceftriaxone 3/2020       Diet: regular diet    Activities: activity as tolerated    Nursing:   SN to complete comprehensive assessment including routine vital signs. Instruct on disease process and s/s of complications to report to MD. Review/verify medication list sent home with the patient at time of discharge  and instruct patient/caregiver as needed. Frequency may be adjusted depending on start of care date. If patient has enteral feeding tube (NG, PEG, J-tube, G-tube), flush tube before and after feeding and/or medication administration with 20-30 mL of water.    Notify MD if SBP > 160 or < 90; DBP > 90 or < 50; HR > 120 or < 50; Temp > 101       CONSULTS:    Aide to provide assistance with personal care, ADLs, and vital signs. - IV infusion and PICC care    MISCELLANEOUS CARE:  HOME INFUSION THERAPY:   SN to perform Infusion Therapy/Central Line  Care.  Review Central Line Care & Central Line Flush with patient.    Administer (drug and dose): Ceftriaxone 2g IV q24h x 6 weeks  State date 04/06/20  Will need IV antibiotics for 6 weeks. Stop date 05/18/20    Scrub the Hub: Prior to accessing the line, perform hand hygiene and always perform a 15-30 second chlor-hexadine scrub  Each lumen of the central line is to be flushed at least daily with 10 mL Normal Saline and 3 mL Heparin flush (100 units/mL)  Skilled Nurse (SN) may draw blood from IV access  Blood Draw Procedure:   - Aspirate at least 10 mL of blood   - Discard   - Obtain specimen   - Change posiflow cap   - Flush with 10 mL Normal Saline followed by a                 3-5 mL Heparin flush (100 units/mL)  Central :   - Sterile dressing changes are done weekly and as needed.   - Use chlor-hexadine scrub to cleanse site, apply Biopatch to insertion site,       apply securement device dressing   - Posi-flow caps are changed weekly and after EVERY lab draw.   - If sterile gauze is under dressing to control oozing,                 dressing change must be performed every 24 hours until gauze is not needed.    WOUND CARE ORDERS  no      Medications: Review discharge medications with patient and family and provide education.      Current Discharge Medication List      CONTINUE these medications which have NOT CHANGED    Details   ascorbic acid (VITAMIN C ORAL) Take 2 capsules by mouth every morning.      aspirin (ECOTRIN) 81 MG EC tablet Take 81 mg by mouth once daily.      atorvastatin (LIPITOR) 40 MG tablet Take 40 mg by mouth once daily.      cefTRIAXone 2 g in dextrose 5 % 50 mL (ROCEPHIN) 2 g/50 mL PgBk IVPB Inject 50 mLs (2 g total) into the vein once daily. for 25 days      elderberry fruit and flower 460-115 mg Cap Take 2 capsules by mouth every morning.      levoFLOXacin (LEVAQUIN) 500 MG tablet Take 1 tablet (500 mg total) by mouth once daily.  Qty: 30 tablet, Refills: 2    Associated  Diagnoses: Vascular inflammation or infection; Proteus infection      pregabalin (LYRICA) 100 MG capsule Take 100 mg by mouth 2 (two) times daily.      valsartan (DIOVAN) 160 MG tablet Take 160 mg by mouth once daily.      cetirizine 10 mg chewable tablet Take 10 mg by mouth once daily.       clopidogreL (PLAVIX) 75 mg tablet Take 1 tablet (75 mg total) by mouth once daily.  Qty: 30 tablet, Refills: 10      metFORMIN (GLUCOPHAGE-XR) 500 MG XR 24hr tablet Take 1,000 mg by mouth daily with breakfast.              I certify that this patient is confined to his home and needs intermittent skilled nursing care.

## 2020-04-06 NOTE — ASSESSMENT & PLAN NOTE
60 y/o M with PAD, S/P bilateral femoral endarterectomy on 12/20/19 c/b erythema at groin incision site s/p clindamycin course, fluid collections at surgical site, s/p surgical wound breakdown, Group F Streptococcus plus P mirabilis endovascular infection s/p wound washout with left groin flap (1/3/2020) s/p 6 weeks of ceftriaxone (completed 2/14) c/b P mirabilis bacteremia s/p 4 week course of ceftriaxone completed 4/2 c/b swelling, erythema and tenderness of his L groin surgical site.  US 4/2 revealed 5.4 x 4.5 cm lesion within L groin concerning for pseudoaneurysm formation. CTA 4/3 demonstrated pseudoaneurysm at level LCFA measuring 8 x 8 cm. He underwent LCFA PTA w/ stent placement to temporize during this time. ID is consulted for IV antibiotics.     Recommendations  Based on prior cultures w/ P mirabilis can restart rocephin 2g daily x 6 weeks with EOT 5/18/2020    Discharge antibiotics:   Ceftriaxone 2g IV q24 hours     End date of IV antibiotics: 5/18/2020    Weekly outpatient laboratory on Monday or Tuesday while on IV antibiotics.    CBC   CMP or BMP    If vancomycin trough is not at target (15-20) prior to discharge, the please perform vancomycin trough before their fourth outpatient dose.    Fax laboratory results to [facility] at [phone] with attn: Richie Walker     Outpatient Infectious Diseases clinic follow up will be arranged and found in patient calendar.    Prior to discharge, please ensure the patient's follow-up has been scheduled.  If there is still no follow-up scheduled in Infectious Diseases clinic, please send an EPIC message to Fauzia Davis MA in Infectious Diseases.

## 2020-04-06 NOTE — HPI
60 y/o M with PAD, S/P bilateral femoral endarterectomy on 12/20/19 c/b erythema at groin incision site s/p clindamycin course, fluid collections at surgical site, s/p surgical wound breakdown, Group F Streptococcus plus P mirabilis endovascular infection s/p wound washout with left groin flap (1/3/2020) s/p 6 weeks of ceftriaxone (completed 2/14) c/b P mirabilis bacteremia s/p 4 week course of ceftriaxone completed 4/2 c/b swelling, erythema and tenderness of his L groin surgical site.  US 4/2 revealed 5.4 x 4.5 cm lesion within L groin concerning for pseudoaneurysm formation. CTA 4/3 demonstrated pseudoaneurysm at level LCFA measuring 8 x 8 cm. He underwent LCFA PTA w/ stent placement to temporize during this time. ID is consulted for IV antibiotics.

## 2020-04-06 NOTE — TELEPHONE ENCOUNTER
----- Message from Ivania Ro sent at 4/6/2020  9:42 AM CDT -----  Received approval from Mariel BARILLAS and it is good for 3 day stay.  Auth# [id number]

## 2020-04-06 NOTE — DISCHARGE INSTRUCTIONS
Peripheral Angiography    Back at home  On the day you get home, dont drive, dont exercise, avoid walking and taking stairs, and avoid bending and lifting. Your healthcare provider may give you other care instructions.  Call your healthcare provider  Call your healthcare provider right away if:  · You notice a lump or bleeding at the insertion site  · You feel pain at the insertion site  · You become lightheaded or dizzy  · You have leg pain or numbness  · You do not urinate in 8 hours    Date Last Reviewed: 5/1/2016 © 2000-2017 [employer]. [address] [place] [address]. All rights reserved. This information is not intended as a substitute for professional medical care. Always follow your healthcare professional's instructions.        PICC Line Care  PICC stands for peripherally inserted central catheter. This is a short-term (temporary) tube that is used instead of a regular IV (intravenous) line.   Reasons for using a PICC line    Your PICC will need some care to keep it clean and working. This care includes:  · Changing the bandage (dressing)  · Flushing the catheter with fluids  · Changing the cap on the end of the catheter  A nurse or other healthcare provider will teach you how to do each of these things.    Home care  The following are general care guidelines that will help you care for your PICC line at home:  · You can use your arm. But avoid any activity that causes mild pain.  · Do not pick at it or pull on the tubing.  · Dont lift anything heavier than 10 pounds with the arm on the side of the PICC line.  · When you bathe or shower, tape plastic wrap over the site to keep it dry.  · Do not put the PICC site under water. No swimming or hot tubs. If the dressing gets wet, change it right away.  · Always wash your hands before and after touching any part of your PICC.  · Do not allow the tubing to hang freely. Make sure to keep the tubing covered and secured to your arm to  prevent the PICC line from being pulled out by accident.  The following tips will help you with dressing changes:  · Change the dressing over the site as directed. This is usually once a week. Change it sooner if the dressing gets wet or soiled. Check the dressing daily.  · You or a family member may be able to do the dressing change at home. Or you may be instructed to return to the office or clinic for dressing changes.  · Sterile technique must be used for PICC dressing change. If your dressing is changed at home, be sure you or your family member knows sterile dressing technique. Call your health care provider for instructions if you need them.  Follow-up care  Follow up as advised by your healthcare provider or our staff.  When to seek medical advice  Call your healthcare provider right away if any of these occur:  · Fever of 100.4°F (38°C) or higher  · Drainage from the PICC site  · Swelling or bulging around the PICC site  · Bleeding from the PICC site  · Skin pulls away from the PICC site  · Redness, warmth, or pus at the PICC site  · Tubing breaks, splits, or leaks  · More exposed tubing (tubing seems longer) or the tubing is pulled out completely  · Medicine or fluids do not drain from the bag into your PICC  Date Last Reviewed: 7/1/2016 © 2000-2017 [employer]. [address] [place] [address], [place] [address]. All rights reserved. This information is not intended as a substitute for professional medical care. Always follow your healthcare professional's instructions.

## 2020-04-06 NOTE — PROGRESS NOTES
Dr. Judd notified of right groin dressing saturated with sero sang drainage.  Says its going to weep and to change the dressing. Done. With gauze and transparent

## 2020-04-06 NOTE — CONSULTS
[facility]  Infectious Disease  Consult Note    Patient Name: Renan Britton  MRN: [id number]  Admission Date: 4/6/2020  Hospital Length of Stay: 0 days  Attending Physician: Kelby Gomez MD  Primary Care Provider: Eren Becker MD     Isolation Status: No active isolations    Patient information was obtained from patient, past medical records and ER records.      Inpatient consult to Infectious Diseases  Consult performed by: Richie Walker MD  Consult ordered by: Jesus Judd MD        Assessment/Plan:     * Pseudoaneurysm of left femoral artery  60 y/o M with PAD, S/P bilateral femoral endarterectomy on 12/20/19 c/b erythema at groin incision site s/p clindamycin course, fluid collections at surgical site, s/p surgical wound breakdown, Group F Streptococcus plus P mirabilis endovascular infection s/p wound washout with left groin flap (1/3/2020) s/p 6 weeks of ceftriaxone (completed 2/14) c/b P mirabilis bacteremia s/p 4 week course of ceftriaxone completed 4/2 c/b swelling, erythema and tenderness of his L groin surgical site.  US 4/2 revealed 5.4 x 4.5 cm lesion within L groin concerning for pseudoaneurysm formation. CTA 4/3 demonstrated pseudoaneurysm at level LCFA measuring 8 x 8 cm. He underwent LCFA PTA w/ stent placement to temporize during this time. ID is consulted for IV antibiotics.     Recommendations  Based on prior cultures w/ P mirabilis can restart rocephin 2g daily x 6 weeks with EOT 5/18/2020    Discharge antibiotics:   Ceftriaxone 2g IV q24 hours     End date of IV antibiotics: 5/18/2020    Weekly outpatient laboratory on Monday or Tuesday while on IV antibiotics.    CBC   CMP or BMP    If vancomycin trough is not at target (15-20) prior to discharge, the please perform vancomycin trough before their fourth outpatient dose.    Fax laboratory results to [facility] at [phone] with attn: Richie Walker     Outpatient Infectious Diseases clinic  follow up will be arranged and found in patient calendar.    Prior to discharge, please ensure the patient's follow-up has been scheduled.  If there is still no follow-up scheduled in Infectious Diseases clinic, please send an EPIC message to Fazuia Davis MA in Infectious Diseases.        Thank you for your consult. I will sign off. Please contact us if you have any additional questions.    Richie Walker MD  Infectious Disease  [facility]    Subjective:     Principal Problem: Pseudoaneurysm of left femoral artery    HPI: 58 y/o M with PAD, S/P bilateral femoral endarterectomy on 12/20/19 c/b erythema at groin incision site s/p clindamycin course, fluid collections at surgical site, s/p surgical wound breakdown, Group F Streptococcus plus P mirabilis endovascular infection s/p wound washout with left groin flap (1/3/2020) s/p 6 weeks of ceftriaxone (completed 2/14) c/b P mirabilis bacteremia s/p 4 week course of ceftriaxone completed 4/2 c/b swelling, erythema and tenderness of his L groin surgical site.  US 4/2 revealed 5.4 x 4.5 cm lesion within L groin concerning for pseudoaneurysm formation. CTA 4/3 demonstrated pseudoaneurysm at level LCFA measuring 8 x 8 cm. He underwent LCFA PTA w/ stent placement to temporize during this time. ID is consulted for IV antibiotics.     Past Medical History:   Diagnosis Date    Diabetes mellitus     H/O brain tumor     HLD (hyperlipidemia)     Hypertension     PAD (peripheral artery disease)     Seizures     R/T BRAIN TUMOR- SURGICALLY EXCISED       Past Surgical History:   Procedure Laterality Date    APPLICATION OF WOUND VACUUM-ASSISTED CLOSURE DEVICE Bilateral 1/1/2020    Procedure: APPLICATION, WOUND VAC;  Surgeon: SEBAS Prieto II, MD;  Location: [facility] OR [address] [place] [address];  Service: Cardiovascular;  Laterality: Bilateral;    APPLICATION OF WOUND VACUUM-ASSISTED CLOSURE DEVICE N/A 1/3/2020    Procedure: APPLICATION, WOUND VAC;  Surgeon:  Brian Wong MD;  Location: [address];  Service: Plastics;  Laterality: N/A;    BRAIN SURGERY  01/2011    TUMOR EXCISED    CREATION OF MUSCLE ROTATIONAL FLAP N/A 1/3/2020    Procedure: CREATION, FLAP, MUSCLE ROTATION;  Surgeon: Brian Wong MD;  Location: [facility] OR [address] [place] [address];  Service: Plastics;  Laterality: N/A;    ENDARTERECTOMY OF FEMORAL ARTERY Bilateral 12/20/2019    Procedure: ENDARTERECTOMY, FEMORAL;  Surgeon: Kelby Gomez MD;  Location: [address];  Service: Peripheral Vascular;  Laterality: Bilateral;  natalya common femoral endarterectomy with natalya. iliac stent placement    PERCUTANEOUS TRANSLUMINAL ANGIOPLASTY (PTA) OF PERIPHERAL VESSEL Left 10/17/2019    Procedure: PTA, PERIPHERAL VESSEL;  Surgeon: Rao Fisher MD;  Location: [facility] CATH;  Service: Cardiology;  Laterality: Left;       Review of patient's allergies indicates:   Allergen Reactions    Penicillins Hives     Patient currently on cefepime without complications  Tolerated ceftriaxone 3/2020       Medications:  Medications Prior to Admission   Medication Sig    ascorbic acid (VITAMIN C ORAL) Take 2 capsules by mouth every morning.    aspirin (ECOTRIN) 81 MG EC tablet Take 81 mg by mouth once daily.    atorvastatin (LIPITOR) 40 MG tablet Take 40 mg by mouth once daily.    elderberry fruit and flower 460-115 mg Cap Take 2 capsules by mouth every morning.    levoFLOXacin (LEVAQUIN) 500 MG tablet Take 1 tablet (500 mg total) by mouth once daily.    pregabalin (LYRICA) 100 MG capsule Take 100 mg by mouth 2 (two) times daily.    valsartan (DIOVAN) 160 MG tablet Take 160 mg by mouth once daily.    cetirizine 10 mg chewable tablet Take 10 mg by mouth once daily.     clopidogreL (PLAVIX) 75 mg tablet Take 1 tablet (75 mg total) by mouth once daily.    metFORMIN (GLUCOPHAGE-XR) 500 MG XR 24hr tablet Take 1,000 mg by mouth daily with breakfast.      Antibiotics (From admission, onward)    None        Antifungals (From  admission, onward)    None        Antivirals (From admission, onward)    None           There is no immunization history for the selected administration types on file for this patient.    Family History     Problem Relation (Age of Onset)    Cancer Father, Brother    Diabetes Mother    Heart disease Father, Brother, Brother, Brother    Hypertension Mother    Stroke Mother        Social History     Socioeconomic History    Marital status:      Spouse name: Not on file    Number of children: Not on file    Years of education: Not on file    Highest education level: Not on file   Occupational History    Not on file   Social Needs    Financial resource strain: Not on file    Food insecurity:     Worry: Not on file     Inability: Not on file    Transportation needs:     Medical: Not on file     Non-medical: Not on file   Tobacco Use    Smoking status: Former Smoker     Packs/day: 2.00     Types: Cigarettes     Last attempt to quit: 2011     Years since quittin.2    Smokeless tobacco: Never Used   Substance and Sexual Activity    Alcohol use: Yes     Comment: 2-4 PER DAY    Drug use: Never    Sexual activity: Not on file   Lifestyle    Physical activity:     Days per week: Not on file     Minutes per session: Not on file    Stress: Not on file   Relationships    Social connections:     Talks on phone: Not on file     Gets together: Not on file     Attends Hindu service: Not on file     Active member of club or organization: Not on file     Attends meetings of clubs or organizations: Not on file     Relationship status: Not on file   Other Topics Concern    Not on file   Social History Narrative    Not on file     Review of Systems   Constitutional: Negative for chills and fever.   HENT: Negative for tinnitus.    Eyes: Negative for visual disturbance.   Respiratory: Negative for cough and shortness of breath.    Cardiovascular: Negative for chest pain and leg swelling.    Gastrointestinal: Negative for abdominal pain, constipation and diarrhea.   Genitourinary: Negative for dysuria.   Musculoskeletal: Negative for arthralgias and myalgias.   Skin: Positive for wound.   Neurological: Positive for headaches.   Psychiatric/Behavioral: Negative for agitation and confusion.     Objective:     Vital Signs (Most Recent):  Temp: 98.1 °F (36.7 °C) (04/06/20 1546)  Pulse: 80 (04/06/20 1546)  Resp: 12 (04/06/20 1546)  BP: (!) 143/86 (04/06/20 1546)  SpO2: 98 % (04/06/20 1546) Vital Signs (24h Range):  Temp:  [97.7 °F (36.5 °C)-98.6 °F (37 °C)] 98.1 °F (36.7 °C)  Pulse:  [] 80  Resp:  [] 12  SpO2:  [94 %-100 %] 98 %  BP: (106-185)/() 143/86     Weight: 113.9 kg (251 lb)  Body mass index is 35.01 kg/m².    CrCl cannot be calculated (Patient's most recent lab result is older than the maximum 7 days allowed.).    Physical Exam   Constitutional: He is oriented to person, place, and time. He appears well-developed. No distress.   HENT:   Head: Normocephalic and atraumatic.   Eyes: Conjunctivae are normal. Right eye exhibits no discharge. Left eye exhibits no discharge. No scleral icterus.   Neck: Neck supple.   Cardiovascular: Normal rate and regular rhythm.   PICC line in R arm   Pulmonary/Chest: Effort normal and breath sounds normal. No respiratory distress.   Abdominal: Soft. He exhibits no distension. There is no tenderness. There is no guarding.   Musculoskeletal: He exhibits no edema or tenderness.   Neurological: He is alert and oriented to person, place, and time.   Skin: Skin is warm. No rash noted. He is not diaphoretic.   Mild erythema L groin  Prior surgical scars   Psychiatric: He has a normal mood and affect. His behavior is normal.       Significant Labs:   Blood Culture:   Recent Labs   Lab 03/09/20  0103 03/10/20  0837   LABBLOO Gram stain reyna bottle: Gram negative rods   Positive results previously called 03/09/2020  15:45  Gram stain aer bottle: Gram negative  rods   Positive results previously called 03/09/2020  16:54  PROTEUS MIRABILIS  For susceptibility see order #[id number]  *  Gram stain reyna bottle: Gram negative rods   Results called to and read back by: Charlee Rosen RN 03/09/2020  15:44  Gram stain aer bottle: Gram negative rods   Positive results previously called 03/09/2020  17:01  PROTEUS MIRABILIS* No growth after 5 days.  No growth after 5 days.     CBC: No results for input(s): WBC, HGB, HCT, PLT in the last 48 hours.  CMP: No results for input(s): NA, K, CL, CO2, GLU, BUN, CREATININE, CALCIUM, PROT, ALBUMIN, BILITOT, ALKPHOS, AST, ALT, ANIONGAP, EGFRNONAA in the last 48 hours.    Invalid input(s): ESTGFAFRICA  Microbiology Results (last 7 days)     ** No results found for the last 168 hours. **          Significant Imaging: I have reviewed all pertinent imaging results/findings within the past 24 hours.

## 2020-04-06 NOTE — BRIEF OP NOTE
Brief Operative Note  Date: 04/06/2020    Surgeon(s) and Role:     * Kelby Gomez MD - Primary     * Jesus Judd MD - Resident - Assisting    Pre-op Diagnosis:  Pseudoaneurysm of femoral artery [I72.4]; left    Post-op Diagnosis:  Same    In my medical opinion and judgment, this medical or surgical procedure was not able to be safely postponed until April 22, 2020 ([place] Department of Health, Healthcare Facility Notice #[id number]-006).    Procedure(s):  1) Viabahn covered stent placement (8 x 50mm) left common femoral artery  2) PTA left femoral artery (9x60 [place])  3) LLE angiogram  4) Perclose R CFA  5) US guided access R CFA     7.3 mins fluoro, 1012.85 mGy (Left)      Surgeon: Kelby Gomez MD, [facility]  Vascular & Endovascular Surgery       Assistant: EFRAIN Judd MD    Anesthesia: Local MAC    Findings/Key Components:  Successful treatment of left CFA PSA, no flow noted after stent deployment.  Left foot with triphasic pedal signals following treatment.     EBL: Minimal         Specimens (From admission, onward)    None          I attest to being present for the procedure and performing the case.    Kelby Gomez MD  Vascular & Endovascular Surgery     Discharge Note  SUMMARY    Admit Date: 4/6/2020    Attending Physician: Kelby Gomez MD, [facility]  Vascular & Endovascular Surgery       Discharge Physician: Kelby Gomez MD, [facility]  Vascular & Endovascular Surgery       Discharge Date: 04/06/2020    Final Diagnosis: Pseudoaneurysm of femoral artery [I72.4]    Disposition: Home or self-care; PICC line to be placed for IV abx    Patient Instructions:   Current Discharge Medication List      CONTINUE these medications which have NOT CHANGED    Details   ascorbic acid (VITAMIN C ORAL) Take 2 capsules by mouth every morning.      aspirin (ECOTRIN) 81 MG EC tablet Take 81 mg by mouth once daily.      atorvastatin (LIPITOR) 40 MG tablet Take 40 mg by mouth once daily.      cefTRIAXone 2  g in dextrose 5 % 50 mL (ROCEPHIN) 2 g/50 mL PgBk IVPB Inject 50 mLs (2 g total) into the vein once daily. for 25 days      elderberry fruit and flower 460-115 mg Cap Take 2 capsules by mouth every morning.      levoFLOXacin (LEVAQUIN) 500 MG tablet Take 1 tablet (500 mg total) by mouth once daily.  Qty: 30 tablet, Refills: 2    Associated Diagnoses: Vascular inflammation or infection; Proteus infection      pregabalin (LYRICA) 100 MG capsule Take 100 mg by mouth 2 (two) times daily.      valsartan (DIOVAN) 160 MG tablet Take 160 mg by mouth once daily.      cetirizine 10 mg chewable tablet Take 10 mg by mouth once daily.       clopidogreL (PLAVIX) 75 mg tablet Take 1 tablet (75 mg total) by mouth once daily.  Qty: 30 tablet, Refills: 10      metFORMIN (GLUCOPHAGE-XR) 500 MG XR 24hr tablet Take 1,000 mg by mouth daily with breakfast.              Diet:  Resume pre-operative diet    Activity:  Ad sara    Follow-up:  Follow-up in clinic with Dr Gomez within 1-2 weeks; please call clinic nurse at

## 2020-04-06 NOTE — SUBJECTIVE & OBJECTIVE
Past Medical History:   Diagnosis Date    Diabetes mellitus     H/O brain tumor     HLD (hyperlipidemia)     Hypertension     PAD (peripheral artery disease)     Seizures     R/T BRAIN TUMOR- SURGICALLY EXCISED       Past Surgical History:   Procedure Laterality Date    APPLICATION OF WOUND VACUUM-ASSISTED CLOSURE DEVICE Bilateral 1/1/2020    Procedure: APPLICATION, WOUND VAC;  Surgeon: SEBAS Prieto II, MD;  Location: [address];  Service: Cardiovascular;  Laterality: Bilateral;    APPLICATION OF WOUND VACUUM-ASSISTED CLOSURE DEVICE N/A 1/3/2020    Procedure: APPLICATION, WOUND VAC;  Surgeon: Brian Wong MD;  Location: [address];  Service: Plastics;  Laterality: N/A;    BRAIN SURGERY  01/2011    TUMOR EXCISED    CREATION OF MUSCLE ROTATIONAL FLAP N/A 1/3/2020    Procedure: CREATION, FLAP, MUSCLE ROTATION;  Surgeon: Brian Wong MD;  Location: [address];  Service: Plastics;  Laterality: N/A;    ENDARTERECTOMY OF FEMORAL ARTERY Bilateral 12/20/2019    Procedure: ENDARTERECTOMY, FEMORAL;  Surgeon: Kelby Gomez MD;  Location: [address];  Service: Peripheral Vascular;  Laterality: Bilateral;  natalya common femoral endarterectomy with natalya. iliac stent placement    PERCUTANEOUS TRANSLUMINAL ANGIOPLASTY (PTA) OF PERIPHERAL VESSEL Left 10/17/2019    Procedure: PTA, PERIPHERAL VESSEL;  Surgeon: Rao Fisher MD;  Location: [facility];  Service: Cardiology;  Laterality: Left;       Review of patient's allergies indicates:   Allergen Reactions    Penicillins Hives     Patient currently on cefepime without complications  Tolerated ceftriaxone 3/2020       Medications:  Medications Prior to Admission   Medication Sig    ascorbic acid (VITAMIN C ORAL) Take 2 capsules by mouth every morning.    aspirin (ECOTRIN) 81 MG EC tablet Take 81 mg by mouth once daily.    atorvastatin (LIPITOR) 40 MG tablet Take 40 mg by mouth once daily.    elderberry fruit and flower 460-115 mg  Cap Take 2 capsules by mouth every morning.    levoFLOXacin (LEVAQUIN) 500 MG tablet Take 1 tablet (500 mg total) by mouth once daily.    pregabalin (LYRICA) 100 MG capsule Take 100 mg by mouth 2 (two) times daily.    valsartan (DIOVAN) 160 MG tablet Take 160 mg by mouth once daily.    cetirizine 10 mg chewable tablet Take 10 mg by mouth once daily.     clopidogreL (PLAVIX) 75 mg tablet Take 1 tablet (75 mg total) by mouth once daily.    metFORMIN (GLUCOPHAGE-XR) 500 MG XR 24hr tablet Take 1,000 mg by mouth daily with breakfast.      Antibiotics (From admission, onward)    None        Antifungals (From admission, onward)    None        Antivirals (From admission, onward)    None           There is no immunization history for the selected administration types on file for this patient.    Family History     Problem Relation (Age of Onset)    Cancer Father, Brother    Diabetes Mother    Heart disease Father, Brother, Brother, Brother    Hypertension Mother    Stroke Mother        Social History     Socioeconomic History    Marital status:      Spouse name: Not on file    Number of children: Not on file    Years of education: Not on file    Highest education level: Not on file   Occupational History    Not on file   Social Needs    Financial resource strain: Not on file    Food insecurity:     Worry: Not on file     Inability: Not on file    Transportation needs:     Medical: Not on file     Non-medical: Not on file   Tobacco Use    Smoking status: Former Smoker     Packs/day: 2.00     Types: Cigarettes     Last attempt to quit: 2011     Years since quittin.2    Smokeless tobacco: Never Used   Substance and Sexual Activity    Alcohol use: Yes     Comment: 2-4 PER DAY    Drug use: Never    Sexual activity: Not on file   Lifestyle    Physical activity:     Days per week: Not on file     Minutes per session: Not on file    Stress: Not on file   Relationships    Social connections:      Talks on phone: Not on file     Gets together: Not on file     Attends [employer] service: Not on file     Active member of club or organization: Not on file     Attends meetings of clubs or organizations: Not on file     Relationship status: Not on file   Other Topics Concern    Not on file   Social History Narrative    Not on file     Review of Systems   Constitutional: Negative for chills and fever.   HENT: Negative for tinnitus.    Eyes: Negative for visual disturbance.   Respiratory: Negative for cough and shortness of breath.    Cardiovascular: Negative for chest pain and leg swelling.   Gastrointestinal: Negative for abdominal pain, constipation and diarrhea.   Genitourinary: Negative for dysuria.   Musculoskeletal: Negative for arthralgias and myalgias.   Skin: Positive for wound.   Neurological: Positive for headaches.   Psychiatric/Behavioral: Negative for agitation and confusion.     Objective:     Vital Signs (Most Recent):  Temp: 98.1 °F (36.7 °C) (04/06/20 1546)  Pulse: 80 (04/06/20 1546)  Resp: 12 (04/06/20 1546)  BP: (!) 143/86 (04/06/20 1546)  SpO2: 98 % (04/06/20 1546) Vital Signs (24h Range):  Temp:  [97.7 °F (36.5 °C)-98.6 °F (37 °C)] 98.1 °F (36.7 °C)  Pulse:  [] 80  Resp:  [] 12  SpO2:  [94 %-100 %] 98 %  BP: (106-185)/() 143/86     Weight: 113.9 kg (251 lb)  Body mass index is 35.01 kg/m².    CrCl cannot be calculated (Patient's most recent lab result is older than the maximum 7 days allowed.).    Physical Exam   Constitutional: He is oriented to person, place, and time. He appears well-developed. No distress.   HENT:   Head: Normocephalic and atraumatic.   Eyes: Conjunctivae are normal. Right eye exhibits no discharge. Left eye exhibits no discharge. No scleral icterus.   Neck: Neck supple.   Cardiovascular: Normal rate and regular rhythm.   PICC line in R arm   Pulmonary/Chest: Effort normal and breath sounds normal. No respiratory distress.   Abdominal: Soft. He exhibits  no distension. There is no tenderness. There is no guarding.   Musculoskeletal: He exhibits no edema or tenderness.   Neurological: He is alert and oriented to person, place, and time.   Skin: Skin is warm. No rash noted. He is not diaphoretic.   Mild erythema L groin  Prior surgical scars   Psychiatric: He has a normal mood and affect. His behavior is normal.       Significant Labs:   Blood Culture:   Recent Labs   Lab 03/09/20  0103 03/10/20  0837   LABBLOO Gram stain reyna bottle: Gram negative rods   Positive results previously called 03/09/2020  15:45  Gram stain aer bottle: Gram negative rods   Positive results previously called 03/09/2020  16:54  PROTEUS MIRABILIS  For susceptibility see order #[id number]  *  Gram stain reyna bottle: Gram negative rods   Results called to and read back by: Charlee Rosen RN 03/09/2020  15:44  Gram stain aer bottle: Gram negative rods   Positive results previously called 03/09/2020  17:01  PROTEUS MIRABILIS* No growth after 5 days.  No growth after 5 days.     CBC: No results for input(s): WBC, HGB, HCT, PLT in the last 48 hours.  CMP: No results for input(s): NA, K, CL, CO2, GLU, BUN, CREATININE, CALCIUM, PROT, ALBUMIN, BILITOT, ALKPHOS, AST, ALT, ANIONGAP, EGFRNONAA in the last 48 hours.    Invalid input(s): ESTGFAFRICA  Microbiology Results (last 7 days)     ** No results found for the last 168 hours. **          Significant Imaging: I have reviewed all pertinent imaging results/findings within the past 24 hours.

## 2020-04-07 ENCOUNTER — PATIENT MESSAGE (OUTPATIENT)
Dept: VASCULAR SURGERY | Facility: CLINIC | Age: 60
End: 2020-04-07

## 2020-04-07 LAB — POCT GLUCOSE: 127 MG/DL (ref 70–110)

## 2020-04-08 NOTE — ANESTHESIA POSTPROCEDURE EVALUATION
Anesthesia Post Evaluation    Patient: Renan Britton    Procedure(s) Performed: Procedure(s) (LRB):  PLACEMENT-STENT--endo, LCFA, right groin access, 7.3 mins fluoro, 1012.85 mGy (Left)  ANGIOGRAM, Left lower extremity (Left)  PTA (ANGIOPLASTY, PERCUTANEOUS, TRANSLUMINAL), left lower extremity (Left)    Final Anesthesia Type: MAC    Patient location during evaluation: PACU  Patient participation: Yes- Able to Participate  Level of consciousness: awake and alert and oriented  Post-procedure vital signs: reviewed and stable  Pain management: adequate  Airway patency: patent    PONV status at discharge: No PONV  Anesthetic complications: no      Cardiovascular status: hemodynamically stable  Respiratory status: unassisted, spontaneous ventilation and room air  Hydration status: euvolemic  Follow-up not needed.          Vitals Value Taken Time   /86 4/6/2020  3:46 PM   Temp 36.7 °C (98.1 °F) 4/6/2020  3:46 PM   Pulse 94 4/6/2020  3:47 PM   Resp 12 4/6/2020  3:46 PM   SpO2 97 % 4/6/2020  3:47 PM   Vitals shown include unvalidated device data.      No case tracking events are documented in the log.      Pain/Cristy Score: No data recorded

## 2020-04-09 ENCOUNTER — PATIENT MESSAGE (OUTPATIENT)
Dept: VASCULAR SURGERY | Facility: CLINIC | Age: 60
End: 2020-04-09

## 2020-04-09 ENCOUNTER — TELEPHONE (OUTPATIENT)
Dept: INFECTIOUS DISEASES | Facility: CLINIC | Age: 60
End: 2020-04-09

## 2020-04-09 NOTE — TELEPHONE ENCOUNTER
----- Message from Greg Paez sent at 4/9/2020  1:05 PM CDT -----  Contact: [facility]/  MsDaveyFe called in and wanted to speak with the office regarding lab work. She would like a call back from the office and can be reached at    [phone]

## 2020-04-09 NOTE — TELEPHONE ENCOUNTER
Spoke to home health. They stated patient refused lab draw for yesterday and today. I called patient and spoke to him and his wife. They stated they did not refuse lab draw. They told home health no to lab draw due to lab draw done earlier this week for other labs. Mr. Urrutia stated home health can come back to draw labs. However, patient already dosed his IV antibiotics. This means labs can't be drawn today. Nurse will try to go out tomorrow if they are allowed due to tomorrow being a holiday.

## 2020-04-13 ENCOUNTER — DOCUMENT SCAN (OUTPATIENT)
Dept: HOME HEALTH SERVICES | Facility: HOSPITAL | Age: 60
End: 2020-04-13
Payer: COMMERCIAL

## 2020-04-16 ENCOUNTER — PATIENT MESSAGE (OUTPATIENT)
Dept: INFECTIOUS DISEASES | Facility: CLINIC | Age: 60
End: 2020-04-16

## 2020-04-20 ENCOUNTER — DOCUMENT SCAN (OUTPATIENT)
Dept: HOME HEALTH SERVICES | Facility: HOSPITAL | Age: 60
End: 2020-04-20
Payer: COMMERCIAL

## 2020-04-20 ENCOUNTER — DOCUMENT SCAN (OUTPATIENT)
Dept: HOME HEALTH SERVICES | Facility: HOSPITAL | Age: 60
End: 2020-04-20

## 2020-04-21 ENCOUNTER — EXTERNAL HOME HEALTH (OUTPATIENT)
Dept: HOME HEALTH SERVICES | Facility: HOSPITAL | Age: 60
End: 2020-04-21
Payer: COMMERCIAL

## 2020-04-22 ENCOUNTER — PATIENT MESSAGE (OUTPATIENT)
Dept: VASCULAR SURGERY | Facility: CLINIC | Age: 60
End: 2020-04-22

## 2020-04-22 ENCOUNTER — TELEPHONE (OUTPATIENT)
Dept: VASCULAR SURGERY | Facility: CLINIC | Age: 60
End: 2020-04-22

## 2020-04-22 NOTE — TELEPHONE ENCOUNTER
Returned call spoke with  Jennycaterina. Mr Britton appointment time changed to 0915 4/23/2020 at patient request.

## 2020-04-23 ENCOUNTER — OFFICE VISIT (OUTPATIENT)
Dept: VASCULAR SURGERY | Facility: CLINIC | Age: 60
End: 2020-04-23
Attending: SURGERY
Payer: COMMERCIAL

## 2020-04-23 ENCOUNTER — OFFICE VISIT (OUTPATIENT)
Dept: INFECTIOUS DISEASES | Facility: CLINIC | Age: 60
End: 2020-04-23
Payer: COMMERCIAL

## 2020-04-23 ENCOUNTER — HOSPITAL ENCOUNTER (OUTPATIENT)
Dept: VASCULAR SURGERY | Facility: CLINIC | Age: 60
Discharge: HOME | End: 2020-04-23
Attending: SURGERY
Payer: COMMERCIAL

## 2020-04-23 VITALS
SYSTOLIC BLOOD PRESSURE: 150 MMHG | DIASTOLIC BLOOD PRESSURE: 81 MMHG | HEIGHT: 71 IN | HEART RATE: 72 BPM | RESPIRATION RATE: 18 BRPM | WEIGHT: 254.63 LBS | BODY MASS INDEX: 35.65 KG/M2 | TEMPERATURE: 98 F

## 2020-04-23 VITALS
HEIGHT: 71 IN | TEMPERATURE: 98 F | WEIGHT: 255.94 LBS | BODY MASS INDEX: 35.83 KG/M2 | DIASTOLIC BLOOD PRESSURE: 75 MMHG | SYSTOLIC BLOOD PRESSURE: 131 MMHG | HEART RATE: 76 BPM

## 2020-04-23 DIAGNOSIS — I72.4 FEMORAL ARTERY PSEUDO-ANEURYSM, LEFT: Primary | ICD-10-CM

## 2020-04-23 DIAGNOSIS — I73.9 PVD (PERIPHERAL VASCULAR DISEASE): ICD-10-CM

## 2020-04-23 DIAGNOSIS — I77.6 VASCULAR INFLAMMATION OR INFECTION: Primary | ICD-10-CM

## 2020-04-23 DIAGNOSIS — Z79.2 RECEIVING INTRAVENOUS ANTIBIOTIC TREATMENT AS OUTPATIENT: ICD-10-CM

## 2020-04-23 DIAGNOSIS — I72.4 PSEUDOANEURYSM OF LEFT FEMORAL ARTERY: Primary | ICD-10-CM

## 2020-04-23 DIAGNOSIS — A49.8 PROTEUS INFECTION: ICD-10-CM

## 2020-04-23 DIAGNOSIS — I72.4 PSEUDOANEURYSM OF LEFT FEMORAL ARTERY: ICD-10-CM

## 2020-04-23 PROCEDURE — 99214 PR OFFICE/OUTPT VISIT, EST, LEVL IV, 30-39 MIN: ICD-10-PCS | Mod: S$GLB,,, | Performed by: SURGERY

## 2020-04-23 PROCEDURE — 99999 PR PBB SHADOW E&M-EST. PATIENT-LVL IV: ICD-10-PCS | Mod: PBBFAC,,, | Performed by: SURGERY

## 2020-04-23 PROCEDURE — 99999 PR PBB SHADOW E&M-EST. PATIENT-LVL IV: ICD-10-PCS | Mod: PBBFAC,,, | Performed by: INTERNAL MEDICINE

## 2020-04-23 PROCEDURE — 99999 PR PBB SHADOW E&M-EST. PATIENT-LVL IV: CPT | Mod: PBBFAC,,, | Performed by: SURGERY

## 2020-04-23 PROCEDURE — 93926 LOWER EXTREMITY STUDY: CPT | Mod: S$GLB,,, | Performed by: SURGERY

## 2020-04-23 PROCEDURE — 93926 PR DUPLEX LO EXTREM ART UNILAT/LTD: ICD-10-PCS | Mod: S$GLB,,, | Performed by: SURGERY

## 2020-04-23 PROCEDURE — 99999 PR PBB SHADOW E&M-EST. PATIENT-LVL IV: CPT | Mod: PBBFAC,,, | Performed by: INTERNAL MEDICINE

## 2020-04-23 PROCEDURE — 99214 PR OFFICE/OUTPT VISIT, EST, LEVL IV, 30-39 MIN: ICD-10-PCS | Mod: S$GLB,,, | Performed by: INTERNAL MEDICINE

## 2020-04-23 PROCEDURE — 99214 OFFICE O/P EST MOD 30 MIN: CPT | Mod: S$GLB,,, | Performed by: SURGERY

## 2020-04-23 PROCEDURE — 99214 OFFICE O/P EST MOD 30 MIN: CPT | Mod: S$GLB,,, | Performed by: INTERNAL MEDICINE

## 2020-04-23 NOTE — PROGRESS NOTES
Subjective:      Patient ID: Renan Britton is a 59 y.o. male.    Chief Complaint:Hospital Follow Up      History of Present Illness    A 59-year-old man with PAD, S/P bilateral femoral endarterectomy on 12/20/19, post operative course complicated by erythema at the groin incision site treated with clindamycin, fluid collections at the surgical site, s/p surgical wound breakdown, Group F Streptococcus plus P mirabilis endovascular infection, s/p wound washout with left groin flap on 1/3/2020, s/p 6 weeks of ceftriaxone completed on 2/14, P mirabilis bacteremia s/p 2 weeks of ceftriaxone, previously on suppressive levofloxacin, pseudoaneurysm of the left femoral artery, now s/p stenting, due to presumptive infection, and on a 6 week course of ceftriaxone who is seen as a hospital follow up for continued management of his infection and antibiotic therapy. Mr. Britton is scheduled to end his 6 weeks of ceftriaxone on 5/19. He has tolerated antibiotics without adverse effect. He had some swelling to the groin area which has since resolved.     Home health: [facility] company: [facility]      Review of Systems   Constitution: Negative for chills, decreased appetite, fever, malaise/fatigue, night sweats, weight gain and weight loss.   HENT: Negative for congestion, ear pain, hearing loss, hoarse voice, sore throat and tinnitus.    Eyes: Negative for blurred vision, redness and visual disturbance.   Cardiovascular: Negative for chest pain, leg swelling and palpitations.   Respiratory: Negative for cough, hemoptysis, shortness of breath and sputum production.    Hematologic/Lymphatic: Negative for adenopathy. Does not bruise/bleed easily.   Skin: Negative for dry skin, itching, rash and suspicious lesions.   Musculoskeletal: Negative for back pain, joint pain, myalgias and neck pain.   Gastrointestinal: Negative for abdominal pain, constipation, diarrhea, heartburn, nausea and vomiting.    Genitourinary: Negative for dysuria, flank pain, frequency, hematuria, hesitancy and urgency.   Neurological: Negative for dizziness, headaches, numbness, paresthesias and weakness.   Psychiatric/Behavioral: Negative for depression and memory loss. The patient does not have insomnia and is not nervous/anxious.      Objective:   Physical Exam   Constitutional: He is oriented to person, place, and time. He appears well-developed and well-nourished.   HENT:   Head: Normocephalic and atraumatic.   Right Ear: External ear normal.   Left Ear: External ear normal.   Eyes: Conjunctivae are normal. Right eye exhibits no discharge. Left eye exhibits no discharge.   Neck: Normal range of motion.   Cardiovascular: Normal rate and regular rhythm.   Murmur heard.   Systolic (blowing) murmur is present with a grade of 2/6.  Pulmonary/Chest: Effort normal and breath sounds normal. He has no wheezes. He has no rales.   Abdominal: Soft. Bowel sounds are normal. He exhibits no mass. There is no tenderness. There is no rebound.   Musculoskeletal: Normal range of motion.   RUE PICC without erythema or tenderness to palpation.   Neurological: He is alert and oriented to person, place, and time.   Skin: Skin is warm and dry.   Psychiatric: He has a normal mood and affect. His behavior is normal.   Vitals reviewed.    Component      Latest Ref Rng & Units 4/17/2020 4/10/2020   BUN, Bld      9 - 20 mg/dL 16 15   Creatinine      0.80 - 1.50 mg/dL 0.70 (L) 0.60 (L)   BILIRUBIN TOTAL      0.2 - 1.3 mg/dL 0.4 0.3   Alkaline Phosphatase      38 - 145 U/L 56 51   AST      17 - 59 U/L 31 52   ALT      10 - 44 U/L 41 71 (H)   WBC      3.90 - 12.70 K/uL 7.60 6.90   CRP      0.0 - 10.0 mg/L 6.5 29.6 (H)   Sed Rate      0 - 20 mm/Hr 11 28 (H)     Assessment:       1. Vascular inflammation or infection    2. Pseudoaneurysm of left femoral artery    3. Proteus infection    4. Receiving intravenous antibiotic treatment as outpatient        Plan:    Patient with pseudoaneurysm of the left femoral artery presumed to be due to infectious process. Blood cultures from recent admission remained no growth. His ESR and CRP have now down trended to normal.  · Continue ceftriaxone.  · Continue routine laboratory monitoring.   · Will likely need lifelong antibiotic suppression.   · RTC on 5/19.

## 2020-04-27 ENCOUNTER — PATIENT MESSAGE (OUTPATIENT)
Dept: VASCULAR SURGERY | Facility: CLINIC | Age: 60
End: 2020-04-27

## 2020-05-12 PROCEDURE — G0179 PR HOME HEALTH MD RECERTIFICATION: ICD-10-PCS | Mod: ,,, | Performed by: HOSPITALIST

## 2020-05-12 PROCEDURE — G0179 MD RECERTIFICATION HHA PT: HCPCS | Mod: ,,, | Performed by: HOSPITALIST

## 2020-05-19 ENCOUNTER — INFUSION (OUTPATIENT)
Dept: INFECTIOUS DISEASES | Facility: HOSPITAL | Age: 60
End: 2020-05-19
Attending: INTERNAL MEDICINE
Payer: COMMERCIAL

## 2020-05-19 ENCOUNTER — OFFICE VISIT (OUTPATIENT)
Dept: INFECTIOUS DISEASES | Facility: CLINIC | Age: 60
End: 2020-05-19
Payer: COMMERCIAL

## 2020-05-19 VITALS
BODY MASS INDEX: 35.71 KG/M2 | TEMPERATURE: 98 F | DIASTOLIC BLOOD PRESSURE: 81 MMHG | HEART RATE: 72 BPM | SYSTOLIC BLOOD PRESSURE: 129 MMHG | HEIGHT: 71 IN | WEIGHT: 255.06 LBS

## 2020-05-19 DIAGNOSIS — I72.4 PSEUDOANEURYSM OF LEFT FEMORAL ARTERY: ICD-10-CM

## 2020-05-19 DIAGNOSIS — A49.8 PROTEUS INFECTION: ICD-10-CM

## 2020-05-19 DIAGNOSIS — I77.6 VASCULAR INFLAMMATION OR INFECTION: Primary | ICD-10-CM

## 2020-05-19 DIAGNOSIS — Z79.2 CHRONIC ANTIBIOTIC SUPPRESSION: ICD-10-CM

## 2020-05-19 DIAGNOSIS — Z79.2 RECEIVING INTRAVENOUS ANTIBIOTIC TREATMENT AS OUTPATIENT: ICD-10-CM

## 2020-05-19 PROCEDURE — 99999 PR PBB SHADOW E&M-EST. PATIENT-LVL III: CPT | Mod: PBBFAC,,, | Performed by: INTERNAL MEDICINE

## 2020-05-19 PROCEDURE — 99213 OFFICE O/P EST LOW 20 MIN: CPT | Mod: S$GLB,,, | Performed by: INTERNAL MEDICINE

## 2020-05-19 PROCEDURE — 99213 PR OFFICE/OUTPT VISIT, EST, LEVL III, 20-29 MIN: ICD-10-PCS | Mod: S$GLB,,, | Performed by: INTERNAL MEDICINE

## 2020-05-19 PROCEDURE — 99999 PR PBB SHADOW E&M-EST. PATIENT-LVL III: ICD-10-PCS | Mod: PBBFAC,,, | Performed by: INTERNAL MEDICINE

## 2020-05-19 RX ORDER — CEFADROXIL 500 MG/1
1000 CAPSULE ORAL EVERY 12 HOURS
Qty: 120 CAPSULE | Refills: 4 | Status: SHIPPED | OUTPATIENT
Start: 2020-05-19 | End: 2020-09-25

## 2020-05-19 NOTE — PROGRESS NOTES
PICC LINE removal to right upper arm, tip intact, removed without any difficulty, no redness/swelling/bleeding/drainage to site, arm cleaned with saline and gauze, Vaseline gauze to site, 2x2 followed with coban to secure, tolerated without any difficulty, instructed to keep dressing in place for twenty four hours, instructed to call with any questions/concerns, verbalized understanding, monitored 30 minutes post removal of PICC, no reactions/discomfort noted

## 2020-05-19 NOTE — PROGRESS NOTES
Subjective:      Patient ID: Renan Britton is a 59 y.o. male.    Chief Complaint:Follow-up      History of Present Illness    A 59-year-old man with PAD, S/P bilateral femoral endarterectomy on 12/20/19, post operative course complicated by erythema at the groin incision site treated with clindamycin, fluid collections at the surgical site, s/p surgical wound breakdown, Group F Streptococcus plus P mirabilis endovascular infection, s/p wound washout with left groin flap on 1/3/2020, s/p 6 weeks of ceftriaxone completed on 2/14, P mirabilis bacteremia s/p 2 weeks of ceftriaxone, previously on suppressive levofloxacin, pseudoaneurysm of the left femoral artery, now s/p stenting, due to presumptive infection, and on a 6 week course of ceftriaxone who is seen as a follow up for continued management of his infection and antibiotic therapy. Mr. Britton is scheduled to end his 6 weeks of ceftriaxone today 5/19. He has tolerated antibiotics without adverse effect. His wife notes improvement in the groin swelling and induration. He has no complaints and wishes to have his PICC line removed.      Home health: [facility] company: [facility]      Review of Systems   Constitution: Negative for chills, decreased appetite, fever, malaise/fatigue, night sweats, weight gain and weight loss.   HENT: Negative for congestion, ear pain, hearing loss, hoarse voice, sore throat and tinnitus.    Eyes: Negative for blurred vision, redness and visual disturbance.   Cardiovascular: Negative for chest pain, leg swelling and palpitations.   Respiratory: Negative for cough, hemoptysis, shortness of breath and sputum production.    Hematologic/Lymphatic: Negative for adenopathy. Does not bruise/bleed easily.   Skin: Negative for dry skin, itching, rash and suspicious lesions.   Musculoskeletal: Negative for back pain, joint pain, myalgias and neck pain.   Gastrointestinal: Negative for abdominal pain, constipation,  diarrhea, heartburn, nausea and vomiting.   Genitourinary: Negative for dysuria, flank pain, frequency, hematuria, hesitancy and urgency.   Neurological: Negative for dizziness, headaches, numbness, paresthesias and weakness.   Psychiatric/Behavioral: Negative for depression and memory loss. The patient does not have insomnia and is not nervous/anxious.      Objective:   Physical Exam   Constitutional: He is oriented to person, place, and time. He appears well-developed and well-nourished.   HENT:   Head: Normocephalic and atraumatic.   Right Ear: External ear normal.   Left Ear: External ear normal.   Eyes: Conjunctivae are normal. Right eye exhibits no discharge. Left eye exhibits no discharge.   Neck: Normal range of motion.   Cardiovascular: Normal rate and regular rhythm.   Murmur heard.   Systolic (blowing) murmur is present with a grade of 2/6.  Pulmonary/Chest: Effort normal and breath sounds normal. He has no wheezes. He has no rales.   Abdominal: Soft. Bowel sounds are normal. He exhibits no mass. There is no tenderness. There is no rebound.   Musculoskeletal: Normal range of motion.   RUE PICC without erythema or tenderness to palpation.   Neurological: He is alert and oriented to person, place, and time.   Skin: Skin is warm and dry.   Psychiatric: He has a normal mood and affect. His behavior is normal.   Vitals reviewed.    Assessment:       1. Vascular inflammation or infection    2. Pseudoaneurysm of left femoral artery    3. Proteus infection    4. Receiving intravenous antibiotic treatment as outpatient    5. Chronic antibiotic suppression        Plan:   Patient with pseudoaneurysm of the left femoral artery presumed to be due to infectious process. Blood cultures from recent admission remained no growth. His ESR and CRP were reviewed and normal.  · Completed ceftriaxone today.   · Start cefadroxil 1 gm BID for chronic suppression.   · Will get ESR and CRP in one week then monthly thereafter.    · RTC in 3 months.

## 2020-05-29 ENCOUNTER — PATIENT MESSAGE (OUTPATIENT)
Dept: VASCULAR SURGERY | Facility: CLINIC | Age: 60
End: 2020-05-29

## 2020-05-29 LAB
CRP SERPL-MCNC: 1.5 MG/L
ERYTHROCYTE [SEDIMENTATION RATE] IN BLOOD BY WESTERGREN METHOD: 2 MM/H

## 2020-06-09 NOTE — PROGRESS NOTES
Renan Britton   4/23/2020        HPI:  Mr. Britton is a 59 y.o. male with a h/o HLD, PAD who is s/p viabahn stent placement in the L external iliac/common femoral artery in the treatment of a large PSA, 4/6/2020. Previously:    1) ENDARTERECTOMY, FEMORAL (Bilateral)  2) ANGIOGRAM, PELVIC  3) Left common iliac artery PTA (6 x 40 Concrete)  4) Left common iliac artery stent (GORE VBX 10 x 39mm)  5) Prevena VAC placement, bilateral groins    With subsequent left sartorius muscle flap coverage by plastic surgery and right groin wahsout and VAC placement.  He has been doing quite well, with increasing activity and ambulation.  He represented about a month prior to this visit with bacteremia and has continued with IV ABX via a left midline cath. To follow-up with ID today.     1/2020 Imaging    Denies any MI/stroke  Tobacco use: quit 8 years ago    Past Medical History:   Diagnosis Date    Diabetes mellitus     H/O brain tumor     HLD (hyperlipidemia)     Hypertension     PAD (peripheral artery disease)     Seizures     R/T BRAIN TUMOR- SURGICALLY EXCISED     Past Surgical History:   Procedure Laterality Date    ANGIOGRAPHY Left 4/6/2020    Procedure: ANGIOGRAM, Left lower extremity;  Surgeon: Kelby Gomez MD;  Location: [facility] OR [address] [place] [address];  Service: Peripheral Vascular;  Laterality: Left;    APPLICATION OF WOUND VACUUM-ASSISTED CLOSURE DEVICE Bilateral 1/1/2020    Procedure: APPLICATION, WOUND VAC;  Surgeon: SEBAS Prieto II, MD;  Location: [facility] OR [address] [place] [address];  Service: Cardiovascular;  Laterality: Bilateral;    APPLICATION OF WOUND VACUUM-ASSISTED CLOSURE DEVICE N/A 1/3/2020    Procedure: APPLICATION, WOUND VAC;  Surgeon: Brian Wong MD;  Location: [facility] OR [address] [place] [address];  Service: Plastics;  Laterality: N/A;    BRAIN SURGERY  01/2011    TUMOR EXCISED    CREATION OF MUSCLE ROTATIONAL FLAP N/A 1/3/2020    Procedure: CREATION, FLAP, MUSCLE ROTATION;  Surgeon: Brian Wong MD;  Location: [facility]  OR 2ND FLR;  Service: Plastics;  Laterality: N/A;    ENDARTERECTOMY OF FEMORAL ARTERY Bilateral 2019    Procedure: ENDARTERECTOMY, FEMORAL;  Surgeon: Kelby Gomez MD;  [facility];  Service: Peripheral Vascular;  Laterality: Bilateral;  natalya common femoral endarterectomy with natalya. iliac stent placement    PERCUTANEOUS TRANSLUMINAL ANGIOPLASTY Left 2020    Procedure: PTA (ANGIOPLASTY, PERCUTANEOUS, TRANSLUMINAL), left lower extremity;  Surgeon: Kelby Gomez MD;  [facility];  Service: Peripheral Vascular;  Laterality: Left;    PERCUTANEOUS TRANSLUMINAL ANGIOPLASTY (PTA) OF PERIPHERAL VESSEL Left 10/17/2019    Procedure: PTA, PERIPHERAL VESSEL;  Surgeon: Rao Fisher MD;  Location: [facility] CATH;  Service: Cardiology;  Laterality: Left;     Family History   Problem Relation Age of Onset    Diabetes Mother     Hypertension Mother     Stroke Mother     Cancer Father         LUNG    Heart disease Father     Cancer Brother     Heart disease Brother     Heart disease Brother     Heart disease Brother      Social History     Socioeconomic History    Marital status:      Spouse name: Not on file    Number of children: Not on file    Years of education: Not on file    Highest education level: Not on file   Occupational History    Not on file   Social Needs    Financial resource strain: Not on file    Food insecurity:     Worry: Not on file     Inability: Not on file    Transportation needs:     Medical: Not on file     Non-medical: Not on file   Tobacco Use    Smoking status: Former Smoker     Packs/day: 2.00     Types: Cigarettes     Last attempt to quit: 2011     Years since quittin.3    Smokeless tobacco: Never Used   Substance and Sexual Activity    Alcohol use: Yes     Comment: 2-4 PER DAY    Drug use: Never    Sexual activity: Not on file   Lifestyle    Physical activity:     Days per week: Not on file     Minutes per session: Not  on file    Stress: Not on file   Relationships    Social connections:     Talks on phone: Not on file     Gets together: Not on file     Attends Latter day service: Not on file     Active member of club or organization: Not on file     Attends meetings of clubs or organizations: Not on file     Relationship status: Not on file   Other Topics Concern    Not on file   Social History Narrative    Not on file       Current Outpatient Medications:     ascorbic acid (VITAMIN C ORAL), Take 2 capsules by mouth every morning., Disp: , Rfl:     aspirin (ECOTRIN) 81 MG EC tablet, Take 81 mg by mouth once daily., Disp: , Rfl:     atorvastatin (LIPITOR) 40 MG tablet, Take 40 mg by mouth once daily., Disp: , Rfl:     elderberry fruit and flower 460-115 mg Cap, Take 2 capsules by mouth every morning., Disp: , Rfl:     metFORMIN (GLUCOPHAGE-XR) 500 MG XR 24hr tablet, Take 1,000 mg by mouth daily with breakfast. , Disp: , Rfl:     pregabalin (LYRICA) 100 MG capsule, Take 100 mg by mouth 2 (two) times daily., Disp: , Rfl:     valsartan (DIOVAN) 160 MG tablet, Take 160 mg by mouth once daily., Disp: , Rfl:     cefadroxil (DURICEF) 500 MG Cap, Take 2 capsules (1,000 mg total) by mouth every 12 (twelve) hours., Disp: 120 capsule, Rfl: 4    cetirizine 10 mg chewable tablet, Take 10 mg by mouth once daily. , Disp: , Rfl:     clopidogreL (PLAVIX) 75 mg tablet, Take 1 tablet (75 mg total) by mouth once daily., Disp: 30 tablet, Rfl: 10    HYDROcodone-acetaminophen (NORCO) 5-325 mg per tablet, Take 1 tablet by mouth every 6 (six) hours as needed., Disp: 15 tablet, Rfl: 0    levoFLOXacin (LEVAQUIN) 500 MG tablet, Take 1 tablet (500 mg total) by mouth once daily., Disp: 30 tablet, Rfl: 2     REVIEW OF SYSTEMS:  General: negative; Respiratory: no cough, shortness of breath, or wheezing; Cardiovascular: no chest pain or dyspnea on exertion; Gastrointestinal: no abdominal pain, change in bowel habits, or bloody stools;  Genito-Urinary: no dysuria, trouble voiding, or hematuria; Musculoskeletal: no weakness or decreased range of motion, Neurological: no TIA or stroke symptoms; Psychiatric: no nervousness, anxiety or depression.    PHYSICAL EXAM:       General appearance: Alert, well-appearing, and in no distress.  Oriented to person, place, and time  Neurological: Normal speech, no focal findings noted; CN II - XII grossly intact  Musculoskeletal:Digits/nail without cyanosis/clubbing.  Normal muscle strength/tone.  Neck: Supple, no significant adenopathy, no carotid bruit can be auscultated  Chest:Clear to auscultation, no wheezes, rales or rhonchi, symmetric air entry, No use of accessory muscles   Cardiac:Normal rate and regular rhythm, S1 and S2 normal, Systolic ejection murmur  Abdomen:Soft, nontender, nondistended, no masses or organomegaly, No rebound tenderness noted; bowel sounds normal,  No groin adenopathy   Extremities: 2+ femoral pulses bilaterally, no pedal pulses palpable.no pre-tibial edema. No ulcerations,  LLE: biphasic PT signal, biphasic DP signal   RLE: Triphasic DP signal, triphasic PT signal  Incisions are clean and dry  Left groin with nontender medial mass consistent with thrombosed psa.  Non-erythematous, no drainage.     LAB RESULTS:  Lab Results   Component Value Date    K 4.2 05/15/2020    K 4.0 05/08/2020    K 4.2 05/01/2020    CREATININE 0.60 (L) 05/15/2020    CREATININE 0.70 (L) 05/08/2020    CREATININE 0.70 (L) 05/01/2020     Lab Results   Component Value Date    WBC 5.50 05/15/2020    WBC 5.30 05/08/2020    WBC 5.50 05/01/2020    HCT 45.4 05/15/2020    HCT 43.7 05/08/2020    HCT 45.0 05/01/2020     (L) 05/15/2020     (L) 05/08/2020     (L) 05/01/2020     Lab Results   Component Value Date    HGBA1C 5.3 03/09/2020     IMAGING:  Lower Extremity Arterial Imaging  ========================    Left Lower Extremity  Lt prox CFA PSV    144 cm/s  Lt mid CFA  cm/s  Lt distal CFA PSV   114 cm/s  Lt prox DFA PSV    62 cm/s  Lt prox SFA PSV    120 cm/s  Lt mid SFA PSV 64 cm/s  Lt distal SFA PSV  311 cm/s  Lt mid POP PSV 69 cm/s  Lt mid ELISHA PSV 50 cm/s  Lt mid PTA PSV 61 cm/s    Impression  =========    Left leg: Color duplex ultrasound imaging of the left lower extremity reveals patent flow throughout the extremity and CFA stent. A  velocity elevation of 311 cm/s (from 86 cm/s) remain noted in the Left distal SFA with a visual narrowing and heavy calcific plaque.  This finding is suggestive of a > 75% focal stenosis. A branch is noted at the level of the Distal SFA, just proximal to the narrowing.  Additionally, a large complex non-vascular mass is visualized in the groin region measuring 6.9 x 7.9 x 5.3 cm.      IMP/PLAN:  59 y.o. male with recent history as noted above, doing well with normal LYNN bilaterally.  He is doing quite well and has gradually increased his level of activity, although he is still somewhat limited and cannot safely return to work yet.  He has a medial left groin mass consistent with hematoma, but given recent presentation (1 to 2 weeks) and bacteremia in 3/2020.    1) continue asa, plavix, statin  2) daily and progressively increasing ambulation  3) scheduled follow-up with ID, plastic surgery  4) RTC in 3 mo with LYNN/LE arterial duplex      Kelby Gomez MD  Vascular & Endovascular Surgery

## 2020-06-12 LAB — CRP SERPL-MCNC: 2.6 MG/L

## 2020-06-18 ENCOUNTER — EXTERNAL HOME HEALTH (OUTPATIENT)
Dept: HOME HEALTH SERVICES | Facility: HOSPITAL | Age: 60
End: 2020-06-18
Payer: COMMERCIAL

## 2020-06-25 ENCOUNTER — NURSE TRIAGE (OUTPATIENT)
Dept: ADMINISTRATIVE | Facility: CLINIC | Age: 60
End: 2020-06-25

## 2020-06-25 NOTE — TELEPHONE ENCOUNTER
Wife calling with patient present. Reports after lunch the patient began feeling tired. Reports temperature of 100.9. He took Tylenol and his temperature is now 100.5.  Advised, per protocol to be seen in UC/ED. They verbalize understanding.    Reason for Disposition   [1] Fever > 100.0 F (37.8 C) AND [2] diabetes mellitus or weak immune system (e.g., HIV positive, cancer chemo, splenectomy, chronic steroids)    Additional Information   Negative: Shock suspected (e.g., cold/pale/clammy skin, too weak to stand, low BP, rapid pulse)   Negative: Difficult to awaken or acting confused (e.g., disoriented, slurred speech)   Negative: [1] Difficulty breathing AND [2] bluish lips, tongue or face   Negative: New onset rash with multiple purple (or blood-colored) spots or dots   Negative: Sounds like a life-threatening emergency to the triager   Negative: [1] Headache AND [2] stiff neck (can't touch chin to chest)   Negative: Difficulty breathing   Negative: IV drug abuse   Negative: [1] Drinking very little AND [2] dehydration suspected (e.g., no urine > 12 hours, very dry mouth, very lightheaded)   Negative: Patient sounds very sick or weak to the triager  (Exception: mild weakness and hasn't taken fever medicine)   Negative: Fever > 104 F (40 C)   Negative: [1] Fever > 101 F (38.3 C) AND [2] age > 60   Negative: [1] Fever > 100.0 F (37.8 C) AND [2] bedridden (e.g., nursing home patient, CVA, chronic illness, recovering from surgery)   Negative: [1] Fever > 100.0 F (37.8 C) AND [2] indwelling urinary catheter (e.g., Packer, Coude)   Negative: [1] Fever > 100.0 F (37.8 C) AND [2] has port (portacath), central line, or PICC line    Protocols used: FEVER-A-       21-Jan-2019

## 2020-06-26 PROBLEM — A41.9 SEPSIS: Status: ACTIVE | Noted: 2020-06-26

## 2020-06-29 ENCOUNTER — ANESTHESIA EVENT (OUTPATIENT)
Dept: SURGERY | Facility: HOSPITAL | Age: 60
DRG: 271 | End: 2020-06-29
Payer: COMMERCIAL

## 2020-06-29 ENCOUNTER — ANESTHESIA (OUTPATIENT)
Dept: SURGERY | Facility: HOSPITAL | Age: 60
DRG: 271 | End: 2020-06-29
Payer: COMMERCIAL

## 2020-06-29 ENCOUNTER — HOSPITAL ENCOUNTER (INPATIENT)
Facility: HOSPITAL | Age: 60
LOS: 7 days | Discharge: HOME HEALTH | DRG: 271 | End: 2020-07-06
Attending: SURGERY | Admitting: SURGERY
Payer: COMMERCIAL

## 2020-06-29 DIAGNOSIS — I72.4 PSEUDOANEURYSM OF LEFT FEMORAL ARTERY: ICD-10-CM

## 2020-06-29 DIAGNOSIS — A49.8 PROTEUS INFECTION: Primary | ICD-10-CM

## 2020-06-29 DIAGNOSIS — I72.9 PSEUDOANEURYSM: ICD-10-CM

## 2020-06-29 DIAGNOSIS — I72.9 PSEUDOANEURYSM: Primary | ICD-10-CM

## 2020-06-29 DIAGNOSIS — R78.81 BACTEREMIA: ICD-10-CM

## 2020-06-29 LAB
ABO + RH BLD: NORMAL
BLD GP AB SCN CELLS X3 SERPL QL: NORMAL
POCT GLUCOSE: 133 MG/DL (ref 70–110)

## 2020-06-29 PROCEDURE — C1760 CLOSURE DEV, VASC: HCPCS | Performed by: SURGERY

## 2020-06-29 PROCEDURE — 27201423 OPTIME MED/SURG SUP & DEVICES STERILE SUPPLY: Performed by: SURGERY

## 2020-06-29 PROCEDURE — 37236 OPEN/PERQ PLACE STENT 1ST: CPT | Mod: LT,,, | Performed by: SURGERY

## 2020-06-29 PROCEDURE — 86920 COMPATIBILITY TEST SPIN: CPT

## 2020-06-29 PROCEDURE — 87075 CULTR BACTERIA EXCEPT BLOOD: CPT | Mod: 59

## 2020-06-29 PROCEDURE — 63600175 PHARM REV CODE 636 W HCPCS: Performed by: NURSE ANESTHETIST, CERTIFIED REGISTERED

## 2020-06-29 PROCEDURE — 99223 PR INITIAL HOSPITAL CARE,LEVL III: ICD-10-PCS | Mod: 25,,, | Performed by: SURGERY

## 2020-06-29 PROCEDURE — 25500020 PHARM REV CODE 255: Performed by: SURGERY

## 2020-06-29 PROCEDURE — 87070 CULTURE OTHR SPECIMN AEROBIC: CPT | Mod: 59

## 2020-06-29 PROCEDURE — 75710 ARTERY X-RAYS ARM/LEG: CPT | Mod: 26,59,, | Performed by: SURGERY

## 2020-06-29 PROCEDURE — 25000003 PHARM REV CODE 250: Performed by: NURSE ANESTHETIST, CERTIFIED REGISTERED

## 2020-06-29 PROCEDURE — 87205 SMEAR GRAM STAIN: CPT

## 2020-06-29 PROCEDURE — 36415 COLL VENOUS BLD VENIPUNCTURE: CPT

## 2020-06-29 PROCEDURE — C1769 GUIDE WIRE: HCPCS | Performed by: SURGERY

## 2020-06-29 PROCEDURE — 83735 ASSAY OF MAGNESIUM: CPT | Mod: 91

## 2020-06-29 PROCEDURE — 99223 1ST HOSP IP/OBS HIGH 75: CPT | Mod: 25,,, | Performed by: SURGERY

## 2020-06-29 PROCEDURE — 36000708 HC OR TIME LEV III 1ST 15 MIN: Performed by: SURGERY

## 2020-06-29 PROCEDURE — C1887 CATHETER, GUIDING: HCPCS | Performed by: SURGERY

## 2020-06-29 PROCEDURE — 86850 RBC ANTIBODY SCREEN: CPT

## 2020-06-29 PROCEDURE — 36246 PR INS CATH ABD/L-EXT ART 2ND ORDER: ICD-10-PCS | Mod: 51,LT,, | Performed by: SURGERY

## 2020-06-29 PROCEDURE — C1725 CATH, TRANSLUMIN NON-LASER: HCPCS | Performed by: SURGERY

## 2020-06-29 PROCEDURE — 80053 COMPREHEN METABOLIC PANEL: CPT | Mod: 91

## 2020-06-29 PROCEDURE — 37236 PR OPEN/PERQ TRANSCATH PLACE STENT; INITIAL ARTERY: ICD-10-PCS | Mod: LT,,, | Performed by: SURGERY

## 2020-06-29 PROCEDURE — 85025 COMPLETE CBC W/AUTO DIFF WBC: CPT | Mod: 91

## 2020-06-29 PROCEDURE — 63600175 PHARM REV CODE 636 W HCPCS: Performed by: STUDENT IN AN ORGANIZED HEALTH CARE EDUCATION/TRAINING PROGRAM

## 2020-06-29 PROCEDURE — 63600175 PHARM REV CODE 636 W HCPCS: Performed by: SURGERY

## 2020-06-29 PROCEDURE — 87102 FUNGUS ISOLATION CULTURE: CPT | Mod: 59

## 2020-06-29 PROCEDURE — 75710 PR  ANGIO EXTREMITY UNILAT: ICD-10-PCS | Mod: 26,59,, | Performed by: SURGERY

## 2020-06-29 PROCEDURE — 36620 INSERTION CATHETER ARTERY: CPT | Mod: 59,,, | Performed by: ANESTHESIOLOGY

## 2020-06-29 PROCEDURE — D9220A PRA ANESTHESIA: Mod: ,,, | Performed by: ANESTHESIOLOGY

## 2020-06-29 PROCEDURE — C1894 INTRO/SHEATH, NON-LASER: HCPCS | Performed by: SURGERY

## 2020-06-29 PROCEDURE — 84100 ASSAY OF PHOSPHORUS: CPT | Mod: 91

## 2020-06-29 PROCEDURE — 37000008 HC ANESTHESIA 1ST 15 MINUTES: Performed by: SURGERY

## 2020-06-29 PROCEDURE — 36620 PR INSERT CATH,ART,PERCUT,SHORTTERM: ICD-10-PCS | Mod: 59,,, | Performed by: ANESTHESIOLOGY

## 2020-06-29 PROCEDURE — 20000000 HC ICU ROOM

## 2020-06-29 PROCEDURE — 11000001 HC ACUTE MED/SURG PRIVATE ROOM

## 2020-06-29 PROCEDURE — 37000009 HC ANESTHESIA EA ADD 15 MINS: Performed by: SURGERY

## 2020-06-29 PROCEDURE — 87206 SMEAR FLUORESCENT/ACID STAI: CPT

## 2020-06-29 PROCEDURE — C1729 CATH, DRAINAGE: HCPCS | Performed by: SURGERY

## 2020-06-29 PROCEDURE — D9220A PRA ANESTHESIA: ICD-10-PCS | Mod: ,,, | Performed by: ANESTHESIOLOGY

## 2020-06-29 PROCEDURE — 36246 INS CATH ABD/L-EXT ART 2ND: CPT | Mod: 51,LT,, | Performed by: SURGERY

## 2020-06-29 PROCEDURE — 87116 MYCOBACTERIA CULTURE: CPT

## 2020-06-29 PROCEDURE — C1874 STENT, COATED/COV W/DEL SYS: HCPCS | Performed by: SURGERY

## 2020-06-29 PROCEDURE — 36000709 HC OR TIME LEV III EA ADD 15 MIN: Performed by: SURGERY

## 2020-06-29 DEVICE — IMPLANTABLE DEVICE: Type: IMPLANTABLE DEVICE | Site: ARTERIAL | Status: FUNCTIONAL

## 2020-06-29 RX ORDER — HEPARIN SODIUM 1000 [USP'U]/ML
INJECTION, SOLUTION INTRAVENOUS; SUBCUTANEOUS
Status: DISCONTINUED | OUTPATIENT
Start: 2020-06-29 | End: 2020-06-29

## 2020-06-29 RX ORDER — HYDROMORPHONE HYDROCHLORIDE 1 MG/ML
1 INJECTION, SOLUTION INTRAMUSCULAR; INTRAVENOUS; SUBCUTANEOUS EVERY 4 HOURS PRN
Status: DISCONTINUED | OUTPATIENT
Start: 2020-06-30 | End: 2020-07-06 | Stop reason: HOSPADM

## 2020-06-29 RX ORDER — SUCCINYLCHOLINE CHLORIDE 20 MG/ML
INJECTION INTRAMUSCULAR; INTRAVENOUS
Status: DISCONTINUED | OUTPATIENT
Start: 2020-06-29 | End: 2020-06-29

## 2020-06-29 RX ORDER — HYDROCODONE BITARTRATE AND ACETAMINOPHEN 5; 325 MG/1; MG/1
1 TABLET ORAL EVERY 6 HOURS PRN
Status: DISCONTINUED | OUTPATIENT
Start: 2020-06-30 | End: 2020-07-06 | Stop reason: HOSPADM

## 2020-06-29 RX ORDER — PREGABALIN 50 MG/1
100 CAPSULE ORAL 2 TIMES DAILY
Status: DISCONTINUED | OUTPATIENT
Start: 2020-06-30 | End: 2020-07-06 | Stop reason: HOSPADM

## 2020-06-29 RX ORDER — CLOPIDOGREL BISULFATE 75 MG/1
75 TABLET ORAL DAILY
Status: DISCONTINUED | OUTPATIENT
Start: 2020-06-30 | End: 2020-06-30

## 2020-06-29 RX ORDER — PROPOFOL 10 MG/ML
VIAL (ML) INTRAVENOUS
Status: DISCONTINUED | OUTPATIENT
Start: 2020-06-29 | End: 2020-06-29

## 2020-06-29 RX ORDER — FENTANYL CITRATE 50 UG/ML
INJECTION, SOLUTION INTRAMUSCULAR; INTRAVENOUS
Status: DISCONTINUED | OUTPATIENT
Start: 2020-06-29 | End: 2020-06-29

## 2020-06-29 RX ORDER — ROCURONIUM BROMIDE 10 MG/ML
INJECTION, SOLUTION INTRAVENOUS
Status: DISCONTINUED | OUTPATIENT
Start: 2020-06-29 | End: 2020-06-29

## 2020-06-29 RX ORDER — LEVOFLOXACIN 750 MG/1
750 TABLET ORAL DAILY
Status: DISCONTINUED | OUTPATIENT
Start: 2020-06-30 | End: 2020-06-30

## 2020-06-29 RX ORDER — LIDOCAINE HYDROCHLORIDE 20 MG/ML
INJECTION INTRAVENOUS
Status: DISCONTINUED | OUTPATIENT
Start: 2020-06-29 | End: 2020-06-29

## 2020-06-29 RX ORDER — VALSARTAN 40 MG/1
160 TABLET ORAL DAILY
Status: DISCONTINUED | OUTPATIENT
Start: 2020-06-30 | End: 2020-07-06 | Stop reason: HOSPADM

## 2020-06-29 RX ORDER — SODIUM CHLORIDE 9 MG/ML
INJECTION, SOLUTION INTRAVENOUS CONTINUOUS
Status: DISCONTINUED | OUTPATIENT
Start: 2020-06-30 | End: 2020-07-01

## 2020-06-29 RX ORDER — PHENYLEPHRINE HYDROCHLORIDE 10 MG/ML
INJECTION INTRAVENOUS
Status: DISCONTINUED | OUTPATIENT
Start: 2020-06-29 | End: 2020-06-29

## 2020-06-29 RX ORDER — ASPIRIN 81 MG/1
81 TABLET ORAL DAILY
Status: DISCONTINUED | OUTPATIENT
Start: 2020-06-30 | End: 2020-07-06 | Stop reason: HOSPADM

## 2020-06-29 RX ORDER — HYDRALAZINE HYDROCHLORIDE 20 MG/ML
10 INJECTION INTRAMUSCULAR; INTRAVENOUS EVERY 4 HOURS PRN
Status: DISCONTINUED | OUTPATIENT
Start: 2020-06-29 | End: 2020-07-06 | Stop reason: HOSPADM

## 2020-06-29 RX ORDER — KETAMINE HCL IN 0.9 % NACL 50 MG/5 ML
SYRINGE (ML) INTRAVENOUS
Status: DISCONTINUED | OUTPATIENT
Start: 2020-06-29 | End: 2020-06-29

## 2020-06-29 RX ORDER — PROTAMINE SULFATE 10 MG/ML
INJECTION, SOLUTION INTRAVENOUS
Status: DISCONTINUED | OUTPATIENT
Start: 2020-06-29 | End: 2020-06-29

## 2020-06-29 RX ORDER — SODIUM CHLORIDE 0.9 % (FLUSH) 0.9 %
10 SYRINGE (ML) INJECTION
Status: DISCONTINUED | OUTPATIENT
Start: 2020-06-29 | End: 2020-07-06 | Stop reason: HOSPADM

## 2020-06-29 RX ORDER — ATORVASTATIN CALCIUM 20 MG/1
40 TABLET, FILM COATED ORAL DAILY
Status: DISCONTINUED | OUTPATIENT
Start: 2020-06-30 | End: 2020-07-06 | Stop reason: HOSPADM

## 2020-06-29 RX ORDER — IODIXANOL 320 MG/ML
INJECTION, SOLUTION INTRAVASCULAR
Status: DISCONTINUED | OUTPATIENT
Start: 2020-06-29 | End: 2020-06-29

## 2020-06-29 RX ORDER — MUPIROCIN 20 MG/G
1 OINTMENT TOPICAL 2 TIMES DAILY
Status: DISPENSED | OUTPATIENT
Start: 2020-06-30 | End: 2020-07-05

## 2020-06-29 RX ORDER — CLINDAMYCIN PHOSPHATE 900 MG/50ML
INJECTION, SOLUTION INTRAVENOUS
Status: DISCONTINUED | OUTPATIENT
Start: 2020-06-29 | End: 2020-06-29

## 2020-06-29 RX ORDER — KETOROLAC TROMETHAMINE 30 MG/ML
15 INJECTION, SOLUTION INTRAMUSCULAR; INTRAVENOUS EVERY 8 HOURS PRN
Status: ACTIVE | OUTPATIENT
Start: 2020-06-29 | End: 2020-07-01

## 2020-06-29 RX ORDER — ONDANSETRON 2 MG/ML
4 INJECTION INTRAMUSCULAR; INTRAVENOUS EVERY 6 HOURS PRN
Status: DISCONTINUED | OUTPATIENT
Start: 2020-06-29 | End: 2020-07-06 | Stop reason: HOSPADM

## 2020-06-29 RX ORDER — MIDAZOLAM HYDROCHLORIDE 1 MG/ML
INJECTION, SOLUTION INTRAMUSCULAR; INTRAVENOUS
Status: DISCONTINUED | OUTPATIENT
Start: 2020-06-29 | End: 2020-06-29

## 2020-06-29 RX ORDER — FENTANYL CITRATE 50 UG/ML
25 INJECTION, SOLUTION INTRAMUSCULAR; INTRAVENOUS EVERY 5 MIN PRN
Status: DISCONTINUED | OUTPATIENT
Start: 2020-06-29 | End: 2020-06-30

## 2020-06-29 RX ORDER — ONDANSETRON 2 MG/ML
INJECTION INTRAMUSCULAR; INTRAVENOUS
Status: DISCONTINUED | OUTPATIENT
Start: 2020-06-29 | End: 2020-06-29

## 2020-06-29 RX ADMIN — CEFTRIAXONE 2 G: 2 INJECTION, SOLUTION INTRAVENOUS at 11:06

## 2020-06-29 RX ADMIN — MIDAZOLAM HYDROCHLORIDE 2 MG: 1 INJECTION, SOLUTION INTRAMUSCULAR; INTRAVENOUS at 08:06

## 2020-06-29 RX ADMIN — SUGAMMADEX 400 MG: 100 INJECTION, SOLUTION INTRAVENOUS at 11:06

## 2020-06-29 RX ADMIN — ONDANSETRON 4 MG: 2 INJECTION, SOLUTION INTRAMUSCULAR; INTRAVENOUS at 11:06

## 2020-06-29 RX ADMIN — PROTAMINE SULFATE 60 MG: 10 INJECTION, SOLUTION INTRAVENOUS at 11:06

## 2020-06-29 RX ADMIN — PHENYLEPHRINE HYDROCHLORIDE 200 MCG: 10 INJECTION INTRAVENOUS at 09:06

## 2020-06-29 RX ADMIN — HYDRALAZINE HYDROCHLORIDE 10 MG: 20 INJECTION INTRAMUSCULAR; INTRAVENOUS at 11:06

## 2020-06-29 RX ADMIN — CLINDAMYCIN PHOSPHATE 900 MG: 18 INJECTION, SOLUTION INTRAVENOUS at 09:06

## 2020-06-29 RX ADMIN — SODIUM CHLORIDE: 0.9 INJECTION, SOLUTION INTRAVENOUS at 11:06

## 2020-06-29 RX ADMIN — HYDROMORPHONE HYDROCHLORIDE 1 MG: 1 INJECTION, SOLUTION INTRAMUSCULAR; INTRAVENOUS; SUBCUTANEOUS at 11:06

## 2020-06-29 RX ADMIN — CEFTRIAXONE 2 G: 1 INJECTION, SOLUTION INTRAVENOUS at 09:06

## 2020-06-29 RX ADMIN — PROTAMINE SULFATE 5 MG: 10 INJECTION, SOLUTION INTRAVENOUS at 10:06

## 2020-06-29 RX ADMIN — ROCURONIUM BROMIDE 10 MG: 10 INJECTION, SOLUTION INTRAVENOUS at 08:06

## 2020-06-29 RX ADMIN — Medication 10 MG: at 09:06

## 2020-06-29 RX ADMIN — ROCURONIUM BROMIDE 20 MG: 10 INJECTION, SOLUTION INTRAVENOUS at 09:06

## 2020-06-29 RX ADMIN — SODIUM CHLORIDE, SODIUM GLUCONATE, SODIUM ACETATE, POTASSIUM CHLORIDE, MAGNESIUM CHLORIDE, SODIUM PHOSPHATE, DIBASIC, AND POTASSIUM PHOSPHATE: .53; .5; .37; .037; .03; .012; .00082 INJECTION, SOLUTION INTRAVENOUS at 09:06

## 2020-06-29 RX ADMIN — FENTANYL CITRATE 100 MCG: 50 INJECTION, SOLUTION INTRAMUSCULAR; INTRAVENOUS at 08:06

## 2020-06-29 RX ADMIN — HEPARIN SODIUM 9000 UNITS: 1000 INJECTION, SOLUTION INTRAVENOUS; SUBCUTANEOUS at 10:06

## 2020-06-29 RX ADMIN — Medication 20 MG: at 08:06

## 2020-06-29 RX ADMIN — LIDOCAINE HYDROCHLORIDE 100 MG: 20 INJECTION, SOLUTION INTRAVENOUS at 08:06

## 2020-06-29 RX ADMIN — SODIUM CHLORIDE, SODIUM GLUCONATE, SODIUM ACETATE, POTASSIUM CHLORIDE, MAGNESIUM CHLORIDE, SODIUM PHOSPHATE, DIBASIC, AND POTASSIUM PHOSPHATE: .53; .5; .37; .037; .03; .012; .00082 INJECTION, SOLUTION INTRAVENOUS at 08:06

## 2020-06-29 RX ADMIN — ROCURONIUM BROMIDE 10 MG: 10 INJECTION, SOLUTION INTRAVENOUS at 10:06

## 2020-06-29 RX ADMIN — PROPOFOL 200 MG: 10 INJECTION, EMULSION INTRAVENOUS at 08:06

## 2020-06-29 RX ADMIN — SUCCINYLCHOLINE CHLORIDE 120 MG: 20 INJECTION, SOLUTION INTRAMUSCULAR; INTRAVENOUS at 08:06

## 2020-06-29 RX ADMIN — SODIUM CHLORIDE, SODIUM GLUCONATE, SODIUM ACETATE, POTASSIUM CHLORIDE, MAGNESIUM CHLORIDE, SODIUM PHOSPHATE, DIBASIC, AND POTASSIUM PHOSPHATE: .53; .5; .37; .037; .03; .012; .00082 INJECTION, SOLUTION INTRAVENOUS at 10:06

## 2020-06-29 RX ADMIN — Medication 10 MG: at 10:06

## 2020-06-30 LAB
ALBUMIN SERPL BCP-MCNC: 2.3 G/DL (ref 3.5–5.2)
ALP SERPL-CCNC: 45 U/L (ref 55–135)
ALT SERPL W/O P-5'-P-CCNC: 31 U/L (ref 10–44)
ANION GAP SERPL CALC-SCNC: 11 MMOL/L (ref 8–16)
ANION GAP SERPL CALC-SCNC: 8 MMOL/L (ref 8–16)
AST SERPL-CCNC: 27 U/L (ref 10–40)
BASOPHILS # BLD AUTO: 0.01 K/UL (ref 0–0.2)
BASOPHILS NFR BLD: 0.1 % (ref 0–1.9)
BASOPHILS NFR BLD: 0.1 % (ref 0–1.9)
BASOPHILS NFR BLD: 0.2 % (ref 0–1.9)
BILIRUB SERPL-MCNC: 0.7 MG/DL (ref 0.1–1)
BUN SERPL-MCNC: 12 MG/DL (ref 6–20)
BUN SERPL-MCNC: 12 MG/DL (ref 6–20)
CALCIUM SERPL-MCNC: 7.8 MG/DL (ref 8.7–10.5)
CALCIUM SERPL-MCNC: 8.3 MG/DL (ref 8.7–10.5)
CHLORIDE SERPL-SCNC: 101 MMOL/L (ref 95–110)
CHLORIDE SERPL-SCNC: 102 MMOL/L (ref 95–110)
CO2 SERPL-SCNC: 22 MMOL/L (ref 23–29)
CO2 SERPL-SCNC: 24 MMOL/L (ref 23–29)
CREAT SERPL-MCNC: 0.7 MG/DL (ref 0.5–1.4)
CREAT SERPL-MCNC: 0.7 MG/DL (ref 0.5–1.4)
DIFFERENTIAL METHOD: ABNORMAL
EOSINOPHIL # BLD AUTO: 0 K/UL (ref 0–0.5)
EOSINOPHIL # BLD AUTO: 0 K/UL (ref 0–0.5)
EOSINOPHIL # BLD AUTO: 0.1 K/UL (ref 0–0.5)
EOSINOPHIL NFR BLD: 0.2 % (ref 0–8)
EOSINOPHIL NFR BLD: 0.4 % (ref 0–8)
EOSINOPHIL NFR BLD: 0.7 % (ref 0–8)
ERYTHROCYTE [DISTWIDTH] IN BLOOD BY AUTOMATED COUNT: 14.3 % (ref 11.5–14.5)
ERYTHROCYTE [DISTWIDTH] IN BLOOD BY AUTOMATED COUNT: 14.4 % (ref 11.5–14.5)
ERYTHROCYTE [DISTWIDTH] IN BLOOD BY AUTOMATED COUNT: 14.4 % (ref 11.5–14.5)
EST. GFR  (AFRICAN AMERICAN): >60 ML/MIN/1.73 M^2
EST. GFR  (AFRICAN AMERICAN): >60 ML/MIN/1.73 M^2
EST. GFR  (NON AFRICAN AMERICAN): >60 ML/MIN/1.73 M^2
EST. GFR  (NON AFRICAN AMERICAN): >60 ML/MIN/1.73 M^2
GLUCOSE SERPL-MCNC: 128 MG/DL (ref 70–110)
GLUCOSE SERPL-MCNC: 141 MG/DL (ref 70–110)
GLUCOSE SERPL-MCNC: 154 MG/DL (ref 70–110)
GRAM STN SPEC: NORMAL
HCO3 UR-SCNC: 29 MMOL/L (ref 24–28)
HCT VFR BLD AUTO: 31.4 % (ref 40–54)
HCT VFR BLD AUTO: 33.2 % (ref 40–54)
HCT VFR BLD AUTO: 35.7 % (ref 40–54)
HCT VFR BLD CALC: 32 %PCV (ref 36–54)
HGB BLD-MCNC: 10.1 G/DL (ref 14–18)
HGB BLD-MCNC: 10.6 G/DL (ref 14–18)
HGB BLD-MCNC: 12.1 G/DL (ref 14–18)
IMM GRANULOCYTES # BLD AUTO: 0.01 K/UL (ref 0–0.04)
IMM GRANULOCYTES # BLD AUTO: 0.03 K/UL (ref 0–0.04)
IMM GRANULOCYTES # BLD AUTO: 0.04 K/UL (ref 0–0.04)
IMM GRANULOCYTES NFR BLD AUTO: 0.2 % (ref 0–0.5)
IMM GRANULOCYTES NFR BLD AUTO: 0.4 % (ref 0–0.5)
IMM GRANULOCYTES NFR BLD AUTO: 0.5 % (ref 0–0.5)
LYMPHOCYTES # BLD AUTO: 0.5 K/UL (ref 1–4.8)
LYMPHOCYTES # BLD AUTO: 0.6 K/UL (ref 1–4.8)
LYMPHOCYTES # BLD AUTO: 0.6 K/UL (ref 1–4.8)
LYMPHOCYTES NFR BLD: 11 % (ref 18–48)
LYMPHOCYTES NFR BLD: 5.9 % (ref 18–48)
LYMPHOCYTES NFR BLD: 8.5 % (ref 18–48)
MAGNESIUM SERPL-MCNC: 1.7 MG/DL (ref 1.6–2.6)
MAGNESIUM SERPL-MCNC: 2 MG/DL (ref 1.6–2.6)
MCH RBC QN AUTO: 27.2 PG (ref 27–31)
MCH RBC QN AUTO: 27.6 PG (ref 27–31)
MCH RBC QN AUTO: 27.8 PG (ref 27–31)
MCHC RBC AUTO-ENTMCNC: 31.9 G/DL (ref 32–36)
MCHC RBC AUTO-ENTMCNC: 32.2 G/DL (ref 32–36)
MCHC RBC AUTO-ENTMCNC: 33.9 G/DL (ref 32–36)
MCV RBC AUTO: 82 FL (ref 82–98)
MCV RBC AUTO: 84 FL (ref 82–98)
MCV RBC AUTO: 87 FL (ref 82–98)
MONOCYTES # BLD AUTO: 0.2 K/UL (ref 0.3–1)
MONOCYTES # BLD AUTO: 0.3 K/UL (ref 0.3–1)
MONOCYTES # BLD AUTO: 0.5 K/UL (ref 0.3–1)
MONOCYTES NFR BLD: 3 % (ref 4–15)
MONOCYTES NFR BLD: 6.2 % (ref 4–15)
MONOCYTES NFR BLD: 6.4 % (ref 4–15)
NEUTROPHILS # BLD AUTO: 4.2 K/UL (ref 1.8–7.7)
NEUTROPHILS # BLD AUTO: 6.5 K/UL (ref 1.8–7.7)
NEUTROPHILS # BLD AUTO: 7.4 K/UL (ref 1.8–7.7)
NEUTROPHILS NFR BLD: 81.8 % (ref 38–73)
NEUTROPHILS NFR BLD: 87.1 % (ref 38–73)
NEUTROPHILS NFR BLD: 87.3 % (ref 38–73)
NRBC BLD-RTO: 0 /100 WBC
PCO2 BLDA: 48 MMHG (ref 35–45)
PH SMN: 7.39 [PH] (ref 7.35–7.45)
PHOSPHATE SERPL-MCNC: 3.1 MG/DL (ref 2.7–4.5)
PHOSPHATE SERPL-MCNC: 3.9 MG/DL (ref 2.7–4.5)
PLATELET # BLD AUTO: 127 K/UL (ref 150–350)
PLATELET # BLD AUTO: 128 K/UL (ref 150–350)
PLATELET # BLD AUTO: 134 K/UL (ref 150–350)
PMV BLD AUTO: 10.4 FL (ref 9.2–12.9)
PMV BLD AUTO: 10.7 FL (ref 9.2–12.9)
PMV BLD AUTO: 11.4 FL (ref 9.2–12.9)
PO2 BLDA: 354 MMHG (ref 80–100)
POC ACTIVATED CLOTTING TIME K: 219 SEC (ref 74–137)
POC BE: 4 MMOL/L
POC IONIZED CALCIUM: 1.14 MMOL/L (ref 1.06–1.42)
POC SATURATED O2: 100 % (ref 95–100)
POC TCO2: 30 MMOL/L (ref 23–27)
POTASSIUM BLD-SCNC: 3.8 MMOL/L (ref 3.5–5.1)
POTASSIUM SERPL-SCNC: 3.7 MMOL/L (ref 3.5–5.1)
POTASSIUM SERPL-SCNC: 4.4 MMOL/L (ref 3.5–5.1)
PREALB SERPL-MCNC: 4 MG/DL (ref 20–43)
PROT SERPL-MCNC: 6 G/DL (ref 6–8.4)
RBC # BLD AUTO: 3.72 M/UL (ref 4.6–6.2)
RBC # BLD AUTO: 3.84 M/UL (ref 4.6–6.2)
RBC # BLD AUTO: 4.35 M/UL (ref 4.6–6.2)
SAMPLE: ABNORMAL
SAMPLE: ABNORMAL
SODIUM BLD-SCNC: 134 MMOL/L (ref 136–145)
SODIUM SERPL-SCNC: 134 MMOL/L (ref 136–145)
SODIUM SERPL-SCNC: 134 MMOL/L (ref 136–145)
WBC # BLD AUTO: 5.16 K/UL (ref 3.9–12.7)
WBC # BLD AUTO: 7.45 K/UL (ref 3.9–12.7)
WBC # BLD AUTO: 8.49 K/UL (ref 3.9–12.7)

## 2020-06-30 PROCEDURE — 85025 COMPLETE CBC W/AUTO DIFF WBC: CPT | Mod: 91

## 2020-06-30 PROCEDURE — 99223 PR INITIAL HOSPITAL CARE,LEVL III: ICD-10-PCS | Mod: ,,, | Performed by: INTERNAL MEDICINE

## 2020-06-30 PROCEDURE — 80048 BASIC METABOLIC PNL TOTAL CA: CPT

## 2020-06-30 PROCEDURE — 20000000 HC ICU ROOM

## 2020-06-30 PROCEDURE — 99223 1ST HOSP IP/OBS HIGH 75: CPT | Mod: ,,, | Performed by: INTERNAL MEDICINE

## 2020-06-30 PROCEDURE — 25000003 PHARM REV CODE 250: Performed by: STUDENT IN AN ORGANIZED HEALTH CARE EDUCATION/TRAINING PROGRAM

## 2020-06-30 PROCEDURE — 99291 CRITICAL CARE FIRST HOUR: CPT | Mod: ,,, | Performed by: ANESTHESIOLOGY

## 2020-06-30 PROCEDURE — 84100 ASSAY OF PHOSPHORUS: CPT

## 2020-06-30 PROCEDURE — 87040 BLOOD CULTURE FOR BACTERIA: CPT

## 2020-06-30 PROCEDURE — 63600175 PHARM REV CODE 636 W HCPCS: Performed by: STUDENT IN AN ORGANIZED HEALTH CARE EDUCATION/TRAINING PROGRAM

## 2020-06-30 PROCEDURE — 84134 ASSAY OF PREALBUMIN: CPT

## 2020-06-30 PROCEDURE — 83735 ASSAY OF MAGNESIUM: CPT

## 2020-06-30 PROCEDURE — 99291 PR CRITICAL CARE, E/M 30-74 MINUTES: ICD-10-PCS | Mod: ,,, | Performed by: ANESTHESIOLOGY

## 2020-06-30 RX ORDER — POTASSIUM CHLORIDE 750 MG/1
30 CAPSULE, EXTENDED RELEASE ORAL ONCE
Status: COMPLETED | OUTPATIENT
Start: 2020-06-30 | End: 2020-06-30

## 2020-06-30 RX ORDER — MAGNESIUM SULFATE HEPTAHYDRATE 40 MG/ML
2 INJECTION, SOLUTION INTRAVENOUS ONCE
Status: COMPLETED | OUTPATIENT
Start: 2020-06-30 | End: 2020-06-30

## 2020-06-30 RX ORDER — ENOXAPARIN SODIUM 100 MG/ML
40 INJECTION SUBCUTANEOUS
Status: DISCONTINUED | OUTPATIENT
Start: 2020-06-30 | End: 2020-07-06 | Stop reason: HOSPADM

## 2020-06-30 RX ORDER — POLYETHYLENE GLYCOL 3350 17 G/17G
17 POWDER, FOR SOLUTION ORAL DAILY
Status: DISCONTINUED | OUTPATIENT
Start: 2020-06-30 | End: 2020-07-06 | Stop reason: HOSPADM

## 2020-06-30 RX ORDER — MEPERIDINE HYDROCHLORIDE 50 MG/ML
25 INJECTION INTRAMUSCULAR; INTRAVENOUS; SUBCUTANEOUS ONCE AS NEEDED
Status: COMPLETED | OUTPATIENT
Start: 2020-06-30 | End: 2020-06-30

## 2020-06-30 RX ADMIN — MAGNESIUM SULFATE 2 G: 2 INJECTION INTRAVENOUS at 01:06

## 2020-06-30 RX ADMIN — MUPIROCIN 1 G: 20 OINTMENT TOPICAL at 09:06

## 2020-06-30 RX ADMIN — CEFTRIAXONE 2 G: 2 INJECTION, SOLUTION INTRAVENOUS at 11:06

## 2020-06-30 RX ADMIN — POLYETHYLENE GLYCOL 3350 17 G: 17 POWDER, FOR SOLUTION ORAL at 08:06

## 2020-06-30 RX ADMIN — ATORVASTATIN CALCIUM 40 MG: 20 TABLET, FILM COATED ORAL at 08:06

## 2020-06-30 RX ADMIN — MUPIROCIN 1 G: 20 OINTMENT TOPICAL at 08:06

## 2020-06-30 RX ADMIN — PREGABALIN 100 MG: 75 CAPSULE ORAL at 09:06

## 2020-06-30 RX ADMIN — LEVOFLOXACIN 750 MG: 750 TABLET, FILM COATED ORAL at 08:06

## 2020-06-30 RX ADMIN — MEPERIDINE HYDROCHLORIDE 6.25 MG: 50 INJECTION INTRAMUSCULAR; INTRAVENOUS; SUBCUTANEOUS at 12:06

## 2020-06-30 RX ADMIN — PREGABALIN 100 MG: 75 CAPSULE ORAL at 08:06

## 2020-06-30 RX ADMIN — CLOPIDOGREL BISULFATE 75 MG: 75 TABLET ORAL at 08:06

## 2020-06-30 RX ADMIN — ENOXAPARIN SODIUM 40 MG: 100 INJECTION SUBCUTANEOUS at 05:06

## 2020-06-30 RX ADMIN — ASPIRIN 81 MG: 81 TABLET, DELAYED RELEASE ORAL at 08:06

## 2020-06-30 RX ADMIN — VALSARTAN 160 MG: 160 TABLET ORAL at 08:06

## 2020-06-30 RX ADMIN — POTASSIUM CHLORIDE 30 MEQ: 750 CAPSULE, EXTENDED RELEASE ORAL at 05:06

## 2020-06-30 NOTE — H&P
Vascular Surgery H&P    History of Present Illness: 59-year-old male with PAD s/p bilateral femoral endarterectomy 12/2019 who was admitted for fevers and chills for 4 days. Of note, patient had wound complications on his left femoral endarterectomy ultimately needing debridements and flap coverage with prolonged abx. His course was further complicated by a pseudoaneurysm on that site which was stented. Last finished abx 5/2020. On further questioning, patient notes his left leg has become much more swollen and firm over the last 24 hours and the mass in his groin which used to be quite small feels bigger and more firm as well. He does not note significant pain to the leg and has been getting around okay. Denies any other issues at this time.      No current facility-administered medications on file prior to encounter.            Current Outpatient Medications on File Prior to Encounter   Medication Sig    ascorbic acid (VITAMIN C ORAL) Take 2 capsules by mouth every morning.    aspirin (ECOTRIN) 81 MG EC tablet Take 81 mg by mouth once daily.    atorvastatin (LIPITOR) 40 MG tablet Take 40 mg by mouth once daily.    cefadroxil (DURICEF) 500 MG Cap Take 2 capsules (1,000 mg total) by mouth every 12 (twelve) hours.    cetirizine 10 mg chewable tablet Take 10 mg by mouth once daily.     clopidogreL (PLAVIX) 75 mg tablet Take 1 tablet (75 mg total) by mouth once daily.    elderberry fruit and flower 460-115 mg Cap Take 2 capsules by mouth every morning.    HYDROcodone-acetaminophen (NORCO) 5-325 mg per tablet Take 1 tablet by mouth every 6 (six) hours as needed.    levoFLOXacin (LEVAQUIN) 500 MG tablet Take 1 tablet (500 mg total) by mouth once daily.    metFORMIN (GLUCOPHAGE-XR) 500 MG XR 24hr tablet Take 1,000 mg by mouth daily with breakfast.     pregabalin (LYRICA) 100 MG capsule Take 100 mg by mouth 2 (two) times daily.    valsartan (DIOVAN) 160 MG tablet Take 160 mg by mouth once daily.                Review of patient's allergies indicates:   Allergen Reactions    Penicillins Hives       Patient currently on cefepime without complications  Tolerated ceftriaxone 3/2020              Past Medical History:   Diagnosis Date    Diabetes mellitus      H/O brain tumor      HLD (hyperlipidemia)      Hypertension      PAD (peripheral artery disease)      Seizures       R/T BRAIN TUMOR- SURGICALLY EXCISED            Past Surgical History:   Procedure Laterality Date    ANGIOGRAPHY Left 4/6/2020     Procedure: ANGIOGRAM, Left lower extremity;  Surgeon: Kelby Gomez MD;  Location: [address];  Service: Peripheral Vascular;  Laterality: Left;    APPLICATION OF WOUND VACUUM-ASSISTED CLOSURE DEVICE Bilateral 1/1/2020     Procedure: APPLICATION, WOUND VAC;  Surgeon: SEBAS Prieto II, MD;  Location: [address];  Service: Cardiovascular;  Laterality: Bilateral;    APPLICATION OF WOUND VACUUM-ASSISTED CLOSURE DEVICE N/A 1/3/2020     Procedure: APPLICATION, WOUND VAC;  Surgeon: Brian Wong MD;  Location: [address];  Service: Plastics;  Laterality: N/A;    BRAIN SURGERY   01/2011     TUMOR EXCISED    CREATION OF MUSCLE ROTATIONAL FLAP N/A 1/3/2020     Procedure: CREATION, FLAP, MUSCLE ROTATION;  Surgeon: Brian Wong MD;  Location: [address];  Service: Plastics;  Laterality: N/A;    ENDARTERECTOMY OF FEMORAL ARTERY Bilateral 12/20/2019     Procedure: ENDARTERECTOMY, FEMORAL;  Surgeon: Kelby Gomez MD;  Location: [address];  Service: Peripheral Vascular;  Laterality: Bilateral;  natalya common femoral endarterectomy with natalya. iliac stent placement    PERCUTANEOUS TRANSLUMINAL ANGIOPLASTY Left 4/6/2020     Procedure: PTA (ANGIOPLASTY, PERCUTANEOUS, TRANSLUMINAL), left lower extremity;  Surgeon: Kelby Gomez MD;  Location: [address];  Service: Peripheral Vascular;  Laterality: Left;    PERCUTANEOUS TRANSLUMINAL ANGIOPLASTY (PTA) OF PERIPHERAL VESSEL  Left 10/17/2019     Procedure: PTA, PERIPHERAL VESSEL;  Surgeon: Rao Fisher MD;  Location: [facility] CATH;  Service: Cardiology;  Laterality: Left;           Family History      Problem Relation (Age of Onset)     Cancer Father, Brother     Diabetes Mother     Heart disease Father, Brother, Brother, Brother     Hypertension Mother     Stroke Mother                Tobacco Use    Smoking status: Former Smoker       Packs/day: 2.00       Types: Cigarettes       Quit date: 2011       Years since quittin.4    Smokeless tobacco: Never Used   Substance and Sexual Activity    Alcohol use: Yes       Comment: 2-4 PER DAY    Drug use: Never    Sexual activity: Not on file      Review of Systems   Constitutional: Positive for fever. Negative for chills.   HENT: Negative for sore throat.    Eyes: Negative for redness.   Respiratory: Negative for shortness of breath.    Cardiovascular: Negative for chest pain.   Gastrointestinal: Negative for abdominal pain.   Endocrine: Negative for polyuria.   Genitourinary: Negative for dysuria.   Musculoskeletal: Negative for back pain.        Leg pain and swelling   Skin: Negative for rash.   Neurological: Negative for headaches.   Psychiatric/Behavioral: Negative for agitation.      Objective:      Vital Signs (Most Recent):  Temp: 96.7 °F (35.9 °C) (20 1107)  Pulse: 83 (20 1107)  Resp: 18 (20 1107)  BP: 138/77 (20 1107)  SpO2: 99 % (20 1107) Vital Signs (24h Range):  Temp:  [96.4 °F (35.8 °C)-99.7 °F (37.6 °C)] 96.7 °F (35.9 °C)  Pulse:  [77-93] 83  Resp:  [16-20] 18  SpO2:  [85 %-99 %] 99 %  BP: (120-138)/(73-86) 138/77      Weight: 115 kg (253 lb 8.5 oz)  Body mass index is 36.38 kg/m².     Physical Exam  Vitals signs and nursing note reviewed.   Constitutional:       General: He is not in acute distress.     Appearance: He is well-developed.   HENT:      Head: Normocephalic and atraumatic.   Neck:      Musculoskeletal: Normal range of motion.    Cardiovascular:      Rate and Rhythm: Normal rate.      Comments: Palpable mass in left groin. This is mildly tender. Left leg is swollen from thigh down. This is mildly tender. No evidence of infection grossly. Palpable DP on the left.   Pulmonary:      Effort: Pulmonary effort is normal.   Abdominal:      Palpations: Abdomen is soft.      Tenderness: There is no abdominal tenderness.   Musculoskeletal: Normal range of motion.   Skin:     General: Skin is warm and dry.   Neurological:      Mental Status: He is alert.   Psychiatric:         Behavior: Behavior normal.            Significant Labs:  CBC:       Recent Labs   Lab 06/29/20  0451   WBC 7.28   RBC 4.58*   HGB 12.7*   HCT 37.9*   *   MCV 83   MCH 27.7   MCHC 33.5      CMP:       Recent Labs   Lab 06/29/20  0451   *   CALCIUM 9.0   ALBUMIN 2.7*   PROT 6.6   *   K 3.9   CO2 26   CL 98   BUN 10   CREATININE 0.7   ALKPHOS 46*   ALT 39   AST 32   BILITOT 1.0         Significant Diagnostics:  I have reviewed all pertinent imaging results/findings within the past 24 hours.    Assessment/Plan:      PVD (peripheral vascular disease)  59-year-old man admitted for fevers with recent history of bilateral femoral endarterectomy complicated by left-sided wound infection requiring flap coverage and prolonged abx. Today, CT shows expanded pseudoaneurysm from prior which is pulsatile on exam. Further, there appears to be some contrast extending into the pseudoaneurysm.     Will plan for emergent covered stent placement in L EIA, washout of hematoma, possible open pseudoaneurysm repair    Carolyn Pat MD   Fellow, Vascular/Endovascular Surgery    Vascular Attending  Agree with above  58 yo man with h.o prior heavy tobacco use, obesity (BMI 37+), HTN well known to my partner, Dr Gomez - who did bilateral CFA endarterectomies in Dec 2019; L groin wound infections led to a L groin myocutaneous flap.  Had one episode of bleeding pseudoaneurysm from L CFA  in April 2020 that covered by Dr Gomez with 8mm viabahn.  He was seen in clinic by him in early June 2020 with imaging f/u in future.  Presented to [facility] a few days ago with fevers and chills; Proteus in BCx.  CT a/p shows a large L groin pseudoaneurysm (10cm) and breakdown of very proximal L CFA--- infectious in nature.  Since iv ABx he has had negative BCx and defervesced.  Transferred from [facility] this evening.  He is non-toxic appearing; obese and non-palpable pedal pulses.    Tonight, emergently - He will need an attempt to control this contained ruptured L CFA infected pseudoaneurysm by placing 9mm VIabahn (x75mm) and then cutdown above the L muscle flap to evacuate the large hematoma.  However, this is only temporizing. Given scarred R groin, large bore access (and closure with Perclose) are risk for R femoral vessel injury.    In future - ideally in this hospital stay along with continuation of iv ABx towards Proteus - he will need an extra-anatomic bypass:  either L obturator bypass (to L ak-popliteal artery since L distal SFA has significant atherosclerotic disease), or extra-anatomic bypass to L PFA but this would require going thru muscle flap which would be quite difficult. Will d/w Dr Gomez when he returns re- whether he wishes to perform the definitive therapy.    Bruce Dallas MD [facility] FACS   Vascular/Endovascular Surgery

## 2020-06-30 NOTE — OP NOTE
Date: 06/29/2020    Surgeon: Bruce Dallas MD FACS    Assistant: Carolyn Pat MD    Pre-op Diagnosis:   Pseudoaneurysm [I72.9]; Pseudoaneurysm distal L EIA/proximal L CFA at site of prior L femoral endarterectomy; recent Proteus bactermeia    Post-op Diagnosis: Same    Procedures:   1) U/S-guided R CFA access, 9fr  2) Emergent L EIA to CFA covered stent placement, 9x75mm Viabahn, post-dilated with 8mm balloon  3) Perclose closure R CFA, 9fr  4) Drainage of 200 ml hematoma (pseudoaneurysm) thru L proximal groin 2cm incision and placement of 19Fr Ezra drain    Anesthesia: Local MAC    EBL: Minimal    Anesthesia: Local/Sedation    Findings:   Successful treatment of L proximal CFA pseudoaneurysm with L EIA to CFA covered stent placement, 9x75mm Viabahn, post-dilated with 8mm balloon  L distal SFA stenosis > 75% stenosis   3v tibial runoff, L PT is main runoff   270 ml (drained blood from L groin area)     Raditaion: 1230.25 mGy  Fluoro time: 9min    Indication: 59 y M with prior bilateral CFA endarterectomies. L groin became infected requiring a muscle flap. He then developed a L CFA pseudoaneurysm s/p covered stent placement. He then developed a large pseudoaneurysm in the L EIA just proximal to the prior stent. He was noted to have fevers and chills several days prior, as well as positive blood cultures.    Description of Procedure:  After an informed consent was obtained the patient was brought to the OR and placed in the supine position. Both the patient and procedure were confirmed and identified during timeout process. The patient received perioperative antibiotics. The patient's bilateral groins were prepped and draped in usual sterile fashion. Using ultrasound guidance, the R common femoral artery vessel patency was confirmed. Then direct ultrasound guidance the R CFA was entered with a 21-G needle, Mandril wire and 4-Fr micropuncture needle. This was very difficult due to the significant scar tissue from his  prior surgery. Sheathogram demonstrated access in the CFA. Then under fluoroscopic guidance, an 0.035-in amplatz was placed into the distal aorta. The tract was serially dilated to 9F, and a 9F sheath was inserted. Next a DILLAN-catheter was placed over the wire and into the distal aorta under fluoroscopy and an aortogram with runoff to L leg was performed which demonstrated:    Aorto-iliac vessels: L EIA with large pseudoaneurysm arising from jsut proximal to prior stent  L Common femoral, profunda femoral arteries: no significant disease. Prior patch in place  L Superficial femoral artery: mid SFA stenosis  L Popliteal artery: no significant disease  Tibials: 3v runoff    Based on the images from this diagnostic angio, a decision was made to intervene. We then systemically heparinized the patient with 7000 u of heparin.     Next, we advanced an angled glide cath into the popliteal artery, exchanged for an amplatz, and placed a 5x55 sheath. A 9x75 viabahn was positioned across the pseudoaneurysm and successfully deployed. Completion angiogram showed no further extravasation.  Next a 2 cm incision was made over the left groin directly superficial to the pseudoaneurysm.  The pseudoaneurysm was entered and 250 cc of blood was removed.  A repeat angiogram was performed from the right CFA sheath, confirming that there was no further bleeding from the pseudoaneurysm.  A 19 [place] Ezra drain was placed into the cavity, and sutured into place with the 0 nylon suture. Heparin was reversed with 40 units of protamine. We then deployed a perclose closure device without issue in the right groin. At the conclusion of the case the patient was noted to have no evidence of a groin hematoma. All instrument and sponge counts were correct at the end of the case. The patient tolerated the procedure well and was transferred to the pacu for further recovery.     Complications:  None; patient tolerated the procedure well.    Disposition:  Recovery- hemodynamically stable in good condition    Carolyn Pat MD   Fellow, Vascular/Endovascular Surgery    Bruce Dallas MD DFSVS FACS  Vascular/Endovascular Surgery

## 2020-06-30 NOTE — H&P
[facility]  Critical Care - Surgery  History & Physical    Patient Name: Renan Britton  MRN: [id number]  Admission Date: 6/29/2020  Code Status: Prior  Attending Physician: Bruce Dallas MD   Primary Care Provider: Eren Becker MD   Principal Problem: Pseudoaneurysm    Subjective:     HPI:  59-year-old male with PAD s/p bilateral femoral endarterectomy 12/2019 who was admitted for fevers and chills for 4 days. Of note, patient had wound complications on his left femoral endarterectomy ultimately needing debridements and flap coverage with prolonged abx. His course was further complicated by a pseudoaneurysm on that site which was stented. Last finished abx 5/2020. On further questioning, patient notes his left leg has become much more swollen and firm over the last 24 hours and the mass in his groin which used to be quite small feels bigger and more firm as well. He does not note significant pain to the leg and has been getting around okay. Denies any other issues at this time.  He was transferred from [facility] to [facility] for vascular surgery evaluation.  Pt taken emergently to OR for covered stent placed and washout of left groin.  Pt arrives to SICU extubated and off all pressors.    Hospital/ICU Course:  No notes on file    Follow-up For: Procedure(s) (LRB):  Angiogram Extremity Unilateral, washout L groin, possible open pseudoaneurysm repair (Left)  STENT, ILIAC ARTERY (Left)  AORTOGRAM (N/A)  EVACUATION-HEMATOMA-VASCULAR (Left)  REPAIR, PSEUDOANEURYSM (Left)    Post-Operative Day: 1 Day Post-Op     Past Medical History:   Diagnosis Date    Diabetes mellitus     H/O brain tumor     HLD (hyperlipidemia)     Hypertension     PAD (peripheral artery disease)     Seizures     R/T BRAIN TUMOR- SURGICALLY EXCISED       Past Surgical History:   Procedure Laterality Date    ANGIOGRAPHY Left 4/6/2020    Procedure: ANGIOGRAM, Left lower extremity;  Surgeon: Kelby Gomez MD;  Location:  [facility] OR 2ND FLR;  Service: Peripheral Vascular;  Laterality: Left;    APPLICATION OF WOUND VACUUM-ASSISTED CLOSURE DEVICE Bilateral 1/1/2020    Procedure: APPLICATION, WOUND VAC;  Surgeon: SEBAS Prieto II, MD;  [facility];  Service: Cardiovascular;  Laterality: Bilateral;    APPLICATION OF WOUND VACUUM-ASSISTED CLOSURE DEVICE N/A 1/3/2020    Procedure: APPLICATION, WOUND VAC;  Surgeon: Brian Wong MD;  Location: [facility] OR [address] [place] [address];  Service: Plastics;  Laterality: N/A;    BRAIN SURGERY  01/2011    TUMOR EXCISED    CREATION OF MUSCLE ROTATIONAL FLAP N/A 1/3/2020    Procedure: CREATION, FLAP, MUSCLE ROTATION;  Surgeon: Brian Wong MD;  Location: [facility] OR [address] [place] [address];  Service: Plastics;  Laterality: N/A;    ENDARTERECTOMY OF FEMORAL ARTERY Bilateral 12/20/2019    Procedure: ENDARTERECTOMY, FEMORAL;  Surgeon: Kelby Gomez MD;  Location: [address];  Service: Peripheral Vascular;  Laterality: Bilateral;  natalya common femoral endarterectomy with natalya. iliac stent placement    PERCUTANEOUS TRANSLUMINAL ANGIOPLASTY Left 4/6/2020    Procedure: PTA (ANGIOPLASTY, PERCUTANEOUS, TRANSLUMINAL), left lower extremity;  Surgeon: Kelby Gomez MD;  Location: [address];  Service: Peripheral Vascular;  Laterality: Left;    PERCUTANEOUS TRANSLUMINAL ANGIOPLASTY (PTA) OF PERIPHERAL VESSEL Left 10/17/2019    Procedure: PTA, PERIPHERAL VESSEL;  Surgeon: Rao Fisher MD;  Location: [facility] CATH;  Service: Cardiology;  Laterality: Left;       Review of patient's allergies indicates:   Allergen Reactions    Penicillins Hives     Patient currently on cefepime without complications  Tolerated ceftriaxone 3/2020       Family History     Problem Relation (Age of Onset)    Cancer Father, Brother    Diabetes Mother    Heart disease Father, Brother, Brother, Brother    Hypertension Mother    Stroke Mother        Tobacco Use    Smoking status: Former Smoker     Packs/day: 2.00     Types:  Cigarettes     Quit date: 2011     Years since quittin.4    Smokeless tobacco: Never Used   Substance and Sexual Activity    Alcohol use: Yes     Comment: 2-4 PER DAY    Drug use: Never    Sexual activity: Not on file      Review of Systems   Unable to perform ROS: Acuity of condition     Objective:     Vital Signs (Most Recent):  Temp: 99 °F (37.2 °C) (20 2340)  Pulse: (!) 138 (20 0015)  Resp: (!) 27 (20 0015)  BP: (!) 130/95 (20 2345)  SpO2: (!) 77 % (20 0015) Vital Signs (24h Range):  Temp:  [96.4 °F (35.8 °C)-99.2 °F (37.3 °C)] 99 °F (37.2 °C)  Pulse:  [] 138  Resp:  [16-49] 27  SpO2:  [77 %-100 %] 77 %  BP: (123-138)/(65-95) 130/95  Arterial Line BP: (141-196)/(67-88) 141/67        There is no height or weight on file to calculate BMI.      Intake/Output Summary (Last 24 hours) at 2020 0028  Last data filed at 2020 2345  Gross per 24 hour   Intake 1600 ml   Output 370 ml   Net 1230 ml       Physical Exam  Vitals signs reviewed.   Constitutional:       Appearance: Normal appearance.   HENT:      Head: Normocephalic and atraumatic.      Nose: Nose normal.   Eyes:      Pupils: Pupils are equal, round, and reactive to light.   Neck:      Musculoskeletal: Normal range of motion.   Cardiovascular:      Rate and Rhythm: Normal rate and regular rhythm.      Comments: Able to doppler biphasic PT on LLE  Weak, but able to doppler Biphasic DP on LLE    L groin dressing c/d/i.  Drain w/ SS output  No pulsatile mass  Abdominal:      General: Abdomen is flat. There is no distension.      Palpations: Abdomen is soft.   Musculoskeletal: Normal range of motion.   Skin:     General: Skin is warm and dry.      Capillary Refill: Capillary refill takes less than 2 seconds.   Neurological:      General: No focal deficit present.      Mental Status: He is alert.   Psychiatric:         Mood and Affect: Mood normal.         Behavior: Behavior normal.         Vents:        Lines/Drains/Airways     Peripherally Inserted Central Catheter Line            PICC Double Lumen 04/06/20 0951 right brachial 84 days          Drain                 Closed/Suction Drain 06/29/20 2254 Left Groin Bulb 19 Fr. less than 1 day         Urethral Catheter 06/29/20 2115 Non-latex;Double-lumen;Straight-tip 16 Fr. less than 1 day          Peripheral Intravenous Line                 Midline Catheter Insertion/Assessment  - Single Lumen 03/12/20 1321 Left cephalic vein (lateral side of arm) 18g x 8cm 109 days         Peripheral IV - Double Lumen 06/27/20 0952 18 G Anterior;Distal;Right Upper Arm 2 days                Significant Labs:    CBC/Anemia Profile:  Recent Labs   Lab 06/28/20  0535 06/28/20  1606 06/29/20  0451 06/29/20  2345   WBC 7.80  --  7.28 7.45   HGB 12.9* 12.9* 12.7* 12.1*   HCT 39.2* 39.4* 37.9* 35.7*   *  --  134* 128*   MCV 83  --  83 82   RDW 14.2  --  14.2 14.4        Chemistries:  Recent Labs   Lab 06/28/20  0535 06/29/20  0451   * 133*   K 3.4* 3.9    98   CO2 24 26   BUN 9 10   CREATININE 0.7 0.7   CALCIUM 8.7 9.0   ALBUMIN 2.7* 2.7*   PROT 6.4 6.6   BILITOT 0.9 1.0   ALKPHOS 40* 46*   ALT 46* 39   AST 44* 32   MG 1.8 1.6   PHOS 2.5* 3.1     Significant Imaging: I have reviewed and interpreted all pertinent imaging results/findings within the past 24 hours.    Assessment/Plan:     * Pseudoaneurysm  Renan Britton is a 59 y.o. male w/ recurrent L CFA pseudoaneurysm who presents to SICU s/p covered stent placement and L groin washout on 6/29.    Neuro  Jessica/Dilaudid    Resp  Just extubated, 10 L facemask, wean as tolerated  Encourage IS    Cards  No pressor support necessary  Regular rate and rhythm  No known cardiac hx  ASA/Plavix given PVD hx    GI  NPO  Replace lytes as needed  If he does well, can have a diet in the AM    Renal  Normal function preop, will check on postop labs    Endo  Insulin gtt    Heme/ID  Normal Hgb preop, will check postop, minimal  blood loss during surgery  Rocephin/Levo per primary  Cx pending from OR, will f/u    Dispo: ICU tonight, possibly floor tomorrow            Critical care was time spent personally by me on the following activities: development of treatment plan with patient or surrogate and bedside caregivers, discussions with consultants, evaluation of patient's response to treatment, examination of patient, ordering and performing treatments and interventions, ordering and review of laboratory studies, ordering and review of radiographic studies, pulse oximetry, re-evaluation of patient's condition.  This critical care time did not overlap with that of any other provider or involve time for any procedures.     Peewee Wright MD  Critical Care - Surgery  [facility]

## 2020-06-30 NOTE — ASSESSMENT & PLAN NOTE
Renan Britton is a 59 y.o. male w/ recurrent L CFA pseudoaneurysm who presents to SICU s/p covered stent placement and L groin washout on 6/29.    Neuro  Jessica/Dilaudid    Resp  Just extubated, 10 L facemask, wean as tolerated  Encourage IS    Cards  No pressor support necessary  Regular rate and rhythm  No known cardiac hx  ASA/Plavix given PVD hx    GI  NPO  Replace lytes as needed  If he does well, can have a diet in the AM    Renal  Normal function preop, will check on postop labs    Endo  Insulin gtt    Heme/ID  Normal Hgb preop, will check postop, minimal blood loss during surgery  Rocephin/Levo per primary  Cx pending from OR, will f/u    Dispo: ICU tonight, possibly floor tomorrow

## 2020-06-30 NOTE — ANESTHESIA PREPROCEDURE EVALUATION
[facility]  Anesthesia Pre-Operative Evaluation       Patient Name: Renan Britton  YOB: 1960  MRN: [id number]  CSN: [id number]      Code Status: Full Code   Date of Procedure: 6/29/2020  Anesthesia: General Procedure: Procedure(s) (LRB):  Angiogram Extremity Unilateral, washout L groin, possible open pseudoaneurysm repair (Left)  Pre-Operative Diagnosis: Pseudoaneurysm [I72.9]  Proceduralist: Surgeon(s) and Role:     * Bruce Dallas MD - Primary        SUBJECTIVE:   Reann Britton is a 59 y.o. male w/ a significant PMHx of NIDDM2, claudication, HLD, PAD, HTN, JJ and remote hx of brain tumor s/p resection who presents with large pseudoaneurysm. Admitted to OSH for fevers with recent history of bilateral femoral endarterectomy complicated by left-sided wound infection requiring flap coverage and prolonged abx. Today, CT shows expanded pseudoaneurysm from prior which is pulsatile on exam. Further, there appears to be some contrast extending into the pseudoaneurysm.     last procedure tolerated well under GA w native airway.    Prev airway: Placement Date: 01/03/20; Placement Time: 1421 (created via procedure documentation); Method of Intubation: Video Laryngoscopy; Mask Ventilation: Not Attempted; Intubated: Postinduction; Airway Device Size: 7.5; Placement Verified By: Capnometry; Complicating Factors: None; Intubation Findings: Bilateral breath sounds; Securment: Lips; Complications: None; Removal Date: 01/03/20;  Removal Time: 1756    Revised cardiac risk index (RCRI) score is 1    he has a current medication list which includes the following long-term medication(s): aspirin, atorvastatin, cefadroxil, cetirizine, clopidogrel, metformin, pregabalin, and valsartan.     ALLERGIES:     Review of patient's allergies indicates:   Allergen Reactions    Penicillins Hives     Patient currently on cefepime without complications  Tolerated ceftriaxone 3/2020     LDA:       Lines/Drains/Airways     Peripherally Inserted Central Catheter Line            PICC Double Lumen 04/06/20 0951 right brachial 84 days          Peripheral Intravenous Line                 Midline Catheter Insertion/Assessment  - Single Lumen 03/12/20 1321 Left cephalic vein (lateral side of arm) 18g x 8cm 109 days         Peripheral IV - Double Lumen 06/27/20 0952 18 G Anterior;Distal;Right Upper Arm 2 days               Anesthesia Evaluation      Airway   Mallampati: III  TM distance: Normal, at least 6 cm  Neck ROM: Normal ROM  Dental    (+) In tact    Pulmonary    Cardiovascular   (+) hypertension poorly controlled,     Rate: Normal    Neuro/Psych      GI/Hepatic/Renal    (+) chronic renal disease (JJ),     Endo/Other    (+) diabetes mellitus type 2 well controlled,   Abdominal                     MEDICATIONS:     Antibiotics (From admission, onward)    None        VTE Risk Mitigation (From admission, onward)    None        No current facility-administered medications for this encounter.      Current Outpatient Medications   Medication Sig Dispense Refill    ascorbic acid (VITAMIN C ORAL) Take 2 capsules by mouth every morning.      aspirin (ECOTRIN) 81 MG EC tablet Take 81 mg by mouth once daily.      atorvastatin (LIPITOR) 40 MG tablet Take 40 mg by mouth once daily.      cefadroxil (DURICEF) 500 MG Cap Take 2 capsules (1,000 mg total) by mouth every 12 (twelve) hours. 120 capsule 4    cetirizine 10 mg chewable tablet Take 10 mg by mouth once daily.       clopidogreL (PLAVIX) 75 mg tablet Take 1 tablet (75 mg total) by mouth once daily. 30 tablet 10    elderberry fruit and flower 460-115 mg Cap Take 2 capsules by mouth every morning.      HYDROcodone-acetaminophen (NORCO) 5-325 mg per tablet Take 1 tablet by mouth every 6 (six) hours as needed. 15 tablet 0    levoFLOXacin (LEVAQUIN) 500 MG tablet Take 1 tablet (500 mg total) by mouth once daily. 30 tablet 2    metFORMIN (GLUCOPHAGE-XR) 500 MG XR  24hr tablet Take 1,000 mg by mouth daily with breakfast.       pregabalin (LYRICA) 100 MG capsule Take 100 mg by mouth 2 (two) times daily.      valsartan (DIOVAN) 160 MG tablet Take 160 mg by mouth once daily.       Facility-Administered Medications Ordered in Other Encounters   Medication Dose Route Frequency Provider Last Rate Last Dose    acetaminophen tablet 650 mg  650 mg Oral Q4H PRN Kathryn Bradford MD   650 mg at 06/27/20 0505    acetaminophen tablet 650 mg  650 mg Oral Q8H PRN Kathryn Bradford MD   650 mg at 06/28/20 0359    aspirin EC tablet 81 mg  81 mg Oral Daily Kathryn Bradford MD   81 mg at 06/29/20 0845    atorvastatin tablet 40 mg  40 mg Oral Daily Kathryn Bradford MD   40 mg at 06/29/20 0857    carboxymethylcellulose 0.5 % ophthalmic solution 1 drop  1 drop Both Eyes TID PRN Avani Shaffer MD   1 drop at 06/29/20 0858    cefTRIAXone (ROCEPHIN) 1 g/50 mL D5W IVPB  1 g Intravenous Q24H Kathryn Bradford MD   1 g at 06/29/20 0857    clopidogreL tablet 75 mg  75 mg Oral Daily Kathryn Bradford MD   75 mg at 06/29/20 0857    dextrose 50% injection 12.5 g  12.5 g Intravenous PRN Kathryn Bradford MD        dextrose 50% injection 25 g  25 g Intravenous PRN Kathryn Bradford MD        enoxaparin injection 40 mg  40 mg Subcutaneous Q24H Lit Ames MD   40 mg at 06/29/20 1707    glucagon (human recombinant) injection 1 mg  1 mg Intramuscular PRN Kathryn Bradford MD        glucose chewable tablet 16 g  16 g Oral PRN Kathryn Bradford MD        glucose chewable tablet 24 g  24 g Oral PRN Kathryn Bradford MD        HYDROcodone-acetaminophen 5-325 mg per tablet 1 tablet  1 tablet Oral Q6H PRN Kathryn Bradford MD   1 tablet at 06/29/20 1713    insulin aspart U-100 pen 0-5 Units  0-5 Units Subcutaneous QID (AC + HS) PRN Kathryn Bradford MD        lorazepam (ATIVAN) injection 1 mg  1 mg Intravenous Q5 Min PRN Lit Ames MD   1 mg at 06/27/20 0646    ondansetron injection 4 mg  4 mg Intravenous Q6H PRN Kathryn Bradford MD         polyethylene glycol packet 17 g  17 g Oral Daily Lit Ames MD   17 g at 06/29/20 0856    pregabalin capsule 100 mg  100 mg Oral BID Kathryn Bradford MD   100 mg at 06/29/20 0858    senna-docusate 8.6-50 mg per tablet 1 tablet  1 tablet Oral Daily PRN Armand Cooper MD        sodium chloride 0.9% flush 10 mL  10 mL Intravenous PRN Kathryn Bradford MD        valsartan tablet 40 mg  40 mg Oral Daily Lit Ames MD   40 mg at 06/29/20 1017          History:   There are no hospital problems to display for this patient.    Surgical History:    has a past surgical history that includes Brain surgery (01/2011); Percutaneous transluminal angioplasty (PTA) of peripheral vessel (Left, 10/17/2019); Endarterectomy of femoral artery (Bilateral, 12/20/2019); Application of wound vacuum-assisted closure device (Bilateral, 1/1/2020); Creation of muscle rotational flap (N/A, 1/3/2020); Application of wound vacuum-assisted closure device (N/A, 1/3/2020); Angiography (Left, 4/6/2020); and Percutaneous transluminal angioplasty (Left, 4/6/2020).   Social History:    has no history on file for sexual activity.  reports that he quit smoking about 9 years ago. His smoking use included cigarettes. He smoked 2.00 packs per day. He has never used smokeless tobacco. He reports current alcohol use. He reports that he does not use drugs.     OBJECTIVE:     Vital Signs (Most Recent):    Vital Signs Range (Last 24H):  Temp:  [35.8 °C (96.4 °F)-37.6 °C (99.6 °F)]   Pulse:  [77-89]   Resp:  [16-19]   BP: (120-138)/(65-86)   SpO2:  [93 %-99 %]        There is no height or weight on file to calculate BMI.   Wt Readings from Last 4 Encounters:   06/26/20 115 kg (253 lb 8.5 oz)   06/29/20 115 kg (253 lb 8.5 oz)   05/19/20 115.7 kg (255 lb 1.2 oz)   04/23/20 116.1 kg (255 lb 15.3 oz)     Significant Labs:  Lab Results   Component Value Date    WBC 7.28 06/29/2020    HGB 12.7 (L) 06/29/2020    HCT 37.9 (L) 06/29/2020     (L) 06/29/2020      (L) 06/29/2020    K 3.9 06/29/2020    CL 98 06/29/2020    CREATININE 0.7 06/29/2020    BUN 10 06/29/2020    CO2 26 06/29/2020     (H) 06/29/2020    CALCIUM 9.0 06/29/2020    MG 1.6 06/29/2020    PHOS 3.1 06/29/2020    ALKPHOS 46 (L) 06/29/2020    ALT 39 06/29/2020    AST 32 06/29/2020    ALBUMIN 2.7 (L) 06/29/2020    INR 1.6 (H) 06/27/2020    APTT 28.2 03/10/2020    HGBA1C 5.3 03/09/2020     Recent Results (from the past 72 hour(s))   Blood culture    Collection Time: 06/27/20 12:10 AM    Specimen: Blood   Result Value Ref Range    Blood Culture, Routine No Growth to date     Blood Culture, Routine No Growth to date     Blood Culture, Routine No Growth to date    Blood culture    Collection Time: 06/27/20 12:17 AM    Specimen: Peripheral, Right; Blood   Result Value Ref Range    Blood Culture, Routine No Growth to date     Blood Culture, Routine No Growth to date     Blood Culture, Routine No Growth to date    Comprehensive Metabolic Panel (CMP)    Collection Time: 06/27/20  5:43 AM   Result Value Ref Range    Sodium 140 136 - 145 mmol/L    Potassium 4.5 3.5 - 5.1 mmol/L    Chloride 102 95 - 110 mmol/L    CO2 24 23 - 29 mmol/L    Glucose 144 (H) 70 - 110 mg/dL    BUN, Bld 10 6 - 20 mg/dL    Creatinine 1.0 0.5 - 1.4 mg/dL    Calcium 9.5 8.7 - 10.5 mg/dL    Total Protein 7.8 6.0 - 8.4 g/dL    Albumin 3.3 (L) 3.5 - 5.2 g/dL    Total Bilirubin 1.0 0.1 - 1.0 mg/dL    Alkaline Phosphatase 49 (L) 55 - 135 U/L    AST 39 10 - 40 U/L    ALT 56 (H) 10 - 44 U/L    Anion Gap 14 8 - 16 mmol/L    eGFR if African American >60.0 >60 mL/min/1.73 m^2    eGFR if non African American >60.0 >60 mL/min/1.73 m^2   Magnesium    Collection Time: 06/27/20  5:43 AM   Result Value Ref Range    Magnesium 2.0 1.6 - 2.6 mg/dL   Phosphorus    Collection Time: 06/27/20  5:43 AM   Result Value Ref Range    Phosphorus 3.1 2.7 - 4.5 mg/dL   CBC with Automated Differential    Collection Time: 06/27/20  5:43 AM   Result Value Ref Range     WBC 8.72 3.90 - 12.70 K/uL    RBC 5.33 4.60 - 6.20 M/uL    Hemoglobin 14.8 14.0 - 18.0 g/dL    Hematocrit 46.2 40.0 - 54.0 %    Mean Corpuscular Volume 87 82 - 98 fL    Mean Corpuscular Hemoglobin 27.8 27.0 - 31.0 pg    Mean Corpuscular Hemoglobin Conc 32.0 32.0 - 36.0 g/dL    RDW 14.4 11.5 - 14.5 %    Platelets 159 150 - 350 K/uL    MPV 10.1 9.2 - 12.9 fL    Immature Granulocytes 0.3 0.0 - 0.5 %    Gran # (ANC) 6.7 1.8 - 7.7 K/uL    Immature Grans (Abs) 0.03 0.00 - 0.04 K/uL    Lymph # 1.5 1.0 - 4.8 K/uL    Mono # 0.5 0.3 - 1.0 K/uL    Eos # 0.0 0.0 - 0.5 K/uL    Baso # 0.02 0.00 - 0.20 K/uL    nRBC 0 0 /100 WBC    Gran% 76.5 (H) 38.0 - 73.0 %    Lymph% 17.1 (L) 18.0 - 48.0 %    Mono% 5.8 4.0 - 15.0 %    Eosinophil% 0.1 0.0 - 8.0 %    Basophil% 0.2 0.0 - 1.9 %    Differential Method Automated    PT/INR    Collection Time: 06/27/20  5:43 AM   Result Value Ref Range    Prothrombin Time 17.7 (H) 9.0 - 12.5 sec    INR 1.6 (H) 0.8 - 1.2   POCT glucose    Collection Time: 06/27/20  5:59 AM   Result Value Ref Range    POCT Glucose 125 (H) 70 - 110 mg/dL   POCT glucose    Collection Time: 06/27/20  7:36 AM   Result Value Ref Range    POCT Glucose 149 (H) 70 - 110 mg/dL   POCT glucose    Collection Time: 06/27/20 11:28 AM   Result Value Ref Range    POCT Glucose 186 (H) 70 - 110 mg/dL   POCT glucose    Collection Time: 06/27/20  3:32 PM   Result Value Ref Range    POCT Glucose 120 (H) 70 - 110 mg/dL   POCT glucose    Collection Time: 06/27/20  8:11 PM   Result Value Ref Range    POCT Glucose 141 (H) 70 - 110 mg/dL   VANCOMYCIN, TROUGH before 4th dose    Collection Time: 06/27/20 10:57 PM   Result Value Ref Range    Vancomycin-Trough 9.0 (L) 10.0 - 22.0 ug/mL   Comprehensive Metabolic Panel (CMP)    Collection Time: 06/28/20  5:35 AM   Result Value Ref Range    Sodium 132 (L) 136 - 145 mmol/L    Potassium 3.4 (L) 3.5 - 5.1 mmol/L    Chloride 102 95 - 110 mmol/L    CO2 24 23 - 29 mmol/L    Glucose 138 (H) 70 - 110 mg/dL     BUN, Bld 9 6 - 20 mg/dL    Creatinine 0.7 0.5 - 1.4 mg/dL    Calcium 8.7 8.7 - 10.5 mg/dL    Total Protein 6.4 6.0 - 8.4 g/dL    Albumin 2.7 (L) 3.5 - 5.2 g/dL    Total Bilirubin 0.9 0.1 - 1.0 mg/dL    Alkaline Phosphatase 40 (L) 55 - 135 U/L    AST 44 (H) 10 - 40 U/L    ALT 46 (H) 10 - 44 U/L    Anion Gap 6 (L) 8 - 16 mmol/L    eGFR if African American >60.0 >60 mL/min/1.73 m^2    eGFR if non African American >60.0 >60 mL/min/1.73 m^2   Magnesium    Collection Time: 06/28/20  5:35 AM   Result Value Ref Range    Magnesium 1.8 1.6 - 2.6 mg/dL   Phosphorus    Collection Time: 06/28/20  5:35 AM   Result Value Ref Range    Phosphorus 2.5 (L) 2.7 - 4.5 mg/dL   CBC with Automated Differential    Collection Time: 06/28/20  5:35 AM   Result Value Ref Range    WBC 7.80 3.90 - 12.70 K/uL    RBC 4.73 4.60 - 6.20 M/uL    Hemoglobin 12.9 (L) 14.0 - 18.0 g/dL    Hematocrit 39.2 (L) 40.0 - 54.0 %    Mean Corpuscular Volume 83 82 - 98 fL    Mean Corpuscular Hemoglobin 27.3 27.0 - 31.0 pg    Mean Corpuscular Hemoglobin Conc 32.9 32.0 - 36.0 g/dL    RDW 14.2 11.5 - 14.5 %    Platelets 126 (L) 150 - 350 K/uL    MPV 10.6 9.2 - 12.9 fL    Immature Granulocytes 0.4 0.0 - 0.5 %    Gran # (ANC) 6.2 1.8 - 7.7 K/uL    Immature Grans (Abs) 0.03 0.00 - 0.04 K/uL    Lymph # 0.8 (L) 1.0 - 4.8 K/uL    Mono # 0.8 0.3 - 1.0 K/uL    Eos # 0.0 0.0 - 0.5 K/uL    Baso # 0.02 0.00 - 0.20 K/uL    nRBC 0 0 /100 WBC    Gran% 78.8 (H) 38.0 - 73.0 %    Lymph% 9.9 (L) 18.0 - 48.0 %    Mono% 10.5 4.0 - 15.0 %    Eosinophil% 0.1 0.0 - 8.0 %    Basophil% 0.3 0.0 - 1.9 %    Differential Method Automated    POCT glucose    Collection Time: 06/28/20  7:57 AM   Result Value Ref Range    POCT Glucose 125 (H) 70 - 110 mg/dL   POCT glucose    Collection Time: 06/28/20 11:39 AM   Result Value Ref Range    POCT Glucose 154 (H) 70 - 110 mg/dL   Hemoglobin    Collection Time: 06/28/20  4:06 PM   Result Value Ref Range    Hemoglobin 12.9 (L) 14.0 - 18.0 g/dL   Hematocrit     Collection Time: 06/28/20  4:06 PM   Result Value Ref Range    Hematocrit 39.4 (L) 40.0 - 54.0 %   POCT glucose    Collection Time: 06/28/20  4:14 PM   Result Value Ref Range    POCT Glucose 138 (H) 70 - 110 mg/dL   POCT glucose    Collection Time: 06/28/20  8:09 PM   Result Value Ref Range    POCT Glucose 150 (H) 70 - 110 mg/dL   Comprehensive Metabolic Panel (CMP)    Collection Time: 06/29/20  4:51 AM   Result Value Ref Range    Sodium 133 (L) 136 - 145 mmol/L    Potassium 3.9 3.5 - 5.1 mmol/L    Chloride 98 95 - 110 mmol/L    CO2 26 23 - 29 mmol/L    Glucose 139 (H) 70 - 110 mg/dL    BUN, Bld 10 6 - 20 mg/dL    Creatinine 0.7 0.5 - 1.4 mg/dL    Calcium 9.0 8.7 - 10.5 mg/dL    Total Protein 6.6 6.0 - 8.4 g/dL    Albumin 2.7 (L) 3.5 - 5.2 g/dL    Total Bilirubin 1.0 0.1 - 1.0 mg/dL    Alkaline Phosphatase 46 (L) 55 - 135 U/L    AST 32 10 - 40 U/L    ALT 39 10 - 44 U/L    Anion Gap 9 8 - 16 mmol/L    eGFR if African American >60.0 >60 mL/min/1.73 m^2    eGFR if non African American >60.0 >60 mL/min/1.73 m^2   Magnesium    Collection Time: 06/29/20  4:51 AM   Result Value Ref Range    Magnesium 1.6 1.6 - 2.6 mg/dL   Phosphorus    Collection Time: 06/29/20  4:51 AM   Result Value Ref Range    Phosphorus 3.1 2.7 - 4.5 mg/dL   CBC with Automated Differential    Collection Time: 06/29/20  4:51 AM   Result Value Ref Range    WBC 7.28 3.90 - 12.70 K/uL    RBC 4.58 (L) 4.60 - 6.20 M/uL    Hemoglobin 12.7 (L) 14.0 - 18.0 g/dL    Hematocrit 37.9 (L) 40.0 - 54.0 %    Mean Corpuscular Volume 83 82 - 98 fL    Mean Corpuscular Hemoglobin 27.7 27.0 - 31.0 pg    Mean Corpuscular Hemoglobin Conc 33.5 32.0 - 36.0 g/dL    RDW 14.2 11.5 - 14.5 %    Platelets 134 (L) 150 - 350 K/uL    MPV 10.9 9.2 - 12.9 fL    Immature Granulocytes 0.4 0.0 - 0.5 %    Gran # (ANC) 6.0 1.8 - 7.7 K/uL    Immature Grans (Abs) 0.03 0.00 - 0.04 K/uL    Lymph # 0.6 (L) 1.0 - 4.8 K/uL    Mono # 0.6 0.3 - 1.0 K/uL    Eos # 0.0 0.0 - 0.5 K/uL    Baso # 0.01 0.00  - 0.20 K/uL    nRBC 0 0 /100 WBC    Gran% 82.2 (H) 38.0 - 73.0 %    Lymph% 8.4 (L) 18.0 - 48.0 %    Mono% 8.5 4.0 - 15.0 %    Eosinophil% 0.4 0.0 - 8.0 %    Basophil% 0.1 0.0 - 1.9 %    Differential Method Automated    POCT glucose    Collection Time: 06/29/20  7:23 AM   Result Value Ref Range    POCT Glucose 149 (H) 70 - 110 mg/dL   POCT glucose    Collection Time: 06/29/20 11:08 AM   Result Value Ref Range    POCT Glucose 151 (H) 70 - 110 mg/dL   POCT glucose    Collection Time: 06/29/20  3:36 PM   Result Value Ref Range    POCT Glucose 172 (H) 70 - 110 mg/dL       EKG:   Results for orders placed or performed during the hospital encounter of 06/26/20   EKG 12-lead    Collection Time: 06/26/20  7:54 AM    Narrative    Test Reason : A41.9,    Vent. Rate : 090 BPM     Atrial Rate : 090 BPM     P-R Int : 150 ms          QRS Dur : 088 ms      QT Int : 360 ms       P-R-T Axes : 064 073 061 degrees     QTc Int : 440 ms    Normal sinus rhythm  Within normal limits  When compared with ECG of 09-MAR-2020 02:06,  Nonspecific T wave abnormality no longer evident in Lateral leads  Confirmed by Jesus Velazquez MD ([id number]) on 6/27/2020 8:09:25 AM    Referred By: ARON XIE           Confirmed By:Jesus Velazquez MD       TTE:  Results for orders placed or performed during the hospital encounter of 03/09/20   Echo Color Flow Doppler? Yes   Result Value Ref Range    BSA 2.43 m2    TDI SEPTAL 0.09 m/s    LV LATERAL E/E' RATIO 13.89 m/s    LV SEPTAL E/E' RATIO 13.89 m/s    LA WIDTH 3.99 cm    TDI LATERAL 0.09 m/s    LVIDD 4.64 3.5 - 6.0 cm    IVS 1.26 (A) 0.6 - 1.1 cm    PW 1.30 (A) 0.6 - 1.1 cm    LVIDS 3.00 2.1 - 4.0 cm    FS 35 28 - 44 %    LA volume 57.76 cm3    Sinus 3.16 cm    STJ 2.49 cm    Ascending aorta 2.84 cm    LV mass 228.03 g    LA size 3.76 cm    RVDD 3.84 cm    TAPSE 2.44 cm    Left Ventricle Relative Wall Thickness 0.56 cm    AV mean gradient 37 mmHg    AV valve area 1.40 cm2    AV Velocity Ratio 0.42     AV index  (prosthetic) 0.36     E/A ratio 1.13     Mean e' 0.09 m/s    E wave decelartion time 213.99 msec    LVOT diameter 2.22 cm    LVOT area 3.9 cm2    LVOT peak toney 1.48 m/s    LVOT peak VTI 25.78 cm    Ao peak toney 3.53 m/s    Ao VTI 71.10 cm    LVOT stroke volume 99.74 cm3    AV peak gradient 50 mmHg    E/E' ratio 13.89 m/s    MV Peak E Toney 1.25 m/s    TR Max Toney 2.28 m/s    MV Peak A Toney 1.11 m/s    LV Systolic Volume 35.03 mL    LV Systolic Volume Index 14.8 mL/m2    LV Diastolic Volume 99.12 mL    LV Diastolic Volume Index 41.89 mL/m2    LA Volume Index 24.4 mL/m2    LV Mass Index 96 g/m2    RA Major Axis 4.52 cm    Left Atrium Minor Axis 4.57 cm    Left Atrium Major Axis 4.49 cm    Triscuspid Valve Regurgitation Peak Gradient 21 mmHg    RA Width 3.69 cm    Right Atrial Pressure (from IVC) 3 mmHg    TV rest pulmonary artery pressure 24 mmHg    Narrative    · Concentric left ventricular remodeling.  · Normal left ventricular systolic function. The estimated ejection   fraction is 65%.  · Normal right ventricular systolic function.  · Normal LV diastolic function.  · No wall motion abnormalities.  · Mild tricuspid regurgitation.  · The estimated PA systolic pressure is 24 mmHg.  · Normal central venous pressure (3 mmHg).          ASSESSMENT/PLAN:       Pre-op Assessment    I have reviewed the Patient Summary Reports.    I have reviewed the Nursing Notes.    I have reviewed the Medications.     Review of Systems  Anesthesia Hx:  No problems with previous Anesthesia  History of prior surgery of interest to airway management or planning: Previous anesthesia: General Denies Family Hx of Anesthesia complications.   Denies Personal Hx of Anesthesia complications.   Social:  Former Smoker, Social Alcohol Use    Cardiovascular:   Hypertension, poorly controlled    Renal/:   Chronic Renal Disease (JJ)    Neurological:   History of brain tumor s/p remote resection   Endocrine:   Diabetes, well controlled, type 2         Physical Exam  General:  Obesity    Airway/Jaw/Neck:  Airway Findings: Mouth Opening: Normal Tongue: Normal  General Airway Assessment: Adult, Possible difficult intubation, Possible difficult mask airway  Mallampati: III  TM Distance: Normal, at least 6 cm  Jaw/Neck Findings:  Neck ROM: Normal ROM  Neck Findings:  Girth Increased      Dental:  Dental Findings: In tact   Chest/Lungs:  Chest/Lungs Findings: Clear to auscultation, Normal Respiratory Rate     Heart/Vascular:  Heart Findings: Rate: Normal  Rhythm: Regular Rhythm  Sounds: Normal        Mental Status:  Mental Status Findings:  Cooperative, Alert and Oriented         Anesthesia Plan  Type of Anesthesia, risks & benefits discussed:  Anesthesia Type:  MAC, general, regional  Patient's Preference:   Intra-op Monitoring Plan: standard ASA monitors and arterial line  Intra-op Monitoring Plan Comments:   Post Op Pain Control Plan: multimodal analgesia and IV/PO Opioids PRN  Post Op Pain Control Plan Comments:   Induction:   IV  Beta Blocker:  Patient is not currently on a Beta-Blocker (No further documentation required).       Informed Consent: Patient understands risks and agrees with Anesthesia plan.  Questions answered. Anesthesia consent signed with patient.  ASA Score: 3     Day of Surgery Review of History & Physical:    H&P update referred to the surgeon.         Ready For Surgery From Anesthesia Perspective.

## 2020-06-30 NOTE — PLAN OF CARE
Pt free from falls and injury this shift. All VSS at this time. Pt had no complaints of pain this shift. SpO2 @ % on RA all shift with no issues. MAPs maintained > 65 and SBP <160 with no issues. All pedal pulses remain dopplerable. Abdomen is rounded, soft. No BM this shift. L groin KATHLEEN drain output minimal. Packer discontinued this shift per MD, pt has since voided over 500cc. 1 new set of blood cultures sent today. No other significant events this shift, transfer orders in place, awaiting bed placement. Plan of care reviewed with pt and spouse, all questions answered. Will continue to monitor.

## 2020-06-30 NOTE — PLAN OF CARE
SICU PLAN OF CARE NOTE    Dx: Pseudoaneurysm    Shift Events: Patient transferred to SICU post op Iliac stent d/t pseudoaneurysm In left fem. One dose of demerol, dilaudid, hydralazine given overnight. Q1H neurovascular checks    Goals of Care: assess site for bleeding or return of hematoma    Neuro: AAO x4, Follows Commands, and Moves All Extremities    Vital Signs: BP 96/64 (BP Location: Left arm, Patient Position: Lying)   Pulse 84   Temp 98.4 °F (36.9 °C) (Oral)   Resp 13   Wt 123.5 kg (272 lb 4.3 oz)   SpO2 97%   BMI 39.07 kg/m²     Respiratory: Nasal Cannula 2L    Diet: NPO    Gtts: MIVF    Urine Output: Urinary Catheter 390 cc/shift    Drains: KATHLEEN Drain, total output 25 cc / shift     Labs/Accuchecks: daily labs, no accucheck order in place though spot checked due to DM history    Skin: Patient's skin remains free from breakdown. Heel foams applied. Head of bead flat until 5 am. Groin site with gauze and some dried drainage, sand bag applied on admit per MD request. No other skin breakdown noted.

## 2020-06-30 NOTE — PROGRESS NOTES
[facility]  Vascular Surgery  Progress Note    Patient Name: Renan Britton  MRN: [id number]  Admission Date: 6/29/2020  Primary Care Provider: Eren Becker MD    Subjective:     Interval History: no events. L groin feels better    Post-Op Info:  Procedure(s) (LRB):  Angiogram Extremity Unilateral, washout L groin, possible open pseudoaneurysm repair (Left)  STENT, ILIAC ARTERY (Left)  AORTOGRAM (N/A)  EVACUATION-HEMATOMA-VASCULAR (Left)  REPAIR, PSEUDOANEURYSM (Left)   1 Day Post-Op       Medications:  Continuous Infusions:   sodium chloride 0.9% 125 mL/hr at 06/30/20 0700     Scheduled Meds:   aspirin  81 mg Oral Daily    atorvastatin  40 mg Oral Daily    cefTRIAXone (ROCEPHIN) IVPB  2 g Intravenous Q24H    clopidogreL  75 mg Oral Daily    enoxparin  40 mg Subcutaneous Q24H    levoFLOXacin  750 mg Oral Daily    mupirocin  1 g Nasal BID    polyethylene glycol  17 g Oral Daily    pregabalin  100 mg Oral BID    valsartan  160 mg Oral Daily     PRN Meds:hydrALAZINE, HYDROcodone-acetaminophen, HYDROmorphone, ketorolac, ondansetron, sodium chloride 0.9%     Objective:     Vital Signs (Most Recent):  Temp: 98.3 °F (36.8 °C) (06/30/20 0700)  Pulse: 86 (06/30/20 0745)  Resp: (!) 23 (06/30/20 0745)  BP: 112/63 (06/30/20 0700)  SpO2: 100 % (06/30/20 0745) Vital Signs (24h Range):  Temp:  [96.7 °F (35.9 °C)-99.2 °F (37.3 °C)] 98.3 °F (36.8 °C)  Pulse:  [] 86  Resp:  [10-28] 23  SpO2:  [77 %-100 %] 100 %  BP: ()/(50-95) 112/63  Arterial Line BP: ()/(47-88) 111/55     Date 06/30/20 0700 - 07/01/20 0659   Shift 4230-9369 0817-1816 7916-4668 24 Hour Total   INTAKE   Shift Total(mL/kg)       OUTPUT   Urine(mL/kg/hr) 125   125   Drains 0   0   Shift Total(mL/kg) 125(1)   125(1)   Weight (kg) 123.5 123.5 123.5 123.5       Physical Exam  Vitals signs reviewed.   Constitutional:       Appearance: Normal appearance. He is obese.   HENT:      Head: Normocephalic and atraumatic.    Eyes:      Extraocular Movements: Extraocular movements intact.      Pupils: Pupils are equal, round, and reactive to light.   Neck:      Musculoskeletal: Normal range of motion and neck supple.   Cardiovascular:      Rate and Rhythm: Regular rhythm.   Pulmonary:      Effort: Pulmonary effort is normal. No respiratory distress.   Abdominal:      General: Abdomen is flat. There is no distension.   Musculoskeletal:      Comments: R fem no hematoma  L fem drain in place - 25 cc serosanguinous  L foot biphasic DP and PT   Neurological:      Mental Status: He is alert.         Significant Labs:  BMP:   Recent Labs   Lab 06/30/20  0300   *   *   K 3.7      CO2 24   BUN 12   CREATININE 0.7   CALCIUM 7.8*   MG 2.0     CBC:   Recent Labs   Lab 06/30/20  0300   WBC 8.49   RBC 3.84*   HGB 10.6*   HCT 33.2*   *   MCV 87   MCH 27.6   MCHC 31.9*       Significant Diagnostics:  I have reviewed all pertinent imaging results/findings within the past 24 hours.    Assessment/Plan:     * Pseudoaneurysm  59 y M with prior bilateral CFA endarterectomies in Dec 2019; L groin wound infections led to a L groin myocutaneous flap.  Had one episode of bleeding pseudoaneurysm from L CFA in April 2020 that was with 8mm viabahn. Readmitted on 6/26 with fever and positive blood cx (proteus)  S/p L EIA viabahn stent (9x75) with via R CFA access, L groin hematoma washout  - cont asa, plavix  - cont iv abx. ID consulted for recs  - cont L groin drain and sand bag, attempt to collapse groin wound  - monitor H/H  - DC foy  - step down to floor        Carolyn Pat MD  Vascular Surgery  [facility]    Vascular Attending  Agree with above  POD#1 s/p emergent L EIA/CFA 9mm Viabahn and evacuation of L groin contained pseudoaneurysm (200 ml)  Feeling much better since L groin pseudoaneurysm decompression above L groin muscle flap  19fr Ezra drain 25 ml sero-sanguinous fluid    Await BCx and L hematoma  Cx  Prior Proteus + on admit to [facility]  IV Abx  ID input  Will need to speak with Dr Gomez in a week re- definitive therapy, including possible L obturator bypass if a Cx is positive    Bruce Dallas MD DFSVS FACS   Vascular/Endovascular Surgery

## 2020-06-30 NOTE — SUBJECTIVE & OBJECTIVE
Past Medical History:   Diagnosis Date    Diabetes mellitus     H/O brain tumor     HLD (hyperlipidemia)     Hypertension     PAD (peripheral artery disease)     Seizures     R/T BRAIN TUMOR- SURGICALLY EXCISED       Past Surgical History:   Procedure Laterality Date    ANGIOGRAPHY Left 4/6/2020    Procedure: ANGIOGRAM, Left lower extremity;  Surgeon: Kelby Gomez MD;  Location: [address];  Service: Peripheral Vascular;  Laterality: Left;    ANGIOGRAPHY OF LOWER EXTREMITY Left 6/29/2020    Procedure: Angiogram Extremity Unilateral, washout L groin, possible open pseudoaneurysm repair;  Surgeon: Bruce Dallas MD;  Location: [address];  Service: Peripheral Vascular;  Laterality: Left;  contrast 23 ml  fluoro time: 9.9 min  mGy: 1230.26  Gycm2: 146.69    AORTOGRAPHY N/A 6/29/2020    Procedure: AORTOGRAM;  Surgeon: Bruce Dallas MD;  Location: [address];  Service: Peripheral Vascular;  Laterality: N/A;    APPLICATION OF WOUND VACUUM-ASSISTED CLOSURE DEVICE Bilateral 1/1/2020    Procedure: APPLICATION, WOUND VAC;  Surgeon: SEBAS Prieto II, MD;  Location: [address];  Service: Cardiovascular;  Laterality: Bilateral;    APPLICATION OF WOUND VACUUM-ASSISTED CLOSURE DEVICE N/A 1/3/2020    Procedure: APPLICATION, WOUND VAC;  Surgeon: Brian Wong MD;  Location: [address];  Service: Plastics;  Laterality: N/A;    BRAIN SURGERY  01/2011    TUMOR EXCISED    CREATION OF MUSCLE ROTATIONAL FLAP N/A 1/3/2020    Procedure: CREATION, FLAP, MUSCLE ROTATION;  Surgeon: Brian Wong MD;  Location: [address];  Service: Plastics;  Laterality: N/A;    ENDARTERECTOMY OF FEMORAL ARTERY Bilateral 12/20/2019    Procedure: ENDARTERECTOMY, FEMORAL;  Surgeon: Kelby Gomez MD;  Location: [address];  Service: Peripheral Vascular;  Laterality: Bilateral;  natalya common femoral endarterectomy with natalya. iliac stent placement    PERCUTANEOUS TRANSLUMINAL  ANGIOPLASTY Left 4/6/2020    Procedure: PTA (ANGIOPLASTY, PERCUTANEOUS, TRANSLUMINAL), left lower extremity;  Surgeon: Kelby Gomez MD;  Location: [facility] OR [address] [place] [address];  Service: Peripheral Vascular;  Laterality: Left;    PERCUTANEOUS TRANSLUMINAL ANGIOPLASTY (PTA) OF PERIPHERAL VESSEL Left 10/17/2019    Procedure: PTA, PERIPHERAL VESSEL;  Surgeon: Rao [place] MD;  [facility];  Service: Cardiology;  Laterality: Left;    PSEUDOANEURYSM REPAIR Left 6/29/2020    Procedure: REPAIR, PSEUDOANEURYSM;  Surgeon: Bruce Dallas MD;  [facility];  Service: Peripheral Vascular;  Laterality: Left;       Review of patient's allergies indicates:   Allergen Reactions    Penicillins Hives     Patient currently on cefepime without complications  Tolerated ceftriaxone 3/2020       Medications:  Medications Prior to Admission   Medication Sig    ascorbic acid (VITAMIN C ORAL) Take 2 capsules by mouth every morning.    aspirin (ECOTRIN) 81 MG EC tablet Take 81 mg by mouth once daily.    atorvastatin (LIPITOR) 40 MG tablet Take 40 mg by mouth once daily.    cefadroxil (DURICEF) 500 MG Cap Take 2 capsules (1,000 mg total) by mouth every 12 (twelve) hours.    cetirizine 10 mg chewable tablet Take 10 mg by mouth once daily.     clopidogreL (PLAVIX) 75 mg tablet Take 1 tablet (75 mg total) by mouth once daily.    elderberry fruit and flower 460-115 mg Cap Take 2 capsules by mouth every morning.    HYDROcodone-acetaminophen (NORCO) 5-325 mg per tablet Take 1 tablet by mouth every 6 (six) hours as needed.    levoFLOXacin (LEVAQUIN) 500 MG tablet Take 1 tablet (500 mg total) by mouth once daily.    metFORMIN (GLUCOPHAGE-XR) 500 MG XR 24hr tablet Take 1,000 mg by mouth daily with breakfast.     pregabalin (LYRICA) 100 MG capsule Take 100 mg by mouth 2 (two) times daily.    valsartan (DIOVAN) 160 MG tablet Take 160 mg by mouth once daily.     Antibiotics (From admission, onward)    Start     Stop Route  Frequency Ordered    20 0900  mupirocin 2 % ointment 1 g       0859 Nasl 2 times daily 20 0030  cefTRIAXone (ROCEPHIN) 2 g/50 mL D5W IVPB      -- IV Every 24 hours (non-standard times) 20 2323        Antifungals (From admission, onward)    None        Antivirals (From admission, onward)    None           There is no immunization history for the selected administration types on file for this patient.    Family History     Problem Relation (Age of Onset)    Cancer Father, Brother    Diabetes Mother    Heart disease Father, Brother, Brother, Brother    Hypertension Mother    Stroke Mother        Social History     Socioeconomic History    Marital status:      Spouse name: Not on file    Number of children: Not on file    Years of education: Not on file    Highest education level: Not on file   Occupational History    Not on file   Social Needs    Financial resource strain: Not on file    Food insecurity     Worry: Not on file     Inability: Not on file    Transportation needs     Medical: Not on file     Non-medical: Not on file   Tobacco Use    Smoking status: Former Smoker     Packs/day: 2.00     Types: Cigarettes     Quit date: 2011     Years since quittin.4    Smokeless tobacco: Never Used   Substance and Sexual Activity    Alcohol use: Yes     Comment: 2-4 PER DAY    Drug use: Never    Sexual activity: Not on file   Lifestyle    Physical activity     Days per week: Not on file     Minutes per session: Not on file    Stress: Not on file   Relationships    Social connections     Talks on phone: Not on file     Gets together: Not on file     Attends [employer] service: Not on file     Active member of club or organization: Not on file     Attends meetings of clubs or organizations: Not on file     Relationship status: Not on file   Other Topics Concern    Not on file   Social History Narrative    Not on file     Review of Systems    Constitutional: Positive for chills and fever.   Skin: Positive for wound.   All other systems reviewed and are negative.    Objective:     Vital Signs (Most Recent):  Temp: 98.5 °F (36.9 °C) (06/30/20 1100)  Pulse: 83 (06/30/20 1500)  Resp: 14 (06/30/20 1500)  BP: (!) 97/52 (06/30/20 1300)  SpO2: 98 % (06/30/20 1500) Vital Signs (24h Range):  Temp:  [98.3 °F (36.8 °C)-99.2 °F (37.3 °C)] 98.5 °F (36.9 °C)  Pulse:  [] 83  Resp:  [10-28] 14  SpO2:  [77 %-100 %] 98 %  BP: ()/(50-95) 97/52  Arterial Line BP: ()/(46-88) 112/51     Weight: 124 kg (273 lb 5.9 oz)  Body mass index is 39.22 kg/m².    Estimated Creatinine Clearance: 153.8 mL/min (based on SCr of 0.7 mg/dL).    Physical Exam  Vitals signs and nursing note reviewed.   Constitutional:       Appearance: Normal appearance. He is normal weight.   HENT:      Head: Normocephalic and atraumatic.   Cardiovascular:      Rate and Rhythm: Normal rate and regular rhythm.      Heart sounds: Murmur present.      Comments: Musical HSM  Pulmonary:      Effort: Pulmonary effort is normal.      Breath sounds: Normal breath sounds.   Musculoskeletal:         General: No swelling.      Comments: KATHLEEN drain, left groin   Skin:     General: Skin is warm and dry.      Findings: No erythema or rash.   Neurological:      General: No focal deficit present.      Mental Status: He is oriented to person, place, and time. Mental status is at baseline.   Psychiatric:         Mood and Affect: Mood normal.         Behavior: Behavior normal.         Thought Content: Thought content normal.         Judgment: Judgment normal.         Significant Labs:   CBC:   Recent Labs   Lab 06/29/20  2345 06/30/20  0300 06/30/20  0953   WBC 7.45 8.49 5.16   HGB 12.1* 10.6* 10.1*   HCT 35.7* 33.2* 31.4*   * 134* 127*     CMP:   Recent Labs   Lab 06/29/20  0451 06/29/20  2345 06/30/20  0300   * 134* 134*   K 3.9 4.4 3.7   CL 98 101 102   CO2 26 22* 24   * 154* 141*   BUN 10  12 12   CREATININE 0.7 0.7 0.7   CALCIUM 9.0 8.3* 7.8*   PROT 6.6 6.0  --    ALBUMIN 2.7* 2.3*  --    BILITOT 1.0 0.7  --    ALKPHOS 46* 45*  --    AST 32 27  --    ALT 39 31  --    ANIONGAP 9 11 8   EGFRNONAA >60.0 >60.0 >60.0     Wound Culture: No results for input(s): LABAERO in the last 4320 hours.    Significant Imaging: I have reviewed all pertinent imaging results/findings within the past 24 hours.

## 2020-06-30 NOTE — HPI
Mr. Renan Britton is a 59 y.o. male with a history of peripheral vascular disease s/p bilateral femoral endarterectomy, DM, and HTN who presented to the [facility] for fever, chills, and pain and swelling of his left groin.  He was admitted for presumed bacteremia and was found to have an enlarging pseudoaneurysm of his left groin.  Vascular surgery subsequently brought him to the OR and performed a washout and stent placement.  ID was consulted for persistent proteus bacteremia.  Pt is currently on Ceftriaxone and Levaquin given previous bacteremia.     Pt has a complicated course of infection on chart review.  After bilateral femoral endarterectomy, pt was placed on Clindamycin which was changed to IV Ceftriaxone after surgical cultures grew proteus and group F streptococcus.  After his 6wk course he was transitioned to oral Keflex but was placed back on Ceftriaxone after blood cultures grew proteus.  He subsequently finished that course and was placed on oral Levaquin but was transitioned once again to IV Ceftriaxone after being diagnosed with a pseudoaneurysm of his left femoral artery.  After that course he was transitioned to oral Cefadroxil which he was currently taking prior to this admit.       Today pt states he is feeling much better after the procedure.  He reports a marked reduction in pain and swelling of his left groin.  He denies any current fever, chills, N/V, or abdominal pain.

## 2020-06-30 NOTE — BRIEF OP NOTE
Brief Operative Note  Date: 06/29/2020    Pre-op Diagnosis:  Pseudoaneurysm [I72.9]; Pseudoaneurysm distal L EIA/proximal L CFA at site of prior L femoral endarterectomy; recent Proteus bactermeia    Post-op Diagnosis:  Same    Procedure(s):  1) U/S-guided R CFA access, 9fr  2) L EIA to CFA covered stent placement, 9x75mm Viabahn, post-dilated with 8mm balloon  3) Perclose closure R CFA, 9fr  4) Drainage of 200 ml hematoma (pseudoaneurysm) thru L proximal groin 2cm incision and placement of 19Fr Ezra drain    Surgeon: Bruce Dallas MD FACS    Assistant:  Carolyn Pat MD - Fellow    Anesthesia: General    Findings/Key Components:  Successful treatment of L proximal CFA pseudoaneurysm with L EIA to CFA covered stent placement, 9x75mm Viabahn, post-dilated with 8mm balloon  L distal SFA stenosis > 75% stenosis   3v tibial runoff, L PT is main runoff   270 ml (drained blood from L groin area)    Raditaion: 1230.25 mGy  Fluoro time: 9min    EBL: 25ml      Specimens (From admission, onward)    None        I attest to being present for the procedure and performing the case.  Bruce Dallas MD FACS       LF: 3/23/17 LOV: 5/22/17  Please approve or deny pending Rx. Thank you!

## 2020-06-30 NOTE — ANESTHESIA PROCEDURE NOTES
Intubation  Performed by: John Dennis CRNA  Authorized by: Tiarra Zamarripa MD     Intubation:     Induction:  Intravenous    Intubated:  Postinduction    Mask Ventilation:  N/a (RSI)    Attempts:  1    Attempted By:  CRNA    Method of Intubation:  Direct    Blade:  Friedman 2    Laryngeal View Grade: Grade IIA - cords partially seen      Difficult Airway Encountered?: No      Complications:  None    Airway Device:  Oral endotracheal tube    Airway Device Size:  7.5    Style/Cuff Inflation:  Cuffed    Inflation Amount (mL):  6    Tube secured:  23    Secured at:  The lips    Placement Verified By:  Capnometry    Complicating Factors:  None    Findings Post-Intubation:  BS equal bilateral

## 2020-06-30 NOTE — HPI
59-year-old male with PAD s/p bilateral femoral endarterectomy 12/2019 who was admitted for fevers and chills for 4 days. Of note, patient had wound complications on his left femoral endarterectomy ultimately needing debridements and flap coverage with prolonged abx. His course was further complicated by a pseudoaneurysm on that site which was stented. Last finished abx 5/2020. On further questioning, patient notes his left leg has become much more swollen and firm over the last 24 hours and the mass in his groin which used to be quite small feels bigger and more firm as well. He does not note significant pain to the leg and has been getting around okay. Denies any other issues at this time.  He was [facility] [place] [facility] [place].  Pt taken emergently to OR for covered stent placed and washout of left groin.  Pt arrives to SICU extubated and off all pressors.

## 2020-06-30 NOTE — TRANSFER OF CARE
Anesthesia Transfer of Care Note    Patient: Renan Britton    Procedure(s) Performed: Procedure(s) (LRB):  Angiogram Extremity Unilateral, washout L groin, possible open pseudoaneurysm repair (Left)  STENT, ILIAC ARTERY (Left)  AORTOGRAM (N/A)  EVACUATION-HEMATOMA-VASCULAR (Left)  REPAIR, PSEUDOANEURYSM (Left)    Patient location: ICU    Anesthesia Type: general    Transport from OR: Transported from OR on 6-10 L/min O2 by face mask with adequate spontaneous ventilation. Continuous ECG monitoring in transport. Continuous SpO2 monitoring in transport. Continuos invasive BP monitoring in transport    Post pain: adequate analgesia    Post assessment: no apparent anesthetic complications and tolerated procedure well    Post vital signs: stable    Level of consciousness: awake, alert and oriented    Nausea/Vomiting: no nausea/vomiting    Complications: none    Transfer of care protocol was followed      Last vitals:   Visit Vitals  Temp 37.2 °C (99 °F) (Oral)

## 2020-06-30 NOTE — PLAN OF CARE
CM obtained discharge planning assessment at bedside from patient.  No discharge needs are noted at this time.      Eren Becker MD  [address] [facility] [place] [address]      [facility] [place], [place] - [address]  [address] [facility] [place] [address]  Phone: [phone] Fax: [phone]    Payor: [employer] / Plan: [employer] PL  PPO / Product Type: PPO /     Extended Emergency Contact Information  Primary Emergency Contact: Patricia Britton   [facility]  Home Phone: [phone]  Mobile Phone: [phone]  Relation: Spouse   needed? No  Secondary Emergency Contact: Sanjay Britton  Address: [address] [place], [place] [address] [address] [place]  Mobile Phone: [phone]  Relation: Son    No future appointments.       06/30/20 0947   Discharge Assessment   Assessment Type Discharge Planning Assessment   Confirmed/corrected address and phone number on facesheet? Yes   Assessment information obtained from? Patient;Medical Record   Communicated expected length of stay with patient/caregiver yes   Prior to hospitilization cognitive status: Alert/Oriented   Prior to hospitalization functional status: Independent   Current cognitive status: Alert/Oriented   Current Functional Status: Independent   Facility Arrived From: [facility]   Lives With spouse   Able to Return to Prior Arrangements yes   Is patient able to care for self after discharge? Yes   Who are your caregiver(s) and their phone number(s)? Patricia Britton (spouse)- [phone]   Patient's perception of discharge disposition home or selfcare   Readmission Within the Last 30 Days other (see comments)  (transfer from [facility])   Patient currently being followed by outpatient case management? No   Patient currently receives any other outside agency services? No   Equipment Currently Used at Home none   Do you have any problems affording any of your prescribed medications? No   Is the patient taking  medications as prescribed? yes   Does the patient have transportation home? Yes   Transportation Anticipated family or friend will provide   Dialysis Name and Scheduled days N/A   Does the patient receive services at the [facility]madin Clinic? No   Discharge Plan A Home with family   Discharge Plan B Home   DME Needed Upon Discharge  other (see comments)  (TBD)   Patient/Family in Agreement with Plan yes       Lucila Nugent MPH, RN, CM  Ext. [phone]

## 2020-06-30 NOTE — SUBJECTIVE & OBJECTIVE
Medications:  Continuous Infusions:   sodium chloride 0.9% 125 mL/hr at 06/30/20 0700     Scheduled Meds:   aspirin  81 mg Oral Daily    atorvastatin  40 mg Oral Daily    cefTRIAXone (ROCEPHIN) IVPB  2 g Intravenous Q24H    clopidogreL  75 mg Oral Daily    enoxparin  40 mg Subcutaneous Q24H    levoFLOXacin  750 mg Oral Daily    mupirocin  1 g Nasal BID    polyethylene glycol  17 g Oral Daily    pregabalin  100 mg Oral BID    valsartan  160 mg Oral Daily     PRN Meds:hydrALAZINE, HYDROcodone-acetaminophen, HYDROmorphone, ketorolac, ondansetron, sodium chloride 0.9%     Objective:     Vital Signs (Most Recent):  Temp: 98.3 °F (36.8 °C) (06/30/20 0700)  Pulse: 86 (06/30/20 0745)  Resp: (!) 23 (06/30/20 0745)  BP: 112/63 (06/30/20 0700)  SpO2: 100 % (06/30/20 0745) Vital Signs (24h Range):  Temp:  [96.7 °F (35.9 °C)-99.2 °F (37.3 °C)] 98.3 °F (36.8 °C)  Pulse:  [] 86  Resp:  [10-28] 23  SpO2:  [77 %-100 %] 100 %  BP: ()/(50-95) 112/63  Arterial Line BP: ()/(47-88) 111/55     Date 06/30/20 0700 - 07/01/20 0659   Shift 2399-4435 8320-0188 5095-4158 24 Hour Total   INTAKE   Shift Total(mL/kg)       OUTPUT   Urine(mL/kg/hr) 125   125   Drains 0   0   Shift Total(mL/kg) 125(1)   125(1)   Weight (kg) 123.5 123.5 123.5 123.5       Physical Exam  Vitals signs reviewed.   Constitutional:       Appearance: Normal appearance. He is obese.   HENT:      Head: Normocephalic and atraumatic.   Eyes:      Extraocular Movements: Extraocular movements intact.      Pupils: Pupils are equal, round, and reactive to light.   Neck:      Musculoskeletal: Normal range of motion and neck supple.   Cardiovascular:      Rate and Rhythm: Regular rhythm.   Pulmonary:      Effort: Pulmonary effort is normal. No respiratory distress.   Abdominal:      General: Abdomen is flat. There is no distension.   Musculoskeletal:      Comments: R fem no hematoma  L fem drain in place - 25 cc serosanguinous  L foot biphasic DP and PT    Neurological:      Mental Status: He is alert.         Significant Labs:  BMP:   Recent Labs   Lab 06/30/20  0300   *   *   K 3.7      CO2 24   BUN 12   CREATININE 0.7   CALCIUM 7.8*   MG 2.0     CBC:   Recent Labs   Lab 06/30/20  0300   WBC 8.49   RBC 3.84*   HGB 10.6*   HCT 33.2*   *   MCV 87   MCH 27.6   MCHC 31.9*       Significant Diagnostics:  I have reviewed all pertinent imaging results/findings within the past 24 hours.

## 2020-06-30 NOTE — SUBJECTIVE & OBJECTIVE
Follow-up For: Procedure(s) (LRB):  Angiogram Extremity Unilateral, washout L groin, possible open pseudoaneurysm repair (Left)  STENT, ILIAC ARTERY (Left)  AORTOGRAM (N/A)  EVACUATION-HEMATOMA-VASCULAR (Left)  REPAIR, PSEUDOANEURYSM (Left)    Post-Operative Day: 1 Day Post-Op     Past Medical History:   Diagnosis Date    Diabetes mellitus     H/O brain tumor     HLD (hyperlipidemia)     Hypertension     PAD (peripheral artery disease)     Seizures     R/T BRAIN TUMOR- SURGICALLY EXCISED       Past Surgical History:   Procedure Laterality Date    ANGIOGRAPHY Left 4/6/2020    Procedure: ANGIOGRAM, Left lower extremity;  Surgeon: Kelby Gomez MD;  Location: [address];  Service: Peripheral Vascular;  Laterality: Left;    APPLICATION OF WOUND VACUUM-ASSISTED CLOSURE DEVICE Bilateral 1/1/2020    Procedure: APPLICATION, WOUND VAC;  Surgeon: SEBAS Prieto II, MD;  Location: [address];  Service: Cardiovascular;  Laterality: Bilateral;    APPLICATION OF WOUND VACUUM-ASSISTED CLOSURE DEVICE N/A 1/3/2020    Procedure: APPLICATION, WOUND VAC;  Surgeon: Brian Wong MD;  Location: [address];  Service: Plastics;  Laterality: N/A;    BRAIN SURGERY  01/2011    TUMOR EXCISED    CREATION OF MUSCLE ROTATIONAL FLAP N/A 1/3/2020    Procedure: CREATION, FLAP, MUSCLE ROTATION;  Surgeon: Brian Wong MD;  Location: [address];  Service: Plastics;  Laterality: N/A;    ENDARTERECTOMY OF FEMORAL ARTERY Bilateral 12/20/2019    Procedure: ENDARTERECTOMY, FEMORAL;  Surgeon: Kelby Gomez MD;  Location: [address];  Service: Peripheral Vascular;  Laterality: Bilateral;  natalya common femoral endarterectomy with natalya. iliac stent placement    PERCUTANEOUS TRANSLUMINAL ANGIOPLASTY Left 4/6/2020    Procedure: PTA (ANGIOPLASTY, PERCUTANEOUS, TRANSLUMINAL), left lower extremity;  Surgeon: Kelby Gomez MD;  Location: [address];  Service: Peripheral Vascular;  Laterality:  Left;    PERCUTANEOUS TRANSLUMINAL ANGIOPLASTY (PTA) OF PERIPHERAL VESSEL Left 10/17/2019    Procedure: PTA, PERIPHERAL VESSEL;  Surgeon: Rao Fisher MD;  Location: [facility];  Service: Cardiology;  Laterality: Left;       Review of patient's allergies indicates:   Allergen Reactions    Penicillins Hives     Patient currently on cefepime without complications  Tolerated ceftriaxone 3/2020       Family History     Problem Relation (Age of Onset)    Cancer Father, Brother    Diabetes Mother    Heart disease Father, Brother, Brother, Brother    Hypertension Mother    Stroke Mother        Tobacco Use    Smoking status: Former Smoker     Packs/day: 2.00     Types: Cigarettes     Quit date: 2011     Years since quittin.4    Smokeless tobacco: Never Used   Substance and Sexual Activity    Alcohol use: Yes     Comment: 2-4 PER DAY    Drug use: Never    Sexual activity: Not on file      Review of Systems   Unable to perform ROS: Acuity of condition     Objective:     Vital Signs (Most Recent):  Temp: 99 °F (37.2 °C) (20 2340)  Pulse: (!) 138 (20 0015)  Resp: (!) 27 (20 0015)  BP: (!) 130/95 (205)  SpO2: (!) 77 % (20 0015) Vital Signs (24h Range):  Temp:  [96.4 °F (35.8 °C)-99.2 °F (37.3 °C)] 99 °F (37.2 °C)  Pulse:  [] 138  Resp:  [16-49] 27  SpO2:  [77 %-100 %] 77 %  BP: (123-138)/(65-95) 130/95  Arterial Line BP: (141-196)/(67-88) 141/67        There is no height or weight on file to calculate BMI.      Intake/Output Summary (Last 24 hours) at 2020 0028  Last data filed at 2020 2345  Gross per 24 hour   Intake 1600 ml   Output 370 ml   Net 1230 ml       Physical Exam  Vitals signs reviewed.   Constitutional:       Appearance: Normal appearance.   HENT:      Head: Normocephalic and atraumatic.      Nose: Nose normal.   Eyes:      Pupils: Pupils are equal, round, and reactive to light.   Neck:      Musculoskeletal: Normal range of motion.   Cardiovascular:       Rate and Rhythm: Normal rate and regular rhythm.      Comments: Able to doppler biphasic PT on LLE  Weak, but able to doppler Biphasic DP on LLE    L groin dressing c/d/i.  Drain w/ SS output  No pulsatile mass  Abdominal:      General: Abdomen is flat. There is no distension.      Palpations: Abdomen is soft.   Musculoskeletal: Normal range of motion.   Skin:     General: Skin is warm and dry.      Capillary Refill: Capillary refill takes less than 2 seconds.   Neurological:      General: No focal deficit present.      Mental Status: He is alert.   Psychiatric:         Mood and Affect: Mood normal.         Behavior: Behavior normal.         Vents:       Lines/Drains/Airways     Peripherally Inserted Central Catheter Line            PICC Double Lumen 04/06/20 0951 right brachial 84 days          Drain                 Closed/Suction Drain 06/29/20 2254 Left Groin Bulb 19 Fr. less than 1 day         Urethral Catheter 06/29/20 2115 Non-latex;Double-lumen;Straight-tip 16 Fr. less than 1 day          Peripheral Intravenous Line                 Midline Catheter Insertion/Assessment  - Single Lumen 03/12/20 1321 Left cephalic vein (lateral side of arm) 18g x 8cm 109 days         Peripheral IV - Double Lumen 06/27/20 0952 18 G Anterior;Distal;Right Upper Arm 2 days                Significant Labs:    CBC/Anemia Profile:  Recent Labs   Lab 06/28/20  0535 06/28/20  1606 06/29/20  0451 06/29/20  2345   WBC 7.80  --  7.28 7.45   HGB 12.9* 12.9* 12.7* 12.1*   HCT 39.2* 39.4* 37.9* 35.7*   *  --  134* 128*   MCV 83  --  83 82   RDW 14.2  --  14.2 14.4        Chemistries:  Recent Labs   Lab 06/28/20  0535 06/29/20  0451   * 133*   K 3.4* 3.9    98   CO2 24 26   BUN 9 10   CREATININE 0.7 0.7   CALCIUM 8.7 9.0   ALBUMIN 2.7* 2.7*   PROT 6.4 6.6   BILITOT 0.9 1.0   ALKPHOS 40* 46*   ALT 46* 39   AST 44* 32   MG 1.8 1.6   PHOS 2.5* 3.1     Significant Imaging: I have reviewed and interpreted all pertinent imaging  results/findings within the past 24 hours.

## 2020-06-30 NOTE — MEDICAL/APP STUDENT
HISTORY & PHYSICAL  Infectious Disease      SUBJECTIVE:     Chief Complaint/Reason for Admission:   Pseudoaneurysm    History of Present Illness:  Mr. Renan Britton is a 59 y.o. male with a history of peripheral vascular disease s/p bilateral femoral endarterectomy, DM, and HTN who presented to the [facility] for fever, chills, and pain and swelling of his left groin.  He was admitted for presumed bacteremia and was found to have an enlarging pseudoaneurysm of his left groin.  Vascular surgery subsequently brought him to the OR and performed a washout and stent placement.  ID was consulted for persistent proteus bacteremia.  Pt is currently on Ceftriaxone and Levaquin given previous bacteremia.    ID course  Pt has a complicated course of infection on chart review.  After bilateral femoral endarterectomy, pt was placed on Clindamycin which was changed to IV Ceftriaxone after surgical cultures grew proteus and group F streptococcus.  After his 6wk course he was transitioned to oral Keflex but was placed back on Ceftriaxone after blood cultures grew proteus.  He subsequently finished that course and was placed on oral Levaquin but was transitioned once again to IV Ceftriaxone after being diagnosed with a pseudoaneurysm of his left femoral artery.  After that course he was transitioned to oral Cefadroxil which he was currently taking prior to this admit.      Today pt states he is feeling much better after the procedure.  He reports a marked reduction in pain and swelling of his left groin.  He denies any current fever, chills, N/V, or abdominal pain.    ROS  7 point ROS negative except for the above      Medications   sodium chloride 0.9% flush 10 mL (has no administration in time range)   ketorolac injection 15 mg (has no administration in time range)   aspirin EC tablet 81 mg (81 mg Oral Given 6/30/20 0837)   atorvastatin tablet 40 mg (40 mg Oral Given 6/30/20 0837)   HYDROcodone-acetaminophen 5-325 mg per  tablet 1 tablet (has no administration in time range)   pregabalin capsule 100 mg (100 mg Oral Given 6/30/20 0837)   valsartan tablet 160 mg (160 mg Oral Given 6/30/20 0837)   0.9%  NaCl infusion ( Intravenous Verify Only 6/30/20 1300)   mupirocin 2 % ointment 1 g (1 g Nasal Given 6/30/20 0837)   HYDROmorphone injection 1 mg (1 mg Intravenous Given 6/29/20 2350)   cefTRIAXone (ROCEPHIN) 2 g/50 mL D5W IVPB (2 g Intravenous New Bag 6/29/20 2355)   hydrALAZINE injection 10 mg (10 mg Intravenous Given 6/29/20 2350)   ondansetron injection 4 mg (has no administration in time range)   enoxaparin injection 40 mg (has no administration in time range)   polyethylene glycol packet 17 g (17 g Oral Given 6/30/20 0845)   meperidine injection 25 mg (0 mg Intravenous Hold 6/30/20 0007)   magnesium sulfate 2g in water 50mL IVPB (premix) (2 g Intravenous New Bag 6/30/20 0154)   potassium chloride CR capsule 30 mEq (30 mEq Oral Given 6/30/20 0531)   lidocaine (PF) 20 mg/mL (2%) 20 mg/mL (2 %) injection (has no administration in time range)   succinylcholine (ANECTINE) 20 mg/mL injection (has no administration in time range)   rocuronium 10 mg/mL injection (has no administration in time range)   ondansetron 4 mg/2 mL injection (has no administration in time range)   fentaNYL (SUBLIMAZE) 50 mcg/mL injection (has no administration in time range)   midazolam (VERSED) 1 mg/mL injection (has no administration in time range)   propofoL (DIPRIVAN) 10 mg/mL infusion (has no administration in time range)   propofoL (DIPRIVAN) 10 mg/mL infusion (has no administration in time range)   heparin (porcine) 1,000 unit/mL injection (has no administration in time range)   thrombin (bovine) 5,000 unit solution (has no administration in time range)   gelatin adsorbable 100cm top sponge (GELFOAM) 100 sponge (has no administration in time range)   iodixanoL (VISIPAQUE 320) 320 mg iodine/mL injection (has no administration in time range)   lidocaine HCL 10  mg/ml (1%) 10 mg/mL (1 %) injection (has no administration in time range)   ketamine in 0.9 % sod chloride 50 mg/5 mL (10 mg/mL) injection (has no administration in time range)   phenylephrine HCl in 0.9% NaCl 1 mg/10 mL (100 mcg/mL) syringe (has no administration in time range)   clindamycin in D5W (CLEOCIN) 900 mg/50 mL IVPB (has no administration in time range)   cefTRIAXone (ROCEPHIN) 1 gram injection (has no administration in time range)   heparin (porcine) 1,000 unit/mL injection (has no administration in time range)   protamine 10 mg/mL injection (has no administration in time range)   protamine 10 mg/mL injection (has no administration in time range)   sugammadex (BRIDION) 100 mg/mL injection (has no administration in time range)       HISTORY:     Past Medical History:   Diagnosis Date    Diabetes mellitus     H/O brain tumor     HLD (hyperlipidemia)     Hypertension     PAD (peripheral artery disease)     Seizures     R/T BRAIN TUMOR- SURGICALLY EXCISED       Past Surgical History:   Procedure Laterality Date    ANGIOGRAPHY Left 4/6/2020    Procedure: ANGIOGRAM, Left lower extremity;  Surgeon: Kelby Gomez MD;  Location: [address];  Service: Peripheral Vascular;  Laterality: Left;    ANGIOGRAPHY OF LOWER EXTREMITY Left 6/29/2020    Procedure: Angiogram Extremity Unilateral, washout L groin, possible open pseudoaneurysm repair;  Surgeon: Bruce Dallas MD;  Location: [facility] OR [address] [place] [address];  Service: Peripheral Vascular;  Laterality: Left;  contrast 23 ml  fluoro time: 9.9 min  mGy: 1230.26  Gycm2: 146.69    AORTOGRAPHY N/A 6/29/2020    Procedure: AORTOGRAM;  Surgeon: Bruce Dallas MD;  Location: [address];  Service: Peripheral Vascular;  Laterality: N/A;    APPLICATION OF WOUND VACUUM-ASSISTED CLOSURE DEVICE Bilateral 1/1/2020    Procedure: APPLICATION, WOUND VAC;  Surgeon: SEBAS Prieto II, MD;  Location: [address];  Service: Cardiovascular;  Laterality: Bilateral;     APPLICATION OF WOUND VACUUM-ASSISTED CLOSURE DEVICE N/A 1/3/2020    Procedure: APPLICATION, WOUND VAC;  Surgeon: Brian Wong MD;  Location: [facility] OR [address] [place] [address];  Service: Plastics;  Laterality: N/A;    BRAIN SURGERY  01/2011    TUMOR EXCISED    CREATION OF MUSCLE ROTATIONAL FLAP N/A 1/3/2020    Procedure: CREATION, FLAP, MUSCLE ROTATION;  Surgeon: Brian Wong MD;  Location: [address];  Service: Plastics;  Laterality: N/A;    ENDARTERECTOMY OF FEMORAL ARTERY Bilateral 12/20/2019    Procedure: ENDARTERECTOMY, FEMORAL;  Surgeon: Kelby Gomez MD;  Location: [facility] OR [address] [place] [address];  Service: Peripheral Vascular;  Laterality: Bilateral;  natalya common femoral endarterectomy with natalya. iliac stent placement    PERCUTANEOUS TRANSLUMINAL ANGIOPLASTY Left 4/6/2020    Procedure: PTA (ANGIOPLASTY, PERCUTANEOUS, TRANSLUMINAL), left lower extremity;  Surgeon: Kelby Gomez MD;  Location: [address];  Service: Peripheral Vascular;  Laterality: Left;    PERCUTANEOUS TRANSLUMINAL ANGIOPLASTY (PTA) OF PERIPHERAL VESSEL Left 10/17/2019    Procedure: PTA, PERIPHERAL VESSEL;  Surgeon: Rao Fisher MD;  Location: [facility] CATH;  Service: Cardiology;  Laterality: Left;    PSEUDOANEURYSM REPAIR Left 6/29/2020    Procedure: REPAIR, PSEUDOANEURYSM;  Surgeon: Bruce Dallas MD;  Location: [facility] OR [address] [place] [address];  Service: Peripheral Vascular;  Laterality: Left;       Family History   Problem Relation Age of Onset    Diabetes Mother     Hypertension Mother     Stroke Mother     Cancer Father         LUNG    Heart disease Father     Cancer Brother     Heart disease Brother     Heart disease Brother     Heart disease Brother        Social History     Socioeconomic History    Marital status:      Spouse name: Not on file    Number of children: Not on file    Years of education: Not on file    Highest education level: Not on file   Occupational History    Not on file   Social Needs    Financial  resource strain: Not on file    Food insecurity     Worry: Not on file     Inability: Not on file    Transportation needs     Medical: Not on file     Non-medical: Not on file   Tobacco Use    Smoking status: Former Smoker     Packs/day: 2.00     Types: Cigarettes     Quit date: 2011     Years since quittin.4    Smokeless tobacco: Never Used   Substance and Sexual Activity    Alcohol use: Yes     Comment: 2-4 PER DAY    Drug use: Never    Sexual activity: Not on file   Lifestyle    Physical activity     Days per week: Not on file     Minutes per session: Not on file    Stress: Not on file   Relationships    Social connections     Talks on phone: Not on file     Gets together: Not on file     Attends Latter day service: Not on file     Active member of club or organization: Not on file     Attends meetings of clubs or organizations: Not on file     Relationship status: Not on file   Other Topics Concern    Not on file   Social History Narrative    Not on file       MEDICATIONS & ALLERGIES:     Current Facility-Administered Medications on File Prior to Encounter   Medication Dose Route Frequency Provider Last Rate Last Dose    [DISCONTINUED] acetaminophen tablet 650 mg  650 mg Oral Q4H PRN Kathryn Bradford MD   650 mg at 20 0505    [DISCONTINUED] acetaminophen tablet 650 mg  650 mg Oral Q8H PRN Kathryn Bradford MD   650 mg at 20 0359    [DISCONTINUED] aspirin EC tablet 81 mg  81 mg Oral Daily Kathryn Bradford MD   81 mg at 20 0845    [DISCONTINUED] atorvastatin tablet 40 mg  40 mg Oral Daily Kathryn Bradford MD   40 mg at 20 0857    [DISCONTINUED] carboxymethylcellulose 0.5 % ophthalmic solution 1 drop  1 drop Both Eyes TID PRN Avani Shaffer MD   1 drop at 20 0858    [DISCONTINUED] cefTRIAXone (ROCEPHIN) 1 g/50 mL D5W IVPB  1 g Intravenous Q24H Kathryn Bradford MD   1 g at 20 0857    [DISCONTINUED] clopidogreL tablet 75 mg  75 mg Oral Daily Kathryn Bradford MD   75 mg at  06/29/20 0857    [DISCONTINUED] dextrose 50% injection 12.5 g  12.5 g Intravenous PRN Kathryn Bradford MD        [DISCONTINUED] dextrose 50% injection 25 g  25 g Intravenous PRN Kathryn Bradford MD        [DISCONTINUED] enoxaparin injection 40 mg  40 mg Subcutaneous Q24H Lit Ames MD   40 mg at 06/29/20 1707    [DISCONTINUED] glucagon (human recombinant) injection 1 mg  1 mg Intramuscular PRN Kathryn Bradford MD        [DISCONTINUED] glucose chewable tablet 16 g  16 g Oral PRN Kathryn Bradford MD        [DISCONTINUED] glucose chewable tablet 24 g  24 g Oral PRN Kathryn Bradford MD        [DISCONTINUED] HYDROcodone-acetaminophen 5-325 mg per tablet 1 tablet  1 tablet Oral Q6H PRN Kathryn Bradford MD   1 tablet at 06/29/20 1713    [DISCONTINUED] insulin aspart U-100 pen 0-5 Units  0-5 Units Subcutaneous QID (AC + HS) PRN Kathryn Bradford MD        [DISCONTINUED] lactated ringers infusion   Intravenous Continuous Lit Ames  mL/hr at 06/29/20 0856      [DISCONTINUED] lorazepam (ATIVAN) injection 1 mg  1 mg Intravenous Q5 Min PRN Lit Ames MD   1 mg at 06/27/20 0646    [DISCONTINUED] ondansetron injection 4 mg  4 mg Intravenous Q6H PRN Kathryn Bradford MD        [DISCONTINUED] polyethylene glycol packet 17 g  17 g Oral Daily Lit Ames MD   17 g at 06/29/20 0856    [DISCONTINUED] pregabalin capsule 100 mg  100 mg Oral BID Kathryn Bradford MD   100 mg at 06/29/20 0858    [DISCONTINUED] senna-docusate 8.6-50 mg per tablet 1 tablet  1 tablet Oral Daily PRN Armand Cooper MD        [DISCONTINUED] sodium chloride 0.9% flush 10 mL  10 mL Intravenous PRN Kathryn Bradford MD        [DISCONTINUED] valsartan tablet 40 mg  40 mg Oral Daily Lit Ames MD   40 mg at 06/29/20 1017     Current Outpatient Medications on File Prior to Encounter   Medication Sig Dispense Refill    ascorbic acid (VITAMIN C ORAL) Take 2 capsules by mouth every morning.      aspirin (ECOTRIN) 81 MG EC tablet Take 81 mg by mouth once  daily.      atorvastatin (LIPITOR) 40 MG tablet Take 40 mg by mouth once daily.      cefadroxil (DURICEF) 500 MG Cap Take 2 capsules (1,000 mg total) by mouth every 12 (twelve) hours. 120 capsule 4    cetirizine 10 mg chewable tablet Take 10 mg by mouth once daily.       clopidogreL (PLAVIX) 75 mg tablet Take 1 tablet (75 mg total) by mouth once daily. 30 tablet 10    elderberry fruit and flower 460-115 mg Cap Take 2 capsules by mouth every morning.      HYDROcodone-acetaminophen (NORCO) 5-325 mg per tablet Take 1 tablet by mouth every 6 (six) hours as needed. 15 tablet 0    levoFLOXacin (LEVAQUIN) 500 MG tablet Take 1 tablet (500 mg total) by mouth once daily. 30 tablet 2    metFORMIN (GLUCOPHAGE-XR) 500 MG XR 24hr tablet Take 1,000 mg by mouth daily with breakfast.       pregabalin (LYRICA) 100 MG capsule Take 100 mg by mouth 2 (two) times daily.      valsartan (DIOVAN) 160 MG tablet Take 160 mg by mouth once daily.          Review of patient's allergies indicates:   Allergen Reactions    Penicillins Hives     Patient currently on cefepime without complications  Tolerated ceftriaxone 3/2020       OBJECTIVE:     Vital Signs:  Temp:  [98.3 °F (36.8 °C)-99.2 °F (37.3 °C)] 98.5 °F (36.9 °C)  Pulse:  [] 83  Resp:  [10-28] 18  SpO2:  [77 %-100 %] 93 %  BP: ()/(50-95) 97/52  Arterial Line BP: ()/(46-88) 101/49  Body mass index is 39.22 kg/m².     Physical Exam:  Physical Exam   Constitutional: He is oriented to person, place, and time. He appears well-developed and well-nourished.   HENT:   Head: Normocephalic and atraumatic.   Eyes: Conjunctivae and EOM are normal.   Neck: Normal range of motion. Neck supple.   Cardiovascular: Normal rate and regular rhythm.   Murmur heard.  Pan systolic murmur appreciated throughout the precordium   Pulmonary/Chest: Effort normal and breath sounds normal. No respiratory distress.   Abdominal: Soft. He exhibits no distension. There is no abdominal  tenderness.   Neurological: He is alert and oriented to person, place, and time.   Skin: Skin is warm and dry.   Bandage noted on left groin with surgical drainage of non purulent fluid.    Negative for surrounding edema or erythema   Psychiatric: He has a normal mood and affect. His behavior is normal. Thought content normal.     Laboratory  Labs Reviewed   COMPREHENSIVE METABOLIC PANEL - Abnormal; Notable for the following components:       Result Value    Sodium 134 (*)     CO2 22 (*)     Glucose 154 (*)     Calcium 8.3 (*)     Albumin 2.3 (*)     Alkaline Phosphatase 45 (*)     All other components within normal limits   CBC W/ AUTO DIFFERENTIAL - Abnormal; Notable for the following components:    RBC 4.35 (*)     Hemoglobin 12.1 (*)     Hematocrit 35.7 (*)     Platelets 128 (*)     Lymph # 0.6 (*)     Mono # 0.2 (*)     Gran% 87.3 (*)     Lymph% 8.5 (*)     Mono% 3.0 (*)     All other components within normal limits   CBC W/ AUTO DIFFERENTIAL - Abnormal; Notable for the following components:    RBC 3.84 (*)     Hemoglobin 10.6 (*)     Hematocrit 33.2 (*)     Mean Corpuscular Hemoglobin Conc 31.9 (*)     Platelets 134 (*)     Lymph # 0.5 (*)     Gran% 87.1 (*)     Lymph% 5.9 (*)     All other components within normal limits   BASIC METABOLIC PANEL - Abnormal; Notable for the following components:    Sodium 134 (*)     Glucose 141 (*)     Calcium 7.8 (*)     All other components within normal limits   CBC W/ AUTO DIFFERENTIAL - Abnormal; Notable for the following components:    RBC 3.72 (*)     Hemoglobin 10.1 (*)     Hematocrit 31.4 (*)     Platelets 127 (*)     Lymph # 0.6 (*)     Gran% 81.8 (*)     Lymph% 11.0 (*)     All other components within normal limits   PREALBUMIN - Abnormal; Notable for the following components:    Prealbumin 4 (*)     All other components within normal limits   POCT GLUCOSE - Abnormal; Notable for the following components:    POCT Glucose 133 (*)     All other components within  normal limits   ISTAT PROCEDURE - Abnormal; Notable for the following components:    POC PCO2 48.0 (*)     POC PO2 354 (*)     POC HCO3 29.0 (*)     POC Glucose 128 (*)     POC Sodium 134 (*)     POC TCO2 30 (*)     POC Hematocrit 32 (*)     All other components within normal limits   ISTAT ACT-K - Abnormal; Notable for the following components:    POC ACTIVATED CLOTTING TIME K 219 (*)     All other components within normal limits   CULTURE, FUNGUS    Narrative:     Left groin hematoma   GRAM STAIN    Narrative:     Left groin hematoma   CULTURE, FUNGUS    Narrative:     Left groin hematoma   GRAM STAIN    Narrative:     Left groin hematoma   AFB CULTURE & SMEAR   CULTURE, ANAEROBIC   CULTURE, AEROBIC  (SPECIFY SOURCE)   AFB CULTURE & SMEAR   CULTURE, ANAEROBIC   CULTURE, AEROBIC  (SPECIFY SOURCE)   CULTURE, BLOOD   MAGNESIUM   PHOSPHORUS   MAGNESIUM   PHOSPHORUS   TYPE & SCREEN   POCT GLUCOSE MONITORING CONTINUOUS   PREPARE RBC SOFT        Sodium (mmol/L)   Date Value   06/30/2020 134 (L)   06/29/2020 134 (L)   06/29/2020 133 (L)     Potassium (mmol/L)   Date Value   06/30/2020 3.7   06/29/2020 4.4   06/29/2020 3.9     Chloride (mmol/L)   Date Value   06/30/2020 102   06/29/2020 101   06/29/2020 98     CO2 (mmol/L)   Date Value   06/30/2020 24   06/29/2020 22 (L)   06/29/2020 26     BUN, Bld (mg/dL)   Date Value   06/30/2020 12   06/29/2020 12   06/29/2020 10     Creatinine (mg/dL)   Date Value   06/30/2020 0.7   06/29/2020 0.7   06/29/2020 0.7     Glucose (mg/dL)   Date Value   06/30/2020 141 (H)   06/29/2020 154 (H)   06/29/2020 139 (H)     Calcium (mg/dL)   Date Value   06/30/2020 7.8 (L)   06/29/2020 8.3 (L)   06/29/2020 9.0     Magnesium (mg/dL)   Date Value   06/30/2020 2.0   06/29/2020 1.7   06/29/2020 1.6     Phosphorus (mg/dL)   Date Value   06/30/2020 3.1   06/29/2020 3.9   06/29/2020 3.1     Alkaline Phosphatase (U/L)   Date Value   06/29/2020 45 (L)   06/29/2020 46 (L)   06/28/2020 40 (L)     ALT (U/L)    Date Value   06/29/2020 31   06/29/2020 39   06/28/2020 46 (H)     AST (U/L)   Date Value   06/29/2020 27   06/29/2020 32   06/28/2020 44 (H)     Albumin (g/dL)   Date Value   06/29/2020 2.3 (L)   06/29/2020 2.7 (L)   06/28/2020 2.7 (L)     Total Protein (g/dL)   Date Value   06/29/2020 6.0   06/29/2020 6.6   06/28/2020 6.4     Total Bilirubin (mg/dL)   Date Value   06/29/2020 0.7   06/29/2020 1.0   06/28/2020 0.9     INR (no units)   Date Value   06/27/2020 1.6 (H)   03/10/2020 1.5 (H)       WBC (K/uL)   Date Value   06/30/2020 5.16   06/30/2020 8.49   06/29/2020 7.45     Hemoglobin (g/dL)   Date Value   06/30/2020 10.1 (L)   06/30/2020 10.6 (L)   06/29/2020 12.1 (L)     Platelets (K/uL)   Date Value   06/30/2020 127 (L)   06/30/2020 134 (L)   06/29/2020 128 (L)         ASSESSMENT & PLAN:     59M with a complicated medical history presented to the ED with fever, chills, and pain/swelling of the left groin was found to have an expanding pseudoaneurysm, s/p washout and stent placement.  Pt is currently on Ceftriaxone and Levaquin for gram negative coverage. Most recent positive blood culture (6/26) grew Proteus but further results have been negative.  Wound cultures taken during the surgery are currently pending.      Plan  Recurrent Bacteremia  -    Likely related to pts complicated surgical pathology.  Given previous blood culture results of Proteus and low suspicion for Pseudomonas infection, and pts history of vascular disease, will D/C the fluoroquinolone and remain on Ceftriaxone pending culture results.  ID to continue to follow.      Diogenes Kumar, MS4

## 2020-06-30 NOTE — SUBJECTIVE & OBJECTIVE
Interval History/Significant Events: No acute events overnight, tolerating diet, up and out of bed minimally, passing flatus w/o bowel movement    Follow-up For: Procedure(s) (LRB):  Angiogram Extremity Unilateral, washout L groin, possible open pseudoaneurysm repair (Left)  STENT, ILIAC ARTERY (Left)  AORTOGRAM (N/A)  EVACUATION-HEMATOMA-VASCULAR (Left)  REPAIR, PSEUDOANEURYSM (Left)    Post-Operative Day: 2 Days Post-Op    Objective:     Vital Signs (Most Recent):  Temp: 98.3 °F (36.8 °C) (06/30/20 0700)  Pulse: 86 (06/30/20 0745)  Resp: (!) 23 (06/30/20 0745)  BP: 112/63 (06/30/20 0700)  SpO2: 100 % (06/30/20 0745) Vital Signs (24h Range):  Temp:  [96.7 °F (35.9 °C)-99.2 °F (37.3 °C)] 98.3 °F (36.8 °C)  Pulse:  [] 86  Resp:  [10-28] 23  SpO2:  [77 %-100 %] 100 %  BP: ()/(50-95) 112/63  Arterial Line BP: ()/(47-88) 111/55     Weight: 123.5 kg (272 lb 4.3 oz)  Body mass index is 39.07 kg/m².      Intake/Output Summary (Last 24 hours) at 6/30/2020 0804  Last data filed at 6/30/2020 0700  Gross per 24 hour   Intake 2358 ml   Output 900 ml   Net 1458 ml       Physical Exam  Constitutional:       General: He is not in acute distress.     Appearance: Normal appearance. He is not ill-appearing.   HENT:      Head: Normocephalic and atraumatic.   Cardiovascular:      Rate and Rhythm: Normal rate and regular rhythm.      Heart sounds: Murmur present.      Comments: Able to doppler PT on LLE, no lower extremity pain or swelling. L groin incision c/d/i, R groin incision c/d/i, drain to L groin in place w/ SS output  Pulmonary:      Effort: Pulmonary effort is normal.      Breath sounds: Normal breath sounds.   Abdominal:      General: There is no distension.      Palpations: Abdomen is soft.      Tenderness: There is no abdominal tenderness.   Skin:     General: Skin is warm and dry.      Capillary Refill: Capillary refill takes less than 2 seconds.   Neurological:      General: No focal deficit present.       Mental Status: He is alert and oriented to person, place, and time.   Psychiatric:         Mood and Affect: Mood normal.         Behavior: Behavior normal.         Vents:       Lines/Drains/Airways     Peripherally Inserted Central Catheter Line            PICC Double Lumen 04/06/20 0951 right brachial 84 days          Drain                 Closed/Suction Drain 06/29/20 2254 Left Groin Bulb 19 Fr. less than 1 day         Urethral Catheter 06/29/20 2115 Non-latex;Double-lumen;Straight-tip 16 Fr. less than 1 day          Arterial Line                 Arterial Line 06/29/20 2100 Left Radial less than 1 day          Peripheral Intravenous Line                 Midline Catheter Insertion/Assessment  - Single Lumen 03/12/20 1321 Left cephalic vein (lateral side of arm) 18g x 8cm 109 days         Peripheral IV - Double Lumen 06/27/20 0952 18 G Anterior;Distal;Right Upper Arm 2 days         Peripheral IV - Single Lumen 06/29/20 2100 18 G Left;Posterior Hand less than 1 day                Significant Labs:    CBC/Anemia Profile:  Recent Labs   Lab 06/29/20  0451 06/29/20 2134 06/29/20  2345 06/30/20  0300   WBC 7.28  --  7.45 8.49   HGB 12.7*  --  12.1* 10.6*   HCT 37.9* 32* 35.7* 33.2*   *  --  128* 134*   MCV 83  --  82 87   RDW 14.2  --  14.4 14.3        Chemistries:  Recent Labs   Lab 06/29/20  0451 06/29/20  2345 06/29/20  2350 06/30/20  0300   * 134*  --  134*   K 3.9 4.4  --  3.7   CL 98 101  --  102   CO2 26 22*  --  24   BUN 10 12  --  12   CREATININE 0.7 0.7  --  0.7   CALCIUM 9.0 8.3*  --  7.8*   ALBUMIN 2.7* 2.3*  --   --    PROT 6.6 6.0  --   --    BILITOT 1.0 0.7  --   --    ALKPHOS 46* 45*  --   --    ALT 39 31  --   --    AST 32 27  --   --    MG 1.6  --  1.7 2.0   PHOS 3.1  --  3.9 3.1       CMP:   Recent Labs   Lab 06/29/20  2345 06/30/20  0300 07/01/20  0317   * 134* 140   K 4.4 3.7 4.4    102 109   CO2 22* 24 23   * 141* 122*   BUN 12 12 8   CREATININE 0.7 0.7 0.6    CALCIUM 8.3* 7.8* 8.1*   PROT 6.0  --   --    ALBUMIN 2.3*  --   --    BILITOT 0.7  --   --    ALKPHOS 45*  --   --    AST 27  --   --    ALT 31  --   --    ANIONGAP 11 8 8   EGFRNONAA >60.0 >60.0 >60.0     POCT Glucose:   Recent Labs   Lab 06/29/20  1536 06/29/20  2348 07/01/20  0659   POCTGLUCOSE 172* 133* 96       Significant Imaging:  I have reviewed all pertinent imaging results/findings within the past 24 hours.

## 2020-06-30 NOTE — ASSESSMENT & PLAN NOTE
59M with a complicated medical history presented to the ED with fever, chills, and pain/swelling of the left groin was found to have an expanding pseudoaneurysm, s/p washout and stent placement.  Pt is currently on ceftriaxone and Levaquin for gram negative coverage. Most recent positive blood culture (6/26) grew Proteus but further results have been negative.  Wound cultures taken during the surgery are currently pending.        Plan  Recurrent Proteus bacteremia - likely related to pt's complicated surgical pathology.  Given previous blood culture results of Proteus and low suspicion for Pseudomonas infection, and pts history of vascular disease, will D/C the fluoroquinolone and remain on ceftriaxone pending culture results.  Consider echocardiography, given heart murmur and recurrent bacteremia. Will likely require re-treatment for endovascular infection. ID to continue to follow.

## 2020-06-30 NOTE — ASSESSMENT & PLAN NOTE
59 y M with prior bilateral CFA endarterectomies in Dec 2019; L groin wound infections led to a L groin myocutaneous flap.  Had one episode of bleeding pseudoaneurysm from L CFA in April 2020 that was with 8mm viabahn. Readmitted on 6/26 with fever and positive blood cx (proteus)  S/p L EIA viabahn stent (9x75) with via R CFA access, L groin hematoma washout  - cont asa, plavix  - cont iv abx. ID consulted for recs  - cont L groin drain and sand bag, attempt to collapse groin wound  - monitor H/H  - DC foy  - step down to floor

## 2020-06-30 NOTE — PLAN OF CARE
SW is following this Pt for DC planning needs. There are no identified needs at this time. Discharge Disposition: home health, possibly IVAB    SW will continue to coordinate with patient, family, team and insurance to complete patient's discharge plan.    Rabia Singletary LMSW   - Case Management

## 2020-06-30 NOTE — CONSULTS
[facility] [address]  Infectious Disease  Consult Note    Patient Name: Renan Britton  MRN: [id number]  Admission Date: 6/29/2020  Hospital Length of Stay: 1 days  Attending Physician: Bruce Dallas MD  Primary Care Provider: Eren Becker MD     Isolation Status: No active isolations    Patient information was obtained from patient, spouse/SO, past medical records and ER records.      Inpatient consult to Infectious Diseases  Consult performed by: Papito Stone MD  Consult ordered by: Carolyn Pat MD        Assessment/Plan:     Proteus infection  59M with a complicated medical history presented to the ED with fever, chills, and pain/swelling of the left groin was found to have an expanding pseudoaneurysm, s/p washout and stent placement.  Pt is currently on ceftriaxone and Levaquin for gram negative coverage. Most recent positive blood culture (6/26) grew Proteus but further results have been negative.  Wound cultures taken during the surgery are currently pending.        Plan  Recurrent Proteus bacteremia - likely related to pt's complicated surgical pathology.  Given previous blood culture results of Proteus and low suspicion for Pseudomonas infection, and pts history of vascular disease, will D/C the fluoroquinolone and remain on ceftriaxone pending culture results.  Consider echocardiography, given heart murmur and recurrent bacteremia. Will likely require re-treatment for endovascular infection. ID to continue to follow.        Thank you for your consult. I will follow-up with patient. Please contact us if you have any additional questions.    Papito Stone MD  Chair, Infectious Diseases  [facility]  [phone] (cell)    Subjective:     Principal Problem: Pseudoaneurysm    HPI: Mr. Renan Britton is a 59 y.o. male with a history of peripheral vascular disease s/p bilateral femoral endarterectomy, DM, and HTN who presented to the [facility] for fever, chills,  and pain and swelling of his left groin.  He was admitted for presumed bacteremia and was found to have an enlarging pseudoaneurysm of his left groin.  Vascular surgery subsequently brought him to the OR and performed a washout and stent placement.  ID was consulted for persistent proteus bacteremia.  Pt is currently on Ceftriaxone and Levaquin given previous bacteremia.     Pt has a complicated course of infection on chart review.  After bilateral femoral endarterectomy, pt was placed on Clindamycin which was changed to IV Ceftriaxone after surgical cultures grew proteus and group F streptococcus.  After his 6wk course he was transitioned to oral Keflex but was placed back on Ceftriaxone after blood cultures grew proteus.  He subsequently finished that course and was placed on oral Levaquin but was transitioned once again to IV Ceftriaxone after being diagnosed with a pseudoaneurysm of his left femoral artery.  After that course he was transitioned to oral Cefadroxil which he was currently taking prior to this admit.       Today pt states he is feeling much better after the procedure.  He reports a marked reduction in pain and swelling of his left groin.  He denies any current fever, chills, N/V, or abdominal pain.    Past Medical History:   Diagnosis Date    Diabetes mellitus     H/O brain tumor     HLD (hyperlipidemia)     Hypertension     PAD (peripheral artery disease)     Seizures     R/T BRAIN TUMOR- SURGICALLY EXCISED       Past Surgical History:   Procedure Laterality Date    ANGIOGRAPHY Left 4/6/2020    Procedure: ANGIOGRAM, Left lower extremity;  Surgeon: Kelby Gomez MD;  Location: [address];  Service: Peripheral Vascular;  Laterality: Left;    ANGIOGRAPHY OF LOWER EXTREMITY Left 6/29/2020    Procedure: Angiogram Extremity Unilateral, washout L groin, possible open pseudoaneurysm repair;  Surgeon: Bruce Dallas MD;  Location: [facility] OR [address] [place] [address];  Service: Peripheral Vascular;   Laterality: Left;  contrast 23 ml  fluoro time: 9.9 min  mGy: 1230.26  Gycm2: 146.69    AORTOGRAPHY N/A 6/29/2020    Procedure: AORTOGRAM;  Surgeon: Bruce aDllas MD;  [facility];  Service: Peripheral Vascular;  Laterality: N/A;    APPLICATION OF WOUND VACUUM-ASSISTED CLOSURE DEVICE Bilateral 1/1/2020    Procedure: APPLICATION, WOUND VAC;  Surgeon: SEBAS Prieto II, MD;  [facility];  Service: Cardiovascular;  Laterality: Bilateral;    APPLICATION OF WOUND VACUUM-ASSISTED CLOSURE DEVICE N/A 1/3/2020    Procedure: APPLICATION, WOUND VAC;  Surgeon: Brian Wong MD;  Location: [facility] OR [address] [place] [address];  Service: Plastics;  Laterality: N/A;    BRAIN SURGERY  01/2011    TUMOR EXCISED    CREATION OF MUSCLE ROTATIONAL FLAP N/A 1/3/2020    Procedure: CREATION, FLAP, MUSCLE ROTATION;  Surgeon: Brian Wong MD;  Location: [address];  Service: Plastics;  Laterality: N/A;    ENDARTERECTOMY OF FEMORAL ARTERY Bilateral 12/20/2019    Procedure: ENDARTERECTOMY, FEMORAL;  Surgeon: Kelby Gomez MD;  Location: [address];  Service: Peripheral Vascular;  Laterality: Bilateral;  natalya common femoral endarterectomy with natalya. iliac stent placement    PERCUTANEOUS TRANSLUMINAL ANGIOPLASTY Left 4/6/2020    Procedure: PTA (ANGIOPLASTY, PERCUTANEOUS, TRANSLUMINAL), left lower extremity;  Surgeon: Kelby Gomez MD;  Location: [address];  Service: Peripheral Vascular;  Laterality: Left;    PERCUTANEOUS TRANSLUMINAL ANGIOPLASTY (PTA) OF PERIPHERAL VESSEL Left 10/17/2019    Procedure: PTA, PERIPHERAL VESSEL;  Surgeon: Rao Fisher MD;  Location: [facility] C[facility];  Service: Cardiology;  Laterality: Left;    PSEUDOANEURYSM REPAIR Left 6/29/2020    Procedure: REPAIR, PSEUDOANEURYSM;  Surgeon: Bruce Dallas MD;  Location: [address];  Service: Peripheral Vascular;  Laterality: Left;       Review of patient's allergies indicates:   Allergen Reactions    Penicillins Hives      Patient currently on cefepime without complications  Tolerated ceftriaxone 3/2020       Medications:  Medications Prior to Admission   Medication Sig    ascorbic acid (VITAMIN C ORAL) Take 2 capsules by mouth every morning.    aspirin (ECOTRIN) 81 MG EC tablet Take 81 mg by mouth once daily.    atorvastatin (LIPITOR) 40 MG tablet Take 40 mg by mouth once daily.    cefadroxil (DURICEF) 500 MG Cap Take 2 capsules (1,000 mg total) by mouth every 12 (twelve) hours.    cetirizine 10 mg chewable tablet Take 10 mg by mouth once daily.     clopidogreL (PLAVIX) 75 mg tablet Take 1 tablet (75 mg total) by mouth once daily.    elderberry fruit and flower 460-115 mg Cap Take 2 capsules by mouth every morning.    HYDROcodone-acetaminophen (NORCO) 5-325 mg per tablet Take 1 tablet by mouth every 6 (six) hours as needed.    levoFLOXacin (LEVAQUIN) 500 MG tablet Take 1 tablet (500 mg total) by mouth once daily.    metFORMIN (GLUCOPHAGE-XR) 500 MG XR 24hr tablet Take 1,000 mg by mouth daily with breakfast.     pregabalin (LYRICA) 100 MG capsule Take 100 mg by mouth 2 (two) times daily.    valsartan (DIOVAN) 160 MG tablet Take 160 mg by mouth once daily.     Antibiotics (From admission, onward)    Start     Stop Route Frequency Ordered    06/30/20 0900  mupirocin 2 % ointment 1 g      07/05 0859 Nasl 2 times daily 06/29/20 2323    06/30/20 0030  cefTRIAXone (ROCEPHIN) 2 g/50 mL D5W IVPB      -- IV Every 24 hours (non-standard times) 06/29/20 2323        Antifungals (From admission, onward)    None        Antivirals (From admission, onward)    None           There is no immunization history for the selected administration types on file for this patient.    Family History     Problem Relation (Age of Onset)    Cancer Father, Brother    Diabetes Mother    Heart disease Father, Brother, Brother, Brother    Hypertension Mother    Stroke Mother        Social History     Socioeconomic History    Marital status:       Spouse name: Not on file    Number of children: Not on file    Years of education: Not on file    Highest education level: Not on file   Occupational History    Not on file   Social Needs    Financial resource strain: Not on file    Food insecurity     Worry: Not on file     Inability: Not on file    Transportation needs     Medical: Not on file     Non-medical: Not on file   Tobacco Use    Smoking status: Former Smoker     Packs/day: 2.00     Types: Cigarettes     Quit date: 2011     Years since quittin.4    Smokeless tobacco: Never Used   Substance and Sexual Activity    Alcohol use: Yes     Comment: 2-4 PER DAY    Drug use: Never    Sexual activity: Not on file   Lifestyle    Physical activity     Days per week: Not on file     Minutes per session: Not on file    Stress: Not on file   Relationships    Social connections     Talks on phone: Not on file     Gets together: Not on file     Attends Jewish service: Not on file     Active member of club or organization: Not on file     Attends meetings of clubs or organizations: Not on file     Relationship status: Not on file   Other Topics Concern    Not on file   Social History Narrative    Not on file     Review of Systems   Constitutional: Positive for chills and fever.   Skin: Positive for wound.   All other systems reviewed and are negative.    Objective:     Vital Signs (Most Recent):  Temp: 98.5 °F (36.9 °C) (20 1100)  Pulse: 83 (20 1500)  Resp: 14 (20 1500)  BP: (!) 97/52 (20 1300)  SpO2: 98 % (20 1500) Vital Signs (24h Range):  Temp:  [98.3 °F (36.8 °C)-99.2 °F (37.3 °C)] 98.5 °F (36.9 °C)  Pulse:  [] 83  Resp:  [10-28] 14  SpO2:  [77 %-100 %] 98 %  BP: ()/(50-95) 97/52  Arterial Line BP: ()/(46-88) 112/51     Weight: 124 kg (273 lb 5.9 oz)  Body mass index is 39.22 kg/m².    Estimated Creatinine Clearance: 153.8 mL/min (based on SCr of 0.7 mg/dL).    Physical Exam  Vitals signs and  nursing note reviewed.   Constitutional:       Appearance: Normal appearance. He is normal weight.   HENT:      Head: Normocephalic and atraumatic.   Cardiovascular:      Rate and Rhythm: Normal rate and regular rhythm.      Heart sounds: Murmur present.      Comments: Musical HSM  Pulmonary:      Effort: Pulmonary effort is normal.      Breath sounds: Normal breath sounds.   Musculoskeletal:         General: No swelling.      Comments: KATHLEEN drain, left groin   Skin:     General: Skin is warm and dry.      Findings: No erythema or rash.   Neurological:      General: No focal deficit present.      Mental Status: He is oriented to person, place, and time. Mental status is at baseline.   Psychiatric:         Mood and Affect: Mood normal.         Behavior: Behavior normal.         Thought Content: Thought content normal.         Judgment: Judgment normal.         Significant Labs:   CBC:   Recent Labs   Lab 06/29/20  2345 06/30/20  0300 06/30/20  0953   WBC 7.45 8.49 5.16   HGB 12.1* 10.6* 10.1*   HCT 35.7* 33.2* 31.4*   * 134* 127*     CMP:   Recent Labs   Lab 06/29/20  0451 06/29/20  2345 06/30/20  0300   * 134* 134*   K 3.9 4.4 3.7   CL 98 101 102   CO2 26 22* 24   * 154* 141*   BUN 10 12 12   CREATININE 0.7 0.7 0.7   CALCIUM 9.0 8.3* 7.8*   PROT 6.6 6.0  --    ALBUMIN 2.7* 2.3*  --    BILITOT 1.0 0.7  --    ALKPHOS 46* 45*  --    AST 32 27  --    ALT 39 31  --    ANIONGAP 9 11 8   EGFRNONAA >60.0 >60.0 >60.0     Wound Culture: No results for input(s): LABAERO in the last 4320 hours.    Significant Imaging: I have reviewed all pertinent imaging results/findings within the past 24 hours.

## 2020-07-01 LAB
ANION GAP SERPL CALC-SCNC: 8 MMOL/L (ref 8–16)
ASCENDING AORTA: 3.25 CM
AV INDEX (PROSTH): 0.3
AV MEAN GRADIENT: 25 MMHG
AV PEAK GRADIENT: 44 MMHG
AV VALVE AREA: 1.13 CM2
AV VELOCITY RATIO: 0.35
BASOPHILS # BLD AUTO: 0.02 K/UL (ref 0–0.2)
BASOPHILS NFR BLD: 0.4 % (ref 0–1.9)
BSA FOR ECHO PROCEDURE: 2.47 M2
BUN SERPL-MCNC: 8 MG/DL (ref 6–20)
CALCIUM SERPL-MCNC: 8.1 MG/DL (ref 8.7–10.5)
CHLORIDE SERPL-SCNC: 109 MMOL/L (ref 95–110)
CO2 SERPL-SCNC: 23 MMOL/L (ref 23–29)
CREAT SERPL-MCNC: 0.6 MG/DL (ref 0.5–1.4)
CV ECHO LV RWT: 0.31 CM
DIFFERENTIAL METHOD: ABNORMAL
DOP CALC AO PEAK VEL: 3.3 M/S
DOP CALC AO VTI: 75 CM
DOP CALC LVOT AREA: 3.8 CM2
DOP CALC LVOT DIAMETER: 2.2 CM
DOP CALC LVOT PEAK VEL: 1.16 M/S
DOP CALC LVOT STROKE VOLUME: 85.11 CM3
DOP CALCLVOT PEAK VEL VTI: 22.4 CM
E WAVE DECELERATION TIME: 203.65 MSEC
E/A RATIO: 1.5
E/E' RATIO: 13.33 M/S
ECHO LV POSTERIOR WALL: 0.73 CM (ref 0.6–1.1)
EOSINOPHIL # BLD AUTO: 0.1 K/UL (ref 0–0.5)
EOSINOPHIL NFR BLD: 2.1 % (ref 0–8)
ERYTHROCYTE [DISTWIDTH] IN BLOOD BY AUTOMATED COUNT: 14.5 % (ref 11.5–14.5)
EST. GFR  (AFRICAN AMERICAN): >60 ML/MIN/1.73 M^2
EST. GFR  (NON AFRICAN AMERICAN): >60 ML/MIN/1.73 M^2
FRACTIONAL SHORTENING: 23 % (ref 28–44)
GLUCOSE SERPL-MCNC: 122 MG/DL (ref 70–110)
HCT VFR BLD AUTO: 31.5 % (ref 40–54)
HGB BLD-MCNC: 10 G/DL (ref 14–18)
IMM GRANULOCYTES # BLD AUTO: 0.02 K/UL (ref 0–0.04)
IMM GRANULOCYTES NFR BLD AUTO: 0.4 % (ref 0–0.5)
INTERVENTRICULAR SEPTUM: 0.85 CM (ref 0.6–1.1)
LA MAJOR: 4.46 CM
LA MINOR: 4.18 CM
LA WIDTH: 3.89 CM
LEFT ATRIUM SIZE: 3.76 CM
LEFT ATRIUM VOLUME INDEX: 22.5 ML/M2
LEFT ATRIUM VOLUME: 53.65 CM3
LEFT INTERNAL DIMENSION IN SYSTOLE: 3.66 CM (ref 2.1–4)
LEFT VENTRICLE DIASTOLIC VOLUME INDEX: 43.65 ML/M2
LEFT VENTRICLE DIASTOLIC VOLUME: 103.99 ML
LEFT VENTRICLE MASS INDEX: 51 G/M2
LEFT VENTRICLE SYSTOLIC VOLUME INDEX: 23.8 ML/M2
LEFT VENTRICLE SYSTOLIC VOLUME: 56.69 ML
LEFT VENTRICULAR INTERNAL DIMENSION IN DIASTOLE: 4.73 CM (ref 3.5–6)
LEFT VENTRICULAR MASS: 121.59 G
LV LATERAL E/E' RATIO: 12 M/S
LV SEPTAL E/E' RATIO: 15 M/S
LYMPHOCYTES # BLD AUTO: 0.8 K/UL (ref 1–4.8)
LYMPHOCYTES NFR BLD: 14.6 % (ref 18–48)
MAGNESIUM SERPL-MCNC: 2 MG/DL (ref 1.6–2.6)
MCH RBC QN AUTO: 27.4 PG (ref 27–31)
MCHC RBC AUTO-ENTMCNC: 31.7 G/DL (ref 32–36)
MCV RBC AUTO: 86 FL (ref 82–98)
MONOCYTES # BLD AUTO: 0.4 K/UL (ref 0.3–1)
MONOCYTES NFR BLD: 7.2 % (ref 4–15)
MV PEAK A VEL: 0.8 M/S
MV PEAK E VEL: 1.2 M/S
MV STENOSIS PRESSURE HALF TIME: 59.06 MS
MV VALVE AREA P 1/2 METHOD: 3.73 CM2
NEUTROPHILS # BLD AUTO: 3.9 K/UL (ref 1.8–7.7)
NEUTROPHILS NFR BLD: 75.3 % (ref 38–73)
NRBC BLD-RTO: 0 /100 WBC
PHOSPHATE SERPL-MCNC: 3.5 MG/DL (ref 2.7–4.5)
PISA TR MAX VEL: 2.64 M/S
PLATELET # BLD AUTO: 155 K/UL (ref 150–350)
PMV BLD AUTO: 10.7 FL (ref 9.2–12.9)
POCT GLUCOSE: 114 MG/DL (ref 70–110)
POCT GLUCOSE: 130 MG/DL (ref 70–110)
POCT GLUCOSE: 96 MG/DL (ref 70–110)
POTASSIUM SERPL-SCNC: 4.4 MMOL/L (ref 3.5–5.1)
RA MAJOR: 4.02 CM
RA PRESSURE: 3 MMHG
RA WIDTH: 3.22 CM
RBC # BLD AUTO: 3.65 M/UL (ref 4.6–6.2)
RIGHT VENTRICULAR END-DIASTOLIC DIMENSION: 3.76 CM
SINUS: 3.26 CM
SODIUM SERPL-SCNC: 140 MMOL/L (ref 136–145)
STJ: 2.94 CM
TDI LATERAL: 0.1 M/S
TDI SEPTAL: 0.08 M/S
TDI: 0.09 M/S
TR MAX PG: 28 MMHG
TRICUSPID ANNULAR PLANE SYSTOLIC EXCURSION: 2.31 CM
TV REST PULMONARY ARTERY PRESSURE: 31 MMHG
WBC # BLD AUTO: 5.12 K/UL (ref 3.9–12.7)

## 2020-07-01 PROCEDURE — 99233 PR SUBSEQUENT HOSPITAL CARE,LEVL III: ICD-10-PCS | Mod: ,,, | Performed by: ANESTHESIOLOGY

## 2020-07-01 PROCEDURE — 63600175 PHARM REV CODE 636 W HCPCS: Performed by: STUDENT IN AN ORGANIZED HEALTH CARE EDUCATION/TRAINING PROGRAM

## 2020-07-01 PROCEDURE — 99233 SBSQ HOSP IP/OBS HIGH 50: CPT | Mod: ,,, | Performed by: ANESTHESIOLOGY

## 2020-07-01 PROCEDURE — 99233 PR SUBSEQUENT HOSPITAL CARE,LEVL III: ICD-10-PCS | Mod: ,,, | Performed by: INTERNAL MEDICINE

## 2020-07-01 PROCEDURE — 99233 SBSQ HOSP IP/OBS HIGH 50: CPT | Mod: ,,, | Performed by: INTERNAL MEDICINE

## 2020-07-01 PROCEDURE — 85025 COMPLETE CBC W/AUTO DIFF WBC: CPT

## 2020-07-01 PROCEDURE — 84100 ASSAY OF PHOSPHORUS: CPT

## 2020-07-01 PROCEDURE — 20000000 HC ICU ROOM

## 2020-07-01 PROCEDURE — 25000003 PHARM REV CODE 250: Performed by: STUDENT IN AN ORGANIZED HEALTH CARE EDUCATION/TRAINING PROGRAM

## 2020-07-01 PROCEDURE — 83735 ASSAY OF MAGNESIUM: CPT

## 2020-07-01 PROCEDURE — 63600175 PHARM REV CODE 636 W HCPCS: Performed by: SURGERY

## 2020-07-01 PROCEDURE — 94761 N-INVAS EAR/PLS OXIMETRY MLT: CPT

## 2020-07-01 PROCEDURE — 80048 BASIC METABOLIC PNL TOTAL CA: CPT

## 2020-07-01 PROCEDURE — 25000003 PHARM REV CODE 250: Performed by: SURGERY

## 2020-07-01 RX ORDER — DOCUSATE SODIUM 100 MG/1
100 CAPSULE, LIQUID FILLED ORAL 2 TIMES DAILY
Status: DISCONTINUED | OUTPATIENT
Start: 2020-07-01 | End: 2020-07-06 | Stop reason: HOSPADM

## 2020-07-01 RX ORDER — SODIUM CHLORIDE, SODIUM LACTATE, POTASSIUM CHLORIDE, CALCIUM CHLORIDE 600; 310; 30; 20 MG/100ML; MG/100ML; MG/100ML; MG/100ML
INJECTION, SOLUTION INTRAVENOUS CONTINUOUS
Status: DISCONTINUED | OUTPATIENT
Start: 2020-07-01 | End: 2020-07-02

## 2020-07-01 RX ADMIN — POLYETHYLENE GLYCOL 3350 17 G: 17 POWDER, FOR SOLUTION ORAL at 08:07

## 2020-07-01 RX ADMIN — DOCUSATE SODIUM 100 MG: 100 CAPSULE, LIQUID FILLED ORAL at 09:07

## 2020-07-01 RX ADMIN — SODIUM CHLORIDE, SODIUM LACTATE, POTASSIUM CHLORIDE, AND CALCIUM CHLORIDE: .6; .31; .03; .02 INJECTION, SOLUTION INTRAVENOUS at 10:07

## 2020-07-01 RX ADMIN — MUPIROCIN 1 G: 20 OINTMENT TOPICAL at 09:07

## 2020-07-01 RX ADMIN — DOCUSATE SODIUM 100 MG: 100 CAPSULE, LIQUID FILLED ORAL at 08:07

## 2020-07-01 RX ADMIN — MUPIROCIN 1 G: 20 OINTMENT TOPICAL at 08:07

## 2020-07-01 RX ADMIN — ENOXAPARIN SODIUM 40 MG: 100 INJECTION SUBCUTANEOUS at 05:07

## 2020-07-01 RX ADMIN — ASPIRIN 81 MG: 81 TABLET, DELAYED RELEASE ORAL at 09:07

## 2020-07-01 RX ADMIN — ATORVASTATIN CALCIUM 40 MG: 20 TABLET, FILM COATED ORAL at 08:07

## 2020-07-01 RX ADMIN — HYDRALAZINE HYDROCHLORIDE 10 MG: 20 INJECTION INTRAMUSCULAR; INTRAVENOUS at 02:07

## 2020-07-01 RX ADMIN — HUMAN ALBUMIN MICROSPHERES AND PERFLUTREN 0.66 MG: 10; .22 INJECTION, SOLUTION INTRAVENOUS at 09:07

## 2020-07-01 RX ADMIN — PREGABALIN 100 MG: 75 CAPSULE ORAL at 08:07

## 2020-07-01 RX ADMIN — PREGABALIN 100 MG: 75 CAPSULE ORAL at 09:07

## 2020-07-01 RX ADMIN — VALSARTAN 160 MG: 160 TABLET ORAL at 08:07

## 2020-07-01 NOTE — PLAN OF CARE
Plan of Care notation:    No s/s of pain or distress today.  Plan for floor transfer when room becomes available.  Plan of care reviewed with pt, and verbalization of understanding obtained.  Emotional support offered.

## 2020-07-01 NOTE — PROGRESS NOTES
[facility]  Critical Care - Surgery  Progress Note    Patient Name: Renan Britton  MRN: [id number]  Admission Date: 6/29/2020  Hospital Length of Stay: 2 days  Code Status: Prior  Attending Provider: Bruce Dallas MD  Primary Care Provider: Eren Becker MD   Principal Problem: Pseudoaneurysm    Subjective:     Hospital/ICU Course:  No notes on file    Interval History/Significant Events: No acute events overnight, tolerating diet, up and out of bed minimally, passing flatus w/o bowel movement    Follow-up For: Procedure(s) (LRB):  Angiogram Extremity Unilateral, washout L groin, possible open pseudoaneurysm repair (Left)  STENT, ILIAC ARTERY (Left)  AORTOGRAM (N/A)  EVACUATION-HEMATOMA-VASCULAR (Left)  REPAIR, PSEUDOANEURYSM (Left)    Post-Operative Day: 2 Days Post-Op    Objective:     Vital Signs (Most Recent):  Temp: 98.3 °F (36.8 °C) (06/30/20 0700)  Pulse: 86 (06/30/20 0745)  Resp: (!) 23 (06/30/20 0745)  BP: 112/63 (06/30/20 0700)  SpO2: 100 % (06/30/20 0745) Vital Signs (24h Range):  Temp:  [96.7 °F (35.9 °C)-99.2 °F (37.3 °C)] 98.3 °F (36.8 °C)  Pulse:  [] 86  Resp:  [10-28] 23  SpO2:  [77 %-100 %] 100 %  BP: ()/(50-95) 112/63  Arterial Line BP: ()/(47-88) 111/55     Weight: 123.5 kg (272 lb 4.3 oz)  Body mass index is 39.07 kg/m².      Intake/Output Summary (Last 24 hours) at 6/30/2020 0804  Last data filed at 6/30/2020 0700  Gross per 24 hour   Intake 2358 ml   Output 900 ml   Net 1458 ml       Physical Exam  Constitutional:       General: He is not in acute distress.     Appearance: Normal appearance. He is not ill-appearing.   HENT:      Head: Normocephalic and atraumatic.   Cardiovascular:      Rate and Rhythm: Normal rate and regular rhythm.      Comments: Able to doppler PT on LLE, no lower extremity pain or swelling. L groin incision c/d/i, R groin incision c/d/i, drain to L groin in place w/ SS output  Pulmonary:      Effort: Pulmonary effort is normal.       Breath sounds: Normal breath sounds.   Abdominal:      General: There is no distension.      Palpations: Abdomen is soft.      Tenderness: There is no abdominal tenderness.   Skin:     General: Skin is warm and dry.      Capillary Refill: Capillary refill takes less than 2 seconds.   Neurological:      General: No focal deficit present.      Mental Status: He is alert and oriented to person, place, and time.   Psychiatric:         Mood and Affect: Mood normal.         Behavior: Behavior normal.         Vents:       Lines/Drains/Airways     Peripherally Inserted Central Catheter Line            PICC Double Lumen 04/06/20 0951 right brachial 84 days          Drain                 Closed/Suction Drain 06/29/20 2254 Left Groin Bulb 19 Fr. less than 1 day         Urethral Catheter 06/29/20 2115 Non-latex;Double-lumen;Straight-tip 16 Fr. less than 1 day          Arterial Line                 Arterial Line 06/29/20 2100 Left Radial less than 1 day          Peripheral Intravenous Line                 Midline Catheter Insertion/Assessment  - Single Lumen 03/12/20 1321 Left cephalic vein (lateral side of arm) 18g x 8cm 109 days         Peripheral IV - Double Lumen 06/27/20 0952 18 G Anterior;Distal;Right Upper Arm 2 days         Peripheral IV - Single Lumen 06/29/20 2100 18 G Left;Posterior Hand less than 1 day                Significant Labs:    CBC/Anemia Profile:  Recent Labs   Lab 06/29/20  0451 06/29/20  2134 06/29/20  2345 06/30/20  0300   WBC 7.28  --  7.45 8.49   HGB 12.7*  --  12.1* 10.6*   HCT 37.9* 32* 35.7* 33.2*   *  --  128* 134*   MCV 83  --  82 87   RDW 14.2  --  14.4 14.3        Chemistries:  Recent Labs   Lab 06/29/20  0451 06/29/20  2345 06/29/20  2350 06/30/20  0300   * 134*  --  134*   K 3.9 4.4  --  3.7   CL 98 101  --  102   CO2 26 22*  --  24   BUN 10 12  --  12   CREATININE 0.7 0.7  --  0.7   CALCIUM 9.0 8.3*  --  7.8*   ALBUMIN 2.7* 2.3*  --   --    PROT 6.6 6.0  --   --    BILITOT  1.0 0.7  --   --    ALKPHOS 46* 45*  --   --    ALT 39 31  --   --    AST 32 27  --   --    MG 1.6  --  1.7 2.0   PHOS 3.1  --  3.9 3.1       CMP:   Recent Labs   Lab 06/29/20  2345 06/30/20  0300 07/01/20  0317   * 134* 140   K 4.4 3.7 4.4    102 109   CO2 22* 24 23   * 141* 122*   BUN 12 12 8   CREATININE 0.7 0.7 0.6   CALCIUM 8.3* 7.8* 8.1*   PROT 6.0  --   --    ALBUMIN 2.3*  --   --    BILITOT 0.7  --   --    ALKPHOS 45*  --   --    AST 27  --   --    ALT 31  --   --    ANIONGAP 11 8 8   EGFRNONAA >60.0 >60.0 >60.0     POCT Glucose:   Recent Labs   Lab 06/29/20  1536 06/29/20  2348 07/01/20  0659   POCTGLUCOSE 172* 133* 96       Significant Imaging:  I have reviewed all pertinent imaging results/findings within the past 24 hours.       Assessment/Plan:     * Pseudoaneurysm  Renan Brittno is a 59 y.o. male w/ recurrent L CFA pseudoaneurysm who presents to SICU s/p covered stent placement and L groin washout on 6/29.    Neuro  [place]/Dilaudid prn  Not currently requiring pain medication    Resp  Stable on room air  Encourage IS    Cards  No pressor support necessary  Regular rate and rhythm  No known cardiac hx  ASA/plavix given PVD hx  Hydralazine 10 mg for SBP >160      GI  Diabetic diet, 2000 kcal per day, tolerating diet without issues  Replace lytes as needed    Renal  Normal function preop, BUN/Cr 8.0/0.6 today    Endo  Pregabalin 100 mg BID    Heme/ID  Normal Hgb preop, 25 ml EBL during surgery  H/H 10.0/31.5 from 10.1/31.4  Rocephin per primary  Cx pending from OR, will f/u    Dispo: Consider stepdown to floor today           Critical care was time spent personally by me on the following activities: development of treatment plan with patient or surrogate and bedside caregivers, discussions with consultants, evaluation of patient's response to treatment, examination of patient, ordering and performing treatments and interventions, ordering and review of laboratory studies, ordering  and review of radiographic studies, pulse oximetry, re-evaluation of patient's condition.  This critical care time did not overlap with that of any other provider or involve time for any procedures.     Larry Gonzalez MD  Critical Care - Surgery  [facility]

## 2020-07-01 NOTE — ANESTHESIA PROCEDURE NOTES
Arterial    Diagnosis: pseudoaneurysm    Patient location during procedure: done in OR    Staffing  Authorizing Provider: Tiarra Zamarripa MD  Performing Provider: Tiarra Zamarripa MD    Anesthesiologist was present at the time of the procedure.    Preanesthetic Checklist  Completed: patient identified, site marked, surgical consent, pre-op evaluation, timeout performed, IV checked, risks and benefits discussed, monitors and equipment checked and anesthesia consent givenArterial  Skin Prep: chlorhexidine gluconate  Local Infiltration: none  Orientation: left  Location: radial  Catheter Size: 20 G  Catheter placement by Ultrasound guidance. Heme positive aspiration all ports.  Vessel Caliber: medium, patent, compressibility normal  Needle advanced into vessel with real time Ultrasound guidance.Insertion Attempts: 1  Assessment  Dressing: secured with tape and tegaderm

## 2020-07-01 NOTE — ASSESSMENT & PLAN NOTE
Renan Britton is a 59 y.o. male w/ recurrent L CFA pseudoaneurysm who presents to SICU s/p covered stent placement and L groin washout on 6/29.    Neuro  [place]/Dilaudid prn  Not currently requiring pain medication    Resp  Stable on room air  Encourage IS    Cards  No pressor support necessary  Regular rate and rhythm  No known cardiac hx  ASA/plavix given PVD hx  Hydralazine 10 mg for SBP >160    GI  Diabetic diet, 2000 kcal per day, tolerating diet without issues  Replace lytes as needed  Dulcolax and PEG for constipation    Renal  Normal function preop, BUN/Cr 8.0/0.6 today    Endo  Pregabalin 100 mg BID    Heme/ID  Normal Hgb preop, 25 ml EBL during surgery  H/H 10.0/31.5 from 10.1/31.4  Rocephin per primary  Cx pending from OR, will f/u  Patient will require 6 weeks IV antibiotics, consult PICC team for line placement    Dispo: Consider stepdown to floor today

## 2020-07-01 NOTE — ASSESSMENT & PLAN NOTE
59M with a complicated medical history presented to the ED with fever, chills, and pain/swelling of the left groin was found to have an expanding pseudoaneurysm, s/p washout and stent placement.  Pt is currently on ceftriaxone and Levaquin for gram negative coverage. Most recent positive blood culture (6/26) grew Proteus but further results have been negative.  Wound cultures taken during the surgery are currently pending.    Echo done 7/1. No vegetations seen     Plan  Recurrent Proteus bacteremia - likely related to pt's complicated surgical pathology.  Given previous blood culture results of Proteus and low suspicion for Pseudomonas infection, and pts history of vascular disease, will D/C the fluoroquinolone and remain on ceftriaxone pending culture results.    Continue with ceftriaxone, will need PICC line placed for outpatient therapy

## 2020-07-01 NOTE — CONSULTS
RASHID spoke with team: Dr. Jackson- he reported patient will need at least 6 weeks of abx therapy     Explained to nurse and doctor that patient will need a PICC - evaluated chart- patient had blood cultures drawn yesterday, PICC will be placed once 48 hr negative blood cultures are read    Placed 20g 1 3/4 PIV in LILI using u/s guidance.

## 2020-07-01 NOTE — SUBJECTIVE & OBJECTIVE
Interval History: no acute events overnight. Pain is much better, although worsens with movement.    Review of Systems   Constitutional: Negative for chills and fever.   HENT: Negative for rhinorrhea and sore throat.    Respiratory: Negative for cough and shortness of breath.    Cardiovascular: Negative for chest pain and palpitations.   Gastrointestinal: Negative for abdominal pain, constipation, diarrhea and nausea.   Genitourinary: Negative for dysuria and flank pain.   Musculoskeletal: Positive for back pain and gait problem.        Groin pain   Neurological: Negative for light-headedness, numbness and headaches.     Objective:     Vital Signs (Most Recent):  Temp: 97.8 °F (36.6 °C) (07/01/20 0700)  Pulse: 86 (07/01/20 0854)  Resp: 20 (07/01/20 0854)  BP: (!) 155/78 (07/01/20 0230)  SpO2: 100 % (07/01/20 0854) Vital Signs (24h Range):  Temp:  [97.8 °F (36.6 °C)-98.6 °F (37 °C)] 97.8 °F (36.6 °C)  Pulse:  [73-95] 86  Resp:  [10-29] 20  SpO2:  [93 %-100 %] 100 %  BP: ()/(52-78) 155/78  Arterial Line BP: ()/(46-73) 146/65     Weight: 123.8 kg (273 lb)  Body mass index is 39.17 kg/m².    Estimated Creatinine Clearance: 179.3 mL/min (based on SCr of 0.6 mg/dL).    Physical Exam  Vitals signs and nursing note reviewed.   Constitutional:       Appearance: Normal appearance. He is normal weight.   HENT:      Head: Normocephalic and atraumatic.   Cardiovascular:      Rate and Rhythm: Normal rate and regular rhythm.      Heart sounds: Murmur present.      Comments: Musical HSM  Pulmonary:      Effort: Pulmonary effort is normal.      Breath sounds: Normal breath sounds.   Musculoskeletal:         General: No swelling.      Comments: KATHLEEN drain, left groin   Skin:     General: Skin is warm and dry.      Findings: No erythema or rash.   Neurological:      General: No focal deficit present.      Mental Status: He is oriented to person, place, and time. Mental status is at baseline.   Psychiatric:         Mood and  Affect: Mood normal.         Behavior: Behavior normal.         Thought Content: Thought content normal.         Judgment: Judgment normal.         Significant Labs:   Blood Culture:   Recent Labs   Lab 06/26/20  0713 06/26/20  0717 06/27/20  0010 06/27/20  0017 06/30/20  1321   LABBLOO Gram stain aer bottle: Gram negative rods   Results called to and read back by: Sana Jay RN  06/28/2020  14:10  PROTEUS MIRABILIS* No Growth to date  No Growth to date  No Growth to date  No Growth to date  No Growth to date No Growth to date  No Growth to date  No Growth to date  No Growth to date  No Growth to date No Growth to date  No Growth to date  No Growth to date  No Growth to date  No Growth to date No Growth to date     CBC:   Recent Labs   Lab 06/30/20  0300 06/30/20  0953 07/01/20  0317   WBC 8.49 5.16 5.12   HGB 10.6* 10.1* 10.0*   HCT 33.2* 31.4* 31.5*   * 127* 155     CMP:   Recent Labs   Lab 06/29/20  2345 06/30/20  0300 07/01/20  0317   * 134* 140   K 4.4 3.7 4.4    102 109   CO2 22* 24 23   * 141* 122*   BUN 12 12 8   CREATININE 0.7 0.7 0.6   CALCIUM 8.3* 7.8* 8.1*   PROT 6.0  --   --    ALBUMIN 2.3*  --   --    BILITOT 0.7  --   --    ALKPHOS 45*  --   --    AST 27  --   --    ALT 31  --   --    ANIONGAP 11 8 8   EGFRNONAA >60.0 >60.0 >60.0       Significant Imaging: I have reviewed all pertinent imaging results/findings within the past 24 hours.

## 2020-07-01 NOTE — ASSESSMENT & PLAN NOTE
59 y M with prior bilateral CFA endarterectomies in Dec 2019; L groin wound infections led to a L groin myocutaneous flap.    Had one episode of bleeding pseudoaneurysm from L CFA in April 2020 that was with 8mm viabahn. Readmitted on 6/26 with fever and positive blood cx (proteus)    S/p L EIA viabahn stent (9x75) with via R CFA access, L groin hematoma washout    - cont asa, plavix  - cont iv abx - appreciate ID assistance. Will check echocardiogram.  - cont L groin drain and sand bag, attempt to collapse groin wound  - monitor H/H  - step down to floor

## 2020-07-01 NOTE — PLAN OF CARE
"      SICU PLAN OF CARE NOTE    Dx: Pseudoaneurysm    Shift Events: one dose of PRN hydralazine given    Goals of Care: step down    Neuro: AAO x4, Follows Commands, and Moves All Extremities    Vital Signs: BP (!) 155/78   Pulse 76   Temp 97.8 °F (36.6 °C) (Oral)   Resp 15   Ht 5' 10" (1.778 m)   Wt 124 kg (273 lb 5.9 oz)   SpO2 99%   BMI 39.22 kg/m²     Respiratory: Room Air    Diet: Diabetic Diet    Gtts: MIVF    Urine Output: Voids Spontaneously 2650 cc/shift    Drains: KATHLEEN Drain, total output 3 cc / shift     Labs/Accuchecks: Q4H accuchecks    Skin: no skin breakdown noted, groin site gauze & tape. Heel foams on. Patient turns independently.        "

## 2020-07-01 NOTE — PROGRESS NOTES
[facility]  Vascular Surgery  Progress Note    Patient Name: Renan Britton  MRN: [id number]  Admission Date: 6/29/2020  Primary Care Provider: Eren Becker MD    Subjective:     Interval History: No interval events. Awaiting stepdown.    Post-Op Info:  Procedure(s) (LRB):  Angiogram Extremity Unilateral, washout L groin, possible open pseudoaneurysm repair (Left)  STENT, ILIAC ARTERY (Left)  AORTOGRAM (N/A)  EVACUATION-HEMATOMA-VASCULAR (Left)  REPAIR, PSEUDOANEURYSM (Left)   2 Days Post-Op       Medications:  Continuous Infusions:   sodium chloride 0.9% 125 mL/hr at 07/01/20 0600     Scheduled Meds:   aspirin  81 mg Oral Daily    atorvastatin  40 mg Oral Daily    cefTRIAXone (ROCEPHIN) IVPB  2 g Intravenous Q24H    docusate sodium  100 mg Oral BID    enoxparin  40 mg Subcutaneous Q24H    mupirocin  1 g Nasal BID    polyethylene glycol  17 g Oral Daily    pregabalin  100 mg Oral BID    valsartan  160 mg Oral Daily     PRN Meds:hydrALAZINE, HYDROcodone-acetaminophen, HYDROmorphone, ketorolac, ondansetron, sodium chloride 0.9%     Objective:     Vital Signs (Most Recent):  Temp: 97.8 °F (36.6 °C) (07/01/20 0700)  Pulse: 85 (07/01/20 0700)  Resp: 14 (07/01/20 0700)  BP: (!) 155/78 (07/01/20 0230)  SpO2: 100 % (07/01/20 0700) Vital Signs (24h Range):  Temp:  [97.8 °F (36.6 °C)-98.6 °F (37 °C)] 97.8 °F (36.6 °C)  Pulse:  [73-95] 85  Resp:  [10-29] 14  SpO2:  [93 %-100 %] 100 %  BP: ()/(52-78) 155/78  Arterial Line BP: ()/(46-73) 146/65     Date 07/01/20 0700 - 07/02/20 0659   Shift 8954-5697 2428-0083 0725-9282 24 Hour Total   INTAKE   Shift Total(mL/kg)       OUTPUT   Drains 5   5   Shift Total(mL/kg) 5(0)   5(0)   Weight (kg) 124 124 124 124       Physical Exam  Vitals signs reviewed.   Constitutional:       Appearance: Normal appearance. He is obese.   HENT:      Head: Normocephalic and atraumatic.   Eyes:      Extraocular Movements: Extraocular movements intact.       Pupils: Pupils are equal, round, and reactive to light.   Neck:      Musculoskeletal: Normal range of motion and neck supple.   Cardiovascular:      Rate and Rhythm: Regular rhythm.   Pulmonary:      Effort: Pulmonary effort is normal. No respiratory distress.   Abdominal:      General: Abdomen is flat. There is no distension.   Musculoskeletal:      Comments: R fem no hematoma  L fem drain in place - 25 cc serosanguinous  L foot biphasic DP and PT   Neurological:      Mental Status: He is alert.         Significant Labs:  CBC:   Recent Labs   Lab 07/01/20  0317   WBC 5.12   RBC 3.65*   HGB 10.0*   HCT 31.5*      MCV 86   MCH 27.4   MCHC 31.7*     CMP:   Recent Labs   Lab 06/29/20  2345  07/01/20  0317   *   < > 122*   CALCIUM 8.3*   < > 8.1*   ALBUMIN 2.3*  --   --    PROT 6.0  --   --    *   < > 140   K 4.4   < > 4.4   CO2 22*   < > 23      < > 109   BUN 12   < > 8   CREATININE 0.7   < > 0.6   ALKPHOS 45*  --   --    ALT 31  --   --    AST 27  --   --    BILITOT 0.7  --   --     < > = values in this interval not displayed.       Significant Diagnostics:  I have reviewed all pertinent imaging results/findings within the past 24 hours.    Assessment/Plan:     * Pseudoaneurysm  59 y M with prior bilateral CFA endarterectomies in Dec 2019; L groin wound infections led to a L groin myocutaneous flap.    Had one episode of bleeding pseudoaneurysm from L CFA in April 2020 that was with 8mm viabahn. Readmitted on 6/26 with fever and positive blood cx (proteus)    S/p L EIA viabahn stent (9x75) with via R CFA access, L groin hematoma washout    - cont asa, plavix  - cont iv abx - appreciate ID assistance. Will check echocardiogram.  - cont L groin drain and sand bag, attempt to collapse groin wound  - monitor H/H  - step down to floor        Jesus Judd MD  Vascular Surgery  [facility]    Vascular Attending  Agree with above    Bruce Dallas MD DFSVS FACS    Vascular/Endovascular Surgery

## 2020-07-01 NOTE — ANESTHESIA POSTPROCEDURE EVALUATION
Anesthesia Post Evaluation    Patient: Renan Britton    Procedure(s) Performed: Procedure(s) (LRB):  Angiogram Extremity Unilateral, washout L groin, possible open pseudoaneurysm repair (Left)  STENT, ILIAC ARTERY (Left)  AORTOGRAM (N/A)  EVACUATION-HEMATOMA-VASCULAR (Left)  REPAIR, PSEUDOANEURYSM (Left)    Final Anesthesia Type: general    Patient location during evaluation: ICU  Patient participation: Yes- Able to Participate  Level of consciousness: awake and alert  Post-procedure vital signs: reviewed and stable  Pain management: adequate  Airway patency: patent    PONV status at discharge: No PONV  Anesthetic complications: no      Cardiovascular status: blood pressure returned to baseline  Respiratory status: unassisted  Hydration status: euvolemic  Follow-up not needed.          Vitals Value Taken Time   BP 97/52 06/30/20 1302   Temp 37 °C (98.6 °F) 06/30/20 1902   Pulse 83 06/30/20 1929   Resp 18 06/30/20 1929   SpO2 97 % 06/30/20 1929   Vitals shown include unvalidated device data.      No case tracking events are documented in the log.      Pain/Cristy Score: Pain Rating Prior to Med Admin: 10 (6/29/2020 11:50 PM)  Pain Rating Post Med Admin: 2 (6/29/2020  6:04 AM)

## 2020-07-01 NOTE — PHYSICIAN QUERY
PT Name: Renan Britton  MR #: [id number]    CAUSE AND EFFECT RELATIONSHIP CLARIFICATION     CDS/: Julianna Bermeo               Contact information:[email or web address]    This form is a permanent document in the medical record.     Query Date: July 1, 2020    By submitting this query, we are merely seeking further clarification of documentation. Please utilize your independent clinical judgment when addressing the question(s) below.    Supporting Clinical Findings   Location in Medical Record   PVD (peripheral vascular disease)  59-year-old man admitted for fevers with recent history of bilateral femoral endarterectomy complicated by left-sided wound infection requiring flap coverage and prolonged abx. Today, CT shows expanded pseudoaneurysm from prior which is pulsatile on exam. Further, there appears to be some contrast extending into the pseudoaneurysm.      Will plan for emergent covered stent placement in L EIA, washout of hematoma, possible open pseudoaneurysm repair                               ASA/Plavix given PVD hx                                                                      Drainage of 200 ml hematoma (pseudoaneurysm) thru L proximal groin 2cm incision and placement of 19Fr Ezra drain                                                                                     H&P                                  H&P and also home med list      Vascular Op note 6-29                                                                                                                                                                                                      Provider, please clarify if there is any clinical correlation between Hematoma and Anticoagulants.           Are the conditions:      [ X ] Due to or associated with each other   [  ] Unrelated to each other   [  ] Other explanation (Please Specify): ______________   [  ] Clinically Undetermined                                                                                Please document in your progress notes daily for the duration of treatment until resolved and include in your discharge summary.

## 2020-07-01 NOTE — SUBJECTIVE & OBJECTIVE
Medications:  Continuous Infusions:   sodium chloride 0.9% 125 mL/hr at 07/01/20 0600     Scheduled Meds:   aspirin  81 mg Oral Daily    atorvastatin  40 mg Oral Daily    cefTRIAXone (ROCEPHIN) IVPB  2 g Intravenous Q24H    docusate sodium  100 mg Oral BID    enoxparin  40 mg Subcutaneous Q24H    mupirocin  1 g Nasal BID    polyethylene glycol  17 g Oral Daily    pregabalin  100 mg Oral BID    valsartan  160 mg Oral Daily     PRN Meds:hydrALAZINE, HYDROcodone-acetaminophen, HYDROmorphone, ketorolac, ondansetron, sodium chloride 0.9%     Objective:     Vital Signs (Most Recent):  Temp: 97.8 °F (36.6 °C) (07/01/20 0700)  Pulse: 85 (07/01/20 0700)  Resp: 14 (07/01/20 0700)  BP: (!) 155/78 (07/01/20 0230)  SpO2: 100 % (07/01/20 0700) Vital Signs (24h Range):  Temp:  [97.8 °F (36.6 °C)-98.6 °F (37 °C)] 97.8 °F (36.6 °C)  Pulse:  [73-95] 85  Resp:  [10-29] 14  SpO2:  [93 %-100 %] 100 %  BP: ()/(52-78) 155/78  Arterial Line BP: ()/(46-73) 146/65     Date 07/01/20 0700 - 07/02/20 0659   Shift 4559-9195 1716-7923 2805-4559 24 Hour Total   INTAKE   Shift Total(mL/kg)       OUTPUT   Drains 5   5   Shift Total(mL/kg) 5(0)   5(0)   Weight (kg) 124 124 124 124       Physical Exam  Vitals signs reviewed.   Constitutional:       Appearance: Normal appearance. He is obese.   HENT:      Head: Normocephalic and atraumatic.   Eyes:      Extraocular Movements: Extraocular movements intact.      Pupils: Pupils are equal, round, and reactive to light.   Neck:      Musculoskeletal: Normal range of motion and neck supple.   Cardiovascular:      Rate and Rhythm: Regular rhythm.   Pulmonary:      Effort: Pulmonary effort is normal. No respiratory distress.   Abdominal:      General: Abdomen is flat. There is no distension.   Musculoskeletal:      Comments: R fem no hematoma  L fem drain in place - 25 cc serosanguinous  L foot biphasic DP and PT   Neurological:      Mental Status: He is alert.         Significant  Labs:  CBC:   Recent Labs   Lab 07/01/20 0317   WBC 5.12   RBC 3.65*   HGB 10.0*   HCT 31.5*      MCV 86   MCH 27.4   MCHC 31.7*     CMP:   Recent Labs   Lab 06/29/20  2345  07/01/20 0317   *   < > 122*   CALCIUM 8.3*   < > 8.1*   ALBUMIN 2.3*  --   --    PROT 6.0  --   --    *   < > 140   K 4.4   < > 4.4   CO2 22*   < > 23      < > 109   BUN 12   < > 8   CREATININE 0.7   < > 0.6   ALKPHOS 45*  --   --    ALT 31  --   --    AST 27  --   --    BILITOT 0.7  --   --     < > = values in this interval not displayed.       Significant Diagnostics:  I have reviewed all pertinent imaging results/findings within the past 24 hours.

## 2020-07-01 NOTE — PLAN OF CARE
SW is following this Pt for DC planning needs. There are no identified needs at this time. Discharge Disposition: home    Rabia Singletary LMSW   - Case Management

## 2020-07-01 NOTE — PROGRESS NOTES
[facility]  Infectious Disease  Progress Note    Patient Name: Renan Britton  MRN: [id number]  Admission Date: 6/29/2020  Length of Stay: 2 days  Attending Physician: Bruce Dallas MD  Primary Care Provider: Eren Becker MD    Isolation Status: No active isolations  Assessment/Plan:      Proteus infection  59M with a complicated medical history presented to the ED with fever, chills, and pain/swelling of the left groin was found to have an expanding pseudoaneurysm, s/p washout and stent placement.  Pt is currently on ceftriaxone and Levaquin for gram negative coverage. Most recent positive blood culture (6/26) grew Proteus but further results have been negative.  Wound cultures taken during the surgery are currently pending.    Echo done 7/1. No vegetations seen     Plan  Recurrent Proteus bacteremia - likely related to pt's complicated surgical pathology.  Given previous blood culture results of Proteus and low suspicion for Pseudomonas infection, and pts history of vascular disease, will D/C the fluoroquinolone and remain on ceftriaxone pending culture results.    Continue with ceftriaxone, will need PICC line placed for outpatient therapy      Anticipated Disposition: home    Thank you for your consult. I will follow-up with patient. Please contact us if you have any additional questions.    Fran Richardson MD  Infectious Disease  [facility]    Subjective:     Principal Problem:Pseudoaneurysm    HPI: Mr. Renan Britton is a 59 y.o. male with a history of peripheral vascular disease s/p bilateral femoral endarterectomy, DM, and HTN who presented to the [facility] for fever, chills, and pain and swelling of his left groin.  He was admitted for presumed bacteremia and was found to have an enlarging pseudoaneurysm of his left groin.  Vascular surgery subsequently brought him to the OR and performed a washout and stent placement.  ID was consulted for persistent  proteus bacteremia.  Pt is currently on Ceftriaxone and Levaquin given previous bacteremia.     Pt has a complicated course of infection on chart review.  After bilateral femoral endarterectomy, pt was placed on Clindamycin which was changed to IV Ceftriaxone after surgical cultures grew proteus and group F streptococcus.  After his 6wk course he was transitioned to oral Keflex but was placed back on Ceftriaxone after blood cultures grew proteus.  He subsequently finished that course and was placed on oral Levaquin but was transitioned once again to IV Ceftriaxone after being diagnosed with a pseudoaneurysm of his left femoral artery.  After that course he was transitioned to oral Cefadroxil which he was currently taking prior to this admit.       Today pt states he is feeling much better after the procedure.  He reports a marked reduction in pain and swelling of his left groin.  He denies any current fever, chills, N/V, or abdominal pain.  Interval History: no acute events overnight. Pain is much better, although worsens with movement.    Review of Systems   Constitutional: Negative for chills and fever.   HENT: Negative for rhinorrhea and sore throat.    Respiratory: Negative for cough and shortness of breath.    Cardiovascular: Negative for chest pain and palpitations.   Gastrointestinal: Negative for abdominal pain, constipation, diarrhea and nausea.   Genitourinary: Negative for dysuria and flank pain.   Musculoskeletal: Positive for back pain and gait problem.        Groin pain   Neurological: Negative for light-headedness, numbness and headaches.     Objective:     Vital Signs (Most Recent):  Temp: 97.8 °F (36.6 °C) (07/01/20 0700)  Pulse: 86 (07/01/20 0854)  Resp: 20 (07/01/20 0854)  BP: (!) 155/78 (07/01/20 0230)  SpO2: 100 % (07/01/20 0854) Vital Signs (24h Range):  Temp:  [97.8 °F (36.6 °C)-98.6 °F (37 °C)] 97.8 °F (36.6 °C)  Pulse:  [73-95] 86  Resp:  [10-29] 20  SpO2:  [93 %-100 %] 100 %  BP:  ()/(52-78) 155/78  Arterial Line BP: ()/(46-73) 146/65     Weight: 123.8 kg (273 lb)  Body mass index is 39.17 kg/m².    Estimated Creatinine Clearance: 179.3 mL/min (based on SCr of 0.6 mg/dL).    Physical Exam  Vitals signs and nursing note reviewed.   Constitutional:       Appearance: Normal appearance. He is normal weight.   HENT:      Head: Normocephalic and atraumatic.   Cardiovascular:      Rate and Rhythm: Normal rate and regular rhythm.      Heart sounds: Murmur present.      Comments: Musical HSM  Pulmonary:      Effort: Pulmonary effort is normal.      Breath sounds: Normal breath sounds.   Musculoskeletal:         General: No swelling.      Comments: KATHLEEN drain, left groin   Skin:     General: Skin is warm and dry.      Findings: No erythema or rash.   Neurological:      General: No focal deficit present.      Mental Status: He is oriented to person, place, and time. Mental status is at baseline.   Psychiatric:         Mood and Affect: Mood normal.         Behavior: Behavior normal.         Thought Content: Thought content normal.         Judgment: Judgment normal.         Significant Labs:   Blood Culture:   Recent Labs   Lab 06/26/20  0713 06/26/20  0717 06/27/20  0010 06/27/20  0017 06/30/20  1321   LABBLOO Gram stain aer bottle: Gram negative rods   Results called to and read back by: Sana Jay RN  06/28/2020  14:10  PROTEUS MIRABILIS* No Growth to date  No Growth to date  No Growth to date  No Growth to date  No Growth to date No Growth to date  No Growth to date  No Growth to date  No Growth to date  No Growth to date No Growth to date  No Growth to date  No Growth to date  No Growth to date  No Growth to date No Growth to date     CBC:   Recent Labs   Lab 06/30/20  0300 06/30/20  0953 07/01/20  0317   WBC 8.49 5.16 5.12   HGB 10.6* 10.1* 10.0*   HCT 33.2* 31.4* 31.5*   * 127* 155     CMP:   Recent Labs   Lab 06/29/20  2345 06/30/20  0300 07/01/20  0317   *  134* 140   K 4.4 3.7 4.4    102 109   CO2 22* 24 23   * 141* 122*   BUN 12 12 8   CREATININE 0.7 0.7 0.6   CALCIUM 8.3* 7.8* 8.1*   PROT 6.0  --   --    ALBUMIN 2.3*  --   --    BILITOT 0.7  --   --    ALKPHOS 45*  --   --    AST 27  --   --    ALT 31  --   --    ANIONGAP 11 8 8   EGFRNONAA >60.0 >60.0 >60.0       Significant Imaging: I have reviewed all pertinent imaging results/findings within the past 24 hours.

## 2020-07-02 LAB
ANION GAP SERPL CALC-SCNC: 8 MMOL/L (ref 8–16)
BASOPHILS # BLD AUTO: 0.02 K/UL (ref 0–0.2)
BASOPHILS NFR BLD: 0.4 % (ref 0–1.9)
BUN SERPL-MCNC: 10 MG/DL (ref 6–20)
CALCIUM SERPL-MCNC: 9 MG/DL (ref 8.7–10.5)
CHLORIDE SERPL-SCNC: 106 MMOL/L (ref 95–110)
CO2 SERPL-SCNC: 27 MMOL/L (ref 23–29)
CREAT SERPL-MCNC: 0.7 MG/DL (ref 0.5–1.4)
DIFFERENTIAL METHOD: ABNORMAL
EOSINOPHIL # BLD AUTO: 0.2 K/UL (ref 0–0.5)
EOSINOPHIL NFR BLD: 3.7 % (ref 0–8)
ERYTHROCYTE [DISTWIDTH] IN BLOOD BY AUTOMATED COUNT: 14.4 % (ref 11.5–14.5)
EST. GFR  (AFRICAN AMERICAN): >60 ML/MIN/1.73 M^2
EST. GFR  (NON AFRICAN AMERICAN): >60 ML/MIN/1.73 M^2
GLUCOSE SERPL-MCNC: 110 MG/DL (ref 70–110)
HCT VFR BLD AUTO: 33.5 % (ref 40–54)
HGB BLD-MCNC: 10.8 G/DL (ref 14–18)
IMM GRANULOCYTES # BLD AUTO: 0.03 K/UL (ref 0–0.04)
IMM GRANULOCYTES NFR BLD AUTO: 0.6 % (ref 0–0.5)
LYMPHOCYTES # BLD AUTO: 1 K/UL (ref 1–4.8)
LYMPHOCYTES NFR BLD: 18.3 % (ref 18–48)
MAGNESIUM SERPL-MCNC: 2 MG/DL (ref 1.6–2.6)
MCH RBC QN AUTO: 27.3 PG (ref 27–31)
MCHC RBC AUTO-ENTMCNC: 32.2 G/DL (ref 32–36)
MCV RBC AUTO: 85 FL (ref 82–98)
MONOCYTES # BLD AUTO: 0.3 K/UL (ref 0.3–1)
MONOCYTES NFR BLD: 6.1 % (ref 4–15)
NEUTROPHILS # BLD AUTO: 3.8 K/UL (ref 1.8–7.7)
NEUTROPHILS NFR BLD: 70.9 % (ref 38–73)
NRBC BLD-RTO: 0 /100 WBC
PHOSPHATE SERPL-MCNC: 4.3 MG/DL (ref 2.7–4.5)
PLATELET # BLD AUTO: 198 K/UL (ref 150–350)
PMV BLD AUTO: 10.3 FL (ref 9.2–12.9)
POCT GLUCOSE: 107 MG/DL (ref 70–110)
POCT GLUCOSE: 111 MG/DL (ref 70–110)
POCT GLUCOSE: 114 MG/DL (ref 70–110)
POCT GLUCOSE: 115 MG/DL (ref 70–110)
POCT GLUCOSE: 172 MG/DL (ref 70–110)
POCT GLUCOSE: 92 MG/DL (ref 70–110)
POTASSIUM SERPL-SCNC: 3.9 MMOL/L (ref 3.5–5.1)
RBC # BLD AUTO: 3.95 M/UL (ref 4.6–6.2)
SODIUM SERPL-SCNC: 141 MMOL/L (ref 136–145)
WBC # BLD AUTO: 5.4 K/UL (ref 3.9–12.7)

## 2020-07-02 PROCEDURE — 20600001 HC STEP DOWN PRIVATE ROOM

## 2020-07-02 PROCEDURE — 99233 PR SUBSEQUENT HOSPITAL CARE,LEVL III: ICD-10-PCS | Mod: ,,, | Performed by: INTERNAL MEDICINE

## 2020-07-02 PROCEDURE — 76937 US GUIDE VASCULAR ACCESS: CPT

## 2020-07-02 PROCEDURE — 99233 PR SUBSEQUENT HOSPITAL CARE,LEVL III: ICD-10-PCS | Mod: ,,, | Performed by: ANESTHESIOLOGY

## 2020-07-02 PROCEDURE — 94761 N-INVAS EAR/PLS OXIMETRY MLT: CPT

## 2020-07-02 PROCEDURE — 25000003 PHARM REV CODE 250: Performed by: STUDENT IN AN ORGANIZED HEALTH CARE EDUCATION/TRAINING PROGRAM

## 2020-07-02 PROCEDURE — 36573 INSJ PICC RS&I 5 YR+: CPT

## 2020-07-02 PROCEDURE — 63600175 PHARM REV CODE 636 W HCPCS: Performed by: STUDENT IN AN ORGANIZED HEALTH CARE EDUCATION/TRAINING PROGRAM

## 2020-07-02 PROCEDURE — 83735 ASSAY OF MAGNESIUM: CPT

## 2020-07-02 PROCEDURE — 84100 ASSAY OF PHOSPHORUS: CPT

## 2020-07-02 PROCEDURE — 85025 COMPLETE CBC W/AUTO DIFF WBC: CPT

## 2020-07-02 PROCEDURE — 25000003 PHARM REV CODE 250: Performed by: SURGERY

## 2020-07-02 PROCEDURE — 99233 SBSQ HOSP IP/OBS HIGH 50: CPT | Mod: ,,, | Performed by: INTERNAL MEDICINE

## 2020-07-02 PROCEDURE — 80048 BASIC METABOLIC PNL TOTAL CA: CPT

## 2020-07-02 PROCEDURE — C1751 CATH, INF, PER/CENT/MIDLINE: HCPCS

## 2020-07-02 PROCEDURE — 99233 SBSQ HOSP IP/OBS HIGH 50: CPT | Mod: ,,, | Performed by: ANESTHESIOLOGY

## 2020-07-02 RX ORDER — SODIUM CHLORIDE 0.9 % (FLUSH) 0.9 %
10 SYRINGE (ML) INJECTION EVERY 6 HOURS
Status: DISCONTINUED | OUTPATIENT
Start: 2020-07-02 | End: 2020-07-06 | Stop reason: HOSPADM

## 2020-07-02 RX ORDER — SODIUM CHLORIDE 0.9 % (FLUSH) 0.9 %
10 SYRINGE (ML) INJECTION
Status: DISCONTINUED | OUTPATIENT
Start: 2020-07-02 | End: 2020-07-06 | Stop reason: HOSPADM

## 2020-07-02 RX ADMIN — POLYETHYLENE GLYCOL 3350 17 G: 17 POWDER, FOR SOLUTION ORAL at 08:07

## 2020-07-02 RX ADMIN — PREGABALIN 100 MG: 75 CAPSULE ORAL at 08:07

## 2020-07-02 RX ADMIN — MUPIROCIN 1 G: 20 OINTMENT TOPICAL at 08:07

## 2020-07-02 RX ADMIN — PREGABALIN 100 MG: 75 CAPSULE ORAL at 09:07

## 2020-07-02 RX ADMIN — DOCUSATE SODIUM 100 MG: 100 CAPSULE, LIQUID FILLED ORAL at 09:07

## 2020-07-02 RX ADMIN — DOCUSATE SODIUM 100 MG: 100 CAPSULE, LIQUID FILLED ORAL at 08:07

## 2020-07-02 RX ADMIN — ASPIRIN 81 MG: 81 TABLET, DELAYED RELEASE ORAL at 08:07

## 2020-07-02 RX ADMIN — HYDRALAZINE HYDROCHLORIDE 10 MG: 20 INJECTION INTRAMUSCULAR; INTRAVENOUS at 06:07

## 2020-07-02 RX ADMIN — CEFTRIAXONE 2 G: 2 INJECTION, SOLUTION INTRAVENOUS at 12:07

## 2020-07-02 RX ADMIN — VALSARTAN 160 MG: 160 TABLET ORAL at 08:07

## 2020-07-02 RX ADMIN — ATORVASTATIN CALCIUM 40 MG: 20 TABLET, FILM COATED ORAL at 08:07

## 2020-07-02 RX ADMIN — ENOXAPARIN SODIUM 40 MG: 100 INJECTION SUBCUTANEOUS at 05:07

## 2020-07-02 NOTE — CONSULTS
Double lumen PICC placed in right brachial vein of [facility], 39cm in length with 0cm exposed and 36cm arm circumference. Lot#[id number]. Unsuccessful attempt at placing PICC line to LUE. Unable to get line passed axilla region of LUE. Placed PICC to RUE. Waiting for XRAY to verify placement to RUE.

## 2020-07-02 NOTE — SUBJECTIVE & OBJECTIVE
Interval History: NAEON.     Review of Systems   Constitutional: Negative for chills and fever.   HENT: Negative for rhinorrhea and sore throat.    Respiratory: Negative for cough and shortness of breath.    Cardiovascular: Negative for chest pain and palpitations.   Gastrointestinal: Negative for abdominal pain, constipation, diarrhea and nausea.   Genitourinary: Negative for dysuria and flank pain.   Musculoskeletal: Positive for back pain and gait problem.        Groin pain   Neurological: Negative for light-headedness, numbness and headaches.     Objective:     Vital Signs (Most Recent):  Temp: 97.7 °F (36.5 °C) (07/02/20 1515)  Pulse: 79 (07/02/20 1515)  Resp: 20 (07/02/20 1515)  BP: 138/71 (07/02/20 1515)  SpO2: 98 % (07/02/20 1515) Vital Signs (24h Range):  Temp:  [97.7 °F (36.5 °C)-98.3 °F (36.8 °C)] 97.7 °F (36.5 °C)  Pulse:  [70-92] 79  Resp:  [13-34] 20  SpO2:  [96 %-100 %] 98 %  BP: (106-179)/(56-90) 138/71     Weight: 123.8 kg (273 lb)  Body mass index is 39.17 kg/m².    Estimated Creatinine Clearance: 153.6 mL/min (based on SCr of 0.7 mg/dL).    Physical Exam  Vitals signs and nursing note reviewed.   Constitutional:       Appearance: Normal appearance. He is normal weight.   HENT:      Head: Normocephalic and atraumatic.   Cardiovascular:      Rate and Rhythm: Normal rate and regular rhythm.      Heart sounds: Murmur present.      Comments: Musical HSM  Pulmonary:      Effort: Pulmonary effort is normal.      Breath sounds: Normal breath sounds.   Musculoskeletal:         General: No swelling.      Comments: KATHLEEN drain, left groin   Skin:     General: Skin is warm and dry.      Findings: No erythema or rash.   Neurological:      General: No focal deficit present.      Mental Status: He is oriented to person, place, and time. Mental status is at baseline.   Psychiatric:         Mood and Affect: Mood normal.         Behavior: Behavior normal.         Thought Content: Thought content normal.          Judgment: Judgment normal.         Significant Labs:   CBC:   Recent Labs   Lab 07/01/20 0317 07/02/20  0344   WBC 5.12 5.40   HGB 10.0* 10.8*   HCT 31.5* 33.5*    198     CMP:   Recent Labs   Lab 07/01/20 0317 07/02/20  0344    141   K 4.4 3.9    106   CO2 23 27   * 110   BUN 8 10   CREATININE 0.6 0.7   CALCIUM 8.1* 9.0   ANIONGAP 8 8   EGFRNONAA >60.0 >60.0       Significant Imaging: I have reviewed all pertinent imaging results/findings within the past 24 hours.

## 2020-07-02 NOTE — MEDICAL/APP STUDENT
PROGRESS NOTE  Networked reference to record PCT     Admit Date: 6/29/2020  Hospital Day: 3    SUBJECTIVE:     Reason for Follow-up:  Pseudoaneurysm    HPI:   Mr. Renan Britton is a 59 y.o. male with a history of peripheral vascular disease s/p bilateral femoral endarterectomy, DM, and HTN who presented to the [facility] for fever, chills, and pain and swelling of his left groin.  He was admitted for presumed bacteremia and was found to have an enlarging pseudoaneurysm of his left groin.  Vascular surgery subsequently brought him to the OR and performed a washout and stent placement.  ID was consulted for persistent proteus bacteremia.  Pt is currently on Ceftriaxone and Levaquin given previous bacteremia.    Interval history:  No acute events overnight.  Nursing notes indicate PICC line placement scheduled for after 4pm today    Review of Systems:  Review of Systems   Constitutional: Negative for chills and fever.   Respiratory: Negative for cough and shortness of breath.    Cardiovascular: Negative for chest pain and palpitations.   Gastrointestinal: Negative for abdominal pain, nausea and vomiting.   Genitourinary: Negative for dysuria.   Musculoskeletal: Negative for myalgias.   Neurological: Negative for loss of consciousness and weakness.     Please refer to the H&P for past medical, family, and social history.    OBJECTIVE:     Vital Signs Range (Last 24H):  Temp:  [97.7 °F (36.5 °C)-98.4 °F (36.9 °C)]   Pulse:  [70-89]   Resp:  [12-34]   BP: (106-179)/(56-90)   SpO2:  [96 %-100 %]     Physical Exam:  Physical Exam   Constitutional: He is oriented to person, place, and time.  Non-toxic appearance. No distress.   HENT:   Head: Normocephalic and atraumatic.   Neck: Normal range of motion. Neck supple.   Cardiovascular: Normal rate.   Murmur heard.  Pan systolic murmur noted throughout the precordium.   Pulmonary/Chest: Effort normal and breath sounds normal.   Abdominal: Soft. Normal appearance and  bowel sounds are normal.   Neurological: He is alert and oriented to person, place, and time.   Skin:   Bandage dry and intact with non-purulent drainage in drainage tube   Psychiatric: His behavior is normal. Mood and thought content normal.   Vitals reviewed.    Diagnostic Results:  Recent Labs   Lab 06/30/20  0300 07/01/20  0317 07/02/20  0344   * 140 141   K 3.7 4.4 3.9    109 106   CO2 24 23 27   BUN 12 8 10   CREATININE 0.7 0.6 0.7   * 122* 110   CALCIUM 7.8* 8.1* 9.0   MG 2.0 2.0 2.0   PHOS 3.1 3.5 4.3     Recent Labs   Lab 06/27/20  0543 06/28/20  0535 06/29/20  0451 06/29/20  2345   ALKPHOS 49* 40* 46* 45*   ALT 56* 46* 39 31   AST 39 44* 32 27   ALBUMIN 3.3* 2.7* 2.7* 2.3*   PROT 7.8 6.4 6.6 6.0   BILITOT 1.0 0.9 1.0 0.7   INR 1.6*  --   --   --      Recent Labs   Lab 06/30/20  0953 07/01/20 0317 07/02/20  0344   WBC 5.16 5.12 5.40   HGB 10.1* 10.0* 10.8*   HCT 31.4* 31.5* 33.5*   * 155 198       Scheduled Meds:   aspirin  81 mg Oral Daily    atorvastatin  40 mg Oral Daily    cefTRIAXone (ROCEPHIN) IVPB  2 g Intravenous Q24H    docusate sodium  100 mg Oral BID    enoxparin  40 mg Subcutaneous Q24H    mupirocin  1 g Nasal BID    polyethylene glycol  17 g Oral Daily    pregabalin  100 mg Oral BID    valsartan  160 mg Oral Daily     Continuous Infusions:  PRN Meds:.hydrALAZINE, HYDROcodone-acetaminophen, HYDROmorphone, ondansetron, sodium chloride 0.9%    ASSESSMENT/PLAN:     59M with complicated medical history who presented to the ED with fever, chills, and pseudoaneurysm of the left groin, s/p washout and stenting.  Blood cultures positive for Proteus on 6/26 but have remained clear thereafter.  Aerobic culture of the wound is negative, anaerobic culture pending.    PLAN  Pseudoaneurysm with proteus infection  - PICC line to be placed today for home IV antibiotic therapy.  Will continue Ceftriaxone for 4-6 weeks for presumptive endovascular infection.  Discussed with pt  that he may need to be on long term suppressive antibiotic therapy to prevent further complications of his stent.  Will schedule him for an outpatient visit with Dr. Hunt for further discussion.  ID to sign off.      Diogenes Kumar, MS4

## 2020-07-02 NOTE — PLAN OF CARE
VSS throughout the night. HR, NSR 70-80s, Sats 100% on room air. AAOx4. LR gtt discontinued. KATHLEEN drain put out 15cc overnight. Incision site not symptomatic. No breakdown to sacral area, heels, or elbows noted, heel foams in place. Pt repositions independently.   Pt has transfer orders and awaiting a bed. MD to put in orders for plavix.

## 2020-07-02 NOTE — CONSULTS
RASHID consulted for PICC placement    Per policy- PICC will be placed once blood cultures are 48 hr negative (drawn 6/30/2020)    Blood cultures will be read @1612 today- will eval for PICC then

## 2020-07-02 NOTE — PROGRESS NOTES
[facility]  Infectious Disease  Progress Note    Patient Name: Renan Britton  MRN: [id number]  Admission Date: 6/29/2020  Length of Stay: 3 days  Attending Physician: Bruce Dallas MD  Primary Care Provider: Eren Becker MD    Isolation Status: No active isolations  Assessment/Plan:      Proteus infection  59M with a complicated medical history presented to the ED with fever, chills, and pain/swelling of the left groin was found to have an expanding pseudoaneurysm, s/p washout and stent placement.  Pt is currently on ceftriaxone and Levaquin for gram negative coverage. Most recent positive blood culture (6/26) grew Proteus but further results have been negative.  Wound cultures taken during the surgery are currently pending.    Echo done 7/1. No vegetations seen     Plan  - Continue with rocephin 2 g q.d.  - PICC line to be placed later today  - please check weekly CBC, CMP, ESR, and CRP while on IV abx - fax to ID clinic [phone]          Anticipated Disposition: home    Thank you for your consult. I will sign off. Please contact us if you have any additional questions.    Fran Richardson MD  Infectious Disease  [facility]    Subjective:     Principal Problem:Pseudoaneurysm    HPI: Mr. Renan Britton is a 59 y.o. male with a history of peripheral vascular disease s/p bilateral femoral endarterectomy, DM, and HTN who presented to the [facility] for fever, chills, and pain and swelling of his left groin.  He was admitted for presumed bacteremia and was found to have an enlarging pseudoaneurysm of his left groin.  Vascular surgery subsequently brought him to the OR and performed a washout and stent placement.  ID was consulted for persistent proteus bacteremia.  Pt is currently on Ceftriaxone and Levaquin given previous bacteremia.     Pt has a complicated course of infection on chart review.  After bilateral femoral endarterectomy, pt was placed on Clindamycin  which was changed to IV Ceftriaxone after surgical cultures grew proteus and group F streptococcus.  After his 6wk course he was transitioned to oral Keflex but was placed back on Ceftriaxone after blood cultures grew proteus.  He subsequently finished that course and was placed on oral Levaquin but was transitioned once again to IV Ceftriaxone after being diagnosed with a pseudoaneurysm of his left femoral artery.  After that course he was transitioned to oral Cefadroxil which he was currently taking prior to this admit.       Today pt states he is feeling much better after the procedure.  He reports a marked reduction in pain and swelling of his left groin.  He denies any current fever, chills, N/V, or abdominal pain.  Interval History: NAEON.     Review of Systems   Constitutional: Negative for chills and fever.   HENT: Negative for rhinorrhea and sore throat.    Respiratory: Negative for cough and shortness of breath.    Cardiovascular: Negative for chest pain and palpitations.   Gastrointestinal: Negative for abdominal pain, constipation, diarrhea and nausea.   Genitourinary: Negative for dysuria and flank pain.   Musculoskeletal: Positive for back pain and gait problem.        Groin pain   Neurological: Negative for light-headedness, numbness and headaches.     Objective:     Vital Signs (Most Recent):  Temp: 97.7 °F (36.5 °C) (07/02/20 1515)  Pulse: 79 (07/02/20 1515)  Resp: 20 (07/02/20 1515)  BP: 138/71 (07/02/20 1515)  SpO2: 98 % (07/02/20 1515) Vital Signs (24h Range):  Temp:  [97.7 °F (36.5 °C)-98.3 °F (36.8 °C)] 97.7 °F (36.5 °C)  Pulse:  [70-92] 79  Resp:  [13-34] 20  SpO2:  [96 %-100 %] 98 %  BP: (106-179)/(56-90) 138/71     Weight: 123.8 kg (273 lb)  Body mass index is 39.17 kg/m².    Estimated Creatinine Clearance: 153.6 mL/min (based on SCr of 0.7 mg/dL).    Physical Exam  Vitals signs and nursing note reviewed.   Constitutional:       Appearance: Normal appearance. He is normal weight.   HENT:       Head: Normocephalic and atraumatic.   Cardiovascular:      Rate and Rhythm: Normal rate and regular rhythm.      Heart sounds: Murmur present.      Comments: Musical HSM  Pulmonary:      Effort: Pulmonary effort is normal.      Breath sounds: Normal breath sounds.   Musculoskeletal:         General: No swelling.      Comments: KATHLEEN drain, left groin   Skin:     General: Skin is warm and dry.      Findings: No erythema or rash.   Neurological:      General: No focal deficit present.      Mental Status: He is oriented to person, place, and time. Mental status is at baseline.   Psychiatric:         Mood and Affect: Mood normal.         Behavior: Behavior normal.         Thought Content: Thought content normal.         Judgment: Judgment normal.         Significant Labs:   CBC:   Recent Labs   Lab 07/01/20 0317 07/02/20  0344   WBC 5.12 5.40   HGB 10.0* 10.8*   HCT 31.5* 33.5*    198     CMP:   Recent Labs   Lab 07/01/20 0317 07/02/20  0344    141   K 4.4 3.9    106   CO2 23 27   * 110   BUN 8 10   CREATININE 0.6 0.7   CALCIUM 8.1* 9.0   ANIONGAP 8 8   EGFRNONAA >60.0 >60.0       Significant Imaging: I have reviewed all pertinent imaging results/findings within the past 24 hours.

## 2020-07-02 NOTE — ASSESSMENT & PLAN NOTE
59M with a complicated medical history presented to the ED with fever, chills, and pain/swelling of the left groin was found to have an expanding pseudoaneurysm, s/p washout and stent placement.  Pt is currently on ceftriaxone and Levaquin for gram negative coverage. Most recent positive blood culture (6/26) grew Proteus but further results have been negative.  Wound cultures taken during the surgery are currently pending.    Echo done 7/1. No vegetations seen     Plan  - Continue with rocephin  - PICC line to be placed later today  - please check weekly CBC, CMP, ESR, and CRP while on IV abx - fax to [facility] [phone]

## 2020-07-02 NOTE — PROCEDURES
"Renan Britton is a 59 y.o. male patient.    Temp: 97.7 °F (36.5 °C) (07/02/20 1515)  Pulse: 90 (07/02/20 1745)  Resp: 11 (07/02/20 1745)  BP: (!) 166/89 (07/02/20 1700)  SpO2: 98 % (07/02/20 1745)  Weight: 123.8 kg (273 lb) (07/01/20 0854)  Height: 5' 10" (177.8 cm) (07/01/20 0854)    PICC  Date/Time: 7/2/2020 6:10 PM  Performed by: Shanon Starks RN  Assisting provider: Mela Godoy LPN  Consent Done: Yes  Time out: Immediately prior to procedure a time out was called to verify the correct patient, procedure, equipment, support staff and site/side marked as required  Indications: med administration and vascular access  Anesthesia: local infiltration  Local anesthetic: lidocaine 1% without epinephrine  Anesthetic Total (mL): 3  Preparation: skin prepped with ChloraPrep  Skin prep agent dried: skin prep agent completely dried prior to procedure  Sterile barriers: all five maximum sterile barriers used - cap, mask, sterile gown, sterile gloves, and large sterile sheet  Hand hygiene: hand hygiene performed prior to central venous catheter insertion  Location details: right brachial  Catheter type: double lumen  Catheter size: 5 Fr  Catheter Length: 39cm    Ultrasound guidance: yes  Vessel Caliber: medium and patent, compressibility normal  Vascular Doppler: not done  Needle advanced into vessel with real time Ultrasound guidance.  Guidewire confirmed in vessel.  Image recorded and saved.  Sterile sheath used.  Number of attempts: 1  Post-procedure: blood return through all ports, chlorhexidine patch and sterile dressing applied  Technical procedures used: 3cg  Specimens: No  Implants: No  Assessment: placement verified by x-ray  Comments: Unsuccessful attempt at placing PICC line to LUE. Unable to get line passed axilla region of LUE. Placed PICC to RUE. Waiting for XRAY to verify placement to RUE.            Mela Godoy  7/2/2020  "

## 2020-07-02 NOTE — ASSESSMENT & PLAN NOTE
59 y M with prior bilateral CFA endarterectomies in Dec 2019; L groin wound infections led to a L groin myocutaneous flap.    Had one episode of bleeding pseudoaneurysm from L CFA in April 2020 that was with 8mm viabahn. Readmitted on 6/26 with fever and positive blood cx (proteus)    S/p L EIA viabahn stent (9x75) with via R CFA access, L groin hematoma washout    - cont asa, subq enoxaparin  - cont iv ceftriaxone for 6 weeks per ID. PICC in place. No vegetations on echo  - cont drain, put out 7 ml overnight  - OOB ambulation

## 2020-07-02 NOTE — SUBJECTIVE & OBJECTIVE
Medications:  Continuous Infusions:    Scheduled Meds:   aspirin  81 mg Oral Daily    atorvastatin  40 mg Oral Daily    cefTRIAXone (ROCEPHIN) IVPB  2 g Intravenous Q24H    docusate sodium  100 mg Oral BID    enoxparin  40 mg Subcutaneous Q24H    mupirocin  1 g Nasal BID    polyethylene glycol  17 g Oral Daily    pregabalin  100 mg Oral BID    valsartan  160 mg Oral Daily     PRN Meds:hydrALAZINE, HYDROcodone-acetaminophen, HYDROmorphone, ondansetron, sodium chloride 0.9%     Objective:     Vital Signs (Most Recent):  Temp: 98.1 °F (36.7 °C) (07/02/20 0300)  Pulse: 85 (07/02/20 0645)  Resp: 14 (07/02/20 0200)  BP: 135/67 (07/02/20 0645)  SpO2: 100 % (07/02/20 0645) Vital Signs (24h Range):  Temp:  [98.1 °F (36.7 °C)-98.4 °F (36.9 °C)] 98.1 °F (36.7 °C)  Pulse:  [70-90] 85  Resp:  [12-22] 14  SpO2:  [96 %-100 %] 100 %  BP: (106-179)/(56-90) 135/67         Physical Exam  Vitals signs reviewed.   Constitutional:       Appearance: Normal appearance. He is obese.   HENT:      Head: Normocephalic and atraumatic.   Eyes:      Extraocular Movements: Extraocular movements intact.      Pupils: Pupils are equal, round, and reactive to light.   Neck:      Musculoskeletal: Normal range of motion and neck supple.   Cardiovascular:      Rate and Rhythm: Regular rhythm.   Pulmonary:      Effort: Pulmonary effort is normal. No respiratory distress.   Abdominal:      General: Abdomen is flat. There is no distension.   Musculoskeletal:      Comments: R fem no hematoma  L fem drain in place - 15 cc serosanguinous  L foot biphasic DP and PT   Neurological:      Mental Status: He is alert.         Significant Labs:  CBC:   Recent Labs   Lab 07/02/20  0344   WBC 5.40   RBC 3.95*   HGB 10.8*   HCT 33.5*      MCV 85   MCH 27.3   MCHC 32.2     CMP:   Recent Labs   Lab 07/02/20  0344      CALCIUM 9.0      K 3.9   CO2 27      BUN 10   CREATININE 0.7       Significant Diagnostics:  I have reviewed all  pertinent imaging results/findings within the past 24 hours.

## 2020-07-02 NOTE — PROGRESS NOTES
[facility]  Vascular Surgery  Progress Note    Patient Name: Renan Britton  MRN: [id number]  Admission Date: 6/29/2020  Primary Care Provider: Eren Becker MD    Subjective:     Interval History: no events. Doing well    Post-Op Info:  Procedure(s) (LRB):  Angiogram Extremity Unilateral, washout L groin, possible open pseudoaneurysm repair (Left)  STENT, ILIAC ARTERY (Left)  AORTOGRAM (N/A)  EVACUATION-HEMATOMA-VASCULAR (Left)  REPAIR, PSEUDOANEURYSM (Left)   3 Days Post-Op       Medications:  Continuous Infusions:    Scheduled Meds:   aspirin  81 mg Oral Daily    atorvastatin  40 mg Oral Daily    cefTRIAXone (ROCEPHIN) IVPB  2 g Intravenous Q24H    docusate sodium  100 mg Oral BID    enoxparin  40 mg Subcutaneous Q24H    mupirocin  1 g Nasal BID    polyethylene glycol  17 g Oral Daily    pregabalin  100 mg Oral BID    valsartan  160 mg Oral Daily     PRN Meds:hydrALAZINE, HYDROcodone-acetaminophen, HYDROmorphone, ondansetron, sodium chloride 0.9%     Objective:     Vital Signs (Most Recent):  Temp: 98.1 °F (36.7 °C) (07/02/20 0300)  Pulse: 85 (07/02/20 0645)  Resp: 14 (07/02/20 0200)  BP: 135/67 (07/02/20 0645)  SpO2: 100 % (07/02/20 0645) Vital Signs (24h Range):  Temp:  [98.1 °F (36.7 °C)-98.4 °F (36.9 °C)] 98.1 °F (36.7 °C)  Pulse:  [70-90] 85  Resp:  [12-22] 14  SpO2:  [96 %-100 %] 100 %  BP: (106-179)/(56-90) 135/67         Physical Exam  Vitals signs reviewed.   Constitutional:       Appearance: Normal appearance. He is obese.   HENT:      Head: Normocephalic and atraumatic.   Eyes:      Extraocular Movements: Extraocular movements intact.      Pupils: Pupils are equal, round, and reactive to light.   Neck:      Musculoskeletal: Normal range of motion and neck supple.   Cardiovascular:      Rate and Rhythm: Regular rhythm.   Pulmonary:      Effort: Pulmonary effort is normal. No respiratory distress.   Abdominal:      General: Abdomen is flat. There is no distension.    Musculoskeletal:      Comments: R fem no hematoma  L fem drain in place - 15 cc serosanguinous  L foot biphasic DP and PT   Neurological:      Mental Status: He is alert.         Significant Labs:  CBC:   Recent Labs   Lab 07/02/20  0344   WBC 5.40   RBC 3.95*   HGB 10.8*   HCT 33.5*      MCV 85   MCH 27.3   MCHC 32.2     CMP:   Recent Labs   Lab 07/02/20  0344      CALCIUM 9.0      K 3.9   CO2 27      BUN 10   CREATININE 0.7       Significant Diagnostics:  I have reviewed all pertinent imaging results/findings within the past 24 hours.    Assessment/Plan:     * Pseudoaneurysm  59 y M with prior bilateral CFA endarterectomies in Dec 2019; L groin wound infections led to a L groin myocutaneous flap.    Had one episode of bleeding pseudoaneurysm from L CFA in April 2020 that was with 8mm viabahn. Readmitted on 6/26 with fever and positive blood cx (proteus)    S/p L EIA viabahn stent (9x75) with via R CFA access, L groin hematoma washout    - cont asa  - cont iv abx. Blood and intraop cultures negative so far. appreciate ID assistance. Plan for ceftriaxone. Needs PICC placement for outpatient iv abx. No vegetations on echo  - cont drain  - monitor H/H  - step down to floor        Carolyn Pat MD  Vascular Surgery  [facility]    Vascular Attending  Agree with above  F/u Cx's    Bruce Dallas MD DFSVS FACS   Vascular/Endovascular Surgery

## 2020-07-02 NOTE — SUBJECTIVE & OBJECTIVE
Interval History/Significant Events: No acute events overnight. Patient states that he has been tolerating diet well. Up and out of bed minimally. Passing flatus without bowel movements.    Follow-up For: Procedure(s) (LRB):  Angiogram Extremity Unilateral, washout L groin, possible open pseudoaneurysm repair (Left)  STENT, ILIAC ARTERY (Left)  AORTOGRAM (N/A)  EVACUATION-HEMATOMA-VASCULAR (Left)  REPAIR, PSEUDOANEURYSM (Left)    Post-Operative Day: 3 Days Post-Op    Objective:     Vital Signs (Most Recent):  Temp: 98.1 °F (36.7 °C) (07/02/20 0300)  Pulse: 84 (07/02/20 0615)  Resp: 14 (07/02/20 0200)  BP: (!) 150/82 (07/02/20 0615)  SpO2: 100 % (07/02/20 0615) Vital Signs (24h Range):  Temp:  [97.8 °F (36.6 °C)-98.4 °F (36.9 °C)] 98.1 °F (36.7 °C)  Pulse:  [70-90] 84  Resp:  [11-22] 14  SpO2:  [96 %-100 %] 100 %  BP: (106-179)/(56-90) 150/82  Arterial Line BP: (126-146)/(58-65) 146/65     Weight: 123.8 kg (273 lb)  Body mass index is 39.17 kg/m².      Intake/Output Summary (Last 24 hours) at 7/2/2020 0628  Last data filed at 7/2/2020 0400  Gross per 24 hour   Intake 1979 ml   Output 2775 ml   Net -796 ml       Physical Exam  Constitutional:       General: He is not in acute distress.     Appearance: Normal appearance. He is obese.   HENT:      Head: Normocephalic and atraumatic.   Cardiovascular:      Rate and Rhythm: Normal rate and regular rhythm.      Pulses: Normal pulses.      Heart sounds: Murmur present.      Comments: LLE DP pulse present on doppler.  Pulmonary:      Effort: Pulmonary effort is normal. No respiratory distress.      Breath sounds: Normal breath sounds.   Abdominal:      General: There is no distension.      Palpations: Abdomen is soft.      Tenderness: There is no abdominal tenderness.   Musculoskeletal:         General: No swelling.      Right lower leg: No edema.      Left lower leg: No edema.      Comments: L groin drain w/ minimal SS output.   Neurological:      General: No focal deficit  present.      Mental Status: He is alert and oriented to person, place, and time.   Psychiatric:         Mood and Affect: Mood normal.         Behavior: Behavior normal.         Vents:       Lines/Drains/Airways     Peripherally Inserted Central Catheter Line            PICC Double Lumen 04/06/20 0951 right brachial 86 days          Drain                 Closed/Suction Drain 06/29/20 2254 Left Groin Bulb 19 Fr. 2 days          Peripheral Intravenous Line                 Midline Catheter Insertion/Assessment  - Single Lumen 03/12/20 1321 Left cephalic vein (lateral side of arm) 18g x 8cm 111 days         Peripheral IV - Single Lumen 06/29/20 2100 18 G Left;Posterior Hand 2 days         Peripheral IV - Single Lumen 07/01/20 1019 20 G;1 3/4 in Left;Anterior Upper Arm less than 1 day                Significant Labs:    CBC/Anemia Profile:  Recent Labs   Lab 06/30/20  0953 07/01/20  0317 07/02/20  0344   WBC 5.16 5.12 5.40   HGB 10.1* 10.0* 10.8*   HCT 31.4* 31.5* 33.5*   * 155 198   MCV 84 86 85   RDW 14.4 14.5 14.4        Chemistries:  Recent Labs   Lab 07/01/20  0317 07/02/20  0344    141   K 4.4 3.9    106   CO2 23 27   BUN 8 10   CREATININE 0.6 0.7   CALCIUM 8.1* 9.0   MG 2.0 2.0   PHOS 3.5 4.3       BMP:   Recent Labs   Lab 07/02/20  0344         K 3.9      CO2 27   BUN 10   CREATININE 0.7   CALCIUM 9.0   MG 2.0     CMP:   Recent Labs   Lab 07/01/20  0317 07/02/20  0344    141   K 4.4 3.9    106   CO2 23 27   * 110   BUN 8 10   CREATININE 0.6 0.7   CALCIUM 8.1* 9.0   ANIONGAP 8 8   EGFRNONAA >60.0 >60.0     POCT Glucose:   Recent Labs   Lab 07/01/20  0659 07/01/20  1251 07/01/20  1716   POCTGLUCOSE 96 114* 130*       Significant Imaging:  I have reviewed all pertinent imaging results/findings within the past 24 hours.

## 2020-07-02 NOTE — ASSESSMENT & PLAN NOTE
59 y M with prior bilateral CFA endarterectomies in Dec 2019; L groin wound infections led to a L groin myocutaneous flap.    Had one episode of bleeding pseudoaneurysm from L CFA in April 2020 that was with 8mm viabahn. Readmitted on 6/26 with fever and positive blood cx (proteus)    S/p L EIA viabahn stent (9x75) with via R CFA access, L groin hematoma washout    - cont asa  - cont iv abx. Blood and intraop cultures negative so far. appreciate ID assistance. Plan for ceftriaxone. Needs PICC placement for outpatient iv abx. No vegetations on echo  - cont drain  - monitor H/H  - step down to floor

## 2020-07-02 NOTE — PLAN OF CARE
"      SICU PLAN OF CARE NOTE    Dx: Pseudoaneurysm    Shift Events: No acute events this shift. Patient afebrile, AAOx4, follows all commands and moves all extremities equally and freely. PICC line placement and plan for long term abx. All questions answered and concerns addressed. Plan of care reviewed with patient and his wife. Will continue to monitor.     Goals of Care: SBP<160    Neuro: AAO x4, Follows Commands, and Moves All Extremities    Vital Signs: /67   Pulse 83   Temp 97.8 °F (36.6 °C) (Oral)   Resp (!) 24   Ht 5' 10" (1.778 m)   Wt 123.8 kg (273 lb)   SpO2 99%   BMI 39.17 kg/m²     Respiratory: Room Air    Diet: Diabetic Diet    Urine Output: Voids Spontaneously     Drains: KATHLEEN Drain, total output 8 cc / shift     Labs/Accuchecks: All labs trended. See flowsheets.     Skin: Foam applied to heels and sacrum. Waffle inflated, bed plugged in and properly working. No skin breakdown noted.       "

## 2020-07-02 NOTE — SUBJECTIVE & OBJECTIVE
Medications:  Continuous Infusions:  Scheduled Meds:   aspirin  81 mg Oral Daily    atorvastatin  40 mg Oral Daily    cefTRIAXone (ROCEPHIN) IVPB  2 g Intravenous Q24H    docusate sodium  100 mg Oral BID    enoxparin  40 mg Subcutaneous Q24H    mupirocin  1 g Nasal BID    polyethylene glycol  17 g Oral Daily    pregabalin  100 mg Oral BID    sodium chloride 0.9%  10 mL Intravenous Q6H    valsartan  160 mg Oral Daily     PRN Meds:hydrALAZINE, HYDROcodone-acetaminophen, HYDROmorphone, ondansetron, sodium chloride 0.9%, Flushing PICC Protocol **AND** sodium chloride 0.9% **AND** sodium chloride 0.9%     Objective:     Vital Signs (Most Recent):  Temp: 98 °F (36.7 °C) (07/03/20 0833)  Pulse: 84 (07/03/20 0833)  Resp: 16 (07/03/20 0438)  BP: (!) 144/77 (07/03/20 0833)  SpO2: 96 % (07/03/20 0833) Vital Signs (24h Range):  Temp:  [96.7 °F (35.9 °C)-98.1 °F (36.7 °C)] 98 °F (36.7 °C)  Pulse:  [75-92] 84  Resp:  [10-26] 16  SpO2:  [95 %-100 %] 96 %  BP: (116-166)/(62-89) 144/77         Physical Exam  Vitals signs reviewed.   Constitutional:       Appearance: Normal appearance. He is obese.   HENT:      Head: Normocephalic and atraumatic.   Neck:      Musculoskeletal: Normal range of motion and neck supple.   Cardiovascular:      Rate and Rhythm: Regular rhythm.   Pulmonary:      Effort: Pulmonary effort is normal. No respiratory distress.   Abdominal:      General: There is no distension.   Musculoskeletal:      Comments: R fem no hematoma  L fem drain in place - 7 cc serosanguinous  L foot biphasic DP and PT   Neurological:      Mental Status: He is alert.         Significant Labs:  No new labs today     Significant Diagnostics:  I have reviewed all pertinent imaging results/findings within the past 24 hours.

## 2020-07-02 NOTE — PROGRESS NOTES
[facility]  Critical Care - Surgery  Progress Note    Patient Name: Renan Britton  MRN: [id number]  Admission Date: 6/29/2020  Hospital Length of Stay: 3 days  Code Status: Prior  Attending Provider: Bruce Dallas MD  Primary Care Provider: Eren Becker MD   Principal Problem: Pseudoaneurysm    Subjective:     Hospital/ICU Course:  No notes on file    Interval History/Significant Events: No acute events overnight. Patient states that he has been tolerating diet well. Up and out of bed minimally. Passing flatus without bowel movements.    Follow-up For: Procedure(s) (LRB):  Angiogram Extremity Unilateral, washout L groin, possible open pseudoaneurysm repair (Left)  STENT, ILIAC ARTERY (Left)  AORTOGRAM (N/A)  EVACUATION-HEMATOMA-VASCULAR (Left)  REPAIR, PSEUDOANEURYSM (Left)    Post-Operative Day: 3 Days Post-Op    Objective:     Vital Signs (Most Recent):  Temp: 98.1 °F (36.7 °C) (07/02/20 0300)  Pulse: 84 (07/02/20 0615)  Resp: 14 (07/02/20 0200)  BP: (!) 150/82 (07/02/20 0615)  SpO2: 100 % (07/02/20 0615) Vital Signs (24h Range):  Temp:  [97.8 °F (36.6 °C)-98.4 °F (36.9 °C)] 98.1 °F (36.7 °C)  Pulse:  [70-90] 84  Resp:  [11-22] 14  SpO2:  [96 %-100 %] 100 %  BP: (106-179)/(56-90) 150/82  Arterial Line BP: (126-146)/(58-65) 146/65     Weight: 123.8 kg (273 lb)  Body mass index is 39.17 kg/m².      Intake/Output Summary (Last 24 hours) at 7/2/2020 0628  Last data filed at 7/2/2020 0400  Gross per 24 hour   Intake 1979 ml   Output 2775 ml   Net -796 ml       Physical Exam  Constitutional:       General: He is not in acute distress.     Appearance: Normal appearance. He is obese.   HENT:      Head: Normocephalic and atraumatic.   Cardiovascular:      Rate and Rhythm: Normal rate and regular rhythm.      Pulses: Normal pulses.      Heart sounds: Murmur present.      Comments: LLE DP pulse present on doppler.  Pulmonary:      Effort: Pulmonary effort is normal. No respiratory distress.       Breath sounds: Normal breath sounds.   Abdominal:      General: There is no distension.      Palpations: Abdomen is soft.      Tenderness: There is no abdominal tenderness.   Musculoskeletal:         General: No swelling.      Right lower leg: No edema.      Left lower leg: No edema.      Comments: L groin drain w/ minimal SS output.   Neurological:      General: No focal deficit present.      Mental Status: He is alert and oriented to person, place, and time.   Psychiatric:         Mood and Affect: Mood normal.         Behavior: Behavior normal.         Vents:       Lines/Drains/Airways     Peripherally Inserted Central Catheter Line            PICC Double Lumen 04/06/20 0951 right brachial 86 days          Drain                 Closed/Suction Drain 06/29/20 2254 Left Groin Bulb 19 Fr. 2 days          Peripheral Intravenous Line                 Midline Catheter Insertion/Assessment  - Single Lumen 03/12/20 1321 Left cephalic vein (lateral side of arm) 18g x 8cm 111 days         Peripheral IV - Single Lumen 06/29/20 2100 18 G Left;Posterior Hand 2 days         Peripheral IV - Single Lumen 07/01/20 1019 20 G;1 3/4 in Left;Anterior Upper Arm less than 1 day                Significant Labs:    CBC/Anemia Profile:  Recent Labs   Lab 06/30/20  0953 07/01/20 0317 07/02/20  0344   WBC 5.16 5.12 5.40   HGB 10.1* 10.0* 10.8*   HCT 31.4* 31.5* 33.5*   * 155 198   MCV 84 86 85   RDW 14.4 14.5 14.4        Chemistries:  Recent Labs   Lab 07/01/20 0317 07/02/20  0344    141   K 4.4 3.9    106   CO2 23 27   BUN 8 10   CREATININE 0.6 0.7   CALCIUM 8.1* 9.0   MG 2.0 2.0   PHOS 3.5 4.3       BMP:   Recent Labs   Lab 07/02/20  0344         K 3.9      CO2 27   BUN 10   CREATININE 0.7   CALCIUM 9.0   MG 2.0     CMP:   Recent Labs   Lab 07/01/20 0317 07/02/20  0344    141   K 4.4 3.9    106   CO2 23 27   * 110   BUN 8 10   CREATININE 0.6 0.7   CALCIUM 8.1* 9.0   ANIONGAP 8 8    EGFRNONAA >60.0 >60.0     POCT Glucose:   Recent Labs   Lab 07/01/20  0659 07/01/20  1251 07/01/20  1716   POCTGLUCOSE 96 114* 130*       Significant Imaging:  I have reviewed all pertinent imaging results/findings within the past 24 hours.    Assessment/Plan:     * Pseudoaneurysm  Renan Britton is a 59 y.o. male w/ recurrent L CFA pseudoaneurysm who presents to SICU s/p covered stent placement and L groin washout on 6/29.    Neuro  -Norco/Dilaudid prn  -Not currently requiring pain medication    Resp  -Stable on room air  -Encourage IS    Cards  -No pressor support necessary  -Regular rate and rhythm  -No known cardiac hx  -ASA given PVD hx  -Plavix d/c per vascular  -Hydralazine 10 mg for SBP >160    GI  -Diabetic diet, 2000 kcal per day, tolerating diet without issues  -Replace lytes as needed  -Dulcolax and PEG for constipation  -mIVF discontinued this morning    Renal  -Normal function preop, BUN/Cr 10/0.7 today    Endo  -Pregabalin 100 mg BID    Heme/ID  -Normal Hgb preop, 25 ml EBL during surgery  -H/H 10.8/33.5 from 10.0/31.5  -Rocephin per primary  -Cx pending from OR, will f/u  -Patient will require 6 weeks IV antibiotics, consult PICC team for line placement, will need to wait 48 hours after cultures drawn prior to placement    Dispo: stepdown to floor today        Critical care was time spent personally by me on the following activities: development of treatment plan with patient or surrogate and bedside caregivers, discussions with consultants, evaluation of patient's response to treatment, examination of patient, ordering and performing treatments and interventions, ordering and review of laboratory studies, ordering and review of radiographic studies, pulse oximetry, re-evaluation of patient's condition.  This critical care time did not overlap with that of any other provider or involve time for any procedures.     Larry Gonzalez MD  Critical Care - Surgery  [facility]

## 2020-07-03 LAB
BACTERIA SPEC AEROBE CULT: NO GROWTH
BACTERIA SPEC AEROBE CULT: NO GROWTH
BLD PROD TYP BPU: NORMAL
BLD PROD TYP BPU: NORMAL
BLOOD UNIT EXPIRATION DATE: NORMAL
BLOOD UNIT EXPIRATION DATE: NORMAL
BLOOD UNIT TYPE CODE: 600
BLOOD UNIT TYPE CODE: 600
BLOOD UNIT TYPE: NORMAL
BLOOD UNIT TYPE: NORMAL
CODING SYSTEM: NORMAL
CODING SYSTEM: NORMAL
DISPENSE STATUS: NORMAL
DISPENSE STATUS: NORMAL
ESTIMATED AVG GLUCOSE: 126 MG/DL (ref 68–131)
HBA1C MFR BLD HPLC: 6 % (ref 4–5.6)
POCT GLUCOSE: 103 MG/DL (ref 70–110)
POCT GLUCOSE: 116 MG/DL (ref 70–110)
POCT GLUCOSE: 122 MG/DL (ref 70–110)
POCT GLUCOSE: 124 MG/DL (ref 70–110)
POCT GLUCOSE: 139 MG/DL (ref 70–110)
POCT GLUCOSE: 98 MG/DL (ref 70–110)
TRANS ERYTHROCYTES VOL PATIENT: NORMAL ML
TRANS ERYTHROCYTES VOL PATIENT: NORMAL ML

## 2020-07-03 PROCEDURE — 63600175 PHARM REV CODE 636 W HCPCS: Performed by: STUDENT IN AN ORGANIZED HEALTH CARE EDUCATION/TRAINING PROGRAM

## 2020-07-03 PROCEDURE — 25000003 PHARM REV CODE 250: Performed by: SURGERY

## 2020-07-03 PROCEDURE — 25000003 PHARM REV CODE 250: Performed by: STUDENT IN AN ORGANIZED HEALTH CARE EDUCATION/TRAINING PROGRAM

## 2020-07-03 PROCEDURE — A4216 STERILE WATER/SALINE, 10 ML: HCPCS | Performed by: SURGERY

## 2020-07-03 PROCEDURE — 83036 HEMOGLOBIN GLYCOSYLATED A1C: CPT

## 2020-07-03 PROCEDURE — 20600001 HC STEP DOWN PRIVATE ROOM

## 2020-07-03 RX ADMIN — DOCUSATE SODIUM 100 MG: 100 CAPSULE, LIQUID FILLED ORAL at 09:07

## 2020-07-03 RX ADMIN — ENOXAPARIN SODIUM 40 MG: 100 INJECTION SUBCUTANEOUS at 04:07

## 2020-07-03 RX ADMIN — Medication 10 ML: at 06:07

## 2020-07-03 RX ADMIN — ATORVASTATIN CALCIUM 40 MG: 20 TABLET, FILM COATED ORAL at 09:07

## 2020-07-03 RX ADMIN — PREGABALIN 100 MG: 75 CAPSULE ORAL at 09:07

## 2020-07-03 RX ADMIN — VALSARTAN 160 MG: 160 TABLET ORAL at 09:07

## 2020-07-03 RX ADMIN — Medication 10 ML: at 12:07

## 2020-07-03 RX ADMIN — MUPIROCIN 1 G: 20 OINTMENT TOPICAL at 09:07

## 2020-07-03 RX ADMIN — POLYETHYLENE GLYCOL 3350 17 G: 17 POWDER, FOR SOLUTION ORAL at 09:07

## 2020-07-03 RX ADMIN — CEFTRIAXONE 2 G: 2 INJECTION, SOLUTION INTRAVENOUS at 01:07

## 2020-07-03 RX ADMIN — ASPIRIN 81 MG: 81 TABLET, DELAYED RELEASE ORAL at 09:07

## 2020-07-03 NOTE — NURSING TRANSFER
Nursing Transfer Note      7/2/2020     Transfer To: 1043    Transfer via bed    Transfer with cardiac monitoring    Transported by RN    Medicines sent: Yes    Chart send with patient: Yes    Notified: spouse    Patient reassessed at: 2030. 7/2/2020 (date, time)    Upon arrival to floor: cardiac monitor applied, patient oriented to room, call bell in reach and bed in lowest position

## 2020-07-03 NOTE — PROGRESS NOTES
[facility]  Vascular Surgery  Progress Note    Patient Name: Renan Britton  MRN: [id number]  Admission Date: 6/29/2020  Primary Care Provider: Eren Becker MD    Subjective:     Interval History: No acute events overnight. Patient was sitting up in bed. Has PICC line in now.     Post-Op Info:  Procedure(s) (LRB):  Angiogram Extremity Unilateral, washout L groin, possible open pseudoaneurysm repair (Left)  STENT, ILIAC ARTERY (Left)  AORTOGRAM (N/A)  EVACUATION-HEMATOMA-VASCULAR (Left)  REPAIR, PSEUDOANEURYSM (Left)   4 Days Post-Op       Medications:  Continuous Infusions:  Scheduled Meds:   aspirin  81 mg Oral Daily    atorvastatin  40 mg Oral Daily    cefTRIAXone (ROCEPHIN) IVPB  2 g Intravenous Q24H    docusate sodium  100 mg Oral BID    enoxparin  40 mg Subcutaneous Q24H    mupirocin  1 g Nasal BID    polyethylene glycol  17 g Oral Daily    pregabalin  100 mg Oral BID    sodium chloride 0.9%  10 mL Intravenous Q6H    valsartan  160 mg Oral Daily     PRN Meds:hydrALAZINE, HYDROcodone-acetaminophen, HYDROmorphone, ondansetron, sodium chloride 0.9%, Flushing PICC Protocol **AND** sodium chloride 0.9% **AND** sodium chloride 0.9%     Objective:     Vital Signs (Most Recent):  Temp: 98 °F (36.7 °C) (07/03/20 0833)  Pulse: 84 (07/03/20 0833)  Resp: 16 (07/03/20 0438)  BP: (!) 144/77 (07/03/20 0833)  SpO2: 96 % (07/03/20 0833) Vital Signs (24h Range):  Temp:  [96.7 °F (35.9 °C)-98.1 °F (36.7 °C)] 98 °F (36.7 °C)  Pulse:  [75-92] 84  Resp:  [10-26] 16  SpO2:  [95 %-100 %] 96 %  BP: (116-166)/(62-89) 144/77         Physical Exam  Vitals signs reviewed.   Constitutional:       Appearance: Normal appearance. He is obese.   HENT:      Head: Normocephalic and atraumatic.   Neck:      Musculoskeletal: Normal range of motion and neck supple.   Cardiovascular:      Rate and Rhythm: Regular rhythm.   Pulmonary:      Effort: Pulmonary effort is normal. No respiratory distress.   Abdominal:       General: There is no distension.   Musculoskeletal:      Comments: R fem no hematoma  L fem drain in place - 7 cc serosanguinous  L foot biphasic DP and PT   Neurological:      Mental Status: He is alert.         Significant Labs:  No new labs today     Significant Diagnostics:  I have reviewed all pertinent imaging results/findings within the past 24 hours.    Assessment/Plan:     * Pseudoaneurysm  59 y M with prior bilateral CFA endarterectomies in Dec 2019; L groin wound infections led to a L groin myocutaneous flap.    Had one episode of bleeding pseudoaneurysm from L CFA in April 2020 that was with 8mm viabahn. Readmitted on 6/26 with fever and positive blood cx (proteus)    S/p L EIA viabahn stent (9x75) with via R CFA access, L groin hematoma washout    - cont asa, subq enoxaparin  - cont iv ceftriaxone for 6 weeks per ID. PICC in place. No vegetations on echo  - cont drain, put out 7 ml overnight  - OOB ambulation, will evaluate need for PT          Leida Hancock MD  Vascular Surgery  [facility]

## 2020-07-03 NOTE — NURSING TRANSFER
Nursing Transfer Note      7/2/2020     Transfer From: SICU    Transfer via bed    Transfer with cardiac monitoring    Transported by nurse    Medicines sent: yes    Chart send with patient: Yes    Notified: family    Patient reassessed at:07/02/20, 2100    Upon arrival to floor: patient oriented to room, call bell in reach and bed in lowest position

## 2020-07-03 NOTE — PLAN OF CARE
VS stable. No complaints overnight. Bilateral weak radial pulses noted, bilateral pedal pulses heard with doppler. No numbness nor tingling on all extremities. Left groin site with gauze dressing dry, intact. Left groin Brooks drain with serosanguineous drainage. Voiding adequate amount of urine, glucose stable. WCTM

## 2020-07-03 NOTE — NURSING
Patient ambulated in halls accompanied by nurse with no assistance. Denies shortness of breath. Patient back to room and up in chair. Tolerated well.

## 2020-07-04 LAB
POCT GLUCOSE: 104 MG/DL (ref 70–110)
POCT GLUCOSE: 105 MG/DL (ref 70–110)
POCT GLUCOSE: 106 MG/DL (ref 70–110)
POCT GLUCOSE: 110 MG/DL (ref 70–110)
POCT GLUCOSE: 87 MG/DL (ref 70–110)

## 2020-07-04 PROCEDURE — A4216 STERILE WATER/SALINE, 10 ML: HCPCS | Performed by: SURGERY

## 2020-07-04 PROCEDURE — 25000003 PHARM REV CODE 250: Performed by: SURGERY

## 2020-07-04 PROCEDURE — 63600175 PHARM REV CODE 636 W HCPCS: Performed by: STUDENT IN AN ORGANIZED HEALTH CARE EDUCATION/TRAINING PROGRAM

## 2020-07-04 PROCEDURE — 20600001 HC STEP DOWN PRIVATE ROOM

## 2020-07-04 PROCEDURE — 25000003 PHARM REV CODE 250: Performed by: STUDENT IN AN ORGANIZED HEALTH CARE EDUCATION/TRAINING PROGRAM

## 2020-07-04 RX ADMIN — DOCUSATE SODIUM 100 MG: 100 CAPSULE, LIQUID FILLED ORAL at 09:07

## 2020-07-04 RX ADMIN — VALSARTAN 160 MG: 160 TABLET ORAL at 09:07

## 2020-07-04 RX ADMIN — PREGABALIN 100 MG: 75 CAPSULE ORAL at 09:07

## 2020-07-04 RX ADMIN — ASPIRIN 81 MG: 81 TABLET, DELAYED RELEASE ORAL at 09:07

## 2020-07-04 RX ADMIN — CEFTRIAXONE 2 G: 2 INJECTION, SOLUTION INTRAVENOUS at 12:07

## 2020-07-04 RX ADMIN — Medication 10 ML: at 11:07

## 2020-07-04 RX ADMIN — MUPIROCIN 1 G: 20 OINTMENT TOPICAL at 09:07

## 2020-07-04 RX ADMIN — ENOXAPARIN SODIUM 40 MG: 100 INJECTION SUBCUTANEOUS at 05:07

## 2020-07-04 RX ADMIN — Medication 10 ML: at 12:07

## 2020-07-04 RX ADMIN — POLYETHYLENE GLYCOL 3350 17 G: 17 POWDER, FOR SOLUTION ORAL at 09:07

## 2020-07-04 RX ADMIN — ATORVASTATIN CALCIUM 40 MG: 20 TABLET, FILM COATED ORAL at 09:07

## 2020-07-04 RX ADMIN — Medication 10 ML: at 05:07

## 2020-07-04 NOTE — PLAN OF CARE
Patient is alert and oriented. Able to make needs known. No c/o pain or discomfort noted. No s/s of respiratory/cardiac distress noted. Left groin incision dressing is clean, dry, and intact. Left groin KATHLEEN drain is patent and draining well. R PICC line is patent and flushes well. Patient remains on a Diabetic diet and is tolerating it well. Ambulates independently in his room and in the hallways. Blood sugar checks continue every 4 hours and remain WNLs. VS stable. No issues or concerns at this time. Continue to monitor.

## 2020-07-04 NOTE — SUBJECTIVE & OBJECTIVE
Medications:  Continuous Infusions:  Scheduled Meds:   aspirin  81 mg Oral Daily    atorvastatin  40 mg Oral Daily    cefTRIAXone (ROCEPHIN) IVPB  2 g Intravenous Q24H    docusate sodium  100 mg Oral BID    enoxparin  40 mg Subcutaneous Q24H    mupirocin  1 g Nasal BID    polyethylene glycol  17 g Oral Daily    pregabalin  100 mg Oral BID    sodium chloride 0.9%  10 mL Intravenous Q6H    valsartan  160 mg Oral Daily     PRN Meds:hydrALAZINE, HYDROcodone-acetaminophen, HYDROmorphone, ondansetron, sodium chloride 0.9%, Flushing PICC Protocol **AND** sodium chloride 0.9% **AND** sodium chloride 0.9%     Objective:     Vital Signs (Most Recent):  Temp: 97.6 °F (36.4 °C) (07/04/20 0407)  Pulse: 73 (07/04/20 0407)  Resp: 16 (07/04/20 0407)  BP: 124/81 (07/04/20 0407)  SpO2: 97 % (07/04/20 0407) Vital Signs (24h Range):  Temp:  [97.6 °F (36.4 °C)-98.7 °F (37.1 °C)] 97.6 °F (36.4 °C)  Pulse:  [73-84] 73  Resp:  [12-16] 16  SpO2:  [97 %-100 %] 97 %  BP: (124-141)/(71-86) 124/81         Physical Exam  Constitutional:       Appearance: Normal appearance.   Cardiovascular:      Rate and Rhythm: Normal rate and regular rhythm.   Musculoskeletal:      Comments: Femoral wound c/d/i without erythema,   Left sided femoral drain in place, minimal serosanguinous output      Neurological:      Mental Status: He is alert.         Significant Labs:  BMP: No results for input(s): GLU, NA, K, CL, CO2, BUN, CREATININE, CALCIUM, MG in the last 48 hours.  CBC: No results for input(s): WBC, RBC, HGB, HCT, PLT, MCV, MCH, MCHC in the last 48 hours.    Significant Diagnostics:  I have reviewed all pertinent imaging results/findings within the past 24 hours.

## 2020-07-04 NOTE — ASSESSMENT & PLAN NOTE
59 y M with prior bilateral CFA endarterectomies in Dec 2019; L groin wound infections led to a L groin myocutaneous flap.    Had one episode of bleeding pseudoaneurysm from L CFA in April 2020 that was with 8mm viabahn. Readmitted on 6/26 with fever and positive blood cx (proteus)    S/p L EIA viabahn stent (9x75) with via R CFA access, L groin hematoma washout    - cont asa, subq enoxaparin  - cont iv ceftriaxone for 6 weeks per ID. PICC in place. No vegetations on echo  - cont drain, output 17 ml,  will plan to remove Monday 7/6 or day of discharge   - Up and ambulating well, no indication for PT/OT   - Social work consulted for assistance setting up home health for IV abx

## 2020-07-04 NOTE — PROGRESS NOTES
[facility]  Vascular Surgery  Progress Note    Patient Name: Renan Britton  MRN: [id number]  Admission Date: 6/29/2020  Primary Care Provider: Eren Becker MD    Subjective:     Interval History: No acute events overnight. Patient sitting up on couch during visit. Discussed need for outpatient abx on discharge.     Post-Op Info:  Procedure(s) (LRB):  Angiogram Extremity Unilateral, washout L groin, possible open pseudoaneurysm repair (Left)  STENT, ILIAC ARTERY (Left)  AORTOGRAM (N/A)  EVACUATION-HEMATOMA-VASCULAR (Left)  REPAIR, PSEUDOANEURYSM (Left)   5 Days Post-Op       Medications:  Continuous Infusions:  Scheduled Meds:   aspirin  81 mg Oral Daily    atorvastatin  40 mg Oral Daily    cefTRIAXone (ROCEPHIN) IVPB  2 g Intravenous Q24H    docusate sodium  100 mg Oral BID    enoxparin  40 mg Subcutaneous Q24H    mupirocin  1 g Nasal BID    polyethylene glycol  17 g Oral Daily    pregabalin  100 mg Oral BID    sodium chloride 0.9%  10 mL Intravenous Q6H    valsartan  160 mg Oral Daily     PRN Meds:hydrALAZINE, HYDROcodone-acetaminophen, HYDROmorphone, ondansetron, sodium chloride 0.9%, Flushing PICC Protocol **AND** sodium chloride 0.9% **AND** sodium chloride 0.9%     Objective:     Vital Signs (Most Recent):  Temp: 97.6 °F (36.4 °C) (07/04/20 0407)  Pulse: 73 (07/04/20 0407)  Resp: 16 (07/04/20 0407)  BP: 124/81 (07/04/20 0407)  SpO2: 97 % (07/04/20 0407) Vital Signs (24h Range):  Temp:  [97.6 °F (36.4 °C)-98.7 °F (37.1 °C)] 97.6 °F (36.4 °C)  Pulse:  [73-84] 73  Resp:  [12-16] 16  SpO2:  [97 %-100 %] 97 %  BP: (124-141)/(71-86) 124/81         Physical Exam  Constitutional:       Appearance: Normal appearance.   Cardiovascular:      Rate and Rhythm: Normal rate and regular rhythm.   Musculoskeletal:      Comments: Femoral wound c/d/i without erythema,   Left sided femoral drain in place, minimal serosanguinous output      Neurological:      Mental Status: He is alert.          Significant Labs:  BMP: No results for input(s): GLU, NA, K, CL, CO2, BUN, CREATININE, CALCIUM, MG in the last 48 hours.  CBC: No results for input(s): WBC, RBC, HGB, HCT, PLT, MCV, MCH, MCHC in the last 48 hours.    Significant Diagnostics:  I have reviewed all pertinent imaging results/findings within the past 24 hours.    Assessment/Plan:     * Pseudoaneurysm  59 y M with prior bilateral CFA endarterectomies in Dec 2019; L groin wound infections led to a L groin myocutaneous flap.    Had one episode of bleeding pseudoaneurysm from L CFA in April 2020 that was with 8mm viabahn. Readmitted on 6/26 with fever and positive blood cx (proteus)    S/p L EIA viabahn stent (9x75) with via R CFA access, L groin hematoma washout    - cont asa, subq enoxaparin  - cont iv ceftriaxone for 6 weeks per ID. PICC in place. No vegetations on echo  - cont drain, output 17 ml,  will plan to remove Monday 7/6 or day of discharge   - Up and ambulating well, no indication for PT/OT   - Social work consulted for assistance setting up home health for IV abx         Leida Hancock MD  Vascular Surgery  [facility]

## 2020-07-04 NOTE — PLAN OF CARE
POC reviewed with patient, aaox4. Tolerating diet well. Blood sugars monitored as ordered. Denies pain. Dressing changed to left groin with small amount of dried drainage. KATHLEEN drain to left groin put out 15 ml of serosangious drainage. Patient ambulated x3 in [place] today, tolerated well, denied pain. IV d/c to left hand. PICC line to right upper patent and flushing well. Voids per urinal adequately with clear yellow. Pedal pulses intact with Doppler. Patient sat up in chair all this shift. Call light in reach, will continue to monitor.

## 2020-07-04 NOTE — PLAN OF CARE
VS stable. No complaints overnight. Getting OOB, walking down the halls, voiding adequately. Left groin KATHLEEN drain with minimal amount of sanguineous drainage. Left groin site is dry, intact, no swelling noted. Bilateral pedal pulses heard on doppler. Will continue to monitor.

## 2020-07-05 LAB
BACTERIA BLD CULT: NORMAL
BASOPHILS # BLD AUTO: 0.03 K/UL (ref 0–0.2)
BASOPHILS NFR BLD: 0.5 % (ref 0–1.9)
DIFFERENTIAL METHOD: ABNORMAL
EOSINOPHIL # BLD AUTO: 0.2 K/UL (ref 0–0.5)
EOSINOPHIL NFR BLD: 3.2 % (ref 0–8)
ERYTHROCYTE [DISTWIDTH] IN BLOOD BY AUTOMATED COUNT: 14.5 % (ref 11.5–14.5)
HCT VFR BLD AUTO: 34.9 % (ref 40–54)
HGB BLD-MCNC: 11 G/DL (ref 14–18)
IMM GRANULOCYTES # BLD AUTO: 0.07 K/UL (ref 0–0.04)
IMM GRANULOCYTES NFR BLD AUTO: 1.3 % (ref 0–0.5)
LYMPHOCYTES # BLD AUTO: 1.5 K/UL (ref 1–4.8)
LYMPHOCYTES NFR BLD: 26 % (ref 18–48)
MCH RBC QN AUTO: 27.4 PG (ref 27–31)
MCHC RBC AUTO-ENTMCNC: 31.5 G/DL (ref 32–36)
MCV RBC AUTO: 87 FL (ref 82–98)
MONOCYTES # BLD AUTO: 0.5 K/UL (ref 0.3–1)
MONOCYTES NFR BLD: 8.8 % (ref 4–15)
NEUTROPHILS # BLD AUTO: 3.4 K/UL (ref 1.8–7.7)
NEUTROPHILS NFR BLD: 60.2 % (ref 38–73)
NRBC BLD-RTO: 0 /100 WBC
PLATELET # BLD AUTO: 256 K/UL (ref 150–350)
PMV BLD AUTO: 9.8 FL (ref 9.2–12.9)
POCT GLUCOSE: 118 MG/DL (ref 70–110)
POCT GLUCOSE: 127 MG/DL (ref 70–110)
POCT GLUCOSE: 153 MG/DL (ref 70–110)
POCT GLUCOSE: 183 MG/DL (ref 70–110)
POCT GLUCOSE: 93 MG/DL (ref 70–110)
RBC # BLD AUTO: 4.02 M/UL (ref 4.6–6.2)
WBC # BLD AUTO: 5.58 K/UL (ref 3.9–12.7)

## 2020-07-05 PROCEDURE — 63600175 PHARM REV CODE 636 W HCPCS: Performed by: STUDENT IN AN ORGANIZED HEALTH CARE EDUCATION/TRAINING PROGRAM

## 2020-07-05 PROCEDURE — A4216 STERILE WATER/SALINE, 10 ML: HCPCS | Performed by: SURGERY

## 2020-07-05 PROCEDURE — 25000003 PHARM REV CODE 250: Performed by: SURGERY

## 2020-07-05 PROCEDURE — 25000003 PHARM REV CODE 250: Performed by: STUDENT IN AN ORGANIZED HEALTH CARE EDUCATION/TRAINING PROGRAM

## 2020-07-05 PROCEDURE — 20600001 HC STEP DOWN PRIVATE ROOM

## 2020-07-05 PROCEDURE — 85025 COMPLETE CBC W/AUTO DIFF WBC: CPT

## 2020-07-05 RX ADMIN — CEFTRIAXONE 2 G: 2 INJECTION, SOLUTION INTRAVENOUS at 12:07

## 2020-07-05 RX ADMIN — DOCUSATE SODIUM 100 MG: 100 CAPSULE, LIQUID FILLED ORAL at 08:07

## 2020-07-05 RX ADMIN — VALSARTAN 160 MG: 160 TABLET ORAL at 08:07

## 2020-07-05 RX ADMIN — POLYETHYLENE GLYCOL 3350 17 G: 17 POWDER, FOR SOLUTION ORAL at 08:07

## 2020-07-05 RX ADMIN — PREGABALIN 100 MG: 75 CAPSULE ORAL at 08:07

## 2020-07-05 RX ADMIN — ASPIRIN 81 MG: 81 TABLET, DELAYED RELEASE ORAL at 08:07

## 2020-07-05 RX ADMIN — ATORVASTATIN CALCIUM 40 MG: 20 TABLET, FILM COATED ORAL at 08:07

## 2020-07-05 RX ADMIN — Medication 10 ML: at 12:07

## 2020-07-05 RX ADMIN — Medication 10 ML: at 05:07

## 2020-07-05 RX ADMIN — Medication 10 ML: at 06:07

## 2020-07-05 RX ADMIN — ENOXAPARIN SODIUM 40 MG: 100 INJECTION SUBCUTANEOUS at 05:07

## 2020-07-05 NOTE — ASSESSMENT & PLAN NOTE
59 y M with prior bilateral CFA endarterectomies in Dec 2019; L groin wound infections led to a L groin myocutaneous flap.    Had one episode of bleeding pseudoaneurysm from L CFA in April 2020 that was with 8mm viabahn. Readmitted on 6/26 with fever and positive blood cx (proteus)    S/p L EIA viabahn stent (9x75) with via R CFA access, L groin hematoma washout    - Discussion had today about possible future bypass of the femoral site vs long term antibiotics without further surgery. Will await final culture results and make a decision based on that.   - cont asa, subq enoxaparin  - cont iv ceftriaxone for 6 weeks per ID. PICC in place. No vegetations on echo  - cont drain, output 25 ml,  will plan to remove Monday 7/6 or day of discharge   - Up and ambulating well, no indication for PT/OT   - Social work consulted for assistance setting up home health for IV abx, plan of care orders initiated.

## 2020-07-05 NOTE — SUBJECTIVE & OBJECTIVE
Medications:  Continuous Infusions:  Scheduled Meds:   aspirin  81 mg Oral Daily    atorvastatin  40 mg Oral Daily    cefTRIAXone (ROCEPHIN) IVPB  2 g Intravenous Q24H    docusate sodium  100 mg Oral BID    enoxparin  40 mg Subcutaneous Q24H    polyethylene glycol  17 g Oral Daily    pregabalin  100 mg Oral BID    sodium chloride 0.9%  10 mL Intravenous Q6H    valsartan  160 mg Oral Daily     PRN Meds:hydrALAZINE, HYDROcodone-acetaminophen, HYDROmorphone, ondansetron, sodium chloride 0.9%, Flushing PICC Protocol **AND** sodium chloride 0.9% **AND** sodium chloride 0.9%     Objective:     Vital Signs (Most Recent):  Temp: 97.9 °F (36.6 °C) (07/05/20 0734)  Pulse: 71 (07/05/20 0734)  Resp: 18 (07/05/20 0734)  BP: 134/74 (07/05/20 0734)  SpO2: 96 % (07/05/20 0734) Vital Signs (24h Range):  Temp:  [96.3 °F (35.7 °C)-98.5 °F (36.9 °C)] 97.9 °F (36.6 °C)  Pulse:  [71-85] 71  Resp:  [16-18] 18  SpO2:  [95 %-98 %] 96 %  BP: (132-144)/(71-79) 134/74     Date 07/05/20 0700 - 07/06/20 0659   Shift 8765-9163 9103-5735 0876-3448 24 Hour Total   INTAKE   P.O. 360   360   Shift Total(mL/kg) 360(2.9)   360(2.9)   OUTPUT   Shift Total(mL/kg)       Weight (kg) 123.8 123.8 123.8 123.8       Physical Exam  Vitals signs and nursing note reviewed.   Constitutional:       Appearance: Normal appearance.   Pulmonary:      Effort: Pulmonary effort is normal.   Musculoskeletal: Normal range of motion.      Comments: Left sided groin incision with drain in place, draining serosanguinous fluid (25 mL)  RUE PICC line in place   Neurological:      General: No focal deficit present.      Mental Status: He is alert and oriented to person, place, and time.   Psychiatric:         Mood and Affect: Mood normal.         Behavior: Behavior normal.         Significant Labs:  BMP: No results for input(s): GLU, NA, K, CL, CO2, BUN, CREATININE, CALCIUM, MG in the last 48 hours.  CBC:   Recent Labs   Lab 07/05/20  0340   WBC 5.58   RBC 4.02*   HGB  11.0*   HCT 34.9*      MCV 87   MCH 27.4   MCHC 31.5*     CMP: No results for input(s): GLU, CALCIUM, ALBUMIN, PROT, NA, K, CO2, CL, BUN, CREATININE, ALKPHOS, ALT, AST, BILITOT in the last 48 hours.    Significant Diagnostics:  I have reviewed all pertinent imaging results/findings within the past 24 hours.

## 2020-07-05 NOTE — PLAN OF CARE
POC reviewed with patient, states understanding. AOx4. VS WDL. Diabetic diet, tolerated well. POCT checks q4h, no coverage needed. No complaints of nausea or pain. KATHLEEN to left groin to bulb suction, dressing c/d/i. Ambulates independently. Will continue to manage POC.

## 2020-07-05 NOTE — PLAN OF CARE
[facility]    HOME HEALTH ORDERS  FACE TO FACE ENCOUNTER    Patient Name: Renan Britton  YOB: 1960    PCP: Eren Becker MD   PCP Address: [facility] [address] [place] [address]  PCP Phone Number: [phone]  PCP Fax: [phone]    Discharging Team(s): Vascular Surgery     Encounter Date: 07/05/2020    Admit to Home Health    Diagnoses:  Active Hospital Problems    Diagnosis  POA    *Pseudoaneurysm [I72.9]  Yes    Proteus infection [A49.8]  Yes      Resolved Hospital Problems   No resolved problems to display.       No future appointments.        I have seen and examined this patient face to face today. My clinical findings that support the need for the home health skilled services and home bound status are the following:  Patient requires long term daily antibiotic infusions and PICC line care.     Allergies:  Review of patient's allergies indicates:   Allergen Reactions    Penicillins Hives     Patient currently on cefepime without complications  Tolerated ceftriaxone 3/2020       Diet: regular diet    Activities: activity as tolerated    Nursing:   SN to complete comprehensive assessment including routine vital signs. Instruct on disease process and s/s of complications to report to MD. Review/verify medication list sent home with the patient at time of discharge  and instruct patient/caregiver as needed. Frequency may be adjusted depending on start of care date.    Notify MD if SBP > 160 or < 90; DBP > 90 or < 50; HR > 120 or < 50; Temp > 101;      CONSULTS:     to evaluate for community resources/long-range planning.    MISCELLANEOUS CARE:  Home Infusion Therapy:   SN to perform Infusion Therapy/PICC Line Care.  Review: PICC Line Care & Central Line Flush with patient.    Administer (drug and dose): 2 mg ceftriaxone daily for 6 weeks  Last dose given: 7/6 (day of discharge)                       Home dose due: 7/7 (day after discharge)    Scrub the Hub:  Prior to accessing the line, always perform a 30 second alcohol scrub  Each lumen of the central line is to be flushed at least daily with 10 mL Normal Saline and 3 mL Heparin flush (10 units/mL)  Skilled Nurse (SN) may draw blood from IV access  Blood Draw Procedure:   - Aspirate at least 5 mL of blood   - Discard   - Obtain specimen   - Change injection cap   - Flush with 20 mL Normal Saline followed by a                 3-5 mL Heparin flush (10 units/mL)  PICC :   - Sterile dressing changes are done weekly and as needed.   - Use chlor-hexadine scrub to cleanse site, apply Biopatch to insertion site,       apply securement device dressing   - Injection caps are changed weekly and after EVERY lab draw.   - If sterile gauze is under dressing to control oozing,                 dressing change must be performed every 24 hours until gauze is not needed.    Lab draw: Please draw weekly CBC, CMP, ESR, and CRP while on IV abx - fax to [facility] [phone]    WOUND CARE ORDERS  Left femoral wound has packing, this can be taken out on 7/6/20. Must always have dry piece of gauze placed over this wound.       Medications: Review discharge medications with patient and family and provide education.      Current Discharge Medication List      CONTINUE these medications which have NOT CHANGED    Details   ascorbic acid (VITAMIN C ORAL) Take 2 capsules by mouth every morning.      aspirin (ECOTRIN) 81 MG EC tablet Take 81 mg by mouth once daily.      atorvastatin (LIPITOR) 40 MG tablet Take 40 mg by mouth once daily.      cefadroxil (DURICEF) 500 MG Cap Take 2 capsules (1,000 mg total) by mouth every 12 (twelve) hours.  Qty: 120 capsule, Refills: 4    Associated Diagnoses: Vascular inflammation or infection; Pseudoaneurysm of left femoral artery; Proteus infection; Receiving intravenous antibiotic treatment as outpatient      cetirizine 10 mg chewable tablet Take 10 mg by mouth once daily.       clopidogreL  (PLAVIX) 75 mg tablet Take 1 tablet (75 mg total) by mouth once daily.  Qty: 30 tablet, Refills: 10      elderberry fruit and flower 460-115 mg Cap Take 2 capsules by mouth every morning.      HYDROcodone-acetaminophen (NORCO) 5-325 mg per tablet Take 1 tablet by mouth every 6 (six) hours as needed.  Qty: 15 tablet, Refills: 0    Comments: Quantity prescribed more than 7 day supply? No      metFORMIN (GLUCOPHAGE-XR) 500 MG XR 24hr tablet Take 1,000 mg by mouth daily with breakfast.       pregabalin (LYRICA) 100 MG capsule Take 100 mg by mouth 2 (two) times daily.      valsartan (DIOVAN) 160 MG tablet Take 160 mg by mouth once daily.         STOP taking these medications       levoFLOXacin (LEVAQUIN) 500 MG tablet Comments:   Reason for Stopping:               I certify that this patient is confined to his home and needs intermittent skilled nursing care.

## 2020-07-05 NOTE — PLAN OF CARE
CM sent referrals for HH and Infusion. Pt accepted by [facility] and [facility]. Both facilities notified pt now to d/c tomorrow, 7/6/20.       07/05/20 1213   Post-Acute Status   Post-Acute Authorization Home Health;Medications   Home Health Status Set-up Complete   Medication Status Set-up Complete   Discharge Delays None known at this time     Marcie Villegas RN   - [facility]  [id number]

## 2020-07-05 NOTE — PROGRESS NOTES
[facility]  Vascular Surgery  Progress Note    Patient Name: Renan Britton  MRN: [id number]  Admission Date: 6/29/2020  Primary Care Provider: Eren Becker MD    Subjective:     Interval History: NAEO. Pt sitting up on the couch with his wife. Reports feeling stronger and ambulating down the hallways well. Extensive discussion had about home health and IV abx as well as possible future surgical interventions.     Post-Op Info:  Procedure(s) (LRB):  Angiogram Extremity Unilateral, washout L groin, possible open pseudoaneurysm repair (Left)  STENT, ILIAC ARTERY (Left)  AORTOGRAM (N/A)  EVACUATION-HEMATOMA-VASCULAR (Left)  REPAIR, PSEUDOANEURYSM (Left)   6 Days Post-Op       Medications:  Continuous Infusions:  Scheduled Meds:   aspirin  81 mg Oral Daily    atorvastatin  40 mg Oral Daily    cefTRIAXone (ROCEPHIN) IVPB  2 g Intravenous Q24H    docusate sodium  100 mg Oral BID    enoxparin  40 mg Subcutaneous Q24H    polyethylene glycol  17 g Oral Daily    pregabalin  100 mg Oral BID    sodium chloride 0.9%  10 mL Intravenous Q6H    valsartan  160 mg Oral Daily     PRN Meds:hydrALAZINE, HYDROcodone-acetaminophen, HYDROmorphone, ondansetron, sodium chloride 0.9%, Flushing PICC Protocol **AND** sodium chloride 0.9% **AND** sodium chloride 0.9%     Objective:     Vital Signs (Most Recent):  Temp: 97.9 °F (36.6 °C) (07/05/20 0734)  Pulse: 71 (07/05/20 0734)  Resp: 18 (07/05/20 0734)  BP: 134/74 (07/05/20 0734)  SpO2: 96 % (07/05/20 0734) Vital Signs (24h Range):  Temp:  [96.3 °F (35.7 °C)-98.5 °F (36.9 °C)] 97.9 °F (36.6 °C)  Pulse:  [71-85] 71  Resp:  [16-18] 18  SpO2:  [95 %-98 %] 96 %  BP: (132-144)/(71-79) 134/74     Date 07/05/20 0700 - 07/06/20 0659   Shift 8311-5667 0866-1598 3934-2231 24 Hour Total   INTAKE   P.O. 360   360   Shift Total(mL/kg) 360(2.9)   360(2.9)   OUTPUT   Shift Total(mL/kg)       Weight (kg) 123.8 123.8 123.8 123.8       Physical Exam  Vitals signs and nursing  note reviewed.   Constitutional:       Appearance: Normal appearance.   Pulmonary:      Effort: Pulmonary effort is normal.   Musculoskeletal: Normal range of motion.      Comments: Left sided groin incision with drain in place, draining serosanguinous fluid (25 mL)  RUE PICC line in place   Neurological:      General: No focal deficit present.      Mental Status: He is alert and oriented to person, place, and time.   Psychiatric:         Mood and Affect: Mood normal.         Behavior: Behavior normal.         Significant Labs:  BMP: No results for input(s): GLU, NA, K, CL, CO2, BUN, CREATININE, CALCIUM, MG in the last 48 hours.  CBC:   Recent Labs   Lab 07/05/20  0340   WBC 5.58   RBC 4.02*   HGB 11.0*   HCT 34.9*      MCV 87   MCH 27.4   MCHC 31.5*     CMP: No results for input(s): GLU, CALCIUM, ALBUMIN, PROT, NA, K, CO2, CL, BUN, CREATININE, ALKPHOS, ALT, AST, BILITOT in the last 48 hours.    Significant Diagnostics:  I have reviewed all pertinent imaging results/findings within the past 24 hours.    Assessment/Plan:     * Pseudoaneurysm  59 y M with prior bilateral CFA endarterectomies in Dec 2019; L groin wound infections led to a L groin myocutaneous flap.    Had one episode of bleeding pseudoaneurysm from L CFA in April 2020 that was with 8mm viabahn. Readmitted on 6/26 with fever and positive blood cx (proteus)    S/p L EIA viabahn stent (9x75) with via R CFA access, L groin hematoma washout    - Discussion had today about possible future bypass of the femoral site vs long term antibiotics without further surgery. Will await final culture results and make a decision based on that.   - cont asa, subq enoxaparin  - cont iv ceftriaxone for 6 weeks per ID. PICC in place. No vegetations on echo  - cont drain, output 25 ml,  will plan to remove Monday 7/6 or day of discharge   - Up and ambulating well, no indication for PT/OT   - Social work consulted for assistance setting up home health for IV abx, plan  of care orders initiated.          Leida Hancock MD  Vascular Surgery  [facility]

## 2020-07-05 NOTE — PLAN OF CARE
Patient is alert and oriented. Able to make needs known. No c/o pain or discomfort noted. No c/o pain or discomfort noted. No s/s of respiratory/cardiac distress noted. Left groin incision dressing is clean, dry, and intact. Left groin KATHLEEN drain is patent and draining minimal amounts. Right PICC line is patent and flushes well. IV antibiotic therapy continues with no adverse reactions noted. Patient remains on a diabetic diet and is tolerating it well. Ambulates independently in the hallways and in his room. Blood sugar checks continue every 4 hours and remains WNLs. VS stable. No issues or concerns at this monitor. Continue to monitor.

## 2020-07-06 ENCOUNTER — TELEPHONE (OUTPATIENT)
Dept: VASCULAR SURGERY | Facility: CLINIC | Age: 60
End: 2020-07-06

## 2020-07-06 VITALS
HEIGHT: 70 IN | WEIGHT: 273 LBS | BODY MASS INDEX: 39.08 KG/M2 | HEART RATE: 84 BPM | RESPIRATION RATE: 14 BRPM | OXYGEN SATURATION: 98 % | TEMPERATURE: 98 F | DIASTOLIC BLOOD PRESSURE: 85 MMHG | SYSTOLIC BLOOD PRESSURE: 141 MMHG

## 2020-07-06 LAB
BASOPHILS # BLD AUTO: 0.04 K/UL (ref 0–0.2)
BASOPHILS NFR BLD: 0.8 % (ref 0–1.9)
DIFFERENTIAL METHOD: ABNORMAL
EOSINOPHIL # BLD AUTO: 0.2 K/UL (ref 0–0.5)
EOSINOPHIL NFR BLD: 3.1 % (ref 0–8)
ERYTHROCYTE [DISTWIDTH] IN BLOOD BY AUTOMATED COUNT: 15 % (ref 11.5–14.5)
HCT VFR BLD AUTO: 35.1 % (ref 40–54)
HGB BLD-MCNC: 11.2 G/DL (ref 14–18)
IMM GRANULOCYTES # BLD AUTO: 0.08 K/UL (ref 0–0.04)
IMM GRANULOCYTES NFR BLD AUTO: 1.5 % (ref 0–0.5)
LYMPHOCYTES # BLD AUTO: 1.5 K/UL (ref 1–4.8)
LYMPHOCYTES NFR BLD: 28.4 % (ref 18–48)
MCH RBC QN AUTO: 27.7 PG (ref 27–31)
MCHC RBC AUTO-ENTMCNC: 31.9 G/DL (ref 32–36)
MCV RBC AUTO: 87 FL (ref 82–98)
MONOCYTES # BLD AUTO: 0.5 K/UL (ref 0.3–1)
MONOCYTES NFR BLD: 9.7 % (ref 4–15)
NEUTROPHILS # BLD AUTO: 2.9 K/UL (ref 1.8–7.7)
NEUTROPHILS NFR BLD: 56.5 % (ref 38–73)
NRBC BLD-RTO: 0 /100 WBC
PLATELET # BLD AUTO: 264 K/UL (ref 150–350)
PMV BLD AUTO: 10.3 FL (ref 9.2–12.9)
POCT GLUCOSE: 110 MG/DL (ref 70–110)
POCT GLUCOSE: 113 MG/DL (ref 70–110)
POCT GLUCOSE: 140 MG/DL (ref 70–110)
RBC # BLD AUTO: 4.05 M/UL (ref 4.6–6.2)
WBC # BLD AUTO: 5.18 K/UL (ref 3.9–12.7)

## 2020-07-06 PROCEDURE — 25000003 PHARM REV CODE 250: Performed by: STUDENT IN AN ORGANIZED HEALTH CARE EDUCATION/TRAINING PROGRAM

## 2020-07-06 PROCEDURE — 25000003 PHARM REV CODE 250: Performed by: SURGERY

## 2020-07-06 PROCEDURE — A4216 STERILE WATER/SALINE, 10 ML: HCPCS | Performed by: SURGERY

## 2020-07-06 PROCEDURE — 85025 COMPLETE CBC W/AUTO DIFF WBC: CPT

## 2020-07-06 PROCEDURE — 63600175 PHARM REV CODE 636 W HCPCS: Performed by: STUDENT IN AN ORGANIZED HEALTH CARE EDUCATION/TRAINING PROGRAM

## 2020-07-06 RX ORDER — HYDROCODONE BITARTRATE AND ACETAMINOPHEN 5; 325 MG/1; MG/1
1 TABLET ORAL EVERY 6 HOURS PRN
Qty: 15 TABLET | Refills: 0 | Status: ON HOLD | OUTPATIENT
Start: 2020-07-06 | End: 2020-08-18 | Stop reason: HOSPADM

## 2020-07-06 RX ADMIN — PREGABALIN 100 MG: 75 CAPSULE ORAL at 10:07

## 2020-07-06 RX ADMIN — Medication 10 ML: at 06:07

## 2020-07-06 RX ADMIN — DOCUSATE SODIUM 100 MG: 100 CAPSULE, LIQUID FILLED ORAL at 10:07

## 2020-07-06 RX ADMIN — CEFTRIAXONE 2 G: 2 INJECTION, SOLUTION INTRAVENOUS at 12:07

## 2020-07-06 RX ADMIN — ASPIRIN 81 MG: 81 TABLET, DELAYED RELEASE ORAL at 10:07

## 2020-07-06 RX ADMIN — POLYETHYLENE GLYCOL 3350 17 G: 17 POWDER, FOR SOLUTION ORAL at 10:07

## 2020-07-06 RX ADMIN — Medication 10 ML: at 12:07

## 2020-07-06 RX ADMIN — VALSARTAN 160 MG: 160 TABLET ORAL at 10:07

## 2020-07-06 RX ADMIN — ATORVASTATIN CALCIUM 40 MG: 20 TABLET, FILM COATED ORAL at 10:07

## 2020-07-06 NOTE — PLAN OF CARE
Patient discharged home today with no needs.    Message sent to Dr Dallas's clinic to schedule follow up visit and contact patient.       07/06/20 4085   Final Note   Assessment Type Final Discharge Note   Anticipated Discharge Disposition Home   [facility] Referral Info   Post Acute Recommendation No Care   Post-Acute Status   Post-Acute Authorization Other   Other Status No Post-Acute Service Needs

## 2020-07-06 NOTE — HOSPITAL COURSE
Transferred from [facility] for pseudoaneurysm distal L EIA/proximal L CFA at site of prior L femoral endarterectomy and recent site infection. Underwent covered stent placement, evacuation of hematoma, and drain placement. Did well thereafter and was monitored closely in ICU. Seen by ID who recommended 6 weeks of home abx. Recovered in hospital, had drain pulled, and discharged with home health and home abx with close follow up.

## 2020-07-06 NOTE — ASSESSMENT & PLAN NOTE
59 y M with prior bilateral CFA endarterectomies in Dec 2019; L groin wound infections led to a L groin myocutaneous flap.    Had one episode of bleeding pseudoaneurysm from L CFA in April 2020 that was with 8mm viabahn. Readmitted on 6/26 with fever and positive blood cx (proteus)    S/p L EIA viabahn stent (9x75) with via R CFA access, L groin hematoma washout    - blood and wound cultures finalized negative  - cont asa  - cont iv ceftriaxone for 6 weeks per ID. Follow up with ID in 2 weeks  - drain removed. Instructed to change incision packing once daily  - Up and ambulating well, no indication for PT/OT   - home health iv abx delivered bedside    Dispo: DC today. F/u with Dr Gomez when he sees ID, in 2 weeks. Needs CTA abd/pelvis

## 2020-07-06 NOTE — PLAN OF CARE
07/06/20 1101   Post-Acute Status   Post-Acute Authorization HME   HME Status Pending Delivery Hospital   [facility]x will [facility] the bedside [facility].    Gabrielle Johnson LMSW  [facility]  [id number]

## 2020-07-06 NOTE — PROGRESS NOTES
[facility]  Vascular Surgery  Progress Note    Patient Name: Renan Britton  MRN: [id number]  Admission Date: 6/29/2020  Primary Care Provider: Eren Becker MD    Subjective:     Interval History: no events. Feels well    Post-Op Info:  Procedure(s) (LRB):  Angiogram Extremity Unilateral, washout L groin, possible open pseudoaneurysm repair (Left)  STENT, ILIAC ARTERY (Left)  AORTOGRAM (N/A)  EVACUATION-HEMATOMA-VASCULAR (Left)  REPAIR, PSEUDOANEURYSM (Left)   7 Days Post-Op       Medications:  Continuous Infusions:  Scheduled Meds:   aspirin  81 mg Oral Daily    atorvastatin  40 mg Oral Daily    cefTRIAXone (ROCEPHIN) IVPB  2 g Intravenous Q24H    docusate sodium  100 mg Oral BID    enoxparin  40 mg Subcutaneous Q24H    polyethylene glycol  17 g Oral Daily    pregabalin  100 mg Oral BID    sodium chloride 0.9%  10 mL Intravenous Q6H    valsartan  160 mg Oral Daily     PRN Meds:hydrALAZINE, HYDROcodone-acetaminophen, HYDROmorphone, ondansetron, sodium chloride 0.9%, Flushing PICC Protocol **AND** sodium chloride 0.9% **AND** sodium chloride 0.9%     Objective:     Vital Signs (Most Recent):  Temp: 97.8 °F (36.6 °C) (07/06/20 1241)  Pulse: 84 (07/06/20 1241)  Resp: 14 (07/06/20 1241)  BP: (!) 141/85 (07/06/20 1241)  SpO2: 98 % (07/06/20 1241) Vital Signs (24h Range):  Temp:  [97 °F (36.1 °C)-98.2 °F (36.8 °C)] 97.8 °F (36.6 °C)  Pulse:  [70-84] 84  Resp:  [14-18] 14  SpO2:  [96 %-99 %] 98 %  BP: (132-149)/(73-85) 141/85         Physical Exam  Vitals signs and nursing note reviewed.   Constitutional:       Appearance: Normal appearance.   Pulmonary:      Effort: Pulmonary effort is normal.   Musculoskeletal: Normal range of motion.      Comments: Left sided groin incision with drain in place, draining serosanguinous fluid (25 mL)  RUE PICC line in place   Neurological:      General: No focal deficit present.      Mental Status: He is alert and oriented to person, place, and time.    Psychiatric:         Mood and Affect: Mood normal.         Behavior: Behavior normal.         Significant Labs:  BMP: No results for input(s): GLU, NA, K, CL, CO2, BUN, CREATININE, CALCIUM, MG in the last 48 hours.  CBC:   Recent Labs   Lab 07/06/20  0315   WBC 5.18   RBC 4.05*   HGB 11.2*   HCT 35.1*      MCV 87   MCH 27.7   MCHC 31.9*     CMP: No results for input(s): GLU, CALCIUM, ALBUMIN, PROT, NA, K, CO2, CL, BUN, CREATININE, ALKPHOS, ALT, AST, BILITOT in the last 48 hours.    Significant Diagnostics:  I have reviewed all pertinent imaging results/findings within the past 24 hours.    Assessment/Plan:     * Pseudoaneurysm  59 y M with prior bilateral CFA endarterectomies in Dec 2019; L groin wound infections led to a L groin myocutaneous flap.    Had one episode of bleeding pseudoaneurysm from L CFA in April 2020 that was with 8mm viabahn. Readmitted on 6/26 with fever and positive blood cx (proteus)    S/p L EIA viabahn stent (9x75) with via R CFA access, L groin hematoma washout    - blood and wound cultures finalized negative  - cont asa  - cont iv ceftriaxone for 6 weeks per ID. Follow up with ID in 2 weeks  - drain removed. Instructed to change incision packing once daily  - Up and ambulating well, no indication for PT/OT   - home health iv abx delivered bedside    Dispo: DC today. F/u with Dr Gomez when he sees ID, in 2 weeks. Needs CTA abd/pelvis        Carolyn Pat MD  Vascular Surgery  [facility]

## 2020-07-06 NOTE — TELEPHONE ENCOUNTER
----- Message from Nery Walker RN sent at 7/6/2020  3:52 PM CDT -----  Please schedule follow up appt with imaging and contact patient.  Thank you.

## 2020-07-06 NOTE — PLAN OF CARE
Patient discharged home with [facility] for IV antibiotics.    Message sent to Dr Dallas's office to schedule follow up and contact patient.    Future Appointments   Date Time Provider Department Center   8/20/2020 10:00 AM Marcelle Hunt MD [facility] ID [address]            07/06/20 1553   Final Note   Assessment Type Final Discharge Note   Anticipated Discharge Disposition Home-Health   Right Care Referral Info   Post Acute Recommendation Home-care   Post-Acute Status   Post-Acute Authorization Other;Home Health;Medications   Home Health Status Set-up Complete   Medication Status Set-up Complete   Other Status No Post-Acute Service Needs

## 2020-07-06 NOTE — NURSING
Patient given discharge instructions, med reconciliation, follow up instructions and number to call for FMLA information.  Patient going home with PICC line for home ABX.  ABX delivered to room.  Patient escorted of unit via wheelchair with wife.

## 2020-07-06 NOTE — HPI
59-year-old male with PAD s/p bilateral femoral endarterectomy 12/2019 who was admitted for fevers and chills for 4 days. Of note, patient had wound complications on his left femoral endarterectomy ultimately needing debridements and flap coverage with prolonged abx. His course was further complicated by a pseudoaneurysm on that site which was stented. Last finished abx 5/2020. On further questioning, patient notes his left leg has become much more swollen and firm over the last 24 hours and the mass in his groin which used to be quite small feels bigger and more firm as well. He does not note significant pain to the leg and has been getting around okay. Denies any other issues at this time.  He was transferred from [facility] to [facility] for vascular surgery evaluation.  Pt taken emergently to OR for covered stent placed and washout of left groin.  Pt arrives to SICU extubated and off all pressors.

## 2020-07-06 NOTE — DISCHARGE SUMMARY
[facility]  Vascular Surgery  Discharge Summary      Patient Name: Renan Britton  MRN: [id number]  Admission Date: 6/29/2020  Hospital Length of Stay: 7 days  Discharge Date and Time:  07/06/2020 12:55 PM  Attending Physician: Bruce Dallas MD   Discharging Provider: Kenneth Zarate MD  Primary Care Provider: Eren Becker MD    HPI:   59-year-old male with PAD s/p bilateral femoral endarterectomy 12/2019 who was admitted for fevers and chills for 4 days. Of note, patient had wound complications on his left femoral endarterectomy ultimately needing debridements and flap coverage with prolonged abx. His course was further complicated by a pseudoaneurysm on that site which was stented. Last finished abx 5/2020. On further questioning, patient notes his left leg has become much more swollen and firm over the last 24 hours and the mass in his groin which used to be quite small feels bigger and more firm as well. He does not note significant pain to the leg and has been getting around okay. Denies any other issues at this time.  He was transferred from [facility] to [facility] for vascular surgery evaluation.  Pt taken emergently to OR for covered stent placed and washout of left groin.  Pt arrives to SICU extubated and off all pressors.    Procedure(s) (LRB):  Angiogram Extremity Unilateral, washout L groin, possible open pseudoaneurysm repair (Left)  STENT, ILIAC ARTERY (Left)  AORTOGRAM (N/A)  EVACUATION-HEMATOMA-VASCULAR (Left)  REPAIR, PSEUDOANEURYSM (Left)     Hospital Course: Transferred from [facility] for pseudoaneurysm distal L EIA/proximal L CFA at site of prior L femoral endarterectomy and recent site infection. Underwent covered stent placement, evacuation of hematoma, and drain placement. Did well thereafter and was monitored closely in ICU. Seen by ID who recommended 6 weeks of home abx. Recovered in hospital, had drain pulled, and discharged with home health and home abx with close follow up.      Consults:   Consults (From admission, onward)        Status Ordering Provider     Inpatient consult to Infectious Diseases  Once     Provider:  (Not yet assigned)    Completed PANKAJ BELTRE     Inpatient consult to Midline team  Once     Provider:  (Not yet assigned)    Completed CASSANDRA NIETO     Inpatient consult to PICC team (NIAS)  Once     Provider:  (Not yet assigned)    Completed KAITLYN JONES     Inpatient consult to Social Work  Once     Provider:  (Not yet assigned)    Completed MARTINEZ PLAZA          Significant Diagnostic Studies: Labs:   CMP No results for input(s): NA, K, CL, CO2, GLU, BUN, CREATININE, CALCIUM, PROT, ALBUMIN, BILITOT, ALKPHOS, AST, ALT, ANIONGAP, ESTGFRAFRICA, EGFRNONAA in the last 48 hours. and CBC   Recent Labs   Lab 07/05/20  0340 07/06/20  0315   WBC 5.58 5.18   HGB 11.0* 11.2*   HCT 34.9* 35.1*    264       Pending Diagnostic Studies:     None        Final Active Diagnoses:    Diagnosis Date Noted POA    PRINCIPAL PROBLEM:  Pseudoaneurysm [I72.9] 06/29/2020 Yes    Proteus infection [A49.8] 03/10/2020 Yes      Problems Resolved During this Admission:      Discharged Condition: good    Disposition: Home or Self Care    Follow Up:  Follow-up Information     Schedule an appointment as soon as possible for a visit with Bruce Dallas MD.    Specialty: Vascular Surgery  Why: For wound re-check and imaging  Contact information:  [address] [place] [id number]  [phone]                   Patient Instructions:      Diet Adult Regular     Notify your health care provider if you experience any of the following:  temperature >100.4     Notify your health care provider if you experience any of the following:  persistent nausea and vomiting or diarrhea     Notify your health care provider if you experience any of the following:  severe uncontrolled pain     Notify your health care provider if you experience any of the following:  redness, tenderness, or  signs of infection (pain, swelling, redness, odor or green/yellow discharge around incision site)     Notify your health care provider if you experience any of the following:  difficulty breathing or increased cough     Notify your health care provider if you experience any of the following:  severe persistent headache     Notify your health care provider if you experience any of the following:  worsening rash     Notify your health care provider if you experience any of the following:  persistent dizziness, light-headedness, or visual disturbances     Notify your health care provider if you experience any of the following:  increased confusion or weakness     Activity as tolerated     Shower on day dressing removed (No bath)   Order Comments: You can shower tomorrow. The packing should come out as discussed in a couple days. Afterwards, always have a dry dressing over the wound and keep it as dry as possible.     Medications:  Reconciled Home Medications:      Medication List      CHANGE how you take these medications    * HYDROcodone-acetaminophen 5-325 mg per tablet  Commonly known as: NORCO  Take 1 tablet by mouth every 6 (six) hours as needed.  What changed: Another medication with the same name was added. Make sure you understand how and when to take each.     * HYDROcodone-acetaminophen 5-325 mg per tablet  Commonly known as: NORCO  Take 1 tablet by mouth every 6 (six) hours as needed for Pain.  What changed: You were already taking a medication with the same name, and this prescription was added. Make sure you understand how and when to take each.         * This list has 2 medication(s) that are the same as other medications prescribed for you. Read the directions carefully, and ask your doctor or other care provider to review them with you.            CONTINUE taking these medications    aspirin 81 MG EC tablet  Commonly known as: ECOTRIN  Take 81 mg by mouth once daily.     atorvastatin 40 MG  tablet  Commonly known as: LIPITOR  Take 40 mg by mouth once daily.     cefadroxil 500 MG Cap  Commonly known as: DURICEF  Take 2 capsules (1,000 mg total) by mouth every 12 (twelve) hours.     cetirizine 10 mg chewable tablet  Take 10 mg by mouth once daily.     clopidogreL 75 mg tablet  Commonly known as: PLAVIX  Take 1 tablet (75 mg total) by mouth once daily.     elderberry fruit and flower 460-115 mg Cap  Take 2 capsules by mouth every morning.     metFORMIN 500 MG XR 24hr tablet  Commonly known as: GLUCOPHAGE-XR  Take 1,000 mg by mouth daily with breakfast.     pregabalin 100 MG capsule  Commonly known as: LYRICA  Take 100 mg by mouth 2 (two) times daily.     valsartan 160 MG tablet  Commonly known as: DIOVAN  Take 160 mg by mouth once daily.     VITAMIN C ORAL  Take 2 capsules by mouth every morning.        STOP taking these medications    levoFLOXacin 500 MG tablet  Commonly known as: MONIQUE Zarate MD  Vascular Surgery  [facility]

## 2020-07-06 NOTE — SUBJECTIVE & OBJECTIVE
Medications:  Continuous Infusions:  Scheduled Meds:   aspirin  81 mg Oral Daily    atorvastatin  40 mg Oral Daily    cefTRIAXone (ROCEPHIN) IVPB  2 g Intravenous Q24H    docusate sodium  100 mg Oral BID    enoxparin  40 mg Subcutaneous Q24H    polyethylene glycol  17 g Oral Daily    pregabalin  100 mg Oral BID    sodium chloride 0.9%  10 mL Intravenous Q6H    valsartan  160 mg Oral Daily     PRN Meds:hydrALAZINE, HYDROcodone-acetaminophen, HYDROmorphone, ondansetron, sodium chloride 0.9%, Flushing PICC Protocol **AND** sodium chloride 0.9% **AND** sodium chloride 0.9%     Objective:     Vital Signs (Most Recent):  Temp: 97.8 °F (36.6 °C) (07/06/20 1241)  Pulse: 84 (07/06/20 1241)  Resp: 14 (07/06/20 1241)  BP: (!) 141/85 (07/06/20 1241)  SpO2: 98 % (07/06/20 1241) Vital Signs (24h Range):  Temp:  [97 °F (36.1 °C)-98.2 °F (36.8 °C)] 97.8 °F (36.6 °C)  Pulse:  [70-84] 84  Resp:  [14-18] 14  SpO2:  [96 %-99 %] 98 %  BP: (132-149)/(73-85) 141/85         Physical Exam  Vitals signs and nursing note reviewed.   Constitutional:       Appearance: Normal appearance.   Pulmonary:      Effort: Pulmonary effort is normal.   Musculoskeletal: Normal range of motion.      Comments: Left sided groin incision with drain in place, draining serosanguinous fluid (25 mL)  RUE PICC line in place   Neurological:      General: No focal deficit present.      Mental Status: He is alert and oriented to person, place, and time.   Psychiatric:         Mood and Affect: Mood normal.         Behavior: Behavior normal.         Significant Labs:  BMP: No results for input(s): GLU, NA, K, CL, CO2, BUN, CREATININE, CALCIUM, MG in the last 48 hours.  CBC:   Recent Labs   Lab 07/06/20  0315   WBC 5.18   RBC 4.05*   HGB 11.2*   HCT 35.1*      MCV 87   MCH 27.7   MCHC 31.9*     CMP: No results for input(s): GLU, CALCIUM, ALBUMIN, PROT, NA, K, CO2, CL, BUN, CREATININE, ALKPHOS, ALT, AST, BILITOT in the last 48 hours.    Significant  Diagnostics:  I have reviewed all pertinent imaging results/findings within the past 24 hours.

## 2020-07-07 ENCOUNTER — PATIENT OUTREACH (OUTPATIENT)
Dept: ADMINISTRATIVE | Facility: CLINIC | Age: 60
End: 2020-07-07

## 2020-07-07 LAB
BACTERIA SPEC ANAEROBE CULT: NORMAL
BACTERIA SPEC ANAEROBE CULT: NORMAL

## 2020-07-07 PROCEDURE — G0180 PR HOME HEALTH MD CERTIFICATION: ICD-10-PCS | Mod: ,,, | Performed by: SURGERY

## 2020-07-07 PROCEDURE — G0180 MD CERTIFICATION HHA PATIENT: HCPCS | Mod: ,,, | Performed by: SURGERY

## 2020-07-07 NOTE — PATIENT INSTRUCTIONS
Sepsis     To treat sepsis, antibiotics and fluids may by given through an intravenous (IV) line.     Sepsis happens when your body responds with widespread inflammation to a bad infection or bacteremia--the presence of bacteria in your bloodstream. Sepsis can be deadly. Blood pressure may drop and the lungs and kidneys may start to fail. Emergency care for sepsis is crucial.  Risk factors  Those most at risk for sepsis are:  · Infants or older adults  · People who have an illness that weakens their immune system, such as cancer, AIDS, or diabetes  · People being treated with chemotherapy medicines or radiation, which weakens the immune system  · People who have had a transplant  · People with a very severe infection such as pneumonia, meningitis, or a urinary tract infection  When to go to the emergency department (ED)  Sepsis is an emergency. Go to the nearest ED if you have a fever with any of these symptoms:  · Chills and shaking  · Rapid heartbeat and breathing  · Trouble breathing  · Severe nausea or uncontrolled vomiting  · Confusion, disorientation, drowsiness, or dizziness  · Decreased urination  · Severe pain, including in the back or joints   What to expect in the ED  · Blood and urine tests are done to look for bacteria. They also check for organ failure.  · Blood, urine, or sputum cultures may be taken. The samples are sent to a lab. They are placed in a special container. Any bacteria should grow in 24 hours.  · X-rays or other imaging tests may be done.  A person with sepsis will be admitted to the hospital and treated with antibiotics. Treatment may also include oxygen and intravenous fluids.  Date Last Reviewed: 10/1/2016  © 7591-6897 [employer]. [address] [place] [address], [place] [address]. All rights reserved. This information is not intended as a substitute for professional medical care. Always follow your healthcare professional's instructions.

## 2020-07-08 ENCOUNTER — DOCUMENT SCAN (OUTPATIENT)
Dept: HOME HEALTH SERVICES | Facility: HOSPITAL | Age: 60
End: 2020-07-08
Payer: COMMERCIAL

## 2020-07-08 DIAGNOSIS — I70.202 FEMORAL ARTERY OCCLUSION, LEFT: Primary | ICD-10-CM

## 2020-07-12 ENCOUNTER — NURSE TRIAGE (OUTPATIENT)
Dept: ADMINISTRATIVE | Facility: CLINIC | Age: 60
End: 2020-07-12

## 2020-07-12 NOTE — TELEPHONE ENCOUNTER
Reason for Disposition   Health Information question, no triage required and triager able to answer question    Protocols used: INFORMATION ONLY CALL - NO TRIAGE-A-AH

## 2020-07-12 NOTE — TELEPHONE ENCOUNTER
Spoke with pt's wife; she verified pt's identity by spelling pt's first and last names and verifying pt's . Wife denies that pt is currently experiencing any post-procedural symptoms such as fever, cough or SOB. Instructed pt's wife to call OOC back for further assistance if needed and to call 911 for all emergencies; pt's wife voiced verbal understanding and agrees with instructions. Wife states that surgical incision is producing moderate amount of bright red sanguineous drainage noted during BID dressing changes and that surgeon is aware and states that this is a normal occurrence for the particular procedure. Wife reassures OOC RN that the drainage is minor and not exceeding the borders of his bandage and that drainage is not posing any urgent or emergent care at this time. Instructed wife that if bleeding becomes uncontrollable to call 911 and to call OOC RN if they have an urgent matters or questions; wife voiced verbal understanding and agrees with instructions.

## 2020-07-13 ENCOUNTER — TELEPHONE (OUTPATIENT)
Dept: INFECTIOUS DISEASES | Facility: CLINIC | Age: 60
End: 2020-07-13

## 2020-07-13 NOTE — TELEPHONE ENCOUNTER
Infusion Company: [facility] Health: [facility]    Weekly laboratory work up reviewed.     Date 7/9   WBC 5.6   BUN 16   Creat 0.7   AST 28   ALT 27   ALP 56   ESR 27  0-20   CRP 9.7 0-10     Assessment:  Recurrent Proteus bacteremia and endovascular infection     Plan:  Continue ceftriaxone  EOC: 6 weeks  Routine labs

## 2020-07-17 ENCOUNTER — DOCUMENT SCAN (OUTPATIENT)
Dept: HOME HEALTH SERVICES | Facility: HOSPITAL | Age: 60
End: 2020-07-17
Payer: COMMERCIAL

## 2020-07-17 ENCOUNTER — TELEPHONE (OUTPATIENT)
Dept: INFECTIOUS DISEASES | Facility: CLINIC | Age: 60
End: 2020-07-17

## 2020-07-17 NOTE — TELEPHONE ENCOUNTER
----- Message from Marcelle Hunt MD sent at 7/17/2020  9:43 AM CDT -----  Yes extend until the day of the visit. Thanks  ----- Message -----  From: Fauzia Davis MA  Sent: 7/17/2020   9:22 AM CDT  To: Marcelle Hunt MD    Scheduled a virtual appointment on 7/28/2020 with you.    Pt had his PICC place on 7/2/2020, with 6 weeks of IV ABX. Estimated EOC- 8/14/2020. Pt has an appointment with you on 8/20/2020. Do you want to extend the EOC?

## 2020-07-22 ENCOUNTER — TELEPHONE (OUTPATIENT)
Dept: VASCULAR SURGERY | Facility: CLINIC | Age: 60
End: 2020-07-22

## 2020-07-22 NOTE — TELEPHONE ENCOUNTER
Spoke with pt's wife who stated the bleeding has subsided.Appt scheduled with Dr Gomez's office and appt time confirmed.  ----- Message from Renetta Grimm sent at 7/22/2020 10:21 AM CDT -----  Regarding: Pt Wife Patricia  Reason: Calling regarding getting schedule for today. Please call     Communication: [phone]

## 2020-07-23 ENCOUNTER — OFFICE VISIT (OUTPATIENT)
Dept: VASCULAR SURGERY | Facility: CLINIC | Age: 60
End: 2020-07-23
Attending: SURGERY
Payer: COMMERCIAL

## 2020-07-23 ENCOUNTER — HOSPITAL ENCOUNTER (OUTPATIENT)
Dept: VASCULAR SURGERY | Facility: CLINIC | Age: 60
Discharge: HOME | End: 2020-07-23
Attending: SURGERY
Payer: COMMERCIAL

## 2020-07-23 ENCOUNTER — EXTERNAL HOME HEALTH (OUTPATIENT)
Dept: HOME HEALTH SERVICES | Facility: HOSPITAL | Age: 60
End: 2020-07-23
Payer: COMMERCIAL

## 2020-07-23 VITALS
HEART RATE: 68 BPM | DIASTOLIC BLOOD PRESSURE: 84 MMHG | SYSTOLIC BLOOD PRESSURE: 151 MMHG | HEIGHT: 71 IN | WEIGHT: 246.94 LBS | BODY MASS INDEX: 34.57 KG/M2 | TEMPERATURE: 98 F

## 2020-07-23 DIAGNOSIS — Z01.818 PRE-OPERATIVE EXAMINATION: Primary | ICD-10-CM

## 2020-07-23 DIAGNOSIS — T82.7XXD INFECTION OF VASCULAR BYPASS GRAFT, SUBSEQUENT ENCOUNTER: ICD-10-CM

## 2020-07-23 DIAGNOSIS — Z01.818 PREOPERATIVE TESTING: ICD-10-CM

## 2020-07-23 DIAGNOSIS — Z01.818 PRE-OPERATIVE EXAMINATION: ICD-10-CM

## 2020-07-23 DIAGNOSIS — I72.4 PSEUDOANEURYSM OF LEFT FEMORAL ARTERY: Primary | ICD-10-CM

## 2020-07-23 DIAGNOSIS — Z01.818 PREOPERATIVE TESTING: Primary | ICD-10-CM

## 2020-07-23 PROCEDURE — 99214 OFFICE O/P EST MOD 30 MIN: CPT | Mod: S$GLB,,, | Performed by: SURGERY

## 2020-07-23 PROCEDURE — 99999 PR PBB SHADOW E&M-EST. PATIENT-LVL III: CPT | Mod: PBBFAC,,, | Performed by: SURGERY

## 2020-07-23 PROCEDURE — 93970 EXTREMITY STUDY: CPT | Mod: 52,S$GLB,, | Performed by: SURGERY

## 2020-07-23 PROCEDURE — 93970 PR US DUPLEX, UPPER OR LOWER EXT VENOUS,COMPLETE BILAT: ICD-10-PCS | Mod: 52,S$GLB,, | Performed by: SURGERY

## 2020-07-23 PROCEDURE — 99214 PR OFFICE/OUTPT VISIT, EST, LEVL IV, 30-39 MIN: ICD-10-PCS | Mod: S$GLB,,, | Performed by: SURGERY

## 2020-07-23 PROCEDURE — 99999 PR PBB SHADOW E&M-EST. PATIENT-LVL III: ICD-10-PCS | Mod: PBBFAC,,, | Performed by: SURGERY

## 2020-07-28 ENCOUNTER — OFFICE VISIT (OUTPATIENT)
Dept: INFECTIOUS DISEASES | Facility: CLINIC | Age: 60
End: 2020-07-28
Payer: COMMERCIAL

## 2020-07-28 DIAGNOSIS — I72.4 PSEUDOANEURYSM OF LEFT FEMORAL ARTERY: ICD-10-CM

## 2020-07-28 DIAGNOSIS — I77.6 VASCULAR INFLAMMATION OR INFECTION: Primary | ICD-10-CM

## 2020-07-28 DIAGNOSIS — Z79.2 RECEIVING INTRAVENOUS ANTIBIOTIC TREATMENT AS OUTPATIENT: ICD-10-CM

## 2020-07-28 DIAGNOSIS — A49.8 PROTEUS INFECTION: ICD-10-CM

## 2020-07-28 PROCEDURE — 99213 PR OFFICE/OUTPT VISIT, EST, LEVL III, 20-29 MIN: ICD-10-PCS | Mod: 95,,, | Performed by: INTERNAL MEDICINE

## 2020-07-28 PROCEDURE — 99213 OFFICE O/P EST LOW 20 MIN: CPT | Mod: 95,,, | Performed by: INTERNAL MEDICINE

## 2020-07-28 NOTE — PROGRESS NOTES
Subjective:      Patient ID: Renan Britton is a 59 y.o. male.    Chief Complaint:Follow-up    The patient location is: home  The chief complaint leading to consultation is: hospital follow up OPAT    Visit type: audiovisual    Face to Face time with patient: 12 min  30 minutes of total time spent on the encounter, which includes face to face time and non-face to face time preparing to see the patient (eg, review of tests), Obtaining and/or reviewing separately obtained history, Documenting clinical information in the electronic or other health record, Independently interpreting results (not separately reported) and communicating results to the patient/family/caregiver, or Care coordination (not separately reported).         Each patient to whom he or she provides medical services by telemedicine is:  (1) informed of the relationship between the physician and patient and the respective role of any other health care provider with respect to management of the patient; and (2) notified that he or she may decline to receive medical services by telemedicine and may withdraw from such care at any time.    History of Present Illness    A 59-year-old man with PAD, s/p bilateral femoral endarterectomy (12/20/19), surgical site infection treated with clindamycin, post operative fluid collection with wound breakdown, group F Streptococcus plus P mirabilis endovascular infection, s/p wound washout with groin flap (1/3/20), s/p 6 weeks of ceftriaxone (EOC: 2/14/20), P mirabilis bacteremia, s/p ceftriaxone for 4 weeks (EOC: 4/2/20), placed on suppressive Levaquin, pseudoaneurysm of the left femoral artery with stenting, s/p ceftriaxone for 6 weeks (EOC: 5/18/20), placed on suppressive cefadroxil, and left groin expanding pseudoaneurysm plus recurrent bacteremia s/p washout with stent placement who is seen for ongoing management of his infection and antibiotic therapy. Mr. Britton underwent stent placement with drainage of a  hematoma of the left groin on 6/29. He was subsequently discharged on a 6-8 week course of ceftriaxone with an estimated end date of 8/20/20. He has discoloration of the groin area with mild bleeding. He is pending further surgical intervention with possible removal if the stents.       Review of Systems   Constitution: Negative for chills, decreased appetite, fever, malaise/fatigue, night sweats, weight gain and weight loss.   HENT: Negative for congestion, ear pain, hearing loss, hoarse voice, sore throat and tinnitus.    Eyes: Negative for blurred vision, redness and visual disturbance.   Cardiovascular: Negative for chest pain, leg swelling and palpitations.   Respiratory: Negative for cough, hemoptysis, shortness of breath and sputum production.    Hematologic/Lymphatic: Positive for bleeding problem. Negative for adenopathy. Bruises/bleeds easily.   Skin: Negative for dry skin, itching, rash and suspicious lesions.   Musculoskeletal: Negative for back pain, joint pain, myalgias and neck pain.   Gastrointestinal: Negative for abdominal pain, constipation, diarrhea, heartburn, nausea and vomiting.   Genitourinary: Negative for dysuria, flank pain, frequency, hematuria, hesitancy and urgency.   Neurological: Negative for dizziness, headaches, numbness, paresthesias and weakness.   Psychiatric/Behavioral: Negative for depression and memory loss. The patient does not have insomnia and is not nervous/anxious.      Objective:   Physical Exam  Vitals signs and nursing note reviewed.   Constitutional:       Appearance: He is well-developed.   HENT:      Head: Normocephalic and atraumatic.   Eyes:      General: No scleral icterus.        Right eye: No discharge.         Left eye: No discharge.      Conjunctiva/sclera: Conjunctivae normal.   Pulmonary:      Effort: Pulmonary effort is normal.   Musculoskeletal: Normal range of motion.   Skin:     General: Skin is warm and dry.   Neurological:      Mental Status: He is  alert and oriented to person, place, and time.   Psychiatric:         Behavior: Behavior normal.         Thought Content: Thought content normal.         Judgment: Judgment normal.       Assessment:       1. Vascular inflammation or infection    2. Pseudoaneurysm of left femoral artery    3. Proteus infection    4. Receiving intravenous antibiotic treatment as outpatient        Plan:   Patient with recurrent bacteremia and pseudoaneurysm likely from stent seeding vs occult infection. Echocardiogram while admitted without evidence of endocarditis.   · Continue ceftriaxone.   · Continue routine laboratory work up.   · End date of antibiotic to be dependent on further surgical intervention.   · If stents removed then would consider treating for at least 4 weeks from date of removal.   · Recommend inpatient ID service be consulted shortly prior or after surgical intervention to optimize antibiotic course.   · RTC on 8/20 if not admitted.

## 2020-08-02 NOTE — H&P (VIEW-ONLY)
Renan Britton   7/23/2020        HPI:  Mr. Britton is a 59 y.o. male with a h/o HLD, PAD who is s/p viabahn stent placement in the L external iliac/common femoral artery in the treatment of a large PSA, 4/6/2020.  The PSA subsequently recurred as the result of recurrent and persistent bacteremia, requiring additional stent coverage and exclusion.  He presents for follow up and without complaint.     Previously:    1) ENDARTERECTOMY, FEMORAL (Bilateral)  2) ANGIOGRAM, PELVIC  3) Left common iliac artery PTA (6 x 40 [place])  4) Left common iliac artery stent (GORE VBX 10 x 39mm)  5) Prevena VAC placement, bilateral groins    With subsequent left sartorius muscle flap coverage by plastic surgery and right groin wahsout and VAC placement.  He has been doing quite well, with increasing activity and ambulation.  He represented about a month prior to this visit with bacteremia and has continued with IV ABX via a left midline cath. To follow-up with ID today.     1/2020 Imaging    Denies any MI/stroke  Tobacco use: quit 8 years ago    Past Medical History:   Diagnosis Date    Diabetes mellitus     H/O brain tumor     HLD (hyperlipidemia)     Hypertension     PAD (peripheral artery disease)     Seizures     R/T BRAIN TUMOR- SURGICALLY EXCISED     Past Surgical History:   Procedure Laterality Date    ANGIOGRAPHY Left 4/6/2020    Procedure: ANGIOGRAM, Left lower extremity;  Surgeon: Kelby Gomez MD;  Location: [address];  Service: Peripheral Vascular;  Laterality: Left;    ANGIOGRAPHY OF LOWER EXTREMITY Left 6/29/2020    Procedure: Angiogram Extremity Unilateral, washout L groin, possible open pseudoaneurysm repair;  Surgeon: Bruce Dallas MD;  Location: [address];  Service: Peripheral Vascular;  Laterality: Left;  contrast 23 ml  fluoro time: 9.9 min  mGy: 1230.26  Gycm2: 146.69    AORTOGRAPHY N/A 6/29/2020    Procedure: AORTOGRAM;  Surgeon: Bruce Dallas MD;  Location: [address]  [facility];  Service: Peripheral Vascular;  Laterality: N/A;    APPLICATION OF WOUND VACUUM-ASSISTED CLOSURE DEVICE Bilateral 1/1/2020    Procedure: APPLICATION, WOUND VAC;  Surgeon: SEBAS Prieto II, MD;  [facility];  Service: Cardiovascular;  Laterality: Bilateral;    APPLICATION OF WOUND VACUUM-ASSISTED CLOSURE DEVICE N/A 1/3/2020    Procedure: APPLICATION, WOUND VAC;  Surgeon: Brian Wong MD;  [address]  Service: Plastics;  Laterality: N/A;    BRAIN SURGERY  01/2011    TUMOR EXCISED    CREATION OF MUSCLE ROTATIONAL FLAP N/A 1/3/2020    Procedure: CREATION, FLAP, MUSCLE ROTATION;  Surgeon: Brian Wong MD;  [address]  Service: Plastics;  Laterality: N/A;    ENDARTERECTOMY OF FEMORAL ARTERY Bilateral 12/20/2019    Procedure: ENDARTERECTOMY, FEMORAL;  Surgeon: Kelby Gomez MD;  Location: [address];  Service: Peripheral Vascular;  Laterality: Bilateral;  natalya common femoral endarterectomy with natalya. iliac stent placement    PERCUTANEOUS TRANSLUMINAL ANGIOPLASTY Left 4/6/2020    Procedure: PTA (ANGIOPLASTY, PERCUTANEOUS, TRANSLUMINAL), left lower extremity;  Surgeon: Kelby Gomez MD;  Location: [address];  Service: Peripheral Vascular;  Laterality: Left;    PERCUTANEOUS TRANSLUMINAL ANGIOPLASTY (PTA) OF PERIPHERAL VESSEL Left 10/17/2019    Procedure: PTA, PERIPHERAL VESSEL;  Surgeon: Rao Fisher MD;  Location: [facility] CATH;  Service: Cardiology;  Laterality: Left;    PSEUDOANEURYSM REPAIR Left 6/29/2020    Procedure: REPAIR, PSEUDOANEURYSM;  Surgeon: Bruce Dallas MD;  [address]  Service: Peripheral Vascular;  Laterality: Left;     Family History   Problem Relation Age of Onset    Diabetes Mother     Hypertension Mother     Stroke Mother     Cancer Father         LUNG    Heart disease Father     Cancer Brother     Heart disease Brother     Heart disease Brother     Heart disease Brother      Social  History     Socioeconomic History    Marital status:      Spouse name: Not on file    Number of children: Not on file    Years of education: Not on file    Highest education level: Not on file   Occupational History    Not on file   Social Needs    Financial resource strain: Not on file    Food insecurity     Worry: Not on file     Inability: Not on file    Transportation needs     Medical: Not on file     Non-medical: Not on file   Tobacco Use    Smoking status: Former Smoker     Packs/day: 2.00     Types: Cigarettes     Quit date: 2011     Years since quittin.5    Smokeless tobacco: Never Used   Substance and Sexual Activity    Alcohol use: Yes     Comment: 2-4 PER DAY    Drug use: Never    Sexual activity: Not on file   Lifestyle    Physical activity     Days per week: Not on file     Minutes per session: Not on file    Stress: Not on file   Relationships    Social connections     Talks on phone: Not on file     Gets together: Not on file     Attends Ep[employer] service: Not on file     Active member of club or organization: Not on file     Attends meetings of clubs or organizations: Not on file     Relationship status: Not on file   Other Topics Concern    Not on file   Social History Narrative    Not on file       Current Outpatient Medications:     ascorbic acid (VITAMIN C ORAL), Take 2 capsules by mouth every morning., Disp: , Rfl:     aspirin (ECOTRIN) 81 MG EC tablet, Take 81 mg by mouth once daily., Disp: , Rfl:     atorvastatin (LIPITOR) 40 MG tablet, Take 40 mg by mouth once daily., Disp: , Rfl:     cefadroxil (DURICEF) 500 MG Cap, Take 2 capsules (1,000 mg total) by mouth every 12 (twelve) hours., Disp: 120 capsule, Rfl: 4    cetirizine 10 mg chewable tablet, Take 10 mg by mouth daily as needed. , Disp: , Rfl:     clopidogreL (PLAVIX) 75 mg tablet, Take 1 tablet (75 mg total) by mouth once daily., Disp: 30 tablet, Rfl: 10    elderberry fruit and flower 460-115 mg  Cap, Take 2 capsules by mouth every morning., Disp: , Rfl:     HYDROcodone-acetaminophen (NORCO) 5-325 mg per tablet, Take 1 tablet by mouth every 6 (six) hours as needed for Pain., Disp: 15 tablet, Rfl: 0    metFORMIN (GLUCOPHAGE-XR) 500 MG XR 24hr tablet, Take 1,000 mg by mouth daily with breakfast. , Disp: , Rfl:     pregabalin (LYRICA) 100 MG capsule, Take 100 mg by mouth 2 (two) times daily., Disp: , Rfl:     valsartan (DIOVAN) 160 MG tablet, Take 160 mg by mouth once daily., Disp: , Rfl:      REVIEW OF SYSTEMS:  General: negative; Respiratory: no cough, shortness of breath, or wheezing; Cardiovascular: no chest pain or dyspnea on exertion; Gastrointestinal: no abdominal pain, change in bowel habits, or bloody stools; Genito-Urinary: no dysuria, trouble voiding, or hematuria; Musculoskeletal: no weakness or decreased range of motion, Neurological: no TIA or stroke symptoms; Psychiatric: no nervousness, anxiety or depression.    PHYSICAL EXAM:       General appearance: Alert, well-appearing, and in no distress.  Oriented to person, place, and time  Neurological: Normal speech, no focal findings noted; CN II - XII grossly intact  Musculoskeletal:Digits/nail without cyanosis/clubbing.  Normal muscle strength/tone.  Neck: Supple, no significant adenopathy, no carotid bruit can be auscultated  Chest:Clear to auscultation, no wheezes, rales or rhonchi, symmetric air entry, No use of accessory muscles   Cardiac:Normal rate and regular rhythm, S1 and S2 normal, Systolic ejection murmur  Abdomen:Soft, nontender, nondistended, no masses or organomegaly, No rebound tenderness noted; bowel sounds normal,  No groin adenopathy   Extremities: 2+ femoral pulses bilaterally, no pedal pulses palpable.no pre-tibial edema. No ulcerations,  LLE: biphasic PT signal, biphasic DP signal   RLE: Triphasic DP signal, triphasic PT signal  Incisions are clean and dry  Left groin with nontender medial mass consistent with thrombosed  psa.  Non-erythematous, no drainage.     LAB RESULTS:  Lab Results   Component Value Date    K 4.4 07/30/2020    K 4.2 07/23/2020    K 4.2 07/16/2020    CREATININE 0.70 (L) 07/30/2020    CREATININE 0.70 (L) 07/23/2020    CREATININE 0.70 (L) 07/16/2020     Lab Results   Component Value Date    WBC 6.10 07/30/2020    WBC 5.30 07/23/2020    WBC 5.50 07/16/2020    HCT 42.1 07/30/2020    HCT 40.0 07/23/2020    HCT 39.9 (L) 07/16/2020     07/30/2020     07/23/2020     07/16/2020     Lab Results   Component Value Date    HGBA1C 6.0 (H) 07/03/2020    HGBA1C 5.3 03/09/2020     IMAGING:  Previous CT and duplex, angiograms reviewed.       IMP/PLAN:  59 y.o. male with recent history as noted above.  He is doing quite well and has gradually increased his level of activity, although he is still somewhat limited and cannot safely return to work yet.  He has a medial left groin mass consistent with hematoma.  Currently undergoing repeat extended IV antibiotic therapy (ceftriaxone).  Definitive treatment will included explant of the previous stents, left external iliac and left common femoral artery with interposition bypass.  Plastic surgery will reflect the current muscle flap and replace after bypass. Risks and benefits discussed in detail and Mr. Britton is eager to proceed.     1) continue asa, plavix, statin  2) daily and progressively increasing ambulation  3) scheduled follow-up with ID, plastic surgery  4) CTA A/P with runoff to evaluate LLE   5) surgery detail TBD      Kelby Gomez MD  Vascular & Endovascular Surgery

## 2020-08-02 NOTE — PROGRESS NOTES
Renan rBitton   7/23/2020        HPI:  Mr. Britton is a 59 y.o. male with a h/o HLD, PAD who is s/p viabahn stent placement in the L external iliac/common femoral artery in the treatment of a large PSA, 4/6/2020.  The PSA subsequently recurred as the result of recurrent and persistent bacteremia, requiring additional stent coverage and exclusion.  He presents for follow up and without complaint.     Previously:    1) ENDARTERECTOMY, FEMORAL (Bilateral)  2) ANGIOGRAM, PELVIC  3) Left common iliac artery PTA (6 x 40 [place])  4) Left common iliac artery stent (GORE VBX 10 x 39mm)  5) Prevena VAC placement, bilateral groins    With subsequent left sartorius muscle flap coverage by plastic surgery and right groin wahsout and VAC placement.  He has been doing quite well, with increasing activity and ambulation.  He represented about a month prior to this visit with bacteremia and has continued with IV ABX via a left midline cath. To follow-up with ID today.     1/2020 Imaging    Denies any MI/stroke  Tobacco use: quit 8 years ago    Past Medical History:   Diagnosis Date    Diabetes mellitus     H/O brain tumor     HLD (hyperlipidemia)     Hypertension     PAD (peripheral artery disease)     Seizures     R/T BRAIN TUMOR- SURGICALLY EXCISED     Past Surgical History:   Procedure Laterality Date    ANGIOGRAPHY Left 4/6/2020    Procedure: ANGIOGRAM, Left lower extremity;  Surgeon: Kelby Gomze MD;  Location: [address];  Service: Peripheral Vascular;  Laterality: Left;    ANGIOGRAPHY OF LOWER EXTREMITY Left 6/29/2020    Procedure: Angiogram Extremity Unilateral, washout L groin, possible open pseudoaneurysm repair;  Surgeon: Bruce Dallas MD;  Location: [address];  Service: Peripheral Vascular;  Laterality: Left;  contrast 23 ml  fluoro time: 9.9 min  mGy: 1230.26  Gycm2: 146.69    AORTOGRAPHY N/A 6/29/2020    Procedure: AORTOGRAM;  Surgeon: Bruce Dallas MD;  Location: [address]  [place];  Service: Peripheral Vascular;  Laterality: N/A;    APPLICATION OF WOUND VACUUM-ASSISTED CLOSURE DEVICE Bilateral 1/1/2020    Procedure: APPLICATION, WOUND VAC;  Surgeon: SEBAS Prieto II, MD;  [facility];  Service: Cardiovascular;  Laterality: Bilateral;    APPLICATION OF WOUND VACUUM-ASSISTED CLOSURE DEVICE N/A 1/3/2020    Procedure: APPLICATION, WOUND VAC;  Surgeon: Brian Wong MD;  [address];  Service: Plastics;  Laterality: N/A;    BRAIN SURGERY  01/2011    TUMOR EXCISED    CREATION OF MUSCLE ROTATIONAL FLAP N/A 1/3/2020    Procedure: CREATION, FLAP, MUSCLE ROTATION;  Surgeon: Brian Wong MD;  [address];  Service: Plastics;  Laterality: N/A;    ENDARTERECTOMY OF FEMORAL ARTERY Bilateral 12/20/2019    Procedure: ENDARTERECTOMY, FEMORAL;  Surgeon: Kelby Gomez MD;  Location: [address];  Service: Peripheral Vascular;  Laterality: Bilateral;  natalya common femoral endarterectomy with natalya. iliac stent placement    PERCUTANEOUS TRANSLUMINAL ANGIOPLASTY Left 4/6/2020    Procedure: PTA (ANGIOPLASTY, PERCUTANEOUS, TRANSLUMINAL), left lower extremity;  Surgeon: Kelby Gomez MD;  Location: [address];  Service: Peripheral Vascular;  Laterality: Left;    PERCUTANEOUS TRANSLUMINAL ANGIOPLASTY (PTA) OF PERIPHERAL VESSEL Left 10/17/2019    Procedure: PTA, PERIPHERAL VESSEL;  Surgeon: Rao Fisher MD;  Location: [facility] C[facility];  Service: Cardiology;  Laterality: Left;    PSEUDOANEURYSM REPAIR Left 6/29/2020    Procedure: REPAIR, PSEUDOANEURYSM;  Surgeon: Bruce Dallas MD;  [address]  Service: Peripheral Vascular;  Laterality: Left;     Family History   Problem Relation Age of Onset    Diabetes Mother     Hypertension Mother     Stroke Mother     Cancer Father         LUNG    Heart disease Father     Cancer Brother     Heart disease Brother     Heart disease Brother     Heart disease Brother      Social  History     Socioeconomic History    Marital status:      Spouse name: Not on file    Number of children: Not on file    Years of education: Not on file    Highest education level: Not on file   Occupational History    Not on file   Social Needs    Financial resource strain: Not on file    Food insecurity     Worry: Not on file     Inability: Not on file    Transportation needs     Medical: Not on file     Non-medical: Not on file   Tobacco Use    Smoking status: Former Smoker     Packs/day: 2.00     Types: Cigarettes     Quit date: 2011     Years since quittin.5    Smokeless tobacco: Never Used   Substance and Sexual Activity    Alcohol use: Yes     Comment: 2-4 PER DAY    Drug use: Never    Sexual activity: Not on file   Lifestyle    Physical activity     Days per week: Not on file     Minutes per session: Not on file    Stress: Not on file   Relationships    Social connections     Talks on phone: Not on file     Gets together: Not on file     Attends Buddhist service: Not on file     Active member of club or organization: Not on file     Attends meetings of clubs or organizations: Not on file     Relationship status: Not on file   Other Topics Concern    Not on file   Social History Narrative    Not on file       Current Outpatient Medications:     ascorbic acid (VITAMIN C ORAL), Take 2 capsules by mouth every morning., Disp: , Rfl:     aspirin (ECOTRIN) 81 MG EC tablet, Take 81 mg by mouth once daily., Disp: , Rfl:     atorvastatin (LIPITOR) 40 MG tablet, Take 40 mg by mouth once daily., Disp: , Rfl:     cefadroxil (DURICEF) 500 MG Cap, Take 2 capsules (1,000 mg total) by mouth every 12 (twelve) hours., Disp: 120 capsule, Rfl: 4    cetirizine 10 mg chewable tablet, Take 10 mg by mouth daily as needed. , Disp: , Rfl:     clopidogreL (PLAVIX) 75 mg tablet, Take 1 tablet (75 mg total) by mouth once daily., Disp: 30 tablet, Rfl: 10    elderberry fruit and flower 460-115 mg  Cap, Take 2 capsules by mouth every morning., Disp: , Rfl:     HYDROcodone-acetaminophen (NORCO) 5-325 mg per tablet, Take 1 tablet by mouth every 6 (six) hours as needed for Pain., Disp: 15 tablet, Rfl: 0    metFORMIN (GLUCOPHAGE-XR) 500 MG XR 24hr tablet, Take 1,000 mg by mouth daily with breakfast. , Disp: , Rfl:     pregabalin (LYRICA) 100 MG capsule, Take 100 mg by mouth 2 (two) times daily., Disp: , Rfl:     valsartan (DIOVAN) 160 MG tablet, Take 160 mg by mouth once daily., Disp: , Rfl:      REVIEW OF SYSTEMS:  General: negative; Respiratory: no cough, shortness of breath, or wheezing; Cardiovascular: no chest pain or dyspnea on exertion; Gastrointestinal: no abdominal pain, change in bowel habits, or bloody stools; Genito-Urinary: no dysuria, trouble voiding, or hematuria; Musculoskeletal: no weakness or decreased range of motion, Neurological: no TIA or stroke symptoms; Psychiatric: no nervousness, anxiety or depression.    PHYSICAL EXAM:       General appearance: Alert, well-appearing, and in no distress.  Oriented to person, place, and time  Neurological: Normal speech, no focal findings noted; CN II - XII grossly intact  Musculoskeletal:Digits/nail without cyanosis/clubbing.  Normal muscle strength/tone.  Neck: Supple, no significant adenopathy, no carotid bruit can be auscultated  Chest:Clear to auscultation, no wheezes, rales or rhonchi, symmetric air entry, No use of accessory muscles   Cardiac:Normal rate and regular rhythm, S1 and S2 normal, Systolic ejection murmur  Abdomen:Soft, nontender, nondistended, no masses or organomegaly, No rebound tenderness noted; bowel sounds normal,  No groin adenopathy   Extremities: 2+ femoral pulses bilaterally, no pedal pulses palpable.no pre-tibial edema. No ulcerations,  LLE: biphasic PT signal, biphasic DP signal   RLE: Triphasic DP signal, triphasic PT signal  Incisions are clean and dry  Left groin with nontender medial mass consistent with thrombosed  psa.  Non-erythematous, no drainage.     LAB RESULTS:  Lab Results   Component Value Date    K 4.4 07/30/2020    K 4.2 07/23/2020    K 4.2 07/16/2020    CREATININE 0.70 (L) 07/30/2020    CREATININE 0.70 (L) 07/23/2020    CREATININE 0.70 (L) 07/16/2020     Lab Results   Component Value Date    WBC 6.10 07/30/2020    WBC 5.30 07/23/2020    WBC 5.50 07/16/2020    HCT 42.1 07/30/2020    HCT 40.0 07/23/2020    HCT 39.9 (L) 07/16/2020     07/30/2020     07/23/2020     07/16/2020     Lab Results   Component Value Date    HGBA1C 6.0 (H) 07/03/2020    HGBA1C 5.3 03/09/2020     IMAGING:  Previous CT and duplex, angiograms reviewed.       IMP/PLAN:  59 y.o. male with recent history as noted above.  He is doing quite well and has gradually increased his level of activity, although he is still somewhat limited and cannot safely return to work yet.  He has a medial left groin mass consistent with hematoma.  Currently undergoing repeat extended IV antibiotic therapy (ceftriaxone).  Definitive treatment will included explant of the previous stents, left external iliac and left common femoral artery with interposition bypass.  Plastic surgery will reflect the current muscle flap and replace after bypass. Risks and benefits discussed in detail and Mr. Britton is eager to proceed.     1) continue asa, plavix, statin  2) daily and progressively increasing ambulation  3) scheduled follow-up with ID, plastic surgery  4) CTA A/P with runoff to evaluate LLE   5) surgery detail TBD      Kelby Gomez MD  Vascular & Endovascular Surgery

## 2020-08-05 ENCOUNTER — TELEPHONE (OUTPATIENT)
Dept: VASCULAR SURGERY | Facility: CLINIC | Age: 60
End: 2020-08-05

## 2020-08-05 DIAGNOSIS — I70.202 FEMORAL ARTERY OCCLUSION, LEFT: Primary | ICD-10-CM

## 2020-08-05 LAB
FUNGUS SPEC CULT: NORMAL
FUNGUS SPEC CULT: NORMAL

## 2020-08-05 NOTE — TELEPHONE ENCOUNTER
Per Dr Gomez spoke with Mrs Britton informed her Mr Reba appointment time has changed to 0830am on 8/6/2020 due to ultrasound needed. Mrs Britton verbalized understanding.

## 2020-08-06 ENCOUNTER — HOSPITAL ENCOUNTER (OUTPATIENT)
Dept: VASCULAR SURGERY | Facility: CLINIC | Age: 60
Discharge: HOME | End: 2020-08-06
Attending: SURGERY
Payer: COMMERCIAL

## 2020-08-06 ENCOUNTER — OFFICE VISIT (OUTPATIENT)
Dept: VASCULAR SURGERY | Facility: CLINIC | Age: 60
End: 2020-08-06
Attending: SURGERY
Payer: COMMERCIAL

## 2020-08-06 VITALS
HEART RATE: 62 BPM | HEIGHT: 71 IN | TEMPERATURE: 98 F | SYSTOLIC BLOOD PRESSURE: 137 MMHG | DIASTOLIC BLOOD PRESSURE: 81 MMHG | WEIGHT: 250 LBS | BODY MASS INDEX: 35 KG/M2

## 2020-08-06 DIAGNOSIS — I73.9 PVD (PERIPHERAL VASCULAR DISEASE): ICD-10-CM

## 2020-08-06 DIAGNOSIS — Z01.818 PREOPERATIVE TESTING: Primary | ICD-10-CM

## 2020-08-06 DIAGNOSIS — I72.4 PSEUDOANEURYSM OF LEFT FEMORAL ARTERY: Primary | ICD-10-CM

## 2020-08-06 DIAGNOSIS — I70.202 FEMORAL ARTERY OCCLUSION, LEFT: ICD-10-CM

## 2020-08-06 DIAGNOSIS — I70.202 FEMORAL ARTERY OCCLUSION, LEFT: Primary | ICD-10-CM

## 2020-08-06 DIAGNOSIS — Z01.818 OTHER SPECIFIED PRE-OPERATIVE EXAMINATION: ICD-10-CM

## 2020-08-06 DIAGNOSIS — T82.7XXD INFECTION OF VASCULAR BYPASS GRAFT, SUBSEQUENT ENCOUNTER: ICD-10-CM

## 2020-08-06 PROCEDURE — 99215 PR OFFICE/OUTPT VISIT, EST, LEVL V, 40-54 MIN: ICD-10-PCS | Mod: S$GLB,,, | Performed by: SURGERY

## 2020-08-06 PROCEDURE — 99215 OFFICE O/P EST HI 40 MIN: CPT | Mod: S$GLB,,, | Performed by: SURGERY

## 2020-08-06 PROCEDURE — 93926 LOWER EXTREMITY STUDY: CPT | Mod: S$GLB,,, | Performed by: SURGERY

## 2020-08-06 PROCEDURE — 99999 PR PBB SHADOW E&M-EST. PATIENT-LVL III: CPT | Mod: PBBFAC,,, | Performed by: SURGERY

## 2020-08-06 PROCEDURE — 99999 PR PBB SHADOW E&M-EST. PATIENT-LVL III: ICD-10-PCS | Mod: PBBFAC,,, | Performed by: SURGERY

## 2020-08-06 PROCEDURE — 93926 PR DUPLEX LO EXTREM ART UNILAT/LTD: ICD-10-PCS | Mod: S$GLB,,, | Performed by: SURGERY

## 2020-08-07 ENCOUNTER — DOCUMENT SCAN (OUTPATIENT)
Dept: HOME HEALTH SERVICES | Facility: HOSPITAL | Age: 60
End: 2020-08-07
Payer: COMMERCIAL

## 2020-08-10 ENCOUNTER — LAB VISIT (OUTPATIENT)
Dept: LAB | Facility: HOSPITAL | Age: 60
End: 2020-08-10
Attending: SURGERY
Payer: COMMERCIAL

## 2020-08-10 ENCOUNTER — LAB VISIT (OUTPATIENT)
Dept: SURGERY | Facility: CLINIC | Age: 60
End: 2020-08-10
Attending: SURGERY
Payer: COMMERCIAL

## 2020-08-10 DIAGNOSIS — Z01.818 OTHER SPECIFIED PRE-OPERATIVE EXAMINATION: ICD-10-CM

## 2020-08-10 DIAGNOSIS — Z01.818 PREOPERATIVE TESTING: ICD-10-CM

## 2020-08-10 LAB
ABO + RH BLD: NORMAL
BLD GP AB SCN CELLS X3 SERPL QL: NORMAL

## 2020-08-10 PROCEDURE — 86901 BLOOD TYPING SEROLOGIC RH(D): CPT

## 2020-08-10 PROCEDURE — 36415 COLL VENOUS BLD VENIPUNCTURE: CPT

## 2020-08-10 PROCEDURE — U0003 INFECTIOUS AGENT DETECTION BY NUCLEIC ACID (DNA OR RNA); SEVERE ACUTE RESPIRATORY SYNDROME CORONAVIRUS 2 (SARS-COV-2) (CORONAVIRUS DISEASE [COVID-19]), AMPLIFIED PROBE TECHNIQUE, MAKING USE OF HIGH THROUGHPUT TECHNOLOGIES AS DESCRIBED BY CMS-2020-01-R: HCPCS

## 2020-08-11 ENCOUNTER — ANESTHESIA EVENT (OUTPATIENT)
Dept: SURGERY | Facility: HOSPITAL | Age: 60
DRG: 271 | End: 2020-08-11
Payer: COMMERCIAL

## 2020-08-11 ENCOUNTER — TELEPHONE (OUTPATIENT)
Dept: VASCULAR SURGERY | Facility: CLINIC | Age: 60
End: 2020-08-11

## 2020-08-11 LAB — SARS-COV-2 RNA RESP QL NAA+PROBE: NOT DETECTED

## 2020-08-11 NOTE — TELEPHONE ENCOUNTER
----- Message from Jeremiah Paez sent at 8/11/2020  3:13 PM CDT -----  Contact: wife  Pts wife is asking for a call back in regards to the pts surgery     Contact info- [phone]

## 2020-08-11 NOTE — PRE-PROCEDURE INSTRUCTIONS
"PRE-OP INSTRUCTIONS:Instructed patient to have nothing by mouth past midnight.It is ok to take AM medications with a few sips of water.Patient instructed to shower the night before surgery as well as the morning of surgery with an antibacterial soap like dial or hibiclens from the neck down.Encouraged patient to wear loose fitting, comfortable clothing .Medication instructions for pm prior to and am of surgery reviewed.Instructed patient to avoid taking vitamins,supplements or ibuprofen the am of surgery.Patient stated an understanding.Patient also stated that he was receiving 2 Grams Ceftriaxone through his PICC line every morning via an EZ Pump.Patient informed of the current visitor policy and advised patient that one visitor may accompany each patient into the hospital and wait (socially distanced) until a member of the medical team provides an update at the conclusion of the procedure.When they enter the hospital both patient and visitor will have their temperature checked.All visitors are asked to arrive with a mask and to keep their mask on throughout the visit.Each inpatient is allowed 1 visitor per day between the hours of 8am and 6pm.This visitor must remain in the patient's room and not gather in common areas such as waiting rooms or cafeterias.The visitor must be 18 years or older and arrive with a mask and keep the mask on throughout the visit.    Covid test completed 8/10/2020-Negative    Patient denies any side effects or issues with anesthesia or sedation other than waking up with the "shakes"    Patient does not know arrival time.Explained that this information comes from the surgeon's office and if they haven't heard from them by 2 or 3 pm to call the office.Patient stated an understanding.  "

## 2020-08-11 NOTE — ANESTHESIA PREPROCEDURE EVALUATION
08/11/2020  Renan Britton is a 59 y.o., male with left femoral artery occlusion here for left iliac to femoral bypass and flap.    Pre-operative evaluation for Procedure(s) (LRB):  CREATION, BYPASS, ARTERIAL, ILIAC TO FEMORAL (Left)      Patient Active Problem List   Diagnosis    Claudication    PVD (peripheral vascular disease)    Surgical wound breakdown    Type 2 diabetes mellitus, without long-term current use of insulin    Seizures    History of brain tumor    Thrombocytopenia    JJ (acute kidney injury)    Systolic murmur    Essential hypertension    Proteus infection    Femoral artery occlusion, left    Pseudoaneurysm of left femoral artery    Sepsis    Pseudoaneurysm    Preoperative testing    Pre-operative examination       Review of patient's allergies indicates:   Allergen Reactions    Penicillins Hives     Patient currently on cefepime without complications  Tolerated ceftriaxone 3/2020       No current facility-administered medications on file prior to encounter.      Current Outpatient Medications on File Prior to Encounter   Medication Sig Dispense Refill    ascorbic acid (VITAMIN C ORAL) Take 2 capsules by mouth every morning.      aspirin (ECOTRIN) 81 MG EC tablet Take 81 mg by mouth once daily.      atorvastatin (LIPITOR) 40 MG tablet Take 40 mg by mouth once daily.      clopidogreL (PLAVIX) 75 mg tablet Take 1 tablet (75 mg total) by mouth once daily. (Patient taking differently: Take 75 mg by mouth every evening. ) 30 tablet 10    elderberry fruit and flower 460-115 mg Cap Take 2 capsules by mouth every morning.      metFORMIN (GLUCOPHAGE-XR) 500 MG XR 24hr tablet Take 1,000 mg by mouth daily with breakfast.       pregabalin (LYRICA) 100 MG capsule Take 100 mg by mouth 2 (two) times daily.      valsartan (DIOVAN) 160 MG tablet Take 160 mg by mouth once  daily.      cefadroxil (DURICEF) 500 MG Cap Take 2 capsules (1,000 mg total) by mouth every 12 (twelve) hours. 120 capsule 4    HYDROcodone-acetaminophen (NORCO) 5-325 mg per tablet Take 1 tablet by mouth every 6 (six) hours as needed for Pain. 15 tablet 0       Past Surgical History:   Procedure Laterality Date    ANGIOGRAPHY Left 4/6/2020    Procedure: ANGIOGRAM, Left lower extremity;  Surgeon: Kelby Gomez MD;  Location: [address];  Service: Peripheral Vascular;  Laterality: Left;    ANGIOGRAPHY OF LOWER EXTREMITY Left 6/29/2020    Procedure: Angiogram Extremity Unilateral, washout L groin, possible open pseudoaneurysm repair;  Surgeon: Bruce Dallas MD;  Location: [facility] OR [address] [place] [address];  Service: Peripheral Vascular;  Laterality: Left;  contrast 23 ml  fluoro time: 9.9 min  mGy: 1230.26  Gycm2: 146.69    AORTOGRAPHY N/A 6/29/2020    Procedure: AORTOGRAM;  Surgeon: Bruce Dallas MD;  Location: [address];  Service: Peripheral Vascular;  Laterality: N/A;    APPLICATION OF WOUND VACUUM-ASSISTED CLOSURE DEVICE Bilateral 1/1/2020    Procedure: APPLICATION, WOUND VAC;  Surgeon: SEBAS Prieto II, MD;  Location: [address];  Service: Cardiovascular;  Laterality: Bilateral;    APPLICATION OF WOUND VACUUM-ASSISTED CLOSURE DEVICE N/A 1/3/2020    Procedure: APPLICATION, WOUND VAC;  Surgeon: Brian Wong MD;  Location: [address];  Service: Plastics;  Laterality: N/A;    BRAIN SURGERY  01/2011    TUMOR EXCISED    CREATION OF MUSCLE ROTATIONAL FLAP N/A 1/3/2020    Procedure: CREATION, FLAP, MUSCLE ROTATION;  Surgeon: Brian Wong MD;  Location: [address];  Service: Plastics;  Laterality: N/A;    ENDARTERECTOMY OF FEMORAL ARTERY Bilateral 12/20/2019    Procedure: ENDARTERECTOMY, FEMORAL;  Surgeon: Kelby Gomez MD;  Location: [address];  Service: Peripheral Vascular;  Laterality: Bilateral;  natalya common femoral endarterectomy with natalya. iliac stent  placement    PERCUTANEOUS TRANSLUMINAL ANGIOPLASTY Left 2020    Procedure: PTA (ANGIOPLASTY, PERCUTANEOUS, TRANSLUMINAL), left lower extremity;  Surgeon: Kelby Gomez MD;  Location: [facility] OR [address] [place] [address];  Service: Peripheral Vascular;  Laterality: Left;    PERCUTANEOUS TRANSLUMINAL ANGIOPLASTY (PTA) OF PERIPHERAL VESSEL Left 10/17/2019    Procedure: PTA, PERIPHERAL VESSEL;  Surgeon: Rao Fisher MD;  Location: [facility] CATH;  Service: Cardiology;  Laterality: Left;    PSEUDOANEURYSM REPAIR Left 2020    Procedure: REPAIR, PSEUDOANEURYSM;  Surgeon: Bruce Dallas MD;  [facility];  Service: Peripheral Vascular;  Laterality: Left;       Social History     Socioeconomic History    Marital status:      Spouse name: Not on file    Number of children: Not on file    Years of education: Not on file    Highest education level: Not on file   Occupational History    Not on file   Social Needs    Financial resource strain: Not on file    Food insecurity     Worry: Not on file     Inability: Not on file    Transportation needs     Medical: Not on file     Non-medical: Not on file   Tobacco Use    Smoking status: Former Smoker     Packs/day: 2.00     Types: Cigarettes     Quit date: 2011     Years since quittin.5    Smokeless tobacco: Never Used   Substance and Sexual Activity    Alcohol use: Yes     Comment: 2-4 PER DAY    Drug use: Never    Sexual activity: Not on file   Lifestyle    Physical activity     Days per week: Not on file     Minutes per session: Not on file    Stress: Not on file   Relationships    Social connections     Talks on phone: Not on file     Gets together: Not on file     Attends Voodoo service: Not on file     Active member of club or organization: Not on file     Attends meetings of clubs or organizations: Not on file     Relationship status: Not on file   Other Topics Concern    Not on file   Social History Narrative    Not on file  "        CBC: No results for input(s): WBC, RBC, HGB, HCT, PLT, MCV, MCH, MCHC in the last 72 hours.    CMP: No results for input(s): NA, K, CL, CO2, BUN, CREATININE, GLU, MG, PHOS, CALCIUM, ALBUMIN, PROT, ALKPHOS, ALT, AST, BILITOT in the last 72 hours.    INR  No results for input(s): PT, INR, PROTIME, APTT in the last 72 hours.              2D Echo:  No results found for this or any previous visit.      Anesthesia Evaluation    I have reviewed the Patient Summary Reports.    I have reviewed the Nursing Notes.    I have reviewed the Medications.     Review of Systems  Anesthesia Hx:  No problems with previous Anesthesia    Hematology/Oncology:  Hematology Normal   Oncology Normal     EENT/Dental:EENT/Dental Normal   Cardiovascular:   Hypertension    Pulmonary:  Pulmonary Normal    Renal/:   Chronic Renal Disease    Hepatic/GI:  Hepatic/GI Normal    Musculoskeletal:  Musculoskeletal Normal    Neurological:   Seizures    Endocrine:   Diabetes    Dermatological:  Skin Normal    Psych:  Psychiatric Normal           Physical Exam  General:  Well nourished, Morbid Obesity    Airway/Jaw/Neck:  Airway Findings: Mouth Opening: Normal Tongue: Normal  General Airway Assessment: Adult  Mallampati: II  Jaw/Neck Findings:  Neck ROM: Normal ROM     Eyes/Ears/Nose:  Eyes/Ears/Nose Findings:    Dental:  Dental Findings: In tact   Chest/Lungs:  Chest/Lungs Findings: Clear to auscultation, Normal Respiratory Rate     Heart/Vascular:  Heart Findings: Rate: Normal  Rhythm: Regular Rhythm  Sounds: Normal  Heart Murmur  Vascular Findings:        Mental Status:  Mental Status Findings:  Cooperative, Alert and Oriented     Patient denies having any side effects or issues with anesthesia or sedation other than waking up with the "shakes".    Anesthesia Plan  Type of Anesthesia, risks & benefits discussed:  Anesthesia Type:  general  Patient's Preference: General  Intra-op Monitoring Plan: standard ASA monitors, arterial line and central " line  Intra-op Monitoring Plan Comments:   Post Op Pain Control Plan:   Post Op Pain Control Plan Comments:   Induction:   IV  Beta Blocker:  Patient is not currently on a Beta-Blocker (No further documentation required).       Informed Consent: Patient understands risks and agrees with Anesthesia plan.  Questions answered. Anesthesia consent signed with patient.  ASA Score: 3     Day of Surgery Review of History & Physical:    H&P update referred to the surgeon.     Anesthesia Plan Notes: Discussed plan for general endotracheal anesthesia, pt understands and agrees with plan        Ready For Surgery From Anesthesia Perspective.

## 2020-08-12 ENCOUNTER — ANESTHESIA (OUTPATIENT)
Dept: SURGERY | Facility: HOSPITAL | Age: 60
DRG: 271 | End: 2020-08-12
Payer: COMMERCIAL

## 2020-08-12 ENCOUNTER — DOCUMENT SCAN (OUTPATIENT)
Dept: HOME HEALTH SERVICES | Facility: HOSPITAL | Age: 60
End: 2020-08-12
Payer: COMMERCIAL

## 2020-08-12 ENCOUNTER — HOSPITAL ENCOUNTER (INPATIENT)
Facility: HOSPITAL | Age: 60
LOS: 6 days | Discharge: HOME HEALTH | DRG: 271 | End: 2020-08-18
Attending: SURGERY | Admitting: SURGERY
Payer: COMMERCIAL

## 2020-08-12 DIAGNOSIS — I73.9 PAD (PERIPHERAL ARTERY DISEASE): ICD-10-CM

## 2020-08-12 DIAGNOSIS — A49.8 ENTEROCOCCUS FAECALIS INFECTION: ICD-10-CM

## 2020-08-12 DIAGNOSIS — A49.8 PROTEUS INFECTION: ICD-10-CM

## 2020-08-12 DIAGNOSIS — Z88.0 HISTORY OF PENICILLIN ALLERGY: ICD-10-CM

## 2020-08-12 DIAGNOSIS — I72.9 PSEUDOANEURYSM: ICD-10-CM

## 2020-08-12 LAB
GLUCOSE SERPL-MCNC: 120 MG/DL (ref 70–110)
GRAM STN SPEC: NORMAL
HCO3 UR-SCNC: 24.4 MMOL/L (ref 24–28)
HCT VFR BLD CALC: 39 %PCV (ref 36–54)
PCO2 BLDA: 41 MMHG (ref 35–45)
PH SMN: 7.38 [PH] (ref 7.35–7.45)
PO2 BLDA: 201 MMHG (ref 80–100)
POC ACTIVATED CLOTTING TIME K: 109 SEC (ref 74–137)
POC ACTIVATED CLOTTING TIME K: 208 SEC (ref 74–137)
POC ACTIVATED CLOTTING TIME K: 246 SEC (ref 74–137)
POC ACTIVATED CLOTTING TIME K: 246 SEC (ref 74–137)
POC BE: -1 MMOL/L
POC IONIZED CALCIUM: 1.25 MMOL/L (ref 1.06–1.42)
POC SATURATED O2: 100 % (ref 95–100)
POC TCO2: 26 MMOL/L (ref 23–27)
POCT GLUCOSE: 121 MG/DL (ref 70–110)
POTASSIUM BLD-SCNC: 4.2 MMOL/L (ref 3.5–5.1)
SAMPLE: ABNORMAL
SAMPLE: NORMAL
SODIUM BLD-SCNC: 140 MMOL/L (ref 136–145)

## 2020-08-12 PROCEDURE — 25000003 PHARM REV CODE 250: Performed by: SURGERY

## 2020-08-12 PROCEDURE — C1729 CATH, DRAINAGE: HCPCS | Performed by: SURGERY

## 2020-08-12 PROCEDURE — 87077 CULTURE AEROBIC IDENTIFY: CPT

## 2020-08-12 PROCEDURE — 27201423 OPTIME MED/SURG SUP & DEVICES STERILE SUPPLY: Performed by: SURGERY

## 2020-08-12 PROCEDURE — 76937 PR  US GUIDE, VASCULAR ACCESS: ICD-10-PCS | Mod: 26,,, | Performed by: ANESTHESIOLOGY

## 2020-08-12 PROCEDURE — 71000015 HC POSTOP RECOV 1ST HR: Performed by: SURGERY

## 2020-08-12 PROCEDURE — 76937 US GUIDE VASCULAR ACCESS: CPT | Mod: 26,,, | Performed by: ANESTHESIOLOGY

## 2020-08-12 PROCEDURE — 63600175 PHARM REV CODE 636 W HCPCS: Performed by: STUDENT IN AN ORGANIZED HEALTH CARE EDUCATION/TRAINING PROGRAM

## 2020-08-12 PROCEDURE — 82962 GLUCOSE BLOOD TEST: CPT | Performed by: SURGERY

## 2020-08-12 PROCEDURE — 14301 TIS TRNFR ANY 30.1-60 SQ CM: CPT | Mod: 51,,, | Performed by: SURGERY

## 2020-08-12 PROCEDURE — C1768 GRAFT, VASCULAR: HCPCS | Performed by: SURGERY

## 2020-08-12 PROCEDURE — 35565: ICD-10-PCS | Mod: 22,LT,, | Performed by: SURGERY

## 2020-08-12 PROCEDURE — 36620 INSERTION CATHETER ARTERY: CPT | Mod: 59,,, | Performed by: ANESTHESIOLOGY

## 2020-08-12 PROCEDURE — 71000039 HC RECOVERY, EACH ADD'L HOUR: Performed by: SURGERY

## 2020-08-12 PROCEDURE — 35141 REPAIR DEFECT OF ARTERY: CPT | Mod: 51,,, | Performed by: SURGERY

## 2020-08-12 PROCEDURE — 87186 SC STD MICRODIL/AGAR DIL: CPT

## 2020-08-12 PROCEDURE — 87176 TISSUE HOMOGENIZATION CULTR: CPT | Mod: 91

## 2020-08-12 PROCEDURE — 37000009 HC ANESTHESIA EA ADD 15 MINS: Performed by: SURGERY

## 2020-08-12 PROCEDURE — 27201037 HC PRESSURE MONITORING SET UP

## 2020-08-12 PROCEDURE — 63600175 PHARM REV CODE 636 W HCPCS: Performed by: NURSE ANESTHETIST, CERTIFIED REGISTERED

## 2020-08-12 PROCEDURE — 35565 ART BYP GRFT ILIOFEMORAL: CPT | Mod: 22,LT,, | Performed by: SURGERY

## 2020-08-12 PROCEDURE — 71000033 HC RECOVERY, INTIAL HOUR: Performed by: SURGERY

## 2020-08-12 PROCEDURE — 36000709 HC OR TIME LEV III EA ADD 15 MIN: Performed by: SURGERY

## 2020-08-12 PROCEDURE — 94761 N-INVAS EAR/PLS OXIMETRY MLT: CPT

## 2020-08-12 PROCEDURE — D9220A PRA ANESTHESIA: ICD-10-PCS | Mod: ,,, | Performed by: ANESTHESIOLOGY

## 2020-08-12 PROCEDURE — 36556 PR INSERT NON-TUNNEL CV CATH 5+ YRS OLD: ICD-10-PCS | Mod: 59,,, | Performed by: ANESTHESIOLOGY

## 2020-08-12 PROCEDURE — 35141 PR REANEURYSM/GRFT INS,COMMON FEMORAL: ICD-10-PCS | Mod: 82,51,, | Performed by: SURGERY

## 2020-08-12 PROCEDURE — 35141 PR REANEURYSM/GRFT INS,COMMON FEMORAL: ICD-10-PCS | Mod: 51,,, | Performed by: SURGERY

## 2020-08-12 PROCEDURE — 87205 SMEAR GRAM STAIN: CPT

## 2020-08-12 PROCEDURE — 25000003 PHARM REV CODE 250: Performed by: NURSE ANESTHETIST, CERTIFIED REGISTERED

## 2020-08-12 PROCEDURE — S0028 INJECTION, FAMOTIDINE, 20 MG: HCPCS | Performed by: NURSE ANESTHETIST, CERTIFIED REGISTERED

## 2020-08-12 PROCEDURE — 87075 CULTR BACTERIA EXCEPT BLOOD: CPT | Mod: 59

## 2020-08-12 PROCEDURE — P9021 RED BLOOD CELLS UNIT: HCPCS

## 2020-08-12 PROCEDURE — 20600001 HC STEP DOWN PRIVATE ROOM

## 2020-08-12 PROCEDURE — 27800903 OPTIME MED/SURG SUP & DEVICES OTHER IMPLANTS: Performed by: SURGERY

## 2020-08-12 PROCEDURE — C1713 ANCHOR/SCREW BN/BN,TIS/BN: HCPCS | Performed by: SURGERY

## 2020-08-12 PROCEDURE — 35565: ICD-10-PCS | Mod: 82,22,, | Performed by: SURGERY

## 2020-08-12 PROCEDURE — 35141 REPAIR DEFECT OF ARTERY: CPT | Mod: 82,51,, | Performed by: SURGERY

## 2020-08-12 PROCEDURE — 36000708 HC OR TIME LEV III 1ST 15 MIN: Performed by: SURGERY

## 2020-08-12 PROCEDURE — 86920 COMPATIBILITY TEST SPIN: CPT

## 2020-08-12 PROCEDURE — 87070 CULTURE OTHR SPECIMN AEROBIC: CPT | Mod: 59

## 2020-08-12 PROCEDURE — 15002 PR WOUND PREP, PED, TRK/ARM/LG 1ST 100 CM: ICD-10-PCS | Mod: ,,, | Performed by: SURGERY

## 2020-08-12 PROCEDURE — 37000008 HC ANESTHESIA 1ST 15 MINUTES: Performed by: SURGERY

## 2020-08-12 PROCEDURE — 15738 MUSCLE-SKIN GRAFT LEG: CPT | Mod: ,,, | Performed by: SURGERY

## 2020-08-12 PROCEDURE — 71000016 HC POSTOP RECOV ADDL HR: Performed by: SURGERY

## 2020-08-12 PROCEDURE — 15738 PR MUSCLE-SKIN FLAP,LEG: ICD-10-PCS | Mod: ,,, | Performed by: SURGERY

## 2020-08-12 PROCEDURE — D9220A PRA ANESTHESIA: Mod: ,,, | Performed by: ANESTHESIOLOGY

## 2020-08-12 PROCEDURE — 36556 INSERT NON-TUNNEL CV CATH: CPT | Mod: 59,,, | Performed by: ANESTHESIOLOGY

## 2020-08-12 PROCEDURE — 15002 WOUND PREP TRK/ARM/LEG: CPT | Mod: ,,, | Performed by: SURGERY

## 2020-08-12 PROCEDURE — 35565 ART BYP GRFT ILIOFEMORAL: CPT | Mod: 82,22,, | Performed by: SURGERY

## 2020-08-12 PROCEDURE — C1757 CATH, THROMBECTOMY/EMBOLECT: HCPCS | Performed by: SURGERY

## 2020-08-12 PROCEDURE — 36620 PR INSERT CATH,ART,PERCUT,SHORTTERM: ICD-10-PCS | Mod: 59,,, | Performed by: ANESTHESIOLOGY

## 2020-08-12 PROCEDURE — 63600175 PHARM REV CODE 636 W HCPCS: Performed by: SURGERY

## 2020-08-12 PROCEDURE — 14301 PR ADJ TISS XFER ANY AREA,30.1-60 SQCM: ICD-10-PCS | Mod: 51,,, | Performed by: SURGERY

## 2020-08-12 PROCEDURE — 25000003 PHARM REV CODE 250: Performed by: STUDENT IN AN ORGANIZED HEALTH CARE EDUCATION/TRAINING PROGRAM

## 2020-08-12 PROCEDURE — 63600175 PHARM REV CODE 636 W HCPCS: Performed by: ANESTHESIOLOGY

## 2020-08-12 DEVICE — KIT GRAFT BONE/SUB STIM 20ML: Type: IMPLANTABLE DEVICE | Site: GROIN | Status: FUNCTIONAL

## 2020-08-12 RX ORDER — HYDROMORPHONE HYDROCHLORIDE 1 MG/ML
0.5 INJECTION, SOLUTION INTRAMUSCULAR; INTRAVENOUS; SUBCUTANEOUS
Status: DISCONTINUED | OUTPATIENT
Start: 2020-08-12 | End: 2020-08-13

## 2020-08-12 RX ORDER — IBUPROFEN 200 MG
24 TABLET ORAL
Status: CANCELLED | OUTPATIENT
Start: 2020-08-12

## 2020-08-12 RX ORDER — FENTANYL CITRATE 50 UG/ML
INJECTION, SOLUTION INTRAMUSCULAR; INTRAVENOUS
Status: DISCONTINUED | OUTPATIENT
Start: 2020-08-12 | End: 2020-08-12

## 2020-08-12 RX ORDER — INSULIN ASPART 100 [IU]/ML
0-5 INJECTION, SOLUTION INTRAVENOUS; SUBCUTANEOUS
Status: CANCELLED | OUTPATIENT
Start: 2020-08-12

## 2020-08-12 RX ORDER — PROPOFOL 10 MG/ML
VIAL (ML) INTRAVENOUS
Status: DISCONTINUED | OUTPATIENT
Start: 2020-08-12 | End: 2020-08-12

## 2020-08-12 RX ORDER — FENTANYL CITRATE 50 UG/ML
25 INJECTION, SOLUTION INTRAMUSCULAR; INTRAVENOUS EVERY 5 MIN PRN
Status: COMPLETED | OUTPATIENT
Start: 2020-08-12 | End: 2020-08-12

## 2020-08-12 RX ORDER — MIDAZOLAM HYDROCHLORIDE 1 MG/ML
INJECTION, SOLUTION INTRAMUSCULAR; INTRAVENOUS
Status: DISCONTINUED | OUTPATIENT
Start: 2020-08-12 | End: 2020-08-12

## 2020-08-12 RX ORDER — DOCUSATE SODIUM 100 MG/1
100 CAPSULE, LIQUID FILLED ORAL 2 TIMES DAILY
Status: DISCONTINUED | OUTPATIENT
Start: 2020-08-12 | End: 2020-08-18 | Stop reason: HOSPADM

## 2020-08-12 RX ORDER — SUCCINYLCHOLINE CHLORIDE 20 MG/ML
INJECTION INTRAMUSCULAR; INTRAVENOUS
Status: DISCONTINUED | OUTPATIENT
Start: 2020-08-12 | End: 2020-08-12

## 2020-08-12 RX ORDER — VANCOMYCIN HYDROCHLORIDE 1 G/20ML
INJECTION, POWDER, LYOPHILIZED, FOR SOLUTION INTRAVENOUS
Status: DISCONTINUED | OUTPATIENT
Start: 2020-08-12 | End: 2020-08-12 | Stop reason: HOSPADM

## 2020-08-12 RX ORDER — HEPARIN SODIUM 5000 [USP'U]/ML
5000 INJECTION, SOLUTION INTRAVENOUS; SUBCUTANEOUS EVERY 8 HOURS
Status: CANCELLED | OUTPATIENT
Start: 2020-08-12

## 2020-08-12 RX ORDER — ACETAMINOPHEN 10 MG/ML
INJECTION, SOLUTION INTRAVENOUS
Status: DISCONTINUED | OUTPATIENT
Start: 2020-08-12 | End: 2020-08-12

## 2020-08-12 RX ORDER — VECURONIUM BROMIDE FOR INJECTION 1 MG/ML
INJECTION, POWDER, LYOPHILIZED, FOR SOLUTION INTRAVENOUS
Status: DISCONTINUED | OUTPATIENT
Start: 2020-08-12 | End: 2020-08-12

## 2020-08-12 RX ORDER — SODIUM CHLORIDE 9 MG/ML
INJECTION, SOLUTION INTRAVENOUS CONTINUOUS
Status: DISCONTINUED | OUTPATIENT
Start: 2020-08-12 | End: 2020-08-18 | Stop reason: HOSPADM

## 2020-08-12 RX ORDER — HYDROCODONE BITARTRATE AND ACETAMINOPHEN 5; 325 MG/1; MG/1
1 TABLET ORAL EVERY 4 HOURS PRN
Status: DISCONTINUED | OUTPATIENT
Start: 2020-08-12 | End: 2020-08-13

## 2020-08-12 RX ORDER — NITROGLYCERIN 20 MG/100ML
INJECTION INTRAVENOUS
Status: DISCONTINUED | OUTPATIENT
Start: 2020-08-12 | End: 2020-08-12

## 2020-08-12 RX ORDER — FAMOTIDINE 10 MG/ML
INJECTION INTRAVENOUS
Status: DISCONTINUED | OUTPATIENT
Start: 2020-08-12 | End: 2020-08-12

## 2020-08-12 RX ORDER — LIDOCAINE HYDROCHLORIDE 10 MG/ML
1 INJECTION, SOLUTION EPIDURAL; INFILTRATION; INTRACAUDAL; PERINEURAL ONCE
Status: DISCONTINUED | OUTPATIENT
Start: 2020-08-12 | End: 2020-08-12 | Stop reason: HOSPADM

## 2020-08-12 RX ORDER — PREGABALIN 50 MG/1
100 CAPSULE ORAL 2 TIMES DAILY
Status: CANCELLED | OUTPATIENT
Start: 2020-08-12

## 2020-08-12 RX ORDER — EPHEDRINE SULFATE 50 MG/ML
INJECTION, SOLUTION INTRAVENOUS
Status: DISCONTINUED | OUTPATIENT
Start: 2020-08-12 | End: 2020-08-12

## 2020-08-12 RX ORDER — HEPARIN SODIUM 5000 [USP'U]/ML
5000 INJECTION, SOLUTION INTRAVENOUS; SUBCUTANEOUS EVERY 8 HOURS
Status: DISCONTINUED | OUTPATIENT
Start: 2020-08-12 | End: 2020-08-18 | Stop reason: HOSPADM

## 2020-08-12 RX ORDER — ONDANSETRON 2 MG/ML
INJECTION INTRAMUSCULAR; INTRAVENOUS
Status: DISCONTINUED | OUTPATIENT
Start: 2020-08-12 | End: 2020-08-12

## 2020-08-12 RX ORDER — ATORVASTATIN CALCIUM 20 MG/1
40 TABLET, FILM COATED ORAL DAILY
Status: CANCELLED | OUTPATIENT
Start: 2020-08-13

## 2020-08-12 RX ORDER — PHENYLEPHRINE HYDROCHLORIDE 10 MG/ML
INJECTION INTRAVENOUS
Status: DISCONTINUED | OUTPATIENT
Start: 2020-08-12 | End: 2020-08-12

## 2020-08-12 RX ORDER — ONDANSETRON 2 MG/ML
4 INJECTION INTRAMUSCULAR; INTRAVENOUS EVERY 12 HOURS PRN
Status: DISCONTINUED | OUTPATIENT
Start: 2020-08-12 | End: 2020-08-15

## 2020-08-12 RX ORDER — DEXAMETHASONE SODIUM PHOSPHATE 4 MG/ML
INJECTION, SOLUTION INTRA-ARTICULAR; INTRALESIONAL; INTRAMUSCULAR; INTRAVENOUS; SOFT TISSUE
Status: DISCONTINUED | OUTPATIENT
Start: 2020-08-12 | End: 2020-08-12

## 2020-08-12 RX ORDER — CEFADROXIL 500 MG/1
1000 CAPSULE ORAL EVERY 12 HOURS
Status: CANCELLED | OUTPATIENT
Start: 2020-08-12

## 2020-08-12 RX ORDER — ROCURONIUM BROMIDE 10 MG/ML
INJECTION, SOLUTION INTRAVENOUS
Status: DISCONTINUED | OUTPATIENT
Start: 2020-08-12 | End: 2020-08-12

## 2020-08-12 RX ORDER — KETAMINE HCL IN 0.9 % NACL 50 MG/5 ML
SYRINGE (ML) INTRAVENOUS
Status: DISCONTINUED | OUTPATIENT
Start: 2020-08-12 | End: 2020-08-12

## 2020-08-12 RX ORDER — HYDROMORPHONE HYDROCHLORIDE 1 MG/ML
0.2 INJECTION, SOLUTION INTRAMUSCULAR; INTRAVENOUS; SUBCUTANEOUS EVERY 5 MIN PRN
Status: DISCONTINUED | OUTPATIENT
Start: 2020-08-12 | End: 2020-08-12 | Stop reason: HOSPADM

## 2020-08-12 RX ORDER — IBUPROFEN 200 MG
16 TABLET ORAL
Status: CANCELLED | OUTPATIENT
Start: 2020-08-12

## 2020-08-12 RX ORDER — PROTAMINE SULFATE 10 MG/ML
INJECTION, SOLUTION INTRAVENOUS
Status: DISCONTINUED | OUTPATIENT
Start: 2020-08-12 | End: 2020-08-12

## 2020-08-12 RX ORDER — LIDOCAINE HYDROCHLORIDE 20 MG/ML
INJECTION INTRAVENOUS
Status: DISCONTINUED | OUTPATIENT
Start: 2020-08-12 | End: 2020-08-12

## 2020-08-12 RX ORDER — GLUCAGON 1 MG
1 KIT INJECTION
Status: CANCELLED | OUTPATIENT
Start: 2020-08-12

## 2020-08-12 RX ORDER — CIPROFLOXACIN 2 MG/ML
INJECTION, SOLUTION INTRAVENOUS
Status: COMPLETED | OUTPATIENT
Start: 2020-08-12 | End: 2020-08-12

## 2020-08-12 RX ORDER — HEPARIN SODIUM 1000 [USP'U]/ML
INJECTION, SOLUTION INTRAVENOUS; SUBCUTANEOUS
Status: DISCONTINUED | OUTPATIENT
Start: 2020-08-12 | End: 2020-08-12 | Stop reason: HOSPADM

## 2020-08-12 RX ORDER — ONDANSETRON 2 MG/ML
4 INJECTION INTRAMUSCULAR; INTRAVENOUS DAILY PRN
Status: DISCONTINUED | OUTPATIENT
Start: 2020-08-12 | End: 2020-08-12 | Stop reason: HOSPADM

## 2020-08-12 RX ORDER — ASPIRIN 81 MG/1
81 TABLET ORAL DAILY
Status: CANCELLED | OUTPATIENT
Start: 2020-08-13

## 2020-08-12 RX ORDER — CLINDAMYCIN PHOSPHATE 900 MG/50ML
INJECTION, SOLUTION INTRAVENOUS
Status: DISCONTINUED | OUTPATIENT
Start: 2020-08-12 | End: 2020-08-12

## 2020-08-12 RX ADMIN — FENTANYL CITRATE 25 MCG: 50 INJECTION INTRAMUSCULAR; INTRAVENOUS at 07:08

## 2020-08-12 RX ADMIN — FENTANYL CITRATE 50 MCG: 50 INJECTION, SOLUTION INTRAMUSCULAR; INTRAVENOUS at 10:08

## 2020-08-12 RX ADMIN — SODIUM CHLORIDE: 0.9 INJECTION, SOLUTION INTRAVENOUS at 07:08

## 2020-08-12 RX ADMIN — ROCURONIUM BROMIDE 30 MG: 10 INJECTION, SOLUTION INTRAVENOUS at 09:08

## 2020-08-12 RX ADMIN — PHENYLEPHRINE HYDROCHLORIDE 50 MCG: 10 INJECTION INTRAVENOUS at 12:08

## 2020-08-12 RX ADMIN — VECURONIUM BROMIDE FOR INJECTION 2 MG: 1 INJECTION, POWDER, LYOPHILIZED, FOR SOLUTION INTRAVENOUS at 12:08

## 2020-08-12 RX ADMIN — FENTANYL CITRATE 50 MCG: 50 INJECTION, SOLUTION INTRAMUSCULAR; INTRAVENOUS at 08:08

## 2020-08-12 RX ADMIN — PHENYLEPHRINE HYDROCHLORIDE 100 MCG: 10 INJECTION INTRAVENOUS at 12:08

## 2020-08-12 RX ADMIN — SODIUM CHLORIDE: 0.9 INJECTION, SOLUTION INTRAVENOUS at 08:08

## 2020-08-12 RX ADMIN — Medication 10 MG: at 10:08

## 2020-08-12 RX ADMIN — HYDROCODONE BITARTRATE AND ACETAMINOPHEN 1 TABLET: 5; 325 TABLET ORAL at 10:08

## 2020-08-12 RX ADMIN — Medication 10 MG: at 01:08

## 2020-08-12 RX ADMIN — NITROGLYCERIN 25 MCG: 20 INJECTION INTRAVENOUS at 11:08

## 2020-08-12 RX ADMIN — PHENYLEPHRINE HYDROCHLORIDE 100 MCG: 10 INJECTION INTRAVENOUS at 08:08

## 2020-08-12 RX ADMIN — CLINDAMYCIN PHOSPHATE 900 MG: 18 INJECTION, SOLUTION INTRAVENOUS at 02:08

## 2020-08-12 RX ADMIN — Medication 5 MG: at 02:08

## 2020-08-12 RX ADMIN — FENTANYL CITRATE 25 MCG: 50 INJECTION INTRAMUSCULAR; INTRAVENOUS at 06:08

## 2020-08-12 RX ADMIN — PHENYLEPHRINE HYDROCHLORIDE 100 MCG: 10 INJECTION INTRAVENOUS at 09:08

## 2020-08-12 RX ADMIN — SODIUM CHLORIDE, SODIUM GLUCONATE, SODIUM ACETATE, POTASSIUM CHLORIDE, MAGNESIUM CHLORIDE, SODIUM PHOSPHATE, DIBASIC, AND POTASSIUM PHOSPHATE: .53; .5; .37; .037; .03; .012; .00082 INJECTION, SOLUTION INTRAVENOUS at 12:08

## 2020-08-12 RX ADMIN — HEPARIN SODIUM 9000 UNITS: 1000 INJECTION, SOLUTION INTRAVENOUS; SUBCUTANEOUS at 11:08

## 2020-08-12 RX ADMIN — DOCUSATE SODIUM 100 MG: 100 CAPSULE, LIQUID FILLED ORAL at 10:08

## 2020-08-12 RX ADMIN — PROTAMINE SULFATE 20 MG: 10 INJECTION, SOLUTION INTRAVENOUS at 01:08

## 2020-08-12 RX ADMIN — VECURONIUM BROMIDE FOR INJECTION 3 MG: 1 INJECTION, POWDER, LYOPHILIZED, FOR SOLUTION INTRAVENOUS at 01:08

## 2020-08-12 RX ADMIN — FENTANYL CITRATE 50 MCG: 50 INJECTION, SOLUTION INTRAMUSCULAR; INTRAVENOUS at 09:08

## 2020-08-12 RX ADMIN — PROPOFOL 200 MG: 10 INJECTION, EMULSION INTRAVENOUS at 08:08

## 2020-08-12 RX ADMIN — FENTANYL CITRATE 25 MCG: 50 INJECTION INTRAMUSCULAR; INTRAVENOUS at 05:08

## 2020-08-12 RX ADMIN — PHENYLEPHRINE HYDROCHLORIDE 50 MCG: 10 INJECTION INTRAVENOUS at 01:08

## 2020-08-12 RX ADMIN — HEPARIN SODIUM 5000 UNITS: 5000 INJECTION, SOLUTION INTRAVENOUS; SUBCUTANEOUS at 10:08

## 2020-08-12 RX ADMIN — FENTANYL CITRATE 25 MCG: 50 INJECTION INTRAMUSCULAR; INTRAVENOUS at 08:08

## 2020-08-12 RX ADMIN — SUCCINYLCHOLINE CHLORIDE 160 MG: 20 INJECTION, SOLUTION INTRAMUSCULAR; INTRAVENOUS at 08:08

## 2020-08-12 RX ADMIN — ONDANSETRON 4 MG: 2 INJECTION, SOLUTION INTRAMUSCULAR; INTRAVENOUS at 02:08

## 2020-08-12 RX ADMIN — HEPARIN SODIUM 4000 UNITS: 1000 INJECTION, SOLUTION INTRAVENOUS; SUBCUTANEOUS at 12:08

## 2020-08-12 RX ADMIN — PHENYLEPHRINE HYDROCHLORIDE 50 MCG: 10 INJECTION INTRAVENOUS at 02:08

## 2020-08-12 RX ADMIN — VECURONIUM BROMIDE FOR INJECTION 2 MG: 1 INJECTION, POWDER, LYOPHILIZED, FOR SOLUTION INTRAVENOUS at 10:08

## 2020-08-12 RX ADMIN — FAMOTIDINE 20 MG: 10 INJECTION, SOLUTION INTRAVENOUS at 08:08

## 2020-08-12 RX ADMIN — VECURONIUM BROMIDE FOR INJECTION 3 MG: 1 INJECTION, POWDER, LYOPHILIZED, FOR SOLUTION INTRAVENOUS at 11:08

## 2020-08-12 RX ADMIN — DEXAMETHASONE SODIUM PHOSPHATE 4 MG: 4 INJECTION, SOLUTION INTRAMUSCULAR; INTRAVENOUS at 08:08

## 2020-08-12 RX ADMIN — FENTANYL CITRATE 50 MCG: 50 INJECTION, SOLUTION INTRAMUSCULAR; INTRAVENOUS at 01:08

## 2020-08-12 RX ADMIN — ROCURONIUM BROMIDE 40 MG: 10 INJECTION, SOLUTION INTRAVENOUS at 08:08

## 2020-08-12 RX ADMIN — ROCURONIUM BROMIDE 20 MG: 10 INJECTION, SOLUTION INTRAVENOUS at 09:08

## 2020-08-12 RX ADMIN — SODIUM CHLORIDE, SODIUM GLUCONATE, SODIUM ACETATE, POTASSIUM CHLORIDE, MAGNESIUM CHLORIDE, SODIUM PHOSPHATE, DIBASIC, AND POTASSIUM PHOSPHATE: .53; .5; .37; .037; .03; .012; .00082 INJECTION, SOLUTION INTRAVENOUS at 10:08

## 2020-08-12 RX ADMIN — PHENYLEPHRINE HYDROCHLORIDE 100 MCG: 10 INJECTION INTRAVENOUS at 03:08

## 2020-08-12 RX ADMIN — MIDAZOLAM HYDROCHLORIDE 2 MG: 1 INJECTION, SOLUTION INTRAMUSCULAR; INTRAVENOUS at 08:08

## 2020-08-12 RX ADMIN — NITROGLYCERIN 50 MCG: 20 INJECTION INTRAVENOUS at 10:08

## 2020-08-12 RX ADMIN — PROTAMINE SULFATE 100 MG: 10 INJECTION, SOLUTION INTRAVENOUS at 01:08

## 2020-08-12 RX ADMIN — EPHEDRINE SULFATE 10 MG: 50 INJECTION INTRAVENOUS at 09:08

## 2020-08-12 RX ADMIN — SUGAMMADEX 50 MG: 100 INJECTION, SOLUTION INTRAVENOUS at 03:08

## 2020-08-12 RX ADMIN — ROCURONIUM BROMIDE 10 MG: 10 INJECTION, SOLUTION INTRAVENOUS at 08:08

## 2020-08-12 RX ADMIN — VECURONIUM BROMIDE FOR INJECTION 2 MG: 1 INJECTION, POWDER, LYOPHILIZED, FOR SOLUTION INTRAVENOUS at 02:08

## 2020-08-12 RX ADMIN — VECURONIUM BROMIDE FOR INJECTION 3 MG: 1 INJECTION, POWDER, LYOPHILIZED, FOR SOLUTION INTRAVENOUS at 10:08

## 2020-08-12 RX ADMIN — PHENYLEPHRINE HYDROCHLORIDE 50 MCG: 10 INJECTION INTRAVENOUS at 10:08

## 2020-08-12 RX ADMIN — SODIUM CHLORIDE 0.25 MCG/KG/MIN: 9 INJECTION, SOLUTION INTRAVENOUS at 12:08

## 2020-08-12 RX ADMIN — FENTANYL CITRATE 50 MCG: 50 INJECTION, SOLUTION INTRAMUSCULAR; INTRAVENOUS at 03:08

## 2020-08-12 RX ADMIN — CLINDAMYCIN PHOSPHATE 900 MG: 18 INJECTION, SOLUTION INTRAVENOUS at 09:08

## 2020-08-12 RX ADMIN — SODIUM CHLORIDE, SODIUM GLUCONATE, SODIUM ACETATE, POTASSIUM CHLORIDE, MAGNESIUM CHLORIDE, SODIUM PHOSPHATE, DIBASIC, AND POTASSIUM PHOSPHATE: .53; .5; .37; .037; .03; .012; .00082 INJECTION, SOLUTION INTRAVENOUS at 08:08

## 2020-08-12 RX ADMIN — PROPOFOL 50 MG: 10 INJECTION, EMULSION INTRAVENOUS at 10:08

## 2020-08-12 RX ADMIN — LIDOCAINE HYDROCHLORIDE 100 MG: 20 INJECTION, SOLUTION INTRAVENOUS at 08:08

## 2020-08-12 RX ADMIN — ACETAMINOPHEN 1000 MG: 10 INJECTION, SOLUTION INTRAVENOUS at 01:08

## 2020-08-12 RX ADMIN — EPHEDRINE SULFATE 5 MG: 50 INJECTION INTRAVENOUS at 09:08

## 2020-08-12 RX ADMIN — SODIUM CHLORIDE, SODIUM GLUCONATE, SODIUM ACETATE, POTASSIUM CHLORIDE, MAGNESIUM CHLORIDE, SODIUM PHOSPHATE, DIBASIC, AND POTASSIUM PHOSPHATE: .53; .5; .37; .037; .03; .012; .00082 INJECTION, SOLUTION INTRAVENOUS at 01:08

## 2020-08-12 RX ADMIN — ONDANSETRON 4 MG: 2 INJECTION, SOLUTION INTRAMUSCULAR; INTRAVENOUS at 08:08

## 2020-08-12 RX ADMIN — Medication 25 MG: at 09:08

## 2020-08-12 RX ADMIN — SUGAMMADEX 150 MG: 100 INJECTION, SOLUTION INTRAVENOUS at 03:08

## 2020-08-12 RX ADMIN — HEPARIN SODIUM 3000 UNITS: 1000 INJECTION, SOLUTION INTRAVENOUS; SUBCUTANEOUS at 01:08

## 2020-08-12 NOTE — ANESTHESIA PROCEDURE NOTES
Central Line    Diagnosis: PAD  Patient location during procedure: done in OR  Procedure start time: 8/12/2020 8:42 AM  Timeout: 8/12/2020 8:41 AM  Procedure end time: 8/12/2020 8:56 AM    Staffing  Authorizing Provider: Jan Dupree MD  Performing Provider: Kelly Cardenas CRNA    Staffing  Anesthesiologist: Jan Dupree MD  Other anesthesia staff: Emily Jolly MD  Performed: other anesthesia staff   Anesthesiologist was present at the time of the procedure.  Preanesthetic Checklist  Completed: patient identified, site marked, surgical consent, pre-op evaluation, timeout performed, IV checked, risks and benefits discussed, monitors and equipment checked and anesthesia consent given  Indication   Indication: hemodynamic monitoring, vascular access, med administration     Anesthesia   general anesthesia    Central Line   Skin Prep: skin prepped with ChloraPrep, skin prep agent completely dried prior to procedure  maximum sterile barriers used during central venous catheter insertion  hand hygiene performed prior to central venous catheter insertion  Location: right, internal jugular.   Catheter type: triple lumen  Catheter Size: 12 Fr  Ultrasound: vascular probe with ultrasound  Vessel Caliber: medium, patent  Vascular Doppler:  not done, compressibility normal  Needle advanced into vessel with real time Ultrasound guidance.  Guidewire confirmed in vessel.  Image recorded and saved.   Manometry: Venous cannualation confirmed by visual estimation of blood vessel pressure using manometry.  Insertion Attempts: 1   Securement:line sutured, chlorhexidine patch, sterile dressing applied and blood return through all ports    Post-Procedure   Adverse Events:none    Guidewire Guidewire removed intact. Guidewire removed intact, verified with nurse.

## 2020-08-12 NOTE — ANESTHESIA POSTPROCEDURE EVALUATION
Anesthesia Post Evaluation    Patient: Renan Britton    Procedure(s) Performed: Procedure(s) (LRB):  CREATION, BYPASS, ARTERIAL, ILIAC TO FEMORAL (Left)    Final Anesthesia Type: general    Patient location during evaluation: PACU  Patient participation: Yes- Able to Participate  Level of consciousness: awake and alert and oriented  Post-procedure vital signs: reviewed and stable  Pain management: adequate  Airway patency: patent    PONV status at discharge: No PONV  Anesthetic complications: no      Cardiovascular status: blood pressure returned to baseline and hemodynamically stable  Respiratory status: unassisted, room air and spontaneous ventilation  Hydration status: euvolemic  Follow-up not needed.          Vitals Value Taken Time   /74 08/12/20 1717   Temp 36.4 °C (97.5 °F) 08/12/20 1645   Pulse 85 08/12/20 1729   Resp 4 08/12/20 1729   SpO2 95 % 08/12/20 1729   Vitals shown include unvalidated device data.      Event Time   Out of Recovery 16:53:00         Pain/Cristy Score: Cristy Score: 9 (8/12/2020  4:45 PM)

## 2020-08-12 NOTE — ANESTHESIA PROCEDURE NOTES
Arterial    Diagnosis: iliac occlusive disease    Patient location during procedure: done in OR  Procedure start time: 8/12/2020 8:36 AM  Procedure end time: 8/12/2020 8:41 AM    Staffing  Authorizing Provider: Jan Dupree MD  Performing Provider: Jan Dupree MD    Anesthesiologist was present at the time of the procedure.    Preanesthetic Checklist  Completed: patient identified, surgical consent, pre-op evaluation, timeout performed, IV checked, risks and benefits discussed, monitors and equipment checked and anesthesia consent givenArterial  Skin Prep: chlorhexidine gluconate and isopropyl alcohol  Local Infiltration: none  Orientation: left  Location: radial  Catheter Size: 20 G  Catheter placement by Anatomical landmarks. Heme positive aspiration all ports.Insertion Attempts: 1  Assessment  Dressing: secured with tape and tegaderm  Patient: Tolerated well

## 2020-08-12 NOTE — TRANSFER OF CARE
"Anesthesia Transfer of Care Note    Patient: Renan Britton    Procedure(s) Performed: Procedure(s) (LRB):  CREATION, BYPASS, ARTERIAL, ILIAC TO FEMORAL (Left)    Patient location: PACU    Anesthesia Type: general    Transport from OR: Transported from OR on 6-10 L/min O2 by face mask with adequate spontaneous ventilation    Post pain: adequate analgesia    Post assessment: no apparent anesthetic complications    Post vital signs: stable    Level of consciousness: awake and alert    Nausea/Vomiting: no nausea/vomiting    Complications: none    Transfer of care protocol was followed      Last vitals:   Visit Vitals  BP (!) 154/83 (BP Location: Left arm, Patient Position: Lying)   Pulse 74   Temp 36.6 °C (97.9 °F) (Oral)   Resp 18   Ht 5' 11" (1.803 m)   Wt 113.4 kg (250 lb)   SpO2 100%   BMI 34.87 kg/m²     "

## 2020-08-12 NOTE — ANESTHESIA PROCEDURE NOTES
Intubation  Performed by: Kelly Cardenas CRNA  Authorized by: Jan Dupree MD     Intubation:     Induction:  Intravenous    Intubated:  Postinduction    Mask Ventilation:  Easy mask (double hand mask)    Attempts:  1    Attempted By:  Student    Method of Intubation:  Video laryngoscopy    Blade:  [place] 3    Laryngeal View Grade: Grade I - full view of chords      Difficult Airway Encountered?: No      Complications:  None    Airway Device:  Oral endotracheal tube    Airway Device Size:  7.5    Style/Cuff Inflation:  Cuffed (inflated to minimal occlusive pressure)    Inflation Amount (mL):  7    Tube secured:  23    Secured at:  The lips    Placement Verified By:  Capnometry    Complicating Factors:  Obesity    Findings Post-Intubation:  BS equal bilateral and atraumatic/condition of teeth unchanged

## 2020-08-12 NOTE — BRIEF OP NOTE
Brief Operative Note  Date: 08/12/2020    Surgeon(s) and Role:     * Kelby Gomez MD - Primary     * Moreno Hunt MD - Fellow    Cosurgeon: Remi Roberts MD    Pre-op Diagnosis:  Infected left femoral pseudoaneurysm, infected left iliac stents    Post-op Diagnosis:  Same    Modifier: A 22 Modifier is appropriate in this case: > 300% the preoperative preparation, intraoperative mental and physical effort and procedure time were required with two surgeons to complete the case safely given the overall complexity and 3rd time redo nature of the case.     Procedure(s):  1) 3rd time redo left groin exploration  2) Explant infected iliac stents - left common femoral/external   3) Resection left common femoral artery/left femoral infected pseudoaneurysm  4) Left retroperitoneal exposure  5) Left external iliac to distal common femoral artery bypass with cryopreserved greater saphenous vein    Surgeon: Kelby Gomez MD, FACS  Vascular & Endovascular Surgery     Cosurgeon: Remi Roberts MD    Assistant: NIKOLE Hunt MD    Anesthesia: General    Findings/Key Components:  Successful treatment of Infected left femoral pseudoaneurysm, infected left iliac stents.  Excellent inflow, conduit, outflow.  Tissue, fluid stents sent for culture.  No fransisco purulence.  Palpable pulse in graft, SFA and profunda at completion.      Dr. Wong performed complex muscle flap coverage follow vascular reconstruction and will dictate separately.        EBL: 400 cc         Specimens (From admission, onward)    None          I attest to being present for the procedure and performing the case.    Kelby Gomez MD  Vascular & Endovascular Surgery

## 2020-08-12 NOTE — PROGRESS NOTES
Renan Britton   8/6/2020    HPI:  Mr. Britton is a 59 y.o. male with a h/o HLD, PAD who is s/p viabahn stent placement in the L external iliac/common femoral artery in the treatment of a large PSA, 4/6/2020.  He has battled recurrent Proteus bacteremia despite several rounds of extended IV abx.  He represented in 6/2020 with recurrent  Previously:    1) ENDARTERECTOMY, FEMORAL (Bilateral)  2) ANGIOGRAM, PELVIC  3) Left common iliac artery PTA (6 x 40 [place])  4) Left common iliac artery stent (GORE VBX 10 x 39mm)  5) Prevena VAC placement, bilateral groins    With subsequent left sartorius muscle flap coverage by plastic surgery and right groin wahsout and VAC placement.  He has been doing quite well, with increasing activity and ambulation.  He represented about a month prior to this visit with bacteremia and has continued with IV ABX via a left midline cath. To follow-up with ID today.     1/2020 Imaging    Denies any MI/stroke  Tobacco use: quit 8 years ago    Past Medical History:   Diagnosis Date    Diabetes mellitus     H/O brain tumor     HLD (hyperlipidemia)     Hypertension     PAD (peripheral artery disease)     Seizures     R/T BRAIN TUMOR- SURGICALLY EXCISED     Past Surgical History:   Procedure Laterality Date    ANGIOGRAPHY Left 4/6/2020    Procedure: ANGIOGRAM, Left lower extremity;  Surgeon: Kelby Gomez MD;  Location: [facility] OR [address] [place] [address];  Service: Peripheral Vascular;  Laterality: Left;    ANGIOGRAPHY OF LOWER EXTREMITY Left 6/29/2020    Procedure: Angiogram Extremity Unilateral, washout L groin, possible open pseudoaneurysm repair;  Surgeon: Bruce Dallas MD;  Location: [facility] OR [address] [place] [address];  Service: Peripheral Vascular;  Laterality: Left;  contrast 23 ml  fluoro time: 9.9 min  mGy: 1230.26  Gycm2: 146.69    AORTOGRAPHY N/A 6/29/2020    Procedure: AORTOGRAM;  Surgeon: Bruce Dallas MD;  Location: [facility] OR [address] [place] [address];  Service: Peripheral Vascular;  Laterality: N/A;     APPLICATION OF WOUND VACUUM-ASSISTED CLOSURE DEVICE Bilateral 1/1/2020    Procedure: APPLICATION, WOUND VAC;  Surgeon: SEBAS Prieto II, MD;  Location: [address];  Service: Cardiovascular;  Laterality: Bilateral;    APPLICATION OF WOUND VACUUM-ASSISTED CLOSURE DEVICE N/A 1/3/2020    Procedure: APPLICATION, WOUND VAC;  Surgeon: Brian Wong MD;  Location: [address];  Service: Plastics;  Laterality: N/A;    BRAIN SURGERY  01/2011    TUMOR EXCISED    CREATION OF MUSCLE ROTATIONAL FLAP N/A 1/3/2020    Procedure: CREATION, FLAP, MUSCLE ROTATION;  Surgeon: Brian Wong MD;  Location: [address];  Service: Plastics;  Laterality: N/A;    ENDARTERECTOMY OF FEMORAL ARTERY Bilateral 12/20/2019    Procedure: ENDARTERECTOMY, FEMORAL;  Surgeon: Kelby Gomez MD;  Location: [address];  Service: Peripheral Vascular;  Laterality: Bilateral;  natalya common femoral endarterectomy with natalya. iliac stent placement    PERCUTANEOUS TRANSLUMINAL ANGIOPLASTY Left 4/6/2020    Procedure: PTA (ANGIOPLASTY, PERCUTANEOUS, TRANSLUMINAL), left lower extremity;  Surgeon: Kelby Gomez MD;  Location: [address];  Service: Peripheral Vascular;  Laterality: Left;    PERCUTANEOUS TRANSLUMINAL ANGIOPLASTY (PTA) OF PERIPHERAL VESSEL Left 10/17/2019    Procedure: PTA, PERIPHERAL VESSEL;  Surgeon: Rao Fisher MD;  Location: [facility] CATH;  Service: Cardiology;  Laterality: Left;    PSEUDOANEURYSM REPAIR Left 6/29/2020    Procedure: REPAIR, PSEUDOANEURYSM;  Surgeon: Bruce Dallas MD;  Location: [address];  Service: Peripheral Vascular;  Laterality: Left;     Family History   Problem Relation Age of Onset    Diabetes Mother     Hypertension Mother     Stroke Mother     Cancer Father         LUNG    Heart disease Father     Cancer Brother     Heart disease Brother     Heart disease Brother     Heart disease Brother      Social History     Socioeconomic History    Marital status:       Spouse name: Not on file    Number of children: Not on file    Years of education: Not on file    Highest education level: Not on file   Occupational History    Not on file   Social Needs    Financial resource strain: Not on file    Food insecurity     Worry: Not on file     Inability: Not on file    Transportation needs     Medical: Not on file     Non-medical: Not on file   Tobacco Use    Smoking status: Former Smoker     Packs/day: 2.00     Types: Cigarettes     Quit date: 2011     Years since quittin.5    Smokeless tobacco: Never Used   Substance and Sexual Activity    Alcohol use: Yes     Comment: 2-4 PER DAY    Drug use: Never    Sexual activity: Not on file   Lifestyle    Physical activity     Days per week: Not on file     Minutes per session: Not on file    Stress: Not on file   Relationships    Social connections     Talks on phone: Not on file     Gets together: Not on file     Attends Yazidism service: Not on file     Active member of club or organization: Not on file     Attends meetings of clubs or organizations: Not on file     Relationship status: Not on file   Other Topics Concern    Not on file   Social History Narrative    Not on file     No current facility-administered medications for this visit.   No current outpatient medications on file.    Facility-Administered Medications Ordered in Other Visits:     0.9%  NaCl infusion, , Intravenous, Continuous, Kelby Gomez MD, Last Rate: 10 mL/hr at 20 0715    clindamycin in D5W 900 mg/50 mL IVPB, , , PRN, Kelly Cardenas, CRNA, 900 mg at 20 0908    dexamethasone injection, , , PRN, Kelly Greenwoodeca, CRNA, 4 mg at 20 0843    electrolyte-S (ISOLYTE), , , Continuous PRN, Kelly Cardenas CRNA    ePHEDrine sulfate, , , PRN, Kelly Greenwoodeca, CRNA, 10 mg at 20 0904    fentaNYL injection, , , PRN, Kelly Cardenas CRNA, 50 mcg at 20 0831    ketamine in 0.9 % sod chloride 50 mg/5 mL (10  mg/mL) injection, , , PRN, Kelly Cardenas CRNA, 25 mg at 08/12/20 0908    lidocaine (cardiac) injection, , , PRN, Kelly S. Faneca, CRNA, 100 mg at 08/12/20 0833    lidocaine (PF) 10 mg/ml (1%) injection 10 mg, 1 mL, Intradermal, Once, Kelby Gomez MD    midazolam injection, , Intravenous, PRN, Kelly Cardenas, CRNA, 2 mg at 08/12/20 0810    phenylephrine injection, , , PRN, Kelly CROWLEY. Timoeca, CRNA, 100 mcg at 08/12/20 0921    propofol (DIPRIVAN) 10 mg/mL infusion, , , PRN, Kelly Greenwoodeca, CRNA, 40 mg at 08/12/20 0841    rocuronium injection, , , PRN, Kelly Greenwoodeca, CRNA, 30 mg at 08/12/20 0900    succinylcholine injection, , , PRN, Kelly Cardenas, CRNA, 160 mg at 08/12/20 0833     REVIEW OF SYSTEMS:  General: negative; Respiratory: no cough, shortness of breath, or wheezing; Cardiovascular: no chest pain or dyspnea on exertion; Gastrointestinal: no abdominal pain, change in bowel habits, or bloody stools; Genito-Urinary: no dysuria, trouble voiding, or hematuria; Musculoskeletal: no weakness or decreased range of motion, Neurological: no TIA or stroke symptoms; Psychiatric: no nervousness, anxiety or depression.    PHYSICAL EXAM:       General appearance: Alert, well-appearing, and in no distress.  Oriented to person, place, and time  Neurological: Normal speech, no focal findings noted; CN II - XII grossly intact  Musculoskeletal:Digits/nail without cyanosis/clubbing.  Normal muscle strength/tone.  Neck: Supple, no significant adenopathy, no carotid bruit can be auscultated  Chest:Clear to auscultation, no wheezes, rales or rhonchi, symmetric air entry, No use of accessory muscles   Cardiac:Normal rate and regular rhythm, S1 and S2 normal, Systolic ejection murmur  Abdomen:Soft, nontender, nondistended, no masses or organomegaly, No rebound tenderness noted; bowel sounds normal,  No groin adenopathy   Extremities: 2+ femoral pulses bilaterally, no pedal pulses palpable.no pre-tibial edema. No  ulcerations,  LLE: biphasic PT signal, biphasic DP signal   RLE: Triphasic DP signal, triphasic PT signal  Incisions are clean and dry  Left groin with nontender medial mass consistent with thrombosed psa.  Non-erythematous, no drainage.     LAB RESULTS:  Lab Results   Component Value Date    K 4.3 08/06/2020    K 4.4 07/30/2020    K 4.2 07/23/2020    CREATININE 0.70 (L) 08/06/2020    CREATININE 0.70 (L) 07/30/2020    CREATININE 0.70 (L) 07/23/2020     Lab Results   Component Value Date    WBC 5.20 08/06/2020    WBC 6.10 07/30/2020    WBC 5.30 07/23/2020    HCT 42.4 08/06/2020    HCT 42.1 07/30/2020    HCT 40.0 07/23/2020     08/06/2020     07/30/2020     07/23/2020     Lab Results   Component Value Date    HGBA1C 6.0 (H) 07/03/2020    HGBA1C 5.3 03/09/2020     IMAGING:  Lower Extremity Arterial Imaging  ========================    Left Lower Extremity  Lt prox CFA PSV    144 cm/s  Lt mid CFA  cm/s  Lt distal CFA PSV  114 cm/s  Lt prox DFA PSV    62 cm/s  Lt prox SFA PSV    120 cm/s  Lt mid SFA PSV 64 cm/s  Lt distal SFA PSV  311 cm/s  Lt mid POP PSV 69 cm/s  Lt mid ELISHA PSV 50 cm/s  Lt mid PTA PSV 61 cm/s    Impression  =========    Left leg: Color duplex ultrasound imaging of the left lower extremity reveals patent flow throughout the extremity and CFA stent. A  velocity elevation of 311 cm/s (from 86 cm/s) remain noted in the Left distal SFA with a visual narrowing and heavy calcific plaque.  This finding is suggestive of a > 75% focal stenosis. A branch is noted at the level of the Distal SFA, just proximal to the narrowing.  Additionally, a large complex non-vascular mass is visualized in the groin region measuring 6.9 x 7.9 x 5.3 cm.      IMP/PLAN:  59 y.o. male with recent history as noted above, doing well with normal LYNN bilaterally.  He is doing quite well and has gradually increased his level of activity, although he is still somewhat limited and cannot safely return to work  yet.  He continues on IV ceftriaxone.  We discussed the natural history of recurrent gram negative bacteremia in the setting of previous infected femoral psa with indwelling stents.  Given he has failed endovascular therapy and represented with bacteremia and psa recurrence even with antibiotic suppression, definitive therapy is warranted as the risk of recurrence and potentially life-threatening outcome is elevated.  We discussed numerous surgical options, of which explant of his native distal external and common femoral artery, indwelling stents and psa with in-line bypass using cadaveric/cryopreserved artery, with muscle flap reconstruction is the safest.  Risks include bleeding requiring transfusion, re-infection, need for reoperation, leg ischemia, death, and MI.   The patient and his wife agree and wish to proceed.     1) continue asa, statin, HOLD plavix for 7 days  2) daily and progressively increasing ambulation  3) scheduled follow-up with ID, plastic surgery  4) plan for OR as outlined above (with Dr. Wong of Plastic surgery) 8/12      Kelby Gomez MD  Vascular & Endovascular Surgery

## 2020-08-13 LAB
ANION GAP SERPL CALC-SCNC: 5 MMOL/L (ref 8–16)
ANION GAP SERPL CALC-SCNC: 5 MMOL/L (ref 8–16)
BASOPHILS # BLD AUTO: 0.02 K/UL (ref 0–0.2)
BASOPHILS # BLD AUTO: 0.02 K/UL (ref 0–0.2)
BASOPHILS NFR BLD: 0.2 % (ref 0–1.9)
BASOPHILS NFR BLD: 0.2 % (ref 0–1.9)
BUN SERPL-MCNC: 11 MG/DL (ref 6–20)
BUN SERPL-MCNC: 11 MG/DL (ref 6–20)
CALCIUM SERPL-MCNC: 8.7 MG/DL (ref 8.7–10.5)
CALCIUM SERPL-MCNC: 8.7 MG/DL (ref 8.7–10.5)
CHLORIDE SERPL-SCNC: 105 MMOL/L (ref 95–110)
CHLORIDE SERPL-SCNC: 105 MMOL/L (ref 95–110)
CO2 SERPL-SCNC: 27 MMOL/L (ref 23–29)
CO2 SERPL-SCNC: 27 MMOL/L (ref 23–29)
CREAT SERPL-MCNC: 0.7 MG/DL (ref 0.5–1.4)
CREAT SERPL-MCNC: 0.7 MG/DL (ref 0.5–1.4)
DIFFERENTIAL METHOD: ABNORMAL
DIFFERENTIAL METHOD: ABNORMAL
EOSINOPHIL # BLD AUTO: 0 K/UL (ref 0–0.5)
EOSINOPHIL # BLD AUTO: 0 K/UL (ref 0–0.5)
EOSINOPHIL NFR BLD: 0.1 % (ref 0–8)
EOSINOPHIL NFR BLD: 0.1 % (ref 0–8)
ERYTHROCYTE [DISTWIDTH] IN BLOOD BY AUTOMATED COUNT: 14.3 % (ref 11.5–14.5)
ERYTHROCYTE [DISTWIDTH] IN BLOOD BY AUTOMATED COUNT: 14.3 % (ref 11.5–14.5)
EST. GFR  (AFRICAN AMERICAN): >60 ML/MIN/1.73 M^2
EST. GFR  (AFRICAN AMERICAN): >60 ML/MIN/1.73 M^2
EST. GFR  (NON AFRICAN AMERICAN): >60 ML/MIN/1.73 M^2
EST. GFR  (NON AFRICAN AMERICAN): >60 ML/MIN/1.73 M^2
GLUCOSE SERPL-MCNC: 118 MG/DL (ref 70–110)
GLUCOSE SERPL-MCNC: 118 MG/DL (ref 70–110)
HCT VFR BLD AUTO: 35.6 % (ref 40–54)
HCT VFR BLD AUTO: 35.6 % (ref 40–54)
HGB BLD-MCNC: 11.4 G/DL (ref 14–18)
HGB BLD-MCNC: 11.4 G/DL (ref 14–18)
IMM GRANULOCYTES # BLD AUTO: 0.03 K/UL (ref 0–0.04)
IMM GRANULOCYTES # BLD AUTO: 0.03 K/UL (ref 0–0.04)
IMM GRANULOCYTES NFR BLD AUTO: 0.4 % (ref 0–0.5)
IMM GRANULOCYTES NFR BLD AUTO: 0.4 % (ref 0–0.5)
LYMPHOCYTES # BLD AUTO: 1 K/UL (ref 1–4.8)
LYMPHOCYTES # BLD AUTO: 1 K/UL (ref 1–4.8)
LYMPHOCYTES NFR BLD: 11.8 % (ref 18–48)
LYMPHOCYTES NFR BLD: 11.8 % (ref 18–48)
MCH RBC QN AUTO: 27.7 PG (ref 27–31)
MCH RBC QN AUTO: 27.7 PG (ref 27–31)
MCHC RBC AUTO-ENTMCNC: 32 G/DL (ref 32–36)
MCHC RBC AUTO-ENTMCNC: 32 G/DL (ref 32–36)
MCV RBC AUTO: 86 FL (ref 82–98)
MCV RBC AUTO: 86 FL (ref 82–98)
MONOCYTES # BLD AUTO: 0.8 K/UL (ref 0.3–1)
MONOCYTES # BLD AUTO: 0.8 K/UL (ref 0.3–1)
MONOCYTES NFR BLD: 9.1 % (ref 4–15)
MONOCYTES NFR BLD: 9.1 % (ref 4–15)
NEUTROPHILS # BLD AUTO: 6.6 K/UL (ref 1.8–7.7)
NEUTROPHILS # BLD AUTO: 6.6 K/UL (ref 1.8–7.7)
NEUTROPHILS NFR BLD: 78.4 % (ref 38–73)
NEUTROPHILS NFR BLD: 78.4 % (ref 38–73)
NRBC BLD-RTO: 0 /100 WBC
NRBC BLD-RTO: 0 /100 WBC
PLATELET # BLD AUTO: 154 K/UL (ref 150–350)
PLATELET # BLD AUTO: 154 K/UL (ref 150–350)
PMV BLD AUTO: 11 FL (ref 9.2–12.9)
PMV BLD AUTO: 11 FL (ref 9.2–12.9)
POC ACTIVATED CLOTTING TIME K: 197 SEC (ref 74–137)
POTASSIUM SERPL-SCNC: 4 MMOL/L (ref 3.5–5.1)
POTASSIUM SERPL-SCNC: 4 MMOL/L (ref 3.5–5.1)
RBC # BLD AUTO: 4.12 M/UL (ref 4.6–6.2)
RBC # BLD AUTO: 4.12 M/UL (ref 4.6–6.2)
SAMPLE: ABNORMAL
SODIUM SERPL-SCNC: 137 MMOL/L (ref 136–145)
SODIUM SERPL-SCNC: 137 MMOL/L (ref 136–145)
WBC # BLD AUTO: 8.42 K/UL (ref 3.9–12.7)
WBC # BLD AUTO: 8.42 K/UL (ref 3.9–12.7)

## 2020-08-13 PROCEDURE — 99900035 HC TECH TIME PER 15 MIN (STAT)

## 2020-08-13 PROCEDURE — 20600001 HC STEP DOWN PRIVATE ROOM

## 2020-08-13 PROCEDURE — 25000003 PHARM REV CODE 250: Performed by: STUDENT IN AN ORGANIZED HEALTH CARE EDUCATION/TRAINING PROGRAM

## 2020-08-13 PROCEDURE — 80048 BASIC METABOLIC PNL TOTAL CA: CPT

## 2020-08-13 PROCEDURE — 94770 HC EXHALED C02 TEST: CPT

## 2020-08-13 PROCEDURE — 63600175 PHARM REV CODE 636 W HCPCS: Performed by: NURSE PRACTITIONER

## 2020-08-13 PROCEDURE — 85025 COMPLETE CBC W/AUTO DIFF WBC: CPT

## 2020-08-13 PROCEDURE — 63600175 PHARM REV CODE 636 W HCPCS: Performed by: STUDENT IN AN ORGANIZED HEALTH CARE EDUCATION/TRAINING PROGRAM

## 2020-08-13 PROCEDURE — 94761 N-INVAS EAR/PLS OXIMETRY MLT: CPT

## 2020-08-13 PROCEDURE — 99223 PR INITIAL HOSPITAL CARE,LEVL III: ICD-10-PCS | Mod: ,,, | Performed by: NURSE PRACTITIONER

## 2020-08-13 PROCEDURE — 99223 1ST HOSP IP/OBS HIGH 75: CPT | Mod: ,,, | Performed by: NURSE PRACTITIONER

## 2020-08-13 RX ORDER — CLOPIDOGREL BISULFATE 75 MG/1
75 TABLET ORAL DAILY
Status: DISCONTINUED | OUTPATIENT
Start: 2020-08-13 | End: 2020-08-18 | Stop reason: HOSPADM

## 2020-08-13 RX ORDER — OXYCODONE HYDROCHLORIDE 10 MG/1
10 TABLET ORAL NIGHTLY PRN
Status: DISCONTINUED | OUTPATIENT
Start: 2020-08-13 | End: 2020-08-14

## 2020-08-13 RX ORDER — NALOXONE HCL 0.4 MG/ML
0.02 VIAL (ML) INJECTION
Status: DISCONTINUED | OUTPATIENT
Start: 2020-08-13 | End: 2020-08-15

## 2020-08-13 RX ORDER — HYDROMORPHONE HCL IN 0.9% NACL 6 MG/30 ML
PATIENT CONTROLLED ANALGESIA SYRINGE INTRAVENOUS CONTINUOUS
Status: DISCONTINUED | OUTPATIENT
Start: 2020-08-13 | End: 2020-08-15

## 2020-08-13 RX ORDER — VALSARTAN 160 MG/1
160 TABLET ORAL DAILY
Status: DISCONTINUED | OUTPATIENT
Start: 2020-08-13 | End: 2020-08-18 | Stop reason: HOSPADM

## 2020-08-13 RX ORDER — POLYETHYLENE GLYCOL 3350 17 G/17G
17 POWDER, FOR SOLUTION ORAL DAILY
Status: DISCONTINUED | OUTPATIENT
Start: 2020-08-13 | End: 2020-08-18 | Stop reason: HOSPADM

## 2020-08-13 RX ADMIN — HYDROCODONE BITARTRATE AND ACETAMINOPHEN 1 TABLET: 5; 325 TABLET ORAL at 04:08

## 2020-08-13 RX ADMIN — HEPARIN SODIUM 5000 UNITS: 5000 INJECTION, SOLUTION INTRAVENOUS; SUBCUTANEOUS at 05:08

## 2020-08-13 RX ADMIN — DOCUSATE SODIUM 100 MG: 100 CAPSULE, LIQUID FILLED ORAL at 08:08

## 2020-08-13 RX ADMIN — POLYETHYLENE GLYCOL 3350 17 G: 17 POWDER, FOR SOLUTION ORAL at 10:08

## 2020-08-13 RX ADMIN — HYDROCODONE BITARTRATE AND ACETAMINOPHEN 1 TABLET: 5; 325 TABLET ORAL at 08:08

## 2020-08-13 RX ADMIN — CEFTRIAXONE 2 G: 2 INJECTION, SOLUTION INTRAVENOUS at 04:08

## 2020-08-13 RX ADMIN — DOCUSATE SODIUM 100 MG: 100 CAPSULE, LIQUID FILLED ORAL at 09:08

## 2020-08-13 RX ADMIN — Medication: at 10:08

## 2020-08-13 RX ADMIN — VALSARTAN 160 MG: 160 TABLET, FILM COATED ORAL at 10:08

## 2020-08-13 RX ADMIN — HEPARIN SODIUM 5000 UNITS: 5000 INJECTION, SOLUTION INTRAVENOUS; SUBCUTANEOUS at 09:08

## 2020-08-13 RX ADMIN — CLOPIDOGREL 75 MG: 75 TABLET, FILM COATED ORAL at 10:08

## 2020-08-13 RX ADMIN — HYDROMORPHONE HYDROCHLORIDE 0.5 MG: 1 INJECTION, SOLUTION INTRAMUSCULAR; INTRAVENOUS; SUBCUTANEOUS at 12:08

## 2020-08-13 RX ADMIN — HEPARIN SODIUM 5000 UNITS: 5000 INJECTION, SOLUTION INTRAVENOUS; SUBCUTANEOUS at 02:08

## 2020-08-13 NOTE — CONSULTS
[facility]  Infectious Disease  Consult Note    Patient Name: Renan Britton  MRN: [id number]  Admission Date: 8/12/2020  Hospital Length of Stay: 1 days  Attending Physician: Kelby Gomez MD  Primary Care Provider: Eren Becker MD     Isolation Status: No active isolations        Inpatient consult to Infectious Diseases  Consult performed by: ANDREW Cabrera, ANP  Consult ordered by: Moreno Hunt MD  Reason for consult:   history of infected pseudoaneurysm, ID wanted to be involved in his care, he is on long term antibiotics        ID Consult acknowledged.   Full consult and recommendations to follow today  In the interim, please call for any immediate concerns.     Thank you.   ANDREW Jean, ANP-C  [phone] pager,  yhvsagomded [phone]

## 2020-08-13 NOTE — PLAN OF CARE
Pt AAOx4, wife at bedside. Pt tolerating clear liquid diet with no complaints of discomfort or nausea. Pain managed with Dilaudid PCA. Pt on tele, NSR, all other VSS on RA. NV checks Q2- no changes. L groin WV intact and patent, @ 125. KATHLEEN drains x2 intact and patent, bloody drainage. Call light in reach and bed in lowest position. Pt remains free of falls and injury. No acute events this shift. Will continue to monitor.

## 2020-08-13 NOTE — HPI
Mr. Renan Britton is a 59 year old with PAD who is s/p bilateral femoral endarterectcomy in 2019 complicated by postoperative fluid collection with wound breakdown in January 2020 with Group F strep and Proteus mirabilis endovascular infection s/p wound washout with groin flap 1/3/20 s/p 6 weeks of IV ceftriaxone (EOC 2/14/20) complicated by proteus mirabilis bacteremia March 2020 s/p 4 weeks of IV ceftraixone (EOC 4/2/20) followed by suppressive levaquin.  Had subsequent PSA of left femoral artery, s/p stenting on 4/6/20 followed by 6 weeks of IV cefttriaxone (EOC 5/18/20), then placed on suppresive cefadroxil.  He subsequently developed explanding left groin PSA with recurrent Proteus bacteremia,  s/p washout with stent placement on 6/29/20 and discharged on 6-8 week course of ceftriaxone (EOC 8/20/20).  He is now POD #1 de-rotation and re-rotation of thigh flap, excision of left groin infected PSA and left common femoral artery with explantation of external iliac/common femoral stent with EIA to distal common femoral bypass with cryovein.  ID consulted for antibiotic management.     Surgical cultures pending. Gram stain with rare GPC.

## 2020-08-13 NOTE — SUBJECTIVE & OBJECTIVE
Medications:  Continuous Infusions:   sodium chloride 0.9% 10 mL/hr at 08/12/20 0715    hydromorphone in 0.9 % NaCl 6 mg/30 ml       Scheduled Meds:   clopidogreL  75 mg Oral Daily    docusate sodium  100 mg Oral BID    heparin (porcine)  5,000 Units Subcutaneous Q8H    polyethylene glycol  17 g Oral Daily    valsartan  160 mg Oral Daily     PRN Meds:HYDROcodone-acetaminophen, HYDROmorphone, naloxone, ondansetron     Objective:     Vital Signs (Most Recent):  Temp: 98.3 °F (36.8 °C) (08/13/20 0835)  Pulse: 73 (08/13/20 0835)  Resp: 18 (08/13/20 0842)  BP: (!) 173/81 (08/13/20 0835)  SpO2: 100 % (08/13/20 0835) Vital Signs (24h Range):  Temp:  [97.5 °F (36.4 °C)-98.3 °F (36.8 °C)] 98.3 °F (36.8 °C)  Pulse:  [] 73  Resp:  [8-21] 18  SpO2:  [93 %-100 %] 100 %  BP: (122-174)/(63-92) 173/81  Arterial Line BP: (122-177)/(65-79) 177/79     Date 08/13/20 0700 - 08/14/20 0659   Shift 2264-7696 6540-0729 2051-3289 24 Hour Total   INTAKE   P.O. 450   450   Shift Total(mL/kg) 450(3.6)   450(3.6)   OUTPUT   Urine(mL/kg/hr) 775   775   Emesis/NG output 0   0   Drains 0   0   Other 0   0   Stool 0   0   Blood 0   0   Shift Total(mL/kg) 775(6.3)   775(6.3)   Weight (kg) 123.4 123.4 123.4 123.4       Physical Exam  Vitals signs and nursing note reviewed.   Constitutional:       Appearance: Normal appearance.   HENT:      Head: Normocephalic and atraumatic.   Pulmonary:      Effort: Pulmonary effort is normal.      Breath sounds: Normal breath sounds.   Genitourinary:     Comments: Packer in place    Musculoskeletal:      Comments: Soft DP signals on doppler bilaterally  Strong, biphasic PT signals on doppler bilaterally  Left groin with provena wound vac in place, scant ss drainage  2 KATHLEEN drains in place to left groin - ss drainage.    Skin:     General: Skin is warm and dry.      Capillary Refill: Capillary refill takes less than 2 seconds.   Neurological:      General: No focal deficit present.      Mental Status: He  is alert and oriented to person, place, and time.         Significant Labs:  CBC:   Recent Labs   Lab 08/13/20  0433   WBC 8.42  8.42   RBC 4.12*  4.12*   HGB 11.4*  11.4*   HCT 35.6*  35.6*     154   MCV 86  86   MCH 27.7  27.7   MCHC 32.0  32.0     CMP:   Recent Labs   Lab 08/13/20  0433   *  118*   CALCIUM 8.7  8.7     137   K 4.0  4.0   CO2 27  27     105   BUN 11  11   CREATININE 0.7  0.7       Significant Diagnostics:  I have reviewed all pertinent imaging results/findings within the past 24 hours.

## 2020-08-13 NOTE — SUBJECTIVE & OBJECTIVE
Past Medical History:   Diagnosis Date    Diabetes mellitus     H/O brain tumor     HLD (hyperlipidemia)     Hypertension     PAD (peripheral artery disease)     Seizures     R/T BRAIN TUMOR- SURGICALLY EXCISED       Past Surgical History:   Procedure Laterality Date    ANGIOGRAPHY Left 4/6/2020    Procedure: ANGIOGRAM, Left lower extremity;  Surgeon: Kelby Gomez MD;  Location: [address];  Service: Peripheral Vascular;  Laterality: Left;    ANGIOGRAPHY OF LOWER EXTREMITY Left 6/29/2020    Procedure: Angiogram Extremity Unilateral, washout L groin, possible open pseudoaneurysm repair;  Surgeon: Bruce Dallas MD;  Location: [address];  Service: Peripheral Vascular;  Laterality: Left;  contrast 23 ml  fluoro time: 9.9 min  mGy: 1230.26  Gycm2: 146.69    AORTOGRAPHY N/A 6/29/2020    Procedure: AORTOGRAM;  Surgeon: Bruce Dallas MD;  Location: [address];  Service: Peripheral Vascular;  Laterality: N/A;    APPLICATION OF WOUND VACUUM-ASSISTED CLOSURE DEVICE Bilateral 1/1/2020    Procedure: APPLICATION, WOUND VAC;  Surgeon: SEBAS Prieto II, MD;  Location: [address];  Service: Cardiovascular;  Laterality: Bilateral;    APPLICATION OF WOUND VACUUM-ASSISTED CLOSURE DEVICE N/A 1/3/2020    Procedure: APPLICATION, WOUND VAC;  Surgeon: Brian Wong MD;  Location: [address];  Service: Plastics;  Laterality: N/A;    BRAIN SURGERY  01/2011    TUMOR EXCISED    CREATION OF MUSCLE ROTATIONAL FLAP N/A 1/3/2020    Procedure: CREATION, FLAP, MUSCLE ROTATION;  Surgeon: Brian Wong MD;  Location: [address];  Service: Plastics;  Laterality: N/A;    ENDARTERECTOMY OF FEMORAL ARTERY Bilateral 12/20/2019    Procedure: ENDARTERECTOMY, FEMORAL;  Surgeon: Kelby Gomez MD;  Location: [address];  Service: Peripheral Vascular;  Laterality: Bilateral;  natalya common femoral endarterectomy with natalya. iliac stent placement    PERCUTANEOUS TRANSLUMINAL  ANGIOPLASTY Left 4/6/2020    Procedure: PTA (ANGIOPLASTY, PERCUTANEOUS, TRANSLUMINAL), left lower extremity;  Surgeon: Kelby Gomez MD;  Location: [facility] OR [address] [place] [address];  Service: Peripheral Vascular;  Laterality: Left;    PERCUTANEOUS TRANSLUMINAL ANGIOPLASTY (PTA) OF PERIPHERAL VESSEL Left 10/17/2019    Procedure: PTA, PERIPHERAL VESSEL;  Surgeon: Rao Fisher MD;  Location: [facility] CATH;  Service: Cardiology;  Laterality: Left;    PSEUDOANEURYSM REPAIR Left 6/29/2020    Procedure: REPAIR, PSEUDOANEURYSM;  Surgeon: Bruce Dallas MD;  [facility];  Service: Peripheral Vascular;  Laterality: Left;       Review of patient's allergies indicates:   Allergen Reactions    Penicillins Hives     Patient currently on cefepime without complications  Tolerated ceftriaxone 3/2020       Medications:  Medications Prior to Admission   Medication Sig    ascorbic acid (VITAMIN C ORAL) Take 2 capsules by mouth every morning.    aspirin (ECOTRIN) 81 MG EC tablet Take 81 mg by mouth once daily.    atorvastatin (LIPITOR) 40 MG tablet Take 40 mg by mouth once daily.    clopidogreL (PLAVIX) 75 mg tablet Take 1 tablet (75 mg total) by mouth once daily. (Patient taking differently: Take 75 mg by mouth every evening. )    elderberry fruit and flower 460-115 mg Cap Take 2 capsules by mouth every morning.    metFORMIN (GLUCOPHAGE-XR) 500 MG XR 24hr tablet Take 1,000 mg by mouth daily with breakfast.     pregabalin (LYRICA) 100 MG capsule Take 100 mg by mouth 2 (two) times daily.    valsartan (DIOVAN) 160 MG tablet Take 160 mg by mouth once daily.    cefadroxil (DURICEF) 500 MG Cap Take 2 capsules (1,000 mg total) by mouth every 12 (twelve) hours.    HYDROcodone-acetaminophen (NORCO) 5-325 mg per tablet Take 1 tablet by mouth every 6 (six) hours as needed for Pain.     Antibiotics (From admission, onward)    Start     Stop Route Frequency Ordered    08/13/20 0113  cefTRIAXone (ROCEPHIN) 2 g/50 mL D5W IVPB      -- IV  Every 24 hours (non-standard times) 20 1442        Antifungals (From admission, onward)    None        Antivirals (From admission, onward)    None           There is no immunization history for the selected administration types on file for this patient.    Family History     Problem Relation (Age of Onset)    Cancer Father, Brother    Diabetes Mother    Heart disease Father, Brother, Brother, Brother    Hypertension Mother    Stroke Mother        Social History     Socioeconomic History    Marital status:      Spouse name: Not on file    Number of children: Not on file    Years of education: Not on file    Highest education level: Not on file   Occupational History    Not on file   Social Needs    Financial resource strain: Not on file    Food insecurity     Worry: Not on file     Inability: Not on file    Transportation needs     Medical: Not on file     Non-medical: Not on file   Tobacco Use    Smoking status: Former Smoker     Packs/day: 2.00     Types: Cigarettes     Quit date: 2011     Years since quittin.5    Smokeless tobacco: Never Used   Substance and Sexual Activity    Alcohol use: Yes     Comment: 2-4 PER DAY    Drug use: Never    Sexual activity: Not on file   Lifestyle    Physical activity     Days per week: Not on file     Minutes per session: Not on file    Stress: Not on file   Relationships    Social connections     Talks on phone: Not on file     Gets together: Not on file     Attends [employer] service: Not on file     Active member of club or organization: Not on file     Attends meetings of clubs or organizations: Not on file     Relationship status: Not on file   Other Topics Concern    Not on file   Social History Narrative    Not on file     Review of Systems   Constitutional: Positive for activity change. Negative for appetite change, chills, diaphoresis, fatigue and fever.   HENT: Negative.    Eyes: Negative.    Respiratory: Negative for cough,  shortness of breath and wheezing.    Cardiovascular: Negative for chest pain and palpitations.   Gastrointestinal: Negative for abdominal pain, diarrhea, nausea and vomiting.   Genitourinary:        Packer   Musculoskeletal: Negative for back pain and myalgias.        Surgical site pain   Skin: Positive for wound (wound vac left groin).   Neurological: Negative for dizziness, numbness and headaches.   Psychiatric/Behavioral: Negative for agitation and confusion.     Objective:     Vital Signs (Most Recent):  Temp: 98.4 °F (36.9 °C) (08/13/20 1659)  Pulse: 76 (08/13/20 1659)  Resp: 16 (08/13/20 1659)  BP: (!) 157/76 (08/13/20 1659)  SpO2: 98 % (08/13/20 1659) Vital Signs (24h Range):  Temp:  [97.7 °F (36.5 °C)-98.4 °F (36.9 °C)] 98.4 °F (36.9 °C)  Pulse:  [] 76  Resp:  [8-20] 16  SpO2:  [93 %-100 %] 98 %  BP: (144-174)/(74-86) 157/76  Arterial Line BP: (141-177)/(69-79) 177/79     Weight: 123.4 kg (272 lb 0.8 oz)  Body mass index is 37.94 kg/m².    Estimated Creatinine Clearance: 155.6 mL/min (based on SCr of 0.7 mg/dL).    Physical Exam  Vitals signs and nursing note reviewed.   Constitutional:       General: He is not in acute distress.     Appearance: Normal appearance. He is not toxic-appearing.   HENT:      Head: Normocephalic and atraumatic.   Eyes:      General: No scleral icterus.     Conjunctiva/sclera: Conjunctivae normal.   Cardiovascular:      Rate and Rhythm: Normal rate.      Heart sounds: Murmur present.   Pulmonary:      Effort: Pulmonary effort is normal.      Breath sounds: Normal breath sounds.   Abdominal:      General: There is no distension.      Palpations: Abdomen is soft.      Tenderness: There is no abdominal tenderness.   Genitourinary:     Comments: Packer      Musculoskeletal:      Comments: Left groin with wound vac in place  2 drains with small amount bloody drainage  LLQ abdominal incision dressing c/d/i   Skin:     General: Skin is warm and dry.   Neurological:      General: No  focal deficit present.      Mental Status: He is alert and oriented to person, place, and time.   Psychiatric:         Mood and Affect: Mood normal.         Behavior: Behavior normal.         Significant Labs:   Blood Culture:   Recent Labs   Lab 06/26/20  0713 06/26/20  0717 06/27/20  0010 06/27/20  0017 06/30/20  1321   LABBLOO Gram stain aer bottle: Gram negative rods   Results called to and read back by: Sana Jay RN  06/28/2020  14:10  PROTEUS MIRABILIS* No growth after 5 days. No growth after 5 days. No growth after 5 days. No growth after 5 days.     CBC:   Recent Labs   Lab 08/12/20  0917 08/13/20  0433   WBC  --  8.42  8.42   HGB  --  11.4*  11.4*   HCT 39 35.6*  35.6*   PLT  --  154  154     CMP:   Recent Labs   Lab 08/13/20  0433     137   K 4.0  4.0     105   CO2 27  27   *  118*   BUN 11  11   CREATININE 0.7  0.7   CALCIUM 8.7  8.7   ANIONGAP 5*  5*   EGFRNONAA >60.0  >60.0     Wound Culture:   Recent Labs   Lab 06/29/20  2244 08/12/20  1138 08/12/20  1154 08/12/20  1213   LABAERO No growth  No growth No growth Insufficient incubation, culture in progress No growth       Significant Imaging: I have reviewed all pertinent imaging results/findings within the past 24 hours.

## 2020-08-13 NOTE — ASSESSMENT & PLAN NOTE
Renan Britton is a 59 year old man with infected L groin pseudoaneurysm s/p explant of external iliac and common femoral stent, debridement, and reconstruction with cadaveric vein bypass and rerotation of thigh flap by plastic surgery.  - Doppler signals present distal to flap  - Incisional vac in place  - Continue to monitor drain output  - q2h vascular checks  - remainder of care per primary

## 2020-08-13 NOTE — ASSESSMENT & PLAN NOTE
59 year old with PAD who is s/p bilateral femoral endarterectcomy in 2019 complicated by postoperative fluid collection with wound breakdown in January 2020 with Group F strep and Proteus mirabilis endovascular infection s/p wound washout with groin flap 1/3/20 s/p 6 weeks of IV ceftriaxone (EOC 2/14/20) complicated by proteus mirabilis bacteremia March 2020 s/p 4 weeks of IV ceftraixone (EOC 4/2/20) followed by suppressive levaquin.  Had subsequent PSA of left femoral artery, s/p stenting on 4/6/20 followed by 6 weeks of IV ceftriaxone (EOC 5/18/20), then placed on suppresive cefadroxil.  He subsequently developed explanding left groin PSA with recurrent Proteus bacteremia,  s/p washout with stent placement on 6/29/20 and discharged on 6-8 week course of ceftriaxone (EOC 8/20/20).       He is now POD #1 de-rotation and re-rotation of thigh flap, excision of left groin infected PSA and left common femoral artery with explantation of external iliac/common femoral stent graft with EIA to distal common femoral bypass with cryovein.  ID consulted for antibiotic management.      Surgical cultures pending. Gram stain with rare GPC.    Plan/recommendations:   1.  Continue IV ceftriaxone 2 grams q 24 hours (ordered)  2.  Anticipate minimum of 4 weeks of IV abx from date of stent removal.   3.  Will follow surgical cultures and follow up tomorrow.     Data reviewed and plan discussed with ID staff

## 2020-08-13 NOTE — PROGRESS NOTES
[facility]  Vascular Surgery  Progress Note    Patient Name: Renan Britton  MRN: [id number]  Admission Date: 8/12/2020  Primary Care Provider: Eren Becker MD    Subjective:     Interval History: NAEO    Post-Op Info:  Procedure(s) (LRB):  CREATION, BYPASS, ARTERIAL, ILIAC TO FEMORAL (Left)   1 Day Post-Op       Medications:  Continuous Infusions:   sodium chloride 0.9% 10 mL/hr at 08/12/20 0715    hydromorphone in 0.9 % NaCl 6 mg/30 ml       Scheduled Meds:   clopidogreL  75 mg Oral Daily    docusate sodium  100 mg Oral BID    heparin (porcine)  5,000 Units Subcutaneous Q8H    polyethylene glycol  17 g Oral Daily    valsartan  160 mg Oral Daily     PRN Meds:HYDROcodone-acetaminophen, HYDROmorphone, naloxone, ondansetron     Objective:     Vital Signs (Most Recent):  Temp: 98.3 °F (36.8 °C) (08/13/20 0835)  Pulse: 73 (08/13/20 0835)  Resp: 18 (08/13/20 0842)  BP: (!) 173/81 (08/13/20 0835)  SpO2: 100 % (08/13/20 0835) Vital Signs (24h Range):  Temp:  [97.5 °F (36.4 °C)-98.3 °F (36.8 °C)] 98.3 °F (36.8 °C)  Pulse:  [] 73  Resp:  [8-21] 18  SpO2:  [93 %-100 %] 100 %  BP: (122-174)/(63-92) 173/81  Arterial Line BP: (122-177)/(65-79) 177/79     Date 08/13/20 0700 - 08/14/20 0659   Shift 0990-0857 7928-8159 3151-5159 24 Hour Total   INTAKE   P.O. 450   450   Shift Total(mL/kg) 450(3.6)   450(3.6)   OUTPUT   Urine(mL/kg/hr) 775   775   Emesis/NG output 0   0   Drains 0   0   Other 0   0   Stool 0   0   Blood 0   0   Shift Total(mL/kg) 775(6.3)   775(6.3)   Weight (kg) 123.4 123.4 123.4 123.4       Physical Exam  Vitals signs and nursing note reviewed.   Constitutional:       Appearance: Normal appearance.   HENT:      Head: Normocephalic and atraumatic.   Pulmonary:      Effort: Pulmonary effort is normal.      Breath sounds: Normal breath sounds.   Genitourinary:     Comments: Packer in place    Musculoskeletal:      Comments: Soft DP signals on doppler bilaterally  Strong, biphasic  PT signals on doppler bilaterally  Left groin with provena wound vac in place, scant ss drainage  2 KATHLEEN drains in place to left groin - ss drainage.    Skin:     General: Skin is warm and dry.      Capillary Refill: Capillary refill takes less than 2 seconds.   Neurological:      General: No focal deficit present.      Mental Status: He is alert and oriented to person, place, and time.         Significant Labs:  CBC:   Recent Labs   Lab 08/13/20  0433   WBC 8.42  8.42   RBC 4.12*  4.12*   HGB 11.4*  11.4*   HCT 35.6*  35.6*     154   MCV 86  86   MCH 27.7  27.7   MCHC 32.0  32.0     CMP:   Recent Labs   Lab 08/13/20  0433   *  118*   CALCIUM 8.7  8.7     137   K 4.0  4.0   CO2 27  27     105   BUN 11  11   CREATININE 0.7  0.7       Significant Diagnostics:  I have reviewed all pertinent imaging results/findings within the past 24 hours.    Assessment/Plan:     PAD (peripheral artery disease)  59 year old man with infected L groin pseudoaneurysm s/p explant of external iliac and common femoral stent, debridement, and reconstruction with cadaveric vein bypass and rerotation of thigh flap by plastic surgery.    - Doing well, pain adequately controlled  - Strip KATHLEEN drains 2x daily  - Continue Prevena per PRS recs  - DC foy, arterial line   - PT/OT, OOb ambulation with assistance is encouraged  - Will resume home medications including anti-hypertensives  - Will start oral plavix   - Regular diet    Dispo: possibly tomorrow DC with good ambulation, pulses          Leida Hancock MD  Vascular Surgery  [facility]

## 2020-08-13 NOTE — ASSESSMENT & PLAN NOTE
59 year old man with infected L groin pseudoaneurysm s/p explant of external iliac and common femoral stent, debridement, and reconstruction with cadaveric vein bypass and rerotation of thigh flap by plastic surgery.    -Doing well, pain adequately controlled  -Strip KATHLEEN drains 2x daily  -Continue Prevena per PRS recs  -Restart plavix in AM if bleeding is not an issue  -Q2H vascular checks overnight

## 2020-08-13 NOTE — SUBJECTIVE & OBJECTIVE
Medications:  Continuous Infusions:   sodium chloride 0.9% 10 mL/hr at 08/12/20 0715     Scheduled Meds:   heparin (porcine)  5,000 Units Subcutaneous Q8H    lidocaine (PF) 10 mg/ml (1%)  1 mL Intradermal Once     PRN Meds:fentaNYL, HYDROmorphone, ondansetron     Objective:     Vital Signs (Most Recent):  Temp: 97.9 °F (36.6 °C) (08/12/20 1900)  Pulse: 86 (08/12/20 1927)  Resp: 14 (08/12/20 1930)  BP: (!) 160/85 (08/12/20 1900)  SpO2: 100 % (08/12/20 1927) Vital Signs (24h Range):  Temp:  [97.5 °F (36.4 °C)-98.1 °F (36.7 °C)] 97.9 °F (36.6 °C)  Pulse:  [] 86  Resp:  [11-21] 14  SpO2:  [93 %-100 %] 100 %  BP: (122-161)/(63-92) 160/85  Arterial Line BP: (122-167)/(65-78) 164/75     Date 08/12/20 0700 - 08/13/20 0659   Shift 9351-0892 1210-5159 5737-7727 24 Hour Total   INTAKE   I.V.(mL/kg) 4200(37) 800(7.1)  5000(44.1)   Shift Total(mL/kg) 4200(37) 800(7.1)  5000(44.1)   OUTPUT   Urine(mL/kg/hr) 575(0.6) 350  925   Drains  55  55   Blood 400   400   Shift Total(mL/kg) 975(8.6) 405(3.6)  1380(12.2)   Weight (kg) 113.4 113.4 113.4 113.4       Physical Exam   LLE with doppler DP (monophasic) and triphasic PT, RLE with triphasic DP/PT  Groin prevena is intact, scant output  Abdominal incision dressings CDI  KATHLEEN drains 30ml each of SS fluid

## 2020-08-13 NOTE — CONSULTS
[facility]  Infectious Disease  Consult Note    Patient Name: Renan Britton  MRN: [id number]  Admission Date: 8/12/2020  Hospital Length of Stay: 1 days  Attending Physician: Kelby Gomez MD  Primary Care Provider: Eren Becker MD     Isolation Status: No active isolations    Patient information was obtained from patient, spouse/SO and past medical records.      Consults  Assessment/Plan:     * Pseudoaneurysm     59 year old with PAD who is s/p bilateral femoral endarterectcomy in 2019 complicated by postoperative fluid collection with wound breakdown in January 2020 with Group F strep and Proteus mirabilis endovascular infection s/p wound washout with groin flap 1/3/20 s/p 6 weeks of IV ceftriaxone (EOC 2/14/20) complicated by proteus mirabilis bacteremia March 2020 s/p 4 weeks of IV ceftraixone (EOC 4/2/20) followed by suppressive levaquin.  Had subsequent PSA of left femoral artery, s/p stenting on 4/6/20 followed by 6 weeks of IV ceftriaxone (EOC 5/18/20), then placed on suppresive cefadroxil.  He subsequently developed explanding left groin PSA with recurrent Proteus bacteremia likely from stent graft seeding (2D echo without evidence of endocarditis),  s/p washout with stent placement on 6/29/20 and discharged on 6-8 week course of ceftriaxone (EOC 8/20/20).       He is now POD #1 de-rotation and re-rotation of thigh flap, excision of left groin infected PSA and left common femoral artery with explantation of external iliac/common femoral stent graft with EIA to distal common femoral bypass with cryovein.  ID consulted for antibiotic management.      Surgical cultures pending. Gram stain with rare GPC.    Plan/recommendations:   1.  Continue IV ceftriaxone 2 grams q 24 hours (ordered)  2.  Anticipate minimum of 4 weeks of IV abx from date of stent removal.   3.  Will follow surgical cultures and follow up tomorrow.     Data reviewed and plan discussed with ID  staff        Proteus infection  See assessment/plan above        Thank you.   Please call for any questions or concerns.  ANDREW Jean, ANP-C  [phone] pager, [place] [phone]         Subjective:     Principal Problem: Pseudoaneurysm    HPI: Mr. Renan Britton is a 59 year old with PAD who is s/p bilateral femoral endarterectcomy in 2019 complicated by postoperative fluid collection with wound breakdown in January 2020 with Group F strep and Proteus mirabilis endovascular infection s/p wound washout with groin flap 1/3/20 s/p 6 weeks of IV ceftriaxone (EOC 2/14/20) complicated by proteus mirabilis bacteremia March 2020 s/p 4 weeks of IV ceftraixone (EOC 4/2/20) followed by suppressive levaquin.  Had subsequent PSA of left femoral artery, s/p stenting on 4/6/20 followed by 6 weeks of IV cefttriaxone (EOC 5/18/20), then placed on suppresive cefadroxil.  He subsequently developed explanding left groin PSA with recurrent Proteus bacteremia,  s/p washout with stent placement on 6/29/20 and discharged on 6-8 week course of ceftriaxone (EOC 8/20/20).  He is now POD #1 de-rotation and re-rotation of thigh flap, excision of left groin infected PSA and left common femoral artery with explantation of external iliac/common femoral stent with EIA to distal common femoral bypass with cryovein.  ID consulted for antibiotic management.     Surgical cultures pending. Gram stain with rare GPC.    Past Medical History:   Diagnosis Date    Diabetes mellitus     H/O brain tumor     HLD (hyperlipidemia)     Hypertension     PAD (peripheral artery disease)     Seizures     R/T BRAIN TUMOR- SURGICALLY EXCISED       Past Surgical History:   Procedure Laterality Date    ANGIOGRAPHY Left 4/6/2020    Procedure: ANGIOGRAM, Left lower extremity;  Surgeon: Kelby Gomez MD;  Location: [facility] OR [address] [place] [address];  Service: Peripheral Vascular;  Laterality: Left;    ANGIOGRAPHY OF LOWER EXTREMITY Left 6/29/2020    Procedure: Angiogram  Extremity Unilateral, washout L groin, possible open pseudoaneurysm repair;  Surgeon: Bruce Dallas MD;  [facility];  Service: Peripheral Vascular;  Laterality: Left;  contrast 23 ml  fluoro time: 9.9 min  mGy: 1230.26  Gycm2: 146.69    AORTOGRAPHY N/A 6/29/2020    Procedure: AORTOGRAM;  Surgeon: Bruce Dallas MD;  [facility];  Service: Peripheral Vascular;  Laterality: N/A;    APPLICATION OF WOUND VACUUM-ASSISTED CLOSURE DEVICE Bilateral 1/1/2020    Procedure: APPLICATION, WOUND VAC;  Surgeon: SEBAS Prieto II, MD;  [facility];  Service: Cardiovascular;  Laterality: Bilateral;    APPLICATION OF WOUND VACUUM-ASSISTED CLOSURE DEVICE N/A 1/3/2020    Procedure: APPLICATION, WOUND VAC;  Surgeon: Brian Wong MD;  Location: [facility] OR [address] [place] [address];  Service: Plastics;  Laterality: N/A;    BRAIN SURGERY  01/2011    TUMOR EXCISED    CREATION OF MUSCLE ROTATIONAL FLAP N/A 1/3/2020    Procedure: CREATION, FLAP, MUSCLE ROTATION;  Surgeon: Brian Wong MD;  Location: [facility] OR [address] [place] [address];  Service: Plastics;  Laterality: N/A;    ENDARTERECTOMY OF FEMORAL ARTERY Bilateral 12/20/2019    Procedure: ENDARTERECTOMY, FEMORAL;  Surgeon: Kelby Gomez MD;  Location: [address];  Service: Peripheral Vascular;  Laterality: Bilateral;  natalya common femoral endarterectomy with natalya. iliac stent placement    PERCUTANEOUS TRANSLUMINAL ANGIOPLASTY Left 4/6/2020    Procedure: PTA (ANGIOPLASTY, PERCUTANEOUS, TRANSLUMINAL), left lower extremity;  Surgeon: Kelby Gomez MD;  Location: [address];  Service: Peripheral Vascular;  Laterality: Left;    PERCUTANEOUS TRANSLUMINAL ANGIOPLASTY (PTA) OF PERIPHERAL VESSEL Left 10/17/2019    Procedure: PTA, PERIPHERAL VESSEL;  Surgeon: Rao Fisher MD;  Location: [facility] CATH;  Service: Cardiology;  Laterality: Left;    PSEUDOANEURYSM REPAIR Left 6/29/2020    Procedure: REPAIR, PSEUDOANEURYSM;  Surgeon: Bruce Dallas MD;   Location: [facility] OR [address] [place] [address];  Service: Peripheral Vascular;  Laterality: Left;       Review of patient's allergies indicates:   Allergen Reactions    Penicillins Hives     Patient currently on cefepime without complications  Tolerated ceftriaxone 3/2020       Medications:  Medications Prior to Admission   Medication Sig    ascorbic acid (VITAMIN C ORAL) Take 2 capsules by mouth every morning.    aspirin (ECOTRIN) 81 MG EC tablet Take 81 mg by mouth once daily.    atorvastatin (LIPITOR) 40 MG tablet Take 40 mg by mouth once daily.    clopidogreL (PLAVIX) 75 mg tablet Take 1 tablet (75 mg total) by mouth once daily. (Patient taking differently: Take 75 mg by mouth every evening. )    elderberry fruit and flower 460-115 mg Cap Take 2 capsules by mouth every morning.    metFORMIN (GLUCOPHAGE-XR) 500 MG XR 24hr tablet Take 1,000 mg by mouth daily with breakfast.     pregabalin (LYRICA) 100 MG capsule Take 100 mg by mouth 2 (two) times daily.    valsartan (DIOVAN) 160 MG tablet Take 160 mg by mouth once daily.    cefadroxil (DURICEF) 500 MG Cap Take 2 capsules (1,000 mg total) by mouth every 12 (twelve) hours.    HYDROcodone-acetaminophen (NORCO) 5-325 mg per tablet Take 1 tablet by mouth every 6 (six) hours as needed for Pain.     Antibiotics (From admission, onward)    Start     Stop Route Frequency Ordered    08/13/20 1545  cefTRIAXone (ROCEPHIN) 2 g/50 mL D5W IVPB      -- IV Every 24 hours (non-standard times) 08/13/20 1442        Antifungals (From admission, onward)    None        Antivirals (From admission, onward)    None           There is no immunization history for the selected administration types on file for this patient.    Family History     Problem Relation (Age of Onset)    Cancer Father, Brother    Diabetes Mother    Heart disease Father, Brother, Brother, Brother    Hypertension Mother    Stroke Mother        Social History     Socioeconomic History    Marital status:      Spouse name:  Not on file    Number of children: Not on file    Years of education: Not on file    Highest education level: Not on file   Occupational History    Not on file   Social Needs    Financial resource strain: Not on file    Food insecurity     Worry: Not on file     Inability: Not on file    Transportation needs     Medical: Not on file     Non-medical: Not on file   Tobacco Use    Smoking status: Former Smoker     Packs/day: 2.00     Types: Cigarettes     Quit date: 2011     Years since quittin.5    Smokeless tobacco: Never Used   Substance and Sexual Activity    Alcohol use: Yes     Comment: 2-4 PER DAY    Drug use: Never    Sexual activity: Not on file   Lifestyle    Physical activity     Days per week: Not on file     Minutes per session: Not on file    Stress: Not on file   Relationships    Social connections     Talks on phone: Not on file     Gets together: Not on file     Attends Sikh service: Not on file     Active member of club or organization: Not on file     Attends meetings of clubs or organizations: Not on file     Relationship status: Not on file   Other Topics Concern    Not on file   Social History Narrative    Not on file     Review of Systems   Constitutional: Positive for activity change. Negative for appetite change, chills, diaphoresis, fatigue and fever.   HENT: Negative.    Eyes: Negative.    Respiratory: Negative for cough, shortness of breath and wheezing.    Cardiovascular: Negative for chest pain and palpitations.   Gastrointestinal: Negative for abdominal pain, diarrhea, nausea and vomiting.   Genitourinary:        Packer   Musculoskeletal: Negative for back pain and myalgias.        Surgical site pain   Skin: Positive for wound (wound vac left groin).   Neurological: Negative for dizziness, numbness and headaches.   Psychiatric/Behavioral: Negative for agitation and confusion.     Objective:     Vital Signs (Most Recent):  Temp: 98.4 °F (36.9 °C) (20  1659)  Pulse: 76 (08/13/20 1659)  Resp: 16 (08/13/20 1659)  BP: (!) 157/76 (08/13/20 1659)  SpO2: 98 % (08/13/20 1659) Vital Signs (24h Range):  Temp:  [97.7 °F (36.5 °C)-98.4 °F (36.9 °C)] 98.4 °F (36.9 °C)  Pulse:  [] 76  Resp:  [8-20] 16  SpO2:  [93 %-100 %] 98 %  BP: (144-174)/(74-86) 157/76  Arterial Line BP: (141-177)/(69-79) 177/79     Weight: 123.4 kg (272 lb 0.8 oz)  Body mass index is 37.94 kg/m².    Estimated Creatinine Clearance: 155.6 mL/min (based on SCr of 0.7 mg/dL).    Physical Exam  Vitals signs and nursing note reviewed.   Constitutional:       General: He is not in acute distress.     Appearance: Normal appearance. He is not toxic-appearing.   HENT:      Head: Normocephalic and atraumatic.   Eyes:      General: No scleral icterus.     Conjunctiva/sclera: Conjunctivae normal.   Cardiovascular:      Rate and Rhythm: Normal rate.      Heart sounds: Murmur present.   Pulmonary:      Effort: Pulmonary effort is normal.      Breath sounds: Normal breath sounds.   Abdominal:      General: There is no distension.      Palpations: Abdomen is soft.      Tenderness: There is no abdominal tenderness.   Genitourinary:     Comments: Packer      Musculoskeletal:      Comments: Left groin with wound vac in place  2 drains with small amount bloody drainage  LLQ abdominal incision dressing c/d/i   Skin:     General: Skin is warm and dry.   Neurological:      General: No focal deficit present.      Mental Status: He is alert and oriented to person, place, and time.   Psychiatric:         Mood and Affect: Mood normal.         Behavior: Behavior normal.         Significant Labs:   Blood Culture:   Recent Labs   Lab 06/26/20  0713 06/26/20  0717 06/27/20  0010 06/27/20  0017 06/30/20  1321   LABBLOO Gram stain aer bottle: Gram negative rods   Results called to and read back by: Sana Jay RN  06/28/2020  14:10  PROTEUS MIRABILIS* No growth after 5 days. No growth after 5 days. No growth after 5 days. No  growth after 5 days.     CBC:   Recent Labs   Lab 08/12/20  0917 08/13/20  0433   WBC  --  8.42  8.42   HGB  --  11.4*  11.4*   HCT 39 35.6*  35.6*   PLT  --  154  154     CMP:   Recent Labs   Lab 08/13/20  0433     137   K 4.0  4.0     105   CO2 27  27   *  118*   BUN 11  11   CREATININE 0.7  0.7   CALCIUM 8.7  8.7   ANIONGAP 5*  5*   EGFRNONAA >60.0  >60.0     Wound Culture:   Recent Labs   Lab 06/29/20  2244 08/12/20  1138 08/12/20  1154 08/12/20  1213   LABAERO No growth  No growth No growth Insufficient incubation, culture in progress No growth       Significant Imaging: I have reviewed all pertinent imaging results/findings within the past 24 hours.

## 2020-08-13 NOTE — PLAN OF CARE
CM met with patient and wife in room for Discharge Planning Assessment.  Patient wife to answer questions.  Per patient's wife,  he lives with her in a house with 3 step(s) to enter.   Per patient's wife, he was independent with ADLS and did not use DME for ambulation.  Patient will have assistance from wife upon discharge.   Discharge Planning Booklet given to patient/family and discussed.  All questions addressed.  CM will follow for needs.     08/13/20 1402   Discharge Assessment   Assessment Type Discharge Planning Assessment   Confirmed/corrected address and phone number on facesheet? Yes   Assessment information obtained from? Other  (wife)   Expected Length of Stay (days) 4   Communicated expected length of stay with patient/caregiver yes   Prior to hospitilization cognitive status: Alert/Oriented   Prior to hospitalization functional status: Independent   Current cognitive status: Alert/Oriented   Current Functional Status: Needs Assistance   Lives With spouse   Able to Return to Prior Arrangements yes   Is patient able to care for self after discharge? Unable to determine at this time (comments)  (TBD)   Patient's perception of discharge disposition home health   Readmission Within the Last 30 Days no previous admission in last 30 days   Patient currently being followed by outpatient case management? No   Patient currently receives any other outside agency services? Yes   Name and contact number of agency or person providing outside services [facility]/ [facility]   Equipment Currently Used at Home walker, rolling;cane, straight  (has these items; doesnt use them)   Do you have any problems affording any of your prescribed medications? No   Is the patient taking medications as prescribed? yes   Does the patient have transportation home? Yes   Transportation Anticipated family or friend will provide   Does the patient receive services at the [facility]mad[facility] Clinic? No   Discharge Plan A Home  Health   Discharge Plan B Other  (TBD)   DME Needed Upon Discharge  other (see comments)  (TBD)   Patient/Family in Agreement with Plan yes          PCP:  Eren Becker MD        Pharmacy:    [facility] [place] - [address] [place] [id number]  Phone: [phone] Fax: [phone]        Emergency Contacts:  Extended Emergency Contact Information  Primary Emergency Contact: Patricia Britton   [facility]  Home Phone: [phone]  Mobile Phone: [phone]  Relation: Spouse   needed? No  Secondary Emergency Contact: Sanjay Britton  Address: [address] [place] [address] [place]  Mobile Phone: [phone]  Relation: Son      Insurance:    Payor: [employer] / Plan: [employer] PPO PLUS / Product Type: PPO /       08/13/2020  2:08 PM    Sherley Armando RN, CM   Ext: [phone]

## 2020-08-13 NOTE — OP NOTE
[facility]  General Surgery  Operative Note    SUMMARY     Date of Procedure: 8/12/2020        Modifier: A 22 Modifier is appropriate in this case: > 300% the preoperative preparation, intraoperative mental and physical effort and procedure time were required with two surgeons to complete the case safely given the overall complexity and 3rd time redo nature of the case.      Procedure(s):  1) 3rd time redo left groin exploration  2) Explant infected stents - left common femoral/external arteries  3) Resection left common femoral artery/left femoral infected pseudoaneurysm  4) Left retroperitoneal exposure  5) Left external iliac to distal common femoral artery bypass with cryopreserved femoral artery  6) Implant, absorbable antibiotic beads    Surgeon(s) and Role:     * Kelby Gomez MD - Primary     * Moreno Hunt MD - Fellow  Assisting Surgeon:     * Remi Roberts MD  (no qulaified resident help was available due to complexity of case)    Pre-Operative Diagnosis: Infected left femoral pseudoaneurysm    Post-Operative Diagnosis:  Infected left femoral pseudoaneurysm    Anesthesia: General    Description of the Findings of the Procedure:   After informed consent was obtained the patient was brought to the operating room and placed supine on the operating table.  Anesthesia was induced and he was intubated.  A central line was inserted as well as arterial line by Anesthesia.  The patient's left hip was placed on a small gel roll bump.  All pressure points were padded.  Arms out.  The left leg was prepped circumferentially, the right groin prep to the knee, and the abdomen prepped widely to the nipples.  Draped in standard fashion with a foot bag.     was called into the room as he had performed previously a left-sided pedicle graft for wound coverage that needed to be rotated off the wound to proceed with exploration.  This will be dictated separately by him.  Once he  completed his portion of the case we proceeded to identify the SFA and profundus in the groin.  There is dense scar tissue in regards from his prior surgeries and made this very difficult.  We identified the SFA and profunda and circumferentially dissected out both and controlled them with vessel loops in a pots manner.  We then proceeded to make our abdominal incision, an oblique incision roughly 3 cm superior to the inguinal ligaments.  The skin and subcutaneous tissue were divided with electrocautery until the anterior abdominal wall fascia was exposed.  External oblique aponeurosis was entered and muscle was divided using careful electrocautery for hemostasis.  The rectus muscle also was partially divided to obtain good exposure preperitoneal plane was entered and the fat and abdominal contents were swept medially without entering the abdominal cavity proper.  An Omni retractor was utilized for exposure.  The external iliac artery was ]identified, dissected and circumferentially controlled with umbilical tapes.  The ureter was identified and protected.    Attention was turned back to the groin where further dissection was carried out now that we had potential proximal control should we need it in the abdomen.  There is a large pseudoaneurysm cavity filled with infected appearing old thrombus.  The exposure was quite tedious due to the large amount of scar tissue and large pseuodaneurysm and required well over twice the normal time and effort.  This was cleared out and the thick fibrous capsule was debrided back.  We identified the external iliac to common femoral Viabahn stent which was clearly visible as the arterial wall had degraded quite a bit.  There is no pulsatile blood coming from the artery.  A tunnel was fashioned from the retroperitoneal space to the groin space along the anterior edge of the external iliac artery.  Continued debridement and removal of the native common femoral and distal external  iliac artery were carefully performed.  This portion of the case was quite tedious given the amount of scar tissue and non-surgical bleeding.     Once we were satisfied with our dissection of the artery, the patient was systemically heparinized.  Once heparin had circulated the external iliac artery was clamped with a large aortic clamp.  The [address] stent was removed through the groin incision.  Circumflex artery was controlled with clips as it was back bleeding into the open lumen the artery.  The remainder of the common femoral artery was resected to its most distal aspect which appeared healthy.  The distal external iliac was divided and the lumen was spatula did.    A suitable size cryopreserved femoral artery was selected and brought onto the field.  A small leak was repaired prior to trimming it to length.  The proximal and of the cryo artery was trimmed and spatulated likewise.  An end-to-end anastomosis was performed with 6 0 Prolene to the external iliac.  This was tied over a pledget.  The cryo artery conduit was then clamped and the external iliac clamp was removed revealing a hemostatic anastomosis.  The artery graft was then marked longitudinally with a marker to maintain its orientation.  The graft was tunneled through the groin and trimmed to length.  This was anastomosed in an end-to-end fashion to the distal common femoral artery.  The anastomosis was appropriately back bled to remove all air and debris prior to completion of the anastomosis.  The anastomotic line was hemostatic.  Strong biphasic Doppler signals were obtained in both the SFA and profunda upon releasing clamps.    Protamine was administered to reverse heparinization with a test dose to ensure no hypotension.  Hemostasis was meticulously achieved with combination of electrocautery, thrombin Gelfoam, Surgicel, and Nu knit.  The abdominal incision was closed with running 1.  Prolene looped in a single running layer.  The subcutaneous  tissues were reapproximated with 2 Vicryl interrupted [id number].  The skin was closed with staples.    We then called  back in to the rotate the flap over the wound.  This will also be dictated separately by him.  Tobramycin/vancomycin absorbable beads were left in the wound bed.  A Prevena was placed on the groin wound.  Two JPS were left, superior more shallow and the inferior deeper.    At the end of the case the patient had slightly weaker DP signal on the left but strong triphasic PT on the left and also DP and PT on the right.    The patient was allowed to awaken and was extubated.  He is taken to the postanesthesia recovery unit in very good condition.    All lap, sponge and needle counts were correct.    Dr. Gomez was present for the entire case.    Complications: No    Estimated Blood Loss (EBL): 400 mL           Implants:   Implant Name Type Inv. Item Serial No.  Lot No. LRB No. Used Action   Angiograft Cryo Femoral Artery 25cm     [id number]  1 Implanted   Stimulan Rapid Cure     [id number] Left 1 Implanted       Specimens:   Specimen (12h ago, onward)    None                  Condition: Good    Disposition: PACU - hemodynamically stable.      Kelby Gomez MD  Vascular & Endovascular Surgery

## 2020-08-13 NOTE — OP NOTE
[facility]  Plastic Surgery  Operative Note    SUMMARY     Date of Procedure: 8/12/2020     Procedure: Procedure(s) (LRB):  CREATION, BYPASS, ARTERIAL, ILIAC TO FEMORAL (Left)     1.  Delay of previous groin flap  2.  Adjacent tissue transfer of left groin skin 50 cm2  3.  Sartorius muscle flap  4.  Debridement of skin, subcutaneous tissue, muscle and fascia 100 cm2 in preparation for flap closure  5.  Incisional wound vac.      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  Today he underwent extra-anatomic bypass.  Written consent for elevation of flap and flap closure was obtained.  All questions were answered.       Procedure:   The patient was transported to the OR.  Appropriate time out was performed.  The medial incisions of the alt flap were incised, the pseudoaneurysm exposed and the lateral muscles were isolated and exposed.  Vascular surgery then completed their bypass.  The health of the skin flap was concerned as there was capillary refill throughout.      Following completion of the bypass, The anterior edge of the ALT flap was freshened up to healthy tissue.  The fibrinous rind over the anterior abdominal wall and lateral groin was excised with a scalpel and bovie back to bleeding tissue.  Foreign material was removed from the wound.  Stimulan vancomycin and tobramycin antibiotics bead were placed in the wound.      The sartorius muscle was then fully mobilized by incising the muscle near the asis and reflecting it medially.  It was  secured with 2-0 PDS in position.  The ALT flap was then readvanced and sewn in layers with interrupted 2-0 PDS, 2-0 vicryl and 2-0 nylon with excellent approximation.  An incisional wound vac was placed over the closure.      The vascular surgery service confirmed health of the foot.  All counts were correct    Complications: No    Estimated Blood Loss (EBL): 400 mL           Implants:   Implant Name Type Inv. Item Serial No.  Lot No. LRB No. Used Action   Angiograft Cryo Femoral Artery 25cm     [id number]  1 Implanted   Moxie Jeanan Rapid Cure     [id number] Left 1 Implanted       Specimens:   Specimen (12h ago, onward)    None                  Condition: Good    Disposition: PACU - hemodynamically stable.    Attestation: I was present and scrubbed for the entire procedure.

## 2020-08-13 NOTE — ASSESSMENT & PLAN NOTE
59 year old man with infected L groin pseudoaneurysm s/p explant of external iliac and common femoral stent, debridement, and reconstruction with cadaveric vein bypass and rerotation of thigh flap by plastic surgery.    - Doing well, pain adequately controlled  - Strip KATHLEEN drains 2x daily  - Continue Prevena per PRS recs  - DC foy, arterial line   - PT/OT, OOb ambulation with assistance is encouraged  - Will resume home medications including anti-hypertensives  - Will start oral plavix   - Regular diet    Dispo: possibly tomorrow DC with good ambulation, pulses

## 2020-08-13 NOTE — PROGRESS NOTES
[facility]  Plastic Surgery  Progress Note    Subjective:     History of Present Illness:  No notes on file    Post-Op Info:  Procedure(s) (LRB):  CREATION, BYPASS, ARTERIAL, ILIAC TO FEMORAL (Left)   1 Day Post-Op     Interval History: NAEON. Patient states that he is doing well this AM. Denies f/c/n/v. Denies chest pain or SOB. Endorses LLQ pain mostly related to surgical site.     Medications:  Continuous Infusions:   sodium chloride 0.9% 10 mL/hr at 08/12/20 0715    hydromorphone in 0.9 % NaCl 6 mg/30 ml       Scheduled Meds:   clopidogreL  75 mg Oral Daily    docusate sodium  100 mg Oral BID    heparin (porcine)  5,000 Units Subcutaneous Q8H    polyethylene glycol  17 g Oral Daily    valsartan  160 mg Oral Daily     PRN Meds:HYDROcodone-acetaminophen, HYDROmorphone, naloxone, ondansetron     Review of patient's allergies indicates:   Allergen Reactions    Penicillins Hives     Patient currently on cefepime without complications  Tolerated ceftriaxone 3/2020     Objective:     Vital Signs (Most Recent):  Temp: 98.3 °F (36.8 °C) (08/13/20 0835)  Pulse: 73 (08/13/20 0835)  Resp: 18 (08/13/20 0842)  BP: (!) 173/81 (08/13/20 0835)  SpO2: 100 % (08/13/20 0835) Vital Signs (24h Range):  Temp:  [97.5 °F (36.4 °C)-98.3 °F (36.8 °C)] 98.3 °F (36.8 °C)  Pulse:  [] 73  Resp:  [8-21] 18  SpO2:  [93 %-100 %] 100 %  BP: (122-174)/(63-92) 173/81  Arterial Line BP: (122-177)/(65-79) 177/79     Weight: 123.4 kg (272 lb 0.8 oz)  Body mass index is 37.94 kg/m².    Intake/Output - Last 3 Shifts       08/11 0700 - 08/12 0659 08/12 0700 - 08/13 0659 08/13 0700 - 08/14 0659    P.O.  240 450    I.V. (mL/kg)  5000 (40.5)     Total Intake(mL/kg)  5240 (42.5) 450 (3.6)    Urine (mL/kg/hr)  2100 (0.7) 775 (2)    Emesis/NG output   0    Drains  125 0    Other   0    Stool  0 0    Blood  400 0    Total Output  2625 775    Net  +2615 -325           Urine Occurrence   0 x    Stool Occurrence  0 x 0 x    Emesis  Occurrence   0 x          Physical Exam  Constitutional:       General: He is not in acute distress.     Appearance: Normal appearance. He is obese.   HENT:      Head: Normocephalic and atraumatic.      Right Ear: External ear normal.      Left Ear: External ear normal.      Nose: Nose normal.      Mouth/Throat:      Mouth: Mucous membranes are moist.      Pharynx: Oropharynx is clear.   Eyes:      Extraocular Movements: Extraocular movements intact.      Pupils: Pupils are equal, round, and reactive to light.   Neck:      Musculoskeletal: Normal range of motion.   Cardiovascular:      Rate and Rhythm: Normal rate.      Comments: Doppler signals present in bilateral DP  Pulmonary:      Effort: Pulmonary effort is normal. No respiratory distress.   Abdominal:      General: There is no distension.      Palpations: Abdomen is soft.      Tenderness: There is abdominal tenderness.      Comments: Mild LLQ tenderness to deep palpation   Musculoskeletal:      Comments: Incisional vac in place L groin, 2 KATHLEEN drains with SS output.   Skin:     General: Skin is warm and dry.   Neurological:      General: No focal deficit present.      Mental Status: He is alert and oriented to person, place, and time.   Psychiatric:         Mood and Affect: Mood normal.         Behavior: Behavior normal.         Significant Labs:  CBC:   Recent Labs   Lab 08/13/20  0433   WBC 8.42  8.42   RBC 4.12*  4.12*   HGB 11.4*  11.4*   HCT 35.6*  35.6*     154   MCV 86  86   MCH 27.7  27.7   MCHC 32.0  32.0     BMP:   Recent Labs   Lab 08/13/20  0433   *  118*     137   K 4.0  4.0     105   CO2 27  27   BUN 11  11   CREATININE 0.7  0.7   CALCIUM 8.7  8.7     CMP:   Recent Labs   Lab 08/06/20  1400 08/13/20  0433   * 118*  118*   CALCIUM 10.0 8.7  8.7   ALBUMIN 4.5  --    PROT 8.0  --     137  137   K 4.3 4.0  4.0   CO2 27 27  27    105  105   BUN 20 11  11   CREATININE 0.70* 0.7  0.7    ALKPHOS 51  --    ALT 28  --    AST 33  --    BILITOT 0.8  --        Significant Diagnostics:  I have reviewed all pertinent imaging results/findings within the past 24 hours.    Assessment/Plan:     PAD (peripheral artery disease)  Renan Britton is a 59 year old man with infected L groin pseudoaneurysm s/p explant of external iliac and common femoral stent, debridement, and reconstruction with cadaveric vein bypass and rerotation of thigh flap by plastic surgery.  - Doppler signals present distal to flap  - Incisional vac in place  - Continue to monitor drain output  - q2h vascular checks  - remainder of care per primary        Larry Gonzalez MD  Plastic Surgery  [facility]

## 2020-08-13 NOTE — PROGRESS NOTES
[facility]  Vascular Surgery  Progress Note    Patient Name: Renan Britton  MRN: [id number]  Admission Date: 8/12/2020  Primary Care Provider: Eren Becker MD    Subjective:     Interval History: Post op seen in PACU.  No unexpected complaints    Post-Op Info:  Procedure(s) (LRB):  CREATION, BYPASS, ARTERIAL, ILIAC TO FEMORAL (Left)   Day of Surgery       Medications:  Continuous Infusions:   sodium chloride 0.9% 10 mL/hr at 08/12/20 0715     Scheduled Meds:   heparin (porcine)  5,000 Units Subcutaneous Q8H    lidocaine (PF) 10 mg/ml (1%)  1 mL Intradermal Once     PRN Meds:fentaNYL, HYDROmorphone, ondansetron     Objective:     Vital Signs (Most Recent):  Temp: 97.9 °F (36.6 °C) (08/12/20 1900)  Pulse: 86 (08/12/20 1927)  Resp: 14 (08/12/20 1930)  BP: (!) 160/85 (08/12/20 1900)  SpO2: 100 % (08/12/20 1927) Vital Signs (24h Range):  Temp:  [97.5 °F (36.4 °C)-98.1 °F (36.7 °C)] 97.9 °F (36.6 °C)  Pulse:  [] 86  Resp:  [11-21] 14  SpO2:  [93 %-100 %] 100 %  BP: (122-161)/(63-92) 160/85  Arterial Line BP: (122-167)/(65-78) 164/75     Date 08/12/20 0700 - 08/13/20 0659   Shift 0827-8206 3099-4763 3613-6055 24 Hour Total   INTAKE   I.V.(mL/kg) 4200(37) 800(7.1)  5000(44.1)   Shift Total(mL/kg) 4200(37) 800(7.1)  5000(44.1)   OUTPUT   Urine(mL/kg/hr) 575(0.6) 350  925   Drains  55  55   Blood 400   400   Shift Total(mL/kg) 975(8.6) 405(3.6)  1380(12.2)   Weight (kg) 113.4 113.4 113.4 113.4       Physical Exam   LLE with doppler DP (monophasic) and triphasic PT, RLE with triphasic DP/PT  Groin prevena is intact, scant output  Abdominal incision dressings CDI  KATHLEEN drains 30ml each of SS fluid      Assessment/Plan:     PAD (peripheral artery disease)  59 year old man with infected L groin pseudoaneurysm s/p explant of external iliac and common femoral stent, debridement, and reconstruction with cadaveric vein bypass and rerotation of thigh flap by plastic surgery.    -Doing well, pain  adequately controlled  -Strip KATHLEEN drains 2x daily  -Continue Prevena per PRS recs  -Restart plavix in AM if bleeding is not an issue  -Q2H vascular checks overnight        Moreno Hunt MD  Vascular Surgery  [facility]

## 2020-08-13 NOTE — NURSING TRANSFER
Nursing Transfer Note      8/12/2020     Transfer To: 1023    Transfer via bed    Transfer with cardiac monitoring    Transported by RN    Medicines sent: none    Chart send with patient: Yes    Notified: RN    Patient reassessed at: 8/12/20

## 2020-08-13 NOTE — PLAN OF CARE
POC reviewed w/ pt, verbalized understanding. Pt AAOx4. VSS on RA. Pt on tele NS. Pain managed w/ PRN pain meds. x2 KATHLEEN drains, intact and patent. Wound vac to left groin @ 125. NV checks q2 w/ doppler. Pt voids per foy w/ adequate UOP overnight. Pt tolerated clear liquid diet. Pt denies any N/V overnight. Pt slept b/w care. Frequent rounds made for pt safety. Call light in reach. Bed in lowest position and locked. WCTM.

## 2020-08-13 NOTE — SUBJECTIVE & OBJECTIVE
Interval History: NAEON. Patient states that he is doing well this AM. Denies f/c/n/v. Denies chest pain or SOB. Endorses LLQ pain mostly related to surgical site.     Medications:  Continuous Infusions:   sodium chloride 0.9% 10 mL/hr at 08/12/20 0715    hydromorphone in 0.9 % NaCl 6 mg/30 ml       Scheduled Meds:   clopidogreL  75 mg Oral Daily    docusate sodium  100 mg Oral BID    heparin (porcine)  5,000 Units Subcutaneous Q8H    polyethylene glycol  17 g Oral Daily    valsartan  160 mg Oral Daily     PRN Meds:HYDROcodone-acetaminophen, HYDROmorphone, naloxone, ondansetron     Review of patient's allergies indicates:   Allergen Reactions    Penicillins Hives     Patient currently on cefepime without complications  Tolerated ceftriaxone 3/2020     Objective:     Vital Signs (Most Recent):  Temp: 98.3 °F (36.8 °C) (08/13/20 0835)  Pulse: 73 (08/13/20 0835)  Resp: 18 (08/13/20 0842)  BP: (!) 173/81 (08/13/20 0835)  SpO2: 100 % (08/13/20 0835) Vital Signs (24h Range):  Temp:  [97.5 °F (36.4 °C)-98.3 °F (36.8 °C)] 98.3 °F (36.8 °C)  Pulse:  [] 73  Resp:  [8-21] 18  SpO2:  [93 %-100 %] 100 %  BP: (122-174)/(63-92) 173/81  Arterial Line BP: (122-177)/(65-79) 177/79     Weight: 123.4 kg (272 lb 0.8 oz)  Body mass index is 37.94 kg/m².    Intake/Output - Last 3 Shifts       08/11 0700 - 08/12 0659 08/12 0700 - 08/13 0659 08/13 0700 - 08/14 0659    P.O.  240 450    I.V. (mL/kg)  5000 (40.5)     Total Intake(mL/kg)  5240 (42.5) 450 (3.6)    Urine (mL/kg/hr)  2100 (0.7) 775 (2)    Emesis/NG output   0    Drains  125 0    Other   0    Stool  0 0    Blood  400 0    Total Output  2625 775    Net  +2615 -325           Urine Occurrence   0 x    Stool Occurrence  0 x 0 x    Emesis Occurrence   0 x          Physical Exam  Constitutional:       General: He is not in acute distress.     Appearance: Normal appearance. He is obese.   HENT:      Head: Normocephalic and atraumatic.      Right Ear: External ear normal.       Left Ear: External ear normal.      Nose: Nose normal.      Mouth/Throat:      Mouth: Mucous membranes are moist.      Pharynx: Oropharynx is clear.   Eyes:      Extraocular Movements: Extraocular movements intact.      Pupils: Pupils are equal, round, and reactive to light.   Neck:      Musculoskeletal: Normal range of motion.   Cardiovascular:      Rate and Rhythm: Normal rate.      Comments: Doppler signals present in bilateral DP  Pulmonary:      Effort: Pulmonary effort is normal. No respiratory distress.   Abdominal:      General: There is no distension.      Palpations: Abdomen is soft.      Tenderness: There is abdominal tenderness.      Comments: Mild LLQ tenderness to deep palpation   Musculoskeletal:      Comments: Incisional vac in place L groin, 2 KATHLEEN drains with SS output.   Skin:     General: Skin is warm and dry.   Neurological:      General: No focal deficit present.      Mental Status: He is alert and oriented to person, place, and time.   Psychiatric:         Mood and Affect: Mood normal.         Behavior: Behavior normal.         Significant Labs:  CBC:   Recent Labs   Lab 08/13/20  0433   WBC 8.42  8.42   RBC 4.12*  4.12*   HGB 11.4*  11.4*   HCT 35.6*  35.6*     154   MCV 86  86   MCH 27.7  27.7   MCHC 32.0  32.0     BMP:   Recent Labs   Lab 08/13/20  0433   *  118*     137   K 4.0  4.0     105   CO2 27  27   BUN 11  11   CREATININE 0.7  0.7   CALCIUM 8.7  8.7     CMP:   Recent Labs   Lab 08/06/20  1400 08/13/20  0433   * 118*  118*   CALCIUM 10.0 8.7  8.7   ALBUMIN 4.5  --    PROT 8.0  --     137  137   K 4.3 4.0  4.0   CO2 27 27  27    105  105   BUN 20 11  11   CREATININE 0.70* 0.7  0.7   ALKPHOS 51  --    ALT 28  --    AST 33  --    BILITOT 0.8  --        Significant Diagnostics:  I have reviewed all pertinent imaging results/findings within the past 24 hours.

## 2020-08-13 NOTE — PROGRESS NOTES
Afternoon Rounds Note:    Pain much improved with PCA.  Packer out and voiding.  A-line removed.  Pending transfer to stepdown    BLE warm, well perfused  Abd incision dressings CDI, abd soft, NT ND  Groin prevena intact      -Will order night time oral pain reg  -Plavix restarted  -OOB tomorrow, PT/OT ordered  -Pain control with PCA for now    Reinaldo Hunt MD  Vascular Fellow PGY6

## 2020-08-14 LAB
ALBUMIN SERPL BCP-MCNC: 3.1 G/DL (ref 3.5–5.2)
ALP SERPL-CCNC: 41 U/L (ref 55–135)
ALT SERPL W/O P-5'-P-CCNC: 20 U/L (ref 10–44)
ANION GAP SERPL CALC-SCNC: 7 MMOL/L (ref 8–16)
AST SERPL-CCNC: 38 U/L (ref 10–40)
BACTERIA SPEC AEROBE CULT: NORMAL
BASOPHILS # BLD AUTO: 0.02 K/UL (ref 0–0.2)
BASOPHILS NFR BLD: 0.3 % (ref 0–1.9)
BILIRUB SERPL-MCNC: 0.5 MG/DL (ref 0.1–1)
BLD PROD TYP BPU: NORMAL
BLOOD UNIT EXPIRATION DATE: NORMAL
BLOOD UNIT TYPE CODE: 600
BLOOD UNIT TYPE: NORMAL
BUN SERPL-MCNC: 10 MG/DL (ref 6–20)
CALCIUM SERPL-MCNC: 8.7 MG/DL (ref 8.7–10.5)
CHLORIDE SERPL-SCNC: 101 MMOL/L (ref 95–110)
CO2 SERPL-SCNC: 28 MMOL/L (ref 23–29)
CODING SYSTEM: NORMAL
CREAT SERPL-MCNC: 0.7 MG/DL (ref 0.5–1.4)
DIFFERENTIAL METHOD: ABNORMAL
DISPENSE STATUS: NORMAL
EOSINOPHIL # BLD AUTO: 0.1 K/UL (ref 0–0.5)
EOSINOPHIL NFR BLD: 1.5 % (ref 0–8)
ERYTHROCYTE [DISTWIDTH] IN BLOOD BY AUTOMATED COUNT: 14.2 % (ref 11.5–14.5)
EST. GFR  (AFRICAN AMERICAN): >60 ML/MIN/1.73 M^2
EST. GFR  (NON AFRICAN AMERICAN): >60 ML/MIN/1.73 M^2
GLUCOSE SERPL-MCNC: 125 MG/DL (ref 70–110)
HCT VFR BLD AUTO: 33.7 % (ref 40–54)
HGB BLD-MCNC: 10.7 G/DL (ref 14–18)
IMM GRANULOCYTES # BLD AUTO: 0.02 K/UL (ref 0–0.04)
IMM GRANULOCYTES NFR BLD AUTO: 0.3 % (ref 0–0.5)
LYMPHOCYTES # BLD AUTO: 1 K/UL (ref 1–4.8)
LYMPHOCYTES NFR BLD: 12.5 % (ref 18–48)
MCH RBC QN AUTO: 27.5 PG (ref 27–31)
MCHC RBC AUTO-ENTMCNC: 31.8 G/DL (ref 32–36)
MCV RBC AUTO: 87 FL (ref 82–98)
MONOCYTES # BLD AUTO: 0.7 K/UL (ref 0.3–1)
MONOCYTES NFR BLD: 8.3 % (ref 4–15)
NEUTROPHILS # BLD AUTO: 6.1 K/UL (ref 1.8–7.7)
NEUTROPHILS NFR BLD: 77.1 % (ref 38–73)
NRBC BLD-RTO: 0 /100 WBC
PLATELET # BLD AUTO: 139 K/UL (ref 150–350)
PMV BLD AUTO: 10.9 FL (ref 9.2–12.9)
POTASSIUM SERPL-SCNC: 4 MMOL/L (ref 3.5–5.1)
PROT SERPL-MCNC: 6.4 G/DL (ref 6–8.4)
RBC # BLD AUTO: 3.89 M/UL (ref 4.6–6.2)
SODIUM SERPL-SCNC: 136 MMOL/L (ref 136–145)
TRANS ERYTHROCYTES VOL PATIENT: NORMAL ML
WBC # BLD AUTO: 7.94 K/UL (ref 3.9–12.7)

## 2020-08-14 PROCEDURE — 25000003 PHARM REV CODE 250: Performed by: STUDENT IN AN ORGANIZED HEALTH CARE EDUCATION/TRAINING PROGRAM

## 2020-08-14 PROCEDURE — 25000003 PHARM REV CODE 250: Performed by: SURGERY

## 2020-08-14 PROCEDURE — 97530 THERAPEUTIC ACTIVITIES: CPT

## 2020-08-14 PROCEDURE — 97162 PT EVAL MOD COMPLEX 30 MIN: CPT

## 2020-08-14 PROCEDURE — 80053 COMPREHEN METABOLIC PANEL: CPT

## 2020-08-14 PROCEDURE — 94761 N-INVAS EAR/PLS OXIMETRY MLT: CPT

## 2020-08-14 PROCEDURE — 63600175 PHARM REV CODE 636 W HCPCS: Performed by: NURSE PRACTITIONER

## 2020-08-14 PROCEDURE — 97165 OT EVAL LOW COMPLEX 30 MIN: CPT

## 2020-08-14 PROCEDURE — 20600001 HC STEP DOWN PRIVATE ROOM

## 2020-08-14 PROCEDURE — 94770 HC EXHALED C02 TEST: CPT

## 2020-08-14 PROCEDURE — 99233 PR SUBSEQUENT HOSPITAL CARE,LEVL III: ICD-10-PCS | Mod: ,,, | Performed by: NURSE PRACTITIONER

## 2020-08-14 PROCEDURE — 63600175 PHARM REV CODE 636 W HCPCS: Performed by: STUDENT IN AN ORGANIZED HEALTH CARE EDUCATION/TRAINING PROGRAM

## 2020-08-14 PROCEDURE — 99233 SBSQ HOSP IP/OBS HIGH 50: CPT | Mod: ,,, | Performed by: NURSE PRACTITIONER

## 2020-08-14 PROCEDURE — 63600175 PHARM REV CODE 636 W HCPCS: Performed by: SURGERY

## 2020-08-14 PROCEDURE — 99900035 HC TECH TIME PER 15 MIN (STAT)

## 2020-08-14 PROCEDURE — 85025 COMPLETE CBC W/AUTO DIFF WBC: CPT

## 2020-08-14 RX ORDER — HYDROMORPHONE HYDROCHLORIDE 1 MG/ML
0.5 INJECTION, SOLUTION INTRAMUSCULAR; INTRAVENOUS; SUBCUTANEOUS EVERY 6 HOURS PRN
Status: DISCONTINUED | OUTPATIENT
Start: 2020-08-14 | End: 2020-08-15

## 2020-08-14 RX ORDER — OXYCODONE HYDROCHLORIDE 10 MG/1
10 TABLET ORAL EVERY 6 HOURS PRN
Status: DISCONTINUED | OUTPATIENT
Start: 2020-08-14 | End: 2020-08-15

## 2020-08-14 RX ORDER — ONDANSETRON 2 MG/ML
4 INJECTION INTRAMUSCULAR; INTRAVENOUS ONCE
Status: COMPLETED | OUTPATIENT
Start: 2020-08-14 | End: 2020-08-14

## 2020-08-14 RX ADMIN — POLYETHYLENE GLYCOL 3350 17 G: 17 POWDER, FOR SOLUTION ORAL at 09:08

## 2020-08-14 RX ADMIN — OXYCODONE HYDROCHLORIDE 10 MG: 10 TABLET ORAL at 10:08

## 2020-08-14 RX ADMIN — CEFTRIAXONE 2 G: 2 INJECTION, SOLUTION INTRAVENOUS at 04:08

## 2020-08-14 RX ADMIN — HYDROMORPHONE HYDROCHLORIDE 0.5 MG: 1 INJECTION, SOLUTION INTRAMUSCULAR; INTRAVENOUS; SUBCUTANEOUS at 05:08

## 2020-08-14 RX ADMIN — CLOPIDOGREL 75 MG: 75 TABLET, FILM COATED ORAL at 09:08

## 2020-08-14 RX ADMIN — HEPARIN SODIUM 5000 UNITS: 5000 INJECTION, SOLUTION INTRAVENOUS; SUBCUTANEOUS at 06:08

## 2020-08-14 RX ADMIN — DOCUSATE SODIUM 100 MG: 100 CAPSULE, LIQUID FILLED ORAL at 10:08

## 2020-08-14 RX ADMIN — HEPARIN SODIUM 5000 UNITS: 5000 INJECTION, SOLUTION INTRAVENOUS; SUBCUTANEOUS at 02:08

## 2020-08-14 RX ADMIN — DOCUSATE SODIUM 100 MG: 100 CAPSULE, LIQUID FILLED ORAL at 09:08

## 2020-08-14 RX ADMIN — HEPARIN SODIUM 5000 UNITS: 5000 INJECTION, SOLUTION INTRAVENOUS; SUBCUTANEOUS at 10:08

## 2020-08-14 RX ADMIN — ONDANSETRON 4 MG: 2 INJECTION INTRAMUSCULAR; INTRAVENOUS at 05:08

## 2020-08-14 RX ADMIN — VANCOMYCIN HYDROCHLORIDE 2500 MG: 1.5 INJECTION, POWDER, LYOPHILIZED, FOR SOLUTION INTRAVENOUS at 06:08

## 2020-08-14 RX ADMIN — OXYCODONE HYDROCHLORIDE 10 MG: 10 TABLET ORAL at 11:08

## 2020-08-14 RX ADMIN — VALSARTAN 160 MG: 160 TABLET, FILM COATED ORAL at 09:08

## 2020-08-14 NOTE — PROGRESS NOTES
[facility]  Plastic Surgery  Progress Note    Subjective:     History of Present Illness:  No notes on file    Post-Op Info:  Procedure(s) (LRB):  CREATION, BYPASS, ARTERIAL, ILIAC TO FEMORAL (Left)   2 Days Post-Op     Interval History: Intermittently hypertensive overnight, home meds restarted. Complaining of LLQ abdominal pain related to surgical site. Denies n/v/f/c. Denies chest pain or SOB.     Medications:  Continuous Infusions:   sodium chloride 0.9% 10 mL/hr at 08/12/20 0715    hydromorphone in 0.9 % NaCl 6 mg/30 ml       Scheduled Meds:   cefTRIAXone (ROCEPHIN) IVPB  2 g Intravenous Q24H    clopidogreL  75 mg Oral Daily    docusate sodium  100 mg Oral BID    heparin (porcine)  5,000 Units Subcutaneous Q8H    polyethylene glycol  17 g Oral Daily    valsartan  160 mg Oral Daily     PRN Meds:naloxone, ondansetron, oxyCODONE     Review of patient's allergies indicates:   Allergen Reactions    Penicillins Hives     Patient currently on cefepime without complications  Tolerated ceftriaxone 3/2020     Objective:     Vital Signs (Most Recent):  Temp: 98.8 °F (37.1 °C) (08/14/20 0438)  Pulse: 84 (08/14/20 0438)  Resp: 16 (08/14/20 0438)  BP: (!) 162/77 (08/14/20 0438)  SpO2: 98 % (08/14/20 0438) Vital Signs (24h Range):  Temp:  [97.5 °F (36.4 °C)-98.8 °F (37.1 °C)] 98.8 °F (37.1 °C)  Pulse:  [] 84  Resp:  [13-20] 16  SpO2:  [97 %-100 %] 98 %  BP: (144-173)/(76-81) 162/77     Weight: 123.4 kg (272 lb 0.8 oz)  Body mass index is 37.94 kg/m².    Intake/Output - Last 3 Shifts       08/12 0700 - 08/13 0659 08/13 0700 - 08/14 0659 08/14 0700 - 08/15 0659    P.O. 240 700     I.V. (mL/kg) 5000 (40.5) 230.3 (1.9)     IV Piggyback  50     Total Intake(mL/kg) 5240 (42.5) 980.3 (7.9)     Urine (mL/kg/hr) 2100 (0.7) 1875 (0.6)     Emesis/NG output  0     Drains 125 110     Other  0     Stool 0 0     Blood 400 0     Total Output 2625 1985     Net +2615 -1004.7            Urine Occurrence  0 x      Stool Occurrence 0 x 0 x     Emesis Occurrence  0 x           Physical Exam  Constitutional:       General: He is not in acute distress.     Appearance: Normal appearance.   HENT:      Head: Normocephalic and atraumatic.      Right Ear: External ear normal.      Left Ear: External ear normal.      Nose: Nose normal.      Comments: ETCO2 monitor in place.     Mouth/Throat:      Mouth: Mucous membranes are moist.      Pharynx: Oropharynx is clear.   Eyes:      Extraocular Movements: Extraocular movements intact.      Pupils: Pupils are equal, round, and reactive to light.   Cardiovascular:      Rate and Rhythm: Normal rate.   Abdominal:      General: There is no distension.      Palpations: Abdomen is soft.      Comments: LLQ tenderness present, unchanged from yesterday. Otherwise benign abdominal exam.   Skin:     General: Skin is warm and dry.      Comments: L groin incisional vac in place, good suction, no leak. Drain with SS output, telfa underlying drain and vac with SS output.   Neurological:      General: No focal deficit present.      Mental Status: He is alert and oriented to person, place, and time.   Psychiatric:         Mood and Affect: Mood normal.         Behavior: Behavior normal.         Significant Labs:  CBC:   Recent Labs   Lab 08/14/20 0430   WBC 7.94   RBC 3.89*   HGB 10.7*   HCT 33.7*   *   MCV 87   MCH 27.5   MCHC 31.8*     BMP:   Recent Labs   Lab 08/14/20 0430   *      K 4.0      CO2 28   BUN 10   CREATININE 0.7   CALCIUM 8.7     CMP:   Recent Labs   Lab 08/14/20 0430   *   CALCIUM 8.7   ALBUMIN 3.1*   PROT 6.4      K 4.0   CO2 28      BUN 10   CREATININE 0.7   ALKPHOS 41*   ALT 20   AST 38   BILITOT 0.5       Significant Diagnostics:  I have reviewed all pertinent imaging results/findings within the past 24 hours.    Assessment/Plan:     PAD (peripheral artery disease)  Renan Britton is a 59 year old man with infected L groin  pseudoaneurysm s/p explant of external iliac and common femoral stent, debridement, and reconstruction with cadaveric vein bypass and rerotation of thigh flap by plastic surgery.  - Doppler signals present distal to flap  - pain control via pca this am  - Incisional vac in place  - Continue to monitor drain output  - q2h vascular checks  - remainder of care per primary        Larry Gonzalez MD  Plastic Surgery  [facility]

## 2020-08-14 NOTE — CARE UPDATE
Rapid Response Respiratory Therapy ETCO2 Check     Time of visit: 748     Code Status: Prior   : 1960  Bed: 1043/1043 A:   MRN: [id number]    SITUATION     Evaluated patient for: ETCO2 Compliance     BACKGROUND     Patient has a past medical history of Diabetes mellitus, H/O brain tumor, HLD (hyperlipidemia), Hypertension, PAD (peripheral artery disease), and Seizures.    ASSESSMENT     Is the ETCO2 monitor on? (Yes/No)  yes   Is the patient wearing a cannula? (Yes/No) yes  Are ETCO2 orders placed? (Yes/No) yes  Is the patient on a PCA pump? (Yes/No) yes  ETCO2 monitored: ETCO2 (mmHg): 36 mmHg  O2 Device/Concentration: RA  Pulse: 85 Respiratory rate: 18 Temperature: Temp: 98 °F (36.7 °C) BP: BP: 138/86 SpO2: 98  Level of Consciousness: Level of Consciousness (AVPU): alert  All Lung Field Breath Sounds: All Lung Fields Breath Sounds: Anterior:, Lateral:, clear  Ambu at bedside: Ambu bag with the patient?: Yes, Adult Ambu    INTERVENTIONS/RECOMMENDATIONS     None at this time    PHYSICIAN ESCALATION     Physician Escalation (Yes/No): no     Care discussed with: n/a  Discussed plan of care primary RT     FOLLOW-UP     Please call back the Rapid Response RT, Adelina Horowitz, RRT at x [phone] for any questions or concerns.

## 2020-08-14 NOTE — PLAN OF CARE
Problem: Occupational Therapy Goal  Goal: Occupational Therapy Goal  Description: Goals to be met by: 8/28/20     Patient will increase functional independence with ADLs by performing:    UE Dressing with Supervision.  LE Dressing with Minimal Assistance.  Grooming while standing with Stand-by Assistance.  Toileting from toilet with Minimal Assistance for hygiene and clothing management.   Supine to sit with Stand-by Assistance.  Toilet transfer to toilet with Stand-by Assistance and LRAD PRN.    Outcome: Ongoing, Progressing    OT evaluation completed and POC established.  Anitha Luna OT  8/14/2020

## 2020-08-14 NOTE — PROGRESS NOTES
Pharmacokinetic Initial Assessment: IV Vancomycin    Assessment/Plan:    Initiate intravenous vancomycin with loading dose of 2500 mg once followed by a maintenance dose of vancomycin 2000mg IV every 12 hours  Desired empiric serum trough concentration is 15 to 20 mcg/mL  Draw vancomycin trough level 30 min prior to fourth dose on 8/16 at approximately 0530  Pharmacy will continue to follow and monitor vancomycin.      Thank you for allowing pharmacy participate in this patient's care.     Sumaya Patrick, PharmD  Clinical Pharmacist, [facility] Pharmacy/[facility] Team  [email or web address]  office [phone]         Patient brief summary:  Renan Britton is a 59 y.o. male initiated on antimicrobial therapy with IV Vancomycin for treatment of suspected skin & soft tissue infection    Drug Allergies:   Review of patient's allergies indicates:   Allergen Reactions    Penicillins Hives     Patient currently on cefepime without complications  Tolerated ceftriaxone 3/2020       Actual Body Weight:   123.4kg    Renal Function:   Estimated Creatinine Clearance: 155.6 mL/min (based on SCr of 0.7 mg/dL).,     Dialysis Method (if applicable):  N/A    CBC (last 72 hours):  Recent Labs   Lab Result Units 08/13/20 0433 08/14/20 0430   WBC K/uL 8.42  8.42 7.94   Hemoglobin g/dL 11.4*  11.4* 10.7*   Hematocrit % 35.6*  35.6* 33.7*   Platelets K/uL 154  154 139*   Gran% % 78.4*  78.4* 77.1*   Lymph% % 11.8*  11.8* 12.5*   Mono% % 9.1  9.1 8.3   Eosinophil% % 0.1  0.1 1.5   Basophil% % 0.2  0.2 0.3   Differential Method  Automated  Automated Automated       Metabolic Panel (last 72 hours):  Recent Labs   Lab Result Units 08/13/20  0433 08/14/20  0430   Sodium mmol/L 137  137 136   Potassium mmol/L 4.0  4.0 4.0   Chloride mmol/L 105  105 101   CO2 mmol/L 27  27 28   Glucose mg/dL 118*  118* 125*   BUN, Bld mg/dL 11  11 10   Creatinine mg/dL 0.7  0.7 0.7   Albumin g/dL  --  3.1*   Total Bilirubin mg/dL  --  0.5   Alkaline  Phosphatase U/L  --  41*   AST U/L  --  38   ALT U/L  --  20       Drug levels (last 3 results):  No results for input(s): VANCOMYCINRA, VANCOMYCINPE, VANCOMYCINTR in the last 72 hours.    Microbiologic Results:  Microbiology Results (last 7 days)       Procedure Component Value Units Date/Time    Aerobic culture [[id number]]  (Abnormal) Collected: 08/12/20 1213    Order Status: Completed Specimen: Groin Updated: 08/14/20 1428     Aerobic Bacterial Culture ENTEROCOCCUS SPECIES  Rare  Identification and susceptibility pending  No other significant isolate      Narrative:      Left Groin Graft-culture    Aerobic culture [[id number]]  (Abnormal) Collected: 08/12/20 1154    Order Status: Completed Specimen: Body Fluid from Groin Updated: 08/14/20 1348     Aerobic Bacterial Culture ENTEROCOCCUS SPECIES  Many  Identification and susceptibility pending      Narrative:      Left groin    Culture, Anaerobe [[id number]] Collected: 08/12/20 1210    Order Status: Completed Specimen: Tissue from Groin Updated: 08/14/20 0954     Anaerobic Culture Culture in progress    Narrative:      Left Groin Graft-culture    Culture, Anaerobe [[id number]] Collected: 08/12/20 1154    Order Status: Completed Specimen: Body Fluid from Groin Updated: 08/14/20 0953     Anaerobic Culture Culture in progress    Narrative:      Left groin    Culture, Anaerobe [[id number]] Collected: 08/12/20 1137    Order Status: Completed Specimen: Wound from Groin Updated: 08/14/20 0952     Anaerobic Culture Culture in progress    Aerobic culture [[id number]] Collected: 08/12/20 1138    Order Status: Completed Specimen: Wound from Groin Updated: 08/14/20 0852     Aerobic Bacterial Culture No significant isolate    Gram stain [[id number]] Collected: 08/12/20 1154    Order Status: Completed Specimen: Body Fluid from Groin Updated: 08/12/20 2027     Gram Stain Result Rare WBC's      Rare Gram positive cocci    Narrative:      lrgy groin    Gram stain [[id number]] Collected:  08/12/20 1137    Order Status: Completed Specimen: Wound from Groin Updated: 08/12/20 2020     Gram Stain Result Rare WBC's      No organisms seen    Gram stain [[id number]] Collected: 08/12/20 1210    Order Status: Completed Specimen: Tissue from Groin Updated: 08/12/20 1418     Gram Stain Result No WBC's      No organisms seen    Narrative:      Left Groin Graft-culture

## 2020-08-14 NOTE — PT/OT/SLP EVAL
"Physical Therapy Evaluation    Patient Name:  Renan Britton   MRN:  [id number]    Recommendations:     Discharge Recommendations:  home health PT   Discharge Equipment Recommendations: none   Barriers to discharge: decreased functional mobility    Assessment:     Renan Britton is a 59 y.o. male admitted with a medical diagnosis of Pseudoaneurysm.  He presents with the following impairments/functional limitations:  weakness, impaired endurance, impaired self care skills, impaired functional mobilty, gait instability, impaired balance, pain, impaired cardiopulmonary response to activity.  Tolerated session c c/o pain and nausea.  Performed mobility c min A - CGA.  Pt able to amb short distance in room c HHAx1 and demo decreased gait speed, narrow [place], FFP, unsteadiness and c/o increased pain.  Pt mildly impulsive requiring cues for safety.  Pt safe to amb c assistance of 1x person. Pt would benefit from continued skilled acute PT 3x/wk to improve functional mobility.  Recommending pt receive PT services in HH setting following d/c from hospital once medically cleared.      Rehab Prognosis: Good; patient would benefit from acute skilled PT services to address these deficits and reach maximum level of function.    Recent Surgery: Procedure(s) (LRB):  CREATION, BYPASS, ARTERIAL, ILIAC TO FEMORAL (Left) 2 Days Post-Op    Plan:     During this hospitalization, patient to be seen 3 x/week to address the identified rehab impairments via therapeutic exercises, gait training, therapeutic activities, neuromuscular re-education and progress toward the following goals:    · Plan of Care Expires:  09/08/20    Subjective     Chief Complaint: pain and nausea  Patient/Family Comments/goals: "This pain medication isn't working for me. It makes me nauseated and increases my blood pressure."  Pain/Comfort:  · Pain Rating 1: (reports 5/10 back/surgical site pain)    Patients cultural, spiritual, Denominational conflicts given the " current situation: no    Living Environment:  Pt lives c wife in [facility] c [place] [place].  PTA driving, repairs boats, enjoys deer hunting and reports no falls.  Prior to admission, patients level of function was independent.  Equipment used at home: cane, straight, bedside commode, walker, rolling.  DME owned (not currently used): none.  Upon discharge, patient will have assistance from family.    Objective:     Communicated with RN and OT prior to session.  Patient found HOB elevated with telemetry, peripheral IV, PCA, wound vac, KATHLEEN drain  upon PT entry to room.    General Precautions: Standard, fall   Orthopedic Precautions:N/A   Braces: N/A     Exams:  · Cognitive Exam:  Patient is oriented to Person, Place, Time and Situation  · RLE ROM: limited mobility secondary to pain  · RLE Strength: grossly 4/5  · LLE ROM: WFL  · LLE Strength: WFL    Functional Mobility:  · Bed Mobility:     · Rolling Right: minimum assistance  · Scooting: contact guard assistance  · Supine to Sit: minimum assistance  · Sit to Supine: minimum assistance  · Transfers:     · Sit to Stand:  minimum assistance with hand-held assist  · Gait: 15ft c HHAx1 CGA   · demo decreased gait speed, narrow [place], FFP, unsteadiness and c/o increased pain  · Balance: standing (CGA)    Therapeutic Activities and Exercises:  Pt educated on: PT role/POC; safety c mobility; benefits of OOB activities; performing therex; d/c recs - v/u  -sat EOB x6mins  -sit<>stand x4  -standing x2mins  -therex (LAQ, hip flex, AP)  -RN request to place pt UIC but not chair present and unable to obtain chair for pt on this date    AM-PAC 6 CLICK MOBILITY  Total Score:17     Patient left HOB elevated with all lines intact, call button in reach and RN notified.    GOALS:   Multidisciplinary Problems     Physical Therapy Goals        Problem: Physical Therapy Goal    Goal Priority Disciplines Outcome Goal Variances Interventions   Physical Therapy Goal     PT, PT/OT Ongoing, Progressing      Description: Goals to be met by: 9/3/2020     Patient will increase functional independence with mobility by performin. Supine to sit with Modified [place]  2. Sit to supine with Modified [place]  3. Sit to stand transfer with Supervision  4. Bed to chair transfer with Supervision  5. Gait  x 150 feet with Supervision   6. Ascend/descend 3 stair with Stand-by Assistance                   History:     Past Medical History:   Diagnosis Date    Diabetes mellitus     H/O brain tumor     HLD (hyperlipidemia)     Hypertension     PAD (peripheral artery disease)     Seizures     R/T BRAIN TUMOR- SURGICALLY EXCISED       Past Surgical History:   Procedure Laterality Date    ANGIOGRAPHY Left 2020    Procedure: ANGIOGRAM, Left lower extremity;  Surgeon: Kelby Gomez MD;  Location: [facility] OR [address] [place] [address];  Service: Peripheral Vascular;  Laterality: Left;    ANGIOGRAPHY OF LOWER EXTREMITY Left 2020    Procedure: Angiogram Extremity Unilateral, washout L groin, possible open pseudoaneurysm repair;  Surgeon: Bruce Dallas MD;  Location: [facility] OR [address] [place] [address];  Service: Peripheral Vascular;  Laterality: Left;  contrast 23 ml  fluoro time: 9.9 min  mGy: 1230.26  Gycm2: 146.69    AORTOGRAPHY N/A 2020    Procedure: AORTOGRAM;  Surgeon: Bruce Dallas MD;  Location: [facility] OR [address] [place] [address];  Service: Peripheral Vascular;  Laterality: N/A;    APPLICATION OF WOUND VACUUM-ASSISTED CLOSURE DEVICE Bilateral 2020    Procedure: APPLICATION, WOUND VAC;  Surgeon: SEBAS Prieto II, MD;  Location: [facility] OR [address] [place] [address];  Service: Cardiovascular;  Laterality: Bilateral;    APPLICATION OF WOUND VACUUM-ASSISTED CLOSURE DEVICE N/A 1/3/2020    Procedure: APPLICATION, WOUND VAC;  Surgeon: Brian Wong MD;  Location: [facility] OR [address] [place] [address];  Service: Plastics;  Laterality: N/A;    BRAIN SURGERY  2011    TUMOR EXCISED    CREATION OF ILIOFEMORAL ARTERY BYPASS Left 2020    Procedure: CREATION, BYPASS,  ARTERIAL, ILIAC TO FEMORAL;  Surgeon: Kelby Gomez MD;  Location: [address];  Service: Peripheral Vascular;  Laterality: Left;  CO-SURGEONS: DR KEO ADHIKARI;  DR WONG, usion    CREATION OF MUSCLE ROTATIONAL FLAP N/A 1/3/2020    Procedure: CREATION, FLAP, MUSCLE ROTATION;  Surgeon: Brian Wong MD;  Location: [address];  Service: Plastics;  Laterality: N/A;    ENDARTERECTOMY OF FEMORAL ARTERY Bilateral 12/20/2019    Procedure: ENDARTERECTOMY, FEMORAL;  Surgeon: Kelby Gomez MD;  Location: [address];  Service: Peripheral Vascular;  Laterality: Bilateral;  natalya common femoral endarterectomy with natalya. iliac stent placement    PERCUTANEOUS TRANSLUMINAL ANGIOPLASTY Left 4/6/2020    Procedure: PTA (ANGIOPLASTY, PERCUTANEOUS, TRANSLUMINAL), left lower extremity;  Surgeon: Kelby Gomez MD;  Location: [address];  Service: Peripheral Vascular;  Laterality: Left;    PERCUTANEOUS TRANSLUMINAL ANGIOPLASTY (PTA) OF PERIPHERAL VESSEL Left 10/17/2019    Procedure: PTA, PERIPHERAL VESSEL;  Surgeon: Rao Fisher MD;  Location: [facility] CATH;  Service: Cardiology;  Laterality: Left;    PSEUDOANEURYSM REPAIR Left 6/29/2020    Procedure: REPAIR, PSEUDOANEURYSM;  Surgeon: Bruce Dallas MD;  Location: [address];  Service: Peripheral Vascular;  Laterality: Left;       Time Tracking:     PT Received On: 08/14/20  PT Start Time: 0851     PT Stop Time: 0913  PT Total Time (min): 22 min     Billable Minutes: Evaluation 10 min and Therapeutic Activity 10 min      Bobby Johnston, PT  08/14/2020

## 2020-08-14 NOTE — ASSESSMENT & PLAN NOTE
Renan Britton is a 59 year old man with infected L groin pseudoaneurysm s/p explant of external iliac and common femoral stent, debridement, and reconstruction with cadaveric vein bypass and rerotation of thigh flap by plastic surgery.  - Doppler signals present distal to flap  - pain control via pca this am  - Incisional vac in place  - Continue to monitor drain output  - q2h vascular checks  - remainder of care per primary

## 2020-08-14 NOTE — ASSESSMENT & PLAN NOTE
59 year old with PAD who is s/p bilateral femoral endarterectcomy in 2019 complicated by postoperative fluid collection with wound breakdown in January 2020 with Group F strep and Proteus mirabilis endovascular infection s/p wound washout with groin flap 1/3/20 s/p 6 weeks of IV ceftriaxone (EOC 2/14/20) complicated by proteus mirabilis bacteremia March 2020 s/p 4 weeks of IV ceftraixone (EOC 4/2/20) followed by suppressive levaquin.  Had subsequent PSA of left femoral artery, s/p stenting on 4/6/20 followed by 6 weeks of IV ceftriaxone (EOC 5/18/20), then placed on suppresive cefadroxil.  He subsequently developed explanding left groin PSA with recurrent Proteus bacteremia,  s/p washout with stent placement on 6/29/20 and discharged on 6-8 week course of ceftriaxone (EOC 8/20/20).       He is now POD #2 de-rotation and re-rotation of thigh flap, excision of left groin infected PSA and left common femoral artery with explantation of external iliac/common femoral stent graft with EIA to distal common femoral bypass with cryovein.  ID consulted for antibiotic management.      Surgical cultures now showing Enterococcus species, ID and susceptibilities pending.   Afebrile, stable.  IV ceftriaxone continued postoperatively.  Primary complaint is nausea/pain.     Plan/recommendations:   1.  Continue IV ceftriaxone 2 grams q 24 hours for prior proteus and Group F strep.    2.  Add IV vancomycin (Pharmacy to dose) for new enterococcus, pending susceptibilities   3.  Will follow up new cultures and adjust antibiotics accordingly.   4.  Anticipate 4-6 weeks of IV abx from date of stent removal   5.  Will follow up this weekend.  For any questions or concerns over the weekend, please call Ally Roberts at [phone].      Data reviewed and plan discussed with ID staff  Secure Chat  sent to Primary Team

## 2020-08-14 NOTE — PROGRESS NOTES
[facility]  Infectious Disease  Progress Note    Patient Name: Renan Britton  MRN: [id number]  Admission Date: 8/12/2020  Length of Stay: 2 days  Attending Physician: Kelby Gomez MD  Primary Care Provider: Eren Becker MD    Isolation Status: No active isolations  Assessment/Plan:      * Pseudoaneurysm     59 year old with PAD who is s/p bilateral femoral endarterectcomy in 2019 complicated by postoperative fluid collection with wound breakdown in January 2020 with Group F strep and Proteus mirabilis endovascular infection s/p wound washout with groin flap 1/3/20 s/p 6 weeks of IV ceftriaxone (EOC 2/14/20) complicated by proteus mirabilis bacteremia March 2020 s/p 4 weeks of IV ceftraixone (EOC 4/2/20) followed by suppressive levaquin.  Had subsequent PSA of left femoral artery, s/p stenting on 4/6/20 followed by 6 weeks of IV ceftriaxone (EOC 5/18/20), then placed on suppresive cefadroxil.  He subsequently developed explanding left groin PSA with recurrent Proteus bacteremia,  s/p washout with stent placement on 6/29/20 and discharged on 6-8 week course of ceftriaxone (EOC 8/20/20).       He is now POD #2 de-rotation and re-rotation of thigh flap, excision of left groin infected PSA and left common femoral artery with explantation of external iliac/common femoral stent graft with EIA to distal common femoral bypass with cryovein.  ID consulted for antibiotic management.      Surgical cultures now showing Enterococcus species, ID and susceptibilities pending.   Afebrile, stable.  IV ceftriaxone continued postoperatively.  Primary complaint is nausea/pain.     Plan/recommendations:   1.  Continue IV ceftriaxone 2 grams q 24 hours for prior proteus and Group F strep.    2.  Add IV vancomycin (Pharmacy to dose) for new enterococcus, pending susceptibilities   3.  Will follow up new cultures and adjust antibiotics accordingly.   4.  Anticipate 4-6 weeks of IV abx from date of stent  removal   5.  Will follow up this weekend.  For any questions or concerns over the weekend, please call Ally Roberts at [phone].      Data reviewed and plan discussed with ID staff  Secure Chat  sent to Primary Team    Proteus infection  See assessment/plan above      Thank you.   Please call for any questions or concerns.  ANDREW Jean, ANP-C  [phone] pager, [place] [phone]  Subjective:     Principal Problem:Pseudoaneurysm    HPI: Mr. Renan Britton is a 59 year old with PAD who is s/p bilateral femoral endarterectcomy in 2019 complicated by postoperative fluid collection with wound breakdown in January 2020 with Group F strep and Proteus mirabilis endovascular infection s/p wound washout with groin flap 1/3/20 s/p 6 weeks of IV ceftriaxone (EOC 2/14/20) complicated by proteus mirabilis bacteremia March 2020 s/p 4 weeks of IV ceftraixone (EOC 4/2/20) followed by suppressive levaquin.  Had subsequent PSA of left femoral artery, s/p stenting on 4/6/20 followed by 6 weeks of IV cefttriaxone (EOC 5/18/20), then placed on suppresive cefadroxil.  He subsequently developed explanding left groin PSA with recurrent Proteus bacteremia,  s/p washout with stent placement on 6/29/20 and discharged on 6-8 week course of ceftriaxone (EOC 8/20/20).  He is now POD #1 de-rotation and re-rotation of thigh flap, excision of left groin infected PSA and left common femoral artery with explantation of external iliac/common femoral stent with EIA to distal common femoral bypass with cryovein.  ID consulted for antibiotic management.     Surgical cultures pending. Gram stain with rare GPC.  Interval History: No acute events overnight. Afebrile. POD#2  Surgical cultures now showing Enterococcus.    Complaining of nausea from pain medications.     Review of Systems   Constitutional: Positive for activity change. Negative for appetite change, chills, diaphoresis, fatigue and fever.   HENT: Negative.    Eyes: Negative.     Respiratory: Negative for cough, shortness of breath and wheezing.    Cardiovascular: Negative for chest pain and palpitations.   Gastrointestinal: Negative for abdominal pain, diarrhea, nausea and vomiting.   Genitourinary:        Packer   Musculoskeletal: Negative for back pain and myalgias.        Surgical site pain   Skin: Positive for wound (wound vac left groin).   Neurological: Negative for dizziness, numbness and headaches.   Psychiatric/Behavioral: Negative for agitation and confusion.     Objective:     Vital Signs (Most Recent):  Temp: 98 °F (36.7 °C) (08/14/20 0809)  Pulse: 90 (08/14/20 0900)  Resp: 14 (08/14/20 0900)  BP: 138/86 (08/14/20 0809)  SpO2: 100 % (08/14/20 0900) Vital Signs (24h Range):  Temp:  [97.5 °F (36.4 °C)-98.8 °F (37.1 °C)] 98 °F (36.7 °C)  Pulse:  [] 90  Resp:  [13-20] 14  SpO2:  [97 %-100 %] 100 %  BP: (138-170)/(76-86) 138/86     Weight: 123.4 kg (272 lb 0.8 oz)  Body mass index is 37.94 kg/m².    Estimated Creatinine Clearance: 155.6 mL/min (based on SCr of 0.7 mg/dL).    Physical Exam  Vitals signs and nursing note reviewed.   Constitutional:       General: He is not in acute distress.     Appearance: Normal appearance. He is not toxic-appearing.   HENT:      Head: Normocephalic and atraumatic.   Eyes:      General: No scleral icterus.     Conjunctiva/sclera: Conjunctivae normal.   Cardiovascular:      Rate and Rhythm: Normal rate.      Heart sounds: Murmur present.   Pulmonary:      Effort: Pulmonary effort is normal.      Breath sounds: Normal breath sounds.   Abdominal:      General: There is no distension.      Palpations: Abdomen is soft.      Tenderness: There is abdominal tenderness (left lower abdomen - operative site).   Genitourinary:     Comments: Packer      Musculoskeletal:      Comments: Left groin with wound vac in place  2 drains with small amount bloody drainage  LLQ abdominal incision dressing c/d/i   Skin:     General: Skin is warm and dry.    Neurological:      General: No focal deficit present.      Mental Status: He is alert and oriented to person, place, and time.   Psychiatric:         Mood and Affect: Mood normal.         Behavior: Behavior normal.         Significant Labs:   Blood Culture:   Recent Labs   Lab 06/26/20  0713 06/26/20  0717 06/27/20  0010 06/27/20  0017 06/30/20  1321   LABBLOO Gram stain aer bottle: Gram negative rods   Results called to and read back by: Sana Jay RN  06/28/2020  14:10  PROTEUS MIRABILIS* No growth after 5 days. No growth after 5 days. No growth after 5 days. No growth after 5 days.     CBC:   Recent Labs   Lab 08/13/20  0433 08/14/20  0430   WBC 8.42  8.42 7.94   HGB 11.4*  11.4* 10.7*   HCT 35.6*  35.6* 33.7*     154 139*     CMP:   Recent Labs   Lab 08/13/20  0433 08/14/20  0430     137 136   K 4.0  4.0 4.0     105 101   CO2 27  27 28   *  118* 125*   BUN 11  11 10   CREATININE 0.7  0.7 0.7   CALCIUM 8.7  8.7 8.7   PROT  --  6.4   ALBUMIN  --  3.1*   BILITOT  --  0.5   ALKPHOS  --  41*   AST  --  38   ALT  --  20   ANIONGAP 5*  5* 7*   EGFRNONAA >60.0  >60.0 >60.0     Wound Culture:   Recent Labs   Lab 06/29/20  2244 08/12/20  1138 08/12/20  1154 08/12/20  1213   LABAERO No growth  No growth No significant isolate Insufficient incubation, culture in progress No growth       Significant Imaging: I have reviewed all pertinent imaging results/findings within the past 24 hours.

## 2020-08-14 NOTE — PLAN OF CARE
POC reviewed with patient and spouse who verbalized understanding. ANOx4, VSS on RA - el;evated BP tonight r/t pain. He has a dilaudid PCA that he is using but still reported pain was increased tonight. L lower abd transverse incision covered with gauze/tegaderm, C/D/I overnight. Groin incision covered with wound vac @ 125, no output overnight. He has 2 KATHLEEN drains to the L groin as well, sanguineous drainage with some leaking at the site; reinforced dressing x1. Trialysis catheter removed per MD, gauze/tegaderm dressing in place and clean. Adequate UOP per urinal. No BM. Tolerating clears well, denies N/V. Q2 neurovascular checks performed, PT and DP pulses strong and audible in both extremities. Telemetry monitoring on, NSR noted. Call bell in reach, WCTM.

## 2020-08-14 NOTE — NURSING
Spoke to Leida Hancock on call for Dr Gomez regarding Dilaudid PCA. Patient states that med is making him sick and requesting po pain meds. Noted Oxycodone on MAR for nightly PRN. Order given to change to every 6 hours as needed.

## 2020-08-14 NOTE — ASSESSMENT & PLAN NOTE
59 year old man with infected L groin pseudoaneurysm s/p explant of external iliac and common femoral stent, debridement, and reconstruction with cadaveric vein bypass and rerotation of thigh flap by plastic surgery.    - Doing well, pain adequately controlled  - Strip KATHLEEN drains 2x daily  - Continue Prevena per PRS recs  - PT/OT, OOb ambulation with assistance is encouraged  - increase dose of BP meds  - Plavix  - consider starting NOAC  - Regular diet    Dispo: possibly tomorrow DC with good ambulation, pulses

## 2020-08-14 NOTE — SUBJECTIVE & OBJECTIVE
Interval History: Intermittently hypertensive overnight, home meds restarted. Complaining of LLQ abdominal pain related to surgical site. Denies n/v/f/c. Denies chest pain or SOB.     Medications:  Continuous Infusions:   sodium chloride 0.9% 10 mL/hr at 08/12/20 0715    hydromorphone in 0.9 % NaCl 6 mg/30 ml       Scheduled Meds:   cefTRIAXone (ROCEPHIN) IVPB  2 g Intravenous Q24H    clopidogreL  75 mg Oral Daily    docusate sodium  100 mg Oral BID    heparin (porcine)  5,000 Units Subcutaneous Q8H    polyethylene glycol  17 g Oral Daily    valsartan  160 mg Oral Daily     PRN Meds:naloxone, ondansetron, oxyCODONE     Review of patient's allergies indicates:   Allergen Reactions    Penicillins Hives     Patient currently on cefepime without complications  Tolerated ceftriaxone 3/2020     Objective:     Vital Signs (Most Recent):  Temp: 98.8 °F (37.1 °C) (08/14/20 0438)  Pulse: 84 (08/14/20 0438)  Resp: 16 (08/14/20 0438)  BP: (!) 162/77 (08/14/20 0438)  SpO2: 98 % (08/14/20 0438) Vital Signs (24h Range):  Temp:  [97.5 °F (36.4 °C)-98.8 °F (37.1 °C)] 98.8 °F (37.1 °C)  Pulse:  [] 84  Resp:  [13-20] 16  SpO2:  [97 %-100 %] 98 %  BP: (144-173)/(76-81) 162/77     Weight: 123.4 kg (272 lb 0.8 oz)  Body mass index is 37.94 kg/m².    Intake/Output - Last 3 Shifts       08/12 0700 - 08/13 0659 08/13 0700 - 08/14 0659 08/14 0700 - 08/15 0659    P.O. 240 700     I.V. (mL/kg) 5000 (40.5) 230.3 (1.9)     IV Piggyback  50     Total Intake(mL/kg) 5240 (42.5) 980.3 (7.9)     Urine (mL/kg/hr) 2100 (0.7) 1875 (0.6)     Emesis/NG output  0     Drains 125 110     Other  0     Stool 0 0     Blood 400 0     Total Output 2625 1985     Net +2615 -1004.7            Urine Occurrence  0 x     Stool Occurrence 0 x 0 x     Emesis Occurrence  0 x           Physical Exam  Constitutional:       General: He is not in acute distress.     Appearance: Normal appearance.   HENT:      Head: Normocephalic and atraumatic.      Right Ear:  External ear normal.      Left Ear: External ear normal.      Nose: Nose normal.      Comments: ETCO2 monitor in place.     Mouth/Throat:      Mouth: Mucous membranes are moist.      Pharynx: Oropharynx is clear.   Eyes:      Extraocular Movements: Extraocular movements intact.      Pupils: Pupils are equal, round, and reactive to light.   Cardiovascular:      Rate and Rhythm: Normal rate.   Abdominal:      General: There is no distension.      Palpations: Abdomen is soft.      Comments: LLQ tenderness present, unchanged from yesterday. Otherwise benign abdominal exam.   Skin:     General: Skin is warm and dry.      Comments: L groin incisional vac in place, good suction, no leak. Drain with SS output, telfa underlying drain and vac with SS output.   Neurological:      General: No focal deficit present.      Mental Status: He is alert and oriented to person, place, and time.   Psychiatric:         Mood and Affect: Mood normal.         Behavior: Behavior normal.         Significant Labs:  CBC:   Recent Labs   Lab 08/14/20  0430   WBC 7.94   RBC 3.89*   HGB 10.7*   HCT 33.7*   *   MCV 87   MCH 27.5   MCHC 31.8*     BMP:   Recent Labs   Lab 08/14/20  0430   *      K 4.0      CO2 28   BUN 10   CREATININE 0.7   CALCIUM 8.7     CMP:   Recent Labs   Lab 08/14/20  0430   *   CALCIUM 8.7   ALBUMIN 3.1*   PROT 6.4      K 4.0   CO2 28      BUN 10   CREATININE 0.7   ALKPHOS 41*   ALT 20   AST 38   BILITOT 0.5       Significant Diagnostics:  I have reviewed all pertinent imaging results/findings within the past 24 hours.

## 2020-08-14 NOTE — PT/OT/SLP EVAL
Occupational Therapy   Co-Evaluation    Name: Renan Britton  MRN: [id number]  Admitting Diagnosis:  Pseudoaneurysm 2 Days Post-Op   Procedure(s):  CREATION, BYPASS, ARTERIAL, ILIAC TO FEMORAL     Recommendations:     Discharge Recommendations: home health OT  Discharge Equipment Recommendations:  none  Barriers to discharge:  None    Assessment:     Renan Britton is a 59 y.o. male with a medical diagnosis of Pseudoaneurysm.  He presents with a decline in occupational participation and functional mobility following surgery. Patient is agreeable to participate with therapy and tolerates evaluation fairly well. Performance deficits affecting function: weakness, impaired endurance, impaired self care skills, impaired functional mobilty, gait instability, impaired balance, decreased safety awareness, pain, impaired skin, impaired cardiopulmonary response to activity.      Rehab Prognosis: Good; patient would benefit from acute skilled OT services to address these deficits and reach maximum level of function.       Plan:     Patient to be seen 3 x/week to address the above listed problems via self-care/home management, therapeutic activities, therapeutic exercises  · Plan of Care Expires: 09/14/20  · Plan of Care Reviewed with: patient, spouse    Subjective     Chief Complaint: Pain at surgical site/wound vac site, nausea sitting EOB   Patient/Family Comments/goals: To feel better and go home    Occupational Profile:  Living Environment: Patient lives with his wife in a H with 3 MILAN, no HR. His bathroom has a tub/shower combination.  Previous level of function: Independent PTA  Roles and Routines: . 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   Equipment Used at Home:  cane, straight, bedside commode, walker, rolling  Assistance upon Discharge: Wife able to assist upon d/c    Pain/Comfort:  · Pain Rating 1: 5/10  · Location 1: groin(wound vac site)  · Pain Addressed 1:  Reposition, Distraction  · Pain Rating Post-Intervention 1: (Reported increased pain with movement but did not rate)    Patients cultural, spiritual, Islam conflicts given the current situation: no    Objective:     Communicated with: RN prior to session.  Patient found HOB elevated with KATHLEEN drain, peripheral IV, PCA, telemetry upon OT entry to room.    General Precautions: Standard, fall   Orthopedic Precautions:N/A   Braces: N/A     Occupational Performance:    Bed Mobility:    · Patient completed Supine to Sit to R side EOB with minimum assistance at trunk via log roll  · Patient completed Sit to Supine with minimum assistance at BLE    Functional Mobility/Transfers:  · Patient completed Sit <> Stand Transfer x2 trials from EOB with minimum assistance with  hand-held assist   · Functional Mobility: ~15' with CGA/HHA and no AD, 1 LOB noted requiring min assist to correct    Activities of Daily Living:  · Grooming: set-up assistance Patient participated in face wash with a washcloth while sitting EOB with set-up assist.  · Lower Body Dressing: total assistance Patient donned non-slip socks while in bed with total assist.    Cognitive/Visual Perceptual:  Cognitive/Psychosocial Skills:     -       Oriented to: Person, Place, Time and Situation   -       Follows Commands/attention:Follows multistep  commands    Physical Exam:  Upper Extremity Range of Motion:     -       Right Upper Extremity: WFL  -       Left Upper Extremity: WFL  Upper Extremity Strength:    -       Right Upper Extremity: WFL  -       Left Upper Extremity: WFL    AMPAC 6 Click ADL:  AMPAC Total Score: 16    Treatment & Education:  Role of OT/evaluation  Educated on importance of sitting UIC/OOB activity with staff assistance while in acute setting to prevent further debility  Call button for assistance  Education:    Patient left HOB elevated with all lines intact, call button in reach, RN notified and wife present    GOALS:   Multidisciplinary  Problems     Occupational Therapy Goals        Problem: Occupational Therapy Goal    Goal Priority Disciplines Outcome Interventions   Occupational Therapy Goal     OT, PT/OT Ongoing, Progressing    Description: Goals to be met by: 8/28/20     Patient will increase functional independence with ADLs by performing:    UE Dressing with Supervision.  LE Dressing with Minimal Assistance.  Grooming while standing with Stand-by Assistance.  Toileting from toilet with Minimal Assistance for hygiene and clothing management.   Supine to sit with Stand-by Assistance.  Toilet transfer to toilet with Stand-by Assistance and LRAD PRN.                     History:     Past Medical History:   Diagnosis Date    Diabetes mellitus     H/O brain tumor     HLD (hyperlipidemia)     Hypertension     PAD (peripheral artery disease)     Seizures     R/T BRAIN TUMOR- SURGICALLY EXCISED       Past Surgical History:   Procedure Laterality Date    ANGIOGRAPHY Left 4/6/2020    Procedure: ANGIOGRAM, Left lower extremity;  Surgeon: Kelby Gomez MD;  Location: [address];  Service: Peripheral Vascular;  Laterality: Left;    ANGIOGRAPHY OF LOWER EXTREMITY Left 6/29/2020    Procedure: Angiogram Extremity Unilateral, washout L groin, possible open pseudoaneurysm repair;  Surgeon: Bruce Dallas MD;  Location: [address];  Service: Peripheral Vascular;  Laterality: Left;  contrast 23 ml  fluoro time: 9.9 min  mGy: 1230.26  Gycm2: 146.69    AORTOGRAPHY N/A 6/29/2020    Procedure: AORTOGRAM;  Surgeon: Bruce Dallas MD;  Location: [address];  Service: Peripheral Vascular;  Laterality: N/A;    APPLICATION OF WOUND VACUUM-ASSISTED CLOSURE DEVICE Bilateral 1/1/2020    Procedure: APPLICATION, WOUND VAC;  Surgeon: SEBAS Prieto II, MD;  Location: [address];  Service: Cardiovascular;  Laterality: Bilateral;    APPLICATION OF WOUND VACUUM-ASSISTED CLOSURE DEVICE N/A 1/3/2020    Procedure: APPLICATION, WOUND VAC;   Surgeon: Brian Wong MD;  Location: [address];  Service: Plastics;  Laterality: N/A;    BRAIN SURGERY  01/2011    TUMOR EXCISED    CREATION OF ILIOFEMORAL ARTERY BYPASS Left 8/12/2020    Procedure: CREATION, BYPASS, ARTERIAL, ILIAC TO FEMORAL;  Surgeon: Kelby Gomez MD;  Location: [address];  Service: Peripheral Vascular;  Laterality: Left;  CO-SURGEONS: DR KEO ADHIKARI;  DR WONG, profusion    CREATION OF MUSCLE ROTATIONAL FLAP N/A 1/3/2020    Procedure: CREATION, FLAP, MUSCLE ROTATION;  Surgeon: Brian Wong MD;  Location: [address];  Service: Plastics;  Laterality: N/A;    ENDARTERECTOMY OF FEMORAL ARTERY Bilateral 12/20/2019    Procedure: ENDARTERECTOMY, FEMORAL;  Surgeon: Kelby Gomez MD;  Location: [address];  Service: Peripheral Vascular;  Laterality: Bilateral;  natalya common femoral endarterectomy with natalya. iliac stent placement    PERCUTANEOUS TRANSLUMINAL ANGIOPLASTY Left 4/6/2020    Procedure: PTA (ANGIOPLASTY, PERCUTANEOUS, TRANSLUMINAL), left lower extremity;  Surgeon: Kelby Gomez MD;  Location: [address];  Service: Peripheral Vascular;  Laterality: Left;    PERCUTANEOUS TRANSLUMINAL ANGIOPLASTY (PTA) OF PERIPHERAL VESSEL Left 10/17/2019    Procedure: PTA, PERIPHERAL VESSEL;  Surgeon: Rao Fisher MD;  [facility];  Service: Cardiology;  Laterality: Left;    PSEUDOANEURYSM REPAIR Left 6/29/2020    Procedure: REPAIR, PSEUDOANEURYSM;  Surgeon: Bruce Dallas MD;  Location: [address];  Service: Peripheral Vascular;  Laterality: Left;       Time Tracking:     OT Date of Treatment: 08/14/20  OT Start Time: 0852  OT Stop Time: 0914  OT Total Time (min): 22 min    Billable Minutes:Evaluation 11 minutes  Therapeutic Activity 11 minutes    Anitha Luna OT  8/14/2020

## 2020-08-14 NOTE — PROGRESS NOTES
[facility]  Vascular Surgery  Progress Note    Patient Name: Renan Britton  MRN: [id number]  Admission Date: 8/12/2020  Primary Care Provider: Eren Becker MD    Subjective:     Interval History: NAEO    Post-Op Info:  Procedure(s) (LRB):  CREATION, BYPASS, ARTERIAL, ILIAC TO FEMORAL (Left)   2 Days Post-Op       Medications:  Continuous Infusions:   sodium chloride 0.9% 10 mL/hr at 08/12/20 0715    hydromorphone in 0.9 % NaCl 6 mg/30 ml       Scheduled Meds:   cefTRIAXone (ROCEPHIN) IVPB  2 g Intravenous Q24H    clopidogreL  75 mg Oral Daily    docusate sodium  100 mg Oral BID    heparin (porcine)  5,000 Units Subcutaneous Q8H    polyethylene glycol  17 g Oral Daily    valsartan  160 mg Oral Daily     PRN Meds:naloxone, ondansetron, oxyCODONE     Objective:     Vital Signs (Most Recent):  Temp: 98 °F (36.7 °C) (08/14/20 0809)  Pulse: 90 (08/14/20 0900)  Resp: 14 (08/14/20 0900)  BP: 138/86 (08/14/20 0809)  SpO2: 100 % (08/14/20 0900) Vital Signs (24h Range):  Temp:  [97.5 °F (36.4 °C)-98.8 °F (37.1 °C)] 98 °F (36.7 °C)  Pulse:  [] 90  Resp:  [13-20] 14  SpO2:  [97 %-100 %] 100 %  BP: (138-170)/(76-86) 138/86     Date 08/14/20 0700 - 08/15/20 0659   Shift 8530-2664 1783-9163 8193-9788 24 Hour Total   INTAKE   Shift Total(mL/kg)       OUTPUT   Urine(mL/kg/hr) 225   225   Shift Total(mL/kg) 225(1.8)   225(1.8)   Weight (kg) 123.4 123.4 123.4 123.4       Physical Exam  Vitals signs and nursing note reviewed.   Constitutional:       Appearance: Normal appearance.   HENT:      Head: Normocephalic and atraumatic.   Pulmonary:      Effort: Pulmonary effort is normal.      Breath sounds: Normal breath sounds.   Abdominal:      Comments: Abdominal incision with staples, c/d/i   Genitourinary:     Comments:     Musculoskeletal:      Comments: Soft DP signals on doppler bilaterally  Strong, biphasic PT signals on doppler bilaterally  Left groin with provena wound vac in place, scant ss  drainage  2 KATHLEEN drains in place to left groin - ss drainage.    Skin:     General: Skin is warm and dry.      Capillary Refill: Capillary refill takes less than 2 seconds.   Neurological:      General: No focal deficit present.      Mental Status: He is alert and oriented to person, place, and time.         Significant Labs:  CBC:   Recent Labs   Lab 08/14/20  0430   WBC 7.94   RBC 3.89*   HGB 10.7*   HCT 33.7*   *   MCV 87   MCH 27.5   MCHC 31.8*     CMP:   Recent Labs   Lab 08/14/20  0430   *   CALCIUM 8.7   ALBUMIN 3.1*   PROT 6.4      K 4.0   CO2 28      BUN 10   CREATININE 0.7   ALKPHOS 41*   ALT 20   AST 38   BILITOT 0.5       Significant Diagnostics:  none    Assessment/Plan:     PAD (peripheral artery disease)  59 year old man with infected L groin pseudoaneurysm s/p explant of external iliac and common femoral stent, debridement, and reconstruction with cadaveric vein bypass and rerotation of thigh flap by plastic surgery.    - Doing well, pain adequately controlled  - Strip KATHLEEN drains 2x daily  - Continue Prevena per PRS recs  - PT/OT, OOb ambulation with assistance is encouraged  - increase dose of BP meds  - Plavix  - consider starting NOAC  - Regular diet    Dispo: possibly tomorrow DC with good ambulation, pulses          Kenneth Zarate MD  Vascular Surgery  [facility]

## 2020-08-14 NOTE — PLAN OF CARE
Problem: Physical Therapy Goal  Goal: Physical Therapy Goal  Description: Goals to be met by: 9/3/2020     Patient will increase functional independence with mobility by performin. Supine to sit with Modified [place]  2. Sit to supine with Modified [place]  3. Sit to stand transfer with Supervision  4. Bed to chair transfer with Supervision  5. Gait  x 150 feet with Supervision   6. Ascend/descend 3 stair with Stand-by Assistance  Outcome: Ongoing, Progressing   Eval completed and POC established    Bobby Johnston PT,DPT  2020

## 2020-08-14 NOTE — SUBJECTIVE & OBJECTIVE
Interval History: No acute events overnight. Afebrile. POD#2  Surgical cultures now showing Enterococcus.    Complaining of nausea from pain medications.     Review of Systems   Constitutional: Positive for activity change. Negative for appetite change, chills, diaphoresis, fatigue and fever.   HENT: Negative.    Eyes: Negative.    Respiratory: Negative for cough, shortness of breath and wheezing.    Cardiovascular: Negative for chest pain and palpitations.   Gastrointestinal: Negative for abdominal pain, diarrhea, nausea and vomiting.   Genitourinary:        Packer   Musculoskeletal: Negative for back pain and myalgias.        Surgical site pain   Skin: Positive for wound (wound vac left groin).   Neurological: Negative for dizziness, numbness and headaches.   Psychiatric/Behavioral: Negative for agitation and confusion.     Objective:     Vital Signs (Most Recent):  Temp: 98 °F (36.7 °C) (08/14/20 0809)  Pulse: 90 (08/14/20 0900)  Resp: 14 (08/14/20 0900)  BP: 138/86 (08/14/20 0809)  SpO2: 100 % (08/14/20 0900) Vital Signs (24h Range):  Temp:  [97.5 °F (36.4 °C)-98.8 °F (37.1 °C)] 98 °F (36.7 °C)  Pulse:  [] 90  Resp:  [13-20] 14  SpO2:  [97 %-100 %] 100 %  BP: (138-170)/(76-86) 138/86     Weight: 123.4 kg (272 lb 0.8 oz)  Body mass index is 37.94 kg/m².    Estimated Creatinine Clearance: 155.6 mL/min (based on SCr of 0.7 mg/dL).    Physical Exam  Vitals signs and nursing note reviewed.   Constitutional:       General: He is not in acute distress.     Appearance: Normal appearance. He is not toxic-appearing.   HENT:      Head: Normocephalic and atraumatic.   Eyes:      General: No scleral icterus.     Conjunctiva/sclera: Conjunctivae normal.   Cardiovascular:      Rate and Rhythm: Normal rate.      Heart sounds: Murmur present.   Pulmonary:      Effort: Pulmonary effort is normal.      Breath sounds: Normal breath sounds.   Abdominal:      General: There is no distension.      Palpations: Abdomen is soft.       Tenderness: There is abdominal tenderness (left lower abdomen - operative site).   Genitourinary:     Comments: Packer      Musculoskeletal:      Comments: Left groin with wound vac in place  2 drains with small amount bloody drainage  LLQ abdominal incision dressing c/d/i   Skin:     General: Skin is warm and dry.   Neurological:      General: No focal deficit present.      Mental Status: He is alert and oriented to person, place, and time.   Psychiatric:         Mood and Affect: Mood normal.         Behavior: Behavior normal.         Significant Labs:   Blood Culture:   Recent Labs   Lab 06/26/20  0713 06/26/20  0717 06/27/20  0010 06/27/20  0017 06/30/20  1321   LABBLOO Gram stain aer bottle: Gram negative rods   Results called to and read back by: Sana Jay RN  06/28/2020  14:10  PROTEUS MIRABILIS* No growth after 5 days. No growth after 5 days. No growth after 5 days. No growth after 5 days.     CBC:   Recent Labs   Lab 08/13/20  0433 08/14/20  0430   WBC 8.42  8.42 7.94   HGB 11.4*  11.4* 10.7*   HCT 35.6*  35.6* 33.7*     154 139*     CMP:   Recent Labs   Lab 08/13/20  0433 08/14/20  0430     137 136   K 4.0  4.0 4.0     105 101   CO2 27  27 28   *  118* 125*   BUN 11  11 10   CREATININE 0.7  0.7 0.7   CALCIUM 8.7  8.7 8.7   PROT  --  6.4   ALBUMIN  --  3.1*   BILITOT  --  0.5   ALKPHOS  --  41*   AST  --  38   ALT  --  20   ANIONGAP 5*  5* 7*   EGFRNONAA >60.0  >60.0 >60.0     Wound Culture:   Recent Labs   Lab 06/29/20  2244 08/12/20  1138 08/12/20  1154 08/12/20  1213   LABAERO No growth  No growth No significant isolate Insufficient incubation, culture in progress No growth       Significant Imaging: I have reviewed all pertinent imaging results/findings within the past 24 hours.

## 2020-08-15 PROCEDURE — 99900035 HC TECH TIME PER 15 MIN (STAT)

## 2020-08-15 PROCEDURE — 94761 N-INVAS EAR/PLS OXIMETRY MLT: CPT

## 2020-08-15 PROCEDURE — 25000003 PHARM REV CODE 250: Performed by: SURGERY

## 2020-08-15 PROCEDURE — 99233 SBSQ HOSP IP/OBS HIGH 50: CPT | Mod: ,,, | Performed by: PHYSICIAN ASSISTANT

## 2020-08-15 PROCEDURE — 99233 PR SUBSEQUENT HOSPITAL CARE,LEVL III: ICD-10-PCS | Mod: ,,, | Performed by: PHYSICIAN ASSISTANT

## 2020-08-15 PROCEDURE — 63600175 PHARM REV CODE 636 W HCPCS: Performed by: SURGERY

## 2020-08-15 PROCEDURE — 63600175 PHARM REV CODE 636 W HCPCS: Performed by: NURSE PRACTITIONER

## 2020-08-15 PROCEDURE — 63600175 PHARM REV CODE 636 W HCPCS: Performed by: STUDENT IN AN ORGANIZED HEALTH CARE EDUCATION/TRAINING PROGRAM

## 2020-08-15 PROCEDURE — 25000003 PHARM REV CODE 250: Performed by: STUDENT IN AN ORGANIZED HEALTH CARE EDUCATION/TRAINING PROGRAM

## 2020-08-15 PROCEDURE — 20600001 HC STEP DOWN PRIVATE ROOM

## 2020-08-15 RX ORDER — OXYCODONE AND ACETAMINOPHEN 5; 325 MG/1; MG/1
1 TABLET ORAL EVERY 6 HOURS PRN
Status: DISCONTINUED | OUTPATIENT
Start: 2020-08-15 | End: 2020-08-18 | Stop reason: HOSPADM

## 2020-08-15 RX ORDER — ONDANSETRON 2 MG/ML
4 INJECTION INTRAMUSCULAR; INTRAVENOUS EVERY 6 HOURS PRN
Status: DISCONTINUED | OUTPATIENT
Start: 2020-08-15 | End: 2020-08-18 | Stop reason: HOSPADM

## 2020-08-15 RX ORDER — OXYCODONE AND ACETAMINOPHEN 10; 325 MG/1; MG/1
1 TABLET ORAL EVERY 6 HOURS PRN
Status: DISCONTINUED | OUTPATIENT
Start: 2020-08-15 | End: 2020-08-18 | Stop reason: HOSPADM

## 2020-08-15 RX ORDER — IBUPROFEN 400 MG/1
400 TABLET ORAL EVERY 6 HOURS
Status: DISCONTINUED | OUTPATIENT
Start: 2020-08-15 | End: 2020-08-18 | Stop reason: HOSPADM

## 2020-08-15 RX ORDER — HYDROMORPHONE HYDROCHLORIDE 1 MG/ML
0.5 INJECTION, SOLUTION INTRAMUSCULAR; INTRAVENOUS; SUBCUTANEOUS EVERY 6 HOURS PRN
Status: DISCONTINUED | OUTPATIENT
Start: 2020-08-15 | End: 2020-08-18 | Stop reason: HOSPADM

## 2020-08-15 RX ORDER — GABAPENTIN 300 MG/1
300 CAPSULE ORAL 3 TIMES DAILY
Status: DISCONTINUED | OUTPATIENT
Start: 2020-08-15 | End: 2020-08-17

## 2020-08-15 RX ADMIN — HEPARIN SODIUM 5000 UNITS: 5000 INJECTION, SOLUTION INTRAVENOUS; SUBCUTANEOUS at 09:08

## 2020-08-15 RX ADMIN — OXYCODONE HYDROCHLORIDE 10 MG: 10 TABLET ORAL at 04:08

## 2020-08-15 RX ADMIN — HEPARIN SODIUM 5000 UNITS: 5000 INJECTION, SOLUTION INTRAVENOUS; SUBCUTANEOUS at 06:08

## 2020-08-15 RX ADMIN — ONDANSETRON 4 MG: 2 INJECTION INTRAMUSCULAR; INTRAVENOUS at 02:08

## 2020-08-15 RX ADMIN — VANCOMYCIN HYDROCHLORIDE 2000 MG: 100 INJECTION, POWDER, LYOPHILIZED, FOR SOLUTION INTRAVENOUS at 06:08

## 2020-08-15 RX ADMIN — VALSARTAN 160 MG: 160 TABLET, FILM COATED ORAL at 09:08

## 2020-08-15 RX ADMIN — OXYCODONE AND ACETAMINOPHEN 1 TABLET: 10; 325 TABLET ORAL at 09:08

## 2020-08-15 RX ADMIN — IBUPROFEN 400 MG: 400 TABLET, FILM COATED ORAL at 05:08

## 2020-08-15 RX ADMIN — DOCUSATE SODIUM 100 MG: 100 CAPSULE, LIQUID FILLED ORAL at 09:08

## 2020-08-15 RX ADMIN — HYDROMORPHONE HYDROCHLORIDE 0.5 MG: 1 INJECTION, SOLUTION INTRAMUSCULAR; INTRAVENOUS; SUBCUTANEOUS at 02:08

## 2020-08-15 RX ADMIN — POLYETHYLENE GLYCOL 3350 17 G: 17 POWDER, FOR SOLUTION ORAL at 09:08

## 2020-08-15 RX ADMIN — CEFTRIAXONE 2 G: 2 INJECTION, SOLUTION INTRAVENOUS at 04:08

## 2020-08-15 RX ADMIN — GABAPENTIN 300 MG: 300 CAPSULE ORAL at 02:08

## 2020-08-15 RX ADMIN — CLOPIDOGREL 75 MG: 75 TABLET, FILM COATED ORAL at 09:08

## 2020-08-15 RX ADMIN — GABAPENTIN 300 MG: 300 CAPSULE ORAL at 09:08

## 2020-08-15 RX ADMIN — HEPARIN SODIUM 5000 UNITS: 5000 INJECTION, SOLUTION INTRAVENOUS; SUBCUTANEOUS at 02:08

## 2020-08-15 RX ADMIN — VANCOMYCIN HYDROCHLORIDE 2000 MG: 100 INJECTION, POWDER, LYOPHILIZED, FOR SOLUTION INTRAVENOUS at 05:08

## 2020-08-15 RX ADMIN — OXYCODONE AND ACETAMINOPHEN 1 TABLET: 10; 325 TABLET ORAL at 10:08

## 2020-08-15 RX ADMIN — IBUPROFEN 400 MG: 400 TABLET, FILM COATED ORAL at 12:08

## 2020-08-15 NOTE — RESPIRATORY THERAPY
Rapid Response Respiratory Therapy ETCO2 Check     Time of visit: 1359       Code Status: Prior   : 1960  Bed: [facility]/1023 A:   MRN: [id number]    SITUATION     Evaluated patient for: ETCO2 Compliance     BACKGROUND     Patient has a past medical history of Diabetes mellitus, H/O brain tumor, HLD (hyperlipidemia), Hypertension, PAD (peripheral artery disease), and Seizures.    ASSESSMENT     Is the ETCO2 monitor on? (Yes/No)  yes  Is the patient wearing a cannula? (Yes/No) yes  Are ETCO2 orders placed? (Yes/No) yes  Is the patient on a PCA pump? (Yes/No) yes  ETCO2 monitored: ETCO2 (mmHg): 40 mmHg  O2 Device/Concentration: Room air  Pulse: 67 Respiratory rate: 14 Temperature: Temp: 97.9 °F (36.6 °C) BP: BP: (!) 144/76 SpO2: 97  Level of Consciousness: Level of Consciousness (AVPU): alert  All Lung Field Breath Sounds: All Lung Fields Breath Sounds: Anterior:, Lateral:, clear, diminished  Ambu at bedside: Ambu bag with the patient?: Yes, Adult Ambu    INTERVENTIONS/RECOMMENDATIONS     Continue to monitor ETCO    PHYSICIAN ESCALATION     Physician Escalation (Yes/No): No  Discussed plan of care primary RT, Eber, RT    FOLLOW-UP     Please call back the Rapid Response RT, Adams Kapadia, RRT at x [phone] for any questions or concerns.               
        Rapid Response Respiratory Therapy ETCO2 Check     Time of visit: 945    Code Status: Prior   : 1960  Bed: 1043/1043 A:   MRN: [id number]    SITUATION     Evaluated patient for: ETCO2 Compliance     BACKGROUND     Patient has a past medical history of Diabetes mellitus, H/O brain tumor, HLD (hyperlipidemia), Hypertension, PAD (peripheral artery disease), and Seizures.    ASSESSMENT     Is the ETCO2 monitor on? (Yes/No)  no  Is the patient wearing a cannula? (Yes/No) no  Are ETCO2 orders placed? (Yes/No) yes  Is the patient on a PCA pump? (Yes/No) no-discontinued use  ETCO2 monitored: ETCO2 (mmHg): 36 mmHg  O2 Device/Concentration: room air  Pulse: 82 Respiratory rate: 18 Temperature: Temp: 97.7 °F (36.5 °C) BP: BP: 125/67 SpO2: 98%  Level of Consciousness: Level of Consciousness (AVPU): alert  All Lung Field Breath Sounds: All Lung Fields Breath Sounds: Anterior:, Lateral:, diminished, clear, equal bilaterally  Ambu at bedside: Ambu bag with the patient?: Yes, Adult Ambu    INTERVENTIONS/RECOMMENDATIONS     Pt is no longer on PCA pump. No acute respiratory concerns at this time.    PHYSICIAN ESCALATION     Physician Escalation (Yes/No): no     Discussed plan of care primary RTANA     FOLLOW-UP     Please call back the Rapid Response RT, Diandra Martinez, CRT at x [phone] for any questions or concerns.                   
No

## 2020-08-15 NOTE — PLAN OF CARE
POC reviewed with patient and spouse, understanding verbalized. ANOx4, VSS on RA. He reports feeling much better overall tonight. Pain no higher than a 4 utilizing ATC dosing of PRN oxy and dilaudid.    Skin: L lower abd transverse incision covered with island border dressing which is C/D/I overnight. No output to continuous l groin incision wound vac @ 125. 2 groin JPs minimal sanguineous drainage.     Adequate UOP per urinal. No BM. Tolerating consistent carb diet well, endorses mild intermittent nausea but denies vomiting. Bowel sounds audible and active. Q2 neurovascular checks performed, PT/DP pulses strong and audible in both feet.     Vanc therapy continued per MAR. Telemetry monitoring on, NSR noted. Call bell in reach, WCTM.

## 2020-08-15 NOTE — PROGRESS NOTES
[facility]  Vascular Surgery  Progress Note    Patient Name: Renan Britton  MRN: [id number]  Admission Date: 8/12/2020  Primary Care Provider: Eren Becker MD    Subjective:     Interval History: NAEO    Post-Op Info:  Procedure(s) (LRB):  CREATION, BYPASS, ARTERIAL, ILIAC TO FEMORAL (Left)   3 Days Post-Op       Medications:  Continuous Infusions:   sodium chloride 0.9% 10 mL/hr at 08/12/20 0715     Scheduled Meds:   cefTRIAXone (ROCEPHIN) IVPB  2 g Intravenous Q24H    clopidogreL  75 mg Oral Daily    docusate sodium  100 mg Oral BID    gabapentin  300 mg Oral TID    heparin (porcine)  5,000 Units Subcutaneous Q8H    ibuprofen  400 mg Oral Q6H    polyethylene glycol  17 g Oral Daily    valsartan  160 mg Oral Daily    vancomycin (VANCOCIN) IVPB  2,000 mg Intravenous Q12H     PRN Meds:HYDROmorphone, ondansetron, oxyCODONE-acetaminophen, oxyCODONE-acetaminophen     Objective:     Vital Signs (Most Recent):  Temp: 97.8 °F (36.6 °C) (08/15/20 0740)  Pulse: 77 (08/15/20 0740)  Resp: 18 (08/15/20 0740)  BP: (!) 144/74 (08/15/20 0740)  SpO2: 96 % (08/15/20 0753) Vital Signs (24h Range):  Temp:  [96.7 °F (35.9 °C)-98.1 °F (36.7 °C)] 97.8 °F (36.6 °C)  Pulse:  [63-92] 77  Resp:  [14-20] 18  SpO2:  [95 %-99 %] 96 %  BP: (141-152)/(70-87) 144/74     Date 08/15/20 0700 - 08/16/20 0659   Shift 5921-7605 7116-4619 5357-8018 24 Hour Total   INTAKE   P.O. 120   120   Shift Total(mL/kg) 120(1)   120(1)   OUTPUT   Shift Total(mL/kg)       Weight (kg) 123.4 123.4 123.4 123.4       Physical Exam  Vitals signs and nursing note reviewed.   Constitutional:       Appearance: Normal appearance.   HENT:      Head: Normocephalic and atraumatic.   Pulmonary:      Effort: Pulmonary effort is normal.      Breath sounds: Normal breath sounds.   Abdominal:      Comments: LLQ abdominal incision c/d/i with staples in place    Musculoskeletal:      Comments: Left foot with biphasic DP/PT signals    Skin:      General: Skin is warm and dry.      Capillary Refill: Capillary refill takes less than 2 seconds.   Neurological:      General: No focal deficit present.      Mental Status: He is alert and oriented to person, place, and time.   Psychiatric:         Mood and Affect: Mood normal.         Behavior: Behavior normal.         Significant Labs:  None    Significant Diagnostics:  None    Assessment/Plan:     PAD (peripheral artery disease)  59 year old man with infected L groin pseudoaneurysm s/p explant of external iliac and common femoral stent, debridement, and reconstruction with cadaveric vein bypass and rerotation of thigh flap by plastic surgery.    - Multimodal pain regimen   - Strip KATHLEEN drains 2x daily - minimal ss output   - Continue Prevena per PRS recs  - PT/OT, OOB ambulation with assistance is encouraged  - BPs under better control since yesterday   - Plavix  - Regular diet  - Groin culture resulted with enterococcus species - awaiting sensitivity. ID aware, appreciate their recommendations for ongoing abx administration             Leida Hancock MD  Vascular Surgery  [facility]

## 2020-08-15 NOTE — NURSING
Received call back from Dr Hancock regarding KATHLEEN drain output, states she will come to see patient. Will continue to monitor.

## 2020-08-15 NOTE — PLAN OF CARE
POC reviewed with patient and spouse, voiced understanding. AAoX4. Up to side of bed for meals, walked in room with PT. Pain managed with PRN pain meds. Patient refused PCA pump stating it makes him feel bad. Picc line to right upper arm intact and patent. KATHLEEN drains to left groin x2 with minimal output of bloody  drainage. Remains on tele in NSR. Voiding adequately per urinal with yellow urine,clear. NV checks performed every 2 hours with weaker right DP pulse. Wound vac in place with no output. Remains on RA. Abd incisions with dry dressings and intact. Bed in low and locked position. Call light in reach. Will continue to monitor.

## 2020-08-15 NOTE — ASSESSMENT & PLAN NOTE
59 year old man with infected L groin pseudoaneurysm s/p explant of external iliac and common femoral stent, debridement, and reconstruction with cadaveric vein bypass and rerotation of thigh flap by plastic surgery.    - Multimodal pain regimen   - Strip KATHLEEN drains 2x daily - minimal ss output   - Continue Prevena per PRS recs  - PT/OT, OOB ambulation with assistance is encouraged  - BPs under better control since yesterday   - Plavix  - Regular diet  - Groin culture resulted with enterococcus species - awaiting sensitivity. ID aware, appreciate their recommendations for ongoing abx administration

## 2020-08-15 NOTE — SUBJECTIVE & OBJECTIVE
Medications:  Continuous Infusions:   sodium chloride 0.9% 10 mL/hr at 08/12/20 0715     Scheduled Meds:   cefTRIAXone (ROCEPHIN) IVPB  2 g Intravenous Q24H    clopidogreL  75 mg Oral Daily    docusate sodium  100 mg Oral BID    gabapentin  300 mg Oral TID    heparin (porcine)  5,000 Units Subcutaneous Q8H    ibuprofen  400 mg Oral Q6H    polyethylene glycol  17 g Oral Daily    valsartan  160 mg Oral Daily    vancomycin (VANCOCIN) IVPB  2,000 mg Intravenous Q12H     PRN Meds:HYDROmorphone, ondansetron, oxyCODONE-acetaminophen, oxyCODONE-acetaminophen     Objective:     Vital Signs (Most Recent):  Temp: 97.8 °F (36.6 °C) (08/15/20 0740)  Pulse: 77 (08/15/20 0740)  Resp: 18 (08/15/20 0740)  BP: (!) 144/74 (08/15/20 0740)  SpO2: 96 % (08/15/20 0753) Vital Signs (24h Range):  Temp:  [96.7 °F (35.9 °C)-98.1 °F (36.7 °C)] 97.8 °F (36.6 °C)  Pulse:  [63-92] 77  Resp:  [14-20] 18  SpO2:  [95 %-99 %] 96 %  BP: (141-152)/(70-87) 144/74     Date 08/15/20 0700 - 08/16/20 0659   Shift 9292-1549 4210-2251 0873-3072 24 Hour Total   INTAKE   P.O. 120   120   Shift Total(mL/kg) 120(1)   120(1)   OUTPUT   Shift Total(mL/kg)       Weight (kg) 123.4 123.4 123.4 123.4       Physical Exam  Vitals signs and nursing note reviewed.   Constitutional:       Appearance: Normal appearance.   HENT:      Head: Normocephalic and atraumatic.   Pulmonary:      Effort: Pulmonary effort is normal.      Breath sounds: Normal breath sounds.   Abdominal:      Comments: LLQ abdominal incision c/d/i with staples in place    Musculoskeletal:      Comments: Left foot with biphasic DP/PT signals    Skin:     General: Skin is warm and dry.      Capillary Refill: Capillary refill takes less than 2 seconds.   Neurological:      General: No focal deficit present.      Mental Status: He is alert and oriented to person, place, and time.   Psychiatric:         Mood and Affect: Mood normal.         Behavior: Behavior normal.         Significant  Labs:  None    Significant Diagnostics:  None

## 2020-08-16 LAB — VANCOMYCIN TROUGH SERPL-MCNC: 11.2 UG/ML (ref 10–22)

## 2020-08-16 PROCEDURE — 63600175 PHARM REV CODE 636 W HCPCS: Performed by: STUDENT IN AN ORGANIZED HEALTH CARE EDUCATION/TRAINING PROGRAM

## 2020-08-16 PROCEDURE — 80202 ASSAY OF VANCOMYCIN: CPT

## 2020-08-16 PROCEDURE — 63600175 PHARM REV CODE 636 W HCPCS: Performed by: SURGERY

## 2020-08-16 PROCEDURE — 63600175 PHARM REV CODE 636 W HCPCS: Performed by: NURSE PRACTITIONER

## 2020-08-16 PROCEDURE — 25000003 PHARM REV CODE 250: Performed by: SURGERY

## 2020-08-16 PROCEDURE — 20600001 HC STEP DOWN PRIVATE ROOM

## 2020-08-16 PROCEDURE — 25000003 PHARM REV CODE 250: Performed by: STUDENT IN AN ORGANIZED HEALTH CARE EDUCATION/TRAINING PROGRAM

## 2020-08-16 RX ADMIN — VALSARTAN 160 MG: 160 TABLET, FILM COATED ORAL at 08:08

## 2020-08-16 RX ADMIN — HEPARIN SODIUM 5000 UNITS: 5000 INJECTION, SOLUTION INTRAVENOUS; SUBCUTANEOUS at 09:08

## 2020-08-16 RX ADMIN — GABAPENTIN 300 MG: 300 CAPSULE ORAL at 09:08

## 2020-08-16 RX ADMIN — CLOPIDOGREL 75 MG: 75 TABLET, FILM COATED ORAL at 08:08

## 2020-08-16 RX ADMIN — CEFTRIAXONE 2 G: 2 INJECTION, SOLUTION INTRAVENOUS at 04:08

## 2020-08-16 RX ADMIN — GABAPENTIN 300 MG: 300 CAPSULE ORAL at 08:08

## 2020-08-16 RX ADMIN — HEPARIN SODIUM 5000 UNITS: 5000 INJECTION, SOLUTION INTRAVENOUS; SUBCUTANEOUS at 02:08

## 2020-08-16 RX ADMIN — HEPARIN SODIUM 5000 UNITS: 5000 INJECTION, SOLUTION INTRAVENOUS; SUBCUTANEOUS at 05:08

## 2020-08-16 RX ADMIN — DOCUSATE SODIUM 100 MG: 100 CAPSULE, LIQUID FILLED ORAL at 08:08

## 2020-08-16 RX ADMIN — OXYCODONE AND ACETAMINOPHEN 1 TABLET: 10; 325 TABLET ORAL at 10:08

## 2020-08-16 RX ADMIN — IBUPROFEN 400 MG: 400 TABLET, FILM COATED ORAL at 12:08

## 2020-08-16 RX ADMIN — GABAPENTIN 300 MG: 300 CAPSULE ORAL at 04:08

## 2020-08-16 RX ADMIN — IBUPROFEN 400 MG: 400 TABLET, FILM COATED ORAL at 11:08

## 2020-08-16 RX ADMIN — OXYCODONE HYDROCHLORIDE AND ACETAMINOPHEN 1 TABLET: 5; 325 TABLET ORAL at 04:08

## 2020-08-16 RX ADMIN — POLYETHYLENE GLYCOL 3350 17 G: 17 POWDER, FOR SOLUTION ORAL at 08:08

## 2020-08-16 RX ADMIN — DOCUSATE SODIUM 100 MG: 100 CAPSULE, LIQUID FILLED ORAL at 09:08

## 2020-08-16 RX ADMIN — OXYCODONE AND ACETAMINOPHEN 1 TABLET: 10; 325 TABLET ORAL at 08:08

## 2020-08-16 RX ADMIN — IBUPROFEN 400 MG: 400 TABLET, FILM COATED ORAL at 05:08

## 2020-08-16 RX ADMIN — VANCOMYCIN HYDROCHLORIDE 2000 MG: 100 INJECTION, POWDER, LYOPHILIZED, FOR SOLUTION INTRAVENOUS at 05:08

## 2020-08-16 RX ADMIN — VANCOMYCIN HYDROCHLORIDE 1500 MG: 1.5 INJECTION, POWDER, LYOPHILIZED, FOR SOLUTION INTRAVENOUS at 05:08

## 2020-08-16 NOTE — SUBJECTIVE & OBJECTIVE
Interval History: No acute events overnight. Afebrile. Better appetite today. Surgical cultures now showing Enterococcus.  Discussed abx plans with patient and his wife.    Review of Systems   Constitutional: Positive for activity change. Negative for appetite change, chills, diaphoresis, fatigue and fever.   HENT: Negative.    Eyes: Negative.    Respiratory: Negative for cough, shortness of breath and wheezing.    Cardiovascular: Negative for chest pain and palpitations.   Gastrointestinal: Negative for abdominal pain, diarrhea, nausea and vomiting.   Genitourinary:        Packer   Musculoskeletal: Negative for back pain and myalgias.        Surgical site pain   Skin: Positive for wound (wound vac left groin).   Neurological: Negative for dizziness, numbness and headaches.   Psychiatric/Behavioral: Negative for agitation and confusion.     Objective:     Vital Signs (Most Recent):  Temp: 98.1 °F (36.7 °C) (08/15/20 1947)  Pulse: 82 (08/15/20 1947)  Resp: 16 (08/15/20 2104)  BP: (!) 161/83 (08/15/20 1947)  SpO2: 99 % (08/15/20 1947) Vital Signs (24h Range):  Temp:  [96.7 °F (35.9 °C)-98.1 °F (36.7 °C)] 98.1 °F (36.7 °C)  Pulse:  [63-85] 82  Resp:  [14-20] 16  SpO2:  [95 %-99 %] 99 %  BP: (125-161)/(67-83) 161/83     Weight: 123.4 kg (272 lb 0.8 oz)  Body mass index is 37.94 kg/m².    Estimated Creatinine Clearance: 155.6 mL/min (based on SCr of 0.7 mg/dL).    Physical Exam  Vitals signs and nursing note reviewed.   Constitutional:       General: He is not in acute distress.     Appearance: Normal appearance. He is not toxic-appearing.   HENT:      Head: Normocephalic and atraumatic.   Eyes:      General: No scleral icterus.     Conjunctiva/sclera: Conjunctivae normal.   Cardiovascular:      Rate and Rhythm: Normal rate.      Heart sounds: Murmur present.   Pulmonary:      Effort: Pulmonary effort is normal.      Breath sounds: Normal breath sounds.   Abdominal:      General: There is no distension.      Palpations:  Abdomen is soft.      Tenderness: There is abdominal tenderness (left lower abdomen - operative site).   Genitourinary:     Comments: Packer      Musculoskeletal:      Comments: Left groin with wound vac in place  2 drains with small amount bloody drainage  LLQ abdominal incision dressing c/d/i   Skin:     General: Skin is warm and dry.   Neurological:      General: No focal deficit present.      Mental Status: He is alert and oriented to person, place, and time.   Psychiatric:         Mood and Affect: Mood normal.         Behavior: Behavior normal.         Significant Labs:   Blood Culture:   Recent Labs   Lab 06/26/20  0713 06/26/20  0717 06/27/20  0010 06/27/20  0017 06/30/20  1321   LABBLOO Gram stain aer bottle: Gram negative rods   Results called to and read back by: Sana Jay RN  06/28/2020  14:10  PROTEUS MIRABILIS* No growth after 5 days. No growth after 5 days. No growth after 5 days. No growth after 5 days.     CBC:   Recent Labs   Lab 08/14/20  0430   WBC 7.94   HGB 10.7*   HCT 33.7*   *     CMP:   Recent Labs   Lab 08/14/20  0430      K 4.0      CO2 28   *   BUN 10   CREATININE 0.7   CALCIUM 8.7   PROT 6.4   ALBUMIN 3.1*   BILITOT 0.5   ALKPHOS 41*   AST 38   ALT 20   ANIONGAP 7*   EGFRNONAA >60.0     Wound Culture:   Recent Labs   Lab 06/29/20  2244 08/12/20  1138 08/12/20  1154 08/12/20  1213   LABAERO No growth  No growth No significant isolate ENTEROCOCCUS SPECIES  Many  Identification and susceptibility pending  * ENTEROCOCCUS SPECIES  Rare  Identification and susceptibility pending  No other significant isolate  *       Significant Imaging: I have reviewed all pertinent imaging results/findings within the past 24 hours.

## 2020-08-16 NOTE — SUBJECTIVE & OBJECTIVE
Medications:  Continuous Infusions:   sodium chloride 0.9% 10 mL/hr at 08/12/20 0715     Scheduled Meds:   cefTRIAXone (ROCEPHIN) IVPB  2 g Intravenous Q24H    clopidogreL  75 mg Oral Daily    docusate sodium  100 mg Oral BID    gabapentin  300 mg Oral TID    heparin (porcine)  5,000 Units Subcutaneous Q8H    ibuprofen  400 mg Oral Q6H    polyethylene glycol  17 g Oral Daily    valsartan  160 mg Oral Daily    vancomycin (VANCOCIN) IVPB  2,000 mg Intravenous Q12H     PRN Meds:HYDROmorphone, ondansetron, oxyCODONE-acetaminophen, oxyCODONE-acetaminophen     Objective:     Vital Signs (Most Recent):  Temp: 97.8 °F (36.6 °C) (08/16/20 0800)  Pulse: 79 (08/16/20 0800)  Resp: 18 (08/16/20 0852)  BP: (!) 151/76 (08/16/20 0800)  SpO2: 96 % (08/16/20 0800) Vital Signs (24h Range):  Temp:  [96.3 °F (35.7 °C)-98.1 °F (36.7 °C)] 97.8 °F (36.6 °C)  Pulse:  [64-85] 79  Resp:  [14-20] 18  SpO2:  [96 %-100 %] 96 %  BP: (122-161)/(67-83) 151/76     Date 08/16/20 0700 - 08/17/20 0659   Shift 7152-1678 7286-0656 8660-6540 24 Hour Total   INTAKE   P.O. 120   120   Shift Total(mL/kg) 120(1)   120(1)   OUTPUT   Urine(mL/kg/hr) 350   350   Shift Total(mL/kg) 350(2.8)   350(2.8)   Weight (kg) 123.4 123.4 123.4 123.4       Physical Exam  Vitals signs and nursing note reviewed.   Constitutional:       Appearance: Normal appearance.   HENT:      Head: Normocephalic.      Mouth/Throat:      Mouth: Mucous membranes are moist.   Eyes:      Extraocular Movements: Extraocular movements intact.      Pupils: Pupils are equal, round, and reactive to light.   Cardiovascular:      Rate and Rhythm: Normal rate and regular rhythm.      Comments: RLE with good DP/PT signals, well perfused  Pulmonary:      Effort: Pulmonary effort is normal. No respiratory distress.      Breath sounds: No wheezing or rhonchi.   Abdominal:      Palpations: Abdomen is soft.   Musculoskeletal:      Comments: Left groin JPs with bloody > ss output, minimal  overnight  Left groin WV to suction, minimal output    Skin:     General: Skin is warm.      Capillary Refill: Capillary refill takes less than 2 seconds.   Neurological:      General: No focal deficit present.      Mental Status: He is alert and oriented to person, place, and time.

## 2020-08-16 NOTE — PROGRESS NOTES
[facility]  Vascular Surgery  Progress Note    Patient Name: Renan Britton  MRN: [id number]  Admission Date: 8/12/2020  Primary Care Provider: Eren Becker MD    Subjective:     Interval History: Pain better controlled on multimodal.  He is becoming more mobile.      Post-Op Info:  Procedure(s) (LRB):  CREATION, BYPASS, ARTERIAL, ILIAC TO FEMORAL (Left)   4 Days Post-Op       Medications:  Continuous Infusions:   sodium chloride 0.9% 10 mL/hr at 08/12/20 0715     Scheduled Meds:   cefTRIAXone (ROCEPHIN) IVPB  2 g Intravenous Q24H    clopidogreL  75 mg Oral Daily    docusate sodium  100 mg Oral BID    gabapentin  300 mg Oral TID    heparin (porcine)  5,000 Units Subcutaneous Q8H    ibuprofen  400 mg Oral Q6H    polyethylene glycol  17 g Oral Daily    valsartan  160 mg Oral Daily    vancomycin (VANCOCIN) IVPB  2,000 mg Intravenous Q12H     PRN Meds:HYDROmorphone, ondansetron, oxyCODONE-acetaminophen, oxyCODONE-acetaminophen     Objective:     Vital Signs (Most Recent):  Temp: 97.8 °F (36.6 °C) (08/16/20 0800)  Pulse: 79 (08/16/20 0800)  Resp: 18 (08/16/20 0852)  BP: (!) 151/76 (08/16/20 0800)  SpO2: 96 % (08/16/20 0800) Vital Signs (24h Range):  Temp:  [96.3 °F (35.7 °C)-98.1 °F (36.7 °C)] 97.8 °F (36.6 °C)  Pulse:  [64-85] 79  Resp:  [14-20] 18  SpO2:  [96 %-100 %] 96 %  BP: (122-161)/(67-83) 151/76     Date 08/16/20 0700 - 08/17/20 0659   Shift 6338-1292 9816-9629 1507-7605 24 Hour Total   INTAKE   P.O. 120   120   Shift Total(mL/kg) 120(1)   120(1)   OUTPUT   Urine(mL/kg/hr) 350   350   Shift Total(mL/kg) 350(2.8)   350(2.8)   Weight (kg) 123.4 123.4 123.4 123.4       Physical Exam  Vitals signs and nursing note reviewed.   Constitutional:       Appearance: Normal appearance.   HENT:      Head: Normocephalic.      Mouth/Throat:      Mouth: Mucous membranes are moist.   Eyes:      Extraocular Movements: Extraocular movements intact.      Pupils: Pupils are equal, round, and  reactive to light.   Cardiovascular:      Rate and Rhythm: Normal rate and regular rhythm.      Comments: RLE with good DP/PT signals, well perfused  Pulmonary:      Effort: Pulmonary effort is normal. No respiratory distress.      Breath sounds: No wheezing or rhonchi.   Abdominal:      Palpations: Abdomen is soft.   Musculoskeletal:      Comments: Left groin JPs with bloody > ss output, minimal overnight  Left groin WV to suction, minimal output    Skin:     General: Skin is warm.      Capillary Refill: Capillary refill takes less than 2 seconds.   Neurological:      General: No focal deficit present.      Mental Status: He is alert and oriented to person, place, and time.         Assessment/Plan:     PAD (peripheral artery disease)  59 year old man with infected L groin pseudoaneurysm s/p explant of external iliac and common femoral stent, debridement, and reconstruction with cadaveric vein bypass and rerotation of thigh flap by plastic surgery.    - Multimodal pain regimen, continue current regimen  - Strip KATHLEEN drains 2x daily - minimal ss output   - Continue Prevena per PRS recs  - PT/OT, OOB ambulation with assistance is encouraged  - Plavix  - Regular diet  - Groin culture resulted with enterococcus species - Sensitive to vanc and bactrim, ID for suppressive ABX regimen recs  - Anticipate DC in coming days            Moreno Hunt MD  Vascular Surgery  [facility]

## 2020-08-16 NOTE — PLAN OF CARE
POC reviewed with patient and spouse, voiced understanding. AAoX4. Up to side of bed for meals, walked in room with nurse. Sat up in chair for 3.5 hours, tolerated well. Pain managed with PRN pain meds, pain regimen changed per MD. Picc line to right upper arm intact and patent. KATHLEEN drains to left groin x2. KATHLEEN drain #1 with increase in drainage of 40 ml and bloody. Tele in progress. Voiding adequately per urinal with yellow urine,clear. NV checks performed every 2 hours. Wound vac in place with no output. Remains on RA. Abd incisions with dry dressings and intact. Tolerating diet well.  Bed in low and locked position. Call light in reach. Will continue to monitor.

## 2020-08-16 NOTE — PROGRESS NOTES
[facility]  Infectious Disease  Progress Note    Patient Name: Renan Britton  MRN: [id number]  Admission Date: 8/12/2020  Length of Stay: 3 days  Attending Physician: Kelby Gomez MD  Primary Care Provider: Eren Becker MD    Isolation Status: No active isolations  Assessment/Plan:      * Pseudoaneurysm     59 year old with PAD who is s/p bilateral femoral endarterectcomy in 2019 complicated by postoperative fluid collection with wound breakdown in January 2020 with Group F strep and Proteus mirabilis endovascular infection s/p wound washout with groin flap 1/3/20 s/p 6 weeks of IV ceftriaxone (EOC 2/14/20) complicated by proteus mirabilis bacteremia March 2020 s/p 4 weeks of IV ceftraixone (EOC 4/2/20) followed by suppressive levaquin.  Had subsequent PSA of left femoral artery, s/p stenting on 4/6/20 followed by 6 weeks of IV ceftriaxone (EOC 5/18/20), then placed on suppresive cefadroxil.  He subsequently developed explanding left groin PSA with recurrent Proteus bacteremia,  s/p washout with stent placement on 6/29/20 and discharged on 6-8 week course of ceftriaxone (EOC 8/20/20).       He is now POD #3 de-rotation and re-rotation of thigh flap, excision of left groin infected PSA and left common femoral artery with explantation of external iliac/common femoral stent graft with EIA to distal common femoral bypass with cryovein.  ID consulted for antibiotic management.      Surgical cultures are now showing Enterococcus species, ID and susceptibilities pending.   He is currently on Vancomycin and Ceftriaxone. Afebrile, stable.     Plan/recommendations:   1.  Continue IV ceftriaxone 2 grams q 24 hours for prior proteus and Group F strep.    2.  Continue IV vancomycin  for enterococcus, pending speciation and susceptibilities   3.  Will follow up cultures and adjust antibiotics accordingly. Hopefully will be able to deescalate therapy to a single antibiotic to cover all  isolates.  4.  Anticipate 4-6 weeks of IV abx from date of stent removal   5.  ID will follow.        Please call for any questions. Thank you.  Ally Lam PA-C  Phone: [phone]  Pager: [phone]    Subjective:     Principal Problem:Pseudoaneurysm    HPI: Mr. Renan Britton is a 59 year old with PAD who is s/p bilateral femoral endarterectcomy in 2019 complicated by postoperative fluid collection with wound breakdown in January 2020 with Group F strep and Proteus mirabilis endovascular infection s/p wound washout with groin flap 1/3/20 s/p 6 weeks of IV ceftriaxone (EOC 2/14/20) complicated by proteus mirabilis bacteremia March 2020 s/p 4 weeks of IV ceftraixone (EOC 4/2/20) followed by suppressive levaquin.  Had subsequent PSA of left femoral artery, s/p stenting on 4/6/20 followed by 6 weeks of IV cefttriaxone (EOC 5/18/20), then placed on suppresive cefadroxil.  He subsequently developed explanding left groin PSA with recurrent Proteus bacteremia,  s/p washout with stent placement on 6/29/20 and discharged on 6-8 week course of ceftriaxone (EOC 8/20/20).  He is now POD #1 de-rotation and re-rotation of thigh flap, excision of left groin infected PSA and left common femoral artery with explantation of external iliac/common femoral stent with EIA to distal common femoral bypass with cryovein.  ID consulted for antibiotic management.     Surgical cultures pending. Gram stain with rare GPC.  Interval History: No acute events overnight. Afebrile. Better appetite today. Surgical cultures now showing Enterococcus.  Discussed abx plans with patient and his wife.    Review of Systems   Constitutional: Positive for activity change. Negative for appetite change, chills, diaphoresis, fatigue and fever.   HENT: Negative.    Eyes: Negative.    Respiratory: Negative for cough, shortness of breath and wheezing.    Cardiovascular: Negative for chest pain and palpitations.   Gastrointestinal: Negative for abdominal pain,  diarrhea, nausea and vomiting.   Genitourinary:        Packer   Musculoskeletal: Negative for back pain and myalgias.        Surgical site pain   Skin: Positive for wound (wound vac left groin).   Neurological: Negative for dizziness, numbness and headaches.   Psychiatric/Behavioral: Negative for agitation and confusion.     Objective:     Vital Signs (Most Recent):  Temp: 98.1 °F (36.7 °C) (08/15/20 1947)  Pulse: 82 (08/15/20 1947)  Resp: 16 (08/15/20 2104)  BP: (!) 161/83 (08/15/20 1947)  SpO2: 99 % (08/15/20 1947) Vital Signs (24h Range):  Temp:  [96.7 °F (35.9 °C)-98.1 °F (36.7 °C)] 98.1 °F (36.7 °C)  Pulse:  [63-85] 82  Resp:  [14-20] 16  SpO2:  [95 %-99 %] 99 %  BP: (125-161)/(67-83) 161/83     Weight: 123.4 kg (272 lb 0.8 oz)  Body mass index is 37.94 kg/m².    Estimated Creatinine Clearance: 155.6 mL/min (based on SCr of 0.7 mg/dL).    Physical Exam  Vitals signs and nursing note reviewed.   Constitutional:       General: He is not in acute distress.     Appearance: Normal appearance. He is not toxic-appearing.   HENT:      Head: Normocephalic and atraumatic.   Eyes:      General: No scleral icterus.     Conjunctiva/sclera: Conjunctivae normal.   Cardiovascular:      Rate and Rhythm: Normal rate.      Heart sounds: Murmur present.   Pulmonary:      Effort: Pulmonary effort is normal.      Breath sounds: Normal breath sounds.   Abdominal:      General: There is no distension.      Palpations: Abdomen is soft.      Tenderness: There is abdominal tenderness (left lower abdomen - operative site).   Genitourinary:     Comments: Packer      Musculoskeletal:      Comments: Left groin with wound vac in place  2 drains with small amount bloody drainage  LLQ abdominal incision dressing c/d/i   Skin:     General: Skin is warm and dry.   Neurological:      General: No focal deficit present.      Mental Status: He is alert and oriented to person, place, and time.   Psychiatric:         Mood and Affect: Mood normal.          Behavior: Behavior normal.         Significant Labs:   Blood Culture:   Recent Labs   Lab 06/26/20  0713 06/26/20  0717 06/27/20  0010 06/27/20  0017 06/30/20  1321   LABBLOO Gram stain aer bottle: Gram negative rods   Results called to and read back by: Sana Jay RN  06/28/2020  14:10  PROTEUS MIRABILIS* No growth after 5 days. No growth after 5 days. No growth after 5 days. No growth after 5 days.     CBC:   Recent Labs   Lab 08/14/20  0430   WBC 7.94   HGB 10.7*   HCT 33.7*   *     CMP:   Recent Labs   Lab 08/14/20  0430      K 4.0      CO2 28   *   BUN 10   CREATININE 0.7   CALCIUM 8.7   PROT 6.4   ALBUMIN 3.1*   BILITOT 0.5   ALKPHOS 41*   AST 38   ALT 20   ANIONGAP 7*   EGFRNONAA >60.0     Wound Culture:   Recent Labs   Lab 06/29/20  2244 08/12/20  1138 08/12/20  1154 08/12/20  1213   LABAERO No growth  No growth No significant isolate ENTEROCOCCUS SPECIES  Many  Identification and susceptibility pending  * ENTEROCOCCUS SPECIES  Rare  Identification and susceptibility pending  No other significant isolate  *       Significant Imaging: I have reviewed all pertinent imaging results/findings within the past 24 hours.

## 2020-08-16 NOTE — PLAN OF CARE
POC reviewed with patient and spouse, understanding verbalized. ANOx4, VSS on RA. Pain well controlled with PRN percocet tonight and scheduled ibuprofen.     Skin: L lower abd transverse incision island dressing remains C/D/I. Still no output to wound vac on l groin incision 125. 2 groin JPs minimal sanguineous drainage - no increase noted overnight.      Adequate UOP per urinal. No BM although he has hyperactive bowel sounds and endorses gas cramps. Tolerating consistent carb diet well, endorses mild intermittent nausea but denies vomiting. Q2 neurovascular checks continued, arterial and venous PT/DP pulses strong and audible in both feet.      Vanc therapy continued per MAR, trough drawn per orders. Telemetry monitoring on, NSR noted. Call bell in reach, WCTM.

## 2020-08-16 NOTE — ASSESSMENT & PLAN NOTE
59 year old man with infected L groin pseudoaneurysm s/p explant of external iliac and common femoral stent, debridement, and reconstruction with cadaveric vein bypass and rerotation of thigh flap by plastic surgery.    - Multimodal pain regimen, continue current regimen  - Strip KATHLEEN drains 2x daily - minimal ss output   - Continue Prevena per PRS recs  - PT/OT, OOB ambulation with assistance is encouraged  - Plavix  - Regular diet  - Groin culture resulted with enterococcus species - Sensitive to vanc and bactrim, ID for suppressive ABX regimen recs  - Anticipate DC in coming days

## 2020-08-16 NOTE — NURSING
Elongated QT interval of 0.48 on tele strip, different from the normal values during the last 2 nights. On call MD aware, no s/s of distress, patient sleeping quietly. WCTM

## 2020-08-16 NOTE — PROGRESS NOTES
Pharmacokinetic Assessment Follow Up: IV Vancomycin    Vancomycin serum concentration assessment(s):    The trough level was drawn correctly and can be used to guide therapy at this time. The measurement is below the desired definitive target range of 15 to 20 mcg/mL.    Vancomycin Regimen Plan:    Change regimen to Vancomycin 1500 mg IV every 8 hours with next serum trough concentration measured at 0030 prior to 5th dose on 8/18    Drug levels (last 3 results):  Recent Labs   Lab Result Units 08/16/20  0500   Vancomycin-Trough ug/mL 11.2       Pharmacy will continue to follow and monitor vancomycin.    Please contact pharmacy at extension [phone] for questions regarding this assessment.    Thank you for the consult,   Yuri Pickens       Patient brief summary:  Renan Britton is a 59 y.o. male initiated on antimicrobial therapy with IV Vancomycin for treatment of skin & soft tissue infection /Flap      Drug Allergies:   Review of patient's allergies indicates:   Allergen Reactions    Penicillins Hives     Patient currently on cefepime without complications  Tolerated ceftriaxone 3/2020       Actual Body Weight:   123.4 kg    Renal Function:   Estimated Creatinine Clearance: 155.6 mL/min (based on SCr of 0.7 mg/dL).,     Dialysis Method (if applicable):  N/A    CBC (last 72 hours):  Recent Labs   Lab Result Units 08/14/20  0430   WBC K/uL 7.94   Hemoglobin g/dL 10.7*   Hematocrit % 33.7*   Platelets K/uL 139*   Gran% % 77.1*   Lymph% % 12.5*   Mono% % 8.3   Eosinophil% % 1.5   Basophil% % 0.3   Differential Method  Automated       Metabolic Panel (last 72 hours):  Recent Labs   Lab Result Units 08/14/20  0430   Sodium mmol/L 136   Potassium mmol/L 4.0   Chloride mmol/L 101   CO2 mmol/L 28   Glucose mg/dL 125*   BUN, Bld mg/dL 10   Creatinine mg/dL 0.7   Albumin g/dL 3.1*   Total Bilirubin mg/dL 0.5   Alkaline Phosphatase U/L 41*   AST U/L 38   ALT U/L 20       Vancomycin Administrations:  vancomycin given in the  last 96 hours                   vancomycin 2 g in dextrose 5 % 500 mL IVPB (mg) 2,000 mg New Bag 08/16/20 0507     2,000 mg New Bag 08/15/20 1753     2,000 mg New Bag  0615    vancomycin (VANCOCIN) 2,500 mg in dextrose 5 % 500 mL IVPB (mg) 2,500 mg New Bag 08/14/20 1803    vancomycin injection (g) 2 g Given 08/12/20 1435                Microbiologic Results:  Microbiology Results (last 7 days)     Procedure Component Value Units Date/Time    Aerobic culture [[id number]]  (Abnormal)  (Susceptibility) Collected: 08/12/20 1213    Order Status: Completed Specimen: Groin Updated: 08/16/20 0931     Aerobic Bacterial Culture ENTEROCOCCUS FAECALIS  Rare  No other significant isolate      Narrative:      Left Groin Graft-culture    Aerobic culture [[id number]]  (Abnormal)  (Susceptibility) Collected: 08/12/20 1154    Order Status: Completed Specimen: Body Fluid from Groin Updated: 08/16/20 0919     Aerobic Bacterial Culture ENTEROCOCCUS FAECALIS  Many      Narrative:      Left groin    Culture, Anaerobe [[id number]] Collected: 08/12/20 1210    Order Status: Completed Specimen: Tissue from Groin Updated: 08/14/20 0954     Anaerobic Culture Culture in progress    Narrative:      Left Groin Graft-culture    Culture, Anaerobe [[id number]] Collected: 08/12/20 1154    Order Status: Completed Specimen: Body Fluid from Groin Updated: 08/14/20 0953     Anaerobic Culture Culture in progress    Narrative:      Left groin    Culture, Anaerobe [[id number]] Collected: 08/12/20 1137    Order Status: Completed Specimen: Wound from Groin Updated: 08/14/20 0952     Anaerobic Culture Culture in progress    Aerobic culture [[id number]] Collected: 08/12/20 1138    Order Status: Completed Specimen: Wound from Groin Updated: 08/14/20 0852     Aerobic Bacterial Culture No significant isolate    Gram stain [[id number]] Collected: 08/12/20 1154    Order Status: Completed Specimen: Body Fluid from Groin Updated: 08/12/20 2027     Gram Stain Result Rare WBC's       Rare Gram positive cocci    Narrative:      lrgy groin    Gram stain [[id number]] Collected: 08/12/20 1137    Order Status: Completed Specimen: Wound from Groin Updated: 08/12/20 2020     Gram Stain Result Rare WBC's      No organisms seen    Gram stain [[id number]] Collected: 08/12/20 1210    Order Status: Completed Specimen: Tissue from Groin Updated: 08/12/20 1418     Gram Stain Result No WBC's      No organisms seen    Narrative:      Left Groin Graft-culture

## 2020-08-16 NOTE — ASSESSMENT & PLAN NOTE
59 year old with PAD who is s/p bilateral femoral endarterectcomy in 2019 complicated by postoperative fluid collection with wound breakdown in January 2020 with Group F strep and Proteus mirabilis endovascular infection s/p wound washout with groin flap 1/3/20 s/p 6 weeks of IV ceftriaxone (EOC 2/14/20) complicated by proteus mirabilis bacteremia March 2020 s/p 4 weeks of IV ceftraixone (EOC 4/2/20) followed by suppressive levaquin.  Had subsequent PSA of left femoral artery, s/p stenting on 4/6/20 followed by 6 weeks of IV ceftriaxone (EOC 5/18/20), then placed on suppresive cefadroxil.  He subsequently developed explanding left groin PSA with recurrent Proteus bacteremia,  s/p washout with stent placement on 6/29/20 and discharged on 6-8 week course of ceftriaxone (EOC 8/20/20).       He is now POD #3 de-rotation and re-rotation of thigh flap, excision of left groin infected PSA and left common femoral artery with explantation of external iliac/common femoral stent graft with EIA to distal common femoral bypass with cryovein.  ID consulted for antibiotic management.      Surgical cultures are now showing Enterococcus species, ID and susceptibilities pending.   He is currently on Vancomycin and Ceftriaxone. Afebrile, stable.     Plan/recommendations:   1.  Continue IV ceftriaxone 2 grams q 24 hours for prior proteus and Group F strep.    2.  Continue IV vancomycin  for enterococcus, pending speciation and susceptibilities   3.  Will follow up cultures and adjust antibiotics accordingly. Hopefully will be able to deescalate therapy to a single antibiotic to cover all isolates.  4.  Anticipate 4-6 weeks of IV abx from date of stent removal   5.  ID will follow.

## 2020-08-16 NOTE — NURSING
Patient assisted up to side of bed to eat breakfast, noted bloody drainage to wound vac (no drainage up until this point).  Spoke to Dr Ward and he will go by to see pain. Patient denies any increased pain at this time.

## 2020-08-17 PROBLEM — A49.8 ENTEROCOCCUS FAECALIS INFECTION: Status: ACTIVE | Noted: 2020-08-17

## 2020-08-17 LAB
ANION GAP SERPL CALC-SCNC: 8 MMOL/L (ref 8–16)
BACTERIA SPEC AEROBE CULT: ABNORMAL
BACTERIA SPEC AEROBE CULT: ABNORMAL
BACTERIA SPEC ANAEROBE CULT: NORMAL
BUN SERPL-MCNC: 12 MG/DL (ref 6–20)
CALCIUM SERPL-MCNC: 9.4 MG/DL (ref 8.7–10.5)
CHLORIDE SERPL-SCNC: 101 MMOL/L (ref 95–110)
CO2 SERPL-SCNC: 29 MMOL/L (ref 23–29)
CREAT SERPL-MCNC: 0.7 MG/DL (ref 0.5–1.4)
EST. GFR  (AFRICAN AMERICAN): >60 ML/MIN/1.73 M^2
EST. GFR  (NON AFRICAN AMERICAN): >60 ML/MIN/1.73 M^2
GLUCOSE SERPL-MCNC: 144 MG/DL (ref 70–110)
POTASSIUM SERPL-SCNC: 3.7 MMOL/L (ref 3.5–5.1)
SODIUM SERPL-SCNC: 138 MMOL/L (ref 136–145)

## 2020-08-17 PROCEDURE — 63600175 PHARM REV CODE 636 W HCPCS: Performed by: STUDENT IN AN ORGANIZED HEALTH CARE EDUCATION/TRAINING PROGRAM

## 2020-08-17 PROCEDURE — 63600175 PHARM REV CODE 636 W HCPCS: Performed by: SURGERY

## 2020-08-17 PROCEDURE — 25000003 PHARM REV CODE 250: Performed by: STUDENT IN AN ORGANIZED HEALTH CARE EDUCATION/TRAINING PROGRAM

## 2020-08-17 PROCEDURE — 63600175 PHARM REV CODE 636 W HCPCS: Mod: JG | Performed by: STUDENT IN AN ORGANIZED HEALTH CARE EDUCATION/TRAINING PROGRAM

## 2020-08-17 PROCEDURE — 25000003 PHARM REV CODE 250: Performed by: SURGERY

## 2020-08-17 PROCEDURE — 94761 N-INVAS EAR/PLS OXIMETRY MLT: CPT

## 2020-08-17 PROCEDURE — 80048 BASIC METABOLIC PNL TOTAL CA: CPT

## 2020-08-17 PROCEDURE — 99900035 HC TECH TIME PER 15 MIN (STAT)

## 2020-08-17 PROCEDURE — 97116 GAIT TRAINING THERAPY: CPT | Mod: CQ

## 2020-08-17 PROCEDURE — 97530 THERAPEUTIC ACTIVITIES: CPT | Mod: CQ

## 2020-08-17 PROCEDURE — 20600001 HC STEP DOWN PRIVATE ROOM

## 2020-08-17 PROCEDURE — 99233 SBSQ HOSP IP/OBS HIGH 50: CPT | Mod: ,,, | Performed by: NURSE PRACTITIONER

## 2020-08-17 PROCEDURE — 63600175 PHARM REV CODE 636 W HCPCS: Performed by: NURSE PRACTITIONER

## 2020-08-17 PROCEDURE — 97535 SELF CARE MNGMENT TRAINING: CPT

## 2020-08-17 PROCEDURE — 99233 PR SUBSEQUENT HOSPITAL CARE,LEVL III: ICD-10-PCS | Mod: ,,, | Performed by: NURSE PRACTITIONER

## 2020-08-17 RX ORDER — PREGABALIN 50 MG/1
100 CAPSULE ORAL 2 TIMES DAILY
Status: DISCONTINUED | OUTPATIENT
Start: 2020-08-17 | End: 2020-08-18 | Stop reason: HOSPADM

## 2020-08-17 RX ORDER — HYDROMORPHONE HYDROCHLORIDE 1 MG/ML
0.5 INJECTION, SOLUTION INTRAMUSCULAR; INTRAVENOUS; SUBCUTANEOUS ONCE
Status: COMPLETED | OUTPATIENT
Start: 2020-08-17 | End: 2020-08-17

## 2020-08-17 RX ADMIN — IBUPROFEN 400 MG: 400 TABLET, FILM COATED ORAL at 06:08

## 2020-08-17 RX ADMIN — HYDROMORPHONE HYDROCHLORIDE 0.5 MG: 1 INJECTION, SOLUTION INTRAMUSCULAR; INTRAVENOUS; SUBCUTANEOUS at 08:08

## 2020-08-17 RX ADMIN — CEFTRIAXONE 2 G: 2 INJECTION, SOLUTION INTRAVENOUS at 04:08

## 2020-08-17 RX ADMIN — HYDROMORPHONE HYDROCHLORIDE 0.5 MG: 1 INJECTION, SOLUTION INTRAMUSCULAR; INTRAVENOUS; SUBCUTANEOUS at 06:08

## 2020-08-17 RX ADMIN — IBUPROFEN 400 MG: 400 TABLET, FILM COATED ORAL at 05:08

## 2020-08-17 RX ADMIN — IBUPROFEN 400 MG: 400 TABLET, FILM COATED ORAL at 12:08

## 2020-08-17 RX ADMIN — OXYCODONE AND ACETAMINOPHEN 1 TABLET: 10; 325 TABLET ORAL at 10:08

## 2020-08-17 RX ADMIN — ALTEPLASE 2 MG: 2.2 INJECTION, POWDER, LYOPHILIZED, FOR SOLUTION INTRAVENOUS at 10:08

## 2020-08-17 RX ADMIN — VANCOMYCIN HYDROCHLORIDE 1500 MG: 1.5 INJECTION, POWDER, LYOPHILIZED, FOR SOLUTION INTRAVENOUS at 09:08

## 2020-08-17 RX ADMIN — POLYETHYLENE GLYCOL 3350 17 G: 17 POWDER, FOR SOLUTION ORAL at 08:08

## 2020-08-17 RX ADMIN — DOCUSATE SODIUM 100 MG: 100 CAPSULE, LIQUID FILLED ORAL at 08:08

## 2020-08-17 RX ADMIN — PREGABALIN 100 MG: 50 CAPSULE ORAL at 10:08

## 2020-08-17 RX ADMIN — VANCOMYCIN HYDROCHLORIDE 1500 MG: 1.5 INJECTION, POWDER, LYOPHILIZED, FOR SOLUTION INTRAVENOUS at 12:08

## 2020-08-17 RX ADMIN — HEPARIN SODIUM 5000 UNITS: 5000 INJECTION, SOLUTION INTRAVENOUS; SUBCUTANEOUS at 10:08

## 2020-08-17 RX ADMIN — HEPARIN SODIUM 5000 UNITS: 5000 INJECTION, SOLUTION INTRAVENOUS; SUBCUTANEOUS at 06:08

## 2020-08-17 RX ADMIN — CLOPIDOGREL 75 MG: 75 TABLET, FILM COATED ORAL at 08:08

## 2020-08-17 RX ADMIN — VANCOMYCIN HYDROCHLORIDE 1500 MG: 1.5 INJECTION, POWDER, LYOPHILIZED, FOR SOLUTION INTRAVENOUS at 05:08

## 2020-08-17 RX ADMIN — VALSARTAN 160 MG: 160 TABLET, FILM COATED ORAL at 08:08

## 2020-08-17 RX ADMIN — GABAPENTIN 300 MG: 300 CAPSULE ORAL at 08:08

## 2020-08-17 RX ADMIN — OXYCODONE AND ACETAMINOPHEN 1 TABLET: 10; 325 TABLET ORAL at 02:08

## 2020-08-17 RX ADMIN — HEPARIN SODIUM 5000 UNITS: 5000 INJECTION, SOLUTION INTRAVENOUS; SUBCUTANEOUS at 02:08

## 2020-08-17 RX ADMIN — DOCUSATE SODIUM 100 MG: 100 CAPSULE, LIQUID FILLED ORAL at 10:08

## 2020-08-17 RX ADMIN — GABAPENTIN 300 MG: 300 CAPSULE ORAL at 02:08

## 2020-08-17 NOTE — PLAN OF CARE
POC reviewed with patient and spouse, understanding verbalized. ANOx4, VSS on RA. Pain well controlled with PRN percocet and scheduled ibuprofen. Sleeping well in between care tonight.     Skin: L lower abd transverse incision island dressing remains C/D/I. No output to wound vac on l groin incision. 2 groin JPs minimal sanguineous drainage.      Adequate UOP per urinal. No BM although bowel sounds remain overactive and he continues passing gas. Tolerating consistent carb diet well, no N/V reported. Q2 neurovascular checks continued, arterial and venous PT/DP pulses strong and audible in both feet.      Vanc therapy continued per MAR.Telemetry monitoring on, NSR noted. Call bell in reach, WCTM.

## 2020-08-17 NOTE — PROGRESS NOTES
[facility]  Vascular Surgery  Progress Note    Patient Name: Renan Britton  MRN: [id number]  Admission Date: 8/12/2020  Primary Care Provider: Eren Becker MD    Subjective:     Interval History: nAEO    Post-Op Info:  Procedure(s) (LRB):  CREATION, BYPASS, ARTERIAL, ILIAC TO FEMORAL (Left)   5 Days Post-Op       Medications:  Continuous Infusions:   sodium chloride 0.9% 10 mL/hr at 08/12/20 0715     Scheduled Meds:   cefTRIAXone (ROCEPHIN) IVPB  2 g Intravenous Q24H    clopidogreL  75 mg Oral Daily    docusate sodium  100 mg Oral BID    gabapentin  300 mg Oral TID    heparin (porcine)  5,000 Units Subcutaneous Q8H    HYDROmorphone  0.5 mg Intravenous Once    ibuprofen  400 mg Oral Q6H    polyethylene glycol  17 g Oral Daily    valsartan  160 mg Oral Daily    vancomycin (VANCOCIN) IVPB  1,500 mg Intravenous Q8H     PRN Meds:HYDROmorphone, ondansetron, oxyCODONE-acetaminophen, oxyCODONE-acetaminophen     Objective:     Vital Signs (Most Recent):  Temp: 97.6 °F (36.4 °C) (08/17/20 0427)  Pulse: 70 (08/17/20 0609)  Resp: 16 (08/17/20 0630)  BP: (!) 142/74 (08/17/20 0427)  SpO2: 98 % (08/17/20 0427) Vital Signs (24h Range):  Temp:  [97.4 °F (36.3 °C)-98 °F (36.7 °C)] 97.6 °F (36.4 °C)  Pulse:  [64-84] 70  Resp:  [15-20] 16  SpO2:  [96 %-99 %] 98 %  BP: (135-156)/(68-76) 142/74         Physical Exam  Vitals signs and nursing note reviewed.   Constitutional:       Appearance: Normal appearance.   HENT:      Head: Normocephalic.      Mouth/Throat:      Mouth: Mucous membranes are moist.   Eyes:      Extraocular Movements: Extraocular movements intact.      Pupils: Pupils are equal, round, and reactive to light.   Cardiovascular:      Rate and Rhythm: Normal rate and regular rhythm.      Comments: RLE with good DP/PT signals, well perfused  Pulmonary:      Effort: Pulmonary effort is normal. No respiratory distress.      Breath sounds: No wheezing or rhonchi.   Abdominal:      Palpations:  Abdomen is soft.   Musculoskeletal:      Comments: Left groin JPs with serosanguinous output, minimal overnight  Left groin WV to suction, minimal output    Skin:     General: Skin is warm.      Capillary Refill: Capillary refill takes less than 2 seconds.   Neurological:      General: No focal deficit present.      Mental Status: He is alert and oriented to person, place, and time.         Significant Labs:  None    Significant Diagnostics:  none    Assessment/Plan:     PAD (peripheral artery disease)  59 year old man with infected L groin pseudoaneurysm s/p explant of external iliac and common femoral stent, debridement, and reconstruction with cadaveric vein bypass and rerotation of thigh flap by plastic surgery.    - Multimodal pain regimen, continue current regimen  - Strip KATHLEEN drains 2x daily - minimal ss output   - Continue Prevena per PRS recs, follow up recs  - PT/OT, OOB ambulation with assistance is encouraged  - Plavix  - Regular diet  - Groin culture resulted with enterococcus species - Sensitive to vanc and bactrim, ID for suppressive ABX regimen recs with ceftriaxone and vanc/ Need long term plan.   - Anticipate DC in coming days possibly with home health PT/OT            Kenneth Zarate MD  Vascular Surgery  [facility]

## 2020-08-17 NOTE — ASSESSMENT & PLAN NOTE
59 year old man with infected L groin pseudoaneurysm s/p explant of external iliac and common femoral stent, debridement, and reconstruction with cadaveric vein bypass and rerotation of thigh flap by plastic surgery.    - Multimodal pain regimen, continue current regimen  - Strip KATHLEEN drains 2x daily - minimal ss output   - Continue Prevena per PRS recs, follow up recs  - PT/OT, OOB ambulation with assistance is encouraged  - Plavix  - Regular diet  - Groin culture resulted with enterococcus species - Sensitive to vanc and bactrim, ID for suppressive ABX regimen recs with ceftriaxone and vanc/ Need long term plan.   - Anticipate DC in coming days possibly with home health PT/OT

## 2020-08-17 NOTE — CONSULTS
ALLERGY & IMMUNOLOGY SERVICE - INITIAL CONSULT     HISTORY OF PRESENT ILLNESS     Patient ID: Renan Britton is a 59 y.o. male    Consulted for: penicillin allergy evaluation    Consulted by: ID    HPI:   Renan Britton is a 59 y.o. male here with endovascular infection with e. Fecalis. Allergy has been consulted as ID would like to use ampicillin but he has a history of a penicillin allergy.    He said his penicillin reaction happened as a child, and he does not remember what happened. He does not know if he has received penicillins such as augmentin or amoxicillin since. He said there was one time, after a brain surgery, that he received many medications and had a hive only reaction. He has tolerated various cephalosporins. Currently, he reports he is feeling well       REVIEW OF SYSTEMS     CONST: no unintentional weight changes  NEURO: no weakness, no paresthesias  EYES: no discharge, no pruritus, no erythema  EARS: no hearing loss, no sensation of fullness  NOSE: no congestion, no rhinorrhea, no discharge, no itching, no sneezing  PULM: no SOB, no wheezing, no cough, no snoring  CV: no CP, no palpitations, no leg swelling  GI: no pain, no N/V/D, no BRBPR/melena  DERM: no rashes, no skin breaks other than for IV  Rest of ROS negative unless otherwise stated in the HPI.     MEDICAL HISTORY     MedHx:   Patient Active Problem List   Diagnosis    Claudication    PVD (peripheral vascular disease)    Surgical wound breakdown    Type 2 diabetes mellitus, without long-term current use of insulin    Seizures    History of brain tumor    Thrombocytopenia    JJ (acute kidney injury)    Systolic murmur    Essential hypertension    Proteus infection    Femoral artery occlusion, left    Pseudoaneurysm of left femoral artery    Sepsis    Pseudoaneurysm    Preoperative testing    Pre-operative examination    PAD (peripheral artery disease)    Enterococcus faecalis infection       SurgHx:   Past  Surgical History:   Procedure Laterality Date    ANGIOGRAPHY Left 4/6/2020    Procedure: ANGIOGRAM, Left lower extremity;  Surgeon: Kelby Gomez MD;  Location: [facility] OR [address] [place] [address];  Service: Peripheral Vascular;  Laterality: Left;    ANGIOGRAPHY OF LOWER EXTREMITY Left 6/29/2020    Procedure: Angiogram Extremity Unilateral, washout L groin, possible open pseudoaneurysm repair;  Surgeon: Bruce Dallas MD;  Location: [facility] OR [address] [place] [address];  Service: Peripheral Vascular;  Laterality: Left;  contrast 23 ml  fluoro time: 9.9 min  mGy: 1230.26  Gycm2: 146.69    AORTOGRAPHY N/A 6/29/2020    Procedure: AORTOGRAM;  Surgeon: Bruce Dallas MD;  Location: [facility] OR [address] [place] [address];  Service: Peripheral Vascular;  Laterality: N/A;    APPLICATION OF WOUND VACUUM-ASSISTED CLOSURE DEVICE Bilateral 1/1/2020    Procedure: APPLICATION, WOUND VAC;  Surgeon: SEBAS Prieto II, MD;  Location: [facility] OR [address] [place] [address];  Service: Cardiovascular;  Laterality: Bilateral;    APPLICATION OF WOUND VACUUM-ASSISTED CLOSURE DEVICE N/A 1/3/2020    Procedure: APPLICATION, WOUND VAC;  Surgeon: Brian Wong MD;  Location: [facility] OR [address] [place] [address];  Service: Plastics;  Laterality: N/A;    BRAIN SURGERY  01/2011    TUMOR EXCISED    CREATION OF ILIOFEMORAL ARTERY BYPASS Left 8/12/2020    Procedure: CREATION, BYPASS, ARTERIAL, ILIAC TO FEMORAL;  Surgeon: Kelby Gomez MD;  Location: [facility] OR [address] [place] [address];  Service: Peripheral Vascular;  Laterality: Left;  CO-SURGEONS: DR KEO ADHIKARI;  DR WONG, profusion    CREATION OF MUSCLE ROTATIONAL FLAP N/A 1/3/2020    Procedure: CREATION, FLAP, MUSCLE ROTATION;  Surgeon: Brian Wong MD;  Location: [facility] OR [address] [place] [address];  Service: Plastics;  Laterality: N/A;    ENDARTERECTOMY OF FEMORAL ARTERY Bilateral 12/20/2019    Procedure: ENDARTERECTOMY, FEMORAL;  Surgeon: Kelby Gomez MD;  Location: [facility] OR [address] [place] [address];  Service: Peripheral Vascular;  Laterality: Bilateral;  natalya common femoral endarterectomy with natalya. iliac stent  placement    PERCUTANEOUS TRANSLUMINAL ANGIOPLASTY Left 4/6/2020    Procedure: PTA (ANGIOPLASTY, PERCUTANEOUS, TRANSLUMINAL), left lower extremity;  Surgeon: Kelby Gomez MD;  Location: [facility] OR [address] [place] [address];  Service: Peripheral Vascular;  Laterality: Left;    PERCUTANEOUS TRANSLUMINAL ANGIOPLASTY (PTA) OF PERIPHERAL VESSEL Left 10/17/2019    Procedure: PTA, PERIPHERAL VESSEL;  Surgeon: Rao Fisher MD;  [facility]: [facility] CATH;  Service: Cardiology;  Laterality: Left;    PSEUDOANEURYSM REPAIR Left 6/29/2020    Procedure: REPAIR, PSEUDOANEURYSM;  Surgeon: Bruce Dallas MD;  [facility];  Service: Peripheral Vascular;  Laterality: Left;         Medications:   No current facility-administered medications on file prior to encounter.      Current Outpatient Medications on File Prior to Encounter   Medication Sig Dispense Refill    ascorbic acid (VITAMIN C ORAL) Take 2 capsules by mouth every morning.      aspirin (ECOTRIN) 81 MG EC tablet Take 81 mg by mouth once daily.      atorvastatin (LIPITOR) 40 MG tablet Take 40 mg by mouth once daily.      clopidogreL (PLAVIX) 75 mg tablet Take 1 tablet (75 mg total) by mouth once daily. (Patient taking differently: Take 75 mg by mouth every evening. ) 30 tablet 10    elderberry fruit and flower 460-115 mg Cap Take 2 capsules by mouth every morning.      metFORMIN (GLUCOPHAGE-XR) 500 MG XR 24hr tablet Take 1,000 mg by mouth daily with breakfast.       pregabalin (LYRICA) 100 MG capsule Take 100 mg by mouth 2 (two) times daily.      valsartan (DIOVAN) 160 MG tablet Take 160 mg by mouth once daily.      cefadroxil (DURICEF) 500 MG Cap Take 2 capsules (1,000 mg total) by mouth every 12 (twelve) hours. 120 capsule 4    HYDROcodone-acetaminophen (NORCO) 5-325 mg per tablet Take 1 tablet by mouth every 6 (six) hours as needed for Pain. 15 tablet 0     Drug Allergies:   Review of patient's allergies indicates:   Allergen Reactions    Penicillins Hives      "Patient currently on cefepime without complications  Tolerated ceftriaxone 3/2020      SocHx:   Social History     Socioeconomic History    Marital status:      Spouse name: Not on file    Number of children: Not on file    Years of education: Not on file    Highest education level: Not on file   Occupational History    Not on file   Social Needs    Financial resource strain: Not on file    Food insecurity     Worry: Not on file     Inability: Not on file    Transportation needs     Medical: Not on file     Non-medical: Not on file   Tobacco Use    Smoking status: Former Smoker     Packs/day: 2.00     Types: Cigarettes     Quit date: 2011     Years since quittin.5    Smokeless tobacco: Never Used   Substance and Sexual Activity    Alcohol use: Yes     Comment: 2-4 PER DAY    Drug use: Never    Sexual activity: Not on file   Lifestyle    Physical activity     Days per week: Not on file     Minutes per session: Not on file    Stress: Not on file   Relationships    Social connections     Talks on phone: Not on file     Gets together: Not on file     Attends Worship service: Not on file     Active member of club or organization: Not on file     Attends meetings of clubs or organizations: Not on file     Relationship status: Not on file   Other Topics Concern    Not on file   Social History Narrative    Not on file         FamHx:   Family History   Problem Relation Age of Onset    Diabetes Mother     Hypertension Mother     Stroke Mother     Cancer Father         LUNG    Heart disease Father     Cancer Brother     Heart disease Brother     Heart disease Brother     Heart disease Brother           PHYSICAL EXAM     /64 (BP Location: Left arm, Patient Position: Sitting)   Pulse 84   Temp 98.3 °F (36.8 °C) (Oral)   Resp 17   Ht 5' 11" (1.803 m)   Wt 123.4 kg (272 lb 0.8 oz)   SpO2 98%   BMI 37.94 kg/m²   VS: /64 (BP Location: Left arm, Patient Position: " "Sitting)   Pulse 84   Temp 98.3 °F (36.8 °C) (Oral)   Resp 17   Ht 5' 11" (1.803 m)   Wt 123.4 kg (272 lb 0.8 oz)   SpO2 98%   BMI 37.94 kg/m²   GENERAL: AAOx4, NAD, well-appearing, cooperative  EYES: EOMI, no conjunctival injection, no discharge, no infraorbital shiners  NOSE: no external drainage  LUNGS: CTAB, no w/r/c, no increased WOB  HEART: RRR with systolic murmur  EXTREMITIES: No edema, no cyanosis, no clubbing  DERM: no rashes, no skin breaks other than for IV  NEURO: normal speech, no facial asymmetry     LABORATORY STUDIES     Component      Latest Ref Rng & Units 8/18/2020 8/14/2020   WBC      3.90 - 12.70 K/uL  7.94   RBC      4.60 - 6.20 M/uL  3.89 (L)   Hemoglobin      14.0 - 18.0 g/dL  10.7 (L)   Hematocrit      40.0 - 54.0 %  33.7 (L)   MCV      82 - 98 fL  87   MCH      27.0 - 31.0 pg  27.5   MCHC      32.0 - 36.0 g/dL  31.8 (L)   RDW      11.5 - 14.5 %  14.2   Platelets      150 - 350 K/uL  139 (L)   MPV      9.2 - 12.9 fL  10.9   Immature Granulocytes      0.0 - 0.5 %  0.3   Gran # (ANC)      1.8 - 7.7 K/uL  6.1   Immature Grans (Abs)      0.00 - 0.04 K/uL  0.02   Lymph #      1.0 - 4.8 K/uL  1.0   Mono #      0.3 - 1.0 K/uL  0.7   Eos #      0.0 - 0.5 K/uL  0.1   Baso #      0.00 - 0.20 K/uL  0.02   nRBC      0 /100 WBC  0   Gran%      38.0 - 73.0 %  77.1 (H)   Lymph%      18.0 - 48.0 %  12.5 (L)   Mono%      4.0 - 15.0 %  8.3   Eosinophil%      0.0 - 8.0 %  1.5   Basophil%      0.0 - 1.9 %  0.3   Differential Method        Automated   Sodium      136 - 145 mmol/L 137    Potassium      3.5 - 5.1 mmol/L 3.9    Chloride      95 - 110 mmol/L 100    CO2      23 - 29 mmol/L 29    Glucose      70 - 110 mg/dL 115 (H)    BUN, Bld      6 - 20 mg/dL 11    Creatinine      0.5 - 1.4 mg/dL 0.6    Calcium      8.7 - 10.5 mg/dL 9.2    Anion Gap      8 - 16 mmol/L 8    eGFR if African American      >60 mL/min/1.73 m:2 >60.0    eGFR if non African American      >60 mL/min/1.73 m:2 >60.0         ALLERGEN " TESTING     Date: 8/18/20  Risks and benefits were discussed with the patient.    TEST RESULTS  Prick (mm wheal / mm flare) Intradermal   Saline (NS/0.9%):  0 mm (neg)    0 mm (neg)  Pen G (10,000 U/mL):  0 mm (neg)    0 mm (neg)  Pre-Pen (standard):  0 mm (neg)    0 mm (neg)  Histamine (1 mg/mL):  4/8 mm (pos)    N/A      INTERPRETATION: Negative for evidence of penicillin allergy.  Test exhibited good  negative and positive controls.  Positive histamine reactions were 4 mm (wheal) and  8 mm (flare) for prick.  0.02 mL of each substance was injected for each intradermal test.    NOTE: This test assesses the risk for an IgE-mediated immediate type hypersensitivity  reaction following administration of penicillin.  The spectrum of symptoms this test will  assess the risk for includes: urticaria, angioedema, laryngeal edema, bronchospasm,  and shock.  Test does not assess the risk for symptoms produced by non-immunologic  mechanisms or other immunologic mechanisms including serum sickness, interstitial  nephritis, immunologic cytopenias, Wilson-Jayjay syndrome, erythema multiforme,  toxic epidermal necrolysis, and morbiliform eruptions.     CHART REVIEW     Reviewed patients lab and ID note.      ASSESSMENT & PLAN     Renan Britton is a 59 y.o. male with     Patient history of penicillin allergy, now s/p PCN skin testing: There is no evidence of penicillin-specific IgE at the time of testing; see test result section for details.  97-99% of patients with negative intradermal tests will tolerate penicillin administration without the risk of an immediate-type hypersensitivity reaction.  Please note that this does not determine the patient's risk for future development of penicillin-specific IgE, as some patient's may become sensitized due to repeated doses of penicillin. The test does not test for delayed type reactions.  -I will remove penicillin allergy from patient's chart.  -Even though it is highly unlikely  the patient will have IgE mediated reaction, please give the patient's first dose of ampicillin while he is in the hospital with IM epinephrine readily available (to err on the side of caution)  -A reaction would likely happen within in a few minutes of administration of an IV medication, but would check on him about every 15 minutes for 1 hour after administration of ampicillin. If he does not react after 1 hour, he can be discharged with ampicillin    I have discussed the patient's case with my attending physician.  Thank you for allowing us to help care for your patient.  Please contact us with any concerns.    Discussed with: Dr. René Andrade MD  Allergy/Immunology Fellow  Pager [phone]

## 2020-08-17 NOTE — SUBJECTIVE & OBJECTIVE
Medications:  Continuous Infusions:   sodium chloride 0.9% 10 mL/hr at 08/12/20 0715     Scheduled Meds:   cefTRIAXone (ROCEPHIN) IVPB  2 g Intravenous Q24H    clopidogreL  75 mg Oral Daily    docusate sodium  100 mg Oral BID    gabapentin  300 mg Oral TID    heparin (porcine)  5,000 Units Subcutaneous Q8H    HYDROmorphone  0.5 mg Intravenous Once    ibuprofen  400 mg Oral Q6H    polyethylene glycol  17 g Oral Daily    valsartan  160 mg Oral Daily    vancomycin (VANCOCIN) IVPB  1,500 mg Intravenous Q8H     PRN Meds:HYDROmorphone, ondansetron, oxyCODONE-acetaminophen, oxyCODONE-acetaminophen     Objective:     Vital Signs (Most Recent):  Temp: 97.6 °F (36.4 °C) (08/17/20 0427)  Pulse: 70 (08/17/20 0609)  Resp: 16 (08/17/20 0630)  BP: (!) 142/74 (08/17/20 0427)  SpO2: 98 % (08/17/20 0427) Vital Signs (24h Range):  Temp:  [97.4 °F (36.3 °C)-98 °F (36.7 °C)] 97.6 °F (36.4 °C)  Pulse:  [64-84] 70  Resp:  [15-20] 16  SpO2:  [96 %-99 %] 98 %  BP: (135-156)/(68-76) 142/74         Physical Exam  Vitals signs and nursing note reviewed.   Constitutional:       Appearance: Normal appearance.   HENT:      Head: Normocephalic.      Mouth/Throat:      Mouth: Mucous membranes are moist.   Eyes:      Extraocular Movements: Extraocular movements intact.      Pupils: Pupils are equal, round, and reactive to light.   Cardiovascular:      Rate and Rhythm: Normal rate and regular rhythm.      Comments: RLE with good DP/PT signals, well perfused  Pulmonary:      Effort: Pulmonary effort is normal. No respiratory distress.      Breath sounds: No wheezing or rhonchi.   Abdominal:      Palpations: Abdomen is soft.   Musculoskeletal:      Comments: Left groin JPs with serosanguinous output, minimal overnight  Left groin WV to suction, minimal output    Skin:     General: Skin is warm.      Capillary Refill: Capillary refill takes less than 2 seconds.   Neurological:      General: No focal deficit present.      Mental Status: He  is alert and oriented to person, place, and time.         Significant Labs:  None    Significant Diagnostics:  none

## 2020-08-17 NOTE — SUBJECTIVE & OBJECTIVE
Interval History: No acute events over the weekend. Surgical cultures showing E. Faecalis, ampicillin sensitive.  Afebrile. Currently on IV vancomycin and ceftriaxone.   Sitting up in chair, feeling much better.       Review of Systems   Constitutional: Positive for activity change. Negative for appetite change, chills, diaphoresis, fatigue and fever.   HENT: Negative.    Eyes: Negative.    Respiratory: Negative for cough, shortness of breath and wheezing.    Cardiovascular: Negative for chest pain and palpitations.   Gastrointestinal: Negative for abdominal pain, diarrhea, nausea and vomiting.   Musculoskeletal: Negative for back pain and myalgias.        Surgical site pain   Skin: Positive for wound (surgical wound).   Neurological: Negative for dizziness, numbness and headaches.   Psychiatric/Behavioral: Negative for agitation and confusion.     Objective:     Vital Signs (Most Recent):  Temp: 96.2 °F (35.7 °C) (08/17/20 0745)  Pulse: 74 (08/17/20 0745)  Resp: 17 (08/17/20 0829)  BP: 137/65(bp rechecked ) (08/17/20 0745)  SpO2: 99 % (08/17/20 0745) Vital Signs (24h Range):  Temp:  [96.2 °F (35.7 °C)-98 °F (36.7 °C)] 96.2 °F (35.7 °C)  Pulse:  [64-84] 74  Resp:  [15-20] 17  SpO2:  [97 %-99 %] 99 %  BP: (135-156)/(65-76) 137/65     Weight: 123.4 kg (272 lb 0.8 oz)  Body mass index is 37.94 kg/m².    Estimated Creatinine Clearance: 155.6 mL/min (based on SCr of 0.7 mg/dL).    Physical Exam  Vitals signs and nursing note reviewed.   Constitutional:       General: He is not in acute distress.     Appearance: Normal appearance. He is not toxic-appearing.   HENT:      Head: Normocephalic and atraumatic.   Eyes:      General: No scleral icterus.     Conjunctiva/sclera: Conjunctivae normal.   Cardiovascular:      Rate and Rhythm: Normal rate.      Heart sounds: Murmur present.   Pulmonary:      Effort: Pulmonary effort is normal.      Breath sounds: Normal breath sounds.   Abdominal:      General: There is no distension.       Palpations: Abdomen is soft.      Tenderness: There is abdominal tenderness (left lower abdomen - operative site).   Genitourinary:     Comments: Packer      Musculoskeletal:      Comments: Left groin dressing c/d/i.  Wound vac removed.  2 drains with small amount bloody drainage  LLQ abdominal incision  c/d/i       Skin:     General: Skin is warm and dry.   Neurological:      General: No focal deficit present.      Mental Status: He is alert and oriented to person, place, and time.   Psychiatric:         Mood and Affect: Mood normal.         Behavior: Behavior normal.         Significant Labs:   Blood Culture:   Recent Labs   Lab 06/26/20  0713 06/26/20  0717 06/27/20  0010 06/27/20  0017 06/30/20  1321   LABBLOO Gram stain aer bottle: Gram negative rods   Results called to and read back by: Sana Jay RN  06/28/2020  14:10  PROTEUS MIRABILIS* No growth after 5 days. No growth after 5 days. No growth after 5 days. No growth after 5 days.     CBC:   No results for input(s): WBC, HGB, HCT, PLT in the last 48 hours.  CMP:   No results for input(s): NA, K, CL, CO2, GLU, BUN, CREATININE, CALCIUM, PROT, ALBUMIN, BILITOT, ALKPHOS, AST, ALT, ANIONGAP, EGFRNONAA in the last 48 hours.    Invalid input(s): ESTGFAFRICA  Wound Culture:   Recent Labs   Lab 06/29/20  2244 08/12/20  1138 08/12/20  1154 08/12/20  1213   LABAERO No growth  No growth No significant isolate ENTEROCOCCUS FAECALIS  Many  * ENTEROCOCCUS FAECALIS  Rare  No other significant isolate  *       Significant Imaging: I have reviewed all pertinent imaging results/findings within the past 24 hours.

## 2020-08-17 NOTE — PLAN OF CARE
POC reviewed with patient and spouse, voiced understanding. AAoX4. Up to side of bed for meals, walked in room with nurse. Sat up in chair for majority of shift.  tolerated well. Pain managed with PRN pain meds, pain regimen changed per MD. Picc line to right upper arm intact and patent. KATHLEEN drains to left groin x2. KATHLEEN drain #1 with increase in drainage of 40 ml and bloody. Tele in progress. Voiding adequately per urinal with yellow urine,clear. NV checks performed every 2 hours. Wound vac in place with 25 ml output. Remains on RA. Abd incisions with dry dressings and intact. Tolerating diet well.  Bed in low and locked position. Call light in reach. Will continue to monitor.

## 2020-08-17 NOTE — PLAN OF CARE
Problem: Occupational Therapy Goal  Goal: Occupational Therapy Goal  Description: Goals to be met by: 8/28/20     Patient will increase functional independence with ADLs by performing:    UE Dressing with Supervision.  LE Dressing with Minimal Assistance.  Grooming while standing with Stand-by Assistance. -MET 8/17  Toileting from toilet with Minimal Assistance for hygiene and clothing management.   Supine to sit with Stand-by Assistance. -MET 8/17  Toilet transfer to toilet with Stand-by Assistance and LRAD PRN.    Outcome: Ongoing, Progressing    Progressing with POC.  Anitha Luna OT  8/17/2020

## 2020-08-17 NOTE — PROGRESS NOTES
Prevena removed  Skin flap viable  Incision in tact  Continue jasen drains  Please order adhesive remover to remove prevena glue.  Continue current care

## 2020-08-17 NOTE — ASSESSMENT & PLAN NOTE
59 year old with PAD who is s/p bilateral femoral endarterectcomy in 2019 complicated by postoperative fluid collection with wound breakdown in January 2020 with Group F strep and Proteus mirabilis endovascular infection s/p wound washout with groin flap 1/3/20 s/p 6 weeks of IV ceftriaxone (EOC 2/14/20) complicated by proteus mirabilis bacteremia March 2020 s/p 4 weeks of IV ceftraixone (EOC 4/2/20) followed by suppressive levaquin.  Had subsequent PSA of left femoral artery, s/p stenting on 4/6/20 followed by 6 weeks of IV ceftriaxone (EOC 5/18/20), then placed on suppresive cefadroxil.  He subsequently developed explanding left groin PSA with recurrent Proteus bacteremia,  s/p washout with stent placement on 6/29/20 and discharged on 6-8 week course of ceftriaxone (EOC 8/20/20).       He is now POD #5 de-rotation and re-rotation of thigh flap, excision of left groin infected PSA and left common femoral artery with explantation of external iliac/common femoral stent graft with EIA to distal common femoral bypass with cryovein.  ID consulted for antibiotic management.      Surgical cultures are now showing Enterococcus faecalis - ampicillin S, but patient has PCN allergy (? Hives; patient unclear on reaction, happened remotely as a child).  He is currently on Vancomycin and Ceftriaxone.    Afebrile, stable.     Plan/recommendations:   1.  Continue IV ceftriaxone 2 grams q 24 hours for prior proteus and Group F strep.    2.  Continue IV vancomycin  for enterococcus faecalis for now.  Pharmacy to dose.  Maintain trough 15-20.  Appears to currently be on q 8 hour dosing.   Asked micro lab for Daptomycin susceptibilities, but one isolate is daptomycin resistant  3.  Recommend Allergy consult for PCN testing if possible while inpatient, before discharge.  I have placed the consult and Allergy will see today.   If able to tolerate PCNs will be able to use one antibiotic agent (ampicillin 12 grams IV q 24 hours  continuous infusion) for all isolates and avoid vancomycin q 8 hour dosing.   4.  Estimated duration of antibiotic therapy - 6 weeks calculated from start of therapy for E. Faecalis on 8/14.   Estimated end date 9/25/20  5.  Will follow up PCN testing.  If able to tolerate ampicillin, outpatient antibiotic recommendation will be Ampicillin 12 grams IV q 24 hours continuous infusion with weekly cbc, cmp, crp, esr and fax results to above number.   6.  ID follow up 14 days from discharge with Dr. Hunt and EOC.  ID will make the appointments.   7.  Will follow up PCN testing as noted above with final recommendations.     Data reviewed and plan discussed with ID staff  Discussed with Primary Team

## 2020-08-17 NOTE — PT/OT/SLP PROGRESS
Physical Therapy Treatment    Patient Name:  Renan Britton   MRN:  [id number]    Recommendations:     Discharge Recommendations:  home health PT   Discharge Equipment Recommendations: none   Barriers to discharge: decreased functional mobility     Assessment:     Renan Britton is a 59 y.o. male admitted with a medical diagnosis of Pseudoaneurysm.  He presents with the following impairments/functional limitations:  weakness, impaired endurance, impaired self care skills, impaired functional mobilty, gait instability, impaired balance, pain, impaired cardiopulmonary response to activity.    Rehab Prognosis: Good; patient would benefit from acute skilled PT services to address these deficits and reach maximum level of function.    Recent Surgery: Procedure(s) (LRB):  CREATION, BYPASS, ARTERIAL, ILIAC TO FEMORAL (Left) 5 Days Post-Op    Plan:     During this hospitalization, patient to be seen 3 x/week to address the identified rehab impairments via gait training, therapeutic activities, therapeutic exercises, neuromuscular re-education and progress toward the following goals:    · Plan of Care Expires:  09/08/20    Subjective     Chief Complaint: pain  Pain/Comfort:  · Pain Rating 1: 3/10  · Location - Orientation 1: generalized  · Location 1: groin  · Pain Addressed 1: Distraction  · Pain Rating Post-Intervention 1: 3/10      Objective:     Communicated with NSG prior to session.  Patient found up in chair with telemetry upon PTA entry to room. Spouse present.    General Precautions: Standard, fall   Orthopedic Precautions:N/A   Braces: N/A     Functional Mobility:  · Transfers:     · Sit to Stand:  stand by assistance and contact guard assistance with no AD and hand-held assist  · Gait: Pt ambulates 200 ft with no AD and SBA. Pt with slow penny and mild waddling gait likely d/t reported groin pain.       AM-PAC 6 CLICK MOBILITY  Turning over in bed (including adjusting bedclothes, sheets and blankets)?:  4  Sitting down on and standing up from a chair with arms (e.g., wheelchair, bedside commode, etc.): 3  Moving from lying on back to sitting on the side of the bed?: 3  Moving to and from a bed to a chair (including a wheelchair)?: 3  Need to walk in hospital room?: 3  Climbing 3-5 steps with a railing?: 3  Basic Mobility Total Score: 19       Therapeutic Activities and Exercises:  Pt assisted with functional mobility as noted above.    Pt educated on need for increased mobilization with pt agreeable to ambulating 3x minimum daily in halls with SBA of NSG/Spouse.     Patient left up in chair with all lines intact, call button in reach and spouse present.    GOALS:   Multidisciplinary Problems     Physical Therapy Goals        Problem: Physical Therapy Goal    Goal Priority Disciplines Outcome Goal Variances Interventions   Physical Therapy Goal     PT, PT/OT Ongoing, Progressing     Description: Goals to be met by: 9/3/2020     Patient will increase functional independence with mobility by performin. Supine to sit with Modified [place]  2. Sit to supine with Modified [place]  3. Sit to stand transfer with Supervision  4. Bed to chair transfer with Supervision  5. Gait  x 150 feet with Supervision   6. Ascend/descend 3 stair with Stand-by Assistance                   Time Tracking:     PT Received On: 20  PT Start Time: 1319     PT Stop Time: 1342  PT Total Time (min): 23 min     Billable Minutes: Gait Training 15 and Therapeutic Activity 8    Treatment Type: Treatment  PT/PTA: PTA     PTA Visit Number: 1     Lauro Weston, PTA  2020

## 2020-08-17 NOTE — PROGRESS NOTES
[facility]  Infectious Disease  Progress Note    Patient Name: Renan Britton  MRN: [id number]  Admission Date: 8/12/2020  Length of Stay: 5 days  Attending Physician: Kelby Gomez MD  Primary Care Provider: Eren Becker MD    Isolation Status: No active isolations  Assessment/Plan:      * Pseudoaneurysm     59 year old with PAD who is s/p bilateral femoral endarterectcomy in 2019 complicated by postoperative fluid collection with wound breakdown in January 2020 with Group F strep and Proteus mirabilis endovascular infection s/p wound washout with groin flap 1/3/20 s/p 6 weeks of IV ceftriaxone (EOC 2/14/20) complicated by proteus mirabilis bacteremia March 2020 s/p 4 weeks of IV ceftraixone (EOC 4/2/20) followed by suppressive levaquin.  Had subsequent PSA of left femoral artery, s/p stenting on 4/6/20 followed by 6 weeks of IV ceftriaxone (EOC 5/18/20), then placed on suppresive cefadroxil.  He subsequently developed explanding left groin PSA with recurrent Proteus bacteremia,  s/p washout with stent placement on 6/29/20 and discharged on 6-8 week course of ceftriaxone (EOC 8/20/20).       He is now POD #5 de-rotation and re-rotation of thigh flap, excision of left groin infected PSA and left common femoral artery with explantation of external iliac/common femoral stent graft with EIA to distal common femoral bypass with cryovein.  ID consulted for antibiotic management.      Surgical cultures are now showing Enterococcus faecalis - ampicillin S, but patient has PCN allergy (? Hives; patient unclear on reaction, happened remotely as a child).  He is currently on Vancomycin and Ceftriaxone.    Afebrile, stable.     Plan/recommendations:   1.  Continue IV ceftriaxone 2 grams q 24 hours for prior proteus and Group F strep.    2.  Continue IV vancomycin  for enterococcus faecalis for now.  Pharmacy to dose.  Maintain trough 15-20.  Appears to currently be on q 8 hour dosing.   Asked  micro lab for Daptomycin susceptibilities, but one isolate is daptomycin resistant  3.  Recommend Allergy consult for PCN testing if possible while inpatient, before discharge.  I have placed the consult and Allergy will see today.   If able to tolerate PCNs will be able to use one antibiotic agent (ampicillin 12 grams IV q 24 hours continuous infusion) for all isolates and avoid vancomycin q 8 hour dosing.   4.  Estimated duration of antibiotic therapy - 6 weeks calculated from start of therapy for E. Faecalis on 8/14.   Estimated end date 9/25/20  5.  Will follow up PCN testing.  If able to tolerate ampicillin, outpatient antibiotic recommendation will be Ampicillin 12 grams IV q 24 hours continuous infusion with weekly cbc, cmp, crp, esr and fax results to above number.   6.  ID follow up 14 days from discharge with Dr. Hunt and EOC.  ID will make the appointments.   7.  Will follow up PCN testing as noted above with final recommendations.     Data reviewed and plan discussed with ID staff  Discussed with Primary Team      Thank you.   Please call for any questions or concerns.  Nila Dukes, APRN, ANP-C  [phone] pager, [phone]    Subjective:     Principal Problem:Pseudoaneurysm    HPI: Mr. Renan Britton is a 59 year old with PAD who is s/p bilateral femoral endarterectcomy in 2019 complicated by postoperative fluid collection with wound breakdown in January 2020 with Group F strep and Proteus mirabilis endovascular infection s/p wound washout with groin flap 1/3/20 s/p 6 weeks of IV ceftriaxone (EOC 2/14/20) complicated by proteus mirabilis bacteremia March 2020 s/p 4 weeks of IV ceftraixone (EOC 4/2/20) followed by suppressive levaquin.  Had subsequent PSA of left femoral artery, s/p stenting on 4/6/20 followed by 6 weeks of IV cefttriaxone (EOC 5/18/20), then placed on suppresive cefadroxil.  He subsequently developed explanding left groin PSA with recurrent Proteus bacteremia,  s/p washout with  stent placement on 6/29/20 and discharged on 6-8 week course of ceftriaxone (EOC 8/20/20).  He is now POD #1 de-rotation and re-rotation of thigh flap, excision of left groin infected PSA and left common femoral artery with explantation of external iliac/common femoral stent with EIA to distal common femoral bypass with cryovein.  ID consulted for antibiotic management.     Surgical cultures pending. Gram stain with rare GPC.  Interval History: No acute events over the weekend. Surgical cultures showing E. Faecalis, ampicillin sensitive.  Afebrile. Currently on IV vancomycin and ceftriaxone.   Sitting up in chair, feeling much better.       Review of Systems   Constitutional: Positive for activity change. Negative for appetite change, chills, diaphoresis, fatigue and fever.   HENT: Negative.    Eyes: Negative.    Respiratory: Negative for cough, shortness of breath and wheezing.    Cardiovascular: Negative for chest pain and palpitations.   Gastrointestinal: Negative for abdominal pain, diarrhea, nausea and vomiting.   Musculoskeletal: Negative for back pain and myalgias.        Surgical site pain   Skin: Positive for wound (surgical wound).   Neurological: Negative for dizziness, numbness and headaches.   Psychiatric/Behavioral: Negative for agitation and confusion.     Objective:     Vital Signs (Most Recent):  Temp: 96.2 °F (35.7 °C) (08/17/20 0745)  Pulse: 74 (08/17/20 0745)  Resp: 17 (08/17/20 0829)  BP: 137/65(bp rechecked ) (08/17/20 0745)  SpO2: 99 % (08/17/20 0745) Vital Signs (24h Range):  Temp:  [96.2 °F (35.7 °C)-98 °F (36.7 °C)] 96.2 °F (35.7 °C)  Pulse:  [64-84] 74  Resp:  [15-20] 17  SpO2:  [97 %-99 %] 99 %  BP: (135-156)/(65-76) 137/65     Weight: 123.4 kg (272 lb 0.8 oz)  Body mass index is 37.94 kg/m².    Estimated Creatinine Clearance: 155.6 mL/min (based on SCr of 0.7 mg/dL).    Physical Exam  Vitals signs and nursing note reviewed.   Constitutional:       General: He is not in acute distress.      Appearance: Normal appearance. He is not toxic-appearing.   HENT:      Head: Normocephalic and atraumatic.   Eyes:      General: No scleral icterus.     Conjunctiva/sclera: Conjunctivae normal.   Cardiovascular:      Rate and Rhythm: Normal rate.      Heart sounds: Murmur present.   Pulmonary:      Effort: Pulmonary effort is normal.      Breath sounds: Normal breath sounds.   Abdominal:      General: There is no distension.      Palpations: Abdomen is soft.      Tenderness: There is abdominal tenderness (left lower abdomen - operative site).   Genitourinary:     Comments: Packer      Musculoskeletal:      Comments: Left groin dressing c/d/i.  Wound vac removed.  2 drains with small amount bloody drainage  LLQ abdominal incision  c/d/i       Skin:     General: Skin is warm and dry.   Neurological:      General: No focal deficit present.      Mental Status: He is alert and oriented to person, place, and time.   Psychiatric:         Mood and Affect: Mood normal.         Behavior: Behavior normal.         Significant Labs:   Blood Culture:   Recent Labs   Lab 06/26/20  0713 06/26/20  0717 06/27/20  0010 06/27/20  0017 06/30/20  1321   LABBLOO Gram stain aer bottle: Gram negative rods   Results called to and read back by: Sana Jay RN  06/28/2020  14:10  PROTEUS MIRABILIS* No growth after 5 days. No growth after 5 days. No growth after 5 days. No growth after 5 days.     CBC:   No results for input(s): WBC, HGB, HCT, PLT in the last 48 hours.  CMP:   No results for input(s): NA, K, CL, CO2, GLU, BUN, CREATININE, CALCIUM, PROT, ALBUMIN, BILITOT, ALKPHOS, AST, ALT, ANIONGAP, EGFRNONAA in the last 48 hours.    Invalid input(s): ESTGF[place]  Wound Culture:   Recent Labs   Lab 06/29/20  2244 08/12/20  1138 08/12/20  1154 08/12/20  1213   LABAERO No growth  No growth No significant isolate ENTEROCOCCUS FAECALIS  Many  * ENTEROCOCCUS FAECALIS  Rare  No other significant isolate  *       Significant Imaging: I have  reviewed all pertinent imaging results/findings within the past 24 hours.

## 2020-08-17 NOTE — PT/OT/SLP PROGRESS
Occupational Therapy   Treatment    Name: Renan Britton  MRN: [id number]  Admitting Diagnosis:  Pseudoaneurysm  5 Days Post-Op   Procedure(s):  CREATION, BYPASS, ARTERIAL, ILIAC TO FEMORAL     Recommendations:     Discharge Recommendations: home health OT  Discharge Equipment Recommendations:  none  Barriers to discharge:  None    Assessment:     Renan Britton is a 59 y.o. male with a medical diagnosis of Pseudoaneurysm.  He presents with good effort this day and tolerates session well. Performance deficits affecting function are weakness, impaired endurance, impaired self care skills, impaired functional mobilty, gait instability, impaired skin.     Rehab Prognosis:  Good; patient would benefit from acute skilled OT services to address these deficits and reach maximum level of function.       Plan:     Patient to be seen 3 x/week to address the above listed problems via self-care/home management, therapeutic activities, therapeutic exercises  · Plan of Care Expires: 09/14/20  · Plan of Care Reviewed with: patient, spouse    Subjective     Pain/Comfort:  · Pain Rating 1: 2/10  · Location 1: (at wound vac site)    Objective:     Communicated with: RN prior to session.  Patient found HOB elevated with KATHLEEN drain, PICC line, telemetry upon OT entry to room.    General Precautions: Standard, fall   Orthopedic Precautions:N/A   Braces: N/A     Occupational Performance:     Bed Mobility:    · Patient completed Supine to Sit to R side EOB with stand by assistance   · Patient completed Scooting anteriorly to EOB for foot placement on floor with stand by assistance    Functional Mobility/Transfers:  · Patient completed Sit <> Stand Transfer with stand by assistance with no assistive device   · Functional Mobility: ~30' with SBA-CGA and no AD, no LOB/SOB noted    Activities of Daily Living:  · Grooming: stand by assistance Patient participated in face wash with a washcloth and oral hygiene while standing at the sink in  the restroom with SBA.    [facility] 6 Click ADL: 17    Treatment & Education:  Role of OT/POC  Call button for assistance    Patient left up in chair with all lines intact, call button in reach and RN notifiedEducation:      GOALS:   Multidisciplinary Problems     Occupational Therapy Goals        Problem: Occupational Therapy Goal    Goal Priority Disciplines Outcome Interventions   Occupational Therapy Goal     OT, PT/OT Ongoing, Progressing    Description: Goals to be met by: 8/28/20     Patient will increase functional independence with ADLs by performing:    UE Dressing with Supervision.  LE Dressing with Minimal Assistance.  Grooming while standing with Stand-by Assistance. -MET 8/17  Toileting from toilet with Minimal Assistance for hygiene and clothing management.   Supine to sit with Stand-by Assistance. -MET 8/17  Toilet transfer to toilet with Stand-by Assistance and LRAD PRN.                     Time Tracking:     OT Date of Treatment: 08/17/20  OT Start Time: 1112  OT Stop Time: 1131  OT Total Time (min): 19 min    Billable Minutes:Self Care/Home Management 19 minutes    Anitha Luna OT  8/17/2020

## 2020-08-18 VITALS
HEIGHT: 71 IN | SYSTOLIC BLOOD PRESSURE: 118 MMHG | HEART RATE: 88 BPM | RESPIRATION RATE: 16 BRPM | DIASTOLIC BLOOD PRESSURE: 59 MMHG | TEMPERATURE: 98 F | WEIGHT: 272.06 LBS | OXYGEN SATURATION: 100 % | BODY MASS INDEX: 38.09 KG/M2

## 2020-08-18 LAB
ANION GAP SERPL CALC-SCNC: 8 MMOL/L (ref 8–16)
BUN SERPL-MCNC: 11 MG/DL (ref 6–20)
CALCIUM SERPL-MCNC: 9.2 MG/DL (ref 8.7–10.5)
CHLORIDE SERPL-SCNC: 100 MMOL/L (ref 95–110)
CO2 SERPL-SCNC: 29 MMOL/L (ref 23–29)
CREAT SERPL-MCNC: 0.6 MG/DL (ref 0.5–1.4)
EST. GFR  (AFRICAN AMERICAN): >60 ML/MIN/1.73 M^2
EST. GFR  (NON AFRICAN AMERICAN): >60 ML/MIN/1.73 M^2
GLUCOSE SERPL-MCNC: 115 MG/DL (ref 70–110)
POTASSIUM SERPL-SCNC: 3.9 MMOL/L (ref 3.5–5.1)
SODIUM SERPL-SCNC: 137 MMOL/L (ref 136–145)
VANCOMYCIN TROUGH SERPL-MCNC: 17.5 UG/ML (ref 10–22)

## 2020-08-18 PROCEDURE — 63600175 PHARM REV CODE 636 W HCPCS: Performed by: NURSE PRACTITIONER

## 2020-08-18 PROCEDURE — 80202 ASSAY OF VANCOMYCIN: CPT

## 2020-08-18 PROCEDURE — 25000003 PHARM REV CODE 250: Performed by: STUDENT IN AN ORGANIZED HEALTH CARE EDUCATION/TRAINING PROGRAM

## 2020-08-18 PROCEDURE — 63600175 PHARM REV CODE 636 W HCPCS: Performed by: STUDENT IN AN ORGANIZED HEALTH CARE EDUCATION/TRAINING PROGRAM

## 2020-08-18 PROCEDURE — 25000003 PHARM REV CODE 250: Performed by: SURGERY

## 2020-08-18 PROCEDURE — 80048 BASIC METABOLIC PNL TOTAL CA: CPT

## 2020-08-18 PROCEDURE — 99233 SBSQ HOSP IP/OBS HIGH 50: CPT | Mod: ,,, | Performed by: NURSE PRACTITIONER

## 2020-08-18 PROCEDURE — 63600175 PHARM REV CODE 636 W HCPCS: Performed by: SURGERY

## 2020-08-18 PROCEDURE — 99233 PR SUBSEQUENT HOSPITAL CARE,LEVL III: ICD-10-PCS | Mod: ,,, | Performed by: NURSE PRACTITIONER

## 2020-08-18 RX ORDER — OXYCODONE AND ACETAMINOPHEN 5; 325 MG/1; MG/1
1 TABLET ORAL EVERY 6 HOURS PRN
Qty: 18 TABLET | Refills: 0 | Status: SHIPPED | OUTPATIENT
Start: 2020-08-18 | End: 2020-11-30

## 2020-08-18 RX ORDER — EPINEPHRINE 0.3 MG/.3ML
0.3 INJECTION SUBCUTANEOUS
Status: DISCONTINUED | OUTPATIENT
Start: 2020-08-18 | End: 2020-08-18 | Stop reason: HOSPADM

## 2020-08-18 RX ADMIN — IBUPROFEN 400 MG: 400 TABLET, FILM COATED ORAL at 12:08

## 2020-08-18 RX ADMIN — OXYCODONE AND ACETAMINOPHEN 1 TABLET: 10; 325 TABLET ORAL at 07:08

## 2020-08-18 RX ADMIN — VANCOMYCIN HYDROCHLORIDE 1500 MG: 1.5 INJECTION, POWDER, LYOPHILIZED, FOR SOLUTION INTRAVENOUS at 12:08

## 2020-08-18 RX ADMIN — IBUPROFEN 400 MG: 400 TABLET, FILM COATED ORAL at 06:08

## 2020-08-18 RX ADMIN — DOCUSATE SODIUM 100 MG: 100 CAPSULE, LIQUID FILLED ORAL at 08:08

## 2020-08-18 RX ADMIN — VALSARTAN 160 MG: 160 TABLET, FILM COATED ORAL at 08:08

## 2020-08-18 RX ADMIN — OXYCODONE AND ACETAMINOPHEN 1 TABLET: 10; 325 TABLET ORAL at 06:08

## 2020-08-18 RX ADMIN — PREGABALIN 100 MG: 50 CAPSULE ORAL at 08:08

## 2020-08-18 RX ADMIN — CLOPIDOGREL 75 MG: 75 TABLET, FILM COATED ORAL at 08:08

## 2020-08-18 RX ADMIN — AMPICILLIN SODIUM 2 G: 2 INJECTION, POWDER, FOR SOLUTION INTRAMUSCULAR; INTRAVENOUS at 10:08

## 2020-08-18 RX ADMIN — POLYETHYLENE GLYCOL 3350 17 G: 17 POWDER, FOR SOLUTION ORAL at 08:08

## 2020-08-18 RX ADMIN — IBUPROFEN 400 MG: 400 TABLET, FILM COATED ORAL at 01:08

## 2020-08-18 RX ADMIN — AMPICILLIN SODIUM 2 G: 2 INJECTION, POWDER, FOR SOLUTION INTRAMUSCULAR; INTRAVENOUS at 02:08

## 2020-08-18 RX ADMIN — HEPARIN SODIUM 5000 UNITS: 5000 INJECTION, SOLUTION INTRAVENOUS; SUBCUTANEOUS at 06:08

## 2020-08-18 RX ADMIN — HEPARIN SODIUM 5000 UNITS: 5000 INJECTION, SOLUTION INTRAVENOUS; SUBCUTANEOUS at 02:08

## 2020-08-18 NOTE — PLAN OF CARE
Referral sent to [facility]      08/18/20 1231   Post-Acute Status   Post-Acute Authorization Home Health   Home Health Status Referrals Sent   Sherley Armando RN, CM   Ext: [phone]

## 2020-08-18 NOTE — SUBJECTIVE & OBJECTIVE
Interval History: No acute events overnight.  Feels well.  Ambulating in halls.  PCN allergy testing this morning by Allergy was non-reactive.  For discharge today if tolerates first dosing of ampicillin.       Review of Systems   Constitutional: Positive for activity change. Negative for appetite change, chills, diaphoresis, fatigue and fever.   HENT: Negative.    Eyes: Negative.    Respiratory: Negative for cough, shortness of breath and wheezing.    Cardiovascular: Negative for chest pain and palpitations.   Gastrointestinal: Negative for abdominal pain, diarrhea, nausea and vomiting.   Musculoskeletal: Negative for back pain and myalgias.        Surgical site pain   Skin: Positive for wound (surgical wound).   Neurological: Negative for dizziness, numbness and headaches.   Psychiatric/Behavioral: Negative for agitation and confusion.     Objective:     Vital Signs (Most Recent):  Temp: 98.3 °F (36.8 °C) (08/18/20 0709)  Pulse: 73 (08/18/20 0709)  Resp: 16 (08/18/20 0740)  BP: (!) 147/72 (08/18/20 0709)  SpO2: 100 % (08/18/20 0709) Vital Signs (24h Range):  Temp:  [96.5 °F (35.8 °C)-98.3 °F (36.8 °C)] 98.3 °F (36.8 °C)  Pulse:  [66-85] 73  Resp:  [14-20] 16  SpO2:  [98 %-100 %] 100 %  BP: (123-155)/(64-79) 147/72     Weight: 123.4 kg (272 lb 0.8 oz)  Body mass index is 37.94 kg/m².    Estimated Creatinine Clearance: 181.6 mL/min (based on SCr of 0.6 mg/dL).    Physical Exam  Vitals signs and nursing note reviewed.   Constitutional:       General: He is not in acute distress.     Appearance: Normal appearance. He is not toxic-appearing.   HENT:      Head: Normocephalic and atraumatic.   Eyes:      General: No scleral icterus.     Conjunctiva/sclera: Conjunctivae normal.   Cardiovascular:      Rate and Rhythm: Normal rate.      Heart sounds: Murmur present.   Pulmonary:      Effort: Pulmonary effort is normal.      Breath sounds: Normal breath sounds.   Abdominal:      General: There is no distension.       Palpations: Abdomen is soft.      Tenderness: There is abdominal tenderness (left lower abdomen - operative site).   Genitourinary:     Comments:       Musculoskeletal:      Comments: Left groin incisions c/d/i.    2 drains with small amount bloody drainage  LLQ abdominal incision  c/d/i       Skin:     General: Skin is warm and dry.   Neurological:      General: No focal deficit present.      Mental Status: He is alert and oriented to person, place, and time.   Psychiatric:         Mood and Affect: Mood normal.         Behavior: Behavior normal.         Significant Labs:   Blood Culture:   Recent Labs   Lab 06/26/20  0713 06/26/20  0717 06/27/20  0010 06/27/20  0017 06/30/20  1321   LABBLOO Gram stain aer bottle: Gram negative rods   Results called to and read back by: Sana Jay RN  06/28/2020  14:10  PROTEUS MIRABILIS* No growth after 5 days. No growth after 5 days. No growth after 5 days. No growth after 5 days.     CBC:   No results for input(s): WBC, HGB, HCT, PLT in the last 48 hours.  CMP:   Recent Labs   Lab 08/17/20  0902 08/18/20  0747    137   K 3.7 3.9    100   CO2 29 29   * 115*   BUN 12 11   CREATININE 0.7 0.6   CALCIUM 9.4 9.2   ANIONGAP 8 8   EGFRNONAA >60.0 >60.0     Wound Culture:   Recent Labs   Lab 06/29/20  2244 08/12/20  1138 08/12/20  1154 08/12/20  1213   LABAERO No growth  No growth No significant isolate ENTEROCOCCUS FAECALIS  Many  * ENTEROCOCCUS FAECALIS  Rare  No other significant isolate  *       Significant Imaging: I have reviewed all pertinent imaging results/findings within the past 24 hours.

## 2020-08-18 NOTE — PROGRESS NOTES
[facility]  Vascular Surgery  Progress Note    Patient Name: Renan Britton  MRN: [id number]  Admission Date: 8/12/2020  Primary Care Provider: Eren Becker MD    Subjective:     Interval History: NAEO    Post-Op Info:  Procedure(s) (LRB):  CREATION, BYPASS, ARTERIAL, ILIAC TO FEMORAL (Left)   6 Days Post-Op       Medications:  Continuous Infusions:   sodium chloride 0.9% 10 mL/hr at 08/12/20 0715     Scheduled Meds:   cefTRIAXone (ROCEPHIN) IVPB  2 g Intravenous Q24H    clopidogreL  75 mg Oral Daily    docusate sodium  100 mg Oral BID    heparin (porcine)  5,000 Units Subcutaneous Q8H    ibuprofen  400 mg Oral Q6H    polyethylene glycol  17 g Oral Daily    pregabalin  100 mg Oral BID    valsartan  160 mg Oral Daily    vancomycin (VANCOCIN) IVPB  1,500 mg Intravenous Q8H     PRN Meds:HYDROmorphone, ondansetron, oxyCODONE-acetaminophen, oxyCODONE-acetaminophen     Objective:     Vital Signs (Most Recent):  Temp: 98 °F (36.7 °C) (08/18/20 0436)  Pulse: 74 (08/18/20 0436)  Resp: 16 (08/18/20 0436)  BP: (!) 148/73 (08/18/20 0436)  SpO2: 100 % (08/18/20 0436) Vital Signs (24h Range):  Temp:  [96.2 °F (35.7 °C)-98.3 °F (36.8 °C)] 98 °F (36.7 °C)  Pulse:  [65-85] 74  Resp:  [14-20] 16  SpO2:  [98 %-100 %] 100 %  BP: (123-155)/(64-79) 148/73         Physical Exam  Vitals signs and nursing note reviewed.   Constitutional:       Appearance: Normal appearance.   HENT:      Head: Normocephalic.      Mouth/Throat:      Mouth: Mucous membranes are moist.   Eyes:      Extraocular Movements: Extraocular movements intact.      Pupils: Pupils are equal, round, and reactive to light.   Cardiovascular:      Rate and Rhythm: Normal rate and regular rhythm.      Comments: RLE with good DP/PT signals, well perfused  Pulmonary:      Effort: Pulmonary effort is normal. No respiratory distress.      Breath sounds: No wheezing or rhonchi.   Abdominal:      Palpations: Abdomen is soft.   Musculoskeletal:       Comments: Left groin JPs with serosanguinous output, minimal overnight  Left groin incisions clean, dry, and intact.    Skin:     General: Skin is warm.      Capillary Refill: Capillary refill takes less than 2 seconds.   Neurological:      General: No focal deficit present.      Mental Status: He is alert and oriented to person, place, and time.         Significant Labs:  none    Significant Diagnostics:  none    Assessment/Plan:     PAD (peripheral artery disease)  59 year old man with infected L groin pseudoaneurysm s/p explant of external iliac and common femoral stent, debridement, and reconstruction with cadaveric vein bypass and rerotation of thigh flap by plastic surgery.    - Multimodal pain regimen, continue current regimen  - Strip KATHLEEN drains 2x daily - minimal ss output   - PT/OT, OOB ambulation with assistance is encouraged  - Plavix  - Regular diet  - Groin culture resulted with enterococcus species - Sensitive to vanc and bactrim, ID for suppressive ABX regimen recs with ceftriaxone and vanc/ Need long term plan. Will follow up allergy medicine and have final recs  - Anticipate DC  Today with home health            Kenneth Zarate MD  Vascular Surgery  [facility]

## 2020-08-18 NOTE — PROGRESS NOTES
Discharge instructions reviewed and signed with pt.  Prescriptions reviewed and delivered to bedside.  Pt verbalized understanding.  All questions answered. PICC in place with abx infusing per infusion company.  KATHLEEN education provided, pt and wife demonstrated understanding.  Pt currently awaiting transport.

## 2020-08-18 NOTE — PLAN OF CARE
08/18/20 1253   Post-Acute Status   Post-Acute Authorization HME;Home Health   HME Status Referrals Sent   Home Health Status Referrals Sent   Orders sent [facility] and [facility].      [facility] meeting with pt. Pt accepted by [facility].    Gabrielle Johnson LMSW  [facility]  [phone]

## 2020-08-18 NOTE — PROGRESS NOTES
Ampicillin administered per order, epi pen at bedside.  RN at bedside to monitor pt.  Pt tolerated dose well.  Nila with ID notified.

## 2020-08-18 NOTE — PLAN OF CARE
[facility]    HOME HEALTH ORDERS  FACE TO FACE ENCOUNTER    Patient Name: Renan Britton  YOB: 1960    PCP: Eren Becker MD   PCP Address: [facility] [address] [address] [address]  PCP Phone Number: [phone]  PCP Fax: [phone]    Encounter Date: 08/18/2020    Admit to Home Health    Diagnoses:  Active Hospital Problems    Diagnosis  POA    *Pseudoaneurysm [I72.9]  Yes    Enterococcus faecalis infection [B95.2]  Yes    PAD (peripheral artery disease) [I73.9]  Yes    Proteus infection [A49.8]  Yes      Resolved Hospital Problems   No resolved problems to display.       Future Appointments   Date Time Provider Department Center   8/20/2020 10:00 AM Marcelle Hunt MD [facility] [place]     Follow-up Information     Kelby Gomez MD In 2 weeks.    Specialty: Vascular Surgery  Contact information:  [address]  [facility] [address]  [phone]                     I have seen and examined this patient face to face today. My clinical findings that support the need for the home health skilled services and home bound status are the following:  Weakness/numbness causing balance and gait disturbance due to Weakness/Debility and Surgery making it taxing to leave home. Additionally, he needs 6 weeks of IV antibiotics at home.    Allergies:  Review of patient's allergies indicates:   Allergen Reactions    Penicillins Hives     Patient currently on cefepime without complications  Tolerated ceftriaxone 3/2020  PCN allergy testing by Allergy/Immunology.  Ok to give ampicillin       Diet: regular diet    Activities: activity as tolerated    Nursing:   SN to complete comprehensive assessment including routine vital signs. Instruct on disease process and s/s of complications to report to MD. Review/verify medication list sent home with the patient at time of discharge  and instruct patient/caregiver as needed. Frequency may be adjusted depending on start of care  date.    Notify MD if SBP > 160 or < 90; DBP > 90 or < 50; HR > 120 or < 50; Temp > 101;     LABS:  She will need a weekly CBC, CMP, CRP, and ESR with results faxed to [phone] (Dr. Marcelle Hunt)    CONSULTS:    Physical Therapy to evaluate and treat. Evaluate for home safety and equipment needs; Establish/upgrade home exercise program. Perform / instruct on therapeutic exercises, gait training, transfer training, and Range of Motion.  Occupational Therapy to evaluate and treat. Evaluate home environment for safety and equipment needs. Perform/Instruct on transfers, ADL training, ROM, and therapeutic exercises.    MISCELLANEOUS CARE:  Home Infusion Therapy:   SN to perform Infusion Therapy/Central Line Care.  Review Central Line Care & Central Line Flush with patient.    Administer (drug and dose): Ampicillin 12g IV q24h continuous infusion    Last dose given: 8/18/20                         Home dose due: 8/19/20    Scrub the Hub: Prior to accessing the line, always perform a 30 second alcohol scrub  Each lumen of the central line is to be flushed at least daily with 10 mL Normal Saline and 3 mL Heparin flush (10 units/mL)  Skilled Nurse (SN) may draw blood from IV access  Blood Draw Procedure:   - Aspirate at least 5 mL of blood   - Discard   - Obtain specimen   - Change injection cap   - Flush with 20 mL Normal Saline followed by a                 3-5 mL Heparin flush (10 units/mL)  Central :   - Sterile dressing changes are done weekly and as needed.   - Use chlor-hexadine scrub to cleanse site, apply Biopatch to insertion site,       apply securement device dressing   - Injection caps are changed weekly and after EVERY lab draw.   - If sterile gauze is under dressing to control oozing,                 dressing change must be performed every 24 hours until gauze is not needed.    WOUND CARE ORDERS  yes:  Surgical Wound:  Location: Left Groin    Consult ET nurse        Apply the following to  wound:   Other: Dry gauze BID (frequency)     He has two left groin KATHLEEN drains placed by plastic surgery that will need to be stripped BID.      Medications: Review discharge medications with patient and family and provide education.      Current Discharge Medication List      START taking these medications    Details   ampicillin sodium (AMPICILLIN 2 G/100 ML NS, READY TO MIX SYSTEM,) 12g IV q24h. Ending 9/25/20.  Qty: 15277 mL, Refills: 0    Comments: 12g IV q24h continuous infusion. Ending 9/25/20.      oxyCODONE-acetaminophen (PERCOCET) 5-325 mg per tablet Take 1 tablet by mouth every 6 (six) hours as needed for Pain.  Qty: 18 tablet, Refills: 0    Comments: Quantity prescribed more than 7 day supply? No         CONTINUE these medications which have NOT CHANGED    Details   ascorbic acid (VITAMIN C ORAL) Take 2 capsules by mouth every morning.      aspirin (ECOTRIN) 81 MG EC tablet Take 81 mg by mouth once daily.      atorvastatin (LIPITOR) 40 MG tablet Take 40 mg by mouth once daily.      clopidogreL (PLAVIX) 75 mg tablet Take 1 tablet (75 mg total) by mouth once daily.  Qty: 30 tablet, Refills: 10      elderberry fruit and flower 460-115 mg Cap Take 2 capsules by mouth every morning.      metFORMIN (GLUCOPHAGE-XR) 500 MG XR 24hr tablet Take 1,000 mg by mouth daily with breakfast.       pregabalin (LYRICA) 100 MG capsule Take 100 mg by mouth 2 (two) times daily.      valsartan (DIOVAN) 160 MG tablet Take 160 mg by mouth once daily.      cefadroxil (DURICEF) 500 MG Cap Take 2 capsules (1,000 mg total) by mouth every 12 (twelve) hours.  Qty: 120 capsule, Refills: 4    Associated Diagnoses: Vascular inflammation or infection; Pseudoaneurysm of left femoral artery; Proteus infection; Receiving intravenous antibiotic treatment as outpatient         STOP taking these medications       HYDROcodone-acetaminophen (NORCO) 5-325 mg per tablet Comments:   Reason for Stopping:               I certify that this patient is  confined to his home and needs intermittent skilled nursing care, physical therapy and occupational therapy.

## 2020-08-18 NOTE — PROGRESS NOTES
[facility]  Infectious Disease  Progress Note    Patient Name: Renan Britton  MRN: [id number]  Admission Date: 8/12/2020  Length of Stay: 6 days  Attending Physician: Kelby Gomez MD  Primary Care Provider: Eren Becker MD    Isolation Status: No active isolations  Assessment/Plan:      * Pseudoaneurysm     59 year old with PAD who is s/p bilateral femoral endarterectcomy in 2019 complicated by postoperative fluid collection with wound breakdown in January 2020 with Group F strep and Proteus mirabilis endovascular infection s/p wound washout with groin flap 1/3/20 s/p 6 weeks of IV ceftriaxone (EOC 2/14/20) complicated by proteus mirabilis bacteremia March 2020 s/p 4 weeks of IV ceftraixone (EOC 4/2/20) followed by suppressive levaquin.  Had subsequent PSA of left femoral artery, s/p stenting on 4/6/20 followed by 6 weeks of IV ceftriaxone (EOC 5/18/20), then placed on suppresive cefadroxil.  He subsequently developed explanding left groin PSA with recurrent Proteus bacteremia,  s/p washout with stent placement on 6/29/20 and discharged on 6-8 week course of ceftriaxone (EOC 8/20/20).       He is now POD #6 de-rotation and re-rotation of thigh flap, excision of left groin infected PSA and left common femoral artery with explantation of external iliac/common femoral stent graft with EIA to distal common femoral bypass with cryovein.      Surgical cultures  showing Enterococcus faecalis - ampicillin S.  Patient seen by Allergy this morning and PCN testing performed.  Called by Allergy - ok to give ampicillin. Very low risk of PCN reaction.  First dose given this morning and tolerated well.      Plan/discharge recommendations:   1.  Discontinue IV vancomycin and ceftriaxone.   2.  Start ampicillin 2 grams IV q 4 hours while inpatient.  Patient observed X 1 hours after completion and tolerated well.  3.  For discharge recommend Ampicillin 12 grams IV q 24 hours as a continuous infusion.   this will cover E Faecalis, prior proteus, and prior Group F strep.    4.  Estimated duration of antibiotic therapy - 6 weeks calculated from start of therapy for E. Faecalis on 8/14.   Estimated end date 9/25/20  5.  Weekly cbc, cmp, crp, esr and fax results to Dr. Marcelle Hunt at [phone]  6.  ID follow up 14 days from discharge with Dr. Hunt and EOC.  ID will make the appointments.   7.  Will sign off.  Please call or re-consult as needed.      Data reviewed and plan discussed with ID staff  Discussed with Primary Team      Thank you.   Please call for any questions or concerns.  ANDREW Jean, ANP-C  [phone] pager, [place] [phone]  Subjective:     Principal Problem:Pseudoaneurysm    HPI: Mr. Renan Britton is a 59 year old with PAD who is s/p bilateral femoral endarterectcomy in 2019 complicated by postoperative fluid collection with wound breakdown in January 2020 with Group F strep and Proteus mirabilis endovascular infection s/p wound washout with groin flap 1/3/20 s/p 6 weeks of IV ceftriaxone (EOC 2/14/20) complicated by proteus mirabilis bacteremia March 2020 s/p 4 weeks of IV ceftraixone (EOC 4/2/20) followed by suppressive levaquin.  Had subsequent PSA of left femoral artery, s/p stenting on 4/6/20 followed by 6 weeks of IV cefttriaxone (EOC 5/18/20), then placed on suppresive cefadroxil.  He subsequently developed explanding left groin PSA with recurrent Proteus bacteremia,  s/p washout with stent placement on 6/29/20 and discharged on 6-8 week course of ceftriaxone (EOC 8/20/20).  He is now POD #1 de-rotation and re-rotation of thigh flap, excision of left groin infected PSA and left common femoral artery with explantation of external iliac/common femoral stent with EIA to distal common femoral bypass with cryovein.  ID consulted for antibiotic management.     Surgical cultures pending. Gram stain with rare GPC.  Interval History: No acute events overnight.  Feels well.  Ambulating in  halls.  N allergy testing this morning by Allergy was non-reactive.  For discharge today if tolerates first dosing of ampicillin.       Review of Systems   Constitutional: Positive for activity change. Negative for appetite change, chills, diaphoresis, fatigue and fever.   HENT: Negative.    Eyes: Negative.    Respiratory: Negative for cough, shortness of breath and wheezing.    Cardiovascular: Negative for chest pain and palpitations.   Gastrointestinal: Negative for abdominal pain, diarrhea, nausea and vomiting.   Musculoskeletal: Negative for back pain and myalgias.        Surgical site pain   Skin: Positive for wound (surgical wound).   Neurological: Negative for dizziness, numbness and headaches.   Psychiatric/Behavioral: Negative for agitation and confusion.     Objective:     Vital Signs (Most Recent):  Temp: 98.3 °F (36.8 °C) (08/18/20 0709)  Pulse: 73 (08/18/20 0709)  Resp: 16 (08/18/20 0740)  BP: (!) 147/72 (08/18/20 0709)  SpO2: 100 % (08/18/20 0709) Vital Signs (24h Range):  Temp:  [96.5 °F (35.8 °C)-98.3 °F (36.8 °C)] 98.3 °F (36.8 °C)  Pulse:  [66-85] 73  Resp:  [14-20] 16  SpO2:  [98 %-100 %] 100 %  BP: (123-155)/(64-79) 147/72     Weight: 123.4 kg (272 lb 0.8 oz)  Body mass index is 37.94 kg/m².    Estimated Creatinine Clearance: 181.6 mL/min (based on SCr of 0.6 mg/dL).    Physical Exam  Vitals signs and nursing note reviewed.   Constitutional:       General: He is not in acute distress.     Appearance: Normal appearance. He is not toxic-appearing.   HENT:      Head: Normocephalic and atraumatic.   Eyes:      General: No scleral icterus.     Conjunctiva/sclera: Conjunctivae normal.   Cardiovascular:      Rate and Rhythm: Normal rate.      Heart sounds: Murmur present.   Pulmonary:      Effort: Pulmonary effort is normal.      Breath sounds: Normal breath sounds.   Abdominal:      General: There is no distension.      Palpations: Abdomen is soft.      Tenderness: There is abdominal tenderness (left  lower abdomen - operative site).   Genitourinary:     Comments:       Musculoskeletal:      Comments: Left groin incisions c/d/i.    2 drains with small amount bloody drainage  LLQ abdominal incision  c/d/i       Skin:     General: Skin is warm and dry.   Neurological:      General: No focal deficit present.      Mental Status: He is alert and oriented to person, place, and time.   Psychiatric:         Mood and Affect: Mood normal.         Behavior: Behavior normal.         Significant Labs:   Blood Culture:   Recent Labs   Lab 06/26/20  0713 06/26/20  0717 06/27/20  0010 06/27/20  0017 06/30/20  1321   LABBLOO Gram stain aer bottle: Gram negative rods   Results called to and read back by: Sana Jay RN  06/28/2020  14:10  PROTEUS MIRABILIS* No growth after 5 days. No growth after 5 days. No growth after 5 days. No growth after 5 days.     CBC:   No results for input(s): WBC, HGB, HCT, PLT in the last 48 hours.  CMP:   Recent Labs   Lab 08/17/20  0902 08/18/20  0747    137   K 3.7 3.9    100   CO2 29 29   * 115*   BUN 12 11   CREATININE 0.7 0.6   CALCIUM 9.4 9.2   ANIONGAP 8 8   EGFRNONAA >60.0 >60.0     Wound Culture:   Recent Labs   Lab 06/29/20  2244 08/12/20  1138 08/12/20  1154 08/12/20  1213   LABAERO No growth  No growth No significant isolate ENTEROCOCCUS FAECALIS  Many  * ENTEROCOCCUS FAECALIS  Rare  No other significant isolate  *       Significant Imaging: I have reviewed all pertinent imaging results/findings within the past 24 hours.

## 2020-08-18 NOTE — DISCHARGE SUMMARY
[facility]  Discharge Summary  General Surgery      Admit Date: 8/12/2020    Discharge Date:  08/18/2020 6:09 PM    Attending Physician: Kelby Gomez MD     Discharge Provider: Artem Mattson MD    Reason for Admission: Infected Left Groin Pseudoaneurysm    Procedures Performed:   1) 3rd time redo left groin exploration  2) Explant infected iliac stents - left common femoral/external   3) Resection left common femoral artery/left femoral infected pseudoaneurysm  4) Left retroperitoneal exposure  5) Left external iliac to distal common femoral artery bypass with cryopreserved greater saphenous vein    Hospital Course:  Patient is a 59-year old male with Hx of PAD who presented for surgery for an infected left groin pseudoaneurysm. He underwent explant of infected iliac stents, resection of pseudoaneurysm, left external iliac to distal common femoral artery bypass with cryopreserved greater saphenous vein, and creation of muscle flap by plastics on 8/12/2020. A prevena wound vac was placed by plastics at the time of operation. On POD1, his pain was well controlled on a PCA, he was tolerating a regular diet, his foy was removed and he was urinating without difficulty, he resumed his home medications, and he began Plavix. On POD3, his pain was well controlled on oral analgesia, his surgical groin culture resulted with enterococcus, and he was placed on IV ceftriaxone 2g q24h and IV Vancomycin. On POD4, he continued to improve and discharge planning was begun. PT and OT evaluated the patient and recommended home health PT and OT. On POD5, he was able to be switched to IV ampicillin after Allergy evaluated and cleared his previous penicillin allergy. He will complete his IV ampicillin (12g q24h continuous infusion) on 9/25/20. On POD6, he was discharged to home with home health PT/OT and IV antibiotics in good condition. He will follow up in clinic with Dr. Gomez in 2 weeks with ABIs and a Duplex  US.    Consults: None    Significant Diagnostic Studies: Labs:   CMP   Recent Labs   Lab 08/17/20  0902 08/18/20  0747    137   K 3.7 3.9    100   CO2 29 29   * 115*   BUN 12 11   CREATININE 0.7 0.6   CALCIUM 9.4 9.2   ANIONGAP 8 8   ESTGFRAFRICA >60.0 >60.0   EGFRNONAA >60.0 >60.0    and CBC No results for input(s): WBC, HGB, HCT, PLT in the last 48 hours.  Microbiology: Wound Culture: positive for Enterococcus Faecalis, Ampicillin sensitive    Final Diagnoses:   Principal Problem: Pseudoaneurysm   Secondary Diagnoses:   Active Hospital Problems    Diagnosis  POA    *Pseudoaneurysm [I72.9]  Yes    History of penicillin allergy [Z88.0]  Not Applicable    Enterococcus faecalis infection [B95.2]  Yes    PAD (peripheral artery disease) [I73.9]  Yes    Proteus infection [A49.8]  Yes      Resolved Hospital Problems   No resolved problems to display.       Discharged Condition: Good    Disposition: Home with Home Health PT, OT, and IV antibiotics    Follow Up/Patient Instructions: Follow up with Dr. Gomez in 2 weeks with ABIs and a Duplex US.    Medications:  Reconciled Home Medications:      Medication List      START taking these medications    AMPICILLIN 2 G/100 ML NS (READY TO MIX SYSTEM)  12g IV q24h. Ending 9/25/20.     oxyCODONE-acetaminophen 5-325 mg per tablet  Commonly known as: PERCOCET  Take 1 tablet by mouth every 6 (six) hours as needed for Pain.        CHANGE how you take these medications    clopidogreL 75 mg tablet  Commonly known as: PLAVIX  Take 1 tablet (75 mg total) by mouth once daily.  What changed: when to take this        CONTINUE taking these medications    aspirin 81 MG EC tablet  Commonly known as: ECOTRIN  Take 81 mg by mouth once daily.     atorvastatin 40 MG tablet  Commonly known as: LIPITOR  Take 40 mg by mouth once daily.     cefadroxil 500 MG Cap  Commonly known as: DURICEF  Take 2 capsules (1,000 mg total) by mouth every 12 (twelve) hours.     elderberry fruit  and flower 460-115 mg Cap  Take 2 capsules by mouth every morning.     metFORMIN 500 MG ER 24hr tablet  Commonly known as: GLUCOPHAGE-XR  Take 1,000 mg by mouth daily with breakfast.     pregabalin 100 MG capsule  Commonly known as: LYRICA  Take 100 mg by mouth 2 (two) times daily.     valsartan 160 MG tablet  Commonly known as: DIOVAN  Take 160 mg by mouth once daily.     VITAMIN C ORAL  Take 2 capsules by mouth every morning.        STOP taking these medications    HYDROcodone-acetaminophen 5-325 mg per tablet  Commonly known as: NORCO          Discharge Procedure Orders   VAS US Ankle Brachial Indices Resting   Standing Status: Future Standing Exp. Date: 08/18/21     VAS US Arterial Legs Bilateral   Standing Status: Future Standing Exp. Date: 08/18/21     Notify your health care provider if you experience any of the following:  increased confusion or weakness     Notify your health care provider if you experience any of the following:  persistent dizziness, light-headedness, or visual disturbances     Notify your health care provider if you experience any of the following:  worsening rash     Notify your health care provider if you experience any of the following:  severe persistent headache     Notify your health care provider if you experience any of the following:  difficulty breathing or increased cough     Notify your health care provider if you experience any of the following:  redness, tenderness, or signs of infection (pain, swelling, redness, odor or green/yellow discharge around incision site)     Notify your health care provider if you experience any of the following:  severe uncontrolled pain     Notify your health care provider if you experience any of the following:  persistent nausea and vomiting or diarrhea     Notify your health care provider if you experience any of the following:  temperature >100.4     No dressing needed     Activity as tolerated     Follow-up Information     Kelby PEREZ  MD Patricia In 2 weeks.    Specialty: Vascular Surgery  Contact information:  [address]  [facility] [address]  [phone]                   Activity: As tolerated.  Diet: Regular  Wound Care: As above.

## 2020-08-18 NOTE — PROGRESS NOTES
Therapy with vancomycin complete and/or consult discontinued.  Pharmacy will sign off, please re-consult as needed.    Maria Del Rosario Carpenter, PharmD  Ext [phone]

## 2020-08-18 NOTE — SUBJECTIVE & OBJECTIVE
Medications:  Continuous Infusions:   sodium chloride 0.9% 10 mL/hr at 08/12/20 0715     Scheduled Meds:   cefTRIAXone (ROCEPHIN) IVPB  2 g Intravenous Q24H    clopidogreL  75 mg Oral Daily    docusate sodium  100 mg Oral BID    heparin (porcine)  5,000 Units Subcutaneous Q8H    ibuprofen  400 mg Oral Q6H    polyethylene glycol  17 g Oral Daily    pregabalin  100 mg Oral BID    valsartan  160 mg Oral Daily    vancomycin (VANCOCIN) IVPB  1,500 mg Intravenous Q8H     PRN Meds:HYDROmorphone, ondansetron, oxyCODONE-acetaminophen, oxyCODONE-acetaminophen     Objective:     Vital Signs (Most Recent):  Temp: 98 °F (36.7 °C) (08/18/20 0436)  Pulse: 74 (08/18/20 0436)  Resp: 16 (08/18/20 0436)  BP: (!) 148/73 (08/18/20 0436)  SpO2: 100 % (08/18/20 0436) Vital Signs (24h Range):  Temp:  [96.2 °F (35.7 °C)-98.3 °F (36.8 °C)] 98 °F (36.7 °C)  Pulse:  [65-85] 74  Resp:  [14-20] 16  SpO2:  [98 %-100 %] 100 %  BP: (123-155)/(64-79) 148/73         Physical Exam  Vitals signs and nursing note reviewed.   Constitutional:       Appearance: Normal appearance.   HENT:      Head: Normocephalic.      Mouth/Throat:      Mouth: Mucous membranes are moist.   Eyes:      Extraocular Movements: Extraocular movements intact.      Pupils: Pupils are equal, round, and reactive to light.   Cardiovascular:      Rate and Rhythm: Normal rate and regular rhythm.      Comments: RLE with good DP/PT signals, well perfused  Pulmonary:      Effort: Pulmonary effort is normal. No respiratory distress.      Breath sounds: No wheezing or rhonchi.   Abdominal:      Palpations: Abdomen is soft.   Musculoskeletal:      Comments: Left groin JPs with serosanguinous output, minimal overnight  Left groin incisions clean, dry, and intact.    Skin:     General: Skin is warm.      Capillary Refill: Capillary refill takes less than 2 seconds.   Neurological:      General: No focal deficit present.      Mental Status: He is alert and oriented to person, place,  and time.         Significant Labs:  none    Significant Diagnostics:  none

## 2020-08-18 NOTE — PLAN OF CARE
POC reviewed with patient and spouse, voiced understanding. AAoX4. Up to side of bed for meals, walked in halls x3 with wife and PT.  Sat up in chair for majority of shift.  tolerated well. Pain managed with PRN pain meds. Picc line to right upper arm intact and patent. KATHLEEN drains to left groin x2 with minimal bloody drainage. Tele in progress. Voiding adequately per urinal with yellow urine,clear. NV checks performed every 2 hours with no changes/ Wound vac d/c to left groin, noted  2 incisions with  sutures and abd pad. Remains on RA. Abd incisions with dry dressings and intact. Lyrica (home m ed) restarted, Gabapentin d/c.  Tolerating diet well.  Bed in low and locked position. Call light in reach. Will continue to monitor.

## 2020-08-18 NOTE — PLAN OF CARE
"POC reviewed with patient and spouse who both verbalized understanding. ANOx4, VSS on RA. Pain well controlled tonight, only need for PRN intervention x1. Sleeping well in between care.     Skin: L lower abd transverse incision LUBNA w/ staples, C/D/I. The L groin has a U-shaped C/D/I incision w/ sutures and an ABD pad in place, some old drainage noted but no excess overnight. 2 groin KATHLEEN sites healing, minimal sanguineous drainage to the bulbs.      Adequate UOP per urinal. No BM although bowel sounds are normoactive and he still endorses "rumbling". Tolerating consistent carb diet well, no N/V reported. Q2 neurovascular checks continued, arterial and venous PT/DP pulses strong and audible in both feet. Feet fluctuate between warm and cool with each check, perfusing well overall.     Vanc therapy continued per MAR, trough drawn @ 0030 per orders. Levels remain therapeutic. Telemetry monitoring on, NSR noted. Call bell in reach, WCTM.  "

## 2020-08-18 NOTE — ASSESSMENT & PLAN NOTE
59 year old man with infected L groin pseudoaneurysm s/p explant of external iliac and common femoral stent, debridement, and reconstruction with cadaveric vein bypass and rerotation of thigh flap by plastic surgery.    - Multimodal pain regimen, continue current regimen  - Strip KATHLEEN drains 2x daily - minimal ss output   - PT/OT, OOB ambulation with assistance is encouraged  - Plavix  - Regular diet  - Groin culture resulted with enterococcus species - Sensitive to vanc and bactrim, ID for suppressive ABX regimen recs with ceftriaxone and vanc/ Need long term plan. Will follow up allergy medicine and have final recs  - Anticipate DC  Today with home health

## 2020-08-18 NOTE — ASSESSMENT & PLAN NOTE
59 year old with PAD who is s/p bilateral femoral endarterectcomy in 2019 complicated by postoperative fluid collection with wound breakdown in January 2020 with Group F strep and Proteus mirabilis endovascular infection s/p wound washout with groin flap 1/3/20 s/p 6 weeks of IV ceftriaxone (EOC 2/14/20) complicated by proteus mirabilis bacteremia March 2020 s/p 4 weeks of IV ceftraixone (EOC 4/2/20) followed by suppressive levaquin.  Had subsequent PSA of left femoral artery, s/p stenting on 4/6/20 followed by 6 weeks of IV ceftriaxone (EOC 5/18/20), then placed on suppresive cefadroxil.  He subsequently developed explanding left groin PSA with recurrent Proteus bacteremia,  s/p washout with stent placement on 6/29/20 and discharged on 6-8 week course of ceftriaxone (EOC 8/20/20).       He is now POD #6 de-rotation and re-rotation of thigh flap, excision of left groin infected PSA and left common femoral artery with explantation of external iliac/common femoral stent graft with EIA to distal common femoral bypass with cryovein.      Surgical cultures  showing Enterococcus faecalis - ampicillin S.  Patient seen by Allergy this morning and PCN testing performed.  Called by Allergy - ok to give ampicillin. Very low risk of PCN reaction.  First dose given this morning and tolerated well.      Plan/discharge recommendations:   1.  Discontinue IV vancomycin and ceftriaxone.   2.  Start ampicillin 2 grams IV q 4 hours while inpatient.  Patient observed X 1 hours after completion and tolerated well.  3.  For discharge recommend Ampicillin 12 grams IV q 24 hours as a continuous infusion.  this will cover E Faecalis, prior proteus, and prior Group F strep.    4.  Estimated duration of antibiotic therapy - 6 weeks calculated from start of therapy for E. Faecalis on 8/14.   Estimated end date 9/25/20  5.  Weekly cbc, cmp, crp, esr and fax results to Dr. Marcelle Hunt at [phone]  6.  ID follow up 14 days from discharge  with Dr. Hunt and EOC.  ID will make the appointments.   7.  Will sign off.  Please call or re-consult as needed.      Data reviewed and plan discussed with ID staff  Discussed with Primary Team

## 2020-08-19 ENCOUNTER — DOCUMENT SCAN (OUTPATIENT)
Dept: HOME HEALTH SERVICES | Facility: HOSPITAL | Age: 60
End: 2020-08-19

## 2020-08-19 NOTE — PLAN OF CARE
Patient was discharged home with [facility] [place] and [facility]. Transport was provided by family.      08/19/20 0924   Final Note   Assessment Type Final Discharge Note   Anticipated Discharge Disposition Home-Health   Hospital Follow Up  Appt(s) scheduled? Yes   Discharge plans and expectations educations in teach back method with documentation complete? Yes   Right Care Referral Info   Post Acute Recommendation Home-care   Post-Acute Status   Post-Acute Authorization Home Health   Home Health Status Set-up Complete     Future Appointments   Date Time Provider Department Center   8/26/2020  1:45 PM Brian Wong MD [facility]   9/3/2020  9:00 AM VASCULAR, LAB [facility]   9/3/2020 10:00 AM VASCULAR, LAB [facility]   9/3/2020 10:45 AM Kelby Gomez MD [facility]   9/8/2020 11:30 AM Marcelle Hunt MD [facility] ID [facility]   9/25/2020  9:30 AM Marcelle Hunt MD [facility] ID [facility] Armando RN, CM   Ext: [phone]

## 2020-08-21 ENCOUNTER — DOCUMENT SCAN (OUTPATIENT)
Dept: HOME HEALTH SERVICES | Facility: HOSPITAL | Age: 60
End: 2020-08-21

## 2020-08-21 ENCOUNTER — PATIENT OUTREACH (OUTPATIENT)
Dept: ADMINISTRATIVE | Facility: CLINIC | Age: 60
End: 2020-08-21

## 2020-08-21 NOTE — PATIENT INSTRUCTIONS
Abdominal Pain    Abdominal pain is pain in the stomach or belly area. Everyone has this pain from time to time. In many cases it goes away on its own. But abdominal pain can sometimes be due to a serious problem, such as appendicitis. So its important to know when to seek help.  Causes of abdominal pain  There are many possible causes of abdominal pain. Common causes in adults include:  · Constipation, diarrhea, or gas  · Stomach acid flowing back up into the esophagus (acid reflux or heartburn)  · Severe acid reflux, called GERD (gastroesophageal reflux disease)  · A sore in the lining of the stomach or small intestine (peptic ulcer)  · Inflammation of the gallbladder, liver, or pancreas  · Gallstones or kidney stones  · Appendicitis   · Intestinal blockage   · An internal organ pushing through a muscle or other tissue (hernia)  · Urinary tract infections  · In women, menstrual cramps, fibroids, or endometriosis  · Inflammation or infection of the intestines  Diagnosing the cause of abdominal pain  Your healthcare provider will do a physical exam help find the cause of your pain. If needed, tests will be ordered. Belly pain has many possible causes. So it can be hard to find the reason for your pain. Giving details about your pain can help. Tell your provider where and when you feel the pain, and what makes it better or worse. Also let your provider know if you have other symptoms such as:  · Fever  · Tiredness  · Upset stomach (nausea)  · Vomiting  · Changes in bathroom habits  Treating abdominal pain  Some causes of pain need emergency medical treatment right away. These include appendicitis or a bowel blockage. Other problems can be treated with rest, fluids, or medicines. Your healthcare provider can give you specific instructions for treatment or self-care based on what is causing your pain.  If you have vomiting or diarrhea, sip water or other clear fluids. When you are ready to eat solid foods again,  start with small amounts of easy-to-digest, low-fat foods. These include apple sauce, toast, or crackers.   When to seek medical care  Call 911 or go to the hospital right away if you:  · Cant pass stool and are vomiting  · Are vomiting blood or have bloody diarrhea or black, tarry diarrhea  · Have chest, neck, or shoulder pain  · Feel like you might pass out  · Have pain in your shoulder blades with nausea  · Have sudden, severe belly pain  · Have new, severe pain unlike any you have felt before  · Have a belly that is rigid, hard, and tender to touch  Call your healthcare provider if you have:  · Pain for more than 5 days  · Bloating for more than 2 days  · Diarrhea for more than 5 days  · A fever of 100.4°F (38.0°C) or higher, or as directed by your provider  · Pain that gets worse  · Weight loss for no reason  · Continued lack of appetite  · Blood in your stool  How to prevent abdominal pain  Here are some tips to help prevent abdominal pain:  · Eat smaller amounts of food at one time.  · Avoid greasy, fried, or other high-fat foods.  · Avoid foods that give you gas.  · Exercise regularly.  · Drink plenty of fluids.  To help prevent GERD symptoms:  · Quit smoking.  · Reduce alcohol and certain foods that increase stomach acid.  · Avoid aspirin and over-the-counter pain and fever medicines (NSAIDS or nonsteroidal anti-inflammatory drugs), if possible  · Lose extra weight.  · Finish eating at least 2 hours before you go to bed or lie down.  · Raise the head of your bed.  Date Last Reviewed: 7/1/2016  © 6473-4974 [employer]. [address] [place] [address], [place] [address]. All rights reserved. This information is not intended as a substitute for professional medical care. Always follow your healthcare professional's instructions.

## 2020-08-24 ENCOUNTER — DOCUMENT SCAN (OUTPATIENT)
Dept: HOME HEALTH SERVICES | Facility: HOSPITAL | Age: 60
End: 2020-08-24

## 2020-08-25 ENCOUNTER — OFFICE VISIT (OUTPATIENT)
Dept: PLASTIC SURGERY | Facility: CLINIC | Age: 60
End: 2020-08-25
Payer: COMMERCIAL

## 2020-08-25 VITALS
DIASTOLIC BLOOD PRESSURE: 78 MMHG | HEART RATE: 72 BPM | BODY MASS INDEX: 37.94 KG/M2 | SYSTOLIC BLOOD PRESSURE: 146 MMHG | WEIGHT: 272 LBS

## 2020-08-25 DIAGNOSIS — T81.31XD DEHISCENCE OF OPERATIVE WOUND, SUBSEQUENT ENCOUNTER: Primary | ICD-10-CM

## 2020-08-25 PROCEDURE — 99024 PR POST-OP FOLLOW-UP VISIT: ICD-10-PCS | Mod: S$GLB,,, | Performed by: SURGERY

## 2020-08-25 PROCEDURE — 99024 POSTOP FOLLOW-UP VISIT: CPT | Mod: S$GLB,,, | Performed by: SURGERY

## 2020-08-26 NOTE — PROGRESS NOTES
Plastic Update    Excellent contour of flap  Skin edges well approximated  No evidence of infection  I removed drain from under flap  Continue remaining drain  Follow up in 2 weeks

## 2020-09-01 LAB
ACID FAST MOD KINY STN SPEC: NORMAL
ACID FAST MOD KINY STN SPEC: NORMAL
MYCOBACTERIUM SPEC QL CULT: NORMAL
MYCOBACTERIUM SPEC QL CULT: NORMAL

## 2020-09-02 ENCOUNTER — TELEPHONE (OUTPATIENT)
Dept: PLASTIC SURGERY | Facility: CLINIC | Age: 60
End: 2020-09-02

## 2020-09-02 NOTE — TELEPHONE ENCOUNTER
Spoke with Patricia Britton , pts wife, and got pt scheduled for Drain Removal. She verbalized understanding of pt time date and location.             ----- Message from Renetta Grimm sent at 9/2/2020 10:25 AM CDT -----  Regarding: Pt wife Patricia  Reason: Calling to see if pt can be seen tomorrow to remove drainage. Please call      Communication: [phone]

## 2020-09-03 ENCOUNTER — OFFICE VISIT (OUTPATIENT)
Dept: VASCULAR SURGERY | Facility: CLINIC | Age: 60
End: 2020-09-03
Attending: SURGERY
Payer: COMMERCIAL

## 2020-09-03 ENCOUNTER — OFFICE VISIT (OUTPATIENT)
Dept: PLASTIC SURGERY | Facility: CLINIC | Age: 60
End: 2020-09-03
Payer: COMMERCIAL

## 2020-09-03 ENCOUNTER — HOSPITAL ENCOUNTER (OUTPATIENT)
Dept: VASCULAR SURGERY | Facility: CLINIC | Age: 60
Discharge: HOME | End: 2020-09-03
Payer: COMMERCIAL

## 2020-09-03 VITALS — SYSTOLIC BLOOD PRESSURE: 142 MMHG | DIASTOLIC BLOOD PRESSURE: 82 MMHG | HEART RATE: 64 BPM

## 2020-09-03 VITALS
WEIGHT: 248.88 LBS | SYSTOLIC BLOOD PRESSURE: 148 MMHG | HEART RATE: 64 BPM | DIASTOLIC BLOOD PRESSURE: 82 MMHG | TEMPERATURE: 98 F | HEIGHT: 70 IN | RESPIRATION RATE: 18 BRPM | BODY MASS INDEX: 35.63 KG/M2

## 2020-09-03 DIAGNOSIS — I72.4 PSEUDOANEURYSM OF LEFT FEMORAL ARTERY: Primary | ICD-10-CM

## 2020-09-03 DIAGNOSIS — T82.7XXD INFECTION OF VASCULAR BYPASS GRAFT, SUBSEQUENT ENCOUNTER: ICD-10-CM

## 2020-09-03 DIAGNOSIS — I72.9 PSEUDOANEURYSM: ICD-10-CM

## 2020-09-03 DIAGNOSIS — A49.8 ENTEROCOCCUS FAECALIS INFECTION: Primary | ICD-10-CM

## 2020-09-03 PROCEDURE — 93925 PR DUPLEX LO EXTREM ART BILAT: ICD-10-PCS | Mod: S$GLB,,, | Performed by: SURGERY

## 2020-09-03 PROCEDURE — 99024 PR POST-OP FOLLOW-UP VISIT: ICD-10-PCS | Mod: S$GLB,,, | Performed by: SURGERY

## 2020-09-03 PROCEDURE — 99999 PR PBB SHADOW E&M-EST. PATIENT-LVL III: CPT | Mod: PBBFAC,,, | Performed by: SURGERY

## 2020-09-03 PROCEDURE — 99024 POSTOP FOLLOW-UP VISIT: CPT | Mod: S$GLB,,, | Performed by: SURGERY

## 2020-09-03 PROCEDURE — 93925 LOWER EXTREMITY STUDY: CPT | Mod: S$GLB,,, | Performed by: SURGERY

## 2020-09-03 PROCEDURE — 93923 PR NON-INVASIVE PHYSIOLOGIC STUDY EXTREMITY 3 LEVELS: ICD-10-PCS | Mod: S$GLB,,, | Performed by: SURGERY

## 2020-09-03 PROCEDURE — 99999 PR PBB SHADOW E&M-EST. PATIENT-LVL III: ICD-10-PCS | Mod: PBBFAC,,, | Performed by: SURGERY

## 2020-09-03 PROCEDURE — 93923 UPR/LXTR ART STDY 3+ LVLS: CPT | Mod: S$GLB,,, | Performed by: SURGERY

## 2020-09-03 RX ORDER — IBUPROFEN 400 MG/1
400 TABLET ORAL EVERY 6 HOURS PRN
COMMUNITY
End: 2021-08-25

## 2020-09-03 NOTE — LETTER
September 16, 2020      Artem Mattson MD  [address]  [facility] [address]           [facility]  [address]  [facility] [address]  Phone: [phone]  Fax: [phone]          Patient: Renan Britton   MR Number: [id number]   YOB: 1960   Date of Visit: 9/3/2020       Dear Dr. Artem Mattson:    Thank you for referring Renan Britton to me for evaluation. Attached you will find relevant portions of my assessment and plan of care.    If you have questions, please do not hesitate to call me. I look forward to following Renan Britton along with you.    Sincerely,    Kelby Gomez MD    Enclosure  CC:  No Recipients    If you would like to receive this communication electronically, please contact [email or web address] or [phone] to request more information on [employer] access.    For providers and/or their staff who would like to refer a patient to [facility], please contact us through our one-stop-shop provider referral line, [facility], at [phone].    If you feel you have received this communication in error or would no longer like to receive these types of communications, please e-mail [email or web address]

## 2020-09-05 PROCEDURE — G0179 PR HOME HEALTH MD RECERTIFICATION: ICD-10-PCS | Mod: ,,, | Performed by: SURGERY

## 2020-09-05 PROCEDURE — G0179 MD RECERTIFICATION HHA PT: HCPCS | Mod: ,,, | Performed by: SURGERY

## 2020-09-08 ENCOUNTER — OFFICE VISIT (OUTPATIENT)
Dept: INFECTIOUS DISEASES | Facility: CLINIC | Age: 60
End: 2020-09-08
Payer: COMMERCIAL

## 2020-09-08 DIAGNOSIS — I77.6 VASCULAR INFLAMMATION OR INFECTION: Primary | ICD-10-CM

## 2020-09-08 DIAGNOSIS — I72.4 PSEUDOANEURYSM OF LEFT FEMORAL ARTERY: ICD-10-CM

## 2020-09-08 DIAGNOSIS — Z79.2 RECEIVING INTRAVENOUS ANTIBIOTIC TREATMENT AS OUTPATIENT: ICD-10-CM

## 2020-09-08 DIAGNOSIS — A49.8 ENTEROCOCCUS FAECALIS INFECTION: ICD-10-CM

## 2020-09-08 PROCEDURE — 99213 OFFICE O/P EST LOW 20 MIN: CPT | Mod: 95,,, | Performed by: INTERNAL MEDICINE

## 2020-09-08 PROCEDURE — 99213 PR OFFICE/OUTPT VISIT, EST, LEVL III, 20-29 MIN: ICD-10-PCS | Mod: 95,,, | Performed by: INTERNAL MEDICINE

## 2020-09-08 NOTE — PROGRESS NOTES
Sutures removed  Flap viable  No evidence of infection  Drain serous, output 15 cc per day for last week.    Continue antibiotics per ID  No further activity restrictions  Clean incision line with soap and water  Moisturize with aquaphor  Ace wrap left thigh and across left hip as demonstrated in clinic  Follow up as needed

## 2020-09-08 NOTE — PROGRESS NOTES
Subjective:      Patient ID: Renan Britton is a 59 y.o. male.    Chief Complaint:No chief complaint on file.    The patient location is: home  The chief complaint leading to consultation is: hospital follow up OPAT    Visit type: audiovisual    Face to Face time with patient: 14 minutes  30 minutes of total time spent on the encounter, which includes face to face time and non-face to face time preparing to see the patient (eg, review of tests), Obtaining and/or reviewing separately obtained history, Documenting clinical information in the electronic or other health record, Independently interpreting results (not separately reported) and communicating results to the patient/family/caregiver, or Care coordination (not separately reported).         Each patient to whom he or she provides medical services by telemedicine is:  (1) informed of the relationship between the physician and patient and the respective role of any other health care provider with respect to management of the patient; and (2) notified that he or she may decline to receive medical services by telemedicine and may withdraw from such care at any time.    History of Present Illness    A 59-year-old man with PAD, s/p bilateral femoral endarterectomy (12/20/19), surgical site infection treated with clindamycin, post operative fluid collection with wound breakdown, group F Streptococcus plus P mirabilis endovascular infection, s/p wound washout with groin flap (1/3/20), s/p 6 weeks of ceftriaxone (EOC: 2/14/20), P mirabilis bacteremia, s/p ceftriaxone for 4 weeks (EOC: 4/2/20), placed on suppressive Levaquin, pseudoaneurysm of the left femoral artery with stenting, s/p ceftriaxone for 6 weeks (EOC: 5/18/20), placed on suppressive cefadroxil, and left groin expanding pseudoaneurysm plus recurrent bacteremia s/p washout with stent placement who is seen for ongoing management of his infection and antibiotic therapy. When last seen he had undergone  stent placement with drainage of a hematoma of the left groin on 6/29 and was placed on a 6-8 week course of ceftriaxone. He was readmitted to [facility] for further management and underwent debridement of all infected tissue with removal of the stent on 8/12. Operative cultures were positive for E faecalis and he was discharged on a  6 week course of ampicillin with a scheduled end date of 9/25/2020. Today he has no complaints. His wife, who accompanies him during the visit, says the site is healing well and she has not noted any concerning signs.       Review of Systems   Constitution: Negative for chills, decreased appetite, fever, malaise/fatigue, night sweats, weight gain and weight loss.   HENT: Negative for congestion, ear pain, hearing loss, hoarse voice, sore throat and tinnitus.    Eyes: Negative for blurred vision, redness and visual disturbance.   Cardiovascular: Negative for chest pain, leg swelling and palpitations.   Respiratory: Negative for cough, hemoptysis, shortness of breath and sputum production.    Hematologic/Lymphatic: Negative for adenopathy. Does not bruise/bleed easily.   Skin: Negative for dry skin, itching, rash and suspicious lesions.   Musculoskeletal: Negative for back pain, joint pain, myalgias and neck pain.   Gastrointestinal: Negative for abdominal pain, constipation, diarrhea, heartburn, nausea and vomiting.   Genitourinary: Negative for dysuria, flank pain, frequency, hematuria, hesitancy and urgency.   Neurological: Negative for dizziness, headaches, numbness, paresthesias and weakness.   Psychiatric/Behavioral: Negative for depression and memory loss. The patient does not have insomnia and is not nervous/anxious.      Objective:   Physical Exam  Vitals signs and nursing note reviewed.   Constitutional:       Appearance: He is well-developed.   HENT:      Head: Normocephalic and atraumatic.   Eyes:      General: No scleral icterus.        Right eye: No discharge.         Left eye: No  discharge.      Conjunctiva/sclera: Conjunctivae normal.   Pulmonary:      Effort: Pulmonary effort is normal.   Musculoskeletal: Normal range of motion.   Skin:     General: Skin is warm and dry.   Neurological:      Mental Status: He is alert and oriented to person, place, and time.   Psychiatric:         Behavior: Behavior normal.         Thought Content: Thought content normal.         Judgment: Judgment normal.       Assessment:       1. Vascular inflammation or infection    2. Pseudoaneurysm of left femoral artery    3. Enterococcus faecalis infection    4. Receiving intravenous antibiotic treatment as outpatient        Plan:   Endovascular infection now due to E faecalis. Previously had P mirabilis bacteremia. Presumably all infected tissue has been excised however vascular graft was placed for repair.   · Continue ampicillin.  · Routine laboratory monitoring.   · Will transition to oral antibiotic suppression in the setting of graft. Unclear at this time if he will need lifelong suppression as he was not actively bacteremic at the time of graft placement.   · Discussed need for surveillance colonoscopy in the setting of Enterococcal infection. Will place referral at next visit.  · RTC 9/25/20

## 2020-09-11 ENCOUNTER — DOCUMENT SCAN (OUTPATIENT)
Dept: HOME HEALTH SERVICES | Facility: HOSPITAL | Age: 60
End: 2020-09-11

## 2020-09-14 ENCOUNTER — EXTERNAL HOME HEALTH (OUTPATIENT)
Dept: HOME HEALTH SERVICES | Facility: HOSPITAL | Age: 60
End: 2020-09-14
Payer: COMMERCIAL

## 2020-09-15 ENCOUNTER — PATIENT MESSAGE (OUTPATIENT)
Dept: VASCULAR SURGERY | Facility: CLINIC | Age: 60
End: 2020-09-15

## 2020-09-16 NOTE — PROGRESS NOTES
Renan Britton   9/3/2020    HPI:  Mr. Britton is a 60 y.o. male with a h/o HLD, PAD who underwent the following 2020:    1) 3rd time redo left groin exploration  2) Explant infected stents - left common femoral/external arteries  3) Resection left common femoral artery/left femoral infected pseudoaneurysm  4) Left retroperitoneal exposure  5) Left external iliac to distal common femoral artery bypass with cryopreserved femoral artery  6) Implant, absorbable antibiotic beads    Doing quite well postop, ambulating several times per day.  Pain is well-controlled. Continues on IV abx.     Had previously undergone the followin) ENDARTERECTOMY, FEMORAL (Bilateral)  2) ANGIOGRAM, PELVIC  3) Left common iliac artery PTA (6 x 40 [place])  4) Left common iliac artery stent (GORE VBX 10 x 39mm)  5) Prevena VAC placement, bilateral groins    With subsequent left sartorius muscle flap coverage by plastic surgery and right groin wahsout and VAC placement.  He has been doing quite well, with increasing activity and ambulation.  He represented about a month prior to this visit with bacteremia and has continued with IV ABX via a left midline cath. To follow-up with ID today.     2020 Imaging    Denies any MI/stroke  Tobacco use: quit 8 years ago    Past Medical History:   Diagnosis Date    Diabetes mellitus     H/O brain tumor     HLD (hyperlipidemia)     Hypertension     PAD (peripheral artery disease)     Seizures     R/T BRAIN TUMOR- SURGICALLY EXCISED     Past Surgical History:   Procedure Laterality Date    ANGIOGRAPHY Left 2020    Procedure: ANGIOGRAM, Left lower extremity;  Surgeon: Kelby Gomez MD;  Location: [facility] OR [address] [place] [address];  Service: Peripheral Vascular;  Laterality: Left;    ANGIOGRAPHY OF LOWER EXTREMITY Left 2020    Procedure: Angiogram Extremity Unilateral, washout L groin, possible open pseudoaneurysm repair;  Surgeon: Bruce Dallas MD;  Location: [facility] OR [address] [place] [address];   Service: Peripheral Vascular;  Laterality: Left;  contrast 23 ml  fluoro time: 9.9 min  mGy: 1230.26  Gycm2: 146.69    AORTOGRAPHY N/A 6/29/2020    Procedure: AORTOGRAM;  Surgeon: Bruce Dallas MD;  Location: [facility] OR [address] [place] [address];  Service: Peripheral Vascular;  Laterality: N/A;    APPLICATION OF WOUND VACUUM-ASSISTED CLOSURE DEVICE Bilateral 1/1/2020    Procedure: APPLICATION, WOUND VAC;  Surgeon: SEBAS Prieto II, MD;  [facility];  Service: Cardiovascular;  Laterality: Bilateral;    APPLICATION OF WOUND VACUUM-ASSISTED CLOSURE DEVICE N/A 1/3/2020    Procedure: APPLICATION, WOUND VAC;  Surgeon: Brian Wong MD;  Location: [facility] OR [address] [place] [address];  Service: Plastics;  Laterality: N/A;    BRAIN SURGERY  01/2011    TUMOR EXCISED    CREATION OF ILIOFEMORAL ARTERY BYPASS Left 8/12/2020    Procedure: CREATION, BYPASS, ARTERIAL, ILIAC TO FEMORAL;  Surgeon: Kelby Gomez MD;  Location: [address];  Service: Peripheral Vascular;  Laterality: Left;  CO-SURGEONS: DR KEO ADHIKARI;  DR WONG, profusion    CREATION OF MUSCLE ROTATIONAL FLAP N/A 1/3/2020    Procedure: CREATION, FLAP, MUSCLE ROTATION;  Surgeon: Brian Wong MD;  Location: [address];  Service: Plastics;  Laterality: N/A;    ENDARTERECTOMY OF FEMORAL ARTERY Bilateral 12/20/2019    Procedure: ENDARTERECTOMY, FEMORAL;  Surgeon: Kelby Gomez MD;  Location: [address];  Service: Peripheral Vascular;  Laterality: Bilateral;  natalya common femoral endarterectomy with natalya. iliac stent placement    PERCUTANEOUS TRANSLUMINAL ANGIOPLASTY Left 4/6/2020    Procedure: PTA (ANGIOPLASTY, PERCUTANEOUS, TRANSLUMINAL), left lower extremity;  Surgeon: Kelby Gomez MD;  Location: [address];  Service: Peripheral Vascular;  Laterality: Left;    PERCUTANEOUS TRANSLUMINAL ANGIOPLASTY (PTA) OF PERIPHERAL VESSEL Left 10/17/2019    Procedure: PTA, PERIPHERAL VESSEL;  Surgeon: Rao Fisher MD;  Location: [facility] C[facility];  Service:  Cardiology;  Laterality: Left;    PSEUDOANEURYSM REPAIR Left 2020    Procedure: REPAIR, PSEUDOANEURYSM;  Surgeon: Bruce Dallas MD;  Location: [facility] OR [address] [place] [address];  Service: Peripheral Vascular;  Laterality: Left;     Family History   Problem Relation Age of Onset    Diabetes Mother     Hypertension Mother     Stroke Mother     Cancer Father         LUNG    Heart disease Father     Cancer Brother     Heart disease Brother     Heart disease Brother     Heart disease Brother      Social History     Socioeconomic History    Marital status:      Spouse name: Not on file    Number of children: Not on file    Years of education: Not on file    Highest education level: Not on file   Occupational History    Not on file   Social Needs    Financial resource strain: Not on file    Food insecurity     Worry: Not on file     Inability: Not on file    Transportation needs     Medical: Not on file     Non-medical: Not on file   Tobacco Use    Smoking status: Former Smoker     Packs/day: 2.00     Types: Cigarettes     Quit date: 2011     Years since quittin.6    Smokeless tobacco: Never Used   Substance and Sexual Activity    Alcohol use: Yes     Comment: 2-4 PER DAY    Drug use: Never    Sexual activity: Not on file   Lifestyle    Physical activity     Days per week: Not on file     Minutes per session: Not on file    Stress: Not on file   Relationships    Social connections     Talks on phone: Not on file     Gets together: Not on file     Attends [employer] service: Not on file     Active member of club or organization: Not on file     Attends meetings of clubs or organizations: Not on file     Relationship status: Not on file   Other Topics Concern    Not on file   Social History Narrative    Not on file       Current Outpatient Medications:     ampicillin sodium (AMPICILLIN 2 G/100 ML NS, READY TO MIX SYSTEM,), 12g IV q24h. Ending 20., Disp: 20986 mL, Rfl: 0    ascorbic  acid (VITAMIN C ORAL), Take 2 capsules by mouth every morning., Disp: , Rfl:     aspirin (ECOTRIN) 81 MG EC tablet, Take 81 mg by mouth once daily., Disp: , Rfl:     atorvastatin (LIPITOR) 40 MG tablet, Take 40 mg by mouth once daily., Disp: , Rfl:     clopidogreL (PLAVIX) 75 mg tablet, Take 1 tablet (75 mg total) by mouth once daily. (Patient taking differently: Take 75 mg by mouth every evening. ), Disp: 30 tablet, Rfl: 10    elderberry fruit and flower 460-115 mg Cap, Take 2 capsules by mouth every morning., Disp: , Rfl:     ibuprofen (ADVIL,MOTRIN) 400 MG tablet, Take 400 mg by mouth every 6 (six) hours as needed for Other (pain)., Disp: , Rfl:     metFORMIN (GLUCOPHAGE-XR) 500 MG XR 24hr tablet, Take 1,000 mg by mouth daily with breakfast. , Disp: , Rfl:     pregabalin (LYRICA) 100 MG capsule, Take 100 mg by mouth 2 (two) times daily., Disp: , Rfl:     valsartan (DIOVAN) 160 MG tablet, Take 160 mg by mouth once daily., Disp: , Rfl:     cefadroxil (DURICEF) 500 MG Cap, Take 2 capsules (1,000 mg total) by mouth every 12 (twelve) hours. (Patient not taking: Reported on 8/21/2020), Disp: 120 capsule, Rfl: 4    oxyCODONE-acetaminophen (PERCOCET) 5-325 mg per tablet, Take 1 tablet by mouth every 6 (six) hours as needed for Pain. (Patient not taking: Reported on 9/3/2020), Disp: 18 tablet, Rfl: 0     REVIEW OF SYSTEMS:  General: negative; Respiratory: no cough, shortness of breath, or wheezing; Cardiovascular: no chest pain or dyspnea on exertion; Gastrointestinal: no abdominal pain, change in bowel habits, or bloody stools; Genito-Urinary: no dysuria, trouble voiding, or hematuria; Musculoskeletal: no weakness or decreased range of motion, Neurological: no TIA or stroke symptoms; Psychiatric: no nervousness, anxiety or depression.    PHYSICAL EXAM:       General appearance: Alert, well-appearing, and in no distress.  Oriented to person, place, and time  Neurological: Normal speech, no focal findings noted;  CN II - XII grossly intact  Musculoskeletal:Digits/nail without cyanosis/clubbing.  Normal muscle strength/tone.  Neck: Supple, no significant adenopathy, no carotid bruit can be auscultated  Chest:Clear to auscultation, no wheezes, rales or rhonchi, symmetric air entry, No use of accessory muscles   Cardiac:Normal rate and regular rhythm, S1 and S2 normal, Systolic ejection murmur  Abdomen:Soft, nontender, nondistended, no masses or organomegaly, No rebound tenderness noted; bowel sounds normal,  No groin adenopathy   Extremities: 2+ femoral pulses bilaterally, no pedal pulses palpable.no pre-tibial edema. No ulcerations,  LLE: biphasic PT signal, biphasic DP signal   RLE: Triphasic DP signal, triphasic PT signal  Incisions are clean and dry      LAB RESULTS:  Lab Results   Component Value Date    K 4.5 2020    K 4.2 2020    K 4.3 2020    CREATININE 0.70 (L) 2020    CREATININE 0.70 (L) 2020    CREATININE 0.70 (L) 2020    CREATININE 0.70 (L) 2020     Lab Results   Component Value Date    WBC 4.60 2020    WBC 4.40 2020    WBC 6.80 2020    HCT 35.7 (L) 2020    HCT 31.0 (L) 2020    HCT 31.2 (L) 2020     2020     2020     2020     Lab Results   Component Value Date    HGBA1C 6.0 (H) 2020    HGBA1C 5.3 2020     IMAGIN2020    Lower Extremity Arterial Imaging   ========================  Pressure Lower   ============     Rt low thigh BP    168 mmHg   Rt calf  mmHg   Rt PTA BP  157 mmHg   Rt dors pedis A  mmHg   Right LYNN ratio use:   post. tibial   Rt LYNN post tibial (rt post tib A BP / max brach A BP) 1.04   Rt LYNN (rt dors ped A BP / max brach A BP) 0.93   Rt ankle BP / max brach A BP   1.04   Lt brachial A syst BP  151 mmHg   Lt low thigh BP    172 mmHg   Lt calf  mmHg   Lt PTA BP  162 mmHg   Lt dors pedis A  mmHg   Left LYNN ratio use:    post. tibial   Lt LYNN (lt  post tibial A BP / max brach A BP)  1.07   Lt LYNN dors ped (lt dors ped A BP / max brach A BP)    1.00   Lt ankle BP / max brach A BP   1.07     Impression   =========     Right Leg: Segmental pressures and PVR waveforms suggest minimal peripheral arterial occlusive disease.   Left Leg: Segmental pressures and PVR waveforms suggest minimal peripheral arterial occlusive disease.     IMP/PLAN:  60 y.o. male with recent history as noted above, doing well with normal LYNN bilaterally following left external iliac to distal common femoral artery bypass with cryopreserved femoral artery .  He is doing quite well and has gradually increased his level of activity, although he is still somewhat limited and cannot safely return to work yet.  He continues on IV ceftriaxone.      1) continue asa, statin, plavix  2) daily and progressively increasing ambulation  3) scheduled follow-up with ID, plastic surgery  4) RTC 8 weeks with LLE duplex and LYNN      Kelby Gomez MD  Vascular & Endovascular Surgery

## 2020-09-25 ENCOUNTER — INFUSION (OUTPATIENT)
Dept: INFECTIOUS DISEASES | Facility: HOSPITAL | Age: 60
End: 2020-09-25
Attending: INTERNAL MEDICINE
Payer: COMMERCIAL

## 2020-09-25 ENCOUNTER — OFFICE VISIT (OUTPATIENT)
Dept: INFECTIOUS DISEASES | Facility: CLINIC | Age: 60
End: 2020-09-25
Payer: COMMERCIAL

## 2020-09-25 VITALS
SYSTOLIC BLOOD PRESSURE: 145 MMHG | HEART RATE: 66 BPM | BODY MASS INDEX: 36.1 KG/M2 | WEIGHT: 252.19 LBS | HEIGHT: 70 IN | TEMPERATURE: 99 F | DIASTOLIC BLOOD PRESSURE: 80 MMHG

## 2020-09-25 DIAGNOSIS — I77.6 VASCULAR INFLAMMATION OR INFECTION: Primary | ICD-10-CM

## 2020-09-25 DIAGNOSIS — Z79.2 CHRONIC ANTIBIOTIC SUPPRESSION: ICD-10-CM

## 2020-09-25 DIAGNOSIS — Z79.2 RECEIVING INTRAVENOUS ANTIBIOTIC TREATMENT AS OUTPATIENT: ICD-10-CM

## 2020-09-25 DIAGNOSIS — A49.8 ENTEROCOCCUS FAECALIS INFECTION: ICD-10-CM

## 2020-09-25 PROCEDURE — 99213 PR OFFICE/OUTPT VISIT, EST, LEVL III, 20-29 MIN: ICD-10-PCS | Mod: S$GLB,,, | Performed by: INTERNAL MEDICINE

## 2020-09-25 PROCEDURE — 99999 PR PBB SHADOW E&M-EST. PATIENT-LVL IV: ICD-10-PCS | Mod: PBBFAC,,, | Performed by: INTERNAL MEDICINE

## 2020-09-25 PROCEDURE — 99213 OFFICE O/P EST LOW 20 MIN: CPT | Mod: S$GLB,,, | Performed by: INTERNAL MEDICINE

## 2020-09-25 PROCEDURE — 99999 PR PBB SHADOW E&M-EST. PATIENT-LVL IV: CPT | Mod: PBBFAC,,, | Performed by: INTERNAL MEDICINE

## 2020-09-25 RX ORDER — AMOXICILLIN 500 MG/1
1000 CAPSULE ORAL EVERY 12 HOURS
Qty: 120 CAPSULE | Refills: 11 | Status: SHIPPED | OUTPATIENT
Start: 2020-10-25 | End: 2021-08-25

## 2020-09-25 RX ORDER — AMOXICILLIN 500 MG/1
1000 CAPSULE ORAL EVERY 12 HOURS
Qty: 120 CAPSULE | Refills: 0 | Status: SHIPPED | OUTPATIENT
Start: 2020-09-25 | End: 2020-10-25

## 2020-09-25 NOTE — PROGRESS NOTES
RUE PICC removed, 39cm noted. No redness, swelling, or drainage noted to site. Patient tolerated well, monitored for 30 minutes post removal. Patient verbalized understanding of site care instructions and left in NAD.

## 2020-09-25 NOTE — PROGRESS NOTES
Subjective:      Patient ID: Renan Britton is a 60 y.o. male.    Chief Complaint:Follow-up      History of Present Illness    A 59-year-old man with PAD, s/p bilateral femoral endarterectomy (12/20/19), surgical site infection treated with clindamycin, post operative fluid collection with wound breakdown, group F Streptococcus plus P mirabilis endovascular infection, s/p wound washout with groin flap (1/3/20), s/p 6 weeks of ceftriaxone (EOC: 2/14/20), P mirabilis bacteremia, s/p ceftriaxone for 4 weeks (EOC: 4/2/20), placed on suppressive Levaquin, pseudoaneurysm of the left femoral artery with stenting, s/p ceftriaxone for 6 weeks (EOC: 5/18/20), placed on suppressive cefadroxil, and left groin expanding pseudoaneurysm plus recurrent bacteremia s/p washout with stent placement who is seen for ongoing management of his infection and antibiotic therapy. When last seen he had undergone stent placement with drainage of a hematoma of the left groin on 6/29 and was placed on a 6-8 week course of ceftriaxone. He was readmitted to [facility] for further management and underwent debridement of all infected tissue with removal of the stent on 8/12. Operative cultures were positive for E faecalis and he was discharged on a  6 week course of ampicillin with a scheduled end date of 9/25/2020. Today he has no complaints. His wife, who accompanies him during the visit, says the site is healing well.     Review of Systems   Constitution: Negative for chills, decreased appetite, fever, malaise/fatigue, night sweats, weight gain and weight loss.   HENT: Negative for congestion, ear pain, hearing loss, hoarse voice, sore throat and tinnitus.    Eyes: Negative for blurred vision, redness and visual disturbance.   Cardiovascular: Negative for chest pain, leg swelling and palpitations.   Respiratory: Negative for cough, hemoptysis, shortness of breath and sputum production.    Hematologic/Lymphatic: Negative for adenopathy. Does not  bruise/bleed easily.   Skin: Negative for dry skin, itching, rash and suspicious lesions.   Musculoskeletal: Negative for back pain, joint pain, myalgias and neck pain.   Gastrointestinal: Negative for abdominal pain, constipation, diarrhea, heartburn, nausea and vomiting.   Genitourinary: Negative for dysuria, flank pain, frequency, hematuria, hesitancy and urgency.   Neurological: Negative for dizziness, headaches, numbness, paresthesias and weakness.   Psychiatric/Behavioral: Negative for depression and memory loss. The patient does not have insomnia and is not nervous/anxious.      Objective:   Physical Exam  Vitals signs reviewed.   Constitutional:       Appearance: He is well-developed.   HENT:      Head: Normocephalic and atraumatic.      Right Ear: External ear normal.      Left Ear: External ear normal.   Eyes:      Conjunctiva/sclera: Conjunctivae normal.   Neck:      Musculoskeletal: Normal range of motion and neck supple.      Thyroid: No thyromegaly.   Cardiovascular:      Rate and Rhythm: Normal rate and regular rhythm.      Heart sounds: Murmur present. Systolic murmur present with a grade of 3/6.   Pulmonary:      Effort: Pulmonary effort is normal.      Breath sounds: Normal breath sounds. No wheezing or rales.   Abdominal:      General: Bowel sounds are normal.      Palpations: Abdomen is soft. There is no mass.      Tenderness: There is no abdominal tenderness. There is no rebound.   Musculoskeletal: Normal range of motion.   Lymphadenopathy:      Cervical: No cervical adenopathy.   Skin:     General: Skin is warm and dry.   Neurological:      Mental Status: He is alert and oriented to person, place, and time.   Psychiatric:         Behavior: Behavior normal.       Assessment:       1. Vascular inflammation or infection    2. Enterococcus faecalis infection    3. Receiving intravenous antibiotic treatment as outpatient    4. Chronic antibiotic suppression        Plan:   Patient completed a 6 week  of ampicillin today. His ESR and CRP have normalized.   · Remove PICC line today.   · Start amoxicillin 1000 mg PO BID for chronic suppression.   · Will repeat ESR CRP in one week then every 2 weeks then monthly.   · Referral to GI for screening colonoscopy in the setting of Enterococcal infection and for surveillance.   · RTC in 3 months.

## 2020-10-02 LAB
CRP SERPL-MCNC: 2 MG/L
ERYTHROCYTE [SEDIMENTATION RATE] IN BLOOD BY WESTERGREN METHOD: 2 MM/H

## 2020-10-05 ENCOUNTER — OFFICE VISIT (OUTPATIENT)
Dept: PLASTIC SURGERY | Facility: CLINIC | Age: 60
End: 2020-10-05
Payer: COMMERCIAL

## 2020-10-05 DIAGNOSIS — I72.4 PSEUDOANEURYSM OF LEFT FEMORAL ARTERY: Primary | ICD-10-CM

## 2020-10-05 PROCEDURE — 99024 PR POST-OP FOLLOW-UP VISIT: ICD-10-PCS | Mod: S$GLB,,, | Performed by: SURGERY

## 2020-10-05 PROCEDURE — 99999 PR PBB SHADOW E&M-EST. PATIENT-LVL III: ICD-10-PCS | Mod: PBBFAC,,, | Performed by: SURGERY

## 2020-10-05 PROCEDURE — 99024 POSTOP FOLLOW-UP VISIT: CPT | Mod: S$GLB,,, | Performed by: SURGERY

## 2020-10-05 PROCEDURE — 99999 PR PBB SHADOW E&M-EST. PATIENT-LVL III: CPT | Mod: PBBFAC,,, | Performed by: SURGERY

## 2020-10-06 NOTE — PROGRESS NOTES
Plastic Update    Well healed fasciocutaneous flap in abdomen  Unremarkable vascular studies  He does not have claudication symptoms on his left side  Incisions are well healed  Pin hole sized area of granulation tissue seen over medial aspect of flap  He denies drainage of any kind    Lab Results   Component Value Date    CRP 2.0 10/01/2020     CMP  Sodium   Date Value Ref Range Status   09/24/2020 139 136 - 145 mmol/L Final     Potassium   Date Value Ref Range Status   09/24/2020 4.0 3.5 - 5.1 mmol/L Final     Chloride   Date Value Ref Range Status   09/24/2020 100 95 - 110 mmol/L Final     CO2   Date Value Ref Range Status   09/24/2020 27 23 - 29 mmol/L Final     Glucose   Date Value Ref Range Status   09/24/2020 106 74 - 106 mg/dL Final     BUN, Bld   Date Value Ref Range Status   09/24/2020 17 9 - 20 mg/dL Final     Creatinine   Date Value Ref Range Status   09/24/2020 0.80 0.80 - 1.50 mg/dL Final     Calcium   Date Value Ref Range Status   09/24/2020 9.5 8.4 - 10.2 mg/dL Final     Total Protein   Date Value Ref Range Status   09/24/2020 7.0 6.3 - 8.2 g/dL Final     Albumin   Date Value Ref Range Status   09/24/2020 4.4 3.5 - 5.2 g/dL Final     Total Bilirubin   Date Value Ref Range Status   09/24/2020 0.6 0.2 - 1.3 mg/dL Final     Alkaline Phosphatase   Date Value Ref Range Status   09/24/2020 47 38 - 145 U/L Final     AST   Date Value Ref Range Status   09/24/2020 28 17 - 59 U/L Final     ALT   Date Value Ref Range Status   09/24/2020 22 10 - 44 U/L Final     Anion Gap   Date Value Ref Range Status   08/18/2020 8 8 - 16 mmol/L Final     eGFR if    Date Value Ref Range Status   09/24/2020 >60 >60 mL/min/1.73 m^2 Final     eGFR if non    Date Value Ref Range Status   09/24/2020 >60 >60 mL/min/1.73 m^2 Final     Comment:     Calculation used to obtain the estimated glomerular filtration  rate (eGFR) is the CKD-EPI equation.        Lab Results   Component Value Date    SEDRATE 2  10/01/2020     Lab Results   Component Value Date    SEDRATE 2 10/01/2020     Can follow up as needed  Recommend Mon, Fri silver nitrate applications if pin hole sized granulation tissue is noted    Plastic & Reconstructive Surgery  [facility]  c/o Brian Wong M.D.  [facility]  [address]  [address] [place] [address]    Work [phone]  Toll free [phone]  If no answer [phone]

## 2020-10-09 LAB — CRP SERPL-MCNC: 1.2 MG/L

## 2020-10-21 ENCOUNTER — PATIENT MESSAGE (OUTPATIENT)
Dept: INFECTIOUS DISEASES | Facility: CLINIC | Age: 60
End: 2020-10-21

## 2020-10-21 DIAGNOSIS — Z79.2 CHRONIC ANTIBIOTIC SUPPRESSION: ICD-10-CM

## 2020-10-21 DIAGNOSIS — I77.6 VASCULAR INFLAMMATION OR INFECTION: Primary | ICD-10-CM

## 2020-10-23 LAB
CRP SERPL-MCNC: 1.5 MG/L
ERYTHROCYTE [SEDIMENTATION RATE] IN BLOOD BY WESTERGREN METHOD: 2 MM/H

## 2020-11-04 ENCOUNTER — PATIENT MESSAGE (OUTPATIENT)
Dept: INFECTIOUS DISEASES | Facility: CLINIC | Age: 60
End: 2020-11-04

## 2020-11-04 DIAGNOSIS — A49.8 ENTEROCOCCUS FAECALIS INFECTION: ICD-10-CM

## 2020-11-04 DIAGNOSIS — Z79.2 CHRONIC ANTIBIOTIC SUPPRESSION: ICD-10-CM

## 2020-11-04 DIAGNOSIS — I77.6 VASCULAR INFLAMMATION OR INFECTION: Primary | ICD-10-CM

## 2020-11-05 ENCOUNTER — OFFICE VISIT (OUTPATIENT)
Dept: PLASTIC SURGERY | Facility: CLINIC | Age: 60
End: 2020-11-05
Payer: COMMERCIAL

## 2020-11-05 VITALS
DIASTOLIC BLOOD PRESSURE: 80 MMHG | WEIGHT: 248 LBS | HEART RATE: 72 BPM | SYSTOLIC BLOOD PRESSURE: 121 MMHG | BODY MASS INDEX: 35.58 KG/M2

## 2020-11-05 DIAGNOSIS — I73.9 PAD (PERIPHERAL ARTERY DISEASE): Primary | ICD-10-CM

## 2020-11-05 PROCEDURE — 99024 PR POST-OP FOLLOW-UP VISIT: ICD-10-PCS | Mod: S$GLB,,, | Performed by: SURGERY

## 2020-11-05 PROCEDURE — 99999 PR PBB SHADOW E&M-EST. PATIENT-LVL III: CPT | Mod: PBBFAC,,, | Performed by: SURGERY

## 2020-11-05 PROCEDURE — 99999 PR PBB SHADOW E&M-EST. PATIENT-LVL III: ICD-10-PCS | Mod: PBBFAC,,, | Performed by: SURGERY

## 2020-11-05 PROCEDURE — 99024 POSTOP FOLLOW-UP VISIT: CPT | Mod: S$GLB,,, | Performed by: SURGERY

## 2020-11-06 LAB
CRP SERPL-MCNC: 1.5 MG/L
ERYTHROCYTE [SEDIMENTATION RATE] IN BLOOD BY WESTERGREN METHOD: 2 MM/H

## 2020-11-08 NOTE — PROGRESS NOTES
Plastic Update    Well healed groin flap.  Has intermittent drainage from the area treated with silver nitrate  All of his inflammatory labs are within normal limits  His vascular studies have not showed ischemia  I recommended scar massage  I also cleared him to return to work from our standpoint.     Component      Latest Ref Rng & Units 11/5/2020 10/22/2020 10/8/2020 10/1/2020   CRP      <8.0 mg/L 1.5 1.5 1.2 2.0   Sed Rate      < OR = 20 mm/h 2 2  2     Plastic & Reconstructive Surgery  [facility]  c/o Brian Wong M.D.  [facility]  [address]  [address] [place] [address]    Work [phone]  Toll free [phone]  If no answer [phone]

## 2020-11-12 ENCOUNTER — PATIENT MESSAGE (OUTPATIENT)
Dept: VASCULAR SURGERY | Facility: CLINIC | Age: 60
End: 2020-11-12

## 2020-11-16 ENCOUNTER — PATIENT MESSAGE (OUTPATIENT)
Dept: INFECTIOUS DISEASES | Facility: CLINIC | Age: 60
End: 2020-11-16

## 2020-11-19 ENCOUNTER — TELEPHONE (OUTPATIENT)
Dept: GASTROENTEROLOGY | Facility: CLINIC | Age: 60
End: 2020-11-19

## 2020-11-19 NOTE — TELEPHONE ENCOUNTER
----- Message from Melani Ro sent at 11/19/2020  2:14 PM CST -----  Regarding: Appointment Request  Contact: Wife, Patricia/ [phone]  Patient requesting to speak with you regarding the patient getting an a appointment and colonoscopy scheduled. Please advise.

## 2020-11-30 ENCOUNTER — TELEPHONE (OUTPATIENT)
Dept: GASTROENTEROLOGY | Facility: CLINIC | Age: 60
End: 2020-11-30

## 2020-11-30 ENCOUNTER — OFFICE VISIT (OUTPATIENT)
Dept: GASTROENTEROLOGY | Facility: CLINIC | Age: 60
End: 2020-11-30
Payer: COMMERCIAL

## 2020-11-30 VITALS
OXYGEN SATURATION: 98 % | HEART RATE: 71 BPM | HEIGHT: 70 IN | TEMPERATURE: 97 F | SYSTOLIC BLOOD PRESSURE: 128 MMHG | DIASTOLIC BLOOD PRESSURE: 78 MMHG | WEIGHT: 255.31 LBS | RESPIRATION RATE: 18 BRPM | BODY MASS INDEX: 36.55 KG/M2

## 2020-11-30 DIAGNOSIS — Z12.11 SCREENING FOR MALIGNANT NEOPLASM OF COLON: Primary | ICD-10-CM

## 2020-11-30 DIAGNOSIS — Z01.818 PREOPERATIVE EXAMINATION: ICD-10-CM

## 2020-11-30 PROCEDURE — 99999 PR PBB SHADOW E&M-EST. PATIENT-LVL V: ICD-10-PCS | Mod: PBBFAC,,, | Performed by: NURSE PRACTITIONER

## 2020-11-30 PROCEDURE — 99999 PR PBB SHADOW E&M-EST. PATIENT-LVL V: CPT | Mod: PBBFAC,,, | Performed by: NURSE PRACTITIONER

## 2020-11-30 PROCEDURE — 99203 PR OFFICE/OUTPT VISIT, NEW, LEVL III, 30-44 MIN: ICD-10-PCS | Mod: S$GLB,,, | Performed by: NURSE PRACTITIONER

## 2020-11-30 PROCEDURE — 99203 OFFICE O/P NEW LOW 30 MIN: CPT | Mod: S$GLB,,, | Performed by: NURSE PRACTITIONER

## 2020-11-30 RX ORDER — SODIUM, POTASSIUM,MAG SULFATES 17.5-3.13G
1 SOLUTION, RECONSTITUTED, ORAL ORAL DAILY
Qty: 1 KIT | Refills: 0 | Status: SHIPPED | OUTPATIENT
Start: 2020-11-30 | End: 2020-12-02

## 2020-11-30 NOTE — TELEPHONE ENCOUNTER
----- Message from EUN Laura sent at 11/30/2020  8:37 AM CST -----  Please obtain approval to hold Plavix from Dr. Gomez at [facility]

## 2020-11-30 NOTE — LETTER
November 30, 2020      [facility]  [address], [place]   [place] [address]  Phone: [phone]  Fax: [phone]       Patient: Renan Britton   YOB: 1960  Date of Visit: 11/30/2020    To Whom It May Concern:    Jamey Britton  was at [facility] on 11/30/2020. He may return to work/school on 12/1/2020 with no restrictions. If you have any questions or concerns, or if I can be of further assistance, please do not hesitate to contact me.    Sincerely,    Gabi Longo MA

## 2020-11-30 NOTE — TELEPHONE ENCOUNTER
Patient is scheduled for a colonoscopy on 1/8/2021. We will need clearance for the patient to hold his plavix prior to the procedure and for how many days.

## 2020-11-30 NOTE — PROGRESS NOTES
Subjective:       Patient ID: Renan Britton is a 60 y.o. male.    Chief Complaint: Screening Colonoscopy    61 y/o male with hypertension, type 2 diabetes mellitus, PAD on Plavix referred for initial screening colonoscopy. Patient reports regular BM daily. Denies abdominal pain, blood in stool, melena, fever, fatigue, or weight loss. No FH colon polyps or colon cancer.       Past Medical History:   Diagnosis Date    Diabetes mellitus     H/O brain tumor     HLD (hyperlipidemia)     Hypertension     PAD (peripheral artery disease)     Seizures     R/T BRAIN TUMOR- SURGICALLY EXCISED       Past Surgical History:   Procedure Laterality Date    ANGIOGRAPHY Left 4/6/2020    Procedure: ANGIOGRAM, Left lower extremity;  Surgeon: Kelby Gomez MD;  Location: [facility] OR [address] [place] [address];  Service: Peripheral Vascular;  Laterality: Left;    ANGIOGRAPHY OF LOWER EXTREMITY Left 6/29/2020    Procedure: Angiogram Extremity Unilateral, washout L groin, possible open pseudoaneurysm repair;  Surgeon: Bruce Dallas MD;  [facility];  Service: Peripheral Vascular;  Laterality: Left;  contrast 23 ml  fluoro time: 9.9 min  mGy: 1230.26  Gycm2: 146.69    AORTOGRAPHY N/A 6/29/2020    Procedure: AORTOGRAM;  Surgeon: rBuce Dallas MD;  Location: [facility] OR [address] [place] [address];  Service: Peripheral Vascular;  Laterality: N/A;    APPLICATION OF WOUND VACUUM-ASSISTED CLOSURE DEVICE Bilateral 1/1/2020    Procedure: APPLICATION, WOUND VAC;  Surgeon: SEBAS Prieto II, MD;  [facility];  Service: Cardiovascular;  Laterality: Bilateral;    APPLICATION OF WOUND VACUUM-ASSISTED CLOSURE DEVICE N/A 1/3/2020    Procedure: APPLICATION, WOUND VAC;  Surgeon: Brian Wong MD;  [facility];  Service: Plastics;  Laterality: N/A;    BRAIN SURGERY  01/2011    TUMOR EXCISED    CREATION OF ILIOFEMORAL ARTERY BYPASS Left 8/12/2020    Procedure: CREATION, BYPASS, ARTERIAL, ILIAC TO FEMORAL;  Surgeon: Kelby  CLYDE Gomez MD;  Location: [address];  Service: Peripheral Vascular;  Laterality: Left;  CO-SURGEONS: DR KEO ADHIKARI;  DR WONG, profusion    CREATION OF MUSCLE ROTATIONAL FLAP N/A 1/3/2020    Procedure: CREATION, FLAP, MUSCLE ROTATION;  Surgeon: Brian Wong MD;  Location: [address];  Service: Plastics;  Laterality: N/A;    ENDARTERECTOMY OF FEMORAL ARTERY Bilateral 12/20/2019    Procedure: ENDARTERECTOMY, FEMORAL;  Surgeon: Kelby Gomez MD;  Location: [address];  Service: Peripheral Vascular;  Laterality: Bilateral;  natalya common femoral endarterectomy with natalya. iliac stent placement    PERCUTANEOUS TRANSLUMINAL ANGIOPLASTY Left 4/6/2020    Procedure: PTA (ANGIOPLASTY, PERCUTANEOUS, TRANSLUMINAL), left lower extremity;  Surgeon: Kelby Gomez MD;  Location: [address];  Service: Peripheral Vascular;  Laterality: Left;    PERCUTANEOUS TRANSLUMINAL ANGIOPLASTY (PTA) OF PERIPHERAL VESSEL Left 10/17/2019    Procedure: PTA, PERIPHERAL VESSEL;  Surgeon: Rao Fisher MD;  Location: [facility] CATH;  Service: Cardiology;  Laterality: Left;    PSEUDOANEURYSM REPAIR Left 6/29/2020    Procedure: REPAIR, PSEUDOANEURYSM;  Surgeon: Bruce Dallas MD;  Location: [address];  Service: Peripheral Vascular;  Laterality: Left;       Family History   Problem Relation Age of Onset    Diabetes Mother     Hypertension Mother     Stroke Mother     Cancer Father         LUNG    Heart disease Father     Cancer Brother     Heart disease Brother     Heart disease Brother     Heart disease Brother        Social History     Socioeconomic History    Marital status:      Spouse name: Not on file    Number of children: Not on file    Years of education: Not on file    Highest education level: Not on file   Occupational History    Not on file   Social Needs    Financial resource strain: Not on file    Food insecurity     Worry: Not on file     Inability: Not on file     "Transportation needs     Medical: Not on file     Non-medical: Not on file   Tobacco Use    Smoking status: Former Smoker     Packs/day: 2.00     Types: Cigarettes     Quit date: 2011     Years since quittin.8    Smokeless tobacco: Never Used   Substance and Sexual Activity    Alcohol use: Yes     Comment: 2-4 PER DAY    Drug use: Never    Sexual activity: Not on file   Lifestyle    Physical activity     Days per week: Not on file     Minutes per session: Not on file    Stress: Not on file   Relationships    Social connections     Talks on phone: Not on file     Gets together: Not on file     Attends Synagogue service: Not on file     Active member of club or organization: Not on file     Attends meetings of clubs or organizations: Not on file     Relationship status: Not on file   Other Topics Concern    Not on file   Social History Narrative    Not on file       Review of Systems   Constitutional: Negative for fatigue, fever and unexpected weight change.   HENT: Negative for trouble swallowing.    Respiratory: Negative for cough and shortness of breath.    Cardiovascular: Negative for chest pain.   Gastrointestinal: Negative for abdominal pain, blood in stool and constipation.   Allergic/Immunologic: Negative for immunocompromised state.   Neurological: Negative for dizziness.   Psychiatric/Behavioral: Negative for dysphoric mood.         Objective:     Vitals:    20 0812   BP: 128/78   BP Location: Right arm   Patient Position: Sitting   BP Method: X-Large (Manual)   Pulse: 71   Resp: 18   Temp: 97 °F (36.1 °C)   TempSrc: Oral   SpO2: 98%   Weight: 115.8 kg (255 lb 4.8 oz)   Height: 5' 10" (1.778 m)          Physical Exam  Constitutional:       General: He is not in acute distress.     Appearance: Normal appearance. He is not ill-appearing.   HENT:      Head: Normocephalic.   Eyes:      Conjunctiva/sclera: Conjunctivae normal.      Pupils: Pupils are equal, round, and reactive to light. "   Neck:      Musculoskeletal: Normal range of motion.   Cardiovascular:      Rate and Rhythm: Normal rate and regular rhythm.      Heart sounds: Murmur present.   Pulmonary:      Effort: Pulmonary effort is normal.      Breath sounds: Normal breath sounds.   Abdominal:      General: Bowel sounds are normal.      Palpations: Abdomen is soft.      Tenderness: There is no abdominal tenderness.   Musculoskeletal: Normal range of motion.      Right lower leg: No edema.      Left lower leg: No edema.   Skin:     General: Skin is warm and dry.   Neurological:      Mental Status: He is alert and oriented to person, place, and time.   Psychiatric:         Mood and Affect: Mood normal.         Behavior: Behavior normal.               Assessment:         ICD-10-CM ICD-9-CM   1. Screening for malignant neoplasm of colon  Z12.11 V76.51   2. Preoperative examination  Z01.818 V72.84       Plan:       Screening for malignant neoplasm of colon  -     Case Request Endoscopy: COLONOSCOPY  -     SUPREP BOWEL PREP KIT 17.5-3.13-1.6 gram SolR; Take 177 mLs by mouth once daily. for 2 days  Dispense: 1 kit; Refill: 0    Preoperative examination  -     COVID-19 Routine Screening; Future; Expected date: 11/30/2020    Patient on anticoagulation therapy. Will send to Dr. Gomez for approval to hold Plavix.  Follow up if symptoms worsen or fail to improve.     Patient's Medications   New Prescriptions    SUPREP BOWEL PREP KIT 17.5-3.13-1.6 GRAM SOLR    Take 177 mLs by mouth once daily. for 2 days   Previous Medications    AMOXICILLIN (AMOXIL) 500 MG CAPSULE    Take 2 capsules (1,000 mg total) by mouth every 12 (twelve) hours.    ASCORBIC ACID (VITAMIN C ORAL)    Take 2 capsules by mouth every morning.    ASPIRIN (ECOTRIN) 81 MG EC TABLET    Take 81 mg by mouth once daily.    ATORVASTATIN (LIPITOR) 40 MG TABLET    Take 40 mg by mouth once daily.    CLOPIDOGREL (PLAVIX) 75 MG TABLET    Take 1 tablet (75 mg total) by mouth once daily.     ELDERBERRY FRUIT AND FLOWER 460-115 MG CAP    Take 2 capsules by mouth every morning.    IBUPROFEN (ADVIL,MOTRIN) 400 MG TABLET    Take 400 mg by mouth every 6 (six) hours as needed for Other (pain).    METFORMIN (GLUCOPHAGE-XR) 500 MG XR 24HR TABLET    Take 1,000 mg by mouth daily with breakfast.     PREGABALIN (LYRICA) 100 MG CAPSULE    Take 100 mg by mouth 2 (two) times daily.    VALSARTAN (DIOVAN) 160 MG TABLET    Take 160 mg by mouth once daily.   Modified Medications    No medications on file   Discontinued Medications    OXYCODONE-ACETAMINOPHEN (PERCOCET) 5-325 MG PER TABLET    Take 1 tablet by mouth every 6 (six) hours as needed for Pain.

## 2020-11-30 NOTE — PATIENT INSTRUCTIONS
SUPREP Instructions    You are scheduled for a colonoscopy with Dr. Posada on 1/8/2021 at [facility].  To ensure that your test is accurate and complete, you MUST follow these instructions listed below.  If you have any questions, please call our office at [phone].  Plan on being at the hospital for your procedure for 3-4 hours.    1.  Follow a CLEAR LIQUID DIET for the entire day before your scheduled colonoscopy.  This means no solid food the entire day starting when you wake.  You may have as much of the clear liquids as you want throughout the day.   CLEAR LIQUID DIET:   - Avoid Red, Orange, Purple, and/or Blue food coloring   - NO DAIRY   - You can have:  Coffee with sugar (no creamer), tea, water, soda, apple or white grape juice, chicken or beef broth/bouillon (no meat, noodles, or veggies), green/yellow popsicles, green/yellow Jell-O, lemonade.    2.  AT 5 pm the evening before your colonoscopy, POUR ONE (1) BOTTLE OF SUPREP INTO THE MIXING CONTAINER, PROVIDED INSIDE THE BOX.  ADD WATER TO THE LINE ON THE CONTAINER AND MIX IT WELL.  DRINK THE ENTIRE CONTAINER AND THEN DRINK TWO (2) MORE CONTAINERS OF WATER OVER THE NEXT 1 HOUR.  This is sometimes easier to drink if this solution is cold, so you can mix the solution 20 minutes ahead of time and place in the refrigerator prior to drinking.  You have to drink the solution within 30-45 minutes of mixing it.  Do NOT put this solution over ice.  It IS ok to drink with a straw.    3.  The endoscopy department will call you 1 day before your colonoscopy to tell you the exact time to arrive, AND to tell you the exact time to drink the 2nd portion of your prep (which will be FIVE HOURS BEFORE YOUR ARRIVAL TIME).  At this time given to you, POUR ONE (1) BOTTLE OF SUPREP INTO THE MIXING CONTAINER, PROVIDED INSIDE THE BOX.  ADD WATER TO THE LINE ON THE CONTAINER AND MIX IT WELL.  DRINK THE ENTIRE CONTAINER AND THEN DRINK TWO (2) MORE CONTAINERS OF WATER  OVER THE NEXT 1 HOUR.  This is sometimes easier to drink if this solution is cold, so you can mix the solution 20 minutes ahead of time and place in the refrigerator prior to drinking.  You have to drink the solution within 30-45 minutes of mixing it.  Do NOT put this solution over ice.  It IS ok to drink with a straw.  Once this is complete, you may not have ANYTHING else by mouth!    4.  You must have someone with you to DRIVE YOU HOME since you will be receiving IV sedation for the colonoscopy.    5.  It is ok to take MOST of your REGULAR MEDICATIONS  in the morning of your test with a SIP of water.  THE ONLY MEDS YOU NEED TO HOLD ARE YOUR DIABETES MEDICATIONS,  SOME BLOOD PRESSURE MEDS, AND BLOOD THINNERS IF OK'D BY YOUR DOCTOR.  Do NOT have anything else to eat or drink the morning of your colonoscopy.  It is ok to brush your teeth.    6.  If you are on blood thinners THAT YOU HAVE BEEN INSTRUCTED TO HOLD BY YOUR DOCTOR FOR THIS PROCEDURE, then do NOT take this the morning of your colonoscopy.  Do NOT stop these medications on your own, they must be approved to be held by your doctor.  Your colonoscopy can NOT be done if you are on these medications.  Examples of blood thinners include: Coumadin, Aggrenox, Plavix, Pradaxa, Reapro, Pletal, Xarelto, Ticagrelor, Brilinta, Eliquis, and high dose aspirin (325 mg).  You do not have to stop baby aspirin 81 mg.    7.  IF YOU ARE DIABETIC:  NO INSULIN OR ORAL MEDICATIONS THE MORNING OF THE COLONOSCOPY.  TAKE ONLY HALF THE DOSE OF YOUR INSULIN THE DAY BEFORE THE COLONOSCOPY.  DO NOT TAKE ANY ORAL DIABETIC MEDICATIONS THE DAY BEFORE THE COLONOSCOPY.  IF YOU ARE AN INSULIN DEPENDENT DIABETIC WITH UNSTABLE BLOOD SUGARS, NOTIFY YOUR PRIMARY CARE PHYSICIAN FOR INSTRUCTIONS.

## 2020-12-04 ENCOUNTER — TELEPHONE (OUTPATIENT)
Dept: GASTROENTEROLOGY | Facility: CLINIC | Age: 60
End: 2020-12-04

## 2020-12-04 ENCOUNTER — TELEPHONE (OUTPATIENT)
Dept: VASCULAR SURGERY | Facility: CLINIC | Age: 60
End: 2020-12-04

## 2020-12-04 NOTE — TELEPHONE ENCOUNTER
Patient wanted to know if there is Urologist at this location. Told patient there is Dr. Mitchell in [place]. Patient will consider scheduling an appointment.

## 2020-12-04 NOTE — TELEPHONE ENCOUNTER
----- Message from Kelby Gomez MD sent at 12/4/2020  8:24 AM CST -----  Yes, five days, restart day after biopsy, must stay on aspirin.  Same for colonoscopy.  ----- Message -----  From: Rayna Gordon MA  Sent: 12/3/2020   4:55 PM CST  To: Kelby Gomez MD    Can pt hold plavix for a prostate bx ? Please advise   ----- Message -----  From: Papito Melvin  Sent: 12/3/2020   4:44 PM CST  To: Patricia Blunt from [facility] w/  called to speak w/ someone regarding clearance to have the patient discontinue use of Rx clopidogreL (PLAVIX) 75 mg tablet prior to his prostate biopsy, callback [phone]

## 2020-12-04 NOTE — TELEPHONE ENCOUNTER
----- Message from Desire Sen sent at 12/4/2020 11:52 AM CST -----  Type:  Needs Medical Advice    Who Called:wife  Reason:has questions about colonoscopy   Would the patient rather a call back or a response via [facility]? call  Best Call Back Number: [phone]  Additional Information: none

## 2020-12-07 ENCOUNTER — TELEPHONE (OUTPATIENT)
Dept: GASTROENTEROLOGY | Facility: CLINIC | Age: 60
End: 2020-12-07

## 2020-12-07 NOTE — TELEPHONE ENCOUNTER
----- Message from Rayna Gordon MA sent at 12/7/2020  2:03 PM CST -----  Hover for details  ----- Message from Kelby Gomez MD sent at 12/4/2020  8:24 AM CST -----  Yes, five days, restart day after biopsy, must stay on aspirin.  Same for colonoscopy.

## 2020-12-29 ENCOUNTER — TELEPHONE (OUTPATIENT)
Dept: GASTROENTEROLOGY | Facility: CLINIC | Age: 60
End: 2020-12-29

## 2020-12-29 NOTE — TELEPHONE ENCOUNTER
----- Message from Neema Warner sent at 12/29/2020  3:07 PM CST -----  Type:  Needs Medical Advice    Who Called: pt's wife  Advice Regarding: medication question, for procedure  Would the patient rather a call back or a response via [facility]? call  Best Call Back Number:   Additional Information: n/a

## 2020-12-29 NOTE — TELEPHONE ENCOUNTER
12/29/2020 Spoke with patient wife, wife making sure patient stop plavix 5 days before procedure. 1/3/2020. Vf/ma

## 2020-12-30 ENCOUNTER — OFFICE VISIT (OUTPATIENT)
Dept: UROLOGY | Facility: CLINIC | Age: 60
End: 2020-12-30
Payer: COMMERCIAL

## 2020-12-30 VITALS
RESPIRATION RATE: 19 BRPM | HEIGHT: 70 IN | OXYGEN SATURATION: 98 % | WEIGHT: 255 LBS | BODY MASS INDEX: 36.51 KG/M2 | HEART RATE: 81 BPM | DIASTOLIC BLOOD PRESSURE: 67 MMHG | TEMPERATURE: 99 F | SYSTOLIC BLOOD PRESSURE: 128 MMHG

## 2020-12-30 DIAGNOSIS — R35.1 NOCTURIA: ICD-10-CM

## 2020-12-30 DIAGNOSIS — R97.20 ELEVATED PSA, LESS THAN 10 NG/ML: Primary | ICD-10-CM

## 2020-12-30 PROCEDURE — 99204 OFFICE O/P NEW MOD 45 MIN: CPT | Mod: S$GLB,,, | Performed by: NURSE PRACTITIONER

## 2020-12-30 PROCEDURE — 99204 PR OFFICE/OUTPT VISIT, NEW, LEVL IV, 45-59 MIN: ICD-10-PCS | Mod: S$GLB,,, | Performed by: NURSE PRACTITIONER

## 2020-12-30 PROCEDURE — 81002 URINALYSIS NONAUTO W/O SCOPE: CPT | Mod: PBBFAC,PO | Performed by: NURSE PRACTITIONER

## 2020-12-30 PROCEDURE — 87086 URINE CULTURE/COLONY COUNT: CPT

## 2020-12-30 PROCEDURE — 99999 PR PBB SHADOW E&M-EST. PATIENT-LVL V: ICD-10-PCS | Mod: PBBFAC,,, | Performed by: NURSE PRACTITIONER

## 2020-12-30 PROCEDURE — 87077 CULTURE AEROBIC IDENTIFY: CPT

## 2020-12-30 PROCEDURE — 87088 URINE BACTERIA CULTURE: CPT

## 2020-12-30 PROCEDURE — 99999 PR PBB SHADOW E&M-EST. PATIENT-LVL V: CPT | Mod: PBBFAC,,, | Performed by: NURSE PRACTITIONER

## 2020-12-30 PROCEDURE — 87186 SC STD MICRODIL/AGAR DIL: CPT

## 2020-12-30 RX ORDER — CIPROFLOXACIN 500 MG/1
500 TABLET ORAL EVERY 12 HOURS
Qty: 42 TABLET | Refills: 0 | Status: SHIPPED | OUTPATIENT
Start: 2020-12-30 | End: 2021-01-20

## 2020-12-30 NOTE — PROGRESS NOTES
Subjective:       Patient ID: Renan Britton is a 60 y.o. male.    Chief Complaint: Elevated PSA    Patient is new to me. He is a 61 yo WM who is here today for evaluation of elevated PSA. His PSA was 4.4 ng/mL on 11/17/2020 ([facility]). Patient denies family history of prostate cancer. He is here today with his wife (Patricia). Results scanned under Media.    Other  This is a new (elevated PSA) problem. Episode onset: 11/17/2020. The problem occurs daily. Associated symptoms include urinary symptoms. Pertinent negatives include no abdominal pain, anorexia, change in bowel habit, chills, fatigue, fever, headaches, nausea, swollen glands, vomiting or weakness. Nothing aggravates the symptoms. He has tried nothing for the symptoms.     Review of Systems   Constitutional: Negative for appetite change, chills, fatigue and fever.   HENT: Negative.    Respiratory: Negative.    Cardiovascular: Negative.    Gastrointestinal: Negative for abdominal pain, anorexia, change in bowel habit, constipation, diarrhea, nausea, vomiting and change in bowel habit.   Genitourinary: Negative for decreased urine volume, difficulty urinating, discharge, dysuria, flank pain, frequency, hematuria, penile pain, penile swelling, scrotal swelling, testicular pain and urgency.        Nocturia x0-1   Musculoskeletal: Positive for back pain (chronic lower back pain).   Neurological: Negative for dizziness, weakness and headaches.   Psychiatric/Behavioral: Negative.          Objective:      Physical Exam  Vitals signs and nursing note reviewed.   Constitutional:       General: He is not in acute distress.     Appearance: He is well-developed. He is obese. He is not ill-appearing.   HENT:      Head: Normocephalic and atraumatic.   Eyes:      Pupils: Pupils are equal, round, and reactive to light.   Neck:      Musculoskeletal: Normal range of motion.   Cardiovascular:      Rate and Rhythm: Normal rate.   Pulmonary:      Effort: Pulmonary effort is  normal. No respiratory distress.   Abdominal:      Palpations: Abdomen is soft.      Tenderness: There is no abdominal tenderness.   Genitourinary:     Comments: BARBRA performed, but difficult an unable to palpate patient's prostate due to his anatomy and tenseness at this time.  Musculoskeletal: Normal range of motion.   Skin:     General: Skin is warm and dry.   Neurological:      Mental Status: He is alert and oriented to person, place, and time.      Coordination: Coordination normal.   Psychiatric:         Mood and Affect: Mood normal.         Behavior: Behavior normal.         Thought Content: Thought content normal.         Judgment: Judgment normal.         Assessment:       1. Elevated PSA, less than 10 ng/ml    2. Nocturia        Plan:       Renan was seen today for elevated psa.    Diagnoses and all orders for this visit:    Elevated PSA, less than 10 ng/ml  -     ciprofloxacin HCl (CIPRO) 500 MG tablet; Take 1 tablet (500 mg total) by mouth every 12 (twelve) hours. for 21 days  -     POCT URINE DIPSTICK WITHOUT MICROSCOPE  -     Urine culture  -     PSA, Total and Free; Future    Nocturia    Other orders  1. PSA, total and free in 4 weeks (no ejaculation or bike riding for 72 hours prior to repeat lab).   2. BARBRA today.    Follow-up in 4 weeks.    Antonette Sierra NP

## 2020-12-30 NOTE — PATIENT INSTRUCTIONS
1. Urine dipstick  2. Urine cx  3. PSA, total and free in 4 weeks (no ejaculation or bike riding for 72 hours prior to repeat lab).   4. BARBRA today.  5. Follow-up in 4 weeks.

## 2020-12-31 LAB
BILIRUB SERPL-MCNC: NORMAL MG/DL
BLOOD URINE, POC: NORMAL
CLARITY, POC UA: CLEAR
COLOR, POC UA: YELLOW
GLUCOSE UR QL STRIP: NORMAL
KETONES UR QL STRIP: NORMAL
LEUKOCYTE ESTERASE URINE, POC: NORMAL
NITRITE, POC UA: NORMAL
PH, POC UA: 5
PROTEIN, POC: NORMAL
SPECIFIC GRAVITY, POC UA: 1.03
UROBILINOGEN, POC UA: 0.2

## 2021-01-02 LAB — BACTERIA UR CULT: ABNORMAL

## 2021-01-03 DIAGNOSIS — N30.00 ACUTE CYSTITIS WITHOUT HEMATURIA: Primary | ICD-10-CM

## 2021-01-04 ENCOUNTER — PATIENT MESSAGE (OUTPATIENT)
Dept: INFECTIOUS DISEASES | Facility: CLINIC | Age: 61
End: 2021-01-04

## 2021-01-08 PROBLEM — Z12.11 SCREENING FOR MALIGNANT NEOPLASM OF COLON: Status: RESOLVED | Noted: 2020-11-30 | Resolved: 2021-01-08

## 2021-01-08 PROBLEM — Z12.11 SCREEN FOR COLON CANCER: Status: RESOLVED | Noted: 2021-01-08 | Resolved: 2021-01-08

## 2021-01-08 PROBLEM — Z86.0100 PERSONAL HISTORY OF COLONIC POLYPS: Status: ACTIVE | Noted: 2021-01-08

## 2021-01-08 PROBLEM — Z86.010 PERSONAL HISTORY OF COLONIC POLYPS: Status: ACTIVE | Noted: 2021-01-08

## 2021-01-08 PROBLEM — Z12.11 SCREEN FOR COLON CANCER: Status: ACTIVE | Noted: 2021-01-08

## 2021-01-10 ENCOUNTER — PATIENT MESSAGE (OUTPATIENT)
Dept: GASTROENTEROLOGY | Facility: CLINIC | Age: 61
End: 2021-01-10

## 2021-01-14 ENCOUNTER — TELEPHONE (OUTPATIENT)
Dept: GASTROENTEROLOGY | Facility: CLINIC | Age: 61
End: 2021-01-14

## 2021-01-25 ENCOUNTER — PATIENT MESSAGE (OUTPATIENT)
Dept: GASTROENTEROLOGY | Facility: CLINIC | Age: 61
End: 2021-01-25

## 2021-01-28 ENCOUNTER — OFFICE VISIT (OUTPATIENT)
Dept: UROLOGY | Facility: CLINIC | Age: 61
End: 2021-01-28
Payer: COMMERCIAL

## 2021-01-28 VITALS
HEART RATE: 67 BPM | BODY MASS INDEX: 35.02 KG/M2 | OXYGEN SATURATION: 98 % | WEIGHT: 250.19 LBS | TEMPERATURE: 98 F | SYSTOLIC BLOOD PRESSURE: 130 MMHG | DIASTOLIC BLOOD PRESSURE: 70 MMHG | RESPIRATION RATE: 18 BRPM | HEIGHT: 71 IN

## 2021-01-28 DIAGNOSIS — N30.00 ACUTE CYSTITIS WITHOUT HEMATURIA: ICD-10-CM

## 2021-01-28 DIAGNOSIS — Z09 FOLLOW-UP EXAM AFTER TREATMENT: ICD-10-CM

## 2021-01-28 DIAGNOSIS — R97.20 ELEVATED PSA, LESS THAN 10 NG/ML: Primary | ICD-10-CM

## 2021-01-28 PROCEDURE — 99213 OFFICE O/P EST LOW 20 MIN: CPT | Mod: S$GLB,,, | Performed by: NURSE PRACTITIONER

## 2021-01-28 PROCEDURE — 99999 PR PBB SHADOW E&M-EST. PATIENT-LVL V: ICD-10-PCS | Mod: PBBFAC,,, | Performed by: NURSE PRACTITIONER

## 2021-01-28 PROCEDURE — 99999 PR PBB SHADOW E&M-EST. PATIENT-LVL V: CPT | Mod: PBBFAC,,, | Performed by: NURSE PRACTITIONER

## 2021-01-28 PROCEDURE — 99213 PR OFFICE/OUTPT VISIT, EST, LEVL III, 20-29 MIN: ICD-10-PCS | Mod: S$GLB,,, | Performed by: NURSE PRACTITIONER

## 2021-03-04 RX ORDER — ATORVASTATIN CALCIUM 40 MG/1
40 TABLET, FILM COATED ORAL DAILY
Qty: 30 TABLET | Refills: 5 | Status: SHIPPED | OUTPATIENT
Start: 2021-03-04 | End: 2021-09-17 | Stop reason: SDUPTHER

## 2021-03-18 RX ORDER — CLOPIDOGREL BISULFATE 75 MG/1
75 TABLET ORAL DAILY
Qty: 30 TABLET | Refills: 10 | Status: SHIPPED | OUTPATIENT
Start: 2021-03-18 | End: 2022-03-16

## 2021-06-10 NOTE — PROGRESS NOTES
Assessment per sgna guidelines Please forward this important TCC information to your provider in order to maximize the post discharge care delivery of this patient.    C3 nurse spoke with Renan Britton  for a TCC post hospital discharge follow up call. The patient does not have a scheduled HOSFU appointment with Eren Becker MD within 7-14 days post hospital discharge date 08/18/2020. C3 nurse was unable to schedule HOSFU appointment in [facility].  Please contact pcp and schedule follow up appointment using HOSFU visit type on or before 09/01/2020    Respectfully,  Vale Longo LPN    [facility]    [email or web address]       Please do not reply to this message, as this inbox is not routinely monitored.

## 2021-08-24 ENCOUNTER — PATIENT MESSAGE (OUTPATIENT)
Dept: UROLOGY | Facility: CLINIC | Age: 61
End: 2021-08-24

## 2021-08-24 ENCOUNTER — PATIENT MESSAGE (OUTPATIENT)
Dept: INFECTIOUS DISEASES | Facility: CLINIC | Age: 61
End: 2021-08-24

## 2021-08-24 DIAGNOSIS — I77.6 VASCULAR INFLAMMATION OR INFECTION: Primary | ICD-10-CM

## 2021-08-24 DIAGNOSIS — Z79.2 CHRONIC ANTIBIOTIC SUPPRESSION: ICD-10-CM

## 2021-08-25 ENCOUNTER — TELEPHONE (OUTPATIENT)
Dept: UROLOGY | Facility: CLINIC | Age: 61
End: 2021-08-25

## 2021-08-25 ENCOUNTER — OFFICE VISIT (OUTPATIENT)
Dept: UROLOGY | Facility: CLINIC | Age: 61
End: 2021-08-25
Payer: COMMERCIAL

## 2021-08-25 VITALS
WEIGHT: 263 LBS | HEIGHT: 71 IN | TEMPERATURE: 98 F | DIASTOLIC BLOOD PRESSURE: 84 MMHG | HEART RATE: 69 BPM | OXYGEN SATURATION: 98 % | SYSTOLIC BLOOD PRESSURE: 134 MMHG | BODY MASS INDEX: 36.82 KG/M2

## 2021-08-25 DIAGNOSIS — R35.1 NOCTURIA: ICD-10-CM

## 2021-08-25 DIAGNOSIS — R97.20 ELEVATED PSA, LESS THAN 10 NG/ML: Primary | ICD-10-CM

## 2021-08-25 LAB
BILIRUB UR QL STRIP: NEGATIVE
CLARITY UR REFRACT.AUTO: CLEAR
COLOR UR AUTO: YELLOW
GLUCOSE UR QL STRIP: NEGATIVE
HGB UR QL STRIP: NEGATIVE
KETONES UR QL STRIP: NEGATIVE
LEUKOCYTE ESTERASE UR QL STRIP: NEGATIVE
MICROSCOPIC COMMENT: NORMAL
NITRITE UR QL STRIP: NEGATIVE
PH UR STRIP: 7 [PH] (ref 5–8)
PROT UR QL STRIP: NEGATIVE
RBC #/AREA URNS AUTO: 0 /HPF (ref 0–4)
SP GR UR STRIP: 1.02 (ref 1–1.03)
SQUAMOUS #/AREA URNS AUTO: 0 /HPF
URN SPEC COLLECT METH UR: NORMAL
WBC #/AREA URNS AUTO: 1 /HPF (ref 0–5)

## 2021-08-25 PROCEDURE — 87186 SC STD MICRODIL/AGAR DIL: CPT | Performed by: NURSE PRACTITIONER

## 2021-08-25 PROCEDURE — 81001 URINALYSIS AUTO W/SCOPE: CPT | Performed by: NURSE PRACTITIONER

## 2021-08-25 PROCEDURE — 99214 PR OFFICE/OUTPT VISIT, EST, LEVL IV, 30-39 MIN: ICD-10-PCS | Mod: S$GLB,,, | Performed by: NURSE PRACTITIONER

## 2021-08-25 PROCEDURE — 87086 URINE CULTURE/COLONY COUNT: CPT | Performed by: NURSE PRACTITIONER

## 2021-08-25 PROCEDURE — 99999 PR PBB SHADOW E&M-EST. PATIENT-LVL IV: ICD-10-PCS | Mod: PBBFAC,,, | Performed by: NURSE PRACTITIONER

## 2021-08-25 PROCEDURE — 87088 URINE BACTERIA CULTURE: CPT | Performed by: NURSE PRACTITIONER

## 2021-08-25 PROCEDURE — 99999 PR PBB SHADOW E&M-EST. PATIENT-LVL IV: CPT | Mod: PBBFAC,,, | Performed by: NURSE PRACTITIONER

## 2021-08-25 PROCEDURE — 87077 CULTURE AEROBIC IDENTIFY: CPT | Performed by: NURSE PRACTITIONER

## 2021-08-25 PROCEDURE — 99214 OFFICE O/P EST MOD 30 MIN: CPT | Mod: S$GLB,,, | Performed by: NURSE PRACTITIONER

## 2021-08-26 ENCOUNTER — TELEPHONE (OUTPATIENT)
Dept: UROLOGY | Facility: CLINIC | Age: 61
End: 2021-08-26

## 2021-08-26 ENCOUNTER — PATIENT MESSAGE (OUTPATIENT)
Dept: UROLOGY | Facility: CLINIC | Age: 61
End: 2021-08-26

## 2021-08-26 DIAGNOSIS — Z01.818 PRE-OP TESTING: Primary | ICD-10-CM

## 2021-08-27 ENCOUNTER — PATIENT MESSAGE (OUTPATIENT)
Dept: UROLOGY | Facility: CLINIC | Age: 61
End: 2021-08-27

## 2021-08-27 ENCOUNTER — PATIENT MESSAGE (OUTPATIENT)
Dept: INFECTIOUS DISEASES | Facility: CLINIC | Age: 61
End: 2021-08-27

## 2021-08-27 ENCOUNTER — TELEPHONE (OUTPATIENT)
Dept: INFECTIOUS DISEASES | Facility: CLINIC | Age: 61
End: 2021-08-27

## 2021-08-28 LAB — BACTERIA UR CULT: ABNORMAL

## 2021-09-03 ENCOUNTER — PATIENT MESSAGE (OUTPATIENT)
Dept: UROLOGY | Facility: CLINIC | Age: 61
End: 2021-09-03

## 2021-09-08 ENCOUNTER — PATIENT MESSAGE (OUTPATIENT)
Dept: UROLOGY | Facility: CLINIC | Age: 61
End: 2021-09-08

## 2021-09-08 ENCOUNTER — PATIENT MESSAGE (OUTPATIENT)
Dept: INFECTIOUS DISEASES | Facility: CLINIC | Age: 61
End: 2021-09-08

## 2021-09-17 ENCOUNTER — PATIENT MESSAGE (OUTPATIENT)
Dept: INFECTIOUS DISEASES | Facility: CLINIC | Age: 61
End: 2021-09-17

## 2021-09-18 RX ORDER — ATORVASTATIN CALCIUM 40 MG/1
40 TABLET, FILM COATED ORAL DAILY
Qty: 30 TABLET | Refills: 5 | Status: SHIPPED | OUTPATIENT
Start: 2021-09-18 | End: 2024-03-19 | Stop reason: SDUPTHER

## 2021-09-20 ENCOUNTER — LAB VISIT (OUTPATIENT)
Dept: LAB | Facility: HOSPITAL | Age: 61
End: 2021-09-20
Attending: NURSE PRACTITIONER
Payer: COMMERCIAL

## 2021-09-20 DIAGNOSIS — Z01.818 PRE-OP TESTING: ICD-10-CM

## 2021-09-20 DIAGNOSIS — Z79.2 CHRONIC ANTIBIOTIC SUPPRESSION: ICD-10-CM

## 2021-09-20 DIAGNOSIS — I77.6 VASCULAR INFLAMMATION OR INFECTION: ICD-10-CM

## 2021-09-20 LAB
ALBUMIN SERPL BCP-MCNC: 3.9 G/DL (ref 3.5–5.2)
ALBUMIN SERPL BCP-MCNC: 3.9 G/DL (ref 3.5–5.2)
ALP SERPL-CCNC: 45 U/L (ref 55–135)
ALP SERPL-CCNC: 45 U/L (ref 55–135)
ALT SERPL W/O P-5'-P-CCNC: 43 U/L (ref 10–44)
ALT SERPL W/O P-5'-P-CCNC: 43 U/L (ref 10–44)
ANION GAP SERPL CALC-SCNC: 9 MMOL/L (ref 8–16)
AST SERPL-CCNC: 35 U/L (ref 10–40)
AST SERPL-CCNC: 35 U/L (ref 10–40)
BASOPHILS # BLD AUTO: 0.04 K/UL (ref 0–0.2)
BASOPHILS NFR BLD: 0.6 % (ref 0–1.9)
BILIRUB SERPL-MCNC: 0.6 MG/DL (ref 0.1–1)
BILIRUB SERPL-MCNC: 0.6 MG/DL (ref 0.1–1)
BILIRUB UR QL STRIP: NEGATIVE
BUN SERPL-MCNC: 14 MG/DL (ref 6–20)
CALCIUM SERPL-MCNC: 9.5 MG/DL (ref 8.7–10.5)
CHLORIDE SERPL-SCNC: 106 MMOL/L (ref 95–110)
CLARITY UR: CLEAR
CO2 SERPL-SCNC: 24 MMOL/L (ref 23–29)
COLOR UR: YELLOW
CREAT SERPL-MCNC: 0.9 MG/DL (ref 0.5–1.4)
DIFFERENTIAL METHOD: NORMAL
EOSINOPHIL # BLD AUTO: 0.2 K/UL (ref 0–0.5)
EOSINOPHIL NFR BLD: 3.2 % (ref 0–8)
ERYTHROCYTE [DISTWIDTH] IN BLOOD BY AUTOMATED COUNT: 13.7 % (ref 11.5–14.5)
EST. GFR  (AFRICAN AMERICAN): >60 ML/MIN/1.73 M^2
EST. GFR  (NON AFRICAN AMERICAN): >60 ML/MIN/1.73 M^2
GLUCOSE SERPL-MCNC: 99 MG/DL (ref 70–110)
GLUCOSE UR QL STRIP: NEGATIVE
HCT VFR BLD AUTO: 46.2 % (ref 40–54)
HGB BLD-MCNC: 15.6 G/DL (ref 14–18)
HGB UR QL STRIP: NEGATIVE
IMM GRANULOCYTES # BLD AUTO: 0.01 K/UL (ref 0–0.04)
IMM GRANULOCYTES NFR BLD AUTO: 0.2 % (ref 0–0.5)
KETONES UR QL STRIP: NEGATIVE
LEUKOCYTE ESTERASE UR QL STRIP: NEGATIVE
LYMPHOCYTES # BLD AUTO: 1.7 K/UL (ref 1–4.8)
LYMPHOCYTES NFR BLD: 26.7 % (ref 18–48)
MCH RBC QN AUTO: 30.5 PG (ref 27–31)
MCHC RBC AUTO-ENTMCNC: 33.8 G/DL (ref 32–36)
MCV RBC AUTO: 90 FL (ref 82–98)
MONOCYTES # BLD AUTO: 0.6 K/UL (ref 0.3–1)
MONOCYTES NFR BLD: 9.6 % (ref 4–15)
NEUTROPHILS # BLD AUTO: 3.7 K/UL (ref 1.8–7.7)
NEUTROPHILS NFR BLD: 59.7 % (ref 38–73)
NITRITE UR QL STRIP: NEGATIVE
NRBC BLD-RTO: 0 /100 WBC
PH UR STRIP: 6 [PH] (ref 5–8)
PLATELET # BLD AUTO: 154 K/UL (ref 150–450)
PMV BLD AUTO: 10.4 FL (ref 9.2–12.9)
POTASSIUM SERPL-SCNC: 3.9 MMOL/L (ref 3.5–5.1)
PROT SERPL-MCNC: 7.5 G/DL (ref 6–8.4)
PROT SERPL-MCNC: 7.5 G/DL (ref 6–8.4)
PROT UR QL STRIP: NEGATIVE
RBC # BLD AUTO: 5.12 M/UL (ref 4.6–6.2)
SODIUM SERPL-SCNC: 139 MMOL/L (ref 136–145)
SP GR UR STRIP: >=1.03 (ref 1–1.03)
URN SPEC COLLECT METH UR: ABNORMAL
UROBILINOGEN UR STRIP-ACNC: NEGATIVE EU/DL
WBC # BLD AUTO: 6.26 K/UL (ref 3.9–12.7)

## 2021-09-20 PROCEDURE — 80053 COMPREHEN METABOLIC PANEL: CPT | Performed by: INTERNAL MEDICINE

## 2021-09-20 PROCEDURE — 81003 URINALYSIS AUTO W/O SCOPE: CPT | Performed by: NURSE PRACTITIONER

## 2021-09-20 PROCEDURE — 85025 COMPLETE CBC W/AUTO DIFF WBC: CPT | Performed by: INTERNAL MEDICINE

## 2021-09-20 PROCEDURE — 87086 URINE CULTURE/COLONY COUNT: CPT | Performed by: NURSE PRACTITIONER

## 2021-09-20 PROCEDURE — 36415 COLL VENOUS BLD VENIPUNCTURE: CPT | Performed by: INTERNAL MEDICINE

## 2021-09-21 ENCOUNTER — TELEPHONE (OUTPATIENT)
Dept: UROLOGY | Facility: CLINIC | Age: 61
End: 2021-09-21

## 2021-09-22 ENCOUNTER — PATIENT MESSAGE (OUTPATIENT)
Dept: VASCULAR SURGERY | Facility: CLINIC | Age: 61
End: 2021-09-22

## 2021-09-22 ENCOUNTER — PATIENT MESSAGE (OUTPATIENT)
Dept: UROLOGY | Facility: CLINIC | Age: 61
End: 2021-09-22

## 2021-09-22 ENCOUNTER — TELEPHONE (OUTPATIENT)
Dept: VASCULAR SURGERY | Facility: CLINIC | Age: 61
End: 2021-09-22

## 2021-09-23 LAB — BACTERIA UR CULT: NO GROWTH

## 2021-09-28 ENCOUNTER — PATIENT MESSAGE (OUTPATIENT)
Dept: UROLOGY | Facility: CLINIC | Age: 61
End: 2021-09-28

## 2021-09-29 DIAGNOSIS — R97.20 ELEVATED PSA, LESS THAN 10 NG/ML: Primary | ICD-10-CM

## 2021-09-29 DIAGNOSIS — R97.20 ELEVATED PSA: ICD-10-CM

## 2021-09-29 RX ORDER — SODIUM CHLORIDE 9 MG/ML
INJECTION, SOLUTION INTRAVENOUS CONTINUOUS
Status: CANCELLED | OUTPATIENT
Start: 2021-09-29

## 2021-09-30 ENCOUNTER — DOCUMENTATION ONLY (OUTPATIENT)
Dept: INFECTIOUS DISEASES | Facility: HOSPITAL | Age: 61
End: 2021-09-30

## 2021-09-30 ENCOUNTER — PATIENT MESSAGE (OUTPATIENT)
Dept: INFECTIOUS DISEASES | Facility: CLINIC | Age: 61
End: 2021-09-30

## 2021-09-30 DIAGNOSIS — I77.6 VASCULAR INFLAMMATION OR INFECTION: Primary | ICD-10-CM

## 2021-09-30 DIAGNOSIS — Z79.2 CHRONIC ANTIBIOTIC SUPPRESSION: ICD-10-CM

## 2021-09-30 DIAGNOSIS — A49.8 ENTEROCOCCUS FAECALIS INFECTION: ICD-10-CM

## 2021-09-30 RX ORDER — AMOXICILLIN 500 MG/1
1000 TABLET, FILM COATED ORAL EVERY 12 HOURS
COMMUNITY
End: 2021-10-01 | Stop reason: SDUPTHER

## 2021-10-01 RX ORDER — AMOXICILLIN 500 MG/1
1000 TABLET, FILM COATED ORAL EVERY 12 HOURS
Qty: 120 TABLET | Refills: 11 | Status: SHIPPED | OUTPATIENT
Start: 2021-10-01 | End: 2021-10-20 | Stop reason: SDUPTHER

## 2021-10-04 PROBLEM — R97.20 ELEVATED PSA: Status: ACTIVE | Noted: 2021-10-04

## 2021-10-07 ENCOUNTER — TELEPHONE (OUTPATIENT)
Dept: UROLOGY | Facility: CLINIC | Age: 61
End: 2021-10-07

## 2021-10-12 ENCOUNTER — TELEPHONE (OUTPATIENT)
Dept: UROLOGY | Facility: CLINIC | Age: 61
End: 2021-10-12

## 2021-10-12 DIAGNOSIS — C68.9 MALIGNANT NEOPLASM OF URINARY ORGAN, UNSPECIFIED: ICD-10-CM

## 2021-10-12 DIAGNOSIS — C61 MALIGNANT NEOPLASM OF PROSTATE: ICD-10-CM

## 2021-10-18 ENCOUNTER — PATIENT MESSAGE (OUTPATIENT)
Dept: INFECTIOUS DISEASES | Facility: CLINIC | Age: 61
End: 2021-10-18
Payer: COMMERCIAL

## 2021-10-20 ENCOUNTER — TELEPHONE (OUTPATIENT)
Dept: INFECTIOUS DISEASES | Facility: CLINIC | Age: 61
End: 2021-10-20

## 2021-10-20 DIAGNOSIS — Z79.2 CHRONIC ANTIBIOTIC SUPPRESSION: ICD-10-CM

## 2021-10-20 DIAGNOSIS — A49.8 ENTEROCOCCUS FAECALIS INFECTION: ICD-10-CM

## 2021-10-20 DIAGNOSIS — I77.6 VASCULAR INFLAMMATION OR INFECTION: ICD-10-CM

## 2021-10-20 RX ORDER — AMOXICILLIN 500 MG/1
1000 TABLET, FILM COATED ORAL EVERY 12 HOURS
Qty: 120 TABLET | Refills: 11 | Status: SHIPPED | OUTPATIENT
Start: 2021-10-20 | End: 2021-12-21 | Stop reason: SDUPTHER

## 2021-10-25 ENCOUNTER — HOSPITAL ENCOUNTER (OUTPATIENT)
Dept: RADIOLOGY | Facility: HOSPITAL | Age: 61
Discharge: HOME | End: 2021-10-25
Attending: UROLOGY
Payer: COMMERCIAL

## 2021-10-25 DIAGNOSIS — C61 MALIGNANT NEOPLASM OF PROSTATE: ICD-10-CM

## 2021-10-25 DIAGNOSIS — C68.9 MALIGNANT NEOPLASM OF URINARY ORGAN, UNSPECIFIED: ICD-10-CM

## 2021-10-25 PROCEDURE — 74178 CT ABD&PLV WO CNTR FLWD CNTR: CPT | Mod: TC

## 2021-10-25 PROCEDURE — 78306 BONE IMAGING WHOLE BODY: CPT | Mod: 26,,, | Performed by: RADIOLOGY

## 2021-10-25 PROCEDURE — 74178 CT ABD&PLV WO CNTR FLWD CNTR: CPT | Mod: 26,,, | Performed by: RADIOLOGY

## 2021-10-25 PROCEDURE — 25500020 PHARM REV CODE 255: Performed by: UROLOGY

## 2021-10-25 PROCEDURE — A9503 TC99M MEDRONATE: HCPCS

## 2021-10-25 PROCEDURE — 74178 CT ABDOMEN PELVIS W WO CONTRAST: ICD-10-PCS | Mod: 26,,, | Performed by: RADIOLOGY

## 2021-10-25 PROCEDURE — 78306 BONE IMAGING WHOLE BODY: CPT | Mod: TC

## 2021-10-25 PROCEDURE — A9698 NON-RAD CONTRAST MATERIALNOC: HCPCS | Performed by: UROLOGY

## 2021-10-25 PROCEDURE — 78306 NM BONE SCAN WHOLE BODY: ICD-10-PCS | Mod: 26,,, | Performed by: RADIOLOGY

## 2021-10-25 RX ADMIN — IOHEXOL 1000 ML: 12 SOLUTION ORAL at 10:10

## 2021-10-25 RX ADMIN — IOHEXOL 100 ML: 350 INJECTION, SOLUTION INTRAVENOUS at 11:10

## 2021-10-26 ENCOUNTER — OFFICE VISIT (OUTPATIENT)
Dept: UROLOGY | Facility: CLINIC | Age: 61
End: 2021-10-26
Payer: COMMERCIAL

## 2021-10-26 VITALS
OXYGEN SATURATION: 98 % | HEIGHT: 70 IN | HEART RATE: 68 BPM | RESPIRATION RATE: 14 BRPM | WEIGHT: 251 LBS | BODY MASS INDEX: 35.93 KG/M2 | TEMPERATURE: 98 F | SYSTOLIC BLOOD PRESSURE: 125 MMHG | DIASTOLIC BLOOD PRESSURE: 70 MMHG

## 2021-10-26 DIAGNOSIS — C61 MALIGNANT NEOPLASM OF PROSTATE: Primary | ICD-10-CM

## 2021-10-26 PROCEDURE — 99215 PR OFFICE/OUTPT VISIT, EST, LEVL V, 40-54 MIN: ICD-10-PCS | Mod: S$GLB,,, | Performed by: UROLOGY

## 2021-10-26 PROCEDURE — 99999 PR PBB SHADOW E&M-EST. PATIENT-LVL V: CPT | Mod: PBBFAC,,, | Performed by: UROLOGY

## 2021-10-26 PROCEDURE — 99215 OFFICE O/P EST HI 40 MIN: CPT | Mod: S$GLB,,, | Performed by: UROLOGY

## 2021-10-26 PROCEDURE — 99999 PR PBB SHADOW E&M-EST. PATIENT-LVL V: ICD-10-PCS | Mod: PBBFAC,,, | Performed by: UROLOGY

## 2021-10-26 RX ORDER — CIPROFLOXACIN 2 MG/ML
400 INJECTION, SOLUTION INTRAVENOUS
Status: CANCELLED | OUTPATIENT
Start: 2021-10-26

## 2021-10-26 RX ORDER — NEOMYCIN SULFATE 500 MG/1
1000 TABLET ORAL 2 TIMES DAILY
Qty: 4 TABLET | Refills: 0 | Status: SHIPPED | OUTPATIENT
Start: 2021-10-26 | End: 2021-11-17

## 2021-10-26 RX ORDER — ERYTHROMYCIN 500 MG/1
1000 TABLET, COATED ORAL 2 TIMES DAILY
Qty: 4 TABLET | Refills: 0 | Status: SHIPPED | OUTPATIENT
Start: 2021-10-26 | End: 2021-11-17

## 2021-10-26 RX ORDER — SODIUM CHLORIDE 9 MG/ML
INJECTION, SOLUTION INTRAVENOUS CONTINUOUS
Status: CANCELLED | OUTPATIENT
Start: 2021-10-26

## 2021-10-28 ENCOUNTER — PATIENT MESSAGE (OUTPATIENT)
Dept: GASTROENTEROLOGY | Facility: CLINIC | Age: 61
End: 2021-10-28
Payer: COMMERCIAL

## 2021-11-02 ENCOUNTER — TELEPHONE (OUTPATIENT)
Dept: GASTROENTEROLOGY | Facility: CLINIC | Age: 61
End: 2021-11-02
Payer: COMMERCIAL

## 2021-11-02 DIAGNOSIS — Z86.010 PERSONAL HISTORY OF COLONIC POLYPS: Primary | ICD-10-CM

## 2021-11-02 RX ORDER — SODIUM PICOSULFATE, MAGNESIUM OXIDE, AND ANHYDROUS CITRIC ACID 10; 3.5; 12 MG/160ML; G/160ML; G/160ML
LIQUID ORAL
Qty: 1 EACH | Refills: 0 | Status: CANCELLED | OUTPATIENT
Start: 2021-11-02

## 2021-11-03 RX ORDER — SODIUM, POTASSIUM,MAG SULFATES 17.5-3.13G
1 SOLUTION, RECONSTITUTED, ORAL ORAL DAILY
Qty: 1 KIT | Refills: 0 | Status: SHIPPED | OUTPATIENT
Start: 2021-11-03 | End: 2021-11-05

## 2021-11-17 ENCOUNTER — TELEPHONE (OUTPATIENT)
Dept: UROLOGY | Facility: CLINIC | Age: 61
End: 2021-11-17
Payer: COMMERCIAL

## 2021-11-17 RX ORDER — NEOMYCIN SULFATE 500 MG/1
1000 TABLET ORAL 2 TIMES DAILY
Qty: 4 TABLET | Refills: 0 | Status: SHIPPED | OUTPATIENT
Start: 2021-11-17 | End: 2021-11-19

## 2021-11-17 RX ORDER — ERYTHROMYCIN 250 MG/1
1000 TABLET, COATED ORAL 2 TIMES DAILY
Qty: 8 TABLET | Refills: 0 | Status: SHIPPED | OUTPATIENT
Start: 2021-11-17 | End: 2021-11-19

## 2021-11-26 ENCOUNTER — TELEPHONE (OUTPATIENT)
Dept: GASTROENTEROLOGY | Facility: CLINIC | Age: 61
End: 2021-11-26
Payer: COMMERCIAL

## 2021-12-06 ENCOUNTER — TELEPHONE (OUTPATIENT)
Dept: UROLOGY | Facility: CLINIC | Age: 61
End: 2021-12-06
Payer: COMMERCIAL

## 2021-12-13 PROBLEM — C61 MALIGNANT NEOPLASM OF PROSTATE: Status: ACTIVE | Noted: 2021-12-13

## 2021-12-17 ENCOUNTER — PATIENT MESSAGE (OUTPATIENT)
Dept: UROLOGY | Facility: CLINIC | Age: 61
End: 2021-12-17
Payer: COMMERCIAL

## 2021-12-21 ENCOUNTER — OFFICE VISIT (OUTPATIENT)
Dept: UROLOGY | Facility: CLINIC | Age: 61
End: 2021-12-21
Payer: COMMERCIAL

## 2021-12-21 ENCOUNTER — PATIENT MESSAGE (OUTPATIENT)
Dept: INFECTIOUS DISEASES | Facility: CLINIC | Age: 61
End: 2021-12-21
Payer: COMMERCIAL

## 2021-12-21 VITALS
HEIGHT: 70 IN | BODY MASS INDEX: 35.22 KG/M2 | TEMPERATURE: 99 F | HEART RATE: 86 BPM | OXYGEN SATURATION: 97 % | SYSTOLIC BLOOD PRESSURE: 114 MMHG | WEIGHT: 246 LBS | RESPIRATION RATE: 20 BRPM | DIASTOLIC BLOOD PRESSURE: 64 MMHG

## 2021-12-21 DIAGNOSIS — A49.8 ENTEROCOCCUS FAECALIS INFECTION: ICD-10-CM

## 2021-12-21 DIAGNOSIS — Z98.890 POSTOPERATIVE STATE: ICD-10-CM

## 2021-12-21 DIAGNOSIS — Z79.2 CHRONIC ANTIBIOTIC SUPPRESSION: ICD-10-CM

## 2021-12-21 DIAGNOSIS — I77.6 VASCULAR INFLAMMATION OR INFECTION: ICD-10-CM

## 2021-12-21 PROCEDURE — 99024 PR POST-OP FOLLOW-UP VISIT: ICD-10-PCS | Mod: S$GLB,,, | Performed by: UROLOGY

## 2021-12-21 PROCEDURE — 99024 POSTOP FOLLOW-UP VISIT: CPT | Mod: S$GLB,,, | Performed by: UROLOGY

## 2021-12-21 PROCEDURE — 99999 PR PBB SHADOW E&M-EST. PATIENT-LVL I: CPT | Mod: PBBFAC,,, | Performed by: UROLOGY

## 2021-12-21 PROCEDURE — 99999 PR PBB SHADOW E&M-EST. PATIENT-LVL I: ICD-10-PCS | Mod: PBBFAC,,, | Performed by: UROLOGY

## 2021-12-22 RX ORDER — AMOXICILLIN 500 MG/1
1000 TABLET, FILM COATED ORAL EVERY 12 HOURS
Qty: 120 TABLET | Refills: 11 | Status: SHIPPED | OUTPATIENT
Start: 2021-12-22 | End: 2022-12-05

## 2021-12-28 ENCOUNTER — OFFICE VISIT (OUTPATIENT)
Dept: UROLOGY | Facility: CLINIC | Age: 61
End: 2021-12-28
Payer: COMMERCIAL

## 2021-12-28 VITALS
WEIGHT: 245 LBS | HEART RATE: 87 BPM | TEMPERATURE: 99 F | HEIGHT: 70 IN | DIASTOLIC BLOOD PRESSURE: 72 MMHG | SYSTOLIC BLOOD PRESSURE: 120 MMHG | BODY MASS INDEX: 35.07 KG/M2 | RESPIRATION RATE: 16 BRPM | OXYGEN SATURATION: 98 %

## 2021-12-28 DIAGNOSIS — Z90.79 STATUS POST PROSTATECTOMY: Primary | ICD-10-CM

## 2021-12-28 DIAGNOSIS — I77.6 VASCULAR INFLAMMATION OR INFECTION: ICD-10-CM

## 2021-12-28 DIAGNOSIS — A49.8 ENTEROCOCCUS FAECALIS INFECTION: ICD-10-CM

## 2021-12-28 DIAGNOSIS — Z79.2 CHRONIC ANTIBIOTIC SUPPRESSION: ICD-10-CM

## 2021-12-28 PROCEDURE — 99499 NO LOS: ICD-10-PCS | Mod: S$GLB,,, | Performed by: UROLOGY

## 2021-12-28 PROCEDURE — 99999 PR PBB SHADOW E&M-EST. PATIENT-LVL II: ICD-10-PCS | Mod: PBBFAC,,, | Performed by: UROLOGY

## 2021-12-28 PROCEDURE — 99499 UNLISTED E&M SERVICE: CPT | Mod: S$GLB,,, | Performed by: UROLOGY

## 2021-12-28 PROCEDURE — 99999 PR PBB SHADOW E&M-EST. PATIENT-LVL II: CPT | Mod: PBBFAC,,, | Performed by: UROLOGY

## 2021-12-28 PROCEDURE — 82570 ASSAY OF URINE CREATININE: CPT | Performed by: UROLOGY

## 2021-12-28 RX ORDER — CIPROFLOXACIN 500 MG/1
500 TABLET ORAL 2 TIMES DAILY
Qty: 28 TABLET | Refills: 0 | Status: SHIPPED | OUTPATIENT
Start: 2021-12-28 | End: 2022-01-11

## 2021-12-28 RX ORDER — MUPIROCIN 20 MG/G
OINTMENT TOPICAL 3 TIMES DAILY
Qty: 15 G | Refills: 2 | Status: SHIPPED | OUTPATIENT
Start: 2021-12-28 | End: 2023-05-03

## 2021-12-28 RX ORDER — HYDROCODONE BITARTRATE AND ACETAMINOPHEN 5; 325 MG/1; MG/1
1 TABLET ORAL EVERY 6 HOURS PRN
Qty: 28 TABLET | Refills: 0 | Status: SHIPPED | OUTPATIENT
Start: 2021-12-28 | End: 2022-01-04

## 2021-12-29 LAB
BODY FLUID SOURCE, CREATININE: NORMAL
CREAT FLD-MCNC: 0.6 MG/DL

## 2022-01-01 NOTE — SUBJECTIVE & OBJECTIVE
"Interval History: "Better but not there yet". Recurrent P mirabilis bacteremia suspected to be from fluid collection in left groin. Unclear if endovascular graft involved. On cefepime. Repeat blood cultures NGTD. De-escalated to ceftriaxone based on sensitivities on 3/11. CTA with run off with inflammation and no abscess.     Review of Systems   Constitutional: Negative for chills, fatigue and fever.   HENT: Negative for ear pain, mouth sores, nosebleeds, postnasal drip, rhinorrhea, sinus pressure, sore throat, tinnitus, trouble swallowing and voice change.    Eyes: Negative for photophobia, pain, redness and visual disturbance.   Respiratory: Negative for apnea, cough, chest tightness, shortness of breath and wheezing.    Cardiovascular: Negative for chest pain, palpitations and leg swelling.   Gastrointestinal: Negative for abdominal pain, blood in stool, constipation, diarrhea, nausea and vomiting.   Endocrine: Negative for cold intolerance, heat intolerance, polydipsia and polyuria.   Genitourinary: Negative for decreased urine volume, difficulty urinating, discharge, dysuria, flank pain, frequency, genital sores, hematuria, penile pain, penile swelling, scrotal swelling, testicular pain and urgency.   Musculoskeletal: Negative for arthralgias, back pain, joint swelling, myalgias and neck pain.   Skin: Negative for color change and rash.   Allergic/Immunologic: Negative for environmental allergies and food allergies.   Neurological: Negative for dizziness, seizures, syncope, weakness, light-headedness, numbness and headaches.   Hematological: Negative for adenopathy. Does not bruise/bleed easily.   Psychiatric/Behavioral: Negative for agitation, confusion, decreased concentration, hallucinations, self-injury, sleep disturbance and suicidal ideas. The patient is not nervous/anxious.      Objective:     Vital Signs (Most Recent):  Temp: 97.4 °F (36.3 °C) (03/12/20 0709)  Pulse: 75 (03/12/20 0709)  Resp: 16 " (03/12/20 0709)  BP: (!) 142/86 (03/12/20 0829)  SpO2: 97 % (03/12/20 0709) Vital Signs (24h Range):  Temp:  [96.3 °F (35.7 °C)-98.2 °F (36.8 °C)] 97.4 °F (36.3 °C)  Pulse:  [74-94] 75  Resp:  [16-18] 16  SpO2:  [97 %-99 %] 97 %  BP: (133-190)/() 142/86     Weight: 116.1 kg (256 lb)  Body mass index is 34.72 kg/m².    Estimated Creatinine Clearance: 153.2 mL/min (based on SCr of 0.7 mg/dL).    Physical Exam   Constitutional: He is oriented to person, place, and time. He appears well-developed and well-nourished.   HENT:   Head: Normocephalic and atraumatic.   Right Ear: External ear normal.   Left Ear: External ear normal.   Eyes: Conjunctivae are normal. Right eye exhibits no discharge. Left eye exhibits no discharge.   Cardiovascular: Normal rate, regular rhythm and normal heart sounds.   No murmur heard.  Pulmonary/Chest: Effort normal and breath sounds normal. He has no wheezes. He has no rales.   Abdominal: Soft. Bowel sounds are normal. He exhibits no mass. There is no tenderness. There is no rebound.   Musculoskeletal: Normal range of motion.   Neurological: He is alert and oriented to person, place, and time.   Skin: Skin is warm and dry.   Psychiatric: He has a normal mood and affect. His behavior is normal.   Vitals reviewed.      Significant Labs:   Blood Culture:   Recent Labs   Lab 03/09/20  0103 03/10/20  0837   LABBLOO Gram stain reyna bottle: Gram negative rods   Positive results previously called 03/09/2020  15:45  Gram stain aer bottle: Gram negative rods   Positive results previously called 03/09/2020  16:54  PROTEUS MIRABILIS  For susceptibility see order #[id number]  *  Gram stain reyna bottle: Gram negative rods   Results called to and read back by: Charlee Rosen RN 03/09/2020  15:44  Gram stain aer bottle: Gram negative rods   Positive results previously called 03/09/2020  17:01  PROTEUS MIRABILIS* No Growth to date  No Growth to date  No Growth to date  No Growth to date     BMP:    Recent Labs   Lab 03/12/20  0509   *      K 4.0      CO2 24   BUN 12   CREATININE 0.7   CALCIUM 9.2   MG 2.0     CBC:   Recent Labs   Lab 03/11/20  0428 03/12/20  0509   WBC 3.41* 3.69*   HGB 13.9* 14.2   HCT 43.4 44.5   PLT 94* 111*     Urine Culture: No results for input(s): LABURIN in the last 4320 hours.  Urine Studies:   Recent Labs   Lab 03/09/20  0103   COLORU Yellow   APPEARANCEUA Hazy*   PHUR 5.0   SPECGRAV 1.025   PROTEINUA 1+*   GLUCUA Negative   KETONESU Negative   BILIRUBINUA Negative   OCCULTUA Negative   NITRITE Negative   LEUKOCYTESUR Trace*   RBCUA 1   WBCUA 3   BACTERIA Occasional   SQUAMEPITHEL 0   HYALINECASTS 0     Wound Culture:   Recent Labs   Lab 01/01/20  1012   LABAERO PROTEUS MIRABILIS  Few  *  STREPTOCOCCUS GROUP F  Few  Beta-hemolytic streptococci are routinely susceptible to   penicillins,cephalosporins and carbapenems.  Susceptibility testing not routinely performed  *       Significant Imaging: I have reviewed all pertinent imaging results/findings within the past 24 hours.   PROVIDER:[TOKEN:[[id number]]]

## 2022-01-03 ENCOUNTER — TELEPHONE (OUTPATIENT)
Dept: UROLOGY | Facility: CLINIC | Age: 62
End: 2022-01-03
Payer: COMMERCIAL

## 2022-01-03 NOTE — TELEPHONE ENCOUNTER
----- Message from Desire Sen sent at 1/3/2022  3:19 PM CST -----  Type:  Needs Medical Advice    Who Called: wife  Reason:incision/ blood blister is bleeding   Would the patient rather a call back or a response via [employer]? call  Best Call Back Number: [phone]  Additional Information: none

## 2022-01-03 NOTE — TELEPHONE ENCOUNTER
Pts wife reports small amount of bleeding through incision packing, incision looks good, no other issues. Dr. Mitchell informed wife to repack and keep an eye on wound and report to ER if bleeding worsens. Dr. Mitchell will see pt first thing in the morning.

## 2022-01-04 ENCOUNTER — OFFICE VISIT (OUTPATIENT)
Dept: UROLOGY | Facility: CLINIC | Age: 62
End: 2022-01-04
Payer: COMMERCIAL

## 2022-01-04 VITALS
OXYGEN SATURATION: 99 % | DIASTOLIC BLOOD PRESSURE: 70 MMHG | HEART RATE: 75 BPM | HEIGHT: 70 IN | BODY MASS INDEX: 34.59 KG/M2 | SYSTOLIC BLOOD PRESSURE: 118 MMHG | WEIGHT: 241.63 LBS

## 2022-01-04 DIAGNOSIS — C61 MALIGNANT NEOPLASM OF PROSTATE: Primary | ICD-10-CM

## 2022-01-04 DIAGNOSIS — Z98.890 POSTOPERATIVE STATE: ICD-10-CM

## 2022-01-04 PROCEDURE — 99999 PR PBB SHADOW E&M-EST. PATIENT-LVL II: ICD-10-PCS | Mod: PBBFAC,,, | Performed by: UROLOGY

## 2022-01-04 PROCEDURE — 99024 POSTOP FOLLOW-UP VISIT: CPT | Mod: S$GLB,,, | Performed by: UROLOGY

## 2022-01-04 PROCEDURE — 99999 PR PBB SHADOW E&M-EST. PATIENT-LVL II: CPT | Mod: PBBFAC,,, | Performed by: UROLOGY

## 2022-01-04 PROCEDURE — 99024 PR POST-OP FOLLOW-UP VISIT: ICD-10-PCS | Mod: S$GLB,,, | Performed by: UROLOGY

## 2022-01-04 NOTE — PROGRESS NOTES
Renan Britton is a 61 y.o. male patient.   1. Postoperative state    2. Malignant neoplasm of prostate      Past Medical History:   Diagnosis Date    Diabetes mellitus     Elevated PSA     H/O brain tumor     HLD (hyperlipidemia)     Hypertension     PAD (peripheral artery disease)     Personal history of colonic polyps 1/8/2021    Seizures     R/T BRAIN TUMOR- SURGICALLY EXCISED    Urinary tract infection      Past Surgical History Pertinent Negatives:   Procedure Date Noted    CYSTOSCOPY 12/30/2020    LITHOTRIPSY 12/30/2020    PROSTATE SURGERY 12/30/2020    VASECTOMY 12/30/2020     Scheduled Meds:  Continuous Infusions:  PRN Meds:    Review of patient's allergies indicates:   Allergen Reactions    Penicillins Hives     Patient currently on cefepime without complications  Tolerated ceftriaxone 3/2020  PCN allergy testing by Allergy/Immunology.  Ok to give ampicillin     There are no hospital problems to display for this patient.    There were no vitals taken for this visit.    Subjective:   Diet: Adequate intake.  Patient reports no nausea or vomiting.    Activity level: Normal.    Pain control: Well controlled.      Objective:  Vital signs (most recent): There were no vitals taken for this visit.  General appearance: Comfortable, well-appearing, in no acute distress and not in pain.    Lungs:  Normal respiratory rate and normal effort.  Breath sounds normal.    Heart: Normal rate.  Regular rhythm.    Chest: Symmetric chest wall expansion.    Abdomen: Abdomen is soft.    Bowel sounds:  Bowel sounds are normal.    Tenderness: There is no abdominal tenderness tenderness.  There is no incisional area tenderness.    Wound:  Clean.  There is no drainage.    Extremities: There is normal range of motion.    Neurological: The patient is alert and oriented to person, place and time.  Normal strength.  Pupils are equal, round, and reactive to light.       Assessment:   Post-op: 22 days.    Condition: In  stable condition.     Plan:  Encourage ambulation.  Continue wound care as written and change dressings as ordered.  Start/continue incentive spirometry.  Discontinue drain (KATHLEEN removed).  Regular diet.  Resume oral medications.      Patient Instructions   F/U in 9-10 weeks  Continue Kegal exercises  Continue current wound care         Raghav Mitchell MD  1/4/2022

## 2022-01-14 ENCOUNTER — PATIENT MESSAGE (OUTPATIENT)
Dept: UROLOGY | Facility: CLINIC | Age: 62
End: 2022-01-14
Payer: COMMERCIAL

## 2022-01-14 RX ORDER — HYDROCODONE BITARTRATE AND ACETAMINOPHEN 5; 325 MG/1; MG/1
1 TABLET ORAL EVERY 6 HOURS PRN
Qty: 28 TABLET | Refills: 0 | Status: SHIPPED | OUTPATIENT
Start: 2022-01-14 | End: 2022-04-04

## 2022-01-14 NOTE — TELEPHONE ENCOUNTER
Dr. Mitchell, pt is requesting refill of hydrocodone. I don't see it on his chart at all to send you a request. Please advise.

## 2022-03-15 ENCOUNTER — OFFICE VISIT (OUTPATIENT)
Dept: UROLOGY | Facility: CLINIC | Age: 62
End: 2022-03-15
Payer: COMMERCIAL

## 2022-03-15 VITALS
WEIGHT: 244 LBS | HEART RATE: 81 BPM | OXYGEN SATURATION: 98 % | SYSTOLIC BLOOD PRESSURE: 101 MMHG | DIASTOLIC BLOOD PRESSURE: 65 MMHG | HEIGHT: 70 IN | BODY MASS INDEX: 34.93 KG/M2

## 2022-03-15 DIAGNOSIS — N52.31 ERECTILE DYSFUNCTION AFTER RADICAL PROSTATECTOMY: ICD-10-CM

## 2022-03-15 DIAGNOSIS — C61 MALIGNANT NEOPLASM OF PROSTATE: ICD-10-CM

## 2022-03-15 DIAGNOSIS — Z98.890 POSTOPERATIVE STATE: Primary | ICD-10-CM

## 2022-03-15 PROCEDURE — 99999 PR PBB SHADOW E&M-EST. PATIENT-LVL V: CPT | Mod: PBBFAC,,, | Performed by: UROLOGY

## 2022-03-15 PROCEDURE — 99214 OFFICE O/P EST MOD 30 MIN: CPT | Mod: S$GLB,,, | Performed by: UROLOGY

## 2022-03-15 PROCEDURE — 99214 PR OFFICE/OUTPT VISIT, EST, LEVL IV, 30-39 MIN: ICD-10-PCS | Mod: S$GLB,,, | Performed by: UROLOGY

## 2022-03-15 PROCEDURE — 99999 PR PBB SHADOW E&M-EST. PATIENT-LVL V: ICD-10-PCS | Mod: PBBFAC,,, | Performed by: UROLOGY

## 2022-03-15 RX ORDER — TADALAFIL 10 MG/1
10 TABLET ORAL DAILY
Qty: 90 TABLET | Refills: 3 | Status: SHIPPED | OUTPATIENT
Start: 2022-03-15 | End: 2023-03-29 | Stop reason: SDUPTHER

## 2022-03-15 NOTE — LETTER
March 15, 2022      [facility]  [address] [place] [address]  [place] [address]  Phone: [phone]  Fax: [phone]       Patient: Renan Britton   YOB: 1960  Date of Visit: 03/15/2022    To Whom It May Concern:    Jamey Britton  was at [facility] on 03/15/2022. The patient may return to work on 3/16/22 with no restrictions. If you have any questions or concerns, or if I can be of further assistance, please do not hesitate to contact me.    Sincerely,    Raghav Mitchell MD

## 2022-03-15 NOTE — PROGRESS NOTES
Subjective:       Patient ID: Renan Britton is a 61 y.o. male.    Chief Complaint: PSA and BARBRA follow up    60 yo WM was a proximally 3 months status post radical retropubic prostatectomy.  The patient has regained control of his bladder as well as regained erections however his erections are not completely full.  He is having orgasm and he has having excretion of fluid from the bulbourethra glands.  The patient has been doing well.  PSA 0.11     Other  This is a chronic (History of prostate cancer, resolving erectile dysfunction) problem. The current episode started more than 1 month ago. The problem occurs constantly. The problem has been gradually improving. Pertinent negatives include no abdominal pain, anorexia, arthralgias, change in bowel habit, chest pain, chills, congestion, coughing, diaphoresis, fatigue, fever, headaches, joint swelling, myalgias, nausea, neck pain, numbness, rash, sore throat, swollen glands, urinary symptoms, vertigo, visual change, vomiting or weakness. Nothing aggravates the symptoms. He has tried nothing for the symptoms. The treatment provided significant relief.     Review of Systems   Constitutional: Negative for activity change, appetite change, chills, diaphoresis, fatigue, fever and unexpected weight change.   HENT: Negative for congestion, hearing loss, sinus pressure, sore throat and trouble swallowing.    Eyes: Negative for photophobia, pain, discharge and visual disturbance.   Respiratory: Negative for apnea, cough and shortness of breath.    Cardiovascular: Negative for chest pain, palpitations and leg swelling.   Gastrointestinal: Negative for abdominal distention, abdominal pain, anal bleeding, anorexia, blood in stool, change in bowel habit, constipation, diarrhea, nausea, rectal pain and vomiting.   Endocrine: Negative for cold intolerance, heat intolerance, polydipsia, polyphagia and polyuria.   Genitourinary: Negative for decreased urine volume, difficulty  urinating, dysuria, enuresis, flank pain, frequency, genital sores, hematuria, penile discharge, penile pain, penile swelling, scrotal swelling, testicular pain and urgency.   Musculoskeletal: Negative for arthralgias, back pain, joint swelling, myalgias and neck pain.   Skin: Negative for color change, pallor, rash and wound.   Allergic/Immunologic: Negative for environmental allergies, food allergies and immunocompromised state.   Neurological: Negative for dizziness, vertigo, seizures, weakness, numbness and headaches.   Hematological: Negative for adenopathy. Does not bruise/bleed easily.   Psychiatric/Behavioral: Negative.        Objective:      Physical Exam  Vitals and nursing note reviewed.   Constitutional:       Appearance: Normal appearance. He is well-developed. He is obese.   HENT:      Head: Normocephalic and atraumatic.      Right Ear: External ear normal.      Left Ear: External ear normal.      Nose: Nose normal. No congestion or rhinorrhea.      Mouth/Throat:      Mouth: Mucous membranes are moist.      Pharynx: Oropharynx is clear. No oropharyngeal exudate or posterior oropharyngeal erythema.   Eyes:      General: No scleral icterus.        Right eye: No discharge.         Left eye: No discharge.      Extraocular Movements: Extraocular movements intact.      Conjunctiva/sclera: Conjunctivae normal.      Pupils: Pupils are equal, round, and reactive to light.   Cardiovascular:      Rate and Rhythm: Normal rate and regular rhythm.      Pulses: Normal pulses.      Heart sounds: Normal heart sounds.   Pulmonary:      Effort: Pulmonary effort is normal.      Breath sounds: Normal breath sounds.   Abdominal:      General: Bowel sounds are normal.      Palpations: Abdomen is soft.      Tenderness: There is no right CVA tenderness or left CVA tenderness.      Comments: Incision well healed   Genitourinary:     Penis: Normal.       Testes: Normal.   Musculoskeletal:         General: Normal range of motion.       Cervical back: Normal range of motion and neck supple.   Skin:     General: Skin is warm and dry.      Capillary Refill: Capillary refill takes less than 2 seconds.   Neurological:      Mental Status: He is alert and oriented to person, place, and time.      Deep Tendon Reflexes: Reflexes are normal and symmetric.   Psychiatric:         Behavior: Behavior normal.         Thought Content: Thought content normal.         Judgment: Judgment normal.         Assessment:       1. Postoperative state    2. Malignant neoplasm of prostate    3. Erectile dysfunction after radical prostatectomy        Plan:       Patient Instructions   Return to work  Start Cialis daily 10 mg  Follow-up 3 months with PSA test

## 2022-04-01 ENCOUNTER — TELEPHONE (OUTPATIENT)
Dept: GASTROENTEROLOGY | Facility: CLINIC | Age: 62
End: 2022-04-01
Payer: COMMERCIAL

## 2022-04-01 ENCOUNTER — TELEPHONE (OUTPATIENT)
Dept: UROLOGY | Facility: CLINIC | Age: 62
End: 2022-04-01
Payer: COMMERCIAL

## 2022-04-01 NOTE — TELEPHONE ENCOUNTER
Patient's wife called to schedule an EGD for her  with Dr. Posada. Patient's hemoglobin is 7.0 and needs to be seen. Patient scheduled with Charline Posada NP on 4/4/2022. Per Dr. Posada, message sent to patient's cardiologist to obtain clearance to discontinue Plavix.

## 2022-04-01 NOTE — TELEPHONE ENCOUNTER
Patient has had a significant drop in his hemoglobin and will need to have an EGD done. Clearance is needed to discontinue Plavix prior to EGD. Our goal is to do the procedure on 4/7/2022. If this clearance is done on today, can you please send us a message and also send a message to the patient via [facility].

## 2022-04-01 NOTE — TELEPHONE ENCOUNTER
----- Message from Maia Biswas sent at 4/1/2022  2:02 PM CDT -----  Regarding: call back  Contact: [phone]  Type:  Needs Medical Advice    Who Called: PT   Symptoms (please be specific): red blood counts are dropping and iron     Would the patient rather a call back or a response via [employer]? Call back   Best Call Back Number:  [phone]  Additional Information:  patient is concern and would like to get advice

## 2022-04-01 NOTE — TELEPHONE ENCOUNTER
----- Message from Mago Trejo sent at 4/1/2022  1:19 PM CDT -----  Contact: patient wife [phone]  Patient wife requesting to speak with the nurse regarding the patient red blood count and iron dropping everyday   Please advise

## 2022-04-01 NOTE — TELEPHONE ENCOUNTER
Returned pt call. Spoke to his wife. Patients labs show low hematocrit. Pt has an appointment with  Monday.

## 2022-04-04 ENCOUNTER — TELEPHONE (OUTPATIENT)
Dept: VASCULAR SURGERY | Facility: CLINIC | Age: 62
End: 2022-04-04
Payer: COMMERCIAL

## 2022-04-04 ENCOUNTER — OFFICE VISIT (OUTPATIENT)
Dept: GASTROENTEROLOGY | Facility: CLINIC | Age: 62
End: 2022-04-04
Payer: COMMERCIAL

## 2022-04-04 VITALS
WEIGHT: 254.69 LBS | SYSTOLIC BLOOD PRESSURE: 110 MMHG | BODY MASS INDEX: 36.46 KG/M2 | HEIGHT: 70 IN | HEART RATE: 84 BPM | DIASTOLIC BLOOD PRESSURE: 75 MMHG

## 2022-04-04 DIAGNOSIS — D50.9 IRON DEFICIENCY ANEMIA, UNSPECIFIED IRON DEFICIENCY ANEMIA TYPE: Primary | ICD-10-CM

## 2022-04-04 DIAGNOSIS — Z79.01 ANTICOAGULANT LONG-TERM USE: ICD-10-CM

## 2022-04-04 DIAGNOSIS — K92.1 MELENA: ICD-10-CM

## 2022-04-04 PROCEDURE — 99999 PR PBB SHADOW E&M-EST. PATIENT-LVL IV: CPT | Mod: PBBFAC,,, | Performed by: NURSE PRACTITIONER

## 2022-04-04 PROCEDURE — 99214 OFFICE O/P EST MOD 30 MIN: CPT | Mod: S$GLB,,, | Performed by: NURSE PRACTITIONER

## 2022-04-04 PROCEDURE — 99214 PR OFFICE/OUTPT VISIT, EST, LEVL IV, 30-39 MIN: ICD-10-PCS | Mod: S$GLB,,, | Performed by: NURSE PRACTITIONER

## 2022-04-04 PROCEDURE — 99999 PR PBB SHADOW E&M-EST. PATIENT-LVL IV: ICD-10-PCS | Mod: PBBFAC,,, | Performed by: NURSE PRACTITIONER

## 2022-04-04 RX ORDER — PANTOPRAZOLE SODIUM 40 MG/1
40 TABLET, DELAYED RELEASE ORAL DAILY
Qty: 30 TABLET | Refills: 2 | Status: SHIPPED | OUTPATIENT
Start: 2022-04-04 | End: 2022-07-11 | Stop reason: SDUPTHER

## 2022-04-04 NOTE — TELEPHONE ENCOUNTER
----- Message from Namita Rosado sent at 4/4/2022  8:35 AM CDT -----  Regarding: Medication change  Contact: Patricia/wife [phone]  Calling in regards to speaking with provider or nurse Rayna regarding medication change to be recorded in chart due to upcoming procedure. Patient states they were told to quit taking Plavix five days prior to procedure, last dose Friday. Please call patient is in office with provider needing documentation, contact through portal is accepted.

## 2022-04-04 NOTE — PATIENT INSTRUCTIONS
EGD Prep Instructions    [facility] [address] [place]  [address]    You are scheduled for an EGD with Dr. Posada on 4/7/2022 at [facility].  You will enter through the [facility] entrance and check in at [facility] Day [facility].    Nothing to eat or drink after midnight before the procedure.  You MAY brush your teeth.    You MAY take your blood pressure, heart, and seizure medication on the morning of the procedure, with a SIP of water.  Hold ALL other medications until after the procedure.    You must have someone with you to DRIVE YOU HOME since you will be receiving IV sedation for the procedure.    If you are on blood thinners THAT YOU HAVE BEEN INSTRUCTED TO HOLD BY YOUR DOCTOR FOR THIS PROCEDURE, then do NOT take this the morning of your EGD.  Do NOT stop these medications on your own, they must be approved to be held by your doctor.  Your EGD can NOT be done if you are on these medications.  Examples of blood thinners include: Coumadin, Aggrenox, Plavix, Pradaxa, Reapro, Pletal, Xarelto, Ticagrelor, Brilinta, Eliquis, and high dose aspirin (325 mg).  You do not have to stop baby aspirin 81 mg.    You will receive a call a few days before your EGD to tell you the time to arrive.  If you have not received a call by the day before your procedure, call the Pre-op Coordinator at [phone].

## 2022-04-04 NOTE — TELEPHONE ENCOUNTER
Spoke to the pt, sent message to Dr. Gomez who is at the [place] location on today. Will see that info gets in the chart.

## 2022-04-04 NOTE — PROGRESS NOTES
Subjective:       Patient ID: Renan Britton is a 61 y.o. male.    Chief Complaint: Anemia ( Low blood levels )    60 y/o male with multiple diagnoses as listed in problem list and medical history presents to clinic with c/o anemia and melena. Patient reports black stools x 3 weeks. As of 3/25 Hgb/Hct 7.3/26.8 compared to 12/15/2021 at 10.7/33.4 respectively. No chest or abdominal pain but some HERRERA. Started oral iron last week and has been off Plavix since 4/2. No hx of GERD or PUD. Denies alcohol abuse or chronic NSAID use. No previous upper endoscopy.       Past Medical History:   Diagnosis Date    Diabetes mellitus     Elevated PSA     H/O brain tumor     HLD (hyperlipidemia)     Hypertension     PAD (peripheral artery disease)     Personal history of colonic polyps 1/8/2021    Prostate cancer     Seizures     R/T BRAIN TUMOR- SURGICALLY EXCISED    Urinary tract infection        Past Surgical History:   Procedure Laterality Date    ANGIOGRAPHY Left 4/6/2020    Procedure: ANGIOGRAM, Left lower extremity;  Surgeon: Kelby Gomez MD;  Location: [address];  Service: Peripheral Vascular;  Laterality: Left;    ANGIOGRAPHY OF LOWER EXTREMITY Left 6/29/2020    Procedure: Angiogram Extremity Unilateral, washout L groin, possible open pseudoaneurysm repair;  Surgeon: Bruce Dallas MD;  Location: [address];  Service: Peripheral Vascular;  Laterality: Left;  contrast 23 ml  fluoro time: 9.9 min  mGy: 1230.26  Gycm2: 146.69    AORTOGRAPHY N/A 6/29/2020    Procedure: AORTOGRAM;  Surgeon: Bruce Dallas MD;  Location: [address];  Service: Peripheral Vascular;  Laterality: N/A;    APPLICATION OF WOUND VACUUM-ASSISTED CLOSURE DEVICE Bilateral 1/1/2020    Procedure: APPLICATION, WOUND VAC;  Surgeon: SEBAS Prieto II, MD;  Location: [address];  Service: Cardiovascular;  Laterality: Bilateral;    APPLICATION OF WOUND VACUUM-ASSISTED CLOSURE DEVICE N/A 1/3/2020     Procedure: APPLICATION, WOUND VAC;  Surgeon: Brian Wong MD;  Location: [facility] OR [facility];  Service: Plastics;  Laterality: N/A;    BRAIN SURGERY  01/2011    TUMOR EXCISED    COLONOSCOPY N/A 1/8/2021    Procedure: COLONOSCOPY;  Surgeon: Susan Posada MD;  Location: [facility] ENDO;  Service: Endoscopy;  Laterality: N/A;    COLONOSCOPY N/A 11/18/2021    Procedure: COLONOSCOPY;  Surgeon: Susan Posada MD;  Location: [facility] ENDO;  Service: Endoscopy;  Laterality: N/A;    CREATION OF ILIOFEMORAL ARTERY BYPASS Left 8/12/2020    Procedure: CREATION, BYPASS, ARTERIAL, ILIAC TO FEMORAL;  Surgeon: Kelby Gomez MD;  [facility];  Service: Peripheral Vascular;  Laterality: Left;  CO-SURGEONS: DR KEO ADHIKARI;  DR WONG, profusion    CREATION OF MUSCLE ROTATIONAL FLAP N/A 1/3/2020    Procedure: CREATION, FLAP, MUSCLE ROTATION;  Surgeon: Brian Wong MD;  [facility];  Service: Plastics;  Laterality: N/A;    ENDARTERECTOMY OF FEMORAL ARTERY Bilateral 12/20/2019    Procedure: ENDARTERECTOMY, FEMORAL;  Surgeon: Kelby Gomez MD;  Location: [address];  Service: Peripheral Vascular;  Laterality: Bilateral;  natalya common femoral endarterectomy with natalya. iliac stent placement    PERCUTANEOUS TRANSLUMINAL ANGIOPLASTY Left 4/6/2020    Procedure: PTA (ANGIOPLASTY, PERCUTANEOUS, TRANSLUMINAL), left lower extremity;  Surgeon: Kelby Gomez MD;  [address];  Service: Peripheral Vascular;  Laterality: Left;    PERCUTANEOUS TRANSLUMINAL ANGIOPLASTY (PTA) OF PERIPHERAL VESSEL Left 10/17/2019    Procedure: PTA, PERIPHERAL VESSEL;  Surgeon: Rao Fisher MD;  [facility] CATH;  Service: Cardiology;  Laterality: Left;    PSEUDOANEURYSM REPAIR Left 6/29/2020    Procedure: REPAIR, PSEUDOANEURYSM;  Surgeon: Bruce Dallas MD;  Location: [facility] OR [address] [place] [address];  Service: Peripheral Vascular;  Laterality: Left;    RADICAL RETROPUBIC PROSTATECTOMY N/A 12/13/2021     "Procedure: PROSTATECTOMY, RETROPUBIC, RADICAL + Bilateral pelvic lymph node dissection;  Surgeon: Raghav Mitchell MD;  Location: [facility];  Service: Urology;  Laterality: N/A;       Family History   Problem Relation Age of Onset    Diabetes Mother     Hypertension Mother     Stroke Mother     Cancer Father         LUNG    Heart disease Father     Cancer Brother     Heart disease Brother     Heart disease Brother     Heart disease Brother     Prostate cancer Neg Hx     Kidney disease Neg Hx        Social History     Socioeconomic History    Marital status:    Tobacco Use    Smoking status: Former Smoker     Packs/day: 0.00     Types: Cigarettes    Smokeless tobacco: Never Used    Tobacco comment:  2-3 daily   Substance and Sexual Activity    Alcohol use: Yes     Comment: socially    Drug use: Never    Sexual activity: Yes     Partners: Female     Birth control/protection: None       Review of Systems   Constitutional: Positive for fatigue. Negative for appetite change, chills, fever and unexpected weight change.   HENT: Negative for trouble swallowing.    Respiratory: Positive for shortness of breath.    Cardiovascular: Negative for chest pain.   Gastrointestinal: Positive for nausea. Negative for abdominal pain.   Hematological: Negative for adenopathy. Does not bruise/bleed easily.   Psychiatric/Behavioral: Negative for dysphoric mood.         Objective:     Vitals:    04/04/22 0820   BP: 110/75   Pulse: 84   Weight: 115.5 kg (254 lb 11.2 oz)   Height: 5' 10" (1.778 m)          Physical Exam  Constitutional:       General: He is not in acute distress.     Appearance: Normal appearance. He is not ill-appearing.   HENT:      Head: Normocephalic.   Eyes:      Conjunctiva/sclera: Conjunctivae normal.      Pupils: Pupils are equal, round, and reactive to light.   Cardiovascular:      Rate and Rhythm: Normal rate and regular rhythm.      Heart sounds: Murmur heard.   Pulmonary:      Effort: " Pulmonary effort is normal.      Breath sounds: Normal breath sounds.   Abdominal:      General: Abdomen is protuberant. Bowel sounds are normal.      Palpations: Abdomen is soft.      Tenderness: There is no abdominal tenderness.   Musculoskeletal:         General: Normal range of motion.      Cervical back: Normal range of motion.      Right lower leg: No edema.      Left lower leg: No edema.   Skin:     General: Skin is warm and dry.   Neurological:      Mental Status: He is alert and oriented to person, place, and time.   Psychiatric:         Mood and Affect: Mood normal.         Behavior: Behavior normal.               Assessment:         ICD-10-CM ICD-9-CM   1. Iron deficiency anemia, unspecified iron deficiency anemia type  D50.9 280.9   2. Melena  K92.1 578.1   3. Anticoagulant long-term use  Z79.01 V58.61       Plan:       Iron deficiency anemia, unspecified iron deficiency anemia type; Melena  -     pantoprazole (PROTONIX) 40 MG tablet; Take 1 tablet (40 mg total) by mouth once daily.  Dispense: 30 tablet; Refill: 2  -     Case Request Endoscopy: EGD (ESOPHAGOGASTRODUODENOSCOPY)    Anticoagulant long-term use         -     Approval to hold Plavix received from cardiology, Dr. Gomez    Follow up if symptoms worsen or fail to improve.     Patient's Medications   New Prescriptions    PANTOPRAZOLE (PROTONIX) 40 MG TABLET    Take 1 tablet (40 mg total) by mouth once daily.   Previous Medications    AMOXICILLIN (AMOXIL) 500 MG TAB    Take 2 tablets (1,000 mg total) by mouth every 12 (twelve) hours.    ATORVASTATIN (LIPITOR) 40 MG TABLET    Take 1 tablet (40 mg total) by mouth once daily.    CLOPIDOGREL (PLAVIX) 75 MG TABLET    TAKE ONE TABLET BY MOUTH once a day    ELDERBERRY FRUIT AND FLOWER 460-115 MG CAP    Take 2 capsules by mouth every morning.    LACTOBACILLUS ACIDOPHILUS (ACIDOPHILUS ORAL)    Take 1 capsule by mouth every other day.    METFORMIN (GLUCOPHAGE-XR) 500 MG XR 24HR TABLET    Take 1,000 mg by  mouth daily with breakfast.     MUPIROCIN (BACTROBAN) 2 % OINTMENT    Apply topically 3 (three) times daily.    PREGABALIN (LYRICA) 100 MG CAPSULE    Take 100 mg by mouth 2 (two) times daily.     TADALAFIL (CIALIS) 10 MG TABLET    Take 1 tablet (10 mg total) by mouth once daily.    VALSARTAN (DIOVAN) 160 MG TABLET    Take 160 mg by mouth once daily.   Modified Medications    No medications on file   Discontinued Medications    No medications on file

## 2022-04-08 ENCOUNTER — TELEPHONE (OUTPATIENT)
Dept: GASTROENTEROLOGY | Facility: CLINIC | Age: 62
End: 2022-04-08
Payer: COMMERCIAL

## 2022-04-08 NOTE — TELEPHONE ENCOUNTER
Spoke with patients wife. Capsule procedure scheduled on 4/18/22. Instructions reviewed and sent via portal.

## 2022-04-08 NOTE — TELEPHONE ENCOUNTER
Patient informed of results and referral to Hematology. Explained about need for capsule endoscopy. Told patient someone will contact him to set up both appointments.

## 2022-04-08 NOTE — TELEPHONE ENCOUNTER
----- Message from Susan Posada MD sent at 4/8/2022  8:16 AM CDT -----  Iron indices do show iron deficiency which frequently means slow GI blood loss.  So (1) I will refer to hematology for IV iron, and (2) I am ordering capsule endoscopy.  This will be done at [place] and will look at his entire small intestine.

## 2022-04-12 ENCOUNTER — PATIENT MESSAGE (OUTPATIENT)
Dept: GASTROENTEROLOGY | Facility: CLINIC | Age: 62
End: 2022-04-12
Payer: COMMERCIAL

## 2022-04-13 ENCOUNTER — TELEPHONE (OUTPATIENT)
Dept: ENDOSCOPY | Facility: HOSPITAL | Age: 62
End: 2022-04-13
Payer: COMMERCIAL

## 2022-04-13 NOTE — TELEPHONE ENCOUNTER
Spoke with patient's spouse about arrival time at 0800.  VCE  Covid -vacc    DAY BEFORE:  4/17/22  1. You will start your clear liquid diet (see attached list) beginning at NOON. After 7pm you will take nothing by mouth except necessary medication with a small sip of water and your bowel prep.   2. At noon, mix the 4.1 oz bottle (119 gram) of Miralax and the 32oz Gatorade (use white/clear color) products, place in the refrigerator (do not add ice).   3. At 7pm you will drink 8oz of Miralax/Gatorade mixture every 15 minutes until mixture is gone (a total of 32oz)  4. Just before going to bed, take one dose of over the counter Phazyme or Simethicone as directed on the box.

## 2022-04-14 ENCOUNTER — PATIENT MESSAGE (OUTPATIENT)
Dept: GASTROENTEROLOGY | Facility: CLINIC | Age: 62
End: 2022-04-14
Payer: COMMERCIAL

## 2022-04-14 ENCOUNTER — PATIENT MESSAGE (OUTPATIENT)
Dept: HEMATOLOGY/ONCOLOGY | Facility: CLINIC | Age: 62
End: 2022-04-14
Payer: COMMERCIAL

## 2022-04-14 DIAGNOSIS — D50.0 IRON DEFICIENCY ANEMIA DUE TO CHRONIC BLOOD LOSS: Primary | ICD-10-CM

## 2022-04-14 RX ORDER — HEPARIN 100 UNIT/ML
500 SYRINGE INTRAVENOUS
Status: CANCELLED | OUTPATIENT
Start: 2022-05-10

## 2022-04-14 RX ORDER — HEPARIN 100 UNIT/ML
500 SYRINGE INTRAVENOUS
Status: CANCELLED | OUTPATIENT
Start: 2022-05-17

## 2022-04-14 RX ORDER — HEPARIN 100 UNIT/ML
500 SYRINGE INTRAVENOUS
Status: CANCELLED | OUTPATIENT
Start: 2022-04-19

## 2022-04-14 RX ORDER — HEPARIN 100 UNIT/ML
500 SYRINGE INTRAVENOUS
Status: CANCELLED | OUTPATIENT
Start: 2022-05-03

## 2022-04-14 RX ORDER — SODIUM CHLORIDE 0.9 % (FLUSH) 0.9 %
10 SYRINGE (ML) INJECTION
Status: CANCELLED | OUTPATIENT
Start: 2022-05-03

## 2022-04-14 RX ORDER — SODIUM CHLORIDE 0.9 % (FLUSH) 0.9 %
10 SYRINGE (ML) INJECTION
Status: CANCELLED | OUTPATIENT
Start: 2022-04-19

## 2022-04-14 RX ORDER — SODIUM CHLORIDE 0.9 % (FLUSH) 0.9 %
10 SYRINGE (ML) INJECTION
Status: CANCELLED | OUTPATIENT
Start: 2022-05-17

## 2022-04-14 RX ORDER — SODIUM CHLORIDE 0.9 % (FLUSH) 0.9 %
10 SYRINGE (ML) INJECTION
Status: CANCELLED | OUTPATIENT
Start: 2022-05-10

## 2022-04-15 ENCOUNTER — PATIENT MESSAGE (OUTPATIENT)
Dept: ENDOSCOPY | Facility: HOSPITAL | Age: 62
End: 2022-04-15
Payer: COMMERCIAL

## 2022-04-18 ENCOUNTER — HOSPITAL ENCOUNTER (OUTPATIENT)
Facility: HOSPITAL | Age: 62
Discharge: HOME | End: 2022-04-18
Attending: INTERNAL MEDICINE | Admitting: INTERNAL MEDICINE
Payer: COMMERCIAL

## 2022-04-18 PROCEDURE — 91110 GI TRC IMG INTRAL ESOPH-ILE: CPT | Performed by: INTERNAL MEDICINE

## 2022-04-18 PROCEDURE — 91110 GI TRC IMG INTRAL ESOPH-ILE: CPT | Mod: 26,,, | Performed by: INTERNAL MEDICINE

## 2022-04-18 PROCEDURE — 91110 PR GI TRACT CAPSULE ENDOSCOPY: ICD-10-PCS | Mod: 26,,, | Performed by: INTERNAL MEDICINE

## 2022-04-21 ENCOUNTER — PATIENT MESSAGE (OUTPATIENT)
Dept: HEMATOLOGY/ONCOLOGY | Facility: CLINIC | Age: 62
End: 2022-04-21
Payer: COMMERCIAL

## 2022-04-22 ENCOUNTER — TELEPHONE (OUTPATIENT)
Dept: GASTROENTEROLOGY | Facility: CLINIC | Age: 62
End: 2022-04-22
Payer: COMMERCIAL

## 2022-04-22 NOTE — TELEPHONE ENCOUNTER
Patient informed of results. Patient already had capsule endoscopy done. Dr. Mcdowell's office is trying to get iron infusions approved.

## 2022-04-22 NOTE — TELEPHONE ENCOUNTER
----- Message from Susan Posada MD sent at 4/19/2022  1:23 PM CDT -----  Celiac (-), HP (-), inlet patch confirmed (not of significance), cont PPI, capsule endoscopy ordered

## 2022-04-26 ENCOUNTER — PATIENT MESSAGE (OUTPATIENT)
Dept: HEMATOLOGY/ONCOLOGY | Facility: CLINIC | Age: 62
End: 2022-04-26
Payer: COMMERCIAL

## 2022-04-26 NOTE — PROGRESS NOTES
PATIENT: Renan Britton  MRN: [id number]  DATE: 4/27/2022    Diagnosis:   1. Iron deficiency anemia due to chronic blood loss    2. Severe obesity (BMI 35.0-39.9) with comorbidity    3. Prostate cancer    4. Advance care planning        Chief Complaint: iron deficiency anemia    Oncologic History:      Oncologic History 1. Prostate cancer      Oncologic Treatment 1. Radical prostatectomy (12/13/21)      Pathology 12/13/21:  1. Right iliac, obturator, and hypogastric lymph nodes, lymphadenectomy:  Eleven lymph nodes, negative for carcinoma (0/11).  2. Left iliac, obturator, and hypogastric lymph nodes, lymphadenectomy:  Six lymph nodes, negative for carcinoma (0/6).  2. Prostate, radical prostatectomy:  Prostatic adenocarcinoma, grade group 2, [place] score = 7 (3+4), with involvement of bilateral prostate  lobes.  Surgical margins are negative for carcinoma.  Uninvolved prostate with benign glandular hyperplasia and minimal chronic inflammation.  AJCC 8th edition Pathologic stage: [id number].        Subjective:    History of Present Illness: Mr. Britton is a 61 y.o. male who presents for evaluation and management of iron deficiency anemia. He is referred by Dr. Posada.    - labs since December 2021 reveal a mild-to-severe anemia, now microcytic  - iron studies 94/7/22) reveal a low-normal ferritin, decreased iron/saturated iron, and increased total iron binding capacity  - he underwent radical prostatectomy on 12/13/21.  - he has undergone colonoscopy (11/18/21) and upper GI endoscopy (4/7/22). He also recently video capsule endoscopy.  - today, he endorses fatigue, dyspnea upon exertion, decreased exercise tolerance. He denies chest pain, nausea, vomiting, diarrhea, constipation.    Past medical, surgical, family, and social histories have been reviewed and updated below.    Past Medical History:   Past Medical History:   Diagnosis Date    Diabetes mellitus     Elevated PSA     H/O brain tumor     HLD  (hyperlipidemia)     Hypertension     PAD (peripheral artery disease)     Personal history of colonic polyps 1/8/2021    Prostate cancer     Seizures     R/T BRAIN TUMOR- SURGICALLY EXCISED    Urinary tract infection        Past Surgical History:   Past Surgical History:   Procedure Laterality Date    ANGIOGRAPHY Left 4/6/2020    Procedure: ANGIOGRAM, Left lower extremity;  Surgeon: Kelby Gomez MD;  Location: [facility] OR [address] [place] [address];  Service: Peripheral Vascular;  Laterality: Left;    ANGIOGRAPHY OF LOWER EXTREMITY Left 6/29/2020    Procedure: Angiogram Extremity Unilateral, washout L groin, possible open pseudoaneurysm repair;  Surgeon: Bruce Dallas MD;  Location: [facility] OR [address] [place] [address];  Service: Peripheral Vascular;  Laterality: Left;  contrast 23 ml  fluoro time: 9.9 min  mGy: 1230.26  Gycm2: 146.69    AORTOGRAPHY N/A 6/29/2020    Procedure: AORTOGRAM;  Surgeon: Bruce Dallas MD;  Location: [facility] OR [address] [place] [address];  Service: Peripheral Vascular;  Laterality: N/A;    APPLICATION OF WOUND VACUUM-ASSISTED CLOSURE DEVICE Bilateral 1/1/2020    Procedure: APPLICATION, WOUND VAC;  Surgeon: SEBAS Prieto II, MD;  Location: [facility] OR [address] [place] [address];  Service: Cardiovascular;  Laterality: Bilateral;    APPLICATION OF WOUND VACUUM-ASSISTED CLOSURE DEVICE N/A 1/3/2020    Procedure: APPLICATION, WOUND VAC;  Surgeon: Brian Wong MD;  Location: [facility] OR [address] [place] [address];  Service: Plastics;  Laterality: N/A;    BRAIN SURGERY  01/2011    TUMOR EXCISED    COLONOSCOPY N/A 1/8/2021    Procedure: COLONOSCOPY;  Surgeon: Susan Posada MD;  Location: [facility] ENDO;  Service: Endoscopy;  Laterality: N/A;    COLONOSCOPY N/A 11/18/2021    Procedure: COLONOSCOPY;  Surgeon: Susan Posada MD;  Location: [facility] ENDO;  Service: Endoscopy;  Laterality: N/A;    CREATION OF ILIOFEMORAL ARTERY BYPASS Left 8/12/2020    Procedure: CREATION, BYPASS, ARTERIAL, ILIAC TO FEMORAL;  Surgeon: Kelby Gomez MD;  Location: [address];   Service: Peripheral Vascular;  Laterality: Left;  CO-SURGEONS: DR KEO ADHIKARI;  DR WONG, profusion    CREATION OF MUSCLE ROTATIONAL FLAP N/A 1/3/2020    Procedure: CREATION, FLAP, MUSCLE ROTATION;  Surgeon: Brian Wong MD;  Location: [address];  Service: Plastics;  Laterality: N/A;    ENDARTERECTOMY OF FEMORAL ARTERY Bilateral 12/20/2019    Procedure: ENDARTERECTOMY, FEMORAL;  Surgeon: Kelby Gomez MD;  Location: [address];  Service: Peripheral Vascular;  Laterality: Bilateral;  natalya common femoral endarterectomy with natalya. iliac stent placement    ESOPHAGOGASTRODUODENOSCOPY N/A 4/7/2022    Procedure: EGD (ESOPHAGOGASTRODUODENOSCOPY);  Surgeon: Susan Posada MD;  Location: [facility];  Service: Endoscopy;  Laterality: N/A;    INTRALUMINAL GASTROINTESTINAL TRACT IMAGING VIA CAPSULE N/A 4/18/2022    Procedure: IMAGING, GI TRACT, INTRALUMINAL, VIA CAPSULE;  Surgeon: Otto Powers MD;  Location: [facility];  Service: Endoscopy;  Laterality: N/A;    PERCUTANEOUS TRANSLUMINAL ANGIOPLASTY Left 4/6/2020    Procedure: PTA (ANGIOPLASTY, PERCUTANEOUS, TRANSLUMINAL), left lower extremity;  Surgeon: Kelby Gomez MD;  Location: [address];  Service: Peripheral Vascular;  Laterality: Left;    PERCUTANEOUS TRANSLUMINAL ANGIOPLASTY (PTA) OF PERIPHERAL VESSEL Left 10/17/2019    Procedure: PTA, PERIPHERAL VESSEL;  Surgeon: Rao Fisher MD;  Location[facility] CATH;  Service: Cardiology;  Laterality: Left;    PSEUDOANEURYSM REPAIR Left 6/29/2020    Procedure: REPAIR, PSEUDOANEURYSM;  Surgeon: Bruce Dallas MD;  Location: [address];  Service: Peripheral Vascular;  Laterality: Left;    RADICAL RETROPUBIC PROSTATECTOMY N/A 12/13/2021    Procedure: PROSTATECTOMY, RETROPUBIC, RADICAL + Bilateral pelvic lymph node dissection;  Surgeon: Raghav Mitchell MD;  [facility];  Service: Urology;  Laterality: N/A;       Family History:   Family History   Problem Relation Age of Onset     Diabetes Mother     Hypertension Mother     Stroke Mother     Cancer Father         LUNG    Heart disease Father     Cancer Brother     Heart disease Brother     Heart disease Brother     Heart disease Brother     Prostate cancer Neg Hx     Kidney disease Neg Hx        Social History:  reports that he has been smoking cigarettes. He has been smoking about 0.25 packs per day. He has never used smokeless tobacco. He reports current alcohol use. He reports that he does not use drugs.    Allergies:  Review of patient's allergies indicates:  No Known Allergies    Medications:  Current Outpatient Medications   Medication Sig Dispense Refill    amoxicillin (AMOXIL) 500 MG Tab Take 2 tablets (1,000 mg total) by mouth every 12 (twelve) hours. 120 tablet 11    aspirin (ECOTRIN) 81 MG EC tablet Take 81 mg by mouth once daily.      atorvastatin (LIPITOR) 40 MG tablet Take 1 tablet (40 mg total) by mouth once daily. 30 tablet 5    clopidogreL (PLAVIX) 75 mg tablet TAKE ONE TABLET BY MOUTH once a day 30 tablet 0    elderberry fruit and flower 460-115 mg Cap Take 2 capsules by mouth every morning.      Lactobacillus acidophilus (ACIDOPHILUS ORAL) Take 1 capsule by mouth every other day.      metFORMIN (GLUCOPHAGE-XR) 500 MG XR 24hr tablet Take 1,000 mg by mouth daily with breakfast.       mupirocin (BACTROBAN) 2 % ointment Apply topically 3 (three) times daily. (Patient not taking: No sig reported) 15 g 2    pantoprazole (PROTONIX) 40 MG tablet Take 1 tablet (40 mg total) by mouth once daily. 30 tablet 2    pregabalin (LYRICA) 100 MG capsule Take 100 mg by mouth 2 (two) times daily.       tadalafiL (CIALIS) 10 MG tablet Take 1 tablet (10 mg total) by mouth once daily. 90 tablet 3    valsartan (DIOVAN) 160 MG tablet Take 160 mg by mouth once daily.       No current facility-administered medications for this visit.       Review of Systems   Constitutional: Positive for fatigue.   HENT: Negative for sore throat.  "   Eyes: Negative for visual disturbance.   Respiratory: Positive for shortness of breath.    Cardiovascular: Negative for chest pain.   Gastrointestinal: Negative for abdominal pain.   Genitourinary: Negative for dysuria.   Musculoskeletal: Negative for back pain.   Skin: Negative for rash.   Neurological: Negative for headaches.   Hematological: Negative for adenopathy.   Psychiatric/Behavioral: The patient is not nervous/anxious.        ECOG Performance Status:   ECOG SCORE    1 - Restricted in strenuous activity-ambulatory and able to carry out work of a light nature         Objective:      Vitals:   Vitals:    04/27/22 1419   BP: 127/79   BP Location: Left arm   Patient Position: Sitting   BP Method: Large (Automatic)   Pulse: 80   Resp: 20   Temp: 98.7 °F (37.1 °C)   TempSrc: Oral   SpO2: 98%   Weight: 112.1 kg (247 lb 2.2 oz)   Height: 5' 10" (1.778 m)     BMI: Body mass index is 35.46 kg/m².    Physical Exam  Vitals and nursing note reviewed.   Constitutional:       Appearance: He is well-developed.   HENT:      Head: Normocephalic and atraumatic.   Eyes:      Pupils: Pupils are equal, round, and reactive to light.   Cardiovascular:      Rate and Rhythm: Normal rate and regular rhythm.   Pulmonary:      Effort: Pulmonary effort is normal.      Breath sounds: Normal breath sounds.   Abdominal:      General: Bowel sounds are normal.      Palpations: Abdomen is soft.   Musculoskeletal:         General: Normal range of motion.      Cervical back: Normal range of motion and neck supple.   Skin:     General: Skin is warm and dry.   Neurological:      Mental Status: He is alert and oriented to person, place, and time.   Psychiatric:         Behavior: Behavior normal.         Thought Content: Thought content normal.         Judgment: Judgment normal.         Laboratory Data:  Labs have been reviewed.    Lab Results   Component Value Date    WBC 3.38 (L) 04/07/2022    HGB 7.0 (L) 04/07/2022    HCT 25.7 (L) 04/07/2022 "    MCV 73 (L) 04/07/2022     04/07/2022           Imaging:     Upper GI endoscopy (4/7/22):  Impression:            - Normal middle third of esophagus.                          - Erythematous mucosa in the antrum. Biopsied.                          - Normal examined duodenum. Biopsied.                          - The tonsils are touching.                          - A single plaque in the upper third of the                          esophagus. Biopsied. This is consistent with a                          benign inlet patch.                          - Z-line irregular, at the gastroesophageal                          junction. No changes c/w Neal's seen.     Colonoscopy (11/18/21):   - This was a much better exam than last                          colonoscopy.                          - Significant colonic spasm.                          - Two 8 to 9 mm polyps in the descending colon,                          removed with a hot snare. Resected and retrieved.                          - One 5 mm polyp in the descending colon. Treated                          with a hot snare.                          - One 10 mm polyp in the sigmoid colon, removed                          with a hot snare. Resected and retrieved.                          - One diminutive polyp in the rectum, removed with                          a cold biopsy forceps. Resected and retrieved.                          - Redundant colon.                          - Internal hemorrhoids.     Assessment:       1. Iron deficiency anemia due to chronic blood loss    2. Severe obesity (BMI 35.0-39.9) with comorbidity    3. Prostate cancer    4. Advance care planning           Plan:     1. Iron deficiency anemia  - I have reviewed his chart  - labs since December 2021 reveal a mild-to-severe anemia, now microcytic  - iron studies 94/7/22) reveal a low-normal ferritin, decreased iron/saturated iron, and increased total iron binding capacity  - he has  undergone colonoscopy (11/18/21) and upper GI endoscopy (4/7/22). He also recently video capsule endoscopy. Follow up those results.  - given the severity of his iron deficiency anemia, I recommend iron sucrose x 4 doses. First dose is 5/2/22.  - return to clinic in 2 months with repeat labs.    2. Severe obesity  - Body mass index is 35.46 kg/m².  - I encouraged weight loss  - continue to monitor    3. Prostate cancer  - he underwent radical prostatectomy on 12/13/21.  - defer surveillance to Dr. Mitchell.    4. Advance Care Planning     Date: 04/27/2022    Power of   I initiated the process of advance care planning today and explained the importance of this process to the patient.  I introduced the concept of advance directives to the patient, as well. Then the patient received detailed information about the importance of designating a Health Care Power of  (HCPOA). He was also instructed to communicate with this person about their wishes for future healthcare, should he become sick and lose decision-making capacity. The patient has not previously appointed a HCPOA. After our discussion, the patient has decided to complete a HCPOA and has appointed his wife Patricia Britton ([phone]). I spent a total time of 16 minutes discussing this issue with the patient.          - return to clinic in 2 months with repeat labs.    Kenneth Mcdowell M.D.  Hematology/Oncology  [facility] [address], [address]  [place] [address]  Phone: [phone]  Fax: [phone]

## 2022-04-27 ENCOUNTER — OFFICE VISIT (OUTPATIENT)
Dept: HEMATOLOGY/ONCOLOGY | Facility: CLINIC | Age: 62
End: 2022-04-27
Payer: COMMERCIAL

## 2022-04-27 VITALS
SYSTOLIC BLOOD PRESSURE: 127 MMHG | BODY MASS INDEX: 35.38 KG/M2 | RESPIRATION RATE: 20 BRPM | OXYGEN SATURATION: 98 % | WEIGHT: 247.13 LBS | DIASTOLIC BLOOD PRESSURE: 79 MMHG | HEIGHT: 70 IN | HEART RATE: 80 BPM | TEMPERATURE: 99 F

## 2022-04-27 DIAGNOSIS — Z71.89 ADVANCE CARE PLANNING: ICD-10-CM

## 2022-04-27 DIAGNOSIS — E66.01 SEVERE OBESITY (BMI 35.0-39.9) WITH COMORBIDITY: ICD-10-CM

## 2022-04-27 DIAGNOSIS — D50.0 IRON DEFICIENCY ANEMIA DUE TO CHRONIC BLOOD LOSS: Primary | ICD-10-CM

## 2022-04-27 DIAGNOSIS — C61 PROSTATE CANCER: ICD-10-CM

## 2022-04-27 PROCEDURE — 99999 PR PBB SHADOW E&M-EST. PATIENT-LVL V: ICD-10-PCS | Mod: PBBFAC,,, | Performed by: INTERNAL MEDICINE

## 2022-04-27 PROCEDURE — 99497 ADVNCD CARE PLAN 30 MIN: CPT | Mod: S$GLB,,, | Performed by: INTERNAL MEDICINE

## 2022-04-27 PROCEDURE — 99497 PR ADVNCD CARE PLAN 30 MIN: ICD-10-PCS | Mod: S$GLB,,, | Performed by: INTERNAL MEDICINE

## 2022-04-27 PROCEDURE — 99204 OFFICE O/P NEW MOD 45 MIN: CPT | Mod: S$GLB,,, | Performed by: INTERNAL MEDICINE

## 2022-04-27 PROCEDURE — 99204 PR OFFICE/OUTPT VISIT, NEW, LEVL IV, 45-59 MIN: ICD-10-PCS | Mod: S$GLB,,, | Performed by: INTERNAL MEDICINE

## 2022-04-27 PROCEDURE — 99999 PR PBB SHADOW E&M-EST. PATIENT-LVL V: CPT | Mod: PBBFAC,,, | Performed by: INTERNAL MEDICINE

## 2022-04-29 ENCOUNTER — PATIENT MESSAGE (OUTPATIENT)
Dept: HEMATOLOGY/ONCOLOGY | Facility: CLINIC | Age: 62
End: 2022-04-29
Payer: COMMERCIAL

## 2022-04-29 NOTE — PROVATION PATIENT INSTRUCTIONS
Discharge Summary/Instructions after an Endoscopic Procedure  Patient Name: Renan Britton  Patient MRN: [id number]  Patient YOB: 1960 Monday, April 18, 2022  Otto Powers MD  Dear patient,  As a result of recent federal legislation (The Federal Cures Act), you may   receive lab or pathology results from your procedure in your [facility]   account before your physician is able to contact you. Your physician or   their representative will relay the results to you with their   recommendations at their soonest availability.  Thank you,  Your health is very important to us during the Covid Crisis. Following your   procedure today, you will receive a daily text for 2 weeks asking about   signs or symptoms of Covid 19.  Please respond to this text when you   receive it so we can follow up and keep you as safe as possible.   RESTRICTIONS:  During your procedure today, you received medications for sedation.  These   medications may affect your judgment, balance and coordination.  Therefore,   for 24 hours, you have the following restrictions:   - DO NOT drive a car, operate machinery, make legal/financial decisions,   sign important papers or drink alcohol.    ACTIVITY:  Today: no heavy lifting, straining or running due to procedural   sedation/anesthesia.  The following day: return to full activity including work.  DIET:  Eat and drink normally unless instructed otherwise.     TREATMENT FOR COMMON SIDE EFFECTS:  - Mild abdominal pain, nausea, belching, bloating or excessive gas:  rest,   eat lightly and use a heating pad.  - Sore Throat: treat with throat lozenges and/or gargle with warm salt   water.  - Because air was used during the procedure, expelling large amounts of air   from your rectum or belching is normal.  - If a bowel prep was taken, you may not have a bowel movement for 1-3 days.    This is normal.  SYMPTOMS TO WATCH FOR AND REPORT TO YOUR PHYSICIAN:  1. Abdominal pain or bloating, other  than gas cramps.  2. Chest pain.  3. Back pain.  4. Signs of infection such as: chills or fever occurring within 24 hours   after the procedure.  5. Rectal bleeding, which would show as bright red, maroon, or black stools.   (A tablespoon of blood from the rectum is not serious, especially if   hemorrhoids are present.)  6. Vomiting.  7. Weakness or dizziness.  GO DIRECTLY TO THE NEAREST EMERGENCY ROOM IF YOU HAVE ANY OF THE FOLLOWING:      Difficulty breathing              Chills and/or fever over 101 F   Persistent vomiting and/or vomiting blood   Severe abdominal pain   Severe chest pain   Black, tarry stools   Bleeding- more than one tablespoon   Any other symptom or condition that you feel may need urgent attention  Your doctor recommends these additional instructions:  If any biopsies were taken, your doctors clinic will contact you in 1 to 2   weeks with any results.  - Discharge patient to home.   - Resume previous diet.   - Return to referring physician as previously scheduled.  For questions, problems or results please call your physician - Otto Powers MD.  EMERGENCY PHONE NUMBER: [phone],  LAB RESULTS: [phone]  IF A COMPLICATION OR EMERGENCY SITUATION ARISES AND YOU ARE UNABLE TO REACH   YOUR PHYSICIAN - GO DIRECTLY TO THE EMERGENCY ROOM.  Otto Powers MD  4/29/2022 9:01:16 AM  This report has been verified and signed electronically.  Dear patient,  As a result of recent federal legislation (The Federal Cures Act), you may   receive lab or pathology results from your procedure in your [facility]   account before your physician is able to contact you. Your physician or   their representative will relay the results to you with their   recommendations at their soonest availability.  Thank you,  PROVATION

## 2022-05-20 ENCOUNTER — PATIENT MESSAGE (OUTPATIENT)
Dept: HEMATOLOGY/ONCOLOGY | Facility: CLINIC | Age: 62
End: 2022-05-20
Payer: COMMERCIAL

## 2022-06-10 ENCOUNTER — PATIENT MESSAGE (OUTPATIENT)
Dept: HEMATOLOGY/ONCOLOGY | Facility: CLINIC | Age: 62
End: 2022-06-10
Payer: COMMERCIAL

## 2022-06-15 ENCOUNTER — PATIENT MESSAGE (OUTPATIENT)
Dept: UROLOGY | Facility: CLINIC | Age: 62
End: 2022-06-15
Payer: COMMERCIAL

## 2022-06-21 ENCOUNTER — OFFICE VISIT (OUTPATIENT)
Dept: UROLOGY | Facility: CLINIC | Age: 62
End: 2022-06-21
Payer: COMMERCIAL

## 2022-06-21 VITALS
HEIGHT: 70 IN | OXYGEN SATURATION: 98 % | DIASTOLIC BLOOD PRESSURE: 82 MMHG | WEIGHT: 256 LBS | SYSTOLIC BLOOD PRESSURE: 128 MMHG | BODY MASS INDEX: 36.65 KG/M2 | HEART RATE: 68 BPM

## 2022-06-21 DIAGNOSIS — R97.20 ELEVATED PSA: ICD-10-CM

## 2022-06-21 DIAGNOSIS — Z90.79 STATUS POST PROSTATECTOMY: ICD-10-CM

## 2022-06-21 DIAGNOSIS — N52.31 ERECTILE DYSFUNCTION AFTER RADICAL PROSTATECTOMY: ICD-10-CM

## 2022-06-21 DIAGNOSIS — C61 MALIGNANT NEOPLASM OF PROSTATE: Primary | ICD-10-CM

## 2022-06-21 PROCEDURE — 99214 PR OFFICE/OUTPT VISIT, EST, LEVL IV, 30-39 MIN: ICD-10-PCS | Mod: S$GLB,,, | Performed by: UROLOGY

## 2022-06-21 PROCEDURE — 99214 OFFICE O/P EST MOD 30 MIN: CPT | Mod: S$GLB,,, | Performed by: UROLOGY

## 2022-06-21 PROCEDURE — 99999 PR PBB SHADOW E&M-EST. PATIENT-LVL IV: CPT | Mod: PBBFAC,,, | Performed by: UROLOGY

## 2022-06-21 PROCEDURE — 99999 PR PBB SHADOW E&M-EST. PATIENT-LVL IV: ICD-10-PCS | Mod: PBBFAC,,, | Performed by: UROLOGY

## 2022-06-21 RX ORDER — SILDENAFIL 50 MG/1
50 TABLET, FILM COATED ORAL DAILY PRN
Qty: 10 TABLET | Refills: 11 | Status: SHIPPED | OUTPATIENT
Start: 2022-06-21 | End: 2023-01-27

## 2022-06-21 NOTE — PROGRESS NOTES
Subjective:       Patient ID: Renan Britton is a 61 y.o. male.    Chief Complaint: Follow-up (3 month) and Results (PSA completed on 06/14/22)    60 yo WM who is over 6 months status post radical retropubic prostatectomy.  The patient had negative margins and negative nodes.  His been doing well with maintaining retaining urinary control.  His erectile function is fair but could be better.  He was using Cialis 10 mg daily.  We will add 50 mg Viagra prior to sexual activity and see if this makes a difference.  The patient had severe iron deficiency anemia.  He required iron transfusions.  He has undergone endoscopic evaluation with the camera as well as EGD and colonoscopy.  The patient has not been found have any lesions to cause anemia.  This appears to have stabilized. Nocturia x 1. Urinary urgency. Rare mild MAGDALENA. No pads. PSA went to 0.11 mg at 3 months and 0.14 at 6 months.    Follow-up  This is a chronic (RRP and BPLND) problem. The current episode started more than 1 year ago. The problem occurs constantly. The problem has been resolved. Pertinent negatives include no abdominal pain, anorexia, arthralgias, change in bowel habit, chest pain, chills, congestion, coughing, diaphoresis, fatigue, fever, headaches, joint swelling, myalgias, nausea, neck pain, numbness, rash, sore throat, swollen glands, urinary symptoms, vertigo, visual change, vomiting or weakness. Nothing aggravates the symptoms. Treatments tried: RRP and BPLND. The treatment provided significant relief.     Review of Systems   Constitutional: Negative for activity change, appetite change, chills, diaphoresis, fatigue, fever and unexpected weight change.   HENT: Negative for congestion, hearing loss, sinus pressure, sore throat and trouble swallowing.    Eyes: Negative for photophobia, pain, discharge and visual disturbance.   Respiratory: Negative for apnea, cough and shortness of breath.    Cardiovascular: Negative for chest pain,  palpitations and leg swelling.   Gastrointestinal: Negative for abdominal distention, abdominal pain, anal bleeding, anorexia, blood in stool, change in bowel habit, constipation, diarrhea, nausea, rectal pain and vomiting.   Endocrine: Negative for cold intolerance, heat intolerance, polydipsia, polyphagia and polyuria.   Genitourinary: Negative for decreased urine volume, difficulty urinating, dysuria, enuresis, flank pain, frequency, genital sores, hematuria, penile discharge, penile pain, penile swelling, scrotal swelling, testicular pain and urgency.   Musculoskeletal: Negative for arthralgias, back pain, joint swelling, myalgias and neck pain.   Skin: Negative for color change, pallor, rash and wound.   Allergic/Immunologic: Negative for environmental allergies, food allergies and immunocompromised state.   Neurological: Negative for dizziness, vertigo, seizures, weakness, numbness and headaches.   Hematological: Negative for adenopathy. Does not bruise/bleed easily.   Psychiatric/Behavioral: Negative.        Objective:      Physical Exam  Vitals and nursing note reviewed.   Constitutional:       Appearance: Normal appearance. He is well-developed.   HENT:      Head: Normocephalic and atraumatic.      Nose: Nose normal.   Eyes:      General: No scleral icterus.        Right eye: No discharge.         Left eye: No discharge.      Extraocular Movements: Extraocular movements intact.      Conjunctiva/sclera: Conjunctivae normal.      Pupils: Pupils are equal, round, and reactive to light.   Cardiovascular:      Rate and Rhythm: Normal rate and regular rhythm.      Heart sounds: Normal heart sounds.   Pulmonary:      Effort: Pulmonary effort is normal.      Breath sounds: Normal breath sounds.   Abdominal:      General: Bowel sounds are normal.      Palpations: Abdomen is soft.      Tenderness: There is no right CVA tenderness or left CVA tenderness.   Genitourinary:     Penis: Normal.       Testes: Normal.    Musculoskeletal:         General: Normal range of motion.      Cervical back: Normal range of motion and neck supple.   Skin:     General: Skin is warm and dry.      Capillary Refill: Capillary refill takes less than 2 seconds.   Neurological:      Mental Status: He is alert and oriented to person, place, and time.      Deep Tendon Reflexes: Reflexes are normal and symmetric.   Psychiatric:         Mood and Affect: Mood normal.         Behavior: Behavior normal.         Thought Content: Thought content normal.         Judgment: Judgment normal.         Assessment:       1. Malignant neoplasm of prostate    2. Elevated PSA    3. Erectile dysfunction after radical prostatectomy    4. Status post prostatectomy        Plan:       Patient Instructions   And Viagra 50 mg prior to sexual activity.  Repeat PSA in 3 months  Follow-up 3 months.

## 2022-06-22 ENCOUNTER — PATIENT MESSAGE (OUTPATIENT)
Dept: HEMATOLOGY/ONCOLOGY | Facility: CLINIC | Age: 62
End: 2022-06-22
Payer: COMMERCIAL

## 2022-06-27 NOTE — PROGRESS NOTES
PATIENT: Renan Britton  MRN: [id number]  DATE: 6/28/2022    Diagnosis:   1. Iron deficiency anemia due to chronic blood loss    2. Severe obesity (BMI 35.0-39.9) with comorbidity    3. Prostate cancer    4. Thrombocytopenia, unspecified      Chief Complaint: iron deficiency anemia    Oncologic History:      Oncologic History 1. Prostate cancer      Oncologic Treatment 1. Radical prostatectomy (12/13/21)      Pathology 12/13/21:  1. Right iliac, obturator, and hypogastric lymph nodes, lymphadenectomy:  Eleven lymph nodes, negative for carcinoma (0/11).  2. Left iliac, obturator, and hypogastric lymph nodes, lymphadenectomy:  Six lymph nodes, negative for carcinoma (0/6).  2. Prostate, radical prostatectomy:  Prostatic adenocarcinoma, grade group 2, Dixon score = 7 (3+4), with involvement of bilateral prostate  lobes.  Surgical margins are negative for carcinoma.  Uninvolved prostate with benign glandular hyperplasia and minimal chronic inflammation.  AJCC 8th edition Pathologic stage: [id number].        Telemedicine visit:  The patient location is: home  The chief complaint leading to consultation is: anemia    Visit type: audiovisual    Face to Face time with patient: 10 minutes  15 minutes of total time spent on the encounter, which includes face to face time and non-face to face time preparing to see the patient (eg, review of tests), Obtaining and/or reviewing separately obtained history, Documenting clinical information in the electronic or other health record, Independently interpreting results (not separately reported) and communicating results to the patient/family/caregiver, or Care coordination (not separately reported).     Each patient to whom he or she provides medical services by telemedicine is:  (1) informed of the relationship between the physician and patient and the respective role of any other health care provider with respect to management of the patient; and (2) notified that he or she may  decline to receive medical services by telemedicine and may withdraw from such care at any time.    Notes: see below    Subjective:    History of Present Illness: Mr. Britton is a 61 y.o. male who presented in April 2022 for evaluation and management of iron deficiency anemia. He was referred by Dr. Posada.    - labs since December 2021 reveal a mild-to-severe anemia, now microcytic  - iron studies 94/7/22) reveal a low-normal ferritin, decreased iron/saturated iron, and increased total iron binding capacity  - he underwent radical prostatectomy on 12/13/21.  - he has undergone colonoscopy (11/18/21) and upper GI endoscopy (4/7/22).     Interval history:  - he presents for a follow-up appointment for his iron deficiency anemia  - he received iron sucrose x 4 doses in April/May 2022.  He also underwent a video capsule endoscopy (4/18/22) that was relatively normal.  - today, he is doing well his fatigue and dyspnea have improved. He denies chest pain, nausea, vomiting, diarrhea, constipation.      Past medical, surgical, family, and social histories have been reviewed and updated below.    Past Medical History:   Past Medical History:   Diagnosis Date    Diabetes mellitus     Elevated PSA     H/O brain tumor     HLD (hyperlipidemia)     Hypertension     PAD (peripheral artery disease)     Personal history of colonic polyps 1/8/2021    Prostate cancer     Seizures     R/T BRAIN TUMOR- SURGICALLY EXCISED    Urinary tract infection        Past Surgical History:   Past Surgical History:   Procedure Laterality Date    ANGIOGRAPHY Left 4/6/2020    Procedure: ANGIOGRAM, Left lower extremity;  Surgeon: Kelby Gomez MD;  Location: [facility] OR [address] [place] [address];  Service: Peripheral Vascular;  Laterality: Left;    ANGIOGRAPHY OF LOWER EXTREMITY Left 6/29/2020    Procedure: Angiogram Extremity Unilateral, washout L groin, possible open pseudoaneurysm repair;  Surgeon: Bruce Dallas MD;  Location: [facility] OR [address] [place] [address];   Service: Peripheral Vascular;  Laterality: Left;  contrast 23 ml  fluoro time: 9.9 min  mGy: 1230.26  Gycm2: 146.69    AORTOGRAPHY N/A 6/29/2020    Procedure: AORTOGRAM;  Surgeon: Bruce Dallas MD;  [facility];  Service: Peripheral Vascular;  Laterality: N/A;    APPLICATION OF WOUND VACUUM-ASSISTED CLOSURE DEVICE Bilateral 1/1/2020    Procedure: APPLICATION, WOUND VAC;  Surgeon: SEBAS Prieto II, MD;  [facility];  Service: Cardiovascular;  Laterality: Bilateral;    APPLICATION OF WOUND VACUUM-ASSISTED CLOSURE DEVICE N/A 1/3/2020    Procedure: APPLICATION, WOUND VAC;  Surgeon: Brian Wong MD;  Location: [facility] OR [address] [place] [address];  Service: Plastics;  Laterality: N/A;    BRAIN SURGERY  01/2011    TUMOR EXCISED    COLONOSCOPY N/A 1/8/2021    Procedure: COLONOSCOPY;  Surgeon: Susan Posada MD;  Location: [facility] ENDO;  Service: Endoscopy;  Laterality: N/A;    COLONOSCOPY N/A 11/18/2021    Procedure: COLONOSCOPY;  Surgeon: Susan Posada MD;  Location: [facility] ENDO;  Service: Endoscopy;  Laterality: N/A;    CREATION OF ILIOFEMORAL ARTERY BYPASS Left 8/12/2020    Procedure: CREATION, BYPASS, ARTERIAL, ILIAC TO FEMORAL;  Surgeon: Kelby Gomez MD;  Location: [address];  Service: Peripheral Vascular;  Laterality: Left;  CO-SURGEONS: DR KEO ADHIKARI;  DR WONG, profusion    CREATION OF MUSCLE ROTATIONAL FLAP N/A 1/3/2020    Procedure: CREATION, FLAP, MUSCLE ROTATION;  Surgeon: Brian Wong MD;  Location: [facility] OR [address] [place] [address];  Service: Plastics;  Laterality: N/A;    ENDARTERECTOMY OF FEMORAL ARTERY Bilateral 12/20/2019    Procedure: ENDARTERECTOMY, FEMORAL;  Surgeon: Kelby Gomez MD;  Location: [address];  Service: Peripheral Vascular;  Laterality: Bilateral;  natalya common femoral endarterectomy with natalya. iliac stent placement    ESOPHAGOGASTRODUODENOSCOPY N/A 4/7/2022    Procedure: EGD (ESOPHAGOGASTRODUODENOSCOPY);  Surgeon: Susan Posada MD;   Location: [facility] ENDO;  Service: Endoscopy;  Laterality: N/A;    INTRALUMINAL GASTROINTESTINAL TRACT IMAGING VIA CAPSULE N/A 4/18/2022    Procedure: IMAGING, GI TRACT, INTRALUMINAL, VIA CAPSULE;  Surgeon: Otto Powers MD;  Location: [facility] ENDO;  Service: Endoscopy;  Laterality: N/A;    PERCUTANEOUS TRANSLUMINAL ANGIOPLASTY Left 4/6/2020    Procedure: PTA (ANGIOPLASTY, PERCUTANEOUS, TRANSLUMINAL), left lower extremity;  Surgeon: Kelby Gomez MD;  [facility];  Service: Peripheral Vascular;  Laterality: Left;    PERCUTANEOUS TRANSLUMINAL ANGIOPLASTY (PTA) OF PERIPHERAL VESSEL Left 10/17/2019    Procedure: PTA, PERIPHERAL VESSEL;  Surgeon: Rao Fisher MD;  Location: [facility] CATH;  Service: Cardiology;  Laterality: Left;    PSEUDOANEURYSM REPAIR Left 6/29/2020    Procedure: REPAIR, PSEUDOANEURYSM;  Surgeon: Bruce Dallas MD;  Location: [facility] OR [facility]LR;  Service: Peripheral Vascular;  Laterality: Left;    RADICAL RETROPUBIC PROSTATECTOMY N/A 12/13/2021    Procedure: PROSTATECTOMY, RETROPUBIC, RADICAL + Bilateral pelvic lymph node dissection;  Surgeon: Raghav Mitchell MD;  Location: [facility] OR;  Service: Urology;  Laterality: N/A;       Family History:   Family History   Problem Relation Age of Onset    Diabetes Mother     Hypertension Mother     Stroke Mother     Cancer Father         LUNG    Heart disease Father     Cancer Brother     Heart disease Brother     Heart disease Brother     Heart disease Brother     Prostate cancer Neg Hx     Kidney disease Neg Hx        Social History:  reports that he has been smoking cigarettes. He has been smoking about 0.25 packs per day. He has never used smokeless tobacco. He reports current alcohol use. He reports that he does not use drugs.    Allergies:  Review of patient's allergies indicates:  No Known Allergies    Medications:  Current Outpatient Medications   Medication Sig Dispense Refill    amoxicillin (AMOXIL) 500 MG Tab Take 2 tablets  (1,000 mg total) by mouth every 12 (twelve) hours. 120 tablet 11    aspirin (ECOTRIN) 81 MG EC tablet Take 81 mg by mouth once daily.      atorvastatin (LIPITOR) 40 MG tablet Take 1 tablet (40 mg total) by mouth once daily. 30 tablet 5    clopidogreL (PLAVIX) 75 mg tablet TAKE ONE TABLET BY MOUTH once a day 30 tablet 0    elderberry fruit and flower 460-115 mg Cap Take 2 capsules by mouth every morning.      Lactobacillus acidophilus (ACIDOPHILUS ORAL) Take 1 capsule by mouth every other day.      metFORMIN (GLUCOPHAGE-XR) 500 MG XR 24hr tablet Take 1,000 mg by mouth daily with breakfast.       mupirocin (BACTROBAN) 2 % ointment Apply topically 3 (three) times daily. 15 g 2    pantoprazole (PROTONIX) 40 MG tablet Take 1 tablet (40 mg total) by mouth once daily. 30 tablet 2    pregabalin (LYRICA) 100 MG capsule Take 100 mg by mouth 2 (two) times daily.       sildenafiL (VIAGRA) 50 MG tablet Take 1 tablet (50 mg total) by mouth daily as needed for Erectile Dysfunction. 10 tablet 11    tadalafiL (CIALIS) 10 MG tablet Take 1 tablet (10 mg total) by mouth once daily. 90 tablet 3    valsartan (DIOVAN) 160 MG tablet Take 160 mg by mouth once daily.       No current facility-administered medications for this visit.       Review of Systems   Constitutional: Positive for fatigue (improved).   HENT: Negative for sore throat.    Eyes: Negative for visual disturbance.   Respiratory: Negative for shortness of breath.    Cardiovascular: Negative for chest pain.   Gastrointestinal: Negative for abdominal pain.   Genitourinary: Negative for dysuria.   Musculoskeletal: Negative for back pain.   Skin: Negative for rash.   Neurological: Negative for headaches.   Hematological: Negative for adenopathy.   Psychiatric/Behavioral: The patient is not nervous/anxious.        ECOG Performance Status:   ECOG SCORE    0 - Fully active-able to carry on all pre-disease performance without restriction         Objective:      Vitals:    There were no vitals filed for this visit.  BMI: There is no height or weight on file to calculate BMI.  Deferred due to telemedicine visit.    Physical Exam   Deferred due to telemedicine visit.      Laboratory Data:  Labs have been reviewed.    Lab Results   Component Value Date    WBC 5.02 06/27/2022    HGB 15.5 06/27/2022    HCT 49.1 06/27/2022    MCV 86 06/27/2022     (L) 06/27/2022           Imaging:     Upper GI endoscopy (4/7/22):  Impression:            - Normal middle third of esophagus.                          - Erythematous mucosa in the antrum. Biopsied.                          - Normal examined duodenum. Biopsied.                          - The tonsils are touching.                          - A single plaque in the upper third of the                          esophagus. Biopsied. This is consistent with a                          benign inlet patch.                          - Z-line irregular, at the gastroesophageal                          junction. No changes c/w Neal's seen.     Colonoscopy (11/18/21):   - This was a much better exam than last                          colonoscopy.                          - Significant colonic spasm.                          - Two 8 to 9 mm polyps in the descending colon,                          removed with a hot snare. Resected and retrieved.                          - One 5 mm polyp in the descending colon. Treated                          with a hot snare.                          - One 10 mm polyp in the sigmoid colon, removed                          with a hot snare. Resected and retrieved.                          - One diminutive polyp in the rectum, removed with                          a cold biopsy forceps. Resected and retrieved.                          - Redundant colon.                          - Internal hemorrhoids.     Assessment:       1. Iron deficiency anemia due to chronic blood loss    2. Severe obesity (BMI 35.0-39.9) with  comorbidity    3. Prostate cancer    4. Thrombocytopenia, unspecified           Plan:     1. Iron deficiency anemia  - I have reviewed his chart  - labs since December 2021 reveal a mild-to-severe anemia, now microcytic  - iron studies 94/7/22) reveal a low-normal ferritin, decreased iron/saturated iron, and increased total iron binding capacity  - he has undergone colonoscopy (11/18/21) and upper GI endoscopy (4/7/22). No clear source for GI blood loss. He also underwent a video capsule endoscopy (4/18/22) that was relatively normal.  - given the severity of his iron deficiency anemia, I recommended iron sucrose x 4 doses.   - he received iron sucrose x 4 doses in April/May 2022.  - Labs have been reviewed. Anemia has resolved! Ferritin has increased to 48 ng/mL.  - return to clinic in 4 months with repeat labs.    2. Severe obesity  - no weight since telemedicine visit.  - I encouraged weight loss  - continue to monitor    3. Prostate cancer  - he underwent radical prostatectomy on 12/13/21.  - defer surveillance to Dr. Mitchell.    4. Advance Care Planning     Power of   After our discussion (at previous visit), the patient decided to complete a HCPOA and appointed his wife Patricia Britton ([phone]).        - return to clinic in 4 months with repeat labs.    Kenneth Mcdowell M.D.  Hematology/Oncology  [facility] - [address], [address]  [place] [address]  Phone: [phone]  Fax: [phone]

## 2022-06-28 ENCOUNTER — OFFICE VISIT (OUTPATIENT)
Dept: HEMATOLOGY/ONCOLOGY | Facility: CLINIC | Age: 62
End: 2022-06-28
Payer: COMMERCIAL

## 2022-06-28 DIAGNOSIS — D69.6 THROMBOCYTOPENIA, UNSPECIFIED: ICD-10-CM

## 2022-06-28 DIAGNOSIS — D50.0 IRON DEFICIENCY ANEMIA DUE TO CHRONIC BLOOD LOSS: Primary | ICD-10-CM

## 2022-06-28 DIAGNOSIS — E66.01 SEVERE OBESITY (BMI 35.0-39.9) WITH COMORBIDITY: ICD-10-CM

## 2022-06-28 DIAGNOSIS — C61 PROSTATE CANCER: ICD-10-CM

## 2022-06-28 PROCEDURE — 99214 OFFICE O/P EST MOD 30 MIN: CPT | Mod: 95,,, | Performed by: INTERNAL MEDICINE

## 2022-06-28 PROCEDURE — 99214 PR OFFICE/OUTPT VISIT, EST, LEVL IV, 30-39 MIN: ICD-10-PCS | Mod: 95,,, | Performed by: INTERNAL MEDICINE

## 2022-06-28 NOTE — Clinical Note
1. Schedule labs cbc, ferritin, iron/tibc near his house in 4 months. 2. Schedule virtual visit day after labs.  Thanks!

## 2022-09-27 ENCOUNTER — OFFICE VISIT (OUTPATIENT)
Dept: UROLOGY | Facility: CLINIC | Age: 62
End: 2022-09-27
Payer: COMMERCIAL

## 2022-09-27 VITALS
WEIGHT: 265.81 LBS | DIASTOLIC BLOOD PRESSURE: 72 MMHG | SYSTOLIC BLOOD PRESSURE: 128 MMHG | HEIGHT: 70 IN | BODY MASS INDEX: 38.05 KG/M2 | HEART RATE: 68 BPM

## 2022-09-27 DIAGNOSIS — Z90.79 STATUS POST PROSTATECTOMY: ICD-10-CM

## 2022-09-27 DIAGNOSIS — C61 PROSTATE CANCER: Primary | ICD-10-CM

## 2022-09-27 DIAGNOSIS — R97.20 ELEVATED PSA: ICD-10-CM

## 2022-09-27 PROCEDURE — 99999 PR PBB SHADOW E&M-EST. PATIENT-LVL IV: CPT | Mod: PBBFAC,,, | Performed by: UROLOGY

## 2022-09-27 PROCEDURE — 99214 PR OFFICE/OUTPT VISIT, EST, LEVL IV, 30-39 MIN: ICD-10-PCS | Mod: S$GLB,,, | Performed by: UROLOGY

## 2022-09-27 PROCEDURE — 99999 PR PBB SHADOW E&M-EST. PATIENT-LVL IV: ICD-10-PCS | Mod: PBBFAC,,, | Performed by: UROLOGY

## 2022-09-27 PROCEDURE — 99214 OFFICE O/P EST MOD 30 MIN: CPT | Mod: S$GLB,,, | Performed by: UROLOGY

## 2022-09-27 RX ORDER — AMOXICILLIN 500 MG/1
1000 CAPSULE ORAL EVERY 12 HOURS
COMMUNITY
Start: 2022-08-30 | End: 2023-01-04 | Stop reason: SDUPTHER

## 2022-09-27 NOTE — PROGRESS NOTES
Subjective:       Patient ID: Renan Britton is a 62 y.o. male.    Chief Complaint: Other (Follow up)    Mr. Britton year old gentleman who is approximately 9 months status post radical retropubic prostatectomy.  The patient's pathology report showed no evidence of extension of prostate cancer outside of prostate.  And no perineural involvement and no lymph node involvement.  The patient did have cancer going on both lobes of the prostate but no evidence of spread of disease.  His PSA nadired at 0.14 and has risen over the last 9 months to 0.19.  The patient has got very much improved urinary control and improving erectile function.  He does not have any complaints and his incision is well healed.    Follow-up  This is a chronic (Personal history of prostate cancer) problem. The current episode started more than 1 year ago. The problem occurs constantly. The problem has been unchanged. Pertinent negatives include no abdominal pain, anorexia, arthralgias, change in bowel habit, chest pain, chills, congestion, coughing, diaphoresis, fatigue, fever, headaches, joint swelling, myalgias, nausea, neck pain, numbness, rash, sore throat, swollen glands, urinary symptoms, vertigo, visual change, vomiting or weakness. Nothing aggravates the symptoms. He has tried nothing for the symptoms. The treatment provided significant relief.   Review of Systems   Constitutional:  Negative for chills, diaphoresis, fatigue and fever.   HENT:  Negative for congestion and sore throat.    Respiratory:  Negative for cough.    Cardiovascular:  Negative for chest pain.   Gastrointestinal:  Negative for abdominal pain, anorexia, change in bowel habit, nausea and vomiting.   Musculoskeletal:  Negative for arthralgias, joint swelling, myalgias and neck pain.   Skin:  Negative for rash.   Neurological:  Negative for vertigo, weakness, numbness and headaches.     Objective:      Physical Exam  Vitals and nursing note reviewed.    Constitutional:       Appearance: Normal appearance. He is well-developed. He is obese.   HENT:      Head: Normocephalic and atraumatic.      Right Ear: External ear normal.      Left Ear: External ear normal.      Nose: Nose normal.      Mouth/Throat:      Mouth: Mucous membranes are moist.      Pharynx: Oropharynx is clear. No oropharyngeal exudate or posterior oropharyngeal erythema.   Eyes:      General: No scleral icterus.        Right eye: No discharge.         Left eye: No discharge.      Extraocular Movements: Extraocular movements intact.      Conjunctiva/sclera: Conjunctivae normal.      Pupils: Pupils are equal, round, and reactive to light.   Cardiovascular:      Rate and Rhythm: Normal rate and regular rhythm.      Pulses: Normal pulses.      Heart sounds: Normal heart sounds.   Pulmonary:      Effort: Pulmonary effort is normal.      Breath sounds: Normal breath sounds.   Abdominal:      General: Bowel sounds are normal.      Palpations: Abdomen is soft.      Tenderness: There is no right CVA tenderness or left CVA tenderness.   Genitourinary:     Penis: Normal.       Testes: Normal.   Musculoskeletal:         General: Normal range of motion.      Cervical back: Normal range of motion and neck supple.   Skin:     General: Skin is warm and dry.      Capillary Refill: Capillary refill takes less than 2 seconds.   Neurological:      General: No focal deficit present.      Mental Status: He is alert and oriented to person, place, and time.      Deep Tendon Reflexes: Reflexes are normal and symmetric.   Psychiatric:         Mood and Affect: Mood normal.         Behavior: Behavior normal.         Thought Content: Thought content normal.         Judgment: Judgment normal.       Assessment:       1. Prostate cancer    2. Elevated PSA    3. Status post prostatectomy        Plan:       Patient Instructions   Follow-up in 3 months with PSA test.  If PSA continues to rise will consider PSMA scan.

## 2022-09-28 ENCOUNTER — PATIENT MESSAGE (OUTPATIENT)
Dept: UROLOGY | Facility: CLINIC | Age: 62
End: 2022-09-28
Payer: COMMERCIAL

## 2022-10-28 NOTE — PROGRESS NOTES
PATIENT: Renan Britton  MRN: [id number]  DATE: 10/31/2022    Diagnosis:   1. Iron deficiency anemia due to chronic blood loss    2. Severe obesity (BMI 35.0-39.9) with comorbidity    3. Prostate cancer      Chief Complaint: iron deficiency anemia    Oncologic History:      Oncologic History 1. Prostate cancer      Oncologic Treatment 1. Radical prostatectomy (12/13/21)      Pathology 12/13/21:  1. Right iliac, obturator, and hypogastric lymph nodes, lymphadenectomy:  Eleven lymph nodes, negative for carcinoma (0/11).  2. Left iliac, obturator, and hypogastric lymph nodes, lymphadenectomy:  Six lymph nodes, negative for carcinoma (0/6).  2. Prostate, radical prostatectomy:  Prostatic adenocarcinoma, grade group 2, [place] score = 7 (3+4), with involvement of bilateral prostate  lobes.  Surgical margins are negative for carcinoma.  Uninvolved prostate with benign glandular hyperplasia and minimal chronic inflammation.  AJCC 8th edition Pathologic stage: [id number].        Telemedicine visit:  The patient location is: home  The chief complaint leading to consultation is: anemia    Visit type: audiovisual    Face to Face time with patient: 10 minutes  15 minutes of total time spent on the encounter, which includes face to face time and non-face to face time preparing to see the patient (eg, review of tests), Obtaining and/or reviewing separately obtained history, Documenting clinical information in the electronic or other health record, Independently interpreting results (not separately reported) and communicating results to the patient/family/caregiver, or Care coordination (not separately reported).     Each patient to whom he or she provides medical services by telemedicine is:  (1) informed of the relationship between the physician and patient and the respective role of any other health care provider with respect to management of the patient; and (2) notified that he or she may decline to receive medical services by  telemedicine and may withdraw from such care at any time.    Notes: see below    Subjective:    History of Present Illness: Mr. Britton is a 62 y.o. male who presented in April 2022 for evaluation and management of iron deficiency anemia. He was referred by Dr. Posada.    - labs since December 2021 reveal a mild-to-severe anemia, now microcytic  - iron studies 94/7/22) reveal a low-normal ferritin, decreased iron/saturated iron, and increased total iron binding capacity  - he underwent radical prostatectomy on 12/13/21.  - he has undergone colonoscopy (11/18/21) and upper GI endoscopy (4/7/22).   - he received iron sucrose x 4 doses in April/May 2022.  He also underwent a video capsule endoscopy (4/18/22) that was relatively normal.    Interval history:  - he presents for a follow-up appointment for his iron deficiency anemia  - today, he is doing well. He denies chest pain, nausea, vomiting, diarrhea, constipation.  - his wife feels he is more sluggish/fatigued recently.    Past medical, surgical, family, and social histories have been reviewed and updated below.    Past Medical History:   Past Medical History:   Diagnosis Date    Diabetes mellitus     Elevated PSA     H/O brain tumor     HLD (hyperlipidemia)     Hypertension     PAD (peripheral artery disease)     Personal history of colonic polyps 1/8/2021    Prostate cancer     Seizures     R/T BRAIN TUMOR- SURGICALLY EXCISED    Urinary tract infection        Past Surgical History:   Past Surgical History:   Procedure Laterality Date    ANGIOGRAPHY Left 4/6/2020    Procedure: ANGIOGRAM, Left lower extremity;  Surgeon: Kelby Gomez MD;  Location: [facility] OR [address] [place] [address];  Service: Peripheral Vascular;  Laterality: Left;    ANGIOGRAPHY OF LOWER EXTREMITY Left 6/29/2020    Procedure: Angiogram Extremity Unilateral, washout L groin, possible open pseudoaneurysm repair;  Surgeon: Bruce Dallas MD;  Location: [facility] OR [address] [place] [address];  Service: Peripheral Vascular;   Laterality: Left;  contrast 23 ml  fluoro time: 9.9 min  mGy: 1230.26  Gycm2: 146.69    AORTOGRAPHY N/A 6/29/2020    Procedure: AORTOGRAM;  Surgeon: Bruce Dallas MD;  [facility];  Service: Peripheral Vascular;  Laterality: N/A;    APPLICATION OF WOUND VACUUM-ASSISTED CLOSURE DEVICE Bilateral 1/1/2020    Procedure: APPLICATION, WOUND VAC;  Surgeon: SEBAS Prieto II, MD;  Location: [facility] OR [facility] FLR;  Service: Cardiovascular;  Laterality: Bilateral;    APPLICATION OF WOUND VACUUM-ASSISTED CLOSURE DEVICE N/A 1/3/2020    Procedure: APPLICATION, WOUND VAC;  Surgeon: Brian Wong MD;  [facility];  Service: Plastics;  Laterality: N/A;    BRAIN SURGERY  01/2011    TUMOR EXCISED    COLONOSCOPY N/A 1/8/2021    Procedure: COLONOSCOPY;  Surgeon: Susan Posada MD;  Location: [facility] ENDO;  Service: Endoscopy;  Laterality: N/A;    COLONOSCOPY N/A 11/18/2021    Procedure: COLONOSCOPY;  Surgeon: Susan Posada MD;  Location: [facility] ENDO;  Service: Endoscopy;  Laterality: N/A;    CREATION OF ILIOFEMORAL ARTERY BYPASS Left 8/12/2020    Procedure: CREATION, BYPASS, ARTERIAL, ILIAC TO FEMORAL;  Surgeon: Kelby Gomez MD;  Location: [facility] OR [address] [place] [address];  Service: Peripheral Vascular;  Laterality: Left;  CO-SURGEONS: DR KEO ADHIKARI;  DR WONG, profusion    CREATION OF MUSCLE ROTATIONAL FLAP N/A 1/3/2020    Procedure: CREATION, FLAP, MUSCLE ROTATION;  Surgeon: Brian Wong MD;  [facility];  Service: Plastics;  Laterality: N/A;    ENDARTERECTOMY OF FEMORAL ARTERY Bilateral 12/20/2019    Procedure: ENDARTERECTOMY, FEMORAL;  Surgeon: Kelby Gomez MD;  Location: [facility] OR [address] [place] [address];  Service: Peripheral Vascular;  Laterality: Bilateral;  natalya common femoral endarterectomy with natalya. iliac stent placement    ESOPHAGOGASTRODUODENOSCOPY N/A 4/7/2022    Procedure: EGD (ESOPHAGOGASTRODUODENOSCOPY);  Surgeon: Susan Posada MD;  Location: [facility] ENDO;  Service: Endoscopy;   Laterality: N/A;    INTRALUMINAL GASTROINTESTINAL TRACT IMAGING VIA CAPSULE N/A 4/18/2022    Procedure: IMAGING, GI TRACT, INTRALUMINAL, VIA CAPSULE;  Surgeon: Otto Powers MD;  Location: [facility] ENDO;  Service: Endoscopy;  Laterality: N/A;    PERCUTANEOUS TRANSLUMINAL ANGIOPLASTY Left 4/6/2020    Procedure: PTA (ANGIOPLASTY, PERCUTANEOUS, TRANSLUMINAL), left lower extremity;  Surgeon: Kelby Gomez MD;  [facility];  Service: Peripheral Vascular;  Laterality: Left;    PERCUTANEOUS TRANSLUMINAL ANGIOPLASTY (PTA) OF PERIPHERAL VESSEL Left 10/17/2019    Procedure: PTA, PERIPHERAL VESSEL;  Surgeon: Rao Fisher MD;  Location: [facility] CATH;  Service: Cardiology;  Laterality: Left;    PSEUDOANEURYSM REPAIR Left 6/29/2020    Procedure: REPAIR, PSEUDOANEURYSM;  Surgeon: Bruce Dallas MD;  Location: [facility] OR [facility] FLR;  Service: Peripheral Vascular;  Laterality: Left;    RADICAL RETROPUBIC PROSTATECTOMY N/A 12/13/2021    Procedure: PROSTATECTOMY, RETROPUBIC, RADICAL + Bilateral pelvic lymph node dissection;  Surgeon: Raghav Mitchell MD;  Location: [facility] OR;  Service: Urology;  Laterality: N/A;       Family History:   Family History   Problem Relation Age of Onset    Diabetes Mother     Hypertension Mother     Stroke Mother     Cancer Father         LUNG    Heart disease Father     Cancer Brother     Heart disease Brother     Heart disease Brother     Heart disease Brother     Prostate cancer Neg Hx     Kidney disease Neg Hx        Social History:  reports that he has been smoking cigarettes. He has been smoking an average of .25 packs per day. He has never used smokeless tobacco. He reports current alcohol use. He reports that he does not use drugs.    Allergies:  Review of patient's allergies indicates:  No Known Allergies    Medications:  Current Outpatient Medications   Medication Sig Dispense Refill    amoxicillin (AMOXIL) 500 MG capsule Take 1,000 mg by mouth every 12 (twelve) hours.      amoxicillin  (AMOXIL) 500 MG Tab Take 2 tablets (1,000 mg total) by mouth every 12 (twelve) hours. (Patient not taking: Reported on 9/27/2022) 120 tablet 11    aspirin (ECOTRIN) 81 MG EC tablet Take 81 mg by mouth once daily.      atorvastatin (LIPITOR) 40 MG tablet Take 1 tablet (40 mg total) by mouth once daily. 30 tablet 5    clopidogreL (PLAVIX) 75 mg tablet TAKE ONE TABLET BY MOUTH once a day 30 tablet 0    elderberry fruit and flower 460-115 mg Cap Take 2 capsules by mouth every morning.      Lactobacillus acidophilus (ACIDOPHILUS ORAL) Take 1 capsule by mouth every other day.      metFORMIN (GLUCOPHAGE-XR) 500 MG XR 24hr tablet Take 1,000 mg by mouth daily with breakfast.       mupirocin (BACTROBAN) 2 % ointment Apply topically 3 (three) times daily. 15 g 2    pantoprazole (PROTONIX) 40 MG tablet Take 1 tablet (40 mg total) by mouth once daily. 30 tablet 2    pregabalin (LYRICA) 100 MG capsule Take 100 mg by mouth 2 (two) times daily.       sildenafiL (VIAGRA) 50 MG tablet Take 1 tablet (50 mg total) by mouth daily as needed for Erectile Dysfunction. 10 tablet 11    tadalafiL (CIALIS) 10 MG tablet Take 1 tablet (10 mg total) by mouth once daily. 90 tablet 3    valsartan (DIOVAN) 160 MG tablet Take 160 mg by mouth once daily.       No current facility-administered medications for this visit.       Review of Systems   Constitutional:  Positive for fatigue. Negative for appetite change and unexpected weight change.   HENT:  Negative for mouth sores and sore throat.    Eyes:  Negative for visual disturbance.   Respiratory:  Negative for cough and shortness of breath.    Cardiovascular:  Negative for chest pain.   Gastrointestinal:  Negative for abdominal pain and diarrhea.   Genitourinary:  Positive for frequency. Negative for dysuria.   Musculoskeletal:  Negative for back pain.   Skin:  Negative for rash.   Neurological:  Negative for headaches.   Hematological:  Negative for adenopathy.   Psychiatric/Behavioral:  The  patient is not nervous/anxious.      ECOG Performance Status:   ECOG SCORE 0            Objective:      Vitals:   There were no vitals filed for this visit.  BMI: There is no height or weight on file to calculate BMI.  Deferred due to telemedicine visit.    Physical Exam   Deferred due to telemedicine visit.      Laboratory Data:  Labs have been reviewed.    Lab Results   Component Value Date    WBC 5.02 10/28/2022    HGB 16.6 10/28/2022    HCT 50.8 10/28/2022    MCV 93 10/28/2022     10/28/2022           Imaging:     Upper GI endoscopy (4/7/22):  Impression:            - Normal middle third of esophagus.                          - Erythematous mucosa in the antrum. Biopsied.                          - Normal examined duodenum. Biopsied.                          - The tonsils are touching.                          - A single plaque in the upper third of the                          esophagus. Biopsied. This is consistent with a                          benign inlet patch.                          - Z-line irregular, at the gastroesophageal                          junction. No changes c/w Neal's seen.     Colonoscopy (11/18/21):   - This was a much better exam than last                          colonoscopy.                          - Significant colonic spasm.                          - Two 8 to 9 mm polyps in the descending colon,                          removed with a hot snare. Resected and retrieved.                          - One 5 mm polyp in the descending colon. Treated                          with a hot snare.                          - One 10 mm polyp in the sigmoid colon, removed                          with a hot snare. Resected and retrieved.                          - One diminutive polyp in the rectum, removed with                          a cold biopsy forceps. Resected and retrieved.                          - Redundant colon.                          - Internal hemorrhoids.      Assessment:       1. Iron deficiency anemia due to chronic blood loss    2. Severe obesity (BMI 35.0-39.9) with comorbidity    3. Prostate cancer    4. Thrombocytopenia, unspecified           Plan:     1. Iron deficiency anemia  - I have reviewed his chart  - labs since December 2021 reveal a mild-to-severe anemia, now microcytic  - iron studies 94/7/22) reveal a low-normal ferritin, decreased iron/saturated iron, and increased total iron binding capacity  - he has undergone colonoscopy (11/18/21) and upper GI endoscopy (4/7/22). No clear source for GI blood loss. He also underwent a video capsule endoscopy (4/18/22) that was relatively normal.  - given the severity of his iron deficiency anemia, I recommended iron sucrose x 4 doses.   - he received iron sucrose x 4 doses in April/May 2022.  - Labs have been reviewed. Hemoglobin is normal. Total iron binding capacity is elevated, suggesting mild iron deficiency.  - recommend iron sucrose x 4 doses   - return to clinic in 6 months with repeat labs.    2. Severe obesity  - no weight since telemedicine visit.  - I encouraged weight loss  - continue to monitor    3. Prostate cancer  - he underwent radical prostatectomy on 12/13/21.  - recent PSA (9/23/22) was 0.19 ng/mL. If it keeps increasing, patient states that Dr. Mitchell will recommend a pet scan for further evaluation.  - defer surveillance to Dr. Mitchell.    4. Advance Care Planning     Power of   After our discussion (at previous visit), the patient decided to complete a HCPOA and appointed his  wife Patricia Britton ([phone]) .        - return to clinic in 6 months with repeat labs.    Kenneth Mcdowell M.D.  Hematology/Oncology  [facility] - [address], [address]  [place] [address]  Phone: [phone]  Fax: [phone]

## 2022-10-31 ENCOUNTER — OFFICE VISIT (OUTPATIENT)
Dept: HEMATOLOGY/ONCOLOGY | Facility: CLINIC | Age: 62
End: 2022-10-31
Payer: COMMERCIAL

## 2022-10-31 DIAGNOSIS — C61 PROSTATE CANCER: ICD-10-CM

## 2022-10-31 DIAGNOSIS — D50.0 IRON DEFICIENCY ANEMIA DUE TO CHRONIC BLOOD LOSS: Primary | ICD-10-CM

## 2022-10-31 DIAGNOSIS — E66.01 SEVERE OBESITY (BMI 35.0-39.9) WITH COMORBIDITY: ICD-10-CM

## 2022-10-31 PROCEDURE — 99214 PR OFFICE/OUTPT VISIT, EST, LEVL IV, 30-39 MIN: ICD-10-PCS | Mod: 95,,, | Performed by: INTERNAL MEDICINE

## 2022-10-31 PROCEDURE — 99214 OFFICE O/P EST MOD 30 MIN: CPT | Mod: 95,,, | Performed by: INTERNAL MEDICINE

## 2022-10-31 RX ORDER — SODIUM CHLORIDE 0.9 % (FLUSH) 0.9 %
10 SYRINGE (ML) INJECTION
Status: CANCELLED | OUTPATIENT
Start: 2022-11-29

## 2022-10-31 RX ORDER — HEPARIN 100 UNIT/ML
500 SYRINGE INTRAVENOUS
Status: CANCELLED | OUTPATIENT
Start: 2022-11-02

## 2022-10-31 RX ORDER — SODIUM CHLORIDE 0.9 % (FLUSH) 0.9 %
10 SYRINGE (ML) INJECTION
Status: CANCELLED | OUTPATIENT
Start: 2022-11-02

## 2022-10-31 RX ORDER — SODIUM CHLORIDE 0.9 % (FLUSH) 0.9 %
10 SYRINGE (ML) INJECTION
Status: CANCELLED | OUTPATIENT
Start: 2022-12-06

## 2022-10-31 RX ORDER — HEPARIN 100 UNIT/ML
500 SYRINGE INTRAVENOUS
Status: CANCELLED | OUTPATIENT
Start: 2022-11-16

## 2022-10-31 RX ORDER — HEPARIN 100 UNIT/ML
500 SYRINGE INTRAVENOUS
Status: CANCELLED | OUTPATIENT
Start: 2022-11-09

## 2022-10-31 RX ORDER — HEPARIN 100 UNIT/ML
500 SYRINGE INTRAVENOUS
Status: CANCELLED | OUTPATIENT
Start: 2022-11-23

## 2022-10-31 RX ORDER — SODIUM CHLORIDE 0.9 % (FLUSH) 0.9 %
10 SYRINGE (ML) INJECTION
Status: CANCELLED | OUTPATIENT
Start: 2022-11-22

## 2022-11-09 ENCOUNTER — PATIENT MESSAGE (OUTPATIENT)
Dept: HEMATOLOGY/ONCOLOGY | Facility: CLINIC | Age: 62
End: 2022-11-09
Payer: COMMERCIAL

## 2022-11-09 DIAGNOSIS — D50.0 IRON DEFICIENCY ANEMIA DUE TO CHRONIC BLOOD LOSS: Primary | ICD-10-CM

## 2022-11-09 RX ORDER — FERROUS SULFATE 325(65) MG
325 TABLET ORAL DAILY
Qty: 90 TABLET | Refills: 3 | Status: SHIPPED | OUTPATIENT
Start: 2022-11-09 | End: 2023-11-09

## 2022-12-05 ENCOUNTER — TELEPHONE (OUTPATIENT)
Dept: UROLOGY | Facility: CLINIC | Age: 62
End: 2022-12-05
Payer: COMMERCIAL

## 2022-12-05 NOTE — TELEPHONE ENCOUNTER
----- Message from Raghav Mitchell MD sent at 12/5/2022 11:10 AM CST -----  PSA continues to rise at 0.29.  Notify patient will order Pylarify 18 nuclear PET-CT scan to be performed in order to identify any evidence of metastatic disease.

## 2022-12-09 ENCOUNTER — HOSPITAL ENCOUNTER (OUTPATIENT)
Dept: RADIOLOGY | Facility: HOSPITAL | Age: 62
Discharge: HOME | End: 2022-12-09
Attending: UROLOGY
Payer: COMMERCIAL

## 2022-12-09 DIAGNOSIS — R97.20 ELEVATED PSA: ICD-10-CM

## 2022-12-09 DIAGNOSIS — C61 PROSTATE CANCER: ICD-10-CM

## 2022-12-09 PROCEDURE — 78815 NM PET CT F 18 PYL PSMA, MIDTHIGH TO VERTEX: ICD-10-PCS | Mod: 26,PS,, | Performed by: RADIOLOGY

## 2022-12-09 PROCEDURE — 78815 PET IMAGE W/CT SKULL-THIGH: CPT | Mod: TC

## 2022-12-09 PROCEDURE — 78815 PET IMAGE W/CT SKULL-THIGH: CPT | Mod: 26,PS,, | Performed by: RADIOLOGY

## 2022-12-12 ENCOUNTER — PATIENT MESSAGE (OUTPATIENT)
Dept: UROLOGY | Facility: CLINIC | Age: 62
End: 2022-12-12
Payer: COMMERCIAL

## 2022-12-13 ENCOUNTER — PATIENT MESSAGE (OUTPATIENT)
Dept: VASCULAR SURGERY | Facility: CLINIC | Age: 62
End: 2022-12-13
Payer: COMMERCIAL

## 2023-01-04 RX ORDER — AMOXICILLIN 500 MG/1
1000 CAPSULE ORAL EVERY 12 HOURS
Qty: 120 CAPSULE | Refills: 11 | Status: ON HOLD | OUTPATIENT
Start: 2023-01-04 | End: 2023-12-11 | Stop reason: SDUPTHER

## 2023-01-18 ENCOUNTER — PATIENT MESSAGE (OUTPATIENT)
Dept: UROLOGY | Facility: CLINIC | Age: 63
End: 2023-01-18
Payer: COMMERCIAL

## 2023-01-19 ENCOUNTER — PATIENT MESSAGE (OUTPATIENT)
Dept: UROLOGY | Facility: CLINIC | Age: 63
End: 2023-01-19
Payer: COMMERCIAL

## 2023-01-19 ENCOUNTER — TELEPHONE (OUTPATIENT)
Dept: UROLOGY | Facility: CLINIC | Age: 63
End: 2023-01-19
Payer: COMMERCIAL

## 2023-01-19 DIAGNOSIS — R97.20 ELEVATED PSA: Primary | ICD-10-CM

## 2023-01-19 NOTE — TELEPHONE ENCOUNTER
The patient should have had a follow-up appointment in December or January with a PSA test.  Please arrange for free and total PSA test to be done and have patient follow-up a week after in my office.  Thanks

## 2023-01-23 ENCOUNTER — PATIENT MESSAGE (OUTPATIENT)
Dept: UROLOGY | Facility: CLINIC | Age: 63
End: 2023-01-23
Payer: COMMERCIAL

## 2023-01-27 ENCOUNTER — OFFICE VISIT (OUTPATIENT)
Dept: UROLOGY | Facility: CLINIC | Age: 63
End: 2023-01-27
Payer: COMMERCIAL

## 2023-01-27 VITALS
OXYGEN SATURATION: 96 % | WEIGHT: 263.81 LBS | DIASTOLIC BLOOD PRESSURE: 98 MMHG | HEIGHT: 70 IN | HEART RATE: 81 BPM | BODY MASS INDEX: 37.77 KG/M2 | SYSTOLIC BLOOD PRESSURE: 144 MMHG

## 2023-01-27 DIAGNOSIS — C61 PROSTATE CANCER: ICD-10-CM

## 2023-01-27 DIAGNOSIS — Z90.79 STATUS POST PROSTATECTOMY: ICD-10-CM

## 2023-01-27 DIAGNOSIS — N52.31 ERECTILE DYSFUNCTION AFTER RADICAL PROSTATECTOMY: ICD-10-CM

## 2023-01-27 DIAGNOSIS — R97.20 ELEVATED PSA, LESS THAN 10 NG/ML: Primary | ICD-10-CM

## 2023-01-27 PROCEDURE — 99999 PR PBB SHADOW E&M-EST. PATIENT-LVL IV: CPT | Mod: PBBFAC,,, | Performed by: UROLOGY

## 2023-01-27 PROCEDURE — 99214 PR OFFICE/OUTPT VISIT, EST, LEVL IV, 30-39 MIN: ICD-10-PCS | Mod: S$GLB,,, | Performed by: UROLOGY

## 2023-01-27 PROCEDURE — 99214 OFFICE O/P EST MOD 30 MIN: CPT | Mod: S$GLB,,, | Performed by: UROLOGY

## 2023-01-27 PROCEDURE — 99999 PR PBB SHADOW E&M-EST. PATIENT-LVL IV: ICD-10-PCS | Mod: PBBFAC,,, | Performed by: UROLOGY

## 2023-01-27 RX ORDER — SILDENAFIL 100 MG/1
100 TABLET, FILM COATED ORAL DAILY PRN
Qty: 10 TABLET | Refills: 11 | Status: SHIPPED | OUTPATIENT
Start: 2023-01-27 | End: 2023-03-29 | Stop reason: SDUPTHER

## 2023-01-27 NOTE — PROGRESS NOTES
Subjective:       Patient ID: Renan Britton is a 62 y.o. male.    Chief Complaint: PSA mgmt    Mr Britton is a 61 yo gentleman with a history of prostate cancer.  His proxy 13 months status post radical retropubic prostatectomy pathology report revealed prostate cancer come into the prostate no evidence of residual disease or extraprostatic extension lymph node negative.  Most recent PSA may scan done with ago was negative.  The patient's PSA never did corrine below 011 and his recently been 0.29 and decreased down to 0.2 4 over the last month.  We will continue to follow him with quarterly PSA test and if the PSA rises over 0.5, I will repeat the PSMA scan.  Daily Cialis 10 mg daily and taken Viagra 50 mg prior to sexual activity.  He is not had good response with erections to this point.  We will increase the Viagra 100 mg.  The patient has been having leg cramps in his lower leg and ankle area particularly after take Viagra.  We will start him on [place] leg cramp medicine and if this does not resolve the symptoms will investigate  further.    Follow-up  The current episode started more than 1 year ago (CAP). The problem occurs constantly. The problem has been unchanged. Pertinent negatives include no abdominal pain, anorexia, arthralgias, change in bowel habit, chest pain, chills, congestion, coughing, diaphoresis, fatigue, fever, headaches, joint swelling, myalgias, nausea, neck pain, numbness, rash, sore throat, swollen glands, urinary symptoms, vertigo, visual change, vomiting or weakness. Nothing aggravates the symptoms. He has tried nothing for the symptoms. The treatment provided significant relief.   Review of Systems   Constitutional:  Negative for activity change, appetite change, chills, diaphoresis, fatigue, fever and unexpected weight change.   HENT:  Negative for congestion, hearing loss, sinus pressure, sore throat and trouble swallowing.    Eyes:  Negative for photophobia, pain, discharge  and visual disturbance.   Respiratory:  Negative for apnea, cough and shortness of breath.    Cardiovascular:  Negative for chest pain, palpitations and leg swelling.   Gastrointestinal:  Negative for abdominal distention, abdominal pain, anal bleeding, anorexia, blood in stool, change in bowel habit, constipation, diarrhea, nausea, rectal pain and vomiting.   Endocrine: Negative for cold intolerance, heat intolerance, polydipsia, polyphagia and polyuria.   Genitourinary:  Negative for decreased urine volume, difficulty urinating, dysuria, enuresis, flank pain, frequency, genital sores, hematuria, penile discharge, penile pain, penile swelling, scrotal swelling, testicular pain and urgency.   Musculoskeletal:  Negative for arthralgias, back pain, joint swelling, myalgias and neck pain.   Skin:  Negative for color change, pallor, rash and wound.   Allergic/Immunologic: Negative for environmental allergies, food allergies and immunocompromised state.   Neurological:  Negative for dizziness, vertigo, seizures, weakness, numbness and headaches.   Hematological:  Negative for adenopathy. Does not bruise/bleed easily.   Psychiatric/Behavioral: Negative.       Objective:      Physical Exam  Vitals and nursing note reviewed.   Constitutional:       Appearance: Normal appearance. He is well-developed.   HENT:      Head: Normocephalic.      Right Ear: External ear normal. There is no impacted cerumen.      Left Ear: External ear normal. There is no impacted cerumen.      Nose: Nose normal. No congestion or rhinorrhea.      Mouth/Throat:      Pharynx: No oropharyngeal exudate or posterior oropharyngeal erythema.   Eyes:      Extraocular Movements: Extraocular movements intact.      Conjunctiva/sclera: Conjunctivae normal.      Pupils: Pupils are equal, round, and reactive to light.   Cardiovascular:      Rate and Rhythm: Normal rate and regular rhythm.      Heart sounds: Normal heart sounds.   Pulmonary:      Effort: Pulmonary  effort is normal.      Breath sounds: Normal breath sounds.   Abdominal:      General: Bowel sounds are normal.      Palpations: Abdomen is soft.      Tenderness: There is no right CVA tenderness or left CVA tenderness.   Genitourinary:     Penis: Normal.       Testes: Normal.   Musculoskeletal:         General: Normal range of motion.      Cervical back: Normal range of motion and neck supple.      Right lower leg: No edema.      Left lower leg: No edema.   Skin:     General: Skin is warm and dry.      Capillary Refill: Capillary refill takes less than 2 seconds.   Neurological:      Mental Status: He is alert and oriented to person, place, and time.      Cranial Nerves: No cranial nerve deficit.      Sensory: No sensory deficit.      Motor: No weakness.      Coordination: Coordination normal.      Gait: Gait normal.      Deep Tendon Reflexes: Reflexes are normal and symmetric. Reflexes normal.   Psychiatric:         Behavior: Behavior normal.         Thought Content: Thought content normal.         Judgment: Judgment normal.       Assessment:       1. Elevated PSA, less than 10 ng/ml    2. Prostate cancer    3. Erectile dysfunction after radical prostatectomy    4. Status post prostatectomy          Plan:       There are no Patient Instructions on file for this visit.

## 2023-01-27 NOTE — PATIENT INSTRUCTIONS
Continue present with quarterly PSA checks  Increase Viagra 100 mg prior to sexual activity,  Continue daily Cialis  F/u 3 months with PSA

## 2023-03-10 ENCOUNTER — TELEPHONE (OUTPATIENT)
Dept: HEMATOLOGY/ONCOLOGY | Facility: CLINIC | Age: 63
End: 2023-03-10
Payer: COMMERCIAL

## 2023-03-10 NOTE — TELEPHONE ENCOUNTER
Spoke with the wife about his appointment changing his appt with the NP Gala Dennis for his virtual visit because Dr. Mcdowell will be out of town

## 2023-03-29 ENCOUNTER — PATIENT MESSAGE (OUTPATIENT)
Dept: UROLOGY | Facility: CLINIC | Age: 63
End: 2023-03-29
Payer: COMMERCIAL

## 2023-03-29 RX ORDER — SILDENAFIL 100 MG/1
100 TABLET, FILM COATED ORAL DAILY PRN
Qty: 10 TABLET | Refills: 11 | Status: ON HOLD | OUTPATIENT
Start: 2023-03-29 | End: 2023-12-11 | Stop reason: HOSPADM

## 2023-03-29 RX ORDER — TADALAFIL 10 MG/1
10 TABLET ORAL DAILY
Qty: 90 TABLET | Refills: 3 | Status: ON HOLD | OUTPATIENT
Start: 2023-03-29 | End: 2023-12-11 | Stop reason: HOSPADM

## 2023-03-30 ENCOUNTER — PATIENT MESSAGE (OUTPATIENT)
Dept: UROLOGY | Facility: CLINIC | Age: 63
End: 2023-03-30
Payer: COMMERCIAL

## 2023-05-03 ENCOUNTER — OFFICE VISIT (OUTPATIENT)
Dept: HEMATOLOGY/ONCOLOGY | Facility: CLINIC | Age: 63
End: 2023-05-03
Payer: COMMERCIAL

## 2023-05-03 ENCOUNTER — OFFICE VISIT (OUTPATIENT)
Dept: UROLOGY | Facility: CLINIC | Age: 63
End: 2023-05-03
Payer: COMMERCIAL

## 2023-05-03 VITALS
WEIGHT: 267 LBS | HEART RATE: 74 BPM | OXYGEN SATURATION: 96 % | BODY MASS INDEX: 38.22 KG/M2 | DIASTOLIC BLOOD PRESSURE: 70 MMHG | SYSTOLIC BLOOD PRESSURE: 124 MMHG | HEIGHT: 70 IN

## 2023-05-03 DIAGNOSIS — E66.01 SEVERE OBESITY (BMI 35.0-39.9) WITH COMORBIDITY: ICD-10-CM

## 2023-05-03 DIAGNOSIS — C61 PROSTATE CANCER: ICD-10-CM

## 2023-05-03 DIAGNOSIS — N52.31 ERECTILE DYSFUNCTION AFTER RADICAL PROSTATECTOMY: ICD-10-CM

## 2023-05-03 DIAGNOSIS — D50.0 IRON DEFICIENCY ANEMIA DUE TO CHRONIC BLOOD LOSS: ICD-10-CM

## 2023-05-03 DIAGNOSIS — Z90.79 STATUS POST PROSTATECTOMY: ICD-10-CM

## 2023-05-03 DIAGNOSIS — D50.0 IRON DEFICIENCY ANEMIA DUE TO CHRONIC BLOOD LOSS: Primary | ICD-10-CM

## 2023-05-03 DIAGNOSIS — R97.20 ELEVATED PSA: Primary | ICD-10-CM

## 2023-05-03 PROCEDURE — 99214 PR OFFICE/OUTPT VISIT, EST, LEVL IV, 30-39 MIN: ICD-10-PCS | Mod: S$GLB,,, | Performed by: UROLOGY

## 2023-05-03 PROCEDURE — 99999 PR PBB SHADOW E&M-EST. PATIENT-LVL III: ICD-10-PCS | Mod: PBBFAC,,, | Performed by: UROLOGY

## 2023-05-03 PROCEDURE — 99214 PR OFFICE/OUTPT VISIT, EST, LEVL IV, 30-39 MIN: ICD-10-PCS | Mod: 95,,, | Performed by: NURSE PRACTITIONER

## 2023-05-03 PROCEDURE — 99214 OFFICE O/P EST MOD 30 MIN: CPT | Mod: 95,,, | Performed by: NURSE PRACTITIONER

## 2023-05-03 PROCEDURE — 99214 OFFICE O/P EST MOD 30 MIN: CPT | Mod: S$GLB,,, | Performed by: UROLOGY

## 2023-05-03 PROCEDURE — 99999 PR PBB SHADOW E&M-EST. PATIENT-LVL III: CPT | Mod: PBBFAC,,, | Performed by: UROLOGY

## 2023-05-03 NOTE — LETTER
May 3, 2023        Kenneth Mcdowell MD  [address]  [address]  [facility] [address]             [facility]  [address] [place] [id number]  Phone: [phone]  Fax: [phone]   Patient: Renan Britton   MR Number: [id number]   YOB: 1960   Date of Visit: 5/3/2023     Dear Dr. Mcdowell,     Please read urology note.  Patient has rising PSA in the face of a completely normal pathology report with no evidence of residual disease and negative PSMA scan in December.  Will follow and repeat PSA in 3 months and if it is still rising will repeat PSMA scan.  Appreciate any input and help take care this patient if you have any thoughts that come to mind please let me know, thanks    Sincerely,      Raghav Mitchell MD            CC    No Recipients    Enclosure

## 2023-05-03 NOTE — PROGRESS NOTES
Answers submitted by the patient for this visit:  Review of Systems Questionnaire (Submitted on 5/1/2023)  appetite change : No  unexpected weight change: No  mouth sores: No  visual disturbance: No  cough: No  shortness of breath: No  chest pain: No  abdominal pain: No  diarrhea: No  frequency: No  back pain: Yes  rash: No  headaches: No  adenopathy: No  nervous/ anxious: No                  PATIENT: Renan Britton  MRN: [id number]  DATE: 5/4/2023    Diagnosis:   1. Iron deficiency anemia due to chronic blood loss    2. Severe obesity (BMI 35.0-39.9) with comorbidity    3. Prostate cancer        Chief Complaint: GINA due to chronic blood loss      Oncologic History:      Oncologic History 1. Prostate cancer      Oncologic Treatment 1. Radical prostatectomy (12/13/21)      Pathology 12/13/21:  1. Right iliac, obturator, and hypogastric lymph nodes, lymphadenectomy:  Eleven lymph nodes, negative for carcinoma (0/11).  2. Left iliac, obturator, and hypogastric lymph nodes, lymphadenectomy:  Six lymph nodes, negative for carcinoma (0/6).  2. Prostate, radical prostatectomy:  Prostatic adenocarcinoma, grade group 2, Worthington score = 7 (3+4), with involvement of bilateral prostate  lobes.  Surgical margins are negative for carcinoma.  Uninvolved prostate with benign glandular hyperplasia and minimal chronic inflammation.  AJCC 8th edition Pathologic stage: [id number].        Telemedicine visit:  The patient location is: home  The chief complaint leading to consultation is: anemia    Visit type: audiovisual    Face to Face time with patient: 23 minutes  30 minutes of total time spent on the encounter, which includes face to face time and non-face to face time preparing to see the patient (eg, review of tests), Obtaining and/or reviewing separately obtained history, Documenting clinical information in the electronic or other health record, Independently interpreting results (not separately reported) and communicating  "results to the patient/family/caregiver, or Care coordination (not separately reported).     Each patient to whom he or she provides medical services by telemedicine is:  (1) informed of the relationship between the physician and patient and the respective role of any other health care provider with respect to management of the patient; and (2) notified that he or she may decline to receive medical services by telemedicine and may withdraw from such care at any time.    Notes: see below    Subjective:    History of Present Illness: Mr. Britton is a 62 y.o. male who presented in April 2022 for evaluation and management of iron deficiency anemia. He was referred by Dr. Posada.    - labs since December 2021 reveal a mild-to-severe anemia, now microcytic  - iron studies 94/7/22) reveal a low-normal ferritin, decreased iron/saturated iron, and increased total iron binding capacity  - he underwent radical prostatectomy on 12/13/21.  - he has undergone colonoscopy (11/18/21) and upper GI endoscopy (4/7/22).   - he received iron sucrose x 4 doses in April/May 2022.  He also underwent a video capsule endoscopy (4/18/22) that was relatively normal.    Interval history:  - he presents for a follow-up appointment for his iron deficiency anemia, wife additionally present  - iron sucrose 11-12/2022   - today, he is doing well. He denies chest pain, nausea, vomiting, diarrhea, constipation, hematemesis, melena, hematuria  - his wife feels he has been doing well  - saw urology/Dr Mitchell today "Recently in December the patient underwent a PSMA PET nuclear medicine scan which revealed no evidence of recurrent disease at that point his PSA level was around 0.29." There has been some upward trend on PSA, uro will repeat 8/2023 and if continues to elevate repeat pet.     Past medical, surgical, family, and social histories have been reviewed and updated below.    Past Medical History:   Past Medical History:   Diagnosis Date    " Diabetes mellitus     Elevated PSA     H/O brain tumor     HLD (hyperlipidemia)     Hypertension     PAD (peripheral artery disease)     Personal history of colonic polyps 1/8/2021    Prostate cancer     Seizures     R/T BRAIN TUMOR- SURGICALLY EXCISED    Urinary tract infection        Past Surgical History:   Past Surgical History:   Procedure Laterality Date    ANGIOGRAPHY Left 4/6/2020    Procedure: ANGIOGRAM, Left lower extremity;  Surgeon: Kelby Gomez MD;  [facility];  Service: Peripheral Vascular;  Laterality: Left;    ANGIOGRAPHY OF LOWER EXTREMITY Left 6/29/2020    Procedure: Angiogram Extremity Unilateral, washout L groin, possible open pseudoaneurysm repair;  Surgeon: Bruce Dallas MD;  [facility];  Service: Peripheral Vascular;  Laterality: Left;  contrast 23 ml  fluoro time: 9.9 min  mGy: 1230.26  Gycm2: 146.69    AORTOGRAPHY N/A 6/29/2020    Procedure: AORTOGRAM;  Surgeon: Bruce Dallas MD;  [facility];  Service: Peripheral Vascular;  Laterality: N/A;    APPLICATION OF WOUND VACUUM-ASSISTED CLOSURE DEVICE Bilateral 1/1/2020    Procedure: APPLICATION, WOUND VAC;  Surgeon: SEBAS Prieto II, MD;  [facility];  Service: Cardiovascular;  Laterality: Bilateral;    APPLICATION OF WOUND VACUUM-ASSISTED CLOSURE DEVICE N/A 1/3/2020    Procedure: APPLICATION, WOUND VAC;  Surgeon: Brian Wong MD;  [facility];  Service: Plastics;  Laterality: N/A;    BRAIN SURGERY  01/2011    TUMOR EXCISED    COLONOSCOPY N/A 1/8/2021    Procedure: COLONOSCOPY;  Surgeon: Susan Posada MD;  Location: [facility] ENDO;  Service: Endoscopy;  Laterality: N/A;    COLONOSCOPY N/A 11/18/2021    Procedure: COLONOSCOPY;  Surgeon: Susan Posada MD;  Location: [facility] ENDO;  Service: Endoscopy;  Laterality: N/A;    CREATION OF ILIOFEMORAL ARTERY BYPASS Left 8/12/2020    Procedure: CREATION, BYPASS, ARTERIAL, ILIAC TO FEMORAL;  Surgeon: Kelby  CLYDE Gomez MD;  [address]  Service: Peripheral Vascular;  Laterality: Left;  CO-SURGEONS: DR KEO ADHIKARI;  DR WONG, profusion    CREATION OF MUSCLE ROTATIONAL FLAP N/A 1/3/2020    Procedure: CREATION, FLAP, MUSCLE ROTATION;  Surgeon: Brian Wong MD;  Location: [facility] OR [address] [place] [address];  Service: Plastics;  Laterality: N/A;    ENDARTERECTOMY OF FEMORAL ARTERY Bilateral 12/20/2019    Procedure: ENDARTERECTOMY, FEMORAL;  Surgeon: Kelby Gomez MD;  Location: [address];  Service: Peripheral Vascular;  Laterality: Bilateral;  natalya common femoral endarterectomy with natalya. iliac stent placement    ESOPHAGOGASTRODUODENOSCOPY N/A 4/7/2022    Procedure: EGD (ESOPHAGOGASTRODUODENOSCOPY);  Surgeon: Susan Posada MD;  Location: [facility] ENDO;  Service: Endoscopy;  Laterality: N/A;    INTRALUMINAL GASTROINTESTINAL TRACT IMAGING VIA CAPSULE N/A 4/18/2022    Procedure: IMAGING, GI TRACT, INTRALUMINAL, VIA CAPSULE;  Surgeon: Otto Powers MD;  Location: [facility] ENDO;  Service: Endoscopy;  Laterality: N/A;    PERCUTANEOUS TRANSLUMINAL ANGIOPLASTY Left 4/6/2020    Procedure: PTA (ANGIOPLASTY, PERCUTANEOUS, TRANSLUMINAL), left lower extremity;  Surgeon: Kelby Gomez MD;  Location: [address];  Service: Peripheral Vascular;  Laterality: Left;    PERCUTANEOUS TRANSLUMINAL ANGIOPLASTY (PTA) OF PERIPHERAL VESSEL Left 10/17/2019    Procedure: PTA, PERIPHERAL VESSEL;  Surgeon: Rao Fisher MD;  Location: [facility] CATH;  Service: Cardiology;  Laterality: Left;    PSEUDOANEURYSM REPAIR Left 6/29/2020    Procedure: REPAIR, PSEUDOANEURYSM;  Surgeon: Bruce Dallas MD;  [facility];  Service: Peripheral Vascular;  Laterality: Left;    RADICAL RETROPUBIC PROSTATECTOMY N/A 12/13/2021    Procedure: PROSTATECTOMY, RETROPUBIC, RADICAL + Bilateral pelvic lymph node dissection;  Surgeon: Raghav Mitchell MD;  Location: [facility] OR;  Service: Urology;  Laterality: N/A;       Family History:   Family History    Problem Relation Age of Onset    Diabetes Mother     Hypertension Mother     Stroke Mother     Cancer Father         LUNG    Heart disease Father     Cancer Brother     Heart disease Brother     Heart disease Brother     Heart disease Brother     Prostate cancer Neg Hx     Kidney disease Neg Hx        Social History:  reports that he has been smoking cigarettes. He has been smoking an average of .25 packs per day. He has never used smokeless tobacco. He reports current alcohol use. He reports that he does not use drugs.    Allergies:  Review of patient's allergies indicates:  No Known Allergies    Medications:  Current Outpatient Medications   Medication Sig Dispense Refill    amoxicillin (AMOXIL) 500 MG capsule Take 2 capsules (1,000 mg total) by mouth every 12 (twelve) hours. 120 capsule 11    atorvastatin (LIPITOR) 40 MG tablet Take 1 tablet (40 mg total) by mouth once daily. 30 tablet 5    clopidogreL (PLAVIX) 75 mg tablet TAKE ONE TABLET BY MOUTH once a day 30 tablet 0    ferrous sulfate (FEOSOL) 325 mg (65 mg iron) Tab tablet Take 1 tablet (325 mg total) by mouth once daily. 90 tablet 3    metFORMIN (GLUCOPHAGE-XR) 500 MG XR 24hr tablet Take 1,000 mg by mouth daily with breakfast.       pregabalin (LYRICA) 100 MG capsule Take 100 mg by mouth 2 (two) times daily.       sildenafiL (VIAGRA) 100 MG tablet Take 1 tablet (100 mg total) by mouth daily as needed for Erectile Dysfunction. 10 tablet 11    tadalafiL (CIALIS) 10 MG tablet Take 1 tablet (10 mg total) by mouth once daily. 90 tablet 3    valsartan (DIOVAN) 160 MG tablet Take 160 mg by mouth once daily.       No current facility-administered medications for this visit.       Review of Systems   Constitutional:  Negative for appetite change, fatigue and unexpected weight change.   HENT:  Negative for mouth sores and sore throat.    Eyes:  Negative for visual disturbance.   Respiratory:  Negative for cough and shortness of breath.    Cardiovascular:  Negative  for chest pain.   Gastrointestinal:  Negative for abdominal pain and diarrhea.   Genitourinary:  Negative for dysuria, frequency and hematuria.   Musculoskeletal:  Negative for back pain.   Skin:  Negative for rash.   Neurological:  Negative for headaches.   Hematological:  Negative for adenopathy.   Psychiatric/Behavioral:  The patient is not nervous/anxious.      ECOG Performance Status:   ECOG SCORE 0            Objective:      Vitals:   There were no vitals filed for this visit.  BMI: There is no height or weight on file to calculate BMI.  Deferred due to telemedicine visit.    Physical Exam   Deferred due to telemedicine visit.      Laboratory Data:  Labs have been reviewed.    Lab Results   Component Value Date    WBC 5.24 05/01/2023    HGB 16.8 05/01/2023    HCT 49.6 05/01/2023    MCV 93 05/01/2023     (L) 05/01/2023     Lab Results   Component Value Date    IRON 78 05/01/2023    IRON 78 05/01/2023    IRON 77 05/01/2023    TRANSFERRIN 255 05/01/2023    TRANSFERRIN 255 05/01/2023    TRANSFERRIN 255 05/01/2023    TRANSFERRIN 255 05/01/2023    TIBC 377 05/01/2023    TIBC 377 05/01/2023    TIBC 377 05/01/2023    FESATURATED 21 05/01/2023    FESATURATED 21 05/01/2023    FESATURATED 20 05/01/2023      Lab Results   Component Value Date    FERRITIN 101 05/01/2023     T sat calculation 20.42%          Imaging:     Upper GI endoscopy (4/7/22):  Impression:            - Normal middle third of esophagus.                          - Erythematous mucosa in the antrum. Biopsied.                          - Normal examined duodenum. Biopsied.                          - The tonsils are touching.                          - A single plaque in the upper third of the                          esophagus. Biopsied. This is consistent with a                          benign inlet patch.                          - Z-line irregular, at the gastroesophageal                          junction. No changes c/w Neal's seen.      Colonoscopy (11/18/21):   - This was a much better exam than last                          colonoscopy.                          - Significant colonic spasm.                          - Two 8 to 9 mm polyps in the descending colon,                          removed with a hot snare. Resected and retrieved.                          - One 5 mm polyp in the descending colon. Treated                          with a hot snare.                          - One 10 mm polyp in the sigmoid colon, removed                          with a hot snare. Resected and retrieved.                          - One diminutive polyp in the rectum, removed with                          a cold biopsy forceps. Resected and retrieved.                          - Redundant colon.                          - Internal hemorrhoids.     Assessment:       1. Iron deficiency anemia due to chronic blood loss    2. Severe obesity (BMI 35.0-39.9) with comorbidity    3. Prostate cancer             Plan:     1. Iron deficiency anemia  - I have reviewed his chart  - labs since December 2021 reveal a mild-to-severe anemia, now microcytic  - iron studies 94/7/22) reveal a low-normal ferritin, decreased iron/saturated iron, and increased total iron binding capacity  - he has undergone colonoscopy (11/18/21) and upper GI endoscopy (4/7/22). No clear source for GI blood loss. He also underwent a video capsule endoscopy (4/18/22) that was relatively normal.  - given the severity of his iron deficiency anemia, I recommended iron sucrose x 4 doses.   - he received iron sucrose x 4 doses in April/May 2022.  - Labs have been reviewed. Hemoglobin is normal. Total iron binding capacity is elevated, suggesting mild iron deficiency.  - recommend iron sucrose x 4 doses   - return to clinic in 6 months with repeat labs.    2. Severe obesity  - weight 267#/BMI 38.31 kg/m2 at Dr Mitchell's appt today  - I encouraged weight loss  - continue to monitor    3. Prostate cancer  - he  underwent radical prostatectomy on 12/13/21.  - as noted in hpi, recent uptick in psa, repeat 8/2023 and if continues will have repeat pet scan  - defer surveillance to Dr. Mitchell.    4. Advance Care Planning     Power of   After our discussion (at previous visit), the patient decided to complete a HCPOA and appointed his  wife Patricia Britton ([phone]) .        - iron studies 8/2023  - return to clinic in 6 months with repeat labs.      Gala Dennis, TIGIST-C  [facility]  Hematology/Oncology  [address]  [place]  [phone]

## 2023-05-03 NOTE — PROGRESS NOTES
Subjective:      Patient ID: Renan Britton is a 62 y.o. male.    Chief Complaint: No chief complaint on file.    61 yo WM with history of prostate cancer.  Patient is status post radical retropubic prostatectomy December of 2021.  The patient has had a slowly rising PSA since this and most recently has red 0.41 or 0.42 depending on the lab value that was looked at.  Patient has had no symptoms or pain.  His erections are functional with medication.  His urinary control is good with no complaints of stress incontinence.  The patient has no abnormal symptomatology is still working full-time.  Recently in December the patient underwent a PSMA PET nuclear medicine scan which revealed no evidence of recurrent disease at that point his PSA level was around 0.29.  The patient is seeing Dr. Mcdowell as well for iron deficiency anemia.    Follow-up  Chronicity: Hx of prostate cancer with slowly rising PSA after prostatectomy. Pertinent negatives include no abdominal pain, arthralgias, chest pain, chills, congestion, coughing, diaphoresis, fatigue, fever, headaches, myalgias, nausea, rash, vomiting or weakness.   Review of Systems   Constitutional:  Negative for activity change, appetite change, chills, diaphoresis, fatigue, fever and unexpected weight change.   HENT:  Negative for congestion, hearing loss, sinus pressure and trouble swallowing.    Eyes:  Negative for photophobia, pain, discharge and visual disturbance.   Respiratory:  Negative for apnea, cough and shortness of breath.    Cardiovascular:  Negative for chest pain, palpitations and leg swelling.   Gastrointestinal:  Negative for abdominal distention, abdominal pain, anal bleeding, blood in stool, constipation, diarrhea, nausea, rectal pain and vomiting.   Endocrine: Negative for cold intolerance, heat intolerance, polydipsia, polyphagia and polyuria.   Genitourinary:  Negative for decreased urine volume, difficulty urinating, dysuria, enuresis, flank pain,  frequency, genital sores, hematuria, penile discharge, penile pain, penile swelling, scrotal swelling, testicular pain and urgency.   Musculoskeletal:  Negative for arthralgias, back pain and myalgias.   Skin:  Negative for color change, pallor, rash and wound.   Allergic/Immunologic: Negative for environmental allergies, food allergies and immunocompromised state.   Neurological:  Negative for dizziness, seizures, weakness and headaches.   Hematological:  Negative for adenopathy. Does not bruise/bleed easily.   Psychiatric/Behavioral: Negative.      Objective:     Physical Exam  Vitals and nursing note reviewed.   Constitutional:       Appearance: Normal appearance. He is well-developed.   HENT:      Head: Normocephalic and atraumatic.      Right Ear: External ear normal.      Left Ear: External ear normal.      Nose: Nose normal.      Mouth/Throat:      Mouth: Mucous membranes are moist.      Pharynx: Oropharynx is clear. No oropharyngeal exudate or posterior oropharyngeal erythema.   Eyes:      General: No scleral icterus.        Right eye: No discharge.         Left eye: No discharge.      Extraocular Movements: Extraocular movements intact.      Conjunctiva/sclera: Conjunctivae normal.      Pupils: Pupils are equal, round, and reactive to light.   Cardiovascular:      Rate and Rhythm: Normal rate and regular rhythm.      Heart sounds: Normal heart sounds.   Pulmonary:      Effort: Pulmonary effort is normal.      Breath sounds: Normal breath sounds.   Abdominal:      General: Bowel sounds are normal.      Palpations: Abdomen is soft.      Tenderness: There is no right CVA tenderness or left CVA tenderness.   Genitourinary:     Penis: Normal.       Testes: Normal.      Prostate: Normal.   Musculoskeletal:         General: Normal range of motion.      Cervical back: Normal range of motion and neck supple.      Right lower leg: No edema.      Left lower leg: No edema.   Skin:     General: Skin is warm and dry.       Capillary Refill: Capillary refill takes less than 2 seconds.   Neurological:      Mental Status: He is alert and oriented to person, place, and time.      Deep Tendon Reflexes: Reflexes are normal and symmetric.   Psychiatric:         Mood and Affect: Mood normal.         Behavior: Behavior normal.         Thought Content: Thought content normal.         Judgment: Judgment normal.      Assessment:      1. Elevated PSA    2. Prostate cancer    3. Iron deficiency anemia due to chronic blood loss    4. Erectile dysfunction after radical prostatectomy    5. Status post prostatectomy      Plan:     Patient Instructions   Repeat labs in 3 months  Notify patient of results  If PSA into to rise will repeat PSMA scan  Patient to follow-up with Dr. Mcdowell for his iron deficiency.  Will also have Dr. Mcdowell follow his PSA test and evaluate.

## 2023-05-03 NOTE — Clinical Note
He will be having labs 8/2/23 for Dr Mitchell, not yet scheduled. Please schedule cbc, iron/tibc, ferritin on 8/2 at [facility] in [place]. Wife states there are usually issues with orders being placed for [facility] in system.  If so, mail lab orders to them.  Will call with these results and schedule appt based on them. Thanks

## 2023-05-03 NOTE — PATIENT INSTRUCTIONS
Repeat labs in 3 months  Notify patient of results  If PSA into to rise will repeat PSMA scan  Patient to follow-up with Dr. Mcdowell for his iron deficiency.  Will also have Dr. Mcdowell follow his PSA test and evaluate.

## 2023-05-05 DIAGNOSIS — C61 PROSTATE CANCER: Primary | ICD-10-CM

## 2023-06-29 ENCOUNTER — PATIENT MESSAGE (OUTPATIENT)
Dept: HEMATOLOGY/ONCOLOGY | Facility: CLINIC | Age: 63
End: 2023-06-29
Payer: COMMERCIAL

## 2023-07-27 ENCOUNTER — PATIENT MESSAGE (OUTPATIENT)
Dept: UROLOGY | Facility: CLINIC | Age: 63
End: 2023-07-27
Payer: COMMERCIAL

## 2023-07-28 ENCOUNTER — OFFICE VISIT (OUTPATIENT)
Dept: UROLOGY | Facility: CLINIC | Age: 63
End: 2023-07-28
Payer: COMMERCIAL

## 2023-07-28 VITALS
DIASTOLIC BLOOD PRESSURE: 74 MMHG | SYSTOLIC BLOOD PRESSURE: 114 MMHG | HEART RATE: 74 BPM | HEIGHT: 70 IN | BODY MASS INDEX: 38.65 KG/M2 | WEIGHT: 270 LBS

## 2023-07-28 DIAGNOSIS — Z90.79 STATUS POST PROSTATECTOMY: ICD-10-CM

## 2023-07-28 DIAGNOSIS — R97.20 ELEVATED PSA: Primary | ICD-10-CM

## 2023-07-28 DIAGNOSIS — C61 PROSTATE CANCER: ICD-10-CM

## 2023-07-28 PROCEDURE — 99215 OFFICE O/P EST HI 40 MIN: CPT | Mod: S$GLB,,, | Performed by: UROLOGY

## 2023-07-28 PROCEDURE — 99999 PR PBB SHADOW E&M-EST. PATIENT-LVL IV: CPT | Mod: PBBFAC,,, | Performed by: UROLOGY

## 2023-07-28 PROCEDURE — 99999 PR PBB SHADOW E&M-EST. PATIENT-LVL IV: ICD-10-PCS | Mod: PBBFAC,,, | Performed by: UROLOGY

## 2023-07-28 PROCEDURE — 99215 PR OFFICE/OUTPT VISIT, EST, LEVL V, 40-54 MIN: ICD-10-PCS | Mod: S$GLB,,, | Performed by: UROLOGY

## 2023-07-28 RX ORDER — ALBUTEROL SULFATE 90 UG/1
2 AEROSOL, METERED RESPIRATORY (INHALATION)
COMMUNITY
Start: 2023-03-24 | End: 2024-02-14

## 2023-07-28 NOTE — PROGRESS NOTES
Subjective:      Patient ID: Renan Britton is a 62 y.o. male.    Chief Complaint: PSA / BARBRA    63 yo WM with history of prostate cancer.  Patient is status post radical retropubic prostatectomy December of 2021.  The patient has had a slowly rising PSA since this and most recently has risen 0.59 or 0.61 depending on the lab value that was looked at.  Patient has had no symptoms or pain.  His erections are functional with medication.  His urinary control is good with no complaints of stress incontinence.  The patient has no abnormal symptomatology is still working full-time.  Recently in December the patient underwent a PSMA PET nuclear medicine scan which revealed no evidence of recurrent disease at that point his PSA level was around 0.29.  The patient is seeing Dr. Mcdowell as well for iron deficiency anemia. Pt and wife are concerned over rising PSA . Will wait to do PSMA scan again until 1.0.    Follow-up  This is a chronic (CAP) problem. The current episode started more than 1 year ago. The problem occurs constantly. The problem has been unchanged. Pertinent negatives include no abdominal pain, anorexia, arthralgias, change in bowel habit, chest pain, chills, congestion, coughing, diaphoresis, fatigue, fever, headaches, joint swelling, myalgias, nausea, neck pain, numbness, rash, sore throat, swollen glands, urinary symptoms, vertigo, visual change, vomiting or weakness. Nothing aggravates the symptoms. Treatments tried: S/P RRP and BPLND. The treatment provided significant relief.   Review of Systems   Constitutional:  Negative for activity change, appetite change, chills, diaphoresis, fatigue, fever and unexpected weight change.   HENT:  Negative for congestion, hearing loss, sinus pressure, sore throat and trouble swallowing.    Eyes:  Negative for photophobia, pain, discharge and visual disturbance.   Respiratory:  Negative for apnea, cough and shortness of breath.    Cardiovascular:  Negative for chest  pain, palpitations and leg swelling.   Gastrointestinal:  Negative for abdominal distention, abdominal pain, anal bleeding, anorexia, blood in stool, change in bowel habit, constipation, diarrhea, nausea, rectal pain and vomiting.   Endocrine: Negative for cold intolerance, heat intolerance, polydipsia, polyphagia and polyuria.   Genitourinary:  Negative for decreased urine volume, difficulty urinating, dysuria, enuresis, flank pain, frequency, genital sores, hematuria, penile discharge, penile pain, penile swelling, scrotal swelling, testicular pain and urgency.   Musculoskeletal:  Negative for arthralgias, back pain, joint swelling, myalgias and neck pain.   Skin:  Negative for color change, pallor, rash and wound.   Allergic/Immunologic: Negative for environmental allergies, food allergies and immunocompromised state.   Neurological:  Negative for dizziness, vertigo, seizures, weakness, numbness and headaches.   Hematological:  Negative for adenopathy. Does not bruise/bleed easily.   Psychiatric/Behavioral: Negative.      Objective:     Physical Exam  Vitals and nursing note reviewed.   Constitutional:       Appearance: Normal appearance. He is well-developed and normal weight.   HENT:      Head: Normocephalic and atraumatic.      Right Ear: There is no impacted cerumen.      Left Ear: There is no impacted cerumen.      Nose: Nose normal. No congestion or rhinorrhea.      Mouth/Throat:      Pharynx: No oropharyngeal exudate or posterior oropharyngeal erythema.   Eyes:      Extraocular Movements: Extraocular movements intact.      Conjunctiva/sclera: Conjunctivae normal.      Pupils: Pupils are equal, round, and reactive to light.   Cardiovascular:      Rate and Rhythm: Normal rate and regular rhythm.      Pulses: Normal pulses.      Heart sounds: Normal heart sounds.   Pulmonary:      Effort: Pulmonary effort is normal.      Breath sounds: Normal breath sounds.   Abdominal:      General: Bowel sounds are normal.       Palpations: Abdomen is soft.      Tenderness: There is no right CVA tenderness or left CVA tenderness.   Genitourinary:     Penis: Normal.       Testes: Normal.   Musculoskeletal:         General: Normal range of motion.      Cervical back: Normal range of motion and neck supple.      Right lower leg: No edema.      Left lower leg: No edema.   Skin:     General: Skin is warm and dry.      Capillary Refill: Capillary refill takes less than 2 seconds.   Neurological:      General: No focal deficit present.      Mental Status: He is alert and oriented to person, place, and time.      Deep Tendon Reflexes: Reflexes are normal and symmetric.   Psychiatric:         Mood and Affect: Mood normal.         Behavior: Behavior normal.         Thought Content: Thought content normal.         Judgment: Judgment normal.      Assessment:      1. Elevated PSA    2. Prostate cancer    3. Status post prostatectomy      Plan:     Patient Instructions   PSA in 3 months  F/U 3 months  PSMA scan if PSA rises above 1.0

## 2023-08-01 NOTE — PROGRESS NOTES
PATIENT: Renan Britton  MRN: [id number]  DATE: 8/7/2023    Diagnosis:   1. Iron deficiency anemia due to chronic blood loss    2. Severe obesity (BMI 35.0-39.9) with comorbidity    3. History of prostate cancer    4. Elevated PSA    5. Thrombocytopenia, unspecified      Chief Complaint: iron deficiency    Oncologic History:      Oncologic History 1. Prostate cancer      Oncologic Treatment 1. Radical prostatectomy (12/13/21)      Pathology 12/13/21:  1. Right iliac, obturator, and hypogastric lymph nodes, lymphadenectomy:  Eleven lymph nodes, negative for carcinoma (0/11).  2. Left iliac, obturator, and hypogastric lymph nodes, lymphadenectomy:  Six lymph nodes, negative for carcinoma (0/6).  2. Prostate, radical prostatectomy:  Prostatic adenocarcinoma, grade group 2, Soha score = 7 (3+4), with involvement of bilateral prostate  lobes.  Surgical margins are negative for carcinoma.  Uninvolved prostate with benign glandular hyperplasia and minimal chronic inflammation.  AJCC 8th edition Pathologic stage: [id number].        Telemedicine visit:  The patient location is: home  The chief complaint leading to consultation is: anemia    Visit type: audiovisual    Face to Face time with patient: 7 minutes  10 minutes of total time spent on the encounter, which includes face to face time and non-face to face time preparing to see the patient (eg, review of tests), Obtaining and/or reviewing separately obtained history, Documenting clinical information in the electronic or other health record, Independently interpreting results (not separately reported) and communicating results to the patient/family/caregiver, or Care coordination (not separately reported).     Each patient to whom he or she provides medical services by telemedicine is:  (1) informed of the relationship between the physician and patient and the respective role of any other health care provider with respect to management of the patient; and (2) notified  that he or she may decline to receive medical services by telemedicine and may withdraw from such care at any time.    Notes: see below    Subjective:    History of Present Illness: Mr. Britton is a 62 y.o. male who presented in April 2022 for evaluation and management of iron deficiency anemia. He was referred by Dr. Posada.    - labs since December 2021 reveal a mild-to-severe anemia, now microcytic  - iron studies 94/7/22) reveal a low-normal ferritin, decreased iron/saturated iron, and increased total iron binding capacity  - he underwent radical prostatectomy on 12/13/21.  - he has undergone colonoscopy (11/18/21) and upper GI endoscopy (4/7/22).   - he received iron sucrose x 4 doses in April/May 2022.  He also underwent a video capsule endoscopy (4/18/22) that was relatively normal.  - he received iron sucrose x 4 doses in November/December 2022.    Interval history:  - he presents for a follow-up appointment for his iron deficiency anemia  - today, he is doing well. His wife states he has more energy. He is working outside every day. He denies chest pain, nausea, vomiting, diarrhea, constipation.    Past medical, surgical, family, and social histories have been reviewed and updated below.    Past Medical History:   Past Medical History:   Diagnosis Date    Diabetes mellitus     Elevated PSA     H/O brain tumor     HLD (hyperlipidemia)     Hypertension     PAD (peripheral artery disease)     Personal history of colonic polyps 1/8/2021    Prostate cancer     Seizures     R/T BRAIN TUMOR- SURGICALLY EXCISED    Urinary tract infection        Past Surgical History:   Past Surgical History:   Procedure Laterality Date    ANGIOGRAPHY Left 4/6/2020    Procedure: ANGIOGRAM, Left lower extremity;  Surgeon: Kelby Gomez MD;  Location: [facility] OR [address] [place] [address];  Service: Peripheral Vascular;  Laterality: Left;    ANGIOGRAPHY OF LOWER EXTREMITY Left 6/29/2020    Procedure: Angiogram Extremity Unilateral, washout L  groin, possible open pseudoaneurysm repair;  Surgeon: Bruce Dallas MD;  Location: [facility] OR 2ND FLR;  Service: Peripheral Vascular;  Laterality: Left;  contrast 23 ml  fluoro time: 9.9 min  mGy: 1230.26  Gycm2: 146.69    AORTOGRAPHY N/A 6/29/2020    Procedure: AORTOGRAM;  Surgeon: Bruce Dallas MD;  [facility];  Service: Peripheral Vascular;  Laterality: N/A;    APPLICATION OF WOUND VACUUM-ASSISTED CLOSURE DEVICE Bilateral 1/1/2020    Procedure: APPLICATION, WOUND VAC;  Surgeon: SEBAS Prieto II, MD;  Location: [facility] OR [facility] FLR;  Service: Cardiovascular;  Laterality: Bilateral;    APPLICATION OF WOUND VACUUM-ASSISTED CLOSURE DEVICE N/A 1/3/2020    Procedure: APPLICATION, WOUND VAC;  Surgeon: Brian Wong MD;  [facility];  Service: Plastics;  Laterality: N/A;    BRAIN SURGERY  01/2011    TUMOR EXCISED    COLONOSCOPY N/A 1/8/2021    Procedure: COLONOSCOPY;  Surgeon: Susan Posada MD;  Location: [facility] ENDO;  Service: Endoscopy;  Laterality: N/A;    COLONOSCOPY N/A 11/18/2021    Procedure: COLONOSCOPY;  Surgeon: Susan Posada MD;  Location: [facility] ENDO;  Service: Endoscopy;  Laterality: N/A;    CREATION OF ILIOFEMORAL ARTERY BYPASS Left 8/12/2020    Procedure: CREATION, BYPASS, ARTERIAL, ILIAC TO FEMORAL;  Surgeon: Kelby Gomez MD;  [facility];  Service: Peripheral Vascular;  Laterality: Left;  CO-SURGEONS: DR KEO ADHIKARI;  DR WONG, profusion    CREATION OF MUSCLE ROTATIONAL FLAP N/A 1/3/2020    Procedure: CREATION, FLAP, MUSCLE ROTATION;  Surgeon: Brian Wong MD;  [facility];  Service: Plastics;  Laterality: N/A;    ENDARTERECTOMY OF FEMORAL ARTERY Bilateral 12/20/2019    Procedure: ENDARTERECTOMY, FEMORAL;  Surgeon: Kelby Gomez MD;  [facility];  Service: Peripheral Vascular;  Laterality: Bilateral;  natalya common femoral endarterectomy with natalya. iliac stent placement    ESOPHAGOGASTRODUODENOSCOPY N/A  4/7/2022    Procedure: EGD (ESOPHAGOGASTRODUODENOSCOPY);  Surgeon: Susan Posada MD;  Location: [facility] ENDO;  Service: Endoscopy;  Laterality: N/A;    INTRALUMINAL GASTROINTESTINAL TRACT IMAGING VIA CAPSULE N/A 4/18/2022    Procedure: IMAGING, GI TRACT, INTRALUMINAL, VIA CAPSULE;  Surgeon: Otto Powers MD;  Location: [facility] ENDO;  Service: Endoscopy;  Laterality: N/A;    PERCUTANEOUS TRANSLUMINAL ANGIOPLASTY Left 4/6/2020    Procedure: PTA (ANGIOPLASTY, PERCUTANEOUS, TRANSLUMINAL), left lower extremity;  Surgeon: Kelby Gomez MD;  Location: [facility] OR [facility] FLR;  Service: Peripheral Vascular;  Laterality: Left;    PERCUTANEOUS TRANSLUMINAL ANGIOPLASTY (PTA) OF PERIPHERAL VESSEL Left 10/17/2019    Procedure: PTA, PERIPHERAL VESSEL;  Surgeon: Rao Fisher MD;  Location: [facility] CATH;  Service: Cardiology;  Laterality: Left;    PSEUDOANEURYSM REPAIR Left 6/29/2020    Procedure: REPAIR, PSEUDOANEURYSM;  Surgeon: Bruce Dallas MD;  Location: [facility] OR [facility] FLR;  Service: Peripheral Vascular;  Laterality: Left;    RADICAL RETROPUBIC PROSTATECTOMY N/A 12/13/2021    Procedure: PROSTATECTOMY, RETROPUBIC, RADICAL + Bilateral pelvic lymph node dissection;  Surgeon: Raghav Mitchell MD;  Location: [facility] OR;  Service: Urology;  Laterality: N/A;       Family History:   Family History   Problem Relation Age of Onset    Diabetes Mother     Hypertension Mother     Stroke Mother     Cancer Father         LUNG    Heart disease Father     Cancer Brother     Heart disease Brother     Heart disease Brother     Heart disease Brother     Prostate cancer Neg Hx     Kidney disease Neg Hx        Social History:  reports that he has been smoking cigarettes. He has never used smokeless tobacco. He reports current alcohol use. He reports that he does not use drugs.    Allergies:  Review of patient's allergies indicates:  No Known Allergies    Medications:  Current Outpatient Medications   Medication Sig Dispense Refill    amoxicillin  (AMOXIL) 500 MG capsule Take 2 capsules (1,000 mg total) by mouth every 12 (twelve) hours. 120 capsule 11    atorvastatin (LIPITOR) 40 MG tablet Take 1 tablet (40 mg total) by mouth once daily. 30 tablet 5    clopidogreL (PLAVIX) 75 mg tablet TAKE ONE TABLET BY MOUTH once a day 30 tablet 0    ferrous sulfate (FEOSOL) 325 mg (65 mg iron) Tab tablet Take 1 tablet (325 mg total) by mouth once daily. 90 tablet 3    metFORMIN (GLUCOPHAGE-XR) 500 MG XR 24hr tablet Take 1,000 mg by mouth daily with breakfast.       pregabalin (LYRICA) 100 MG capsule Take 100 mg by mouth 2 (two) times daily.       sildenafiL (VIAGRA) 100 MG tablet Take 1 tablet (100 mg total) by mouth daily as needed for Erectile Dysfunction. 10 tablet 11    tadalafiL (CIALIS) 10 MG tablet Take 1 tablet (10 mg total) by mouth once daily. 90 tablet 3    valsartan (DIOVAN) 160 MG tablet Take 160 mg by mouth once daily.      VENTOLIN HFA 90 mcg/actuation inhaler 2 puffs every 4 to 6 hours as needed.       No current facility-administered medications for this visit.       Review of Systems   Constitutional:  Negative for appetite change, fatigue and unexpected weight change.   HENT:  Negative for mouth sores and sore throat.    Eyes:  Negative for visual disturbance.   Respiratory:  Negative for cough and shortness of breath.    Cardiovascular:  Negative for chest pain.   Gastrointestinal:  Negative for abdominal pain and diarrhea.   Genitourinary:  Negative for dysuria.   Musculoskeletal:  Negative for back pain.   Skin:  Negative for rash.   Neurological:  Negative for headaches.   Hematological:  Negative for adenopathy.   Psychiatric/Behavioral:  The patient is not nervous/anxious.        ECOG Performance Status:   ECOG SCORE 0            Objective:      Vitals:   There were no vitals filed for this visit.  BMI: There is no height or weight on file to calculate BMI.  Deferred due to telemedicine visit.    Physical Exam   Deferred due to telemedicine  visit.    Laboratory Data:  Labs have been reviewed.    Lab Results   Component Value Date    WBC 5.09 07/24/2023    HGB 16.9 07/24/2023    HCT 49.2 07/24/2023    MCV 94 07/24/2023     (L) 07/24/2023           Imaging:     Upper GI endoscopy (4/7/22):  Impression:            - Normal middle third of esophagus.                          - Erythematous mucosa in the antrum. Biopsied.                          - Normal examined duodenum. Biopsied.                          - The tonsils are touching.                          - A single plaque in the upper third of the                          esophagus. Biopsied. This is consistent with a                          benign inlet patch.                          - Z-line irregular, at the gastroesophageal                          junction. No changes c/w Neal's seen.     Colonoscopy (11/18/21):   - This was a much better exam than last                          colonoscopy.                          - Significant colonic spasm.                          - Two 8 to 9 mm polyps in the descending colon,                          removed with a hot snare. Resected and retrieved.                          - One 5 mm polyp in the descending colon. Treated                          with a hot snare.                          - One 10 mm polyp in the sigmoid colon, removed                          with a hot snare. Resected and retrieved.                          - One diminutive polyp in the rectum, removed with                          a cold biopsy forceps. Resected and retrieved.                          - Redundant colon.                          - Internal hemorrhoids.     Assessment:       1. Iron deficiency anemia due to chronic blood loss    2. Severe obesity (BMI 35.0-39.9) with comorbidity    3. History of prostate cancer    4. Elevated PSA    5. Thrombocytopenia, unspecified           Plan:     1. Iron deficiency anemia  - I have reviewed his chart  - labs since  December 2021 reveal a mild-to-severe anemia, now microcytic  - iron studies 94/7/22) reveal a low-normal ferritin, decreased iron/saturated iron, and increased total iron binding capacity  - he has undergone colonoscopy (11/18/21) and upper GI endoscopy (4/7/22). No clear source for GI blood loss. He also underwent a video capsule endoscopy (4/18/22) that was relatively normal.  - given the severity of his iron deficiency anemia, I recommended iron sucrose x 4 doses.   - he received iron sucrose x 4 doses in April/May 2022.  - he received iron sucrose x 4 doses in November/December 2022.  - Labs have been reviewed. Hemoglobin is normal. Total iron binding capacity is normal, and ferritin is 90 ng/mL.  - return to clinic in 6 months with repeat labs.    2. Severe obesity  - no weight since telemedicine visit.  - I encouraged weight loss  - continue to monitor    3. Prostate cancer  - he underwent radical prostatectomy on 12/13/21.  - recent PSA (7/24/23) was 0.65 ng/mL. It is slowly increasing.  - defer surveillance to Dr. Mitchell.    4. Thrombocytopenia  - intermittent since 2019  - defer further workup for now.    5. Advance Care Planning     Power of   After our discussion (at previous visit), the patient decided to complete a HCPOA and appointed his  wife Patricia Britton ([phone]) .        - return to clinic in 6 months with repeat labs.    Kenneth Mcdowell M.D.  Hematology/Oncology  [facility] - [address], [address]  [place] [address]  Phone: [phone]  Fax: [phone]

## 2023-08-07 ENCOUNTER — OFFICE VISIT (OUTPATIENT)
Dept: HEMATOLOGY/ONCOLOGY | Facility: CLINIC | Age: 63
End: 2023-08-07
Payer: COMMERCIAL

## 2023-08-07 DIAGNOSIS — D50.0 IRON DEFICIENCY ANEMIA DUE TO CHRONIC BLOOD LOSS: Primary | ICD-10-CM

## 2023-08-07 DIAGNOSIS — Z85.46 HISTORY OF PROSTATE CANCER: ICD-10-CM

## 2023-08-07 DIAGNOSIS — E66.01 SEVERE OBESITY (BMI 35.0-39.9) WITH COMORBIDITY: ICD-10-CM

## 2023-08-07 DIAGNOSIS — R97.20 ELEVATED PSA: ICD-10-CM

## 2023-08-07 DIAGNOSIS — D69.6 THROMBOCYTOPENIA, UNSPECIFIED: ICD-10-CM

## 2023-08-07 PROCEDURE — 99214 OFFICE O/P EST MOD 30 MIN: CPT | Mod: 95,,, | Performed by: INTERNAL MEDICINE

## 2023-08-07 PROCEDURE — 99214 PR OFFICE/OUTPT VISIT, EST, LEVL IV, 30-39 MIN: ICD-10-PCS | Mod: 95,,, | Performed by: INTERNAL MEDICINE

## 2023-08-07 NOTE — Clinical Note
1. Schedule labs cbc, ferritin, iron/tibc at [facility] in 6 months.  2. Schedule virtual appt 2-3 days after labs.   Thanks!

## 2023-10-23 ENCOUNTER — PATIENT MESSAGE (OUTPATIENT)
Dept: UROLOGY | Facility: CLINIC | Age: 63
End: 2023-10-23
Payer: COMMERCIAL

## 2023-10-25 ENCOUNTER — OFFICE VISIT (OUTPATIENT)
Dept: UROLOGY | Facility: CLINIC | Age: 63
End: 2023-10-25
Payer: COMMERCIAL

## 2023-10-25 VITALS
SYSTOLIC BLOOD PRESSURE: 114 MMHG | HEART RATE: 70 BPM | DIASTOLIC BLOOD PRESSURE: 69 MMHG | BODY MASS INDEX: 38.74 KG/M2 | HEIGHT: 70 IN

## 2023-10-25 DIAGNOSIS — C61 PROSTATE CANCER: Primary | ICD-10-CM

## 2023-10-25 DIAGNOSIS — N52.31 ERECTILE DYSFUNCTION AFTER RADICAL PROSTATECTOMY: ICD-10-CM

## 2023-10-25 DIAGNOSIS — R97.20 ELEVATED PSA: ICD-10-CM

## 2023-10-25 PROCEDURE — 99214 PR OFFICE/OUTPT VISIT, EST, LEVL IV, 30-39 MIN: ICD-10-PCS | Mod: S$GLB,,, | Performed by: UROLOGY

## 2023-10-25 PROCEDURE — 99214 OFFICE O/P EST MOD 30 MIN: CPT | Mod: S$GLB,,, | Performed by: UROLOGY

## 2023-10-25 PROCEDURE — 99999 PR PBB SHADOW E&M-EST. PATIENT-LVL III: CPT | Mod: PBBFAC,,, | Performed by: UROLOGY

## 2023-10-25 PROCEDURE — 99999 PR PBB SHADOW E&M-EST. PATIENT-LVL III: ICD-10-PCS | Mod: PBBFAC,,, | Performed by: UROLOGY

## 2023-10-25 NOTE — PATIENT INSTRUCTIONS
Schedule PSMA - PET CT scan.  Notify Patient of results  If able to determine location PSA origin will determine therapy necessary and either place the on medication and/or refer patient for treatment to radiation oncology or Hematology Oncology.

## 2023-10-25 NOTE — PROGRESS NOTES
Subjective:      Patient ID: Renan Britton is a 63 y.o. male.    Chief Complaint: PSA - follow up    62 yo gentleman with a history of prostate cancer.  Patient is status post radical retropubic prostatectomy on 12/13/2021.  Patient had a negative metastatic workup and pathology report reveals negative margins,  and no extension.  Patient's PSA has continued to slowly rise and is now at 0.97.  Patient is obviously anxious about this and we have decided to move ahead with a PET PSMA CT scan in order to try to find the location of origin of PSA elevation.  We will obtain this before the end of the year.  Patient has developed good urinary control but does have occasional urge urinary incontinence when bladder is quite full.  Patient has been on Cialis daily and Viagra when attempting sexual activity.  The patient states that the erection is obtained but does not last long enough to finish activity.    Follow-up  This is a chronic (History of prostate cancer with elevated or rising PSA postop.) problem. The current episode started more than 1 year ago. The problem occurs constantly. The problem has been waxing and waning. Associated symptoms include myalgias. Pertinent negatives include no abdominal pain, anorexia, arthralgias, change in bowel habit, chest pain, chills, congestion, coughing, diaphoresis, fatigue, fever, headaches, joint swelling, nausea, neck pain, numbness, rash, sore throat, swollen glands, urinary symptoms, vertigo, visual change, vomiting or weakness. Nothing aggravates the symptoms. He has tried nothing for the symptoms. The treatment provided significant relief.     Review of Systems   Constitutional:  Negative for activity change, appetite change, chills, diaphoresis, fatigue, fever and unexpected weight change.   HENT:  Negative for congestion, hearing loss, sinus pressure, sore throat and trouble swallowing.    Eyes:  Negative for photophobia, pain, discharge and visual disturbance.    Respiratory:  Negative for apnea, cough and shortness of breath.    Cardiovascular:  Negative for chest pain, palpitations and leg swelling.   Gastrointestinal:  Negative for abdominal distention, abdominal pain, anal bleeding, anorexia, blood in stool, change in bowel habit, constipation, diarrhea, nausea, rectal pain and vomiting.   Endocrine: Negative for cold intolerance, heat intolerance, polydipsia, polyphagia and polyuria.   Genitourinary:  Negative for decreased urine volume, difficulty urinating, dysuria, enuresis, flank pain, frequency, genital sores, hematuria, penile discharge, penile pain, penile swelling, scrotal swelling, testicular pain and urgency.   Musculoskeletal:  Positive for myalgias. Negative for arthralgias, back pain, joint swelling and neck pain.   Skin:  Negative for color change, pallor, rash and wound.   Allergic/Immunologic: Negative for environmental allergies, food allergies and immunocompromised state.   Neurological:  Negative for dizziness, vertigo, seizures, weakness, numbness and headaches.   Hematological:  Negative for adenopathy. Does not bruise/bleed easily.   Psychiatric/Behavioral: Negative.        Objective:     Physical Exam  Vitals and nursing note reviewed.   Constitutional:       Appearance: Normal appearance. He is well-developed.   HENT:      Head: Normocephalic.      Right Ear: External ear normal.      Left Ear: External ear normal.      Nose: Nose normal.      Mouth/Throat:      Mouth: Mucous membranes are moist.      Pharynx: Oropharynx is clear. No oropharyngeal exudate or posterior oropharyngeal erythema.   Eyes:      Extraocular Movements: Extraocular movements intact.      Conjunctiva/sclera: Conjunctivae normal.      Pupils: Pupils are equal, round, and reactive to light.   Cardiovascular:      Rate and Rhythm: Normal rate and regular rhythm.      Pulses: Normal pulses.      Heart sounds: Normal heart sounds.   Pulmonary:      Effort: Pulmonary effort is  normal.      Breath sounds: Normal breath sounds.   Abdominal:      General: Bowel sounds are normal.      Palpations: Abdomen is soft.      Tenderness: There is no right CVA tenderness or left CVA tenderness.   Genitourinary:     Penis: Normal.       Testes: Normal.   Musculoskeletal:         General: Normal range of motion.      Cervical back: Normal range of motion and neck supple.      Right lower leg: No edema.      Left lower leg: No edema.   Skin:     General: Skin is warm and dry.      Capillary Refill: Capillary refill takes less than 2 seconds.   Neurological:      General: No focal deficit present.      Mental Status: He is alert and oriented to person, place, and time.      Deep Tendon Reflexes: Reflexes are normal and symmetric.   Psychiatric:         Mood and Affect: Mood normal.         Behavior: Behavior normal.         Thought Content: Thought content normal.         Judgment: Judgment normal.        Assessment:      1. Prostate cancer    2. Elevated PSA    3. Erectile dysfunction after radical prostatectomy      Plan:     Patient Instructions   Schedule PSMA - PET CT scan.  Notify Patient of results  If able to determine location PSA origin will determine therapy necessary and either place the on medication and/or refer patient for treatment to radiation oncology or Hematology Oncology.

## 2023-11-22 ENCOUNTER — HOSPITAL ENCOUNTER (OUTPATIENT)
Dept: RADIOLOGY | Facility: HOSPITAL | Age: 63
Discharge: HOME | End: 2023-11-22
Attending: UROLOGY
Payer: COMMERCIAL

## 2023-11-22 DIAGNOSIS — R97.21 RISING PSA FOLLOWING TREATMENT FOR MALIGNANT NEOPLASM OF PROSTATE: ICD-10-CM

## 2023-11-22 PROCEDURE — 78815 PET IMAGE W/CT SKULL-THIGH: CPT | Mod: 26,PS,, | Performed by: STUDENT IN AN ORGANIZED HEALTH CARE EDUCATION/TRAINING PROGRAM

## 2023-11-22 PROCEDURE — 78815 NM PET CT F 18 PYL PSMA, MIDTHIGH TO VERTEX: ICD-10-PCS | Mod: 26,PS,, | Performed by: STUDENT IN AN ORGANIZED HEALTH CARE EDUCATION/TRAINING PROGRAM

## 2023-11-22 PROCEDURE — 78815 PET IMAGE W/CT SKULL-THIGH: CPT | Mod: TC

## 2023-11-22 PROCEDURE — A9595 NM PET CT F 18 PYL PSMA, MIDTHIGH TO VERTEX: HCPCS | Mod: TB

## 2023-11-24 ENCOUNTER — PATIENT MESSAGE (OUTPATIENT)
Dept: UROLOGY | Facility: CLINIC | Age: 63
End: 2023-11-24
Payer: COMMERCIAL

## 2023-12-03 ENCOUNTER — OFFICE VISIT (OUTPATIENT)
Dept: URGENT CARE | Facility: CLINIC | Age: 63
End: 2023-12-03
Payer: COMMERCIAL

## 2023-12-03 VITALS
OXYGEN SATURATION: 91 % | RESPIRATION RATE: 30 BRPM | DIASTOLIC BLOOD PRESSURE: 56 MMHG | HEART RATE: 115 BPM | BODY MASS INDEX: 39.37 KG/M2 | SYSTOLIC BLOOD PRESSURE: 116 MMHG | HEIGHT: 70 IN | WEIGHT: 275 LBS

## 2023-12-03 DIAGNOSIS — R06.02 SHORTNESS OF BREATH: ICD-10-CM

## 2023-12-03 DIAGNOSIS — R09.02 HYPOXIA: Primary | ICD-10-CM

## 2023-12-03 DIAGNOSIS — R07.9 CHEST PAIN, UNSPECIFIED TYPE: ICD-10-CM

## 2023-12-03 PROBLEM — J10.1 INFLUENZA A WITH RESPIRATORY MANIFESTATIONS: Status: ACTIVE | Noted: 2023-12-03

## 2023-12-03 PROBLEM — J96.01 ACUTE RESPIRATORY FAILURE WITH HYPOXIA AND HYPERCARBIA: Status: RESOLVED | Noted: 2023-12-03 | Resolved: 2023-12-03

## 2023-12-03 PROBLEM — J96.01 ACUTE RESPIRATORY FAILURE WITH HYPOXIA AND HYPERCARBIA: Status: ACTIVE | Noted: 2023-12-03

## 2023-12-03 PROBLEM — J18.9 CAP (COMMUNITY ACQUIRED PNEUMONIA): Status: ACTIVE | Noted: 2023-12-03

## 2023-12-03 PROBLEM — J96.02 ACUTE RESPIRATORY FAILURE WITH HYPOXIA AND HYPERCARBIA: Status: ACTIVE | Noted: 2023-12-03

## 2023-12-03 PROBLEM — I21.4 NSTEMI (NON-ST ELEVATED MYOCARDIAL INFARCTION): Status: ACTIVE | Noted: 2023-12-03

## 2023-12-03 PROBLEM — N17.9 AKI (ACUTE KIDNEY INJURY): Status: ACTIVE | Noted: 2023-12-03

## 2023-12-03 PROBLEM — Z99.11 ON MECHANICALLY ASSISTED VENTILATION: Status: ACTIVE | Noted: 2023-12-03

## 2023-12-03 PROBLEM — E78.5 HLD (HYPERLIPIDEMIA): Status: ACTIVE | Noted: 2023-12-03

## 2023-12-03 PROBLEM — J96.02 ACUTE RESPIRATORY FAILURE WITH HYPOXIA AND HYPERCARBIA: Status: RESOLVED | Noted: 2023-12-03 | Resolved: 2023-12-03

## 2023-12-03 PROCEDURE — 93005 EKG 12-LEAD: ICD-10-PCS | Mod: S$GLB,,,

## 2023-12-03 PROCEDURE — 93010 ELECTROCARDIOGRAM REPORT: CPT | Mod: S$GLB,,, | Performed by: INTERNAL MEDICINE

## 2023-12-03 PROCEDURE — 99205 PR OFFICE/OUTPT VISIT, NEW, LEVL V, 60-74 MIN: ICD-10-PCS | Mod: S$GLB,,,

## 2023-12-03 PROCEDURE — 93010 EKG 12-LEAD: ICD-10-PCS | Mod: S$GLB,,, | Performed by: INTERNAL MEDICINE

## 2023-12-03 PROCEDURE — 93005 ELECTROCARDIOGRAM TRACING: CPT | Mod: S$GLB,,,

## 2023-12-03 PROCEDURE — 99205 OFFICE O/P NEW HI 60 MIN: CPT | Mod: S$GLB,,,

## 2023-12-03 NOTE — PROGRESS NOTES
"Subjective:      Patient ID: Renan Britton is a 63 y.o. male.    Vitals:  height is 5' 10" (1.778 m) and weight is 124.7 kg (275 lb). His blood pressure is 116/56 (abnormal) and his pulse is 115 (abnormal). His respiration is 30 (abnormal) and oxygen saturation is 91% (abnormal).     Chief Complaint: Shortness of Breath    Pt presents to clinic with cough that progressed into shortness of breath and fever at home of 101.6. pt reports increase SOB over last 24 hours. Patient's wife stated his shallow breathing began last night but got worse today. Patient wife reports smoking history for 10 years but quit for 2 years. Pt reported that yesterday he felt chest pain with breathing. No active CP in clinic.     Shortness of Breath  This is a new problem. The current episode started yesterday. The problem occurs constantly. The problem has been gradually improving. Associated symptoms include chest pain, a fever, rhinorrhea and a sore throat. Pertinent negatives include no ear pain, headaches or syncope. He has tried OTC cough suppressants for the symptoms. The treatment provided no relief.       Constitution: Positive for fever.   HENT:  Positive for sore throat. Negative for ear pain.    Cardiovascular:  Positive for chest pain. Negative for passing out.   Respiratory:  Positive for shortness of breath.    Neurological:  Negative for headaches.      Objective:     Physical Exam   Constitutional: He is oriented to person, place, and time.  Non-toxic appearance. He appears ill. He appears distressed.      Comments:Patient has short, shallow breathing. 91% O2 on RA. On 4L NC patient is at 97%.    obesity  HENT:   Head: Normocephalic and atraumatic.   Nose: Nose normal.   Mouth/Throat: Mucous membranes are moist.   Eyes:      Comments: Conjunctival injection   Neck: Neck supple.   Cardiovascular: Regular rhythm. Tachycardia present.   Pulmonary/Chest: He is in respiratory distress.   Neurological: He is alert and " oriented to person, place, and time.   Skin: Skin is warm and dry.   Psychiatric: His behavior is normal. Thought content normal.   Nursing note and vitals reviewed.      Assessment:     1. Hypoxia    2. Shortness of breath    3. Chest pain, unspecified type        Plan:       Hypoxia  -     Refer to Emergency Dept.    Shortness of breath  -     Refer to Emergency Dept.    Chest pain, unspecified type  -     IN OFFICE EKG 12-LEAD (to [place])  -     Refer to Emergency Dept.      [facility] ambulance called and patient will be transferred to [facility].   Called [facility] and spoke with triage nurse about patient transfer by [facility] ambulance. Due to high risk of complications the patient is being sent to ER for further evaluation, treatment and possible hospitalizations.    Medical Decision Making:   Clinical Tests:   Medical Tests: Ordered and Reviewed  Urgent Care Management:  Patient presented to clinic with complaint of  SOB, fever for 2 days. Patient is in respiratory distress upon arrival with rapid, shallow breathing. Pt is AAO and answering questions. O2 is 91% on RA and tachycardic, tachypnea. Patient is ambulatory with wife present in room. Pt placed on 4L NC and O2 went up to 97%, but slowly decreased to 94%. ECG- sinus tachy with 111bpm, t wave depressions in leads I,II, V5, V6. Discussed with patient and wife that patient required transfer by [facility] ambulance to ED. EMS was called. Patient is being transfer to [facility] by wife's request. Report was given to EMS personnel.

## 2023-12-04 PROBLEM — J80 ACUTE RESPIRATORY DISTRESS SYNDROME (ARDS): Status: ACTIVE | Noted: 2023-12-04

## 2023-12-04 PROBLEM — R65.21 SEPTIC SHOCK: Status: ACTIVE | Noted: 2020-06-26

## 2023-12-07 ENCOUNTER — TELEPHONE (OUTPATIENT)
Dept: URGENT CARE | Facility: CLINIC | Age: 63
End: 2023-12-07
Payer: COMMERCIAL

## 2023-12-07 PROBLEM — I21.4 NSTEMI (NON-ST ELEVATED MYOCARDIAL INFARCTION): Status: RESOLVED | Noted: 2023-12-03 | Resolved: 2023-12-07

## 2023-12-07 PROBLEM — A41.9 SEPTIC SHOCK: Status: RESOLVED | Noted: 2020-06-26 | Resolved: 2023-12-07

## 2023-12-07 PROBLEM — Z99.11 ON MECHANICALLY ASSISTED VENTILATION: Status: RESOLVED | Noted: 2023-12-03 | Resolved: 2023-12-07

## 2023-12-07 PROBLEM — R65.21 SEPTIC SHOCK: Status: RESOLVED | Noted: 2020-06-26 | Resolved: 2023-12-07

## 2023-12-08 PROBLEM — N19 HYPERPHOSPHATEMIA ASSOCIATED WITH RENAL FAILURE: Status: ACTIVE | Noted: 2023-12-08

## 2023-12-08 PROBLEM — E83.39 HYPERPHOSPHATEMIA ASSOCIATED WITH RENAL FAILURE: Status: ACTIVE | Noted: 2023-12-08

## 2023-12-08 NOTE — TELEPHONE ENCOUNTER
Called patient's wife, Patricia, who stated patient was admitted to ICU, but is slowly improving. Stated they are now in a private room. She stated patient has been extubated and they are watching his kidney function. Wife is appreciative of the check in. Discussed if they need anything further to call the clinic.

## 2023-12-10 PROBLEM — J96.01 ACUTE RESPIRATORY FAILURE WITH HYPOXIA AND HYPERCAPNIA: Status: RESOLVED | Noted: 2023-12-03 | Resolved: 2023-12-10

## 2023-12-10 PROBLEM — J96.02 ACUTE RESPIRATORY FAILURE WITH HYPOXIA AND HYPERCAPNIA: Status: RESOLVED | Noted: 2023-12-03 | Resolved: 2023-12-10

## 2023-12-10 PROBLEM — J80 ACUTE RESPIRATORY DISTRESS SYNDROME (ARDS): Status: RESOLVED | Noted: 2023-12-04 | Resolved: 2023-12-10

## 2023-12-11 PROBLEM — D69.6 THROMBOCYTOPENIA: Status: RESOLVED | Noted: 2020-03-09 | Resolved: 2023-12-11

## 2024-01-10 ENCOUNTER — TELEPHONE (OUTPATIENT)
Dept: INFECTIOUS DISEASES | Facility: CLINIC | Age: 64
End: 2024-01-10
Payer: COMMERCIAL

## 2024-01-10 ENCOUNTER — PATIENT MESSAGE (OUTPATIENT)
Dept: INFECTIOUS DISEASES | Facility: CLINIC | Age: 64
End: 2024-01-10
Payer: COMMERCIAL

## 2024-01-10 NOTE — TELEPHONE ENCOUNTER
----- Message from Marcelle Hunt MD sent at 1/10/2024 12:38 PM CST -----  Regarding: RE: Regarding medicaton  Contact: [phone]  Yes. He needs to continue his antibiotics.     ----- Message -----  From: Chloé Paiz MA  Sent: 1/10/2024  11:01 AM CST  To: Marcelle Hunt MD  Subject: FW: Regarding medicaton                          Please advise   ----- Message -----  From: Luz Yanez  Sent: 1/10/2024  10:54 AM CST  To: [facility] Staff  Subject: Regarding medicaton                              Pt wife Patricia is calling  to speak with provider or nurse regarding if the pt needs to remain taking amoxicillin (AMOXIL) 500 MG capsule that she initially prescribed him back in March of 2020. Please call and discuss medication with patient as soon as possible.

## 2024-01-15 ENCOUNTER — HOSPITAL ENCOUNTER (OUTPATIENT)
Dept: SLEEP MEDICINE | Facility: HOSPITAL | Age: 64
Discharge: HOME | End: 2024-01-15
Attending: FAMILY MEDICINE
Payer: COMMERCIAL

## 2024-01-15 DIAGNOSIS — G47.30 SLEEP APNEA, UNSPECIFIED: Primary | ICD-10-CM

## 2024-01-15 DIAGNOSIS — G47.30 SLEEP APNEA, UNSPECIFIED: ICD-10-CM

## 2024-01-15 PROCEDURE — 95806 SLEEP STUDY UNATT&RESP EFFT: CPT

## 2024-01-18 PROBLEM — G47.30 SLEEP APNEA, UNSPECIFIED: Status: ACTIVE | Noted: 2024-01-18

## 2024-02-05 NOTE — PROGRESS NOTES
PATIENT: Renan Britton  MRN: [id number]  DATE: 2/9/2024    Diagnosis:   1. Iron deficiency anemia due to chronic blood loss    2. Severe obesity (BMI 35.0-39.9) with comorbidity    3. History of prostate cancer    4. Elevated PSA    5. Acute kidney injury      Chief Complaint: iron deficiency anemia    Oncologic History:      Oncologic History 1. Prostate cancer      Oncologic Treatment 1. Radical prostatectomy (12/13/21)      Pathology 12/13/21:  1. Right iliac, obturator, and hypogastric lymph nodes, lymphadenectomy:  Eleven lymph nodes, negative for carcinoma (0/11).  2. Left iliac, obturator, and hypogastric lymph nodes, lymphadenectomy:  Six lymph nodes, negative for carcinoma (0/6).  2. Prostate, radical prostatectomy:  Prostatic adenocarcinoma, grade group 2, Soha score = 7 (3+4), with involvement of bilateral prostate  lobes.  Surgical margins are negative for carcinoma.  Uninvolved prostate with benign glandular hyperplasia and minimal chronic inflammation.  AJCC 8th edition Pathologic stage: [id number].        Telemedicine visit:  The patient location is: home  The chief complaint leading to consultation is: anemia    Visit type: audiovisual    Face to Face time with patient: 7 minutes  10 minutes of total time spent on the encounter, which includes face to face time and non-face to face time preparing to see the patient (eg, review of tests), Obtaining and/or reviewing separately obtained history, Documenting clinical information in the electronic or other health record, Independently interpreting results (not separately reported) and communicating results to the patient/family/caregiver, or Care coordination (not separately reported).     Each patient to whom he or she provides medical services by telemedicine is:  (1) informed of the relationship between the physician and patient and the respective role of any other health care provider with respect to management of the patient; and (2) notified that  he or she may decline to receive medical services by telemedicine and may withdraw from such care at any time.    Notes: see below    Subjective:    History of Present Illness: Mr. Britton is a 63 y.o. male who presented in April 2022 for evaluation and management of iron deficiency anemia. He was referred by Dr. Posada.    - labs since December 2021 reveal a mild-to-severe anemia, now microcytic  - iron studies 94/7/22) reveal a low-normal ferritin, decreased iron/saturated iron, and increased total iron binding capacity  - he underwent radical prostatectomy on 12/13/21.  - he has undergone colonoscopy (11/18/21) and upper GI endoscopy (4/7/22).   - he received iron sucrose x 4 doses in April/May 2022.  He also underwent a video capsule endoscopy (4/18/22) that was relatively normal.  - he received iron sucrose x 4 doses in November/December 2022.    Interval history:  - he presents for a follow-up appointment for his iron deficiency anemia  - in December, he was hospitalized for pneumonia/renal failure. He states his renal function is improving (he gets outside labs).  - he underwent PSMA scan on 11/22/23.  - today, he endorses fatigue. He feels he's still recovering from the flu/pneumonia. He denies chest pain, nausea, vomiting, diarrhea, constipation.    Past medical, surgical, family, and social histories have been reviewed and updated below.    Past Medical History:   Past Medical History:   Diagnosis Date    JJ (acute kidney injury) 12/3/2023    Diabetes mellitus     Elevated PSA     H/O brain tumor     HLD (hyperlipidemia)     Hypertension     PAD (peripheral artery disease)     Personal history of colonic polyps 1/8/2021    Prostate cancer     Seizures     R/T BRAIN TUMOR- SURGICALLY EXCISED    Urinary tract infection        Past Surgical History:   Past Surgical History:   Procedure Laterality Date    ANGIOGRAPHY Left 4/6/2020    Procedure: ANGIOGRAM, Left lower extremity;  Surgeon: Kelby Gomez,  MD;  [address]  Service: Peripheral Vascular;  Laterality: Left;    ANGIOGRAPHY OF LOWER EXTREMITY Left 6/29/2020    Procedure: Angiogram Extremity Unilateral, washout L groin, possible open pseudoaneurysm repair;  Surgeon: Bruce Dallas MD;  [facility];  Service: Peripheral Vascular;  Laterality: Left;  contrast 23 ml  fluoro time: 9.9 min  mGy: 1230.26  Gycm2: 146.69    AORTOGRAPHY N/A 6/29/2020    Procedure: AORTOGRAM;  Surgeon: Bruce Dallas MD;  Location: [address];  Service: Peripheral Vascular;  Laterality: N/A;    APPLICATION OF WOUND VACUUM-ASSISTED CLOSURE DEVICE Bilateral 1/1/2020    Procedure: APPLICATION, WOUND VAC;  Surgeon: SEBAS Prieto II, MD;  Location: [facility] OR [address] [place] [address];  Service: Cardiovascular;  Laterality: Bilateral;    APPLICATION OF WOUND VACUUM-ASSISTED CLOSURE DEVICE N/A 1/3/2020    Procedure: APPLICATION, WOUND VAC;  Surgeon: Brian Wong MD;  Location: [address];  Service: Plastics;  Laterality: N/A;    BRAIN SURGERY  01/2011    TUMOR EXCISED    COLONOSCOPY N/A 1/8/2021    Procedure: COLONOSCOPY;  Surgeon: Susan Posada MD;  Location: [facility];  Service: Endoscopy;  Laterality: N/A;    COLONOSCOPY N/A 11/18/2021    Procedure: COLONOSCOPY;  Surgeon: Susan Posada MD;  Location: [facility] ENDO;  Service: Endoscopy;  Laterality: N/A;    CREATION OF ILIOFEMORAL ARTERY BYPASS Left 8/12/2020    Procedure: CREATION, BYPASS, ARTERIAL, ILIAC TO FEMORAL;  Surgeon: Kelby Gomez MD;  Location: [address];  Service: Peripheral Vascular;  Laterality: Left;  CO-SURGEONS: DR KEO ADHIKARI;  DR WONG, profusion    CREATION OF MUSCLE ROTATIONAL FLAP N/A 1/3/2020    Procedure: CREATION, FLAP, MUSCLE ROTATION;  Surgeon: Brian Wong MD;  Location: [facility] OR [address] [place] [address];  Service: Plastics;  Laterality: N/A;    ENDARTERECTOMY OF FEMORAL ARTERY Bilateral 12/20/2019    Procedure: ENDARTERECTOMY, FEMORAL;  Surgeon: Kelby Gomez MD;   Location: [facility] OR 2ND FLR;  Service: Peripheral Vascular;  Laterality: Bilateral;  natalya common femoral endarterectomy with natalya. iliac stent placement    ESOPHAGOGASTRODUODENOSCOPY N/A 4/7/2022    Procedure: EGD (ESOPHAGOGASTRODUODENOSCOPY);  Surgeon: Susan Posada MD;  Location: [facility] ENDO;  Service: Endoscopy;  Laterality: N/A;    INTRALUMINAL GASTROINTESTINAL TRACT IMAGING VIA CAPSULE N/A 4/18/2022    Procedure: IMAGING, GI TRACT, INTRALUMINAL, VIA CAPSULE;  Surgeon: Otto Powers MD;  Location: [facility] ENDO;  Service: Endoscopy;  Laterality: N/A;    PERCUTANEOUS TRANSLUMINAL ANGIOPLASTY Left 4/6/2020    Procedure: PTA (ANGIOPLASTY, PERCUTANEOUS, TRANSLUMINAL), left lower extremity;  Surgeon: Kelby Gomez MD;  Location: [facility] OR [facility] FLR;  Service: Peripheral Vascular;  Laterality: Left;    PERCUTANEOUS TRANSLUMINAL ANGIOPLASTY (PTA) OF PERIPHERAL VESSEL Left 10/17/2019    Procedure: PTA, PERIPHERAL VESSEL;  Surgeon: Rao Fisher MD;  Location: [facility] CATH;  Service: Cardiology;  Laterality: Left;    PSEUDOANEURYSM REPAIR Left 6/29/2020    Procedure: REPAIR, PSEUDOANEURYSM;  Surgeon: Bruce Dallas MD;  Location: [facility] OR [facility] FLR;  Service: Peripheral Vascular;  Laterality: Left;    RADICAL RETROPUBIC PROSTATECTOMY N/A 12/13/2021    Procedure: PROSTATECTOMY, RETROPUBIC, RADICAL + Bilateral pelvic lymph node dissection;  Surgeon: Raghav Mitchell MD;  Location: [facility] OR;  Service: Urology;  Laterality: N/A;       Family History:   Family History   Problem Relation Age of Onset    Diabetes Mother     Hypertension Mother     Stroke Mother     Cancer Father         LUNG    Heart disease Father     Cancer Brother     Heart disease Brother     Heart disease Brother     Heart disease Brother     Prostate cancer Neg Hx     Kidney disease Neg Hx        Social History:  reports that he has been smoking cigarettes. He has been exposed to tobacco smoke. He has never used smokeless tobacco. He reports current alcohol  use. He reports that he does not use drugs.    Allergies:  Review of patient's allergies indicates:  No Known Allergies    Medications:  Current Outpatient Medications   Medication Sig Dispense Refill    amoxicillin (AMOXIL) 500 MG capsule Take 1 capsule (500 mg total) by mouth once daily. 90 capsule 3    atorvastatin (LIPITOR) 40 MG tablet Take 1 tablet (40 mg total) by mouth once daily. 30 tablet 5    clopidogreL (PLAVIX) 75 mg tablet TAKE ONE TABLET BY MOUTH once a day 30 tablet 0    hydrALAZINE (APRESOLINE) 50 MG tablet Take 1 tablet (50 mg total) by mouth every 8 (eight) hours. 90 tablet 3    NIFEdipine (PROCARDIA-XL) 60 MG (OSM) 24 hr tablet Take 1 tablet (60 mg total) by mouth once daily. 90 tablet 0    pregabalin (LYRICA) 50 MG capsule Take 1 capsule (50 mg total) by mouth once daily. 30 capsule 1    VENTOLIN HFA 90 mcg/actuation inhaler 2 puffs every 4 to 6 hours as needed.       No current facility-administered medications for this visit.       Review of Systems   Constitutional:  Positive for fatigue. Negative for appetite change and unexpected weight change.   HENT:  Negative for mouth sores and sore throat.    Eyes:  Negative for visual disturbance.   Respiratory:  Negative for cough and shortness of breath.    Cardiovascular:  Negative for chest pain.   Gastrointestinal:  Negative for abdominal pain and diarrhea.   Genitourinary:  Negative for dysuria.   Musculoskeletal:  Negative for back pain.   Skin:  Negative for rash.   Neurological:  Negative for headaches.   Hematological:  Negative for adenopathy.   Psychiatric/Behavioral:  The patient is not nervous/anxious.        ECOG Performance Status:   ECOG SCORE 1            Objective:      Vitals:   There were no vitals filed for this visit.  BMI: There is no height or weight on file to calculate BMI.  Deferred due to telemedicine visit.    Physical Exam   Deferred due to telemedicine visit.    Laboratory Data:  Labs have been reviewed.    Lab Results    Component Value Date    WBC 5.77 02/07/2024    WBC 5.77 02/07/2024    HGB 14.3 02/07/2024    HGB 14.3 02/07/2024    HCT 43.5 02/07/2024    HCT 43.5 02/07/2024    MCV 89 02/07/2024    MCV 89 02/07/2024     02/07/2024     02/07/2024           Imaging:     PSMA (11/22/23):  No suspicious radiotracer avid lesion to suggest disease recurrence or metastasis.     Upper GI endoscopy (4/7/22):  Impression:            - Normal middle third of esophagus.                          - Erythematous mucosa in the antrum. Biopsied.                          - Normal examined duodenum. Biopsied.                          - The tonsils are touching.                          - A single plaque in the upper third of the                          esophagus. Biopsied. This is consistent with a                          benign inlet patch.                          - Z-line irregular, at the gastroesophageal                          junction. No changes c/w Neal's seen.     Colonoscopy (11/18/21):   - This was a much better exam than last                          colonoscopy.                          - Significant colonic spasm.                          - Two 8 to 9 mm polyps in the descending colon,                          removed with a hot snare. Resected and retrieved.                          - One 5 mm polyp in the descending colon. Treated                          with a hot snare.                          - One 10 mm polyp in the sigmoid colon, removed                          with a hot snare. Resected and retrieved.                          - One diminutive polyp in the rectum, removed with                          a cold biopsy forceps. Resected and retrieved.                          - Redundant colon.                          - Internal hemorrhoids.     Assessment:       1. Iron deficiency anemia due to chronic blood loss    2. Severe obesity (BMI 35.0-39.9) with comorbidity    3. History of prostate cancer    4.  Elevated PSA    5. Thrombocytopenia, unspecified           Plan:     1. Iron deficiency anemia  - I have reviewed his chart  - labs since December 2021 reveal a mild-to-severe anemia, now microcytic  - iron studies 94/7/22) reveal a low-normal ferritin, decreased iron/saturated iron, and increased total iron binding capacity  - he has undergone colonoscopy (11/18/21) and upper GI endoscopy (4/7/22). No clear source for GI blood loss. He also underwent a video capsule endoscopy (4/18/22) that was relatively normal.  - given the severity of his iron deficiency anemia, I recommended iron sucrose x 4 doses.   - he received iron sucrose x 4 doses in April/May 2022.  - he received iron sucrose x 4 doses in November/December 2022.  - Labs have been reviewed. Hemoglobin is normal. Total iron binding capacity is normal, and ferritin is 55 ng/mL.  - he is having more fatigue. I offered to set up more iron sucrose, but he wants to try oral iron for now.  - return to clinic in 6 months with repeat labs.    2. Severe obesity  - no weight since telemedicine visit.  - I encouraged weight loss  - continue to monitor    3. History of prostate cancer / increasing PSA  - he underwent radical prostatectomy on 12/13/21.  - PSMA (11/22/23): No suspicious radiotracer avid lesion to suggest disease recurrence or metastasis.   - recent PSA (2/7/24) was 1.6 ng/mL. It is slowly increasing.  - defer surveillance to Dr. Mitchell. Perhaps he can get a a repeat PSMA scan in a few months.    4. Acute kidney injury  - he had renal failure in December 2023. He states he renal function is improving.  - defer to primary care.    5. Advance Care Planning     Power of   After our discussion (at previous visit), the patient decided to complete a HCPOA and appointed his  wife Patricia Britton ([phone]) .        - return to clinic in 6 months with repeat labs.    Kenneth Mcdowell M.D.  Hematology/Oncology  [facility] - [address]  [facility], [address]  [place] [address]  Phone: [phone]  Fax: [phone]

## 2024-02-08 DIAGNOSIS — G47.33 OBSTRUCTIVE SLEEP APNEA (ADULT) (PEDIATRIC): Primary | ICD-10-CM

## 2024-02-09 ENCOUNTER — OFFICE VISIT (OUTPATIENT)
Dept: HEMATOLOGY/ONCOLOGY | Facility: CLINIC | Age: 64
End: 2024-02-09
Payer: COMMERCIAL

## 2024-02-09 DIAGNOSIS — E66.01 SEVERE OBESITY (BMI 35.0-39.9) WITH COMORBIDITY: ICD-10-CM

## 2024-02-09 DIAGNOSIS — Z85.46 HISTORY OF PROSTATE CANCER: ICD-10-CM

## 2024-02-09 DIAGNOSIS — D50.0 IRON DEFICIENCY ANEMIA DUE TO CHRONIC BLOOD LOSS: Primary | ICD-10-CM

## 2024-02-09 DIAGNOSIS — N17.9 ACUTE KIDNEY INJURY: ICD-10-CM

## 2024-02-09 DIAGNOSIS — R97.20 ELEVATED PSA: ICD-10-CM

## 2024-02-09 PROCEDURE — 99214 OFFICE O/P EST MOD 30 MIN: CPT | Mod: 95,,, | Performed by: INTERNAL MEDICINE

## 2024-02-09 NOTE — Clinical Note
1. Schedule labs cbc, cmp, psa, ferritin, iron/tibc in 6 months. 2. Schedule virtual visit day after labs.  Thanks!

## 2024-02-10 ENCOUNTER — HOSPITAL ENCOUNTER (OUTPATIENT)
Dept: SLEEP MEDICINE | Facility: HOSPITAL | Age: 64
Discharge: HOME | End: 2024-02-10
Attending: FAMILY MEDICINE
Payer: COMMERCIAL

## 2024-02-10 DIAGNOSIS — G47.33 OBSTRUCTIVE SLEEP APNEA (ADULT) (PEDIATRIC): ICD-10-CM

## 2024-02-10 PROCEDURE — 95811 POLYSOM 6/>YRS CPAP 4/> PARM: CPT

## 2024-02-11 ENCOUNTER — PATIENT MESSAGE (OUTPATIENT)
Dept: HEMATOLOGY/ONCOLOGY | Facility: CLINIC | Age: 64
End: 2024-02-11
Payer: COMMERCIAL

## 2024-02-14 ENCOUNTER — OFFICE VISIT (OUTPATIENT)
Dept: UROLOGY | Facility: CLINIC | Age: 64
End: 2024-02-14
Payer: COMMERCIAL

## 2024-02-14 VITALS
SYSTOLIC BLOOD PRESSURE: 142 MMHG | HEART RATE: 79 BPM | DIASTOLIC BLOOD PRESSURE: 81 MMHG | BODY MASS INDEX: 37.65 KG/M2 | WEIGHT: 263 LBS | HEIGHT: 70 IN

## 2024-02-14 DIAGNOSIS — C61 PROSTATE CANCER: Primary | ICD-10-CM

## 2024-02-14 DIAGNOSIS — R97.20 ELEVATED PSA, LESS THAN 10 NG/ML: ICD-10-CM

## 2024-02-14 DIAGNOSIS — R97.21 RISING PSA FOLLOWING TREATMENT FOR MALIGNANT NEOPLASM OF PROSTATE: ICD-10-CM

## 2024-02-14 PROCEDURE — 99215 OFFICE O/P EST HI 40 MIN: CPT | Mod: S$GLB,,, | Performed by: UROLOGY

## 2024-02-14 PROCEDURE — 99999 PR PBB SHADOW E&M-EST. PATIENT-LVL III: CPT | Mod: PBBFAC,,, | Performed by: UROLOGY

## 2024-02-14 RX ORDER — PREGABALIN 100 MG/1
100 CAPSULE ORAL 2 TIMES DAILY
COMMUNITY
Start: 2024-02-02

## 2024-02-14 NOTE — PROGRESS NOTES
Subjective:      Patient ID: Renan Britton is a 63 y.o. male.    Chief Complaint: No chief complaint on file.    Mr. Britton is a 64 yo gentleman with a history of prostate cancer.  The patient had Soha 7 prostate cancer with negative nodes and good pathology report with negative margins.  The patient's PSA has slowly been creeping up and is now gone from 0.95 to1.6.  The patient has times and has had negative metastatic workup.  He has had a PSMA scan in 12 of 2022 and October of 2023 with no evidence of metastatic disease.  The patient also has negative bone scan and CT scan abdomen pelvis in 2022 as well.  To his being seen by the hematology oncology group as well.  Recommendations are to move with a repeat PSA scan in 3 months and then if and when PSA goes or above then to obtain another PSMA scan.    Follow-up  This is a chronic (CAP) problem. The current episode started more than 1 year ago. The problem occurs constantly. The problem has been gradually worsening. Pertinent negatives include no abdominal pain, anorexia, arthralgias, change in bowel habit, chest pain, chills, congestion, coughing, diaphoresis, fatigue, fever, headaches, joint swelling, myalgias, nausea, neck pain, numbness, rash, sore throat, swollen glands, urinary symptoms, vertigo, visual change, vomiting or weakness. Nothing aggravates the symptoms. Treatments tried: Radical retropubic prostatectomy and BPLND. The treatment provided significant relief.     Review of Systems   Constitutional:  Negative for activity change, appetite change, chills, diaphoresis, fatigue, fever and unexpected weight change.   HENT:  Negative for congestion, hearing loss, sinus pressure, sore throat and trouble swallowing.    Eyes:  Negative for photophobia, pain, discharge and visual disturbance.   Respiratory:  Negative for apnea, cough and shortness of breath.    Cardiovascular:  Negative for chest pain, palpitations and leg swelling.    Gastrointestinal:  Negative for abdominal distention, abdominal pain, anal bleeding, anorexia, blood in stool, change in bowel habit, constipation, diarrhea, nausea, rectal pain and vomiting.   Endocrine: Negative for cold intolerance, heat intolerance, polydipsia, polyphagia and polyuria.   Genitourinary:  Negative for decreased urine volume, difficulty urinating, dysuria, enuresis, flank pain, frequency, genital sores, hematuria, penile discharge, penile pain, penile swelling, scrotal swelling, testicular pain and urgency.   Musculoskeletal:  Negative for arthralgias, back pain, joint swelling, myalgias and neck pain.   Skin:  Negative for color change, pallor, rash and wound.   Allergic/Immunologic: Negative for environmental allergies, food allergies and immunocompromised state.   Neurological:  Negative for dizziness, vertigo, seizures, weakness, numbness and headaches.   Hematological:  Negative for adenopathy. Does not bruise/bleed easily.   Psychiatric/Behavioral: Negative.        Objective:     Physical Exam  Vitals and nursing note reviewed.   Constitutional:       General: He is not in acute distress.     Appearance: Normal appearance. He is well-developed. He is not ill-appearing, toxic-appearing or diaphoretic.   HENT:      Head: Normocephalic.      Right Ear: External ear normal. There is no impacted cerumen.      Left Ear: External ear normal. There is no impacted cerumen.      Nose: Nose normal. No congestion or rhinorrhea.      Mouth/Throat:      Mouth: Mucous membranes are moist.      Pharynx: Oropharynx is clear. No oropharyngeal exudate or posterior oropharyngeal erythema.   Eyes:      General: No scleral icterus.        Right eye: No discharge.         Left eye: No discharge.      Extraocular Movements: Extraocular movements intact.      Conjunctiva/sclera: Conjunctivae normal.      Pupils: Pupils are equal, round, and reactive to light.   Cardiovascular:      Rate and Rhythm: Normal rate and  regular rhythm.      Pulses: Normal pulses.      Heart sounds: Normal heart sounds.   Pulmonary:      Effort: Pulmonary effort is normal.   Abdominal:      General: Bowel sounds are normal. There is no distension.      Palpations: Abdomen is soft. There is no mass.      Tenderness: There is no abdominal tenderness. There is no right CVA tenderness, left CVA tenderness, guarding or rebound.      Hernia: No hernia is present.   Genitourinary:     Penis: Normal.       Testes: Normal.      Comments: Patient with no CVA tenderness, normal penis and scrotum.  Bladder nontender.  Musculoskeletal:         General: Normal range of motion.      Cervical back: Normal range of motion and neck supple.      Right lower leg: No edema.      Left lower leg: No edema.   Skin:     General: Skin is warm and dry.      Capillary Refill: Capillary refill takes less than 2 seconds.      Findings: No lesion or rash.   Neurological:      General: No focal deficit present.      Mental Status: He is alert and oriented to person, place, and time.      Deep Tendon Reflexes: Reflexes are normal and symmetric.   Psychiatric:         Behavior: Behavior normal.         Thought Content: Thought content normal.         Judgment: Judgment normal.        Assessment:      1. Prostate cancer    2. Elevated PSA, less than 10 ng/ml    3. Rising PSA following treatment for malignant neoplasm of prostate      Plan:     Patient Instructions   F/U 3 month with PSA.  If PSA rises to 2.0 or higher will order PSMA Scan.

## 2024-03-01 ENCOUNTER — PATIENT MESSAGE (OUTPATIENT)
Dept: HEMATOLOGY/ONCOLOGY | Facility: CLINIC | Age: 64
End: 2024-03-01
Payer: COMMERCIAL

## 2024-03-04 ENCOUNTER — TELEPHONE (OUTPATIENT)
Dept: HEMATOLOGY/ONCOLOGY | Facility: CLINIC | Age: 64
End: 2024-03-04
Payer: COMMERCIAL

## 2024-03-04 RX ORDER — SODIUM CHLORIDE 0.9 % (FLUSH) 0.9 %
10 SYRINGE (ML) INJECTION
Status: CANCELLED | OUTPATIENT
Start: 2024-03-20

## 2024-03-04 RX ORDER — DIPHENHYDRAMINE HYDROCHLORIDE 50 MG/ML
50 INJECTION INTRAMUSCULAR; INTRAVENOUS ONCE AS NEEDED
Status: CANCELLED | OUTPATIENT
Start: 2024-03-12

## 2024-03-04 RX ORDER — EPINEPHRINE 0.3 MG/.3ML
0.3 INJECTION SUBCUTANEOUS ONCE AS NEEDED
Status: CANCELLED | OUTPATIENT
Start: 2024-03-27

## 2024-03-04 RX ORDER — HEPARIN 100 UNIT/ML
500 SYRINGE INTRAVENOUS
Status: CANCELLED | OUTPATIENT
Start: 2024-03-27

## 2024-03-04 RX ORDER — HEPARIN 100 UNIT/ML
500 SYRINGE INTRAVENOUS
Status: CANCELLED | OUTPATIENT
Start: 2024-03-12

## 2024-03-04 RX ORDER — HEPARIN 100 UNIT/ML
500 SYRINGE INTRAVENOUS
Status: CANCELLED | OUTPATIENT
Start: 2024-03-20

## 2024-03-04 RX ORDER — DIPHENHYDRAMINE HYDROCHLORIDE 50 MG/ML
50 INJECTION INTRAMUSCULAR; INTRAVENOUS ONCE AS NEEDED
Status: CANCELLED | OUTPATIENT
Start: 2024-03-27

## 2024-03-04 RX ORDER — DIPHENHYDRAMINE HYDROCHLORIDE 50 MG/ML
50 INJECTION INTRAMUSCULAR; INTRAVENOUS ONCE AS NEEDED
Status: CANCELLED | OUTPATIENT
Start: 2024-04-03

## 2024-03-04 RX ORDER — EPINEPHRINE 0.3 MG/.3ML
0.3 INJECTION SUBCUTANEOUS ONCE AS NEEDED
Status: CANCELLED | OUTPATIENT
Start: 2024-04-03

## 2024-03-04 RX ORDER — SODIUM CHLORIDE 0.9 % (FLUSH) 0.9 %
10 SYRINGE (ML) INJECTION
Status: CANCELLED | OUTPATIENT
Start: 2024-03-27

## 2024-03-04 RX ORDER — DIPHENHYDRAMINE HYDROCHLORIDE 50 MG/ML
50 INJECTION INTRAMUSCULAR; INTRAVENOUS ONCE AS NEEDED
Status: CANCELLED | OUTPATIENT
Start: 2024-03-20

## 2024-03-04 RX ORDER — SODIUM CHLORIDE 0.9 % (FLUSH) 0.9 %
10 SYRINGE (ML) INJECTION
Status: CANCELLED | OUTPATIENT
Start: 2024-04-03

## 2024-03-04 RX ORDER — EPINEPHRINE 0.3 MG/.3ML
0.3 INJECTION SUBCUTANEOUS ONCE AS NEEDED
Status: CANCELLED | OUTPATIENT
Start: 2024-03-20

## 2024-03-04 RX ORDER — HEPARIN 100 UNIT/ML
500 SYRINGE INTRAVENOUS
Status: CANCELLED | OUTPATIENT
Start: 2024-04-03

## 2024-03-04 RX ORDER — SODIUM CHLORIDE 0.9 % (FLUSH) 0.9 %
10 SYRINGE (ML) INJECTION
Status: CANCELLED | OUTPATIENT
Start: 2024-03-12

## 2024-03-04 RX ORDER — EPINEPHRINE 0.3 MG/.3ML
0.3 INJECTION SUBCUTANEOUS ONCE AS NEEDED
Status: CANCELLED | OUTPATIENT
Start: 2024-03-12

## 2024-03-05 ENCOUNTER — PATIENT MESSAGE (OUTPATIENT)
Dept: UROLOGY | Facility: CLINIC | Age: 64
End: 2024-03-05
Payer: COMMERCIAL

## 2024-03-05 DIAGNOSIS — R97.20 ELEVATED PSA, LESS THAN 10 NG/ML: Primary | ICD-10-CM

## 2024-03-11 PROBLEM — J18.9 CAP (COMMUNITY ACQUIRED PNEUMONIA): Status: RESOLVED | Noted: 2023-12-03 | Resolved: 2024-03-11

## 2024-03-11 PROBLEM — N17.9 AKI (ACUTE KIDNEY INJURY): Status: RESOLVED | Noted: 2023-12-03 | Resolved: 2024-03-11

## 2024-03-12 ENCOUNTER — PATIENT MESSAGE (OUTPATIENT)
Dept: HEMATOLOGY/ONCOLOGY | Facility: CLINIC | Age: 64
End: 2024-03-12
Payer: COMMERCIAL

## 2024-03-14 ENCOUNTER — PATIENT MESSAGE (OUTPATIENT)
Dept: HEMATOLOGY/ONCOLOGY | Facility: CLINIC | Age: 64
End: 2024-03-14
Payer: COMMERCIAL

## 2024-03-15 ENCOUNTER — PATIENT MESSAGE (OUTPATIENT)
Dept: UROLOGY | Facility: CLINIC | Age: 64
End: 2024-03-15
Payer: COMMERCIAL

## 2024-03-25 ENCOUNTER — TELEPHONE (OUTPATIENT)
Dept: INFECTIOUS DISEASES | Facility: CLINIC | Age: 64
End: 2024-03-25
Payer: COMMERCIAL

## 2024-03-25 DIAGNOSIS — I77.6 VASCULAR INFLAMMATION OR INFECTION: Primary | ICD-10-CM

## 2024-03-25 RX ORDER — AMOXICILLIN 500 MG/1
1000 CAPSULE ORAL EVERY 12 HOURS
Qty: 90 CAPSULE | Refills: 3 | Status: ON HOLD | OUTPATIENT
Start: 2024-03-25 | End: 2024-04-19 | Stop reason: HOSPADM

## 2024-03-25 NOTE — TELEPHONE ENCOUNTER
----- Message from Marcelle Hunt MD sent at 3/25/2024 11:20 AM CDT -----  Regarding: RE: Refill  Contact: [phone]  Already sent in    ----- Message -----  From: Chloé Paiz MA  Sent: 3/25/2024  11:13 AM CDT  To: Marcelle Hunt MD  Subject: RE: Refill                                       Okay, he will need a new scrip probably because it only has 1 500mg and he only has 1 pill left  ----- Message -----  From: Marcelle Urbano MD  Sent: 3/25/2024  11:08 AM CDT  To: Chloé Paiz MA  Subject: RE: Refill                                       I missed the once a day part. Please let them know he should take 1,000mg twice per day. So it should be 2 tablets in the morning and 2 in the evening    ----- Message -----  From: Chloé Paiz MA  Sent: 3/25/2024   9:10 AM CDT  To: Marcelle Hunt MD  Subject: FW: Refill                                       Pt wife stated this prescription is 1 500mg a day, but the one you prescribed was 4 times a day. She would like to know if he needs to do it 4x or just once a day   ----- Message -----  From: Marcelle Urbano MD  Sent: 3/25/2024   8:54 AM CDT  To: Chloé Paiz MA  Subject: RE: Refill                                       There is an active prescription in his chart to last through the year. It was ordered by one of his other physicians. Do they need a new order?    ----- Message -----  From: Chloé Paiz MA  Sent: 3/25/2024   8:50 AM CDT  To: Marcelle Hunt MD  Subject: FW: Refill                                         ----- Message -----  From: Frieda Boles  Sent: 3/25/2024   8:42 AM CDT  To: [facility] Staff  Subject: Refill                                           Rx Refill/Request     Is this a Refill or New Rx:  Refill    Rx Name and Strength:    amoxicillin (AMOXIL) 500 MG capsule    Preferred Pharmacy with phone number:      [facility] - [place] - [address]  [address]  [place]  [address]  Phone: [phone] Fax: [phone]      Communication Preference: Call Patricia if needed at [phone]      Additional Information: Pt has 1 pill left

## 2024-03-25 NOTE — TELEPHONE ENCOUNTER
Called and spoke to pt wife    Explained he needs to take 2 every 12 hours     Provider sent meds to pharmacy

## 2024-03-25 NOTE — TELEPHONE ENCOUNTER
----- Message from Chloé Pazi MA sent at 3/22/2024  8:43 AM CDT -----  Regarding: FW: New RX  Contact: [phone]    ----- Message -----  From: Georgia Gillespie  Sent: 3/22/2024   8:30 AM CDT  To: [facility] Staff  Subject: New RX                                           Is this a Refill or New Rx (Script/Out of Refills):  New RX    Name of Caller: Patient's wife Patricia    Rx Name: amoxicillin (AMOXIL) 500 MG capsule    Pharmacy Name:   [facility] [place] - [address]  [address] [place] [address]  Phone: [phone] Fax: [phone]    Additional Information: Pt's wife would like a call back to further to discuss.

## 2024-04-02 PROBLEM — E87.70 VOLUME OVERLOAD: Status: ACTIVE | Noted: 2024-04-02

## 2024-04-02 PROBLEM — J96.01 ACUTE HYPOXIC RESPIRATORY FAILURE: Status: ACTIVE | Noted: 2024-04-02

## 2024-04-02 PROBLEM — Z71.89 ADVANCED CARE PLANNING/COUNSELING DISCUSSION: Status: ACTIVE | Noted: 2021-01-08

## 2024-04-02 PROBLEM — G47.33 OSA (OBSTRUCTIVE SLEEP APNEA): Status: ACTIVE | Noted: 2024-04-02

## 2024-04-03 PROBLEM — I35.0 AORTIC STENOSIS: Status: ACTIVE | Noted: 2024-04-03

## 2024-04-03 PROBLEM — Z71.89 ADVANCED CARE PLANNING/COUNSELING DISCUSSION: Status: RESOLVED | Noted: 2021-01-08 | Resolved: 2024-04-03

## 2024-04-04 PROBLEM — J96.01 ACUTE HYPOXIC RESPIRATORY FAILURE: Status: RESOLVED | Noted: 2024-04-02 | Resolved: 2024-04-04

## 2024-04-04 PROBLEM — E87.70 VOLUME OVERLOAD: Status: RESOLVED | Noted: 2024-04-02 | Resolved: 2024-04-04

## 2024-04-08 ENCOUNTER — PATIENT MESSAGE (OUTPATIENT)
Dept: HEMATOLOGY/ONCOLOGY | Facility: CLINIC | Age: 64
End: 2024-04-08
Payer: COMMERCIAL

## 2024-04-08 ENCOUNTER — PATIENT MESSAGE (OUTPATIENT)
Dept: UROLOGY | Facility: CLINIC | Age: 64
End: 2024-04-08
Payer: COMMERCIAL

## 2024-04-19 PROBLEM — I50.32 CHRONIC DIASTOLIC HEART FAILURE, NYHA CLASS 3: Status: ACTIVE | Noted: 2024-04-19

## 2024-04-19 PROBLEM — I35.0 CRITICAL AORTIC VALVE STENOSIS: Status: ACTIVE | Noted: 2024-04-19

## 2024-04-19 PROBLEM — I25.10 CORONARY ARTERY DISEASE INVOLVING NATIVE CORONARY ARTERY OF NATIVE HEART WITHOUT ANGINA PECTORIS: Status: ACTIVE | Noted: 2024-04-19

## 2024-05-14 ENCOUNTER — PATIENT MESSAGE (OUTPATIENT)
Dept: UROLOGY | Facility: CLINIC | Age: 64
End: 2024-05-14
Payer: COMMERCIAL

## 2024-05-14 NOTE — TELEPHONE ENCOUNTER
Spoke to patient wife and went over why appointment date changed.  She understood that he was scheduled with NP but changed to  per his portal messages to patient.  She states she wanted to know if they were needing to be seen prior to cardiology appointment, notified her to keep June appointment and for her to send us a message with open heart surgery date so we can send it to  and he will determine if patient needs to come in sooner than June date   She understood

## 2024-05-15 ENCOUNTER — OFFICE VISIT (OUTPATIENT)
Dept: CARDIOTHORACIC SURGERY | Facility: CLINIC | Age: 64
End: 2024-05-15
Payer: COMMERCIAL

## 2024-05-15 VITALS
HEART RATE: 89 BPM | HEIGHT: 70 IN | BODY MASS INDEX: 37.4 KG/M2 | DIASTOLIC BLOOD PRESSURE: 72 MMHG | OXYGEN SATURATION: 92 % | SYSTOLIC BLOOD PRESSURE: 146 MMHG | WEIGHT: 261.25 LBS

## 2024-05-15 DIAGNOSIS — I35.0 CRITICAL AORTIC VALVE STENOSIS: Primary | ICD-10-CM

## 2024-05-15 PROCEDURE — 99999 PR PBB SHADOW E&M-EST. PATIENT-LVL III: CPT | Mod: PBBFAC,,, | Performed by: THORACIC SURGERY (CARDIOTHORACIC VASCULAR SURGERY)

## 2024-05-15 PROCEDURE — 99205 OFFICE O/P NEW HI 60 MIN: CPT | Mod: S$GLB,,, | Performed by: THORACIC SURGERY (CARDIOTHORACIC VASCULAR SURGERY)

## 2024-05-15 NOTE — LETTER
[facility] - Cardiovasc Surg 2nd Fl  [address]  [facility] [address]  Phone: [phone]               May 15, 2024      Patient: Renan Britton   MR Number: [id number]   YOB: 1960   Date of Visit: 5/15/2024     Giuseppe Blood MD  [address] [place] [address] [address]    Dear Dr. Blood,     It has been a privilege for us to see your patient, Mr. Britton, at our [facility].  We had a chance to meet with him and went over the history, echocardiographic, as well as angiographic findings in detail.  As you know, he is a 63 year-old male former smoker (2ppd x 30 years, quit 13 years ago) with a history of heart failure preserved ejection fraction (55% ejection fraction) secondary to aortic stenosis, prior seizures, peripheral arterial disease s/p bypass surgery with residual severe bilateral claudication (at about 50 feet), diabetes (HbA1C 6.0), hypertension, prostate cancer s/p prostatectomy, and BMI of 36.  Recently, he has been symptomatic with dyspnea on exertion symptoms interfering with his quality of life.  His most recent echocardiogram showed 55% ejection fraction with severe aortic stenosis (MEMO 0.74cm2, velocity 4.5m/s, mean gradient 49mmHg).  Angiogram showed nonsignificant disease.    CTA chest showed mild ascending aortic calcifications (need high cross clamp), moderate aortic arch calcifications.    We had an extensive discussion with him regarding the ACC/AHA guidelines and we agreed that he has indications for surgery involving bioprosthetic aortic valve replacement, possible Ross procedure.  However, we should first investigate the degree of peripheral arterial disease due to his severe bilateral leg claudication.  We will obtain a CTA lower extremity and ask our colleague, Dr. Dallas, to see him.    We will also obtain a carotid ultrasound prior to surgery.     Thank you for referring Mr. Britton and for your trust and confidence in  our [facility].    Sincerely,     Harvey Palomo MD  Cardiothoracic Surgery  [facility]  Bruce Dallas MD

## 2024-05-15 NOTE — PROGRESS NOTES
Chief Complaint   Patient presents with    Establish Care      Subjective:     Sony Lora is a 65 y.o. male presenting for       Cervical radiculopathy  Chronic and stable condition.   Complains of left shoulder pain  Patient completed a MRI cervical 2/18/2020  IMPRESSION:     1. Mild degenerative change of the cervical spine, most at C5-6 and C6-C7.     2. Mild discogenic edema at C6-C7 which could be a pain generator.     3. Mild spinal canal narrowing at C5-6 and C6-C7.     4. Multilevel neuroforaminal narrowing, as described.    Lumbar radiculopathy  Chronic stable condition.    Patient in 2/18/2020 completed MRI lumbar   IMPRESSION:     Postsurgical change from posterior decompression fixation and disc prosthesis of L4-5 and L5-S1.     Mild spinal canal narrowing at L1-2, L2-3 and L3-4.     Multilevel neuroforaminal narrowing, most and severe at L3-4.    Chronic left shoulder pain  Chronic stable condition.    Notes onset 1 year ago   Patient feels that something has been dropped off as a rock   Notes are top of his shoulder hurts pretty bad at times it wakes him up in the middle the night is also been causing him to have headaches   He does have a history of cervical radiculopathy   He notes that due to the discomfort sometimes he will use Advil PM at times to help with pain.  He has been having issues holding a jug of milk and using it to pour out with his left shoulder      History of spinal fusion  Chronic stable condition.    Completed in 2004 L4-S1       ROS   See HPI  No current outpatient medications on file.    History reviewed. No pertinent past medical history.    Past Surgical History:   Procedure Laterality Date    APPENDECTOMY      LAMINOTOMY      lumbar fusion 2004       Social History     Tobacco Use    Smoking status: Never    Smokeless tobacco: Never   Vaping Use    Vaping Use: Never used   Substance Use Topics    Alcohol use: Yes    Drug use: Never       Family History   Problem  Subjective:      Patient ID: Renan Britton is a 63 y.o. male.    Chief Complaint: No chief complaint on file.      HPI:  Renan Britton is a 63 y.o. male who presents to an initial surgical evaluation for aortic stenosis as part of TAVR work up. Medical conditions include Chronic diastolic heart failure, PVD s/p  right and left common femoral endarterectomy; s/p left external iliac to left common femoral bypass with cadaveric graft; left common femoral covered stenting), prostate cx s/p prostatectomy, DM2, HTN, HLD, former smoker, obesity with BMI of 37. He has known AS for which endorses chronic HERRERA that is stable. Denies chest pain , LE edema, presyncope, or syncope. Does have claudications.       Echo 4/3 significant for preserve LVEF 55-60 with G1 D dysfunction. Severe, calcific aortic stenosis. (PV 4.54 m/s, MG 49 mmHg, MEMO 0.74 cm2, DVI 0.19). Aortic annular calcification.        Review of patient's allergies indicates:  No Known Allergies  Past Medical History:   Diagnosis Date    JJ (acute kidney injury) 12/03/2023    Aortic valve disease     CHF (congestive heart failure)     Diabetes mellitus     Elevated PSA     H/O brain tumor     HLD (hyperlipidemia)     Hypertension     MRSA infection     IN 2020    PAD (peripheral artery disease)     Personal history of colonic polyps 01/08/2021    Pneumonia, unspecified organism     MOST RECENT WEEK OF APRIL 1, 2024    Prostate cancer     Seizures     R/T BRAIN TUMOR- SURGICALLY EXCISED    Sleep apnea     CPAP    Urinary tract infection      Past Surgical History:   Procedure Laterality Date    ANGIOGRAPHY Left 4/6/2020    Procedure: ANGIOGRAM, Left lower extremity;  Surgeon: Kelby Gomez MD;  Location: [facility] OR [address] [place] [address];  Service: Peripheral Vascular;  Laterality: Left;    ANGIOGRAPHY OF LOWER EXTREMITY Left 6/29/2020    Procedure: Angiogram Extremity Unilateral, washout L groin, possible open pseudoaneurysm repair;  Surgeon: Bruce ADDISON  "Relation Age of Onset    Lung Disease Father        Penicillins    Allergies, past medical history, past surgical history, family history, social history reviewed and updated    Objective:     Vitals: /80 (BP Location: Left arm, Patient Position: Sitting, BP Cuff Size: Adult)   Pulse 80   Temp 37.1 °C (98.8 °F) (Temporal)   Resp 14   Ht 1.854 m (6' 1\")   Wt 87.8 kg (193 lb 8 oz)   SpO2 99%   BMI 25.53 kg/m²   General: Alert, pleasant, NAD  EYES:   PERRL, EOMI, no icterus or pallor.  Conjunctivae and lids normal.   HENT:  Normocephalic.  External ears normal.  Right tympanic membrane pearly, opaque.  Left tympanic membrane unable to be visualized due to impacted cerumen .no nasal drainage present.  Oropharynx non-erythematous, mucous membranes moist.  Neck supple.   No thyromegaly or masses palpated. No cervical or supraclavicular lymphadenopathy.  Musculoskeletal: Gait is normal.  Positive empty can test, Apley, pain with flexion as well as abduction of the left upper extremity, moves all other extremities well.    Extremities: No clubbing, cyanosis or edema noted.   Neurological: No tremors, sensation grossly intact, tone/strength normal, gait is normal, CN2-12 intact.  Psych:  Affect/mood is normal, judgement is good, memory is intact, grooming is appropriate.    Assessment/Plan:     1. Annual physical exam  - CBC WITHOUT DIFFERENTIAL; Future  - Comp Metabolic Panel; Future  - Lipid Profile; Future  - PROSTATE SPECIFIC AG SCREENING; Future    2. Chronic left shoulder pain  - MR-SHOULDER-W/O LEFT; Future  - DX-SHOULDER 2+ LEFT; Future    3. Cervical radiculopathy    4. Lumbar radiculopathy    5. History of spinal fusion    6. Screening for colorectal cancer  - Referral to GI for Colonoscopy       Discussed with patient possible alternative diagnoses, patient is to take all medications as prescribed.     If symptoms persist FU w/PCP, if symptoms worsen go to emergency room.     If experiencing any side " MD Burt;  [facility];  Service: Peripheral Vascular;  Laterality: Left;  contrast 23 ml  fluoro time: 9.9 min  mGy: 1230.26  Gycm2: 146.69    AORTOGRAPHY N/A 6/29/2020    Procedure: AORTOGRAM;  Surgeon: Bruce Dallas MD;  [facility];  Service: Peripheral Vascular;  Laterality: N/A;    APPLICATION OF WOUND VACUUM-ASSISTED CLOSURE DEVICE Bilateral 1/1/2020    Procedure: APPLICATION, WOUND VAC;  Surgeon: SEBAS Prieto II, MD;  Location: [address];  Service: Cardiovascular;  Laterality: Bilateral;    APPLICATION OF WOUND VACUUM-ASSISTED CLOSURE DEVICE N/A 1/3/2020    Procedure: APPLICATION, WOUND VAC;  Surgeon: Brian Wong MD;  Location: [facility] OR [address] [place] [address];  Service: Plastics;  Laterality: N/A;    BRAIN SURGERY  01/2011    TUMOR EXCISED    COLONOSCOPY N/A 1/8/2021    Procedure: COLONOSCOPY;  Surgeon: Susan Posada MD;  Location: [facility] ENDO;  Service: Endoscopy;  Laterality: N/A;    COLONOSCOPY N/A 11/18/2021    Procedure: COLONOSCOPY;  Surgeon: Susan Posada MD;  Location: [facility] ENDO;  Service: Endoscopy;  Laterality: N/A;    CORONARY ANGIOGRAPHY Left 4/19/2024    Procedure: ANGIOGRAM, CORONARY ARTERY;  Surgeon: Giuseppe Blood MD;  Location: [facility] CATH;  Service: Cardiology;  Laterality: Left;    CREATION OF ILIOFEMORAL ARTERY BYPASS Left 8/12/2020    Procedure: CREATION, BYPASS, ARTERIAL, ILIAC TO FEMORAL;  Surgeon: Kelby Gomez MD;  Location: [address];  Service: Peripheral Vascular;  Laterality: Left;  CO-SURGEONS: DR KEO ADHIKARI;  DR WONG, profusion    CREATION OF MUSCLE ROTATIONAL FLAP N/A 1/3/2020    Procedure: CREATION, FLAP, MUSCLE ROTATION;  Surgeon: Brian Wong MD;  Location: [facility] OR [address] [place] [address];  Service: Plastics;  Laterality: N/A;    ENDARTERECTOMY OF FEMORAL ARTERY Bilateral 12/20/2019    Procedure: ENDARTERECTOMY, FEMORAL;  Surgeon: Kelby Gomez MD;  Location: [address];  Service: Peripheral Vascular;  Laterality:  Bilateral;  natalya common femoral endarterectomy with natalya. iliac stent placement    ESOPHAGOGASTRODUODENOSCOPY N/A 4/7/2022    Procedure: EGD (ESOPHAGOGASTRODUODENOSCOPY);  Surgeon: Susan Posada MD;  Location: [facility] ENDO;  Service: Endoscopy;  Laterality: N/A;    INTRALUMINAL GASTROINTESTINAL TRACT IMAGING VIA CAPSULE N/A 4/18/2022    Procedure: IMAGING, GI TRACT, INTRALUMINAL, VIA CAPSULE;  Surgeon: Otto Powers MD;  Location: [facility] ENDO;  Service: Endoscopy;  Laterality: N/A;    PERCUTANEOUS TRANSLUMINAL ANGIOPLASTY Left 4/6/2020    Procedure: PTA (ANGIOPLASTY, PERCUTANEOUS, TRANSLUMINAL), left lower extremity;  Surgeon: Kelby Gomez MD;  Location: [facility] OR [address] [place] [address];  Service: Peripheral Vascular;  Laterality: Left;    PERCUTANEOUS TRANSLUMINAL ANGIOPLASTY (PTA) OF PERIPHERAL VESSEL Left 10/17/2019    Procedure: PTA, PERIPHERAL VESSEL;  Surgeon: Rao Fisher MD;  Location: [facility] CATH;  Service: Cardiology;  Laterality: Left;    PSEUDOANEURYSM REPAIR Left 6/29/2020    Procedure: REPAIR, PSEUDOANEURYSM;  Surgeon: Bruce Dallas MD;  [facility];  Service: Peripheral Vascular;  Laterality: Left;    RADICAL RETROPUBIC PROSTATECTOMY N/A 12/13/2021    Procedure: PROSTATECTOMY, RETROPUBIC, RADICAL + Bilateral pelvic lymph node dissection;  Surgeon: Raghav Mitchell MD;  Location: [facility] OR;  Service: Urology;  Laterality: N/A;    RIGHT HEART CATHETERIZATION Right 4/19/2024    Procedure: INSERTION, CATHETER, RIGHT HEART;  Surgeon: Giuseppe Blood MD;  Location: [facility] CATH;  Service: Cardiology;  Laterality: Right;    TRANSESOPHAGEAL ECHOCARDIOGRAPHY N/A 4/19/2024    Procedure: ECHOCARDIOGRAM, TRANSESOPHAGEAL;  Surgeon: Giuseppe Blood MD;  Location: [facility] CATH;  Service: Cardiology;  Laterality: N/A;     Family History       Problem Relation (Age of Onset)    Cancer Father, Brother    Diabetes Mother    Heart disease Father, Brother, Brother, Brother    Hypertension Mother    Stroke Mother       effects from prescribed medications reports to the office immediately or go to emergency room.    Reviewed indication, dosage, usage and potential adverse effects of prescribed medications.     Reviewed risks and benefits of treatment plan. Patient verbalizes understanding of all instruction and verbally agrees to plan.    Discussed plan with the patient, and she agrees to the above.      I personally reviewed prior external notes and test results pertinent to today's visit.        Return in about 3 weeks (around 1/26/2023) for Follow-up labs, x-ray possibly MRI imaging.      Please note that this dictation was created using voice recognition software. I have made every reasonable attempt to correct obvious errors, but I expect that there may be errors of grammar and possibly content that I did not discover before finalizing the note.       Social History     Socioeconomic History    Marital status:    Tobacco Use    Smoking status: Former     Types: Cigarettes     Passive exposure: Current    Smokeless tobacco: Never    Tobacco comments:      2-3 daily   Substance and Sexual Activity    Alcohol use: Not Currently     Comment: socially    Drug use: Never    Sexual activity: Yes     Partners: Female     Birth control/protection: None     Social Determinants of Health     Financial Resource Strain: Low Risk  (12/6/2023)    Overall Financial Resource Strain (CARDIA)     Difficulty of Paying Living Expenses: Not hard at all   Food Insecurity: No Food Insecurity (12/6/2023)    Hunger Vital Sign     Worried About Running Out of Food in the Last Year: Never true     Ran Out of Food in the Last Year: Never true   Transportation Needs: No Transportation Needs (12/6/2023)    PRAPARE - Transportation     Lack of Transportation (Medical): No     Lack of Transportation (Non-Medical): No   Physical Activity: Unknown (12/6/2023)    Exercise Vital Sign     Days of Exercise per Week: 0 days   Stress: No Stress Concern Present (12/6/2023)    [employer] [place] - Occupational Stress Questionnaire     Feeling of Stress : Not at all   Housing Stability: Low Risk  (12/6/2023)    Housing Stability Vital Sign     Unable to Pay for Housing in the Last Year: No     Number of Places Lived in the Last Year: 1     Unstable Housing in the Last Year: No       Current medications Reviewed  Current Outpatient Medications on File Prior to Visit   Medication Sig Dispense Refill    aspirin (ECOTRIN) 81 MG EC tablet Take 81 mg by mouth once daily.      atorvastatin (LIPITOR) 40 MG tablet Take 1 tablet (40 mg total) by mouth once daily. 30 tablet 5    clopidogreL (PLAVIX) 75 mg tablet Take 1 tablet (75 mg total) by mouth once daily. 30 tablet 0    hydrALAZINE (APRESOLINE) 50 MG tablet Take 1 tablet (50 mg total) by mouth every 8 (eight) hours. 90 tablet 3     LYRICA 100 mg capsule Take 100 mg by mouth 2 (two) times daily.      metFORMIN (GLUCOPHAGE-XR) 500 MG ER 24hr tablet Take 2 tablets (1,000 mg total) by mouth once daily.      NIFEdipine (PROCARDIA-XL) 60 MG (OSM) 24 hr tablet Take 1 tablet (60 mg total) by mouth once daily. 90 tablet 0     No current facility-administered medications on file prior to visit.       Review of Systems   Constitutional:  Positive for fatigue. Negative for activity change, appetite change and fever.   HENT:  Negative for nosebleeds.    Respiratory:  Positive for shortness of breath (chronic). Negative for cough.    Cardiovascular:  Negative for chest pain, palpitations and leg swelling.        Claudications    Gastrointestinal:  Negative for abdominal distention, abdominal pain and nausea.   Genitourinary:  Negative for frequency.   Musculoskeletal:  Negative for arthralgias and myalgias.   Skin:  Negative for rash.   Neurological:  Negative for dizziness and numbness.   Hematological:  Does not bruise/bleed easily.     Objective:   Physical Exam  Constitutional:       Appearance: Normal appearance. He is obese.   HENT:      Head: Normocephalic and atraumatic.   Eyes:      Extraocular Movements: Extraocular movements intact.   Cardiovascular:      Rate and Rhythm: Normal rate.      Heart sounds: Murmur heard.   Pulmonary:      Effort: Pulmonary effort is normal.      Breath sounds: Normal breath sounds.   Abdominal:      General: Abdomen is flat.      Palpations: Abdomen is soft.   Musculoskeletal:         General: Normal range of motion.      Cervical back: Normal range of motion.   Skin:     General: Skin is warm and dry.      Capillary Refill: Capillary refill takes less than 2 seconds.      Coloration: Skin is not pale.   Neurological:      General: No focal deficit present.      Mental Status: He is alert.       Diagnostic Results: reviewed   Carotid US 4/22/24  Carotid Ultrasound 1.The study quality is average. 2.40-59% stenosis  in the mid right internal carotid artery based on Bluth Criteria. 3.40-59% stenosis in the proximal left internal carotid artery based on Bluth Criteria. 4.Greater than 50% stenosis in the right external carotid artery with severe hyperechoic plaque.5.Greater than 50% stenosis in the left external carotid artery with severe hyperechoic plaque.6.Antegrade right vertebral artery flow. 7.Antegrade left vertebral artery flow.     MERI 4/19/2024  1. The study quality is good.   2. Severe aortic valve stenosis is present. Severe calcification of   the aortic valve is noted. The aortic valve is tricuspid. Aortic valve   area continuity equation is 0.75 cm?. The trans-aortic peak velocity   is 5.26 m/s. The trans-aortic peak gradient is 111 mmHg. The   trans-aortic mean gradient is 72 mmHg.  MEMO by 3-D planimetry= 0.76   cm2.   3. The left ventricle is normal in size. Global left ventricular   systolic function is normal. The left ventricular ejection fraction is   55%. Noted left ventricular hypertrophy. Concentric left ventricular   hypertrophy is present. It is moderate.    4. The right ventricle is normal in size. Right ventricular systolic   function is normal.       LHC/RHC 4/19/2024    LM:  Normal.  LAD:  Mild diffuse disease.  CFx:  Mild diffuse disease.  RCA:  Mild-to-moderate diffuse disease.  PCWP:  28 mmHg.  PA:  48/30 with a mean of 36 mm Hg mmHg.  RV:  46/15 mmHg.  RA:  13 mmHg.  CO:  6.17 L/min.  Anish calculation  CI:  2.7 L/min/m2.  Anish calculation  SVR:  1064 woods units  PVR: 1.33 woods units     Impression:  Moderate diffuse CAD.    Critical aortic stenosis    TTE 4/3/2024  Left ventricle: Normal in size with normal systolic function. LV ejection fraction is 55-60%. No regional wall motion abnormalities are evident. Normal LV geometry.   Left ventricular diastolic function: Grade 1 diastolic dysfunction with normal LAP.   Right ventricle: Upper limit of normal in size with normal systolic function.    Estimated PASP: Inadequate TR jet to provide accurate estimation. Estimated RAP: indeterminate due to inadequate IVC visualization.  Left atrium: Normal in size.  Right atrium: Enlarged  Valves:  Severe, calcific aortic stenosis. (PV 4.54 m/s, MG 49 mmHg, MEMO 0.74 cm2, DVI 0.19). Aortic annular calcification.  Aorta: NOot well visualized  Pericardium: Normal thickness No effusion present.  Technical quality is challenging due to acoustic windows. Echo contrast was utilized to improve endocardial visualization. Parasternal images are uninterpretable.  When compared to the most recent available report, aortic stenosis severity has progressed.     LYNN 3/2024  LYNN SUMMARY     The calculated right LYNN is 0.82 which is mildly decreased  The corresponding doppler waveform and PVR are compatible with this finding.     The calculated left LYNN is 0.64 which is moderately decreased  The corresponding doppler waveform and PVR are compatible with this finding.  Assessment:   Severe aortic stenosis   Plan:     I have seen the patient and reviewed the physician assistant's note above. I have personally interviewed and examined the patient at bedside and agree with the findings.     Mr. Britton is a 63 year-old male former smoker (2ppd x 30 years, quit 13 years ago) with a history of heart failure preserved ejection fraction (55% ejection fraction) secondary to aortic stenosis, prior seizures, peripheral arterial disease s/p bypass surgery with residual severe bilateral claudication (at about 50 feet), diabetes (HbA1C 6.0), hypertension, prostate cancer s/p prostatectomy, and BMI of 36.  Recently, he has been symptomatic with dyspnea on exertion symptoms interfering with his quality of life.  His most recent echocardiogram showed 55% ejection fraction with severe aortic stenosis (MEMO 0.74cm2, velocity 4.5m/s, mean gradient 49mmHg).  Angiogram showed nonsignificant disease.    CTA chest showed mild ascending aortic  calcifications (need high cross clamp), moderate aortic arch calcifications.    We had an extensive discussion with him regarding the ACC/AHA guidelines and we agreed that he has indications for surgery involving bioprosthetic aortic valve replacement, possible Ross procedure.  However, we should first investigate the degree of peripheral arterial disease due to his severe bilateral leg claudication.  We will obtain a CTA lower extremity and ask our colleague, Dr. Dallas, to see him.    We will also obtain a carotid ultrasound prior to surgery.       Harvey Palomo MD  Cardiothoracic Surgery  [facility]

## 2024-05-17 ENCOUNTER — PATIENT MESSAGE (OUTPATIENT)
Dept: CARDIOTHORACIC SURGERY | Facility: CLINIC | Age: 64
End: 2024-05-17
Payer: COMMERCIAL

## 2024-05-17 ENCOUNTER — TELEPHONE (OUTPATIENT)
Dept: VASCULAR SURGERY | Facility: CLINIC | Age: 64
End: 2024-05-17
Payer: COMMERCIAL

## 2024-05-17 NOTE — TELEPHONE ENCOUNTER
"Spoke with the pt's wife Patricia who verbalized "I was given an appt to see Dr Dallas in [facility] in Jul and my  needs to see him sooner than that because he needs to have heart surgery but needs to see Dr Dallas for the burning in his legs before the surgery can be done.I will go to [place] if I have to". Informed Patricia that I will send a message to Dr Dallas to make sure the appropriate imaging is ordered for the pt to be seen.Message sent to Dr Dallas.Pt verbalized understanding of information received.  ----- Message from Gina Jones sent at 5/17/2024  1:52 PM CDT -----  Regarding: Appt  Contact: Patricia [phone]  Patricia/ wife is calling speak to nurse about being with a appt with this provider states Dr Mak nurse was to get pt schedule with this provider but wife states she has not received a response please call  "

## 2024-05-20 DIAGNOSIS — I73.9 PAD (PERIPHERAL ARTERY DISEASE): Primary | ICD-10-CM

## 2024-05-20 DIAGNOSIS — I73.9 PERIPHERAL VASCULAR DISEASE, UNSPECIFIED: ICD-10-CM

## 2024-05-21 ENCOUNTER — HOSPITAL ENCOUNTER (OUTPATIENT)
Dept: RADIOLOGY | Facility: HOSPITAL | Age: 64
Discharge: HOME | End: 2024-05-21
Attending: SURGERY
Payer: COMMERCIAL

## 2024-05-21 ENCOUNTER — PATIENT MESSAGE (OUTPATIENT)
Dept: UROLOGY | Facility: CLINIC | Age: 64
End: 2024-05-21
Payer: COMMERCIAL

## 2024-05-21 DIAGNOSIS — I73.9 PAD (PERIPHERAL ARTERY DISEASE): ICD-10-CM

## 2024-05-21 DIAGNOSIS — I73.9 PERIPHERAL VASCULAR DISEASE, UNSPECIFIED: ICD-10-CM

## 2024-05-21 PROCEDURE — 75635 CT ANGIO ABDOMINAL ARTERIES: CPT | Mod: 26,,, | Performed by: RADIOLOGY

## 2024-05-21 PROCEDURE — 75635 CT ANGIO ABDOMINAL ARTERIES: CPT | Mod: TC

## 2024-05-21 PROCEDURE — 25500020 PHARM REV CODE 255: Performed by: SURGERY

## 2024-05-21 RX ADMIN — IOHEXOL 150 ML: 350 INJECTION, SOLUTION INTRAVENOUS at 04:05

## 2024-05-22 DIAGNOSIS — I73.9 PAD (PERIPHERAL ARTERY DISEASE): Primary | ICD-10-CM

## 2024-05-29 ENCOUNTER — HOSPITAL ENCOUNTER (OUTPATIENT)
Dept: VASCULAR SURGERY | Facility: CLINIC | Age: 64
Discharge: HOME | End: 2024-05-29
Attending: SURGERY
Payer: COMMERCIAL

## 2024-05-29 ENCOUNTER — OFFICE VISIT (OUTPATIENT)
Dept: VASCULAR SURGERY | Facility: CLINIC | Age: 64
End: 2024-05-29
Payer: COMMERCIAL

## 2024-05-29 VITALS
HEIGHT: 70 IN | SYSTOLIC BLOOD PRESSURE: 133 MMHG | HEART RATE: 74 BPM | TEMPERATURE: 98 F | WEIGHT: 257.94 LBS | BODY MASS INDEX: 36.93 KG/M2 | DIASTOLIC BLOOD PRESSURE: 66 MMHG

## 2024-05-29 DIAGNOSIS — I73.9 PAD (PERIPHERAL ARTERY DISEASE): ICD-10-CM

## 2024-05-29 DIAGNOSIS — I73.9 PAD (PERIPHERAL ARTERY DISEASE): Primary | ICD-10-CM

## 2024-05-29 PROCEDURE — 99215 OFFICE O/P EST HI 40 MIN: CPT | Mod: S$GLB,,, | Performed by: SURGERY

## 2024-05-29 PROCEDURE — 93923 UPR/LXTR ART STDY 3+ LVLS: CPT | Mod: S$GLB,,, | Performed by: SURGERY

## 2024-05-29 PROCEDURE — 99999 PR PBB SHADOW E&M-EST. PATIENT-LVL III: CPT | Mod: PBBFAC,,, | Performed by: SURGERY

## 2024-05-29 NOTE — PROGRESS NOTES
Renan Britton  05/29/2024    HPI:  Patient is a 63 y.o. male with a h/o obesity (BMI 37), prior heavy tobacco use, HTN, PAD s/p bilateral femoral endarterectomy in 2019 c/b multiple wound infections, Lilac artery stent in 2020,  L iliac femoral bypass in 2020 with Dr. Gomez, prostate s/p prostatectomy, severe aortic stenosis who is here today for evaluation of peripheral arterial disease (PAD) prior to AVR with Dr. Palomo.  States when he exerts himself, gets HERRERA 'fast' - within 50 fit; he also get chest tightness  He has a proximal L EIA stenosis and a diminutive proximal L EIA - resulting in L calf claudication after 1/2 block    Has dealt with bilateral calf claudication that has worsened in the past 3 months. Can walk about 100 ft before feeling burning, cramping calf pain, L>R. Has to rest for 5 mins before starting walking again. No rest pain or wounds.    S/p  6/29/20  Pre-op Diagnosis:   Pseudoaneurysm [I72.9]; Pseudoaneurysm distal L EIA/proximal L CFA at site of prior L femoral endarterectomy; recent Proteus bactermeia     Procedures:   1) U/S-guided R CFA access, 9fr  2) Emergent L EIA to CFA covered stent placement, 9x75mm Viabahn, post-dilated with 8mm balloon  3) Perclose closure R CFA, 9fr  4) Drainage of 200 ml hematoma (pseudoaneurysm) thru L proximal groin 2cm incision and placement of 19Fr Ezra drain     Findings:   Successful treatment of L proximal CFA pseudoaneurysm with L EIA to CFA covered stent placement, 9x75mm Viabahn, post-dilated with 8mm balloon  L distal SFA stenosis > 75% stenosis   3v tibial runoff, L PT is main runoff   270 ml (drained blood from L groin area)     Raditaion: 1230.25 mGy  Fluoro time: 9min     Indication: 59 y M with prior bilateral CFA endarterectomies. L groin became infected requiring a muscle flap. He then developed a L CFA pseudoaneurysm s/p covered stent placement. He then developed a large pseudoaneurysm in the L EIA just proximal to the prior  stent. He was noted to have fevers and chills several days prior, as well as positive blood cultures.    20  1) Viabahn covered stent placement (8 x 50mm) left common femoral artery  2) PTA left femoral artery (9x60 [place])  3) LLE angiogram  4) Perclose R CFA  5) US guided access R CFA      7.3 mins fluoro, 1012.85 mGy (Left)     Findings/Key Components:  Successful treatment of left CFA PSA, no flow noted in pseudoaneurysm after stent deployment.  Left foot with triphasic pedal signals following treatment.     19  1) ENDARTERECTOMY, FEMORAL (Bilateral)  2) ANGIOGRAM, PELVIC  3) Left common iliac artery PTA (6 x 40 [place])  4) Left common iliac artery stent (GORE VBX 10 x 39mm)  5) Prevena VAC placement, bilateral groins     Findings/Key Components:  Successful treatment of Bilateral critical femoral artery stenosis; critical left common iliac artery stenosis.  Biphasic bilateral pedal signals    No MI/stroke  Tobacco use: Quit Dec 2023, 45 pack-year history  Taking ASA 81mg, plavix, and statin    Eren Becker MD   Employment: Employed: [employer] at [employer]      Current Outpatient Medications:     aspirin (ECOTRIN) 81 MG EC tablet, Take 81 mg by mouth once daily., Disp: , Rfl:     atorvastatin (LIPITOR) 40 MG tablet, Take 1 tablet (40 mg total) by mouth once daily., Disp: 30 tablet, Rfl: 5    clopidogreL (PLAVIX) 75 mg tablet, Take 1 tablet (75 mg total) by mouth once daily., Disp: 30 tablet, Rfl: 0    hydrALAZINE (APRESOLINE) 50 MG tablet, Take 1 tablet (50 mg total) by mouth every 8 (eight) hours., Disp: 90 tablet, Rfl: 3    LYRICA 100 mg capsule, Take 100 mg by mouth 2 (two) times daily., Disp: , Rfl:     metFORMIN (GLUCOPHAGE-XR) 500 MG ER 24hr tablet, Take 2 tablets (1,000 mg total) by mouth once daily., Disp: , Rfl:     NIFEdipine (PROCARDIA-XL) 60 MG (OSM) 24 hr tablet, Take 1 tablet (60 mg total) by mouth once daily., Disp: 90 tablet, Rfl: 0    PHYSICAL EXAM:   Right Arm BP - Sittin/68  (24 1335)  Left Arm BP - Sittin/66 (24 1335)  Pulse: 74  Temp: 98 °F (36.7 °C)                   Neck: Nocarotid bruits can be auscultated          Abdomen: Soft, nontender, nondistended, no masses or organomegaly     No rebound tenderness noted; bowel sounds normal     Pulsatile aortic mass is not palpable.      Extremities:   Femoral pulses not palpated  Well healed R femoral incision, well healed L femoral incision     Pedal pulses are not palpated     2+ R popliteal, L popliteal not palpated      Tissue loss: none     No pre-tibial edema    LAB RESULTS:  Lab Results   Component Value Date    K 3.6 2024    K 3.5 2024    K 3.7 2024    CREATININE 0.9 2024    CREATININE 0.8 2024    CREATININE 0.9 2024     Lab Results   Component Value Date    WBC 4.06 2024    WBC 5.30 2024    WBC 9.95 2024    HCT 39.3 (L) 2024    HCT 40.0 2024    HCT 42.8 2024     (L) 2024     2024     2024     Lab Results   Component Value Date    HGBA1C 6.0 (H) 2023    HGBA1C 5.4 10/28/2022    HGBA1C 5.6 2021       IMAGING (I have independently reviewed and interpreted the following tests):  CTA 24  Stenosis in proximal L EIA with short segment diminutive diameter following this stenosis  R CFA disease and stenosis    ABIs:  R LYNN: 0.71  L LYNN: 0.49  PVR waveforms: moderate PAD bilaterally  R: thigh - triphasic  L: thigh- weak biphasic, that get stronger in L calf an ankle    Echo (2024):  Severe aortic valve stenosis is present. Severe calcification of the aortic valve is noted. The aortic valve is tricuspid. Aortic valve area continuity equation is 0.75 cm2. The trans-aortic peak velocity is 5.26 m/s. The trans-aortic peak gradient is 111 mmHg. The trans-aortic mean gradient is 72 mmHg.  MEMO by 3-D planimetry= 0.76 cm2.   LV is normal in size. Global left ventricular systolic function is normal.  The left ventricular ejection fraction is 55%. Noted left ventricular hypertrophy. Concentric left ventricular hypertrophy is present. It is moderate.    RV is normal in size. Right ventricular systolic   function is normal.      Intra-modality comparison (Echocardiogram)     Ejection fraction essentially remained unchanged (50% previous   study, 55% current study).       IMP/PLAN:     63 y.o. male with a h/o severe aortic valve stenosis (MEMO 0.75cm2, trans-aortic gradient 111 mm Hg),  HTN, PAD s/p bilateral femoral endarterectomy in 2019 c/b multiple wound infections, Lilac artery stent in 2020,  L iliac femoral bypass in 2020 with Dr. Gomez, prostate s/p prostatectomy, severe aortic stenosis who is here today for evaluation of peripheral arterial disease (PAD) prior to AVR with Dr. Palomo. Increased calf claudication, L>R. On best medical management.    States when he exerts himself, gets HERRERA 'fast' - within 50 fit; he also get chest tightness  He has a proximal L EIA stenosis and a diminutive proximal L EIA - resulting in L calf claudication after 1/2 block  Some liver cirrhosis on CT, AST 40 otherwise normal LFTs; ?ORDONEZ or other etiology.  Recommend Rx PAD / L EIA disease medically for now.  Re- possible TAVR, I would not advocate CFA or distal EIA access given disease and prior infected groins. If does need TAVR, L axillary artery approach. Or, open AVR, to be determined by CTS       Bruce Dallas MD [facility] FACS   Vascular/Endovascular Surgery    Time spent personally reviewing the patient's chart, interpreting images and test, and conferring with physicians was HBtimespent2: 60 mins.

## 2024-05-30 ENCOUNTER — OFFICE VISIT (OUTPATIENT)
Dept: HEPATOLOGY | Facility: CLINIC | Age: 64
End: 2024-05-30
Payer: COMMERCIAL

## 2024-05-30 VITALS
HEART RATE: 74 BPM | HEIGHT: 70 IN | WEIGHT: 260.13 LBS | DIASTOLIC BLOOD PRESSURE: 82 MMHG | BODY MASS INDEX: 37.24 KG/M2 | SYSTOLIC BLOOD PRESSURE: 151 MMHG | OXYGEN SATURATION: 97 %

## 2024-05-30 DIAGNOSIS — K74.60 HEPATIC CIRRHOSIS, UNSPECIFIED HEPATIC CIRRHOSIS TYPE, UNSPECIFIED WHETHER ASCITES PRESENT: ICD-10-CM

## 2024-05-30 DIAGNOSIS — R93.2 ABNORMAL LIVER DIAGNOSTIC IMAGING: Primary | ICD-10-CM

## 2024-05-30 PROCEDURE — 99204 OFFICE O/P NEW MOD 45 MIN: CPT | Mod: S$GLB,,, | Performed by: INTERNAL MEDICINE

## 2024-05-30 PROCEDURE — 99999 PR PBB SHADOW E&M-EST. PATIENT-LVL V: CPT | Mod: PBBFAC,,, | Performed by: INTERNAL MEDICINE

## 2024-05-30 RX ORDER — ASCORBIC ACID 500 MG
TABLET ORAL
COMMUNITY
Start: 2024-04-12

## 2024-05-30 RX ORDER — CLOPIDOGREL BISULFATE 75 MG/1
TABLET ORAL
COMMUNITY
Start: 2024-04-11

## 2024-05-30 RX ORDER — HYDRALAZINE HYDROCHLORIDE 50 MG/1
TABLET, FILM COATED ORAL
COMMUNITY
Start: 2024-04-11

## 2024-05-30 RX ORDER — CILOSTAZOL 100 MG/1
100 TABLET ORAL 2 TIMES DAILY
COMMUNITY
Start: 2024-04-17

## 2024-05-30 RX ORDER — FERROUS SULFATE 324(65)MG
TABLET, DELAYED RELEASE (ENTERIC COATED) ORAL
COMMUNITY
Start: 2024-04-12

## 2024-05-30 RX ORDER — METFORMIN HYDROCHLORIDE 500 MG/1
TABLET ORAL
COMMUNITY
Start: 2024-04-11

## 2024-05-30 RX ORDER — NIFEDIPINE 60 MG/1
TABLET, EXTENDED RELEASE ORAL
COMMUNITY
Start: 2024-04-11

## 2024-05-30 NOTE — PROGRESS NOTES
HEPATOLOGY CONSULTATION    Referring Physician: Eren Becker MD   Current Corresponding Physician: Eren Becker MD     Reason for Consultation: Consultation for evaluation of Possible cirrhosis    History of Present Illness: Renan Britton is a 63 y.o. male with a hx of prostate cancer, Aortic stenosis, PVD, DM and sleep apnea who presents for evaluation of imaging suggestive of cirrhosis. He is being evaluated for aurgical aortic valve replacement:    CTA abdo 5/21/24: Liver: Slightly nodular liver contour, suggesting possible cirrhosis.   --denies any symptoms of decompensated cirrhosis, including no ascites or edema, cognitive problems that would suggest hepatic encephalopathy, or GI bleeding from varices.   --Hx heavy alcohol x many years (6-pack per day)  --BMI 37    Labs 4/4/24: plts 148, , creat 0.9, alb 3.5, Tbil 0.5, ALT 21, AST 22, ALKP 40    Chief Complaint   Patient presents with    Possible cirrhosis       Past Medical History:   Diagnosis Date    JJ (acute kidney injury) 12/03/2023    Aortic valve disease     CHF (congestive heart failure)     Diabetes mellitus     Elevated PSA     H/O brain tumor     HLD (hyperlipidemia)     Hypertension     MRSA infection     IN 2020    PAD (peripheral artery disease)     Personal history of colonic polyps 01/08/2021    Pneumonia, unspecified organism     MOST RECENT WEEK OF APRIL 1, 2024    Prostate cancer     Seizures     R/T BRAIN TUMOR- SURGICALLY EXCISED    Sleep apnea     CPAP    Urinary tract infection      Outpatient Encounter Medications as of 5/30/2024   Medication Sig Dispense Refill    ascorbic acid, vitamin C, (VITAMIN C) 500 MG tablet Vitamin C 500 mg tablet, [RxNorm: 944935]      aspirin (ECOTRIN) 81 MG EC tablet Take 81 mg by mouth once daily.      atorvastatin (LIPITOR) 40 MG tablet Take 1 tablet (40 mg total) by mouth once daily. 30 tablet 5    cilostazoL (PLETAL) 100 MG Tab Take 100 mg by mouth 2 (two) times daily.       clopidogreL (PLAVIX) 75 mg tablet clopidogreL 75 mg tablet, [RxNorm: 120111]      ferrous sulfate 324 mg (65 mg iron) TbEC ferrous sulfate 324 mg (65 mg iron) tablet,delayed release, [RxNorm: 6655799]      hydrALAZINE (APRESOLINE) 50 MG tablet Take 1 tablet (50 mg total) by mouth every 8 (eight) hours. (Patient taking differently: Take 50 mg by mouth 3 (three) times daily.) 90 tablet 3    LYRICA 100 mg capsule Take 100 mg by mouth 2 (two) times daily.      metFORMIN (GLUCOPHAGE-XR) 500 MG ER 24hr tablet Take 2 tablets (1,000 mg total) by mouth once daily.      NIFEdipine (PROCARDIA-XL) 60 MG (OSM) 24 hr tablet Take 1 tablet (60 mg total) by mouth once daily. 90 tablet 0    clopidogreL (PLAVIX) 75 mg tablet Take 1 tablet (75 mg total) by mouth once daily. (Patient not taking: Reported on 5/30/2024) 30 tablet 0    hydrALAZINE (APRESOLINE) 50 MG tablet hydrALAZINE 50 mg tablet, [RxNorm: 520470] (Patient not taking: Reported on 5/30/2024)      metFORMIN (GLUCOPHAGE) 500 MG tablet metFORMIN 500 mg tablet, [RxNorm: 775582] (Patient not taking: Reported on 5/30/2024)      NIFEdipine (ADALAT CC) 60 MG TbSR NIFEdipine ER 60 mg tablet,extended release, [RxNorm: 905719] (Patient not taking: Reported on 5/30/2024)       No facility-administered encounter medications on file as of 5/30/2024.     Review of patient's allergies indicates:  No Known Allergies  Family History   Problem Relation Name Age of Onset    Diabetes Mother      Hypertension Mother      Stroke Mother      Cancer Father          LUNG    Heart disease Father      Cancer Brother      Heart disease Brother      Heart disease Brother      Heart disease Brother      Prostate cancer Neg Hx      Kidney disease Neg Hx         Social History     Socioeconomic History    Marital status:    Tobacco Use    Smoking status: Former     Types: Cigarettes     Passive exposure: Current    Smokeless tobacco: Never    Tobacco comments:      2-3 daily   Substance and Sexual  Activity    Alcohol use: Not Currently     Comment: socially    Drug use: Never    Sexual activity: Yes     Partners: Female     Birth control/protection: None     Social Determinants of Health     Financial Resource Strain: Low Risk  (12/6/2023)    Overall Financial Resource Strain (CARDIA)     Difficulty of Paying Living Expenses: Not hard at all   Food Insecurity: No Food Insecurity (12/6/2023)    Hunger Vital Sign     Worried About Running Out of Food in the Last Year: Never true     Ran Out of Food in the Last Year: Never true   Transportation Needs: No Transportation Needs (12/6/2023)    PRAPARE - Transportation     Lack of Transportation (Medical): No     Lack of Transportation (Non-Medical): No   Physical Activity: Unknown (12/6/2023)    Exercise Vital Sign     Days of Exercise per Week: 0 days   Stress: No Stress Concern Present (12/6/2023)    [employer] - Occupational Stress Questionnaire     Feeling of Stress : Not at all   Housing Stability: Low Risk  (12/6/2023)    Housing Stability Vital Sign     Unable to Pay for Housing in the Last Year: No     Number of Places Lived in the Last Year: 1     Unstable Housing in the Last Year: No     Review of Systems  Vitals:    05/30/24 1550   BP: (!) 151/82   Pulse: 74       Physical Exam    MELD 3.0: 22 at 12/6/2023  4:42 AM  MELD-Na: 24 at 12/6/2023  4:42 AM  Calculated from:  Serum Creatinine: 6.8 mg/dL (Using max of 3 mg/dL) at 12/6/2023  4:42 AM  Serum Sodium: 134 mmol/L at 12/6/2023  4:42 AM  Total Bilirubin: 0.5 mg/dL (Using min of 1 mg/dL) at 12/6/2023  4:42 AM  Serum Albumin: 2.8 g/dL at 12/6/2023  4:42 AM  INR(ratio): 1.2 at 12/4/2023  4:06 AM  Age at listing (hypothetical): 63 years  Sex: Male at 12/6/2023  4:42 AM      Lab Results   Component Value Date     (H) 04/04/2024    BUN 18 04/04/2024    CREATININE 0.9 04/04/2024    CALCIUM 9.2 04/04/2024     04/04/2024    K 3.6 04/04/2024     04/04/2024    PROT  7.1 04/04/2024    CO2 28 04/04/2024    ANIONGAP 8 04/04/2024    WBC 4.06 04/04/2024    RBC 4.33 (L) 04/04/2024    HGB 12.9 (L) 04/04/2024    HCT 39.3 (L) 04/04/2024    HCT 39 08/12/2020    MCV 91 04/04/2024    MCH 29.8 04/04/2024    MCHC 32.8 04/04/2024     Lab Results   Component Value Date    RDW 14.4 04/04/2024     (L) 04/04/2024    MPV 11.5 04/04/2024    GRAN 2.4 04/04/2024    GRAN 58.1 04/04/2024    LYMPH 0.8 (L) 04/04/2024    LYMPH 20.0 04/04/2024    MONO 0.7 04/04/2024    MONO 16.7 (H) 04/04/2024    EOSINOPHIL 4.2 04/04/2024    BASOPHIL 0.5 04/04/2024    EOS 0.2 04/04/2024    BASO 0.02 04/04/2024    APTT 26.6 12/10/2021    GROUPTRH A NEG 04/07/2022     (H) 04/02/2024    CHOL 109 (L) 05/01/2023    TRIG 78 05/01/2023    HDL 34 (L) 05/01/2023    CHOLHDL 31.2 05/01/2023    TOTALCHOLEST 3.2 05/01/2023    ALBUMIN 3.5 04/04/2024    AST 22 04/04/2024    ALT 21 04/04/2024    ALKPHOS 40 (L) 04/04/2024    MG 1.8 04/04/2024    LABPROT 13.6 (H) 12/04/2023    INR 1.2 12/04/2023    PSA 1.8 04/02/2024       Assessment and Plan:  Renan Britton is a 63 y.o. male who likely has cirrhosis given thrombocytopenia and imaging suggestive of cirrhosis. I suspect cirrhosis secondary to alcohol and fatty liver (elevated BMI and DM). Thus, is high risk for cardiac surgery. Consider TAVR. Also recommend full serologic w/u for CLD, MRI abdo to screen for HCC. Will need EGD to r/o varices.    Return 8 weeks  A total of 35 minutes was spent reviewing the patient's chart, examining the patient, reviewing labs and imaging and coordinating care with the patient's care team.

## 2024-05-31 ENCOUNTER — TELEPHONE (OUTPATIENT)
Dept: TRANSPLANT | Facility: CLINIC | Age: 64
End: 2024-05-31
Payer: COMMERCIAL

## 2024-05-31 ENCOUNTER — PATIENT MESSAGE (OUTPATIENT)
Dept: HEMATOLOGY/ONCOLOGY | Facility: CLINIC | Age: 64
End: 2024-05-31
Payer: COMMERCIAL

## 2024-05-31 DIAGNOSIS — R93.2 ABNORMAL LIVER DIAGNOSTIC IMAGING: Primary | ICD-10-CM

## 2024-05-31 RX ORDER — HEPARIN 100 UNIT/ML
500 SYRINGE INTRAVENOUS
Status: CANCELLED | OUTPATIENT
Start: 2024-06-04

## 2024-05-31 RX ORDER — HEPARIN 100 UNIT/ML
500 SYRINGE INTRAVENOUS
Status: CANCELLED | OUTPATIENT
Start: 2024-06-11

## 2024-05-31 RX ORDER — SODIUM CHLORIDE 0.9 % (FLUSH) 0.9 %
10 SYRINGE (ML) INJECTION
Status: CANCELLED | OUTPATIENT
Start: 2024-06-18

## 2024-05-31 RX ORDER — SODIUM CHLORIDE 0.9 % (FLUSH) 0.9 %
10 SYRINGE (ML) INJECTION
OUTPATIENT
Start: 2024-06-25

## 2024-05-31 RX ORDER — SODIUM CHLORIDE 0.9 % (FLUSH) 0.9 %
10 SYRINGE (ML) INJECTION
Status: CANCELLED | OUTPATIENT
Start: 2024-06-11

## 2024-05-31 RX ORDER — SODIUM CHLORIDE 0.9 % (FLUSH) 0.9 %
10 SYRINGE (ML) INJECTION
Status: CANCELLED | OUTPATIENT
Start: 2024-06-04

## 2024-05-31 RX ORDER — HEPARIN 100 UNIT/ML
500 SYRINGE INTRAVENOUS
Status: CANCELLED | OUTPATIENT
Start: 2024-06-18

## 2024-05-31 RX ORDER — HEPARIN 100 UNIT/ML
500 SYRINGE INTRAVENOUS
OUTPATIENT
Start: 2024-06-25

## 2024-06-01 NOTE — TELEPHONE ENCOUNTER
Call to pt to explain MRI findings  Will cancel biopsy and proceed with MRE  Plan will be to pursue TAVR   No

## 2024-06-03 ENCOUNTER — PATIENT MESSAGE (OUTPATIENT)
Dept: HEPATOLOGY | Facility: CLINIC | Age: 64
End: 2024-06-03
Payer: COMMERCIAL

## 2024-06-03 DIAGNOSIS — I35.0 SEVERE AORTIC STENOSIS: Primary | ICD-10-CM

## 2024-06-05 ENCOUNTER — PATIENT MESSAGE (OUTPATIENT)
Dept: HEMATOLOGY/ONCOLOGY | Facility: CLINIC | Age: 64
End: 2024-06-05
Payer: COMMERCIAL

## 2024-06-07 ENCOUNTER — OFFICE VISIT (OUTPATIENT)
Dept: UROLOGY | Facility: CLINIC | Age: 64
End: 2024-06-07
Payer: COMMERCIAL

## 2024-06-07 VITALS — HEIGHT: 70 IN | WEIGHT: 257.94 LBS | BODY MASS INDEX: 36.93 KG/M2

## 2024-06-07 DIAGNOSIS — Z90.79 STATUS POST PROSTATECTOMY: ICD-10-CM

## 2024-06-07 DIAGNOSIS — R97.20 ELEVATED PSA, LESS THAN 10 NG/ML: ICD-10-CM

## 2024-06-07 DIAGNOSIS — R97.21 RISING PSA FOLLOWING TREATMENT FOR MALIGNANT NEOPLASM OF PROSTATE: ICD-10-CM

## 2024-06-07 DIAGNOSIS — C61 PROSTATE CANCER: Primary | ICD-10-CM

## 2024-06-07 PROCEDURE — 99214 OFFICE O/P EST MOD 30 MIN: CPT | Mod: S$GLB,,, | Performed by: UROLOGY

## 2024-06-07 PROCEDURE — 99999 PR PBB SHADOW E&M-EST. PATIENT-LVL III: CPT | Mod: PBBFAC,,, | Performed by: UROLOGY
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
## 2024-06-07 NOTE — PROGRESS NOTES
Subjective:      Patient ID: Renan Britton is a 63 y.o. male.    Chief Complaint: prostate cancer followup     Mr. Britton is 63-year-old gentleman who is a proximally 2-1/2 years status post radical retropubic prostatectomy.  Pathology was negative for any residual disease or disease outside of the prostate.  The patient did have perineural involvement if I recall this correctly.  The PSA has been slowly rising and all imaging studies so far have yielded no results.  The PSA has almost doubled over the last 6 months it is now 1.7.  Will repeat PET PSMA CT scan and notify patient of results.  May indicate need for MRI based on PET PSMA CT scan and will order that if necessary.  Patient has no symptoms and no complaints urologically however has been having some shortness of breath and some chest pain.  The patient also has a lower leg vascular obstruction that will require treatment but can not be done until his heart is fixed. Receny Pneumonia in April.    Follow-up  This is a chronic problem. The current episode started more than 1 year ago. The problem occurs constantly. The problem has been unchanged. Pertinent negatives include no abdominal pain, anorexia, arthralgias, change in bowel habit, chest pain, chills, congestion, coughing, diaphoresis, fatigue, fever, headaches, joint swelling, myalgias, nausea, neck pain, numbness, rash, sore throat, swollen glands, urinary symptoms, vertigo, visual change, vomiting or weakness. Nothing aggravates the symptoms. He has tried nothing for the symptoms. The treatment provided significant relief.     Review of Systems   Constitutional:  Negative for activity change, appetite change, chills, diaphoresis, fatigue, fever and unexpected weight change.   HENT:  Negative for congestion, hearing loss, sinus pressure, sore throat and trouble swallowing.    Eyes:  Negative for photophobia, pain, discharge and visual disturbance.   Respiratory:  Negative for apnea, cough  and shortness of breath.    Cardiovascular:  Negative for chest pain, palpitations and leg swelling.   Gastrointestinal:  Negative for abdominal distention, abdominal pain, anal bleeding, anorexia, blood in stool, change in bowel habit, constipation, diarrhea, nausea, rectal pain and vomiting.   Endocrine: Negative for cold intolerance, heat intolerance, polydipsia, polyphagia and polyuria.   Genitourinary:  Negative for decreased urine volume, difficulty urinating, dysuria, enuresis, flank pain, frequency, genital sores, hematuria, penile discharge, penile pain, penile swelling, scrotal swelling, testicular pain and urgency.   Musculoskeletal:  Negative for arthralgias, back pain, joint swelling, myalgias and neck pain.   Skin:  Negative for color change, pallor, rash and wound.   Allergic/Immunologic: Negative for environmental allergies, food allergies and immunocompromised state.   Neurological:  Negative for dizziness, vertigo, seizures, weakness, numbness and headaches.   Hematological:  Negative for adenopathy. Does not bruise/bleed easily.   Psychiatric/Behavioral: Negative.        Objective:     Physical Exam  Vitals and nursing note reviewed.   Constitutional:       General: He is not in acute distress.     Appearance: Normal appearance. He is well-developed. He is not ill-appearing, toxic-appearing or diaphoretic.   HENT:      Head: Normocephalic and atraumatic.      Right Ear: External ear normal.      Left Ear: External ear normal.      Nose: Nose normal.      Mouth/Throat:      Pharynx: No oropharyngeal exudate or posterior oropharyngeal erythema.   Eyes:      General: No scleral icterus.        Right eye: No discharge.         Left eye: No discharge.      Extraocular Movements: Extraocular movements intact.      Conjunctiva/sclera: Conjunctivae normal.      Pupils: Pupils are equal, round, and reactive to light.   Cardiovascular:      Rate and Rhythm: Normal rate and regular rhythm.      Heart sounds:  Normal heart sounds.   Pulmonary:      Effort: Pulmonary effort is normal.      Breath sounds: Rales (bilateral) present.   Abdominal:      General: Bowel sounds are normal. There is no distension.      Palpations: Abdomen is soft. There is no mass.      Tenderness: There is no abdominal tenderness. There is no right CVA tenderness, left CVA tenderness, guarding or rebound.      Hernia: No hernia is present.   Genitourinary:     Penis: Normal.       Testes: Normal.      Comments: Patient with no CVA tenderness, normal penis and scrotum.  Bladder nontender.  Musculoskeletal:         General: Normal range of motion.      Cervical back: Normal range of motion and neck supple.   Skin:     General: Skin is warm and dry.      Capillary Refill: Capillary refill takes less than 2 seconds.   Neurological:      Mental Status: He is alert and oriented to person, place, and time.      Deep Tendon Reflexes: Reflexes are normal and symmetric.   Psychiatric:         Mood and Affect: Mood normal.         Behavior: Behavior normal.         Thought Content: Thought content normal.         Judgment: Judgment normal.        Assessment:      1. Hx of prostate adenocarcinoma s/p prostatectomy    2. Rising PSA following treatment for malignant neoplasm of prostate    3. Status post prostatectomy    4. Elevated PSA, less than 10 ng/ml      Plan:     Patient Instructions   Is to undergo PET CT PSMA scan, will have to schedule.     Will follow-up in 3 months with PSA free and total     Patient to undergo heart valve replacement in the interim.      Notify patient of results

## 2024-06-10 PROBLEM — K74.60 CIRRHOSIS: Status: ACTIVE | Noted: 2024-06-10

## 2024-06-11 NOTE — PROGRESS NOTES
Given the recent findings of cirrhosis and severe peripheral vascular disease, Mr. Britton is not a candidate for open heart surgery and we will proceed with transfemoral vs transaxillary TAVR evaluation.      Harvey Palomo MD  Cardiothoracic Surgery  [facility]

## 2024-06-19 ENCOUNTER — HOSPITAL ENCOUNTER (OUTPATIENT)
Dept: RADIOLOGY | Facility: HOSPITAL | Age: 64
Discharge: HOME | End: 2024-06-19
Attending: UROLOGY
Payer: COMMERCIAL

## 2024-06-19 DIAGNOSIS — R97.21 RISING PSA FOLLOWING TREATMENT FOR MALIGNANT NEOPLASM OF PROSTATE: ICD-10-CM

## 2024-06-19 PROCEDURE — 78815 PET IMAGE W/CT SKULL-THIGH: CPT | Mod: TC

## 2024-06-19 PROCEDURE — A9595 HC PIFLUFOLASTAT F-18, DX, PER 1 MCI: HCPCS | Mod: TB | Performed by: UROLOGY

## 2024-06-19 RX ADMIN — PIFLUFOLASTAT F-18 10.25 MILLICURIE: 80 INJECTION INTRAVENOUS at 01:06

## 2024-06-20 ENCOUNTER — TELEPHONE (OUTPATIENT)
Dept: UROLOGY | Facility: CLINIC | Age: 64
End: 2024-06-20
Payer: COMMERCIAL

## 2024-06-20 NOTE — TELEPHONE ENCOUNTER
----- Message from Antonette Sierra NP sent at 6/20/2024 10:45 AM CDT -----  Please inform Dr. Mitchell's patient via telephone that his PET CT did not show any evidence of disease in the chest, abdomen, or pelvis. Keep 3 month follow-up appt with Dr. Mitchell with PSA prior.

## 2024-06-25 ENCOUNTER — HOSPITAL ENCOUNTER (OUTPATIENT)
Dept: RADIOLOGY | Facility: HOSPITAL | Age: 64
Discharge: HOME | End: 2024-06-25
Attending: INTERNAL MEDICINE
Payer: COMMERCIAL

## 2024-06-25 DIAGNOSIS — R93.2 ABNORMAL LIVER DIAGNOSTIC IMAGING: ICD-10-CM

## 2024-06-25 PROCEDURE — 76391 MR ELASTOGRAPHY: CPT | Mod: 26,,, | Performed by: RADIOLOGY

## 2024-06-25 PROCEDURE — 76391 MR ELASTOGRAPHY: CPT | Mod: TC

## 2024-07-01 PROBLEM — D69.6 THROMBOCYTOPENIA: Status: RESOLVED | Noted: 2020-03-09 | Resolved: 2024-07-01

## 2024-07-01 PROBLEM — I35.0 CRITICAL AORTIC VALVE STENOSIS: Status: RESOLVED | Noted: 2024-04-19 | Resolved: 2024-07-01

## 2024-07-01 PROBLEM — I50.33 ACUTE ON CHRONIC DIASTOLIC HEART FAILURE: Status: ACTIVE | Noted: 2024-04-19

## 2024-07-01 PROBLEM — I35.0 AORTIC STENOSIS: Status: RESOLVED | Noted: 2024-04-03 | Resolved: 2024-07-01

## 2024-07-05 ENCOUNTER — TELEPHONE (OUTPATIENT)
Dept: HEPATOLOGY | Facility: CLINIC | Age: 64
End: 2024-07-05
Payer: COMMERCIAL

## 2024-07-09 ENCOUNTER — DOCUMENTATION ONLY (OUTPATIENT)
Dept: CARDIOLOGY | Facility: CLINIC | Age: 64
End: 2024-07-09
Payer: COMMERCIAL

## 2024-07-09 ENCOUNTER — HOSPITAL ENCOUNTER (OUTPATIENT)
Dept: RADIOLOGY | Facility: HOSPITAL | Age: 64
Discharge: HOME | End: 2024-07-09
Attending: INTERNAL MEDICINE
Payer: COMMERCIAL

## 2024-07-09 ENCOUNTER — OFFICE VISIT (OUTPATIENT)
Dept: CARDIOLOGY | Facility: CLINIC | Age: 64
End: 2024-07-09
Payer: COMMERCIAL

## 2024-07-09 ENCOUNTER — PATIENT MESSAGE (OUTPATIENT)
Dept: CARDIOLOGY | Facility: CLINIC | Age: 64
End: 2024-07-09

## 2024-07-09 ENCOUNTER — PATIENT MESSAGE (OUTPATIENT)
Dept: OTOLARYNGOLOGY | Facility: CLINIC | Age: 64
End: 2024-07-09
Payer: COMMERCIAL

## 2024-07-09 VITALS
WEIGHT: 255.31 LBS | OXYGEN SATURATION: 98 % | HEIGHT: 70 IN | SYSTOLIC BLOOD PRESSURE: 125 MMHG | HEART RATE: 80 BPM | BODY MASS INDEX: 36.55 KG/M2 | DIASTOLIC BLOOD PRESSURE: 68 MMHG

## 2024-07-09 DIAGNOSIS — E78.5 HYPERLIPIDEMIA, UNSPECIFIED HYPERLIPIDEMIA TYPE: ICD-10-CM

## 2024-07-09 DIAGNOSIS — I35.0 SEVERE AORTIC STENOSIS: Primary | ICD-10-CM

## 2024-07-09 DIAGNOSIS — I10 ESSENTIAL HYPERTENSION: ICD-10-CM

## 2024-07-09 DIAGNOSIS — K70.30 ALCOHOLIC CIRRHOSIS OF LIVER WITHOUT ASCITES: ICD-10-CM

## 2024-07-09 DIAGNOSIS — I35.0 NONRHEUMATIC AORTIC VALVE STENOSIS: ICD-10-CM

## 2024-07-09 DIAGNOSIS — I73.9 CLAUDICATION: Primary | ICD-10-CM

## 2024-07-09 DIAGNOSIS — I35.0 SEVERE AORTIC STENOSIS: ICD-10-CM

## 2024-07-09 PROCEDURE — 99999 PR PBB SHADOW E&M-EST. PATIENT-LVL V: CPT | Mod: PBBFAC,,, | Performed by: INTERNAL MEDICINE

## 2024-07-09 PROCEDURE — 25500020 PHARM REV CODE 255: Performed by: INTERNAL MEDICINE

## 2024-07-09 PROCEDURE — G2211 COMPLEX E/M VISIT ADD ON: HCPCS | Mod: S$GLB,,, | Performed by: INTERNAL MEDICINE

## 2024-07-09 PROCEDURE — 71275 CT ANGIOGRAPHY CHEST: CPT | Mod: 26,,, | Performed by: RADIOLOGY

## 2024-07-09 PROCEDURE — 99205 OFFICE O/P NEW HI 60 MIN: CPT | Mod: S$GLB,,, | Performed by: INTERNAL MEDICINE

## 2024-07-09 PROCEDURE — 74174 CTA ABD&PLVS W/CONTRAST: CPT | Mod: 26,,, | Performed by: RADIOLOGY

## 2024-07-09 PROCEDURE — 74174 CTA ABD&PLVS W/CONTRAST: CPT | Mod: TC

## 2024-07-09 RX ORDER — SODIUM CHLORIDE 0.9 % (FLUSH) 0.9 %
10 SYRINGE (ML) INJECTION
Status: CANCELLED | OUTPATIENT
Start: 2024-07-09

## 2024-07-09 RX ORDER — HEPARIN 100 UNIT/ML
500 SYRINGE INTRAVENOUS
OUTPATIENT
Start: 2024-07-18

## 2024-07-09 RX ORDER — SODIUM CHLORIDE 0.9 % (FLUSH) 0.9 %
10 SYRINGE (ML) INJECTION
OUTPATIENT
Start: 2024-07-25

## 2024-07-09 RX ORDER — HEPARIN 100 UNIT/ML
500 SYRINGE INTRAVENOUS
Status: CANCELLED | OUTPATIENT
Start: 2024-07-09

## 2024-07-09 RX ORDER — HEPARIN 100 UNIT/ML
500 SYRINGE INTRAVENOUS
OUTPATIENT
Start: 2024-07-25

## 2024-07-09 RX ORDER — HEPARIN 100 UNIT/ML
500 SYRINGE INTRAVENOUS
OUTPATIENT
Start: 2024-07-11

## 2024-07-09 RX ORDER — SODIUM CHLORIDE 0.9 % (FLUSH) 0.9 %
10 SYRINGE (ML) INJECTION
OUTPATIENT
Start: 2024-07-11

## 2024-07-09 RX ORDER — SODIUM CHLORIDE 0.9 % (FLUSH) 0.9 %
10 SYRINGE (ML) INJECTION
OUTPATIENT
Start: 2024-07-18

## 2024-07-09 RX ADMIN — IOHEXOL 120 ML: 350 INJECTION, SOLUTION INTRAVENOUS at 09:07

## 2024-07-09 NOTE — PROGRESS NOTES
Transcatheter Valve Replacement (TAVR)    You have been scheduled for your valve replacement on Tuesday, August 13, 2024.     Please report to the Cardiology Waiting Area on the Third floor of the hospital and check in at 6 AM.   You will then be taken to the SSCU (Short Stay Cardiac Unit) and prepared for your procedure. Please be aware that this is not the time of your procedure but the time you are to arrive.     Preperations for your procedure:  Shower with Dial soap the night before and the morning of your procedure.  No solid foods 8 hours prior to your procedure.  You may have clear liquids until the time of your admission which should be 2 hours prior to your procedure.  You are encouraged to have at least 8 ounces of clear liquids prior to admission to SSCU.  Patients with gastric emptying issues should be fasting for 6- 8 hours prior to the procedure.  Clear liquids include water, black coffee, clear juices, and performance drinks - no pulp or milk.    Heart failure or dialysis patients will be limited to 8 ounces (1 cup) of clear liquids up until 2 hours of the procedure.  You may take your regular morning medications         with as much water as necessary.   Bring your cane and/or walker with you to the hospital.  Bring CPAP/BiPAP, or oxygen if you are on oxygen at home.  Call the office for any signs of infection ( fever, cough, pneumonia, urinary tract, etc.) We cannot implant a device in an infected patient.    Medications:  You may take your regular scheduled medications the morning of your procedure with as much water as necessary.  If you are diabetic and on Metformin (Glucophage), do not take it the day before, the day of, and 2 days after your procedure.  If you are on Pradaxa, Xarelto, Eliquis, or Coumadin hold 3 days prior to your procedure.     How long will the procedure take?  The entire procedure takes three to four hours.  After the procedure, you will go to an ICU where you will be  monitored closely for the next several hours.  You will remain in the ICU overnight.        If you walk with a cane or walker, please bring it with you to the hospital so that we can get you out of bed several hours after your valve replacement.  Our goal is to get you up in a chair and moving as quickly as possible as long as it is safe for you to do so.    When can I go home?  Your length of stay in the hospital, will be determined by your physician.  Be prepared to stay 1-3 days.  You will be discharged when your physician thinks it is safe for you to go home.  You must have someone to drive you home when you are discharged.    Follow Up After Valve Replacement:  It is required that you return to [facility] for the follow up in one month and one year with an echo, lab work, and a clinic visit.  If you are in a research trial, your follow up will be slightly different and according to the trial requirements.  You can follow up with your regular cardiologist regarding any other heart issues.    Contacts one of our nurses at [phone] for any questions.  SUBHASH Adams RN    Stop taking your Metformin (Glucophage) on Sunday, August 11 2024.  You will resume your medication on Friday, August 16, 2024.    Continue to take your other prescribed medications.    Take your Aspirin and Plavix everyday.         THE ABOVE INSTRUCTIONS WERE GIVEN TO THE PATIENT VERBALLY AND THEY VERBALIZED UNDERSTANDING.  THEY DO NOT REQUIRE ANY SPECIAL NEEDS AND DO NOT HAVE ANY LEARNING BARRIERS.          Directions for Reporting to Cardiology Waiting Area in the Hospital  If you park in the Parking Garage:  Take elevators to the1st floor of the parking garage.  Continue past the gift shop, coffee shop, and piano.  Take a right and go to the gold elevators. (Elevator B)  Take the elevator to the 3rd floor.  Follow the arrow on the sign on the wall that says Cath Lab Registration/EP Lab Registration.  Follow the long  hallway all the way around until you come to a big open area.  This is the registration area.  Check in at Reception Desk.    OR    If family is dropping you off:  Have them drop you off at the front of the Hospital under the green overhang.  Enter through the doors and take a right.  Take the E elevators to the 3rd floor Cardiology Waiting Area.  Check in at the Reception Desk in the waiting room.

## 2024-07-09 NOTE — ASSESSMENT & PLAN NOTE
Severe aortic stenosis based on echocardiogram.  Patient currently has  NYHA class 3 symptoms    Transcatheter Aortic valve replacement   Valve: 27 mm Navitor  ECMO:  On Stand by  TAVR access: RTFA  Valvuloplasty balloon: 18 CHER FLOW  Viabahn size if needed: 7@5  Antithrombotic therapy: Aspirin,plavix  Temporary pacing wire: No, Will pace from TAVR wire  Pacemaker risk factors: none   Post op destination: Stepdown  Antibiotics given: (to be done during procedure)  Heart failure on admission?  CONSENTING:  The patient was told that the procedure carries around a 12.5% risk of permanent pacemaker requirement and that risk depends on the patients underlying conduction system.  Prior to the [facility] visit, all available records from referring provider were reviewed.  I have personally reviewed all the lab tests available related to the patient's case  The patient's images were reviewed by myself and the procedural planning was done with my own interpretation of the iliac and aortic anatomy based on CTA, angiography or MERI. I have reviewed all other imaging studies relevant to the patient including echocardiography and coronary angiography.  Patient was educated abut the pathophysiology and natural history of severe aortic stenosis and was educated about the treatment options including surgical and transcatheter valve replacement. She agrees to be full code for the forseable future and to undergo workup for treatment of severe AS.   The risks, benefits, and alternatives of transcatheter aortic valve replacement were discussed with the patient. All questions were answered and informed consent was obtained. I had a detailed discussion with the patient regarding risk of stroke, MI, bleeding access site complications including limb loss, allergy, kidney failure including dialysis and death.  The patient understands the risks and benefits and wishes to go ahead with the procedure.  The referring Cardiologist was notified of the  plan  All patient's questions were answered    Shared Decision Making:  This patient was seen today by a multidisciplinary heart team involving both a Structural Heart Specialist (Interventional Cardiologist) and a Cardiothoracic Surgeon. The patient was involved in shared decision-making to define clearer goals for treatment and align health decisions with their current values.  The patient understands the risks and expected benefits of their treatment options.         intact

## 2024-07-09 NOTE — ASSESSMENT & PLAN NOTE
History of peripheral artery disease with left CFA  and left iliac to femoral bypass  Currently complains of having pain on exertion.  On Plavix and aspirin and statin

## 2024-07-09 NOTE — PROGRESS NOTES
Subjective:    Patient ID:  Renan Britton is a 63 y.o. male who presents for evaluation of Chest Pain and Shortness of Breath      Referring physician: Dr. Harvey Palomo     HPI    Reann Britton is a 63 y.o. male referred by Dr Palomo  for evaluation of severe AS (NYHA Class 3 sx).    63-year-old male with past medical history of coronary artery disease, peripheral artery disease, alcoholic liver cirrhosis, who is being currently evaluated for severe aortic stenosis was sent here for TAVR evaluation.  Patient currently has NYHA III symptoms.  Patient also has severe peripheral artery disease with left iliac to femoral bypass with  of left common femoral artery.  Patient was recently admitted to hospital for heart failure exacerbation.  He was already evaluated by CT surgery who deemed not to be a surgical candidate.  He was also follow up with vascular surgery for peripheral artery disease.  He has a left heart catheterization done in April that showed nonobstructive coronary artery disease.    The patient has undergone the following TAVR work-up:   ECHO (Date 4/3/24): MEMO=PV 4.54 m/s, MG 49 mmHg, MEMO 0.74 cm2, DVI 0.19 , EF= 55-60%.   LHC (Date 4/19/24): Non obstructive   STS: 2%   Frailty: 2/4   Iliacs are >7.37 on R and > 2.96 on L   LVOT area by CTA is 4.40 cm2 (28.3 mm X 21.1 mm) and Avg Diameter is 23.7 per my independent review of images on [employer].  Incidental findings on CT: Pulmonary nodule and Pancreatic nodule   CT Surgery risk assessment: High  risk, per Dr Palomo due to Liver cirrhosis   Rhythm issues: NSR     KCCQ/5 meter walk:   Comorbidities: Liver cirrhosis, PAD      Renan Britton is a 27 mm  Navitor valve candidate via RTFA access.      Past Medical History:   Diagnosis Date    JJ (acute kidney injury) 12/03/2023    Aortic valve disease     CHF (congestive heart failure)     Cirrhosis 06/10/2024    Diabetes mellitus     Elevated PSA     H/O brain tumor      HLD (hyperlipidemia)     Hypertension     MRSA infection     IN 2020    PAD (peripheral artery disease)     Personal history of colonic polyps 01/08/2021    Pneumonia, unspecified organism     MOST RECENT WEEK OF APRIL 1, 2024    Prostate cancer     Seizures     R/T BRAIN TUMOR- SURGICALLY EXCISED    Sleep apnea     CPAP    Urinary tract infection        Past Surgical History:   Procedure Laterality Date    ANGIOGRAPHY Left 4/6/2020    Procedure: ANGIOGRAM, Left lower extremity;  Surgeon: Kelby Gomez MD;  [facility];  Service: Peripheral Vascular;  Laterality: Left;    ANGIOGRAPHY OF LOWER EXTREMITY Left 6/29/2020    Procedure: Angiogram Extremity Unilateral, washout L groin, possible open pseudoaneurysm repair;  Surgeon: Bruce Dallas MD;  Location: [facility] OR [facility] FLR;  Service: Peripheral Vascular;  Laterality: Left;  contrast 23 ml  fluoro time: 9.9 min  mGy: 1230.26  Gycm2: 146.69    AORTOGRAPHY N/A 6/29/2020    Procedure: AORTOGRAM;  Surgeon: Bruce Dallas MD;  [facility];  Service: Peripheral Vascular;  Laterality: N/A;    APPLICATION OF WOUND VACUUM-ASSISTED CLOSURE DEVICE Bilateral 1/1/2020    Procedure: APPLICATION, WOUND VAC;  Surgeon: SEBAS Prieto II, MD;  Location: [facility] OR [facility] FLR;  Service: Cardiovascular;  Laterality: Bilateral;    APPLICATION OF WOUND VACUUM-ASSISTED CLOSURE DEVICE N/A 1/3/2020    Procedure: APPLICATION, WOUND VAC;  Surgeon: Brian Wong MD;  [facility];  Service: Plastics;  Laterality: N/A;    BRAIN SURGERY  01/2011    TUMOR EXCISED    COLONOSCOPY N/A 1/8/2021    Procedure: COLONOSCOPY;  Surgeon: Susan Posada MD;  Location: [facility] ENDO;  Service: Endoscopy;  Laterality: N/A;    COLONOSCOPY N/A 11/18/2021    Procedure: COLONOSCOPY;  Surgeon: Susan Posada MD;  Location: [facility] ENDO;  Service: Endoscopy;  Laterality: N/A;    CORONARY ANGIOGRAPHY Left 4/19/2024    Procedure: ANGIOGRAM, CORONARY ARTERY;   Surgeon: Giuseppe Blood MD;  Location: [facility] CATH;  Service: Cardiology;  Laterality: Left;    CREATION OF ILIOFEMORAL ARTERY BYPASS Left 8/12/2020    Procedure: CREATION, BYPASS, ARTERIAL, ILIAC TO FEMORAL;  Surgeon: Kelby Gomez MD;  Location: [facility] OR [address] [place] [address];  Service: Peripheral Vascular;  Laterality: Left;  CO-SURGEONS: DR KEO ADHIKARI;  DR WONG, profusion    CREATION OF MUSCLE ROTATIONAL FLAP N/A 1/3/2020    Procedure: CREATION, FLAP, MUSCLE ROTATION;  Surgeon: Brian Wong MD;  Location: [facility] OR [address] [place] [address];  Service: Plastics;  Laterality: N/A;    ENDARTERECTOMY OF FEMORAL ARTERY Bilateral 12/20/2019    Procedure: ENDARTERECTOMY, FEMORAL;  Surgeon: Kelby Gomez MD;  Location: [address];  Service: Peripheral Vascular;  Laterality: Bilateral;  natalya common femoral endarterectomy with natalya. iliac stent placement    ESOPHAGOGASTRODUODENOSCOPY N/A 4/7/2022    Procedure: EGD (ESOPHAGOGASTRODUODENOSCOPY);  Surgeon: Susan Posada MD;  Location: [facility] ENDO;  Service: Endoscopy;  Laterality: N/A;    INTRALUMINAL GASTROINTESTINAL TRACT IMAGING VIA CAPSULE N/A 4/18/2022    Procedure: IMAGING, GI TRACT, INTRALUMINAL, VIA CAPSULE;  Surgeon: Otto Powers MD;  Location: [facility] ENDO;  Service: Endoscopy;  Laterality: N/A;    PERCUTANEOUS TRANSLUMINAL ANGIOPLASTY Left 4/6/2020    Procedure: PTA (ANGIOPLASTY, PERCUTANEOUS, TRANSLUMINAL), left lower extremity;  Surgeon: Kelby Gomez MD;  Location: [address];  Service: Peripheral Vascular;  Laterality: Left;    PERCUTANEOUS TRANSLUMINAL ANGIOPLASTY (PTA) OF PERIPHERAL VESSEL Left 10/17/2019    Procedure: PTA, PERIPHERAL VESSEL;  Surgeon: Rao Fisher MD;  Location: [facility] CATH;  Service: Cardiology;  Laterality: Left;    PSEUDOANEURYSM REPAIR Left 6/29/2020    Procedure: REPAIR, PSEUDOANEURYSM;  Surgeon: Bruce Dallas MD;  Location: [address];  Service: Peripheral Vascular;  Laterality: Left;    RADICAL RETROPUBIC PROSTATECTOMY N/A  12/13/2021    Procedure: PROSTATECTOMY, RETROPUBIC, RADICAL + Bilateral pelvic lymph node dissection;  Surgeon: Raghav Mitchell MD;  [facility];  Service: Urology;  Laterality: N/A;    RIGHT HEART CATHETERIZATION Right 4/19/2024    Procedure: INSERTION, CATHETER, RIGHT HEART;  Surgeon: Giuseppe Blood MD;  Location: [facility] CATH;  Service: Cardiology;  Laterality: Right;    TRANSESOPHAGEAL ECHOCARDIOGRAPHY N/A 4/19/2024    Procedure: ECHOCARDIOGRAM, TRANSESOPHAGEAL;  Surgeon: Giuseppe Blood MD;  Location: [facility] CATH;  Service: Cardiology;  Laterality: N/A;       Current Outpatient Medications on File Prior to Visit   Medication Sig Dispense Refill    ascorbic acid, vitamin C, (VITAMIN C) 500 MG tablet Vitamin C 500 mg tablet, [RxNorm: 454452]      aspirin (ECOTRIN) 81 MG EC tablet Take 81 mg by mouth once daily.      atorvastatin (LIPITOR) 40 MG tablet Take 1 tablet (40 mg total) by mouth once daily. 30 tablet 5    cilostazoL (PLETAL) 100 MG Tab Take 100 mg by mouth 2 (two) times daily.      clopidogreL (PLAVIX) 75 mg tablet clopidogreL 75 mg tablet, [RxNorm: 972060]      ferrous sulfate 324 mg (65 mg iron) TbEC ferrous sulfate 324 mg (65 mg iron) tablet,delayed release, [RxNorm: 8576454]      furosemide (LASIX) 20 MG tablet Take 1 tablet (20 mg total) by mouth once daily. You may increase to twice daily if needed for increased shortness of breath or fluid 30 tablet 11    hydrALAZINE (APRESOLINE) 50 MG tablet Take 1 tablet (50 mg total) by mouth every 8 (eight) hours. (Patient taking differently: Take 50 mg by mouth 2 (two) times a day.) 90 tablet 3    hydrALAZINE (APRESOLINE) 50 MG tablet       LYRICA 100 mg capsule Take 100 mg by mouth 2 (two) times daily.      metFORMIN (GLUCOPHAGE) 500 MG tablet       NIFEdipine (ADALAT CC) 60 MG TbSR        Current Facility-Administered Medications on File Prior to Visit   Medication Dose Route Frequency Provider Last Rate Last Admin    [COMPLETED] iohexoL (OMNIPAQUE  "350) injection 120 mL  120 mL Intravenous ONCE PRN Ruslan Xie MD   120 mL at 07/09/24 0945       Review of patient's allergies indicates:  No Known Allergies    Social History     Tobacco Use    Smoking status: Former     Types: Cigarettes     Passive exposure: Current    Smokeless tobacco: Never    Tobacco comments:      2-3 daily   Substance Use Topics    Alcohol use: Not Currently     Comment: socially    Drug use: Never       Family History   Problem Relation Name Age of Onset    Diabetes Mother      Hypertension Mother      Stroke Mother      Cancer Father          LUNG    Heart disease Father      Cancer Brother      Heart disease Brother      Heart disease Brother      Heart disease Brother      Prostate cancer Neg Hx      Kidney disease Neg Hx           Review of Systems   Constitutional: Negative.   HENT: Negative.     Eyes: Negative.    Cardiovascular:  Positive for chest pain.   Respiratory:  Positive for shortness of breath.    Musculoskeletal: Negative.    Gastrointestinal: Negative.    Genitourinary: Negative.    Neurological: Negative.         Objective:     Vitals:    07/09/24 1447 07/09/24 1449   BP: 126/68 125/68   BP Location: Left arm Right arm   Patient Position: Sitting Sitting   BP Method: Large (Automatic) Large (Automatic)   Pulse: 81 80   SpO2: 97% 98%   Weight: 115.8 kg (255 lb 4.7 oz)    Height: 5' 10" (1.778 m)         Physical Exam  Constitutional:       Appearance: He is obese.   HENT:      Head: Normocephalic and atraumatic.      Mouth/Throat:      Mouth: Mucous membranes are moist.   Cardiovascular:      Rate and Rhythm: Normal rate and regular rhythm.      Pulses: Normal pulses.      Heart sounds: Normal heart sounds.      Comments: Late ejection SM with no A2  Abdominal:      General: Abdomen is flat. Bowel sounds are normal.      Palpations: Abdomen is soft.   Musculoskeletal:         General: Normal range of motion.      Cervical back: Normal range of motion.   Skin:     " General: Skin is warm.      Capillary Refill: Capillary refill takes less than 2 seconds.   Neurological:      General: No focal deficit present.      Mental Status: He is alert and oriented to person, place, and time.           Assessmen/Plan   No problem-specific Assessment & Plan notes found for this encounter.          Nonrheumatic aortic valve stenosis  Severe aortic stenosis based on echocardiogram.  Patient currently has  NYHA class 3 symptoms    Transcatheter Aortic valve replacement   Valve: 27 mm Navitor  ECMO:  On Stand by  TAVR access: RTFA  Valvuloplasty balloon: 18 CHER FLOW  Viabahn size if needed: 7@5  Antithrombotic therapy: Aspirin,plavix  Temporary pacing wire: No, Will pace from TAVR wire  Pacemaker risk factors: none   Post op destination: Stepdown  Antibiotics given: (to be done during procedure)  Heart failure on admission?  CONSENTING:  The patient was told that the procedure carries around a 12.5% risk of permanent pacemaker requirement and that risk depends on the patients underlying conduction system.  Prior to the clinc visit, all available records from referring provider were reviewed.  I have personally reviewed all the lab tests available related to the patient's case  The patient's images were reviewed by myself and the procedural planning was done with my own interpretation of the iliac and aortic anatomy based on CTA, angiography or MERI. I have reviewed all other imaging studies relevant to the patient including echocardiography and coronary angiography.  Patient was educated abut the pathophysiology and natural history of severe aortic stenosis and was educated about the treatment options including surgical and transcatheter valve replacement. She agrees to be full code for the forseable future and to undergo workup for treatment of severe AS.   The risks, benefits, and alternatives of transcatheter aortic valve replacement were discussed with the patient. All questions were  answered and informed consent was obtained. I had a detailed discussion with the patient regarding risk of stroke, MI, bleeding access site complications including limb loss, allergy, kidney failure including dialysis and death.  The patient understands the risks and benefits and wishes to go ahead with the procedure.  The referring Cardiologist was notified of the plan  All patient's questions were answered    Shared Decision Making:  This patient was seen today by a multidisciplinary heart team involving both a Structural Heart Specialist (Interventional Cardiologist) and a Cardiothoracic Surgeon. The patient was involved in shared decision-making to define clearer goals for treatment and align health decisions with their current values.  The patient understands the risks and expected benefits of their treatment options.    HFpEF  On Lasix 20 mg daily       Claudication  History of peripheral artery disease with left CFA  and left iliac to femoral bypass  Currently complains of having pain on exertion.  On Plavix and aspirin and statin    Essential hypertension  Continue with the current blood pressure medications    HLD (hyperlipidemia)  Continue Lipitor      Cirrhosis  History of alcoholic liver cirrhosis .  Follows with hepatology      I have seen the patient, reviewed the Fellow's history and physical, assessment and plan. I have personally interviewed and examined the patient and agree with the findings.  63-year-old male with severe AS, high surgical risk, vasculopath, with right femoral approach acceptable for TAVR per CTA.  Patient is optimized on medical therapy and remains severely symptomatic.  We will discuss approach at multidisciplinary meeting for would favor right common iliac versus axillary approach for implant.    CLYDE Xie MD

## 2024-07-12 ENCOUNTER — PATIENT MESSAGE (OUTPATIENT)
Dept: CARDIOLOGY | Facility: CLINIC | Age: 64
End: 2024-07-12
Payer: COMMERCIAL

## 2024-08-01 ENCOUNTER — PATIENT MESSAGE (OUTPATIENT)
Dept: CARDIOLOGY | Facility: CLINIC | Age: 64
End: 2024-08-01
Payer: COMMERCIAL

## 2024-08-07 ENCOUNTER — PATIENT MESSAGE (OUTPATIENT)
Dept: CARDIOLOGY | Facility: CLINIC | Age: 64
End: 2024-08-07
Payer: COMMERCIAL

## 2024-08-08 PROBLEM — I48.91 A-FIB: Status: ACTIVE | Noted: 2024-08-08

## 2024-08-08 PROBLEM — D64.9 ANEMIA: Status: ACTIVE | Noted: 2024-08-08

## 2024-08-09 ENCOUNTER — ANESTHESIA EVENT (OUTPATIENT)
Dept: MEDSURG UNIT | Facility: HOSPITAL | Age: 64
DRG: 267 | End: 2024-08-09
Payer: COMMERCIAL

## 2024-08-09 NOTE — PROGRESS NOTES
PATIENT: Renan Britton  MRN: [id number]  DATE: 8/12/2024    Diagnosis:   1. Iron deficiency anemia due to chronic blood loss    2. Severe obesity (BMI 35.0-39.9) with comorbidity    3. History of prostate cancer    4. Elevated PSA      Chief Complaint: Anemia    Oncologic History:      Oncologic History 1. Prostate cancer      Oncologic Treatment 1. Radical prostatectomy (12/13/21)      Pathology 12/13/21:  1. Right iliac, obturator, and hypogastric lymph nodes, lymphadenectomy:  Eleven lymph nodes, negative for carcinoma (0/11).  2. Left iliac, obturator, and hypogastric lymph nodes, lymphadenectomy:  Six lymph nodes, negative for carcinoma (0/6).  2. Prostate, radical prostatectomy:  Prostatic adenocarcinoma, grade group 2, Scott score = 7 (3+4), with involvement of bilateral prostate  lobes.  Surgical margins are negative for carcinoma.  Uninvolved prostate with benign glandular hyperplasia and minimal chronic inflammation.  AJCC 8th edition Pathologic stage: [id number].        Telemedicine visit:  The patient location is: home  The chief complaint leading to consultation is: anemia    Visit type: audiovisual    Face to Face time with patient: 8 minutes  10 minutes of total time spent on the encounter, which includes face to face time and non-face to face time preparing to see the patient (eg, review of tests), Obtaining and/or reviewing separately obtained history, Documenting clinical information in the electronic or other health record, Independently interpreting results (not separately reported) and communicating results to the patient/family/caregiver, or Care coordination (not separately reported).     Each patient to whom he or she provides medical services by telemedicine is:  (1) informed of the relationship between the physician and patient and the respective role of any other health care provider with respect to management of the patient; and (2) notified that he or she may decline to receive  medical services by telemedicine and may withdraw from such care at any time.    Notes: see below    Subjective:    History of Present Illness: Mr. Britton is a 63 y.o. male who presented in April 2022 for evaluation and management of iron deficiency anemia. He was referred by Dr. Posada.    - labs since December 2021 reveal a mild-to-severe anemia, now microcytic  - iron studies 94/7/22) reveal a low-normal ferritin, decreased iron/saturated iron, and increased total iron binding capacity  - he underwent radical prostatectomy on 12/13/21.  - he has undergone colonoscopy (11/18/21) and upper GI endoscopy (4/7/22).   - he received iron sucrose x 4 doses in April/May 2022.  He also underwent a video capsule endoscopy (4/18/22) that was relatively normal.  - he received iron sucrose x 4 doses in November/December 2022.  - in December, he was hospitalized for pneumonia/renal failure. He states his renal function is improving (he gets outside labs).  - he underwent PSMA scan on 11/22/23.      Interval history:  - he presents for a follow-up appointment for his iron deficiency anemia  - he received ferric gluconate x 8 doses in June/July 2024.  - he is scheduled for aortic valve replacement on 8/13/24.  - he had chest pain last week. He received 3 pints of blood last week.    - today, he endorses fatigue. He is nervous about the aortic valve replacement tomorrow. He denies chest pain, nausea, vomiting, diarrhea, constipation.    Past medical, surgical, family, and social histories have been reviewed and updated below.    Past Medical History:   Past Medical History:   Diagnosis Date    JJ (acute kidney injury) 12/03/2023    Aortic valve disease     CHF (congestive heart failure)     Cirrhosis 06/10/2024    Diabetes mellitus     Elevated PSA     H/O brain tumor     HLD (hyperlipidemia)     Hypertension     MRSA infection     IN 2020    PAD (peripheral artery disease)     Personal history of colonic polyps 01/08/2021     Pneumonia, unspecified organism     MOST RECENT WEEK OF APRIL 1, 2024    Prostate cancer     Seizures     R/T BRAIN TUMOR- SURGICALLY EXCISED    Sleep apnea     CPAP    Urinary tract infection        Past Surgical History:   Past Surgical History:   Procedure Laterality Date    ANGIOGRAPHY Left 4/6/2020    Procedure: ANGIOGRAM, Left lower extremity;  Surgeon: Kelby Gomez MD;  Location: [facility] OR [address] [place] [address];  Service: Peripheral Vascular;  Laterality: Left;    ANGIOGRAPHY OF LOWER EXTREMITY Left 6/29/2020    Procedure: Angiogram Extremity Unilateral, washout L groin, possible open pseudoaneurysm repair;  Surgeon: Bruce Dallas MD;  Location: [facility] OR [address] [place] [address];  Service: Peripheral Vascular;  Laterality: Left;  contrast 23 ml  fluoro time: 9.9 min  mGy: 1230.26  Gycm2: 146.69    AORTOGRAPHY N/A 6/29/2020    Procedure: AORTOGRAM;  Surgeon: Bruce Dallas MD;  Location: [facility] OR [address] [place] [address];  Service: Peripheral Vascular;  Laterality: N/A;    APPLICATION OF WOUND VACUUM-ASSISTED CLOSURE DEVICE Bilateral 1/1/2020    Procedure: APPLICATION, WOUND VAC;  Surgeon: SEBAS Prieto II, MD;  Location: [facility] OR [address] [place] [address];  Service: Cardiovascular;  Laterality: Bilateral;    APPLICATION OF WOUND VACUUM-ASSISTED CLOSURE DEVICE N/A 1/3/2020    Procedure: APPLICATION, WOUND VAC;  Surgeon: Brian Wong MD;  Location: [address];  Service: Plastics;  Laterality: N/A;    BRAIN SURGERY  01/2011    TUMOR EXCISED    COLONOSCOPY N/A 1/8/2021    Procedure: COLONOSCOPY;  Surgeon: Susan Posada MD;  Location: [facility] ENDO;  Service: Endoscopy;  Laterality: N/A;    COLONOSCOPY N/A 11/18/2021    Procedure: COLONOSCOPY;  Surgeon: Susan Posada MD;  Location: [facility] ENDO;  Service: Endoscopy;  Laterality: N/A;    CORONARY ANGIOGRAPHY Left 4/19/2024    Procedure: ANGIOGRAM, CORONARY ARTERY;  Surgeon: Giuseppe Blood MD;  Location: [facility] CATH;  Service: Cardiology;  Laterality: Left;    CREATION OF ILIOFEMORAL ARTERY BYPASS Left  8/12/2020    Procedure: CREATION, BYPASS, ARTERIAL, ILIAC TO FEMORAL;  Surgeon: Kelby Gomez MD;  [facility];  Service: Peripheral Vascular;  Laterality: Left;  CO-SURGEONS: DR KEO ADHIKARI;  DR WONG, profusion    CREATION OF MUSCLE ROTATIONAL FLAP N/A 1/3/2020    Procedure: CREATION, FLAP, MUSCLE ROTATION;  Surgeon: Brian Wong MD;  [facility];  Service: Plastics;  Laterality: N/A;    ENDARTERECTOMY OF FEMORAL ARTERY Bilateral 12/20/2019    Procedure: ENDARTERECTOMY, FEMORAL;  Surgeon: Kelby Gomez MD;  [facility];  Service: Peripheral Vascular;  Laterality: Bilateral;  natalya common femoral endarterectomy with natalya. iliac stent placement    ESOPHAGOGASTRODUODENOSCOPY N/A 4/7/2022    Procedure: EGD (ESOPHAGOGASTRODUODENOSCOPY);  Surgeon: Susan Posada MD;  Location: [facility] ENDO;  Service: Endoscopy;  Laterality: N/A;    INTRALUMINAL GASTROINTESTINAL TRACT IMAGING VIA CAPSULE N/A 4/18/2022    Procedure: IMAGING, GI TRACT, INTRALUMINAL, VIA CAPSULE;  Surgeon: Otto Powers MD;  Location: [facility] ENDO;  Service: Endoscopy;  Laterality: N/A;    PERCUTANEOUS TRANSLUMINAL ANGIOPLASTY Left 4/6/2020    Procedure: PTA (ANGIOPLASTY, PERCUTANEOUS, TRANSLUMINAL), left lower extremity;  Surgeon: Kelby Gomez MD;  Location: [facility] OR [address] [place] [address];  Service: Peripheral Vascular;  Laterality: Left;    PERCUTANEOUS TRANSLUMINAL ANGIOPLASTY (PTA) OF PERIPHERAL VESSEL Left 10/17/2019    Procedure: PTA, PERIPHERAL VESSEL;  Surgeon: Rao Fisher MD;  Location: [facility] CATH;  Service: Cardiology;  Laterality: Left;    PSEUDOANEURYSM REPAIR Left 6/29/2020    Procedure: REPAIR, PSEUDOANEURYSM;  Surgeon: Bruce Dallas MD;  [facility];  Service: Peripheral Vascular;  Laterality: Left;    RADICAL RETROPUBIC PROSTATECTOMY N/A 12/13/2021    Procedure: PROSTATECTOMY, RETROPUBIC, RADICAL + Bilateral pelvic lymph node dissection;  Surgeon: Raghav Mitchell MD;   Location: [facility] OR;  Service: Urology;  Laterality: N/A;    RIGHT HEART CATHETERIZATION Right 4/19/2024    Procedure: INSERTION, CATHETER, RIGHT HEART;  Surgeon: Giuseppe Blood MD;  Location: [facility] CATH;  Service: Cardiology;  Laterality: Right;    TRANSESOPHAGEAL ECHOCARDIOGRAPHY N/A 4/19/2024    Procedure: ECHOCARDIOGRAM, TRANSESOPHAGEAL;  Surgeon: Giuseppe Blood MD;  Location: [facility] CATH;  Service: Cardiology;  Laterality: N/A;       Family History:   Family History   Problem Relation Name Age of Onset    Diabetes Mother      Hypertension Mother      Stroke Mother      Cancer Father          LUNG    Heart disease Father      Cancer Brother      Heart disease Brother      Heart disease Brother      Heart disease Brother      Prostate cancer Neg Hx      Kidney disease Neg Hx         Social History:  reports that he has quit smoking. His smoking use included cigarettes. He has been exposed to tobacco smoke. He has never used smokeless tobacco. He reports that he does not currently use alcohol. He reports that he does not use drugs.    Allergies:  Review of patient's allergies indicates:  No Known Allergies    Medications:  No current facility-administered medications for this visit.     No current outpatient medications on file.     Facility-Administered Medications Ordered in Other Visits   Medication Dose Route Frequency Provider Last Rate Last Admin    0.9%  NaCl infusion (for blood administration)   Intravenous Q24H PRN Yunior Hendricks MD        atorvastatin tablet 40 mg  40 mg Oral Daily Thom Pan MD   40 mg at 08/09/24 0831    clopidogreL tablet 75 mg  75 mg Oral Daily Thom Pan MD   75 mg at 08/09/24 0831    dextrose 10% bolus 250 mL 250 mL  25 g Intravenous PRN Thom Pan MD        dextrose 50% injection 12.5 g  12.5 g Intravenous PRN Thom Pan MD        glucagon (human recombinant) injection 1 mg  1 mg Intramuscular PRN Thom Pan MD        glucose chewable tablet 16 g  16 g Oral PRN Thom Pan MD         glucose chewable tablet 24 g  24 g Oral PRN Thom Pan MD        naloxone 0.4 mg/mL injection 0.02 mg  0.02 mg Intravenous PRN Thom Pan MD        pregabalin capsule 100 mg  100 mg Oral BID Thom Pan MD   100 mg at 08/09/24 0831    sodium chloride 0.9% flush 10 mL  10 mL Intravenous Q12H PRN Thom Pan MD           Review of Systems   Constitutional:  Positive for fatigue. Negative for appetite change and unexpected weight change.   HENT:  Negative for mouth sores and sore throat.    Eyes:  Negative for visual disturbance.   Respiratory:  Negative for cough and shortness of breath.    Cardiovascular:  Negative for chest pain.   Gastrointestinal:  Negative for abdominal pain and diarrhea.   Genitourinary:  Negative for dysuria.   Musculoskeletal:  Negative for back pain.   Skin:  Negative for rash.   Neurological:  Negative for headaches.   Hematological:  Negative for adenopathy.   Psychiatric/Behavioral:  The patient is not nervous/anxious.        ECOG Performance Status:   ECOG SCORE 1            Objective:      Vitals:   There were no vitals filed for this visit.  BMI: There is no height or weight on file to calculate BMI.  Deferred due to telemedicine visit.    Physical Exam   Deferred due to telemedicine visit.    Laboratory Data:  Labs have been reviewed.    Lab Results   Component Value Date    WBC 5.67 08/09/2024    HGB 9.5 (L) 08/09/2024    HCT 32.6 (L) 08/09/2024    MCV 87 08/09/2024     08/09/2024           Imaging:     PSMA (11/22/23):  No suspicious radiotracer avid lesion to suggest disease recurrence or metastasis.     Upper GI endoscopy (4/7/22):  Impression:            - Normal middle third of esophagus.                          - Erythematous mucosa in the antrum. Biopsied.                          - Normal examined duodenum. Biopsied.                          - The tonsils are touching.                          - A single plaque in the upper third of the                           esophagus. Biopsied. This is consistent with a                          benign inlet patch.                          - Z-line irregular, at the gastroesophageal                          junction. No changes c/w Neal's seen.     Colonoscopy (11/18/21):   - This was a much better exam than last                          colonoscopy.                          - Significant colonic spasm.                          - Two 8 to 9 mm polyps in the descending colon,                          removed with a hot snare. Resected and retrieved.                          - One 5 mm polyp in the descending colon. Treated                          with a hot snare.                          - One 10 mm polyp in the sigmoid colon, removed                          with a hot snare. Resected and retrieved.                          - One diminutive polyp in the rectum, removed with                          a cold biopsy forceps. Resected and retrieved.                          - Redundant colon.                          - Internal hemorrhoids.     Assessment:       1. Iron deficiency anemia due to chronic blood loss    2. Severe obesity (BMI 35.0-39.9) with comorbidity    3. History of prostate cancer    4. Elevated PSA           Plan:     1. Iron deficiency anemia  - I have reviewed his chart  - labs since December 2021 reveal a mild-to-severe anemia, now microcytic  - iron studies 94/7/22) reveal a low-normal ferritin, decreased iron/saturated iron, and increased total iron binding capacity  - he has undergone colonoscopy (11/18/21) and upper GI endoscopy (4/7/22). No clear source for GI blood loss. He also underwent a video capsule endoscopy (4/18/22) that was relatively normal.  - given the severity of his iron deficiency anemia, I recommended iron sucrose x 4 doses.   - he received iron sucrose x 4 doses in April/May 2022.  - he received iron sucrose x 4 doses in November/December 2022.  - he received ferric gluconate x 8 doses in  June/July 2024.  - he received 3 units of blood last week  - Labs have been reviewed. Hemoglobin is 9.5 g/dL. Total iron binding capacity is elevated, and ferritin is 54 ng/mL.  - I recommend ferric gluconate x 8 doses.   - return to clinic in 6 months with repeat labs.    2. Severe obesity  - no weight since telemedicine visit.  - I encouraged weight loss  - continue to monitor    3. History of prostate cancer / increasing PSA  - he underwent radical prostatectomy on 12/13/21.  - PSMA (11/22/23): No suspicious radiotracer avid lesion to suggest disease recurrence or metastasis.   - PSA (2/7/24) was 1.6 ng/mL. PSA today was stable at 1.6 ng/mL  - defer surveillance to Dr. Mitchell. Perhaps he can get a a repeat PSMA scan in a few months.    4. Advance Care Planning     Power of   After our discussion (at previous visit), the patient decided to complete a HCPOA and appointed his  wife Patricia Britton ([phone]) .        - return to clinic in 6 months with repeat labs.    Kenneth Mcdowell M.D.  Hematology/Oncology  [facility] - [address], [address]  [place] [address]  Phone: [phone]  Fax: [phone]

## 2024-08-12 ENCOUNTER — OFFICE VISIT (OUTPATIENT)
Dept: HEMATOLOGY/ONCOLOGY | Facility: CLINIC | Age: 64
End: 2024-08-12
Payer: COMMERCIAL

## 2024-08-12 DIAGNOSIS — D50.0 IRON DEFICIENCY ANEMIA DUE TO CHRONIC BLOOD LOSS: Primary | ICD-10-CM

## 2024-08-12 DIAGNOSIS — Z85.46 HISTORY OF PROSTATE CANCER: ICD-10-CM

## 2024-08-12 DIAGNOSIS — R97.20 ELEVATED PSA: ICD-10-CM

## 2024-08-12 DIAGNOSIS — E66.01 SEVERE OBESITY (BMI 35.0-39.9) WITH COMORBIDITY: ICD-10-CM

## 2024-08-12 PROCEDURE — 99214 OFFICE O/P EST MOD 30 MIN: CPT | Mod: 95,,, | Performed by: INTERNAL MEDICINE

## 2024-08-12 RX ORDER — HEPARIN 100 UNIT/ML
500 SYRINGE INTRAVENOUS
OUTPATIENT
Start: 2024-09-10

## 2024-08-12 RX ORDER — SODIUM CHLORIDE 0.9 % (FLUSH) 0.9 %
10 SYRINGE (ML) INJECTION
OUTPATIENT
Start: 2024-09-10

## 2024-08-12 RX ORDER — SODIUM CHLORIDE 0.9 % (FLUSH) 0.9 %
10 SYRINGE (ML) INJECTION
OUTPATIENT
Start: 2024-10-15

## 2024-08-12 RX ORDER — SODIUM CHLORIDE 0.9 % (FLUSH) 0.9 %
10 SYRINGE (ML) INJECTION
OUTPATIENT
Start: 2024-09-24

## 2024-08-12 RX ORDER — HEPARIN 100 UNIT/ML
500 SYRINGE INTRAVENOUS
OUTPATIENT
Start: 2024-08-27

## 2024-08-12 RX ORDER — HEPARIN 100 UNIT/ML
500 SYRINGE INTRAVENOUS
OUTPATIENT
Start: 2024-09-17

## 2024-08-12 RX ORDER — SODIUM CHLORIDE 0.9 % (FLUSH) 0.9 %
10 SYRINGE (ML) INJECTION
OUTPATIENT
Start: 2024-09-17

## 2024-08-12 RX ORDER — HEPARIN 100 UNIT/ML
500 SYRINGE INTRAVENOUS
OUTPATIENT
Start: 2024-09-24

## 2024-08-12 RX ORDER — SODIUM CHLORIDE 0.9 % (FLUSH) 0.9 %
10 SYRINGE (ML) INJECTION
OUTPATIENT
Start: 2024-10-01

## 2024-08-12 RX ORDER — SODIUM CHLORIDE 0.9 % (FLUSH) 0.9 %
10 SYRINGE (ML) INJECTION
OUTPATIENT
Start: 2024-09-03

## 2024-08-12 RX ORDER — HEPARIN 100 UNIT/ML
500 SYRINGE INTRAVENOUS
OUTPATIENT
Start: 2024-09-03

## 2024-08-12 RX ORDER — HEPARIN 100 UNIT/ML
500 SYRINGE INTRAVENOUS
OUTPATIENT
Start: 2024-10-15

## 2024-08-12 RX ORDER — HEPARIN 100 UNIT/ML
500 SYRINGE INTRAVENOUS
OUTPATIENT
Start: 2024-10-01

## 2024-08-12 RX ORDER — HEPARIN 100 UNIT/ML
500 SYRINGE INTRAVENOUS
OUTPATIENT
Start: 2024-10-08

## 2024-08-12 RX ORDER — SODIUM CHLORIDE 0.9 % (FLUSH) 0.9 %
10 SYRINGE (ML) INJECTION
OUTPATIENT
Start: 2024-10-08

## 2024-08-12 RX ORDER — SODIUM CHLORIDE 0.9 % (FLUSH) 0.9 %
10 SYRINGE (ML) INJECTION
OUTPATIENT
Start: 2024-08-27

## 2024-08-12 NOTE — ANESTHESIA PREPROCEDURE EVALUATION
[facility]  Anesthesia Pre-Operative Evaluation   08/12/2024        Patient Name: Renan Britton  YOB: 1960  MRN: [id number]    Subjective  Pre-operative evaluation for Procedure(s) (LRB):  REPLACEMENT, AORTIC VALVE, TRANSCATHETER (TAVR) (Right)    Renan Britton is a 63 y.o. male w/ a significant PMHx of:CAD, PVD s/p fem bypass, obesity, prior prostatectomy, DM2, HTN, alcoholic liver cirrhosis, KAMALA, a-fib and severe aortic stenosis now planning for Navitor AVR.     He was deemed a non surgical candidate per CTS, had a left heart cath apriol 2024 with non-obstructive CAD. Had recent hospitaliztion 2/2 to chest pain deemed deman ischemia 2/2 to a-fib.     H/H 9.5/32.6    MERI 4/24  Final Impressions   1. The study quality is good.   2. Severe aortic valve stenosis is present. Severe calcification of   the aortic valve is noted. The aortic valve is tricuspid. Aortic valve   area continuity equation is 0.75 cm?. The trans-aortic peak velocity   is 5.26 m/s. The trans-aortic peak gradient is 111 mmHg. The   trans-aortic mean gradient is 72 mmHg.  MEMO by 3-D planimetry= 0.76   cm2.   3. The left ventricle is normal in size. Global left ventricular   systolic function is normal. The left ventricular ejection fraction is   55%. Noted left ventricular hypertrophy. Concentric left ventricular   hypertrophy is present. It is moderate.    4. The right ventricle is normal in size. Right ventricular systolic   function is normal.       Prior airway  Date Department Mask Airway Size Difficult Intubation Attempts   12/13/21 [facility] - Surgery moderately difficult with oral airway 7.5 No 1          Past Medical History:   Diagnosis Date    JJ (acute kidney injury) 12/03/2023    Aortic valve disease     CHF (congestive heart failure)     Cirrhosis 06/10/2024    Diabetes mellitus     Elevated PSA     H/O brain tumor     HLD (hyperlipidemia)     Hypertension      MRSA infection     IN 2020    PAD (peripheral artery disease)     Personal history of colonic polyps 01/08/2021    Pneumonia, unspecified organism     MOST RECENT WEEK OF APRIL 1, 2024    Prostate cancer     Seizures     R/T BRAIN TUMOR- SURGICALLY EXCISED    Sleep apnea     CPAP    Urinary tract infection        Current Inpatient Medications:       No current facility-administered medications on file prior to encounter.     Current Outpatient Medications on File Prior to Encounter   Medication Sig Dispense Refill    ascorbic acid, vitamin C, (VITAMIN C) 500 MG tablet Vitamin C 500 mg tablet, [RxNorm: 608484]      aspirin (ECOTRIN) 81 MG EC tablet Take 81 mg by mouth once daily.      atorvastatin (LIPITOR) 40 MG tablet Take 1 tablet (40 mg total) by mouth once daily. 30 tablet 5    clopidogreL (PLAVIX) 75 mg tablet clopidogreL 75 mg tablet, [RxNorm: 867638]      ferrous sulfate 324 mg (65 mg iron) TbEC ferrous sulfate 324 mg (65 mg iron) tablet,delayed release, [RxNorm: 7234383]      furosemide (LASIX) 20 MG tablet Take 1 tablet (20 mg total) by mouth once daily. You may increase to twice daily if needed for increased shortness of breath or fluid 30 tablet 11    hydrALAZINE (APRESOLINE) 50 MG tablet Take 1 tablet (50 mg total) by mouth every 8 (eight) hours. (Patient taking differently: Take 50 mg by mouth 2 (two) times a day.) 90 tablet 3    hydrALAZINE (APRESOLINE) 50 MG tablet       LYRICA 100 mg capsule Take 100 mg by mouth 2 (two) times daily. Patient takes medication for seizures      metFORMIN (GLUCOPHAGE) 500 MG tablet       NIFEdipine (ADALAT CC) 60 MG TbSR          Past Surgical History:   Procedure Laterality Date    ANGIOGRAPHY Left 4/6/2020    Procedure: ANGIOGRAM, Left lower extremity;  Surgeon: Kelby Gomez MD;  Location: [facility] OR [address] [place] [address];  Service: Peripheral Vascular;  Laterality: Left;    ANGIOGRAPHY OF LOWER EXTREMITY Left 6/29/2020    Procedure: Angiogram Extremity Unilateral, washout L  groin, possible open pseudoaneurysm repair;  Surgeon: Bruce Dallas MD;  [facility];  Service: Peripheral Vascular;  Laterality: Left;  contrast 23 ml  fluoro time: 9.9 min  mGy: 1230.26  Gycm2: 146.69    AORTOGRAPHY N/A 6/29/2020    Procedure: AORTOGRAM;  Surgeon: Bruce Dallas MD;  [facility];  Service: Peripheral Vascular;  Laterality: N/A;    APPLICATION OF WOUND VACUUM-ASSISTED CLOSURE DEVICE Bilateral 1/1/2020    Procedure: APPLICATION, WOUND VAC;  Surgeon: SEBAS Prieto II, MD;  [facility];  Service: Cardiovascular;  Laterality: Bilateral;    APPLICATION OF WOUND VACUUM-ASSISTED CLOSURE DEVICE N/A 1/3/2020    Procedure: APPLICATION, WOUND VAC;  Surgeon: Brian Wong MD;  [facility];  Service: Plastics;  Laterality: N/A;    BRAIN SURGERY  01/2011    TUMOR EXCISED    COLONOSCOPY N/A 1/8/2021    Procedure: COLONOSCOPY;  Surgeon: Susan Posada MD;  Location: [facility] ENDO;  Service: Endoscopy;  Laterality: N/A;    COLONOSCOPY N/A 11/18/2021    Procedure: COLONOSCOPY;  Surgeon: Susan Posada MD;  Location: [facility] ENDO;  Service: Endoscopy;  Laterality: N/A;    CORONARY ANGIOGRAPHY Left 4/19/2024    Procedure: ANGIOGRAM, CORONARY ARTERY;  Surgeon: Giuseppe Blood MD;  Location: [facility] CATH;  Service: Cardiology;  Laterality: Left;    CREATION OF ILIOFEMORAL ARTERY BYPASS Left 8/12/2020    Procedure: CREATION, BYPASS, ARTERIAL, ILIAC TO FEMORAL;  Surgeon: Kelby Gomez MD;  [facility];  Service: Peripheral Vascular;  Laterality: Left;  CO-SURGEONS: DR KEO ADHIKARI;  DR WONG, profusion    CREATION OF MUSCLE ROTATIONAL FLAP N/A 1/3/2020    Procedure: CREATION, FLAP, MUSCLE ROTATION;  Surgeon: Brian Wong MD;  [facility];  Service: Plastics;  Laterality: N/A;    ENDARTERECTOMY OF FEMORAL ARTERY Bilateral 12/20/2019    Procedure: ENDARTERECTOMY, FEMORAL;  Surgeon: Kelby Gomez MD;  Location:  [facility] OR 2ND FLR;  Service: Peripheral Vascular;  Laterality: Bilateral;  natalya common femoral endarterectomy with natalya. iliac stent placement    ESOPHAGOGASTRODUODENOSCOPY N/A 4/7/2022    Procedure: EGD (ESOPHAGOGASTRODUODENOSCOPY);  Surgeon: Susan Posada MD;  Location: [facility] ENDO;  Service: Endoscopy;  Laterality: N/A;    INTRALUMINAL GASTROINTESTINAL TRACT IMAGING VIA CAPSULE N/A 4/18/2022    Procedure: IMAGING, GI TRACT, INTRALUMINAL, VIA CAPSULE;  Surgeon: Otto Powers MD;  Location: [facility] ENDO;  Service: Endoscopy;  Laterality: N/A;    PERCUTANEOUS TRANSLUMINAL ANGIOPLASTY Left 4/6/2020    Procedure: PTA (ANGIOPLASTY, PERCUTANEOUS, TRANSLUMINAL), left lower extremity;  Surgeon: Kelby Gomez MD;  Location: [facility] OR 2ND FLR;  Service: Peripheral Vascular;  Laterality: Left;    PERCUTANEOUS TRANSLUMINAL ANGIOPLASTY (PTA) OF PERIPHERAL VESSEL Left 10/17/2019    Procedure: PTA, PERIPHERAL VESSEL;  Surgeon: Rao Fisher MD;  Location: [facility] CATH;  Service: Cardiology;  Laterality: Left;    PSEUDOANEURYSM REPAIR Left 6/29/2020    Procedure: REPAIR, PSEUDOANEURYSM;  Surgeon: Bruce Dallas MD;  Location: [facility] OR 2ND FLR;  Service: Peripheral Vascular;  Laterality: Left;    RADICAL RETROPUBIC PROSTATECTOMY N/A 12/13/2021    Procedure: PROSTATECTOMY, RETROPUBIC, RADICAL + Bilateral pelvic lymph node dissection;  Surgeon: Raghav Mitchell MD;  Location: [facility] OR;  Service: Urology;  Laterality: N/A;    RIGHT HEART CATHETERIZATION Right 4/19/2024    Procedure: INSERTION, CATHETER, RIGHT HEART;  Surgeon: Giuseppe Blood MD;  Location: [facility] CATH;  Service: Cardiology;  Laterality: Right;    TRANSESOPHAGEAL ECHOCARDIOGRAPHY N/A 4/19/2024    Procedure: ECHOCARDIOGRAM, TRANSESOPHAGEAL;  Surgeon: Giuseppe Blood MD;  Location: [facility] CATH;  Service: Cardiology;  Laterality: N/A;       Social History:  Tobacco Use: Medium Risk (8/7/2024)    Patient History     Smoking Tobacco Use: Former     Smokeless Tobacco Use:  Never     Passive Exposure: Current       Alcohol Use: Not At Risk (7/2/2024)    AUDIT-C     Frequency of Alcohol Consumption: Never     Average Number of Drinks: Patient does not drink     Frequency of Binge Drinking: Never       Objective    Vital Signs Range:  BMI Readings from Last 1 Encounters:   08/07/24 36.88 kg/m²               Significant Labs:        Component Value Date/Time    WBC 5.67 08/09/2024 0829    HGB 9.5 (L) 08/09/2024 0829    HCT 32.6 (L) 08/09/2024 0829    HCT 39 08/12/2020 0917     08/09/2024 0829     08/09/2024 0829    K 3.9 08/09/2024 0829     08/09/2024 0829    CO2 27 08/09/2024 0829     (H) 08/09/2024 0829    BUN 17 08/09/2024 0829    CREATININE 0.9 08/09/2024 0829    MG 2.1 07/02/2024 0430    PHOS 3.2 07/02/2024 0430    CALCIUM 9.5 08/09/2024 0829    ALBUMIN 3.8 08/09/2024 0829    PROT 7.1 08/09/2024 0829    ALKPHOS 48 (L) 08/09/2024 0829    BILITOT 1.3 (H) 08/09/2024 0829    AST 31 08/09/2024 0829    ALT 27 08/09/2024 0829    INR 1.1 08/12/2024 0711    HGBA1C 4.6 07/01/2024 0635        Please see Results Review for additional labs.     Diagnostic Studies: All relevant studies, reviewed.    EKG:   Results for orders placed or performed during the hospital encounter of 08/07/24   EKG 12-lead    Collection Time: 08/08/24  9:01 AM   Result Value Ref Range    QRS Duration 92 ms    OHS QTC Calculation 417 ms    Narrative    Test Reason : R07.9,    Vent. Rate : 075 BPM     Atrial Rate : 075 BPM     P-R Int : 158 ms          QRS Dur : 092 ms      QT Int : 374 ms       P-R-T Axes : 068 068 205 degrees     QTc Int : 417 ms    Normal sinus rhythm  ST and T wave abnormality, consider inferolateral ischemia  When compared with ECG of 08-AUG-2024 02:02,  Sinus rhythm has replaced Atrial fibrillation  Confirmed by Jesus eVlazquez MD ([id number]) on 8/9/2024 8:25:14 AM    Referred By: [email or web address]   SELF           Confirmed By:Jesus Velazquez MD       ECHO:  Results for orders placed  during the hospital encounter of 04/02/24    Echo    Interpretation Summary  Left ventricle: Normal in size with normal systolic function. LV ejection fraction is 55-60%. No regional wall motion abnormalities are evident. Normal LV geometry.  Left ventricular diastolic function: Grade 1 diastolic dysfunction with normal LAP.  Right ventricle: Upper limit of normal in size with normal systolic function.  Estimated PASP: Inadequate TR jet to provide accurate estimation. Estimated RAP: indeterminate due to inadequate IVC visualization.  Left atrium: Normal in size.  Right atrium: Enlarged  Valves:  Severe, calcific aortic stenosis. (PV 4.54 m/s, MG 49 mmHg, MEMO 0.74 cm2, DVI 0.19). Aortic annular calcification.  Aorta: NOot well visualized  Pericardium: Normal thickness No effusion present.  Technical quality is challenging due to acoustic windows. Echo contrast was utilized to improve endocardial visualization. Parasternal images are uninterpretable.  When compared to the most recent available report, aortic stenosis severity has progressed.        Pre-op Assessment    I have reviewed the Patient Summary Reports.    I have reviewed the NPO Status.   I have reviewed the Medications.     Review of Systems  Social:  Former Smoker       Hematology/Oncology:       -- Anemia:                    --  Cancer in past history:                 Oncology Comments: prostate     Cardiovascular:     Hypertension Valvular problems/Murmurs, AS  CAD    Dysrhythmias atrial fibrillation     PVD hyperlipidemia    EF 55%  Severe AS MEMO 0.7                         Pulmonary:        Sleep Apnea                Renal/:  Chronic Renal Disease, ARF                Hepatic/GI:      Liver Disease,            Endocrine:  Diabetes         Obesity / BMI > 30      Physical Exam  General: Well nourished, Cooperative, Alert and Oriented    Airway:  Mallampati: IV   Mouth Opening: Normal  TM Distance: > 6 cm  Tongue: Normal  Neck ROM: Normal  ROM    Dental:  Intact        Anesthesia Plan  Type of Anesthesia, risks & benefits discussed:    Anesthesia Type: Gen ETT, Gen Natural Airway, MAC  Intra-op Monitoring Plan: Standard ASA Monitors and Art Line  Post Op Pain Control Plan: multimodal analgesia and IV/PO Opioids PRN  Induction:  IV  Airway Plan: Direct and Video, Post-Induction  Informed Consent: Informed consent signed with the Patient and all parties understand the risks and agree with anesthesia plan.  All questions answered.   ASA Score: 4  Day of Surgery Review of History & Physical: H&P Update referred to the surgeon/provider.    Ready For Surgery From Anesthesia Perspective.     .

## 2024-08-13 ENCOUNTER — ANESTHESIA (OUTPATIENT)
Dept: MEDSURG UNIT | Facility: HOSPITAL | Age: 64
DRG: 267 | End: 2024-08-13
Payer: COMMERCIAL

## 2024-08-13 ENCOUNTER — HOSPITAL ENCOUNTER (INPATIENT)
Facility: HOSPITAL | Age: 64
LOS: 1 days | Discharge: HOME | DRG: 267 | End: 2024-08-14
Attending: INTERNAL MEDICINE | Admitting: INTERNAL MEDICINE
Payer: COMMERCIAL

## 2024-08-13 DIAGNOSIS — I35.0 AORTIC STENOSIS, SEVERE: ICD-10-CM

## 2024-08-13 DIAGNOSIS — Z95.3 STATUS POST TRANSCATHETER AORTIC VALVE REPLACEMENT (TAVR) USING BIOPROSTHESIS: ICD-10-CM

## 2024-08-13 DIAGNOSIS — Z95.2 S/P TAVR (TRANSCATHETER AORTIC VALVE REPLACEMENT): ICD-10-CM

## 2024-08-13 DIAGNOSIS — I35.0 NONRHEUMATIC AORTIC VALVE STENOSIS: Primary | ICD-10-CM

## 2024-08-13 DIAGNOSIS — Z95.3 STATUS POST TRANSCATHETER AORTIC VALVE REPLACEMENT (TAVR) USING BIOPROSTHESIS: Primary | ICD-10-CM

## 2024-08-13 LAB
ABO + RH BLD: NORMAL
ANION GAP SERPL CALC-SCNC: 8 MMOL/L (ref 8–16)
BASOPHILS # BLD AUTO: 0.04 K/UL (ref 0–0.2)
BASOPHILS NFR BLD: 1.2 % (ref 0–1.9)
BLD GP AB SCN CELLS X3 SERPL QL: NORMAL
BNP SERPL-MCNC: 147 PG/ML (ref 0–99)
BUN SERPL-MCNC: 14 MG/DL (ref 8–23)
CALCIUM SERPL-MCNC: 9 MG/DL (ref 8.7–10.5)
CHLORIDE SERPL-SCNC: 107 MMOL/L (ref 95–110)
CO2 SERPL-SCNC: 23 MMOL/L (ref 23–29)
CREAT SERPL-MCNC: 0.8 MG/DL (ref 0.5–1.4)
DIFFERENTIAL METHOD BLD: ABNORMAL
EOSINOPHIL # BLD AUTO: 0.1 K/UL (ref 0–0.5)
EOSINOPHIL NFR BLD: 2.1 % (ref 0–8)
ERYTHROCYTE [DISTWIDTH] IN BLOOD BY AUTOMATED COUNT: 16.1 % (ref 11.5–14.5)
EST. GFR  (NO RACE VARIABLE): >60 ML/MIN/1.73 M^2
GLUCOSE SERPL-MCNC: 116 MG/DL (ref 70–110)
HCT VFR BLD AUTO: 33.8 % (ref 40–54)
HGB BLD-MCNC: 9.9 G/DL (ref 14–18)
IMM GRANULOCYTES # BLD AUTO: 0 K/UL (ref 0–0.04)
IMM GRANULOCYTES NFR BLD AUTO: 0 % (ref 0–0.5)
LYMPHOCYTES # BLD AUTO: 0.6 K/UL (ref 1–4.8)
LYMPHOCYTES NFR BLD: 17.9 % (ref 18–48)
MCH RBC QN AUTO: 25.6 PG (ref 27–31)
MCHC RBC AUTO-ENTMCNC: 29.3 G/DL (ref 32–36)
MCV RBC AUTO: 88 FL (ref 82–98)
MONOCYTES # BLD AUTO: 0.4 K/UL (ref 0.3–1)
MONOCYTES NFR BLD: 11.8 % (ref 4–15)
NEUTROPHILS # BLD AUTO: 2.2 K/UL (ref 1.8–7.7)
NEUTROPHILS NFR BLD: 67 % (ref 38–73)
NRBC BLD-RTO: 0 /100 WBC
OHS QRS DURATION: 86 MS
OHS QRS DURATION: 92 MS
OHS QTC CALCULATION: 433 MS
OHS QTC CALCULATION: 449 MS
PLATELET # BLD AUTO: 161 K/UL (ref 150–450)
PMV BLD AUTO: 11.3 FL (ref 9.2–12.9)
POCT GLUCOSE: 131 MG/DL (ref 70–110)
POCT GLUCOSE: 142 MG/DL (ref 70–110)
POCT GLUCOSE: 144 MG/DL (ref 70–110)
POCT GLUCOSE: 182 MG/DL (ref 70–110)
POTASSIUM SERPL-SCNC: 3.8 MMOL/L (ref 3.5–5.1)
RBC # BLD AUTO: 3.86 M/UL (ref 4.6–6.2)
SODIUM SERPL-SCNC: 138 MMOL/L (ref 136–145)
WBC # BLD AUTO: 3.3 K/UL (ref 3.9–12.7)

## 2024-08-13 PROCEDURE — 63600175 PHARM REV CODE 636 W HCPCS: Performed by: ANESTHESIOLOGY

## 2024-08-13 PROCEDURE — 93005 ELECTROCARDIOGRAM TRACING: CPT

## 2024-08-13 PROCEDURE — 63600175 PHARM REV CODE 636 W HCPCS

## 2024-08-13 PROCEDURE — 25500020 PHARM REV CODE 255: Performed by: INTERNAL MEDICINE

## 2024-08-13 PROCEDURE — C1760 CLOSURE DEV, VASC: HCPCS | Performed by: INTERNAL MEDICINE

## 2024-08-13 PROCEDURE — 85025 COMPLETE CBC W/AUTO DIFF WBC: CPT | Mod: 91 | Performed by: STUDENT IN AN ORGANIZED HEALTH CARE EDUCATION/TRAINING PROGRAM

## 2024-08-13 PROCEDURE — 27201423 OPTIME MED/SURG SUP & DEVICES STERILE SUPPLY: Performed by: INTERNAL MEDICINE

## 2024-08-13 PROCEDURE — 33361 REPLACE AORTIC VALVE PERQ: CPT | Performed by: INTERNAL MEDICINE

## 2024-08-13 PROCEDURE — 25000003 PHARM REV CODE 250: Performed by: INTERNAL MEDICINE

## 2024-08-13 PROCEDURE — 27201037 HC PRESSURE MONITORING SET UP

## 2024-08-13 PROCEDURE — 37000009 HC ANESTHESIA EA ADD 15 MINS: Performed by: INTERNAL MEDICINE

## 2024-08-13 PROCEDURE — 02RF38Z REPLACEMENT OF AORTIC VALVE WITH ZOOPLASTIC TISSUE, PERCUTANEOUS APPROACH: ICD-10-PCS | Performed by: INTERNAL MEDICINE

## 2024-08-13 PROCEDURE — 25000003 PHARM REV CODE 250

## 2024-08-13 PROCEDURE — 82962 GLUCOSE BLOOD TEST: CPT | Performed by: INTERNAL MEDICINE

## 2024-08-13 PROCEDURE — 25000003 PHARM REV CODE 250: Performed by: STUDENT IN AN ORGANIZED HEALTH CARE EDUCATION/TRAINING PROGRAM

## 2024-08-13 PROCEDURE — C1769 GUIDE WIRE: HCPCS | Performed by: INTERNAL MEDICINE

## 2024-08-13 PROCEDURE — C1894 INTRO/SHEATH, NON-LASER: HCPCS | Performed by: INTERNAL MEDICINE

## 2024-08-13 PROCEDURE — 33361 REPLACE AORTIC VALVE PERQ: CPT | Mod: 62,Q0,, | Performed by: INTERNAL MEDICINE

## 2024-08-13 PROCEDURE — C1779 LEAD, PMKR, TRANSVENOUS VDD: HCPCS | Performed by: INTERNAL MEDICINE

## 2024-08-13 PROCEDURE — 20600001 HC STEP DOWN PRIVATE ROOM

## 2024-08-13 PROCEDURE — C1725 CATH, TRANSLUMIN NON-LASER: HCPCS | Performed by: INTERNAL MEDICINE

## 2024-08-13 PROCEDURE — 36620 INSERTION CATHETER ARTERY: CPT | Mod: 59,,, | Performed by: ANESTHESIOLOGY

## 2024-08-13 PROCEDURE — 80048 BASIC METABOLIC PNL TOTAL CA: CPT | Performed by: STUDENT IN AN ORGANIZED HEALTH CARE EDUCATION/TRAINING PROGRAM

## 2024-08-13 PROCEDURE — 63600175 PHARM REV CODE 636 W HCPCS: Performed by: STUDENT IN AN ORGANIZED HEALTH CARE EDUCATION/TRAINING PROGRAM

## 2024-08-13 PROCEDURE — 93010 ELECTROCARDIOGRAM REPORT: CPT | Mod: ,,, | Performed by: INTERNAL MEDICINE

## 2024-08-13 PROCEDURE — 63600175 PHARM REV CODE 636 W HCPCS: Mod: JZ,JG | Performed by: INTERNAL MEDICINE

## 2024-08-13 PROCEDURE — 37000008 HC ANESTHESIA 1ST 15 MINUTES: Performed by: INTERNAL MEDICINE

## 2024-08-13 PROCEDURE — 86850 RBC ANTIBODY SCREEN: CPT | Performed by: INTERNAL MEDICINE

## 2024-08-13 PROCEDURE — 36415 COLL VENOUS BLD VENIPUNCTURE: CPT | Performed by: STUDENT IN AN ORGANIZED HEALTH CARE EDUCATION/TRAINING PROGRAM

## 2024-08-13 PROCEDURE — 83880 ASSAY OF NATRIURETIC PEPTIDE: CPT | Performed by: INTERNAL MEDICINE

## 2024-08-13 PROCEDURE — 27800903 OPTIME MED/SURG SUP & DEVICES OTHER IMPLANTS: Performed by: INTERNAL MEDICINE

## 2024-08-13 DEVICE — TRANSCATHETER HEART VALVE
Type: IMPLANTABLE DEVICE | Site: HEART | Status: FUNCTIONAL
Brand: NAVITOR™

## 2024-08-13 RX ORDER — PREGABALIN 100 MG/1
100 CAPSULE ORAL 2 TIMES DAILY
Status: DISCONTINUED | OUTPATIENT
Start: 2024-08-13 | End: 2024-08-14 | Stop reason: HOSPADM

## 2024-08-13 RX ORDER — FENTANYL CITRATE 50 UG/ML
25 INJECTION, SOLUTION INTRAMUSCULAR; INTRAVENOUS EVERY 5 MIN PRN
Status: DISCONTINUED | OUTPATIENT
Start: 2024-08-13 | End: 2024-08-14 | Stop reason: HOSPADM

## 2024-08-13 RX ORDER — GLUCAGON 1 MG
1 KIT INJECTION
Status: DISCONTINUED | OUTPATIENT
Start: 2024-08-13 | End: 2024-08-14 | Stop reason: HOSPADM

## 2024-08-13 RX ORDER — FUROSEMIDE 20 MG/1
20 TABLET ORAL DAILY
Status: DISCONTINUED | OUTPATIENT
Start: 2024-08-13 | End: 2024-08-14 | Stop reason: HOSPADM

## 2024-08-13 RX ORDER — ASPIRIN 81 MG/1
81 TABLET ORAL DAILY
Status: DISCONTINUED | OUTPATIENT
Start: 2024-08-13 | End: 2024-08-14 | Stop reason: HOSPADM

## 2024-08-13 RX ORDER — FUROSEMIDE 20 MG/1
20 TABLET ORAL DAILY
Status: DISCONTINUED | OUTPATIENT
Start: 2024-08-13 | End: 2024-08-13

## 2024-08-13 RX ORDER — IBUPROFEN 200 MG
24 TABLET ORAL
Status: DISCONTINUED | OUTPATIENT
Start: 2024-08-13 | End: 2024-08-14 | Stop reason: HOSPADM

## 2024-08-13 RX ORDER — HEPARIN SOD,PORCINE/0.9 % NACL 1000/500ML
INTRAVENOUS SOLUTION INTRAVENOUS
Status: DISCONTINUED | OUTPATIENT
Start: 2024-08-13 | End: 2024-08-14 | Stop reason: HOSPADM

## 2024-08-13 RX ORDER — HALOPERIDOL 5 MG/ML
0.5 INJECTION INTRAMUSCULAR EVERY 10 MIN PRN
Status: DISCONTINUED | OUTPATIENT
Start: 2024-08-13 | End: 2024-08-14 | Stop reason: HOSPADM

## 2024-08-13 RX ORDER — CLOPIDOGREL BISULFATE 75 MG/1
75 TABLET ORAL DAILY
Status: DISCONTINUED | OUTPATIENT
Start: 2024-08-13 | End: 2024-08-13

## 2024-08-13 RX ORDER — LANOLIN ALCOHOL/MO/W.PET/CERES
1 CREAM (GRAM) TOPICAL DAILY
Status: DISCONTINUED | OUTPATIENT
Start: 2024-08-13 | End: 2024-08-14 | Stop reason: HOSPADM

## 2024-08-13 RX ORDER — IBUPROFEN 200 MG
16 TABLET ORAL
Status: DISCONTINUED | OUTPATIENT
Start: 2024-08-13 | End: 2024-08-14 | Stop reason: HOSPADM

## 2024-08-13 RX ORDER — PHENYLEPHRINE HCL IN 0.9% NACL 1 MG/10 ML
SYRINGE (ML) INTRAVENOUS
Status: DISCONTINUED | OUTPATIENT
Start: 2024-08-13 | End: 2024-08-13

## 2024-08-13 RX ORDER — FUROSEMIDE 10 MG/ML
20 INJECTION INTRAMUSCULAR; INTRAVENOUS ONCE
Status: COMPLETED | OUTPATIENT
Start: 2024-08-13 | End: 2024-08-13

## 2024-08-13 RX ORDER — MEPERIDINE HYDROCHLORIDE 50 MG/ML
12.5 INJECTION INTRAMUSCULAR; INTRAVENOUS; SUBCUTANEOUS ONCE
Status: COMPLETED | OUTPATIENT
Start: 2024-08-13 | End: 2024-08-13

## 2024-08-13 RX ORDER — ATORVASTATIN CALCIUM 20 MG/1
40 TABLET, FILM COATED ORAL DAILY
Status: DISCONTINUED | OUTPATIENT
Start: 2024-08-13 | End: 2024-08-14 | Stop reason: HOSPADM

## 2024-08-13 RX ORDER — FENTANYL CITRATE 50 UG/ML
INJECTION, SOLUTION INTRAMUSCULAR; INTRAVENOUS
Status: DISCONTINUED | OUTPATIENT
Start: 2024-08-13 | End: 2024-08-13

## 2024-08-13 RX ORDER — SODIUM CHLORIDE 9 MG/ML
INJECTION, SOLUTION INTRAVENOUS CONTINUOUS
Status: DISCONTINUED | OUTPATIENT
Start: 2024-08-13 | End: 2024-08-14 | Stop reason: HOSPADM

## 2024-08-13 RX ORDER — CEFAZOLIN SODIUM 1 G/3ML
INJECTION, POWDER, FOR SOLUTION INTRAMUSCULAR; INTRAVENOUS
Status: DISCONTINUED | OUTPATIENT
Start: 2024-08-13 | End: 2024-08-13

## 2024-08-13 RX ORDER — CLOPIDOGREL BISULFATE 75 MG/1
75 TABLET ORAL DAILY
Status: DISCONTINUED | OUTPATIENT
Start: 2024-08-13 | End: 2024-08-14 | Stop reason: HOSPADM

## 2024-08-13 RX ORDER — SODIUM CHLORIDE 0.9 % (FLUSH) 0.9 %
10 SYRINGE (ML) INJECTION
Status: DISCONTINUED | OUTPATIENT
Start: 2024-08-13 | End: 2024-08-14 | Stop reason: HOSPADM

## 2024-08-13 RX ORDER — HYDRALAZINE HYDROCHLORIDE 50 MG/1
50 TABLET, FILM COATED ORAL 2 TIMES DAILY
Status: DISCONTINUED | OUTPATIENT
Start: 2024-08-13 | End: 2024-08-14 | Stop reason: HOSPADM

## 2024-08-13 RX ORDER — NITROGLYCERIN 0.4 MG/1
0.4 TABLET SUBLINGUAL EVERY 5 MIN PRN
Status: DISCONTINUED | OUTPATIENT
Start: 2024-08-13 | End: 2024-08-14 | Stop reason: HOSPADM

## 2024-08-13 RX ORDER — NIFEDIPINE 60 MG/1
60 TABLET, EXTENDED RELEASE ORAL DAILY
Status: DISCONTINUED | OUTPATIENT
Start: 2024-08-13 | End: 2024-08-14 | Stop reason: HOSPADM

## 2024-08-13 RX ORDER — HEPARIN SODIUM 1000 [USP'U]/ML
INJECTION, SOLUTION INTRAVENOUS; SUBCUTANEOUS
Status: DISCONTINUED | OUTPATIENT
Start: 2024-08-13 | End: 2024-08-13

## 2024-08-13 RX ORDER — PROPOFOL 10 MG/ML
VIAL (ML) INTRAVENOUS CONTINUOUS PRN
Status: DISCONTINUED | OUTPATIENT
Start: 2024-08-13 | End: 2024-08-13

## 2024-08-13 RX ORDER — LIDOCAINE HYDROCHLORIDE 20 MG/ML
INJECTION, SOLUTION INFILTRATION; PERINEURAL
Status: DISCONTINUED | OUTPATIENT
Start: 2024-08-13 | End: 2024-08-14 | Stop reason: HOSPADM

## 2024-08-13 RX ORDER — INSULIN ASPART 100 [IU]/ML
0-10 INJECTION, SOLUTION INTRAVENOUS; SUBCUTANEOUS
Status: DISCONTINUED | OUTPATIENT
Start: 2024-08-13 | End: 2024-08-14 | Stop reason: HOSPADM

## 2024-08-13 RX ORDER — DIPHENHYDRAMINE HCL 50 MG
50 CAPSULE ORAL ONCE
Status: COMPLETED | OUTPATIENT
Start: 2024-08-13 | End: 2024-08-13

## 2024-08-13 RX ORDER — PROTAMINE SULFATE 10 MG/ML
INJECTION, SOLUTION INTRAVENOUS
Status: DISCONTINUED | OUTPATIENT
Start: 2024-08-13 | End: 2024-08-13

## 2024-08-13 RX ADMIN — HEPARIN SODIUM 11000 UNITS: 1000 INJECTION, SOLUTION INTRAVENOUS; SUBCUTANEOUS at 08:08

## 2024-08-13 RX ADMIN — SODIUM CHLORIDE: 9 INJECTION, SOLUTION INTRAVENOUS at 10:08

## 2024-08-13 RX ADMIN — DIPHENHYDRAMINE HYDROCHLORIDE 50 MG: 50 CAPSULE ORAL at 07:08

## 2024-08-13 RX ADMIN — NOREPINEPHRINE BITARTRATE 0.02 MCG/KG/MIN: 1 INJECTION, SOLUTION, CONCENTRATE INTRAVENOUS at 08:08

## 2024-08-13 RX ADMIN — CEFAZOLIN 2 G: 330 INJECTION, POWDER, FOR SOLUTION INTRAMUSCULAR; INTRAVENOUS at 08:08

## 2024-08-13 RX ADMIN — FUROSEMIDE 20 MG: 20 TABLET ORAL at 05:08

## 2024-08-13 RX ADMIN — Medication 100 MCG: at 08:08

## 2024-08-13 RX ADMIN — SODIUM CHLORIDE, SODIUM ACETATE ANHYDROUS, SODIUM GLUCONATE, POTASSIUM CHLORIDE, AND MAGNESIUM CHLORIDE: 526; 222; 502; 37; 30 INJECTION, SOLUTION INTRAVENOUS at 08:08

## 2024-08-13 RX ADMIN — ATORVASTATIN CALCIUM 40 MG: 20 TABLET, FILM COATED ORAL at 01:08

## 2024-08-13 RX ADMIN — PROTAMINE SULFATE 100 MG: 10 INJECTION, SOLUTION INTRAVENOUS at 09:08

## 2024-08-13 RX ADMIN — PROPOFOL 20 MG: 10 INJECTION, EMULSION INTRAVENOUS at 08:08

## 2024-08-13 RX ADMIN — PROPOFOL 25 MCG/KG/MIN: 10 INJECTION, EMULSION INTRAVENOUS at 08:08

## 2024-08-13 RX ADMIN — HYDRALAZINE HYDROCHLORIDE 50 MG: 50 TABLET ORAL at 08:08

## 2024-08-13 RX ADMIN — CLOPIDOGREL BISULFATE 75 MG: 75 TABLET ORAL at 01:08

## 2024-08-13 RX ADMIN — MEPERIDINE HYDROCHLORIDE 12.5 MG: 50 INJECTION INTRAMUSCULAR; INTRAVENOUS; SUBCUTANEOUS at 11:08

## 2024-08-13 RX ADMIN — FUROSEMIDE 20 MG: 10 INJECTION, SOLUTION INTRAVENOUS at 11:08

## 2024-08-13 RX ADMIN — PREGABALIN 100 MG: 100 CAPSULE ORAL at 08:08

## 2024-08-13 RX ADMIN — FENTANYL CITRATE 100 MCG: 50 INJECTION, SOLUTION INTRAMUSCULAR; INTRAVENOUS at 08:08

## 2024-08-13 NOTE — ANESTHESIA PROCEDURE NOTES
Arterial    Diagnosis: Aortic stenosis, severe    Patient location during procedure: done in OR    Staffing  Authorizing Provider: Papito Clinton MD  Performing Provider: Jamey Suarez DO    Staffing  Performed by: Jamey Suarez DO  Authorized by: Papito Clinton MD    Anesthesiologist was present at the time of the procedure.    Preanesthetic Checklist  Completed: patient identified, IV checked, site marked, risks and benefits discussed, surgical consent, monitors and equipment checked, pre-op evaluation, timeout performed and anesthesia consent givenArterial  Skin Prep: chlorhexidine gluconate and isopropyl alcohol  Local Infiltration: lidocaine  Orientation: right  Location: radial    Catheter Size: 20 G  Catheter placement by Ultrasound guidance. Heme positive aspiration all ports.   Vessel Caliber: medium, patent, compressibility normal  Needle advanced into vessel with real time Ultrasound guidance.Insertion Attempts: 1  Assessment  Dressing: secured with tape and tegaderm and steri-strips  Patient: Tolerated well

## 2024-08-13 NOTE — ANESTHESIA PROCEDURE NOTES
Intubation    Date/Time: 8/13/2024 8:38 AM    Performed by: Jamey Suarez DO  Authorized by: Papito Clinton MD    Intubation:     Induction:  Intravenous    Intubated:  Postinduction    Mask Ventilation:  N/a    Attempts:  1    Attempted By:  Resident anesthesiologist    Difficult Airway Encountered?: No      Complications:  None    Airway Device:  Supraglottic airway/LMA    Airway Device Size:  4.0    Placement Verified By:  Capnometry    Complicating Factors:  Large/floppy epiglottis, obesity and short neck    Findings Post-Intubation:  BS equal bilateral and atraumatic/condition of teeth unchanged

## 2024-08-13 NOTE — TRANSFER OF CARE
"Anesthesia Transfer of Care Note    Patient: Renan Britton    Procedure(s) Performed: Procedure(s) (LRB):  REPLACEMENT, AORTIC VALVE, TRANSCATHETER (TAVR) (Right)  Cardiac Cath Cosurgeon (N/A)  REPLACEMENT, AORTIC VALVE, TRANSCATHETER (TAVR)    Patient location: PACU    Anesthesia Type: general    Transport from OR: Transported from OR on 6-10 L/min O2 by face mask with adequate spontaneous ventilation    Post pain: adequate analgesia    Post assessment: no apparent anesthetic complications and tolerated procedure well    Post vital signs: stable    Level of consciousness: awake and alert    Nausea/Vomiting: no nausea/vomiting    Complications: none    Transfer of care protocol was followed      Last vitals: Visit Vitals  /78 (BP Location: Right arm, Patient Position: Lying)   Pulse 78   Temp 37.2 °C (99 °F) (Temporal)   Resp 18   Ht 5' 11" (1.803 m)   Wt 113.9 kg (251 lb)   SpO2 98%   BMI 35.01 kg/m²     "

## 2024-08-13 NOTE — Clinical Note
dry, intact, no bleeding and no hematoma. R. CFA perclose hemostasis 0930am, L. Radial vascband 15 mL 0932am

## 2024-08-13 NOTE — PROGRESS NOTES
Dr garcia cath lab fellow at bedside. Assessing right groin. Per md, ok to go to csu room 314. Wife updated by ep pacu rn. Verbalizes understanding. She has pt's belongings in car.

## 2024-08-13 NOTE — PLAN OF CARE
"Vss. Sr noted on cm. S/p tavr by dr wade. Pt arrived to ep pacu 3 from cath lab with right groin incision, perclose x1, guaze/trans. Film noted. Pt did bleed to right groin. Manual pressure held. Dr garcia at bedside. Drsg removed, no bleeding noted. Quick clot and trans film placed at 1030 by md. No new bleeding or hematoma noted to right groin. Doppler pulses noted and marked. Originally cath lab rn noted no doppler pulses to left dp. Md aware.  Prior to transfer to csu, csu rn aware that very faint doppler noted to left foot dp and marked. Pt arrived with left vascband to left wrist. Cdi. Per cath lab rn, 16 ml air noted.  Air removal per md order noted. Csu rn aware that next 2 ml removal time at 1230 and follow md order.  No bleeding noted. Good cap refill and warm to touch fingers. Urinal at bedside. Pt voided twice see I/o flowsheet. Pt tolerating sips of water. Bg 142.  Md to order sliding scale, csu rn to follow up. Upon arrival csu rn aware that pt needs 2 meds see mar.  20mg lasix iv given per md order.  See flowsheet for full assessment. Bedrest done at 1130. Hemostasis to right groin 0930. Bedrest x2hrs.  Vascband placed at 0930 as well to left wrist.  Pt has right ij cordis and tvp. Ns at 10ml/hr to right ij cordis.  Tvp settings vvi 40 ppm, 10 v ma, .8 v sensitivity. Check off done with csu rn upon arrival to tvp, groins and vascband. 12 lead EKG done and in chart. Per dr garcia ok for pt to go to room. Pt's wife has belongings in car. Pt's wife notified with new room number 314. Seh is in room. Pt aaox4 follows commands.  Pt did have "anesthesia shakes". X1 demerol iv given per md order.  Pt tolerated well.  Urinal at bedside. Pt remains in csu bed.  Tele box on confirmed by tele tech. Csu rn aware to order bedside commode since pt has tvp in place.   "

## 2024-08-13 NOTE — PLAN OF CARE
Problem: Adult Inpatient Plan of Care  Goal: Absence of Hospital-Acquired Illness or Injury  Outcome: Progressing  Goal: Optimal Comfort and Wellbeing  Outcome: Progressing     Problem: Diabetes Comorbidity  Goal: Blood Glucose Level Within Targeted Range  Outcome: Progressing     Problem: Wound  Goal: Improved Oral Intake  Outcome: Progressing  Goal: Skin Health and Integrity  Outcome: Progressing  Goal: Optimal Wound Healing  Outcome: Progressing     Problem: Fall Injury Risk  Goal: Absence of Fall and Fall-Related Injury  Outcome: Progressing

## 2024-08-13 NOTE — NURSING TRANSFER
Nursing Transfer Note      8/13/2024   12:31 PM    Nurse giving handoff:marialuisa berg   Nurse receiving handoff:nicko johnsonu rn    Reason patient is being transferred: ep pacu 3 to 314 csu    Transfer To: ep pacu 3 to 314 csu    Transfer via bed    Transfer with cardiac monitoring, tele box on confirmed by tele tech    Transported by marialuisa berg and abigail cath lab transporter    Transfer Vital Signs:  Blood Pressure:118/59  Heart Rate:81  O2:95% room air  Temperature:98.6  Respirations:18    Telemetry: Box Number [id number], Rate 78, Rhythm sr, and Telemetry  pe[place]  Order for Tele Monitor? Yes    Additional Lines: tvp to right ij, right ij cordis    4eyes on Skin: yes    Medicines sent: ns at 10ml/hr iv kvo to right ij cordis    Any special needs or follow-up needed: bedrest x2hrs done at 1130.  Vascband to left wrist still removing air. Csu rn aware that next 2 ml per md order to be done at 1230 and follow order/protocol.      Patient belongings transferred with patient:  pt's wife has in car    Chart send with patient: Yes    Notified: spouse    Patient reassessed at: 8/13/24 1215 next due 1245 then q1hr x4 and routine  Upon arrival to floor: cardiac monitor applied, patient oriented to room, call bell in reach, and bed in lowest position,  tvp settings and groin and vascband check off done with csu rn upon arrival to room. Urinal in place upon arrival. Csu rn aware.

## 2024-08-13 NOTE — Clinical Note
The left DP pulse was absent pulse. The right DP pulse was detected with doppler.  The left PT pulse was detected with doppler. The right PT pulse was detected with doppler. The radial pulses were +2 bilaterally.

## 2024-08-13 NOTE — SUBJECTIVE & OBJECTIVE
Past Medical History:   Diagnosis Date    JJ (acute kidney injury) 12/03/2023    Aortic valve disease     CHF (congestive heart failure)     Cirrhosis 06/10/2024    Diabetes mellitus     Elevated PSA     H/O brain tumor     HLD (hyperlipidemia)     Hypertension     MRSA infection     IN 2020    PAD (peripheral artery disease)     Personal history of colonic polyps 01/08/2021    Pneumonia, unspecified organism     MOST RECENT WEEK OF APRIL 1, 2024    Prostate cancer     Seizures     R/T BRAIN TUMOR- SURGICALLY EXCISED    Sleep apnea     CPAP    Urinary tract infection        Past Surgical History:   Procedure Laterality Date    ANGIOGRAPHY Left 4/6/2020    Procedure: ANGIOGRAM, Left lower extremity;  Surgeon: Kelby Gomez MD;  Location: [address];  Service: Peripheral Vascular;  Laterality: Left;    ANGIOGRAPHY OF LOWER EXTREMITY Left 6/29/2020    Procedure: Angiogram Extremity Unilateral, washout L groin, possible open pseudoaneurysm repair;  Surgeon: Bruce Dallas MD;  Location: [address];  Service: Peripheral Vascular;  Laterality: Left;  contrast 23 ml  fluoro time: 9.9 min  mGy: 1230.26  Gycm2: 146.69    AORTOGRAPHY N/A 6/29/2020    Procedure: AORTOGRAM;  Surgeon: Bruce Dallas MD;  Location: [address];  Service: Peripheral Vascular;  Laterality: N/A;    APPLICATION OF WOUND VACUUM-ASSISTED CLOSURE DEVICE Bilateral 1/1/2020    Procedure: APPLICATION, WOUND VAC;  Surgeon: SEBAS Prieto II, MD;  Location: [address];  Service: Cardiovascular;  Laterality: Bilateral;    APPLICATION OF WOUND VACUUM-ASSISTED CLOSURE DEVICE N/A 1/3/2020    Procedure: APPLICATION, WOUND VAC;  Surgeon: Brian Wong MD;  Location: [address];  Service: Plastics;  Laterality: N/A;    BRAIN SURGERY  01/2011    TUMOR EXCISED    COLONOSCOPY N/A 1/8/2021    Procedure: COLONOSCOPY;  Surgeon: Susan Posada MD;  Location: [facility] ENDO;  Service: Endoscopy;  Laterality: N/A;    COLONOSCOPY  N/A 11/18/2021    Procedure: COLONOSCOPY;  Surgeon: Susan Posada MD;  Location: [facility] ENDO;  Service: Endoscopy;  Laterality: N/A;    CORONARY ANGIOGRAPHY Left 4/19/2024    Procedure: ANGIOGRAM, CORONARY ARTERY;  Surgeon: Giuseppe Blood MD;  Location: [facility] CATH;  Service: Cardiology;  Laterality: Left;    CREATION OF ILIOFEMORAL ARTERY BYPASS Left 8/12/2020    Procedure: CREATION, BYPASS, ARTERIAL, ILIAC TO FEMORAL;  Surgeon: Kelby Gomez MD;  Location: [facility] OR [address] [place] [address];  Service: Peripheral Vascular;  Laterality: Left;  CO-SURGEONS: DR KEO ADHIKARI;  DR WONG, profusion    CREATION OF MUSCLE ROTATIONAL FLAP N/A 1/3/2020    Procedure: CREATION, FLAP, MUSCLE ROTATION;  Surgeon: Brian Wong MD;  Location: [facility] OR [address] [place] [address];  Service: Plastics;  Laterality: N/A;    ENDARTERECTOMY OF FEMORAL ARTERY Bilateral 12/20/2019    Procedure: ENDARTERECTOMY, FEMORAL;  Surgeon: Kelby Gomez MD;  Location: [facility] OR [address] [place] [address];  Service: Peripheral Vascular;  Laterality: Bilateral;  natalya common femoral endarterectomy with natalya. iliac stent placement    ESOPHAGOGASTRODUODENOSCOPY N/A 4/7/2022    Procedure: EGD (ESOPHAGOGASTRODUODENOSCOPY);  Surgeon: Susan Posada MD;  Location: [facility] ENDO;  Service: Endoscopy;  Laterality: N/A;    INTRALUMINAL GASTROINTESTINAL TRACT IMAGING VIA CAPSULE N/A 4/18/2022    Procedure: IMAGING, GI TRACT, INTRALUMINAL, VIA CAPSULE;  Surgeon: Otto Powers MD;  Location: [facility] ENDO;  Service: Endoscopy;  Laterality: N/A;    PERCUTANEOUS TRANSLUMINAL ANGIOPLASTY Left 4/6/2020    Procedure: PTA (ANGIOPLASTY, PERCUTANEOUS, TRANSLUMINAL), left lower extremity;  Surgeon: Kelby Gomez MD;  Location: [facility] OR [address] [place] [address];  Service: Peripheral Vascular;  Laterality: Left;    PERCUTANEOUS TRANSLUMINAL ANGIOPLASTY (PTA) OF PERIPHERAL VESSEL Left 10/17/2019    Procedure: PTA, PERIPHERAL VESSEL;  Surgeon: Rao Fisher MD;  Location: [facility] CATH;  Service: Cardiology;  Laterality: Left;     PSEUDOANEURYSM REPAIR Left 6/29/2020    Procedure: REPAIR, PSEUDOANEURYSM;  Surgeon: Bruce Dallas MD;  Location: [facility] OR [address] [place] [address];  Service: Peripheral Vascular;  Laterality: Left;    RADICAL RETROPUBIC PROSTATECTOMY N/A 12/13/2021    Procedure: PROSTATECTOMY, RETROPUBIC, RADICAL + Bilateral pelvic lymph node dissection;  Surgeon: Raghav Mitchell MD;  Location: [facility] OR;  Service: Urology;  Laterality: N/A;    RIGHT HEART CATHETERIZATION Right 4/19/2024    Procedure: INSERTION, CATHETER, RIGHT HEART;  Surgeon: Giuseppe Blood MD;  Location: [facility] CATH;  Service: Cardiology;  Laterality: Right;    TRANSESOPHAGEAL ECHOCARDIOGRAPHY N/A 4/19/2024    Procedure: ECHOCARDIOGRAM, TRANSESOPHAGEAL;  Surgeon: Giuseppe Blood MD;  Location: [facility] CATH;  Service: Cardiology;  Laterality: N/A;       Review of patient's allergies indicates:  No Known Allergies    PTA Medications   Medication Sig    aspirin (ECOTRIN) 81 MG EC tablet Take 81 mg by mouth once daily.    atorvastatin (LIPITOR) 40 MG tablet Take 1 tablet (40 mg total) by mouth once daily.    clopidogreL (PLAVIX) 75 mg tablet clopidogreL 75 mg tablet, [RxNorm: 890093]    ferrous sulfate 324 mg (65 mg iron) TbEC ferrous sulfate 324 mg (65 mg iron) tablet,delayed release, [RxNorm: 6748166]    furosemide (LASIX) 20 MG tablet Take 1 tablet (20 mg total) by mouth once daily. You may increase to twice daily if needed for increased shortness of breath or fluid    hydrALAZINE (APRESOLINE) 50 MG tablet Take 1 tablet (50 mg total) by mouth every 8 (eight) hours. (Patient taking differently: Take 50 mg by mouth 2 (two) times a day.)    LYRICA 100 mg capsule Take 100 mg by mouth 2 (two) times daily. Patient takes medication for seizures    NIFEdipine (ADALAT CC) 60 MG TbSR     nitroGLYCERIN (NITROSTAT) 0.4 MG SL tablet Place 1 tablet (0.4 mg total) under the tongue every 5 (five) minutes as needed for Chest pain.    ascorbic acid, vitamin C, (VITAMIN C) 500 MG tablet Vitamin C  500 mg tablet, [RxNorm: 316377]    hydrALAZINE (APRESOLINE) 50 MG tablet     metFORMIN (GLUCOPHAGE) 500 MG tablet      Family History       Problem Relation (Age of Onset)    Cancer Father, Brother    Diabetes Mother    Heart disease Father, Brother, Brother, Brother    Hypertension Mother    Stroke Mother          Tobacco Use    Smoking status: Former     Types: Cigarettes     Passive exposure: Current    Smokeless tobacco: Never    Tobacco comments:      2-3 daily   Substance and Sexual Activity    Alcohol use: Not Currently     Comment: socially    Drug use: Never    Sexual activity: Yes     Partners: Female     Birth control/protection: None     Review of Systems   Constitutional: Negative for fever and malaise/fatigue.   HENT:  Negative for congestion and sore throat.    Eyes:  Negative for blurred vision, vision loss in left eye and vision loss in right eye.   Cardiovascular:  Positive for dyspnea on exertion. Negative for chest pain, irregular heartbeat, leg swelling, near-syncope, palpitations and syncope.   Respiratory:  Negative for shortness of breath and wheezing.    Endocrine: Negative.    Hematologic/Lymphatic: Negative.    Skin: Negative.    Musculoskeletal:  Negative for arthritis, back pain, joint pain and myalgias.   Gastrointestinal:  Negative for abdominal pain, hematemesis, hematochezia and melena.   Genitourinary: Negative.    Neurological:  Negative for light-headedness and loss of balance.   Psychiatric/Behavioral: Negative.       Objective:     Vital Signs (Most Recent):  Temp: 99 °F (37.2 °C) (08/13/24 0716)  Pulse: 78 (08/13/24 0716)  Resp: 18 (08/13/24 0716)  BP: 120/78 (08/13/24 0717)  SpO2: 98 % (08/13/24 0716) Vital Signs (24h Range):  Temp:  [99 °F (37.2 °C)] 99 °F (37.2 °C)  Pulse:  [78] 78  Resp:  [18] 18  SpO2:  [98 %] 98 %  BP: (120-121)/(74-78) 120/78     Weight: 113.9 kg (251 lb)  Body mass index is 35.01 kg/m².    SpO2: 98 %       No intake or output data in the 24 hours ending  08/13/24 0722    Lines/Drains/Airways       Peripheral Intravenous Line  Duration                  Peripheral IV - Single Lumen 08/13/24 0720 18 G Left Antecubital <1 day                     Physical Exam  Vitals and nursing note reviewed.   Constitutional:       Appearance: Normal appearance. He is normal weight. He is not toxic-appearing.   HENT:      Head: Normocephalic and atraumatic.   Eyes:      General: No scleral icterus.     Extraocular Movements: Extraocular movements intact.   Neck:      Vascular: No carotid bruit.   Cardiovascular:      Rate and Rhythm: Normal rate and regular rhythm.      Pulses: Normal pulses.      Heart sounds: Normal heart sounds. No murmur heard.     No gallop.   Pulmonary:      Effort: Pulmonary effort is normal. No respiratory distress.      Breath sounds: Normal breath sounds. No wheezing or rales.   Abdominal:      General: Abdomen is flat. Bowel sounds are normal.      Palpations: Abdomen is soft. There is no mass.   Musculoskeletal:      Cervical back: Normal range of motion.      Right lower leg: No edema.      Left lower leg: No edema.   Skin:     General: Skin is warm and dry.      Capillary Refill: Capillary refill takes less than 2 seconds.      Findings: No rash.   Neurological:      General: No focal deficit present.      Mental Status: He is alert and oriented to person, place, and time. Mental status is at baseline.            Significant Labs: All pertinent lab results from the last 24 hours have been reviewed.    Significant Imaging: Echocardiogram: Transthoracic echo (TTE) complete (Cupid Only):   Results for orders placed or performed during the hospital encounter of 04/02/24   Echo   Result Value Ref Range    BSA 2.39 m2    Berrios's Biplane MOD Ejection Fraction 59 %    LVOT stroke volume 82.66 cm3    LVIDd 4.39 3.5 - 6.0 cm    LV Systolic Volume 37.94 mL    LV Systolic Volume Index 16.4 mL/m2    LVIDs 3.10 2.1 - 4.0 cm    LV Diastolic Volume 87.11 mL    LV  Diastolic Volume Index 37.71 mL/m2    IVS 0.88 0.6 - 1.1 cm    LVOT diameter 2.25 cm    LVOT area 4.0 cm2    FS 29 28 - 44 %    Left Ventricle Relative Wall Thickness 0.46 cm    Posterior Wall 1.0 0.6 - 1.1 cm    LV mass 135.29 g    LV Mass Index 59 g/m2    MV Peak E Toney 1.52 m/s    TDI LATERAL 0.07 m/s    TDI SEPTAL 0.05 m/s    E/E' ratio 25.33 m/s    MV Peak A Toney 0.72 m/s    TR Max Toney 2.53 m/s    E/A ratio 2.11     IVRT 54.23 msec    E wave deceleration time 210.39 msec    LV SEPTAL E/E' RATIO 30.40 m/s    LV LATERAL E/E' RATIO 21.71 m/s    PV Peak S Toney 0.40 m/s    PV Peak D Toney 0.23 m/s    Pulm vein S/D ratio 1.74     LVOT peak toney 0.90 m/s    Left Ventricular Outflow Tract Mean Velocity 0.70 cm/s    Left Ventricular Outflow Tract Mean Gradient 2.12 mmHg    RVDD 4.20 cm    Right ventricular length in diastole (apical 4-chamber view) 7.71 cm    RV mid diameter 3.33 cm    RV S' 13.44 cm/s    LA size 3.76 cm    Left Atrium Minor Axis 5.31 cm    Left Atrium Major Axis 4.78 cm    LA volume (mod) 49.76 cm3    LA Volume Index (Mod) 21.5 mL/m2    RA area 21.9 cm2    RA Major Axis 4.98 cm    RA Width 4.78 cm    Right Atrium Volume Systolic 77.99 mL    AV mean gradient 49 mmHg    AV peak gradient 82 mmHg    Ao peak toney 4.54 m/s    Ao .30 cm    LVOT peak VTI 20.80 cm    AV valve area 0.74 cm²    AV Velocity Ratio 0.20     AV index (prosthetic) 0.19     MEMO by Velocity Ratio 0.79 cm²    MV mean gradient 3 mmHg    MV peak gradient 10 mmHg    MV stenosis pressure 1/2 time 61.01 ms    MV valve area p 1/2 method 3.61 cm2    MV valve area by continuity eq 2.19 cm2    MV VTI 37.7 cm    Triscuspid Valve Regurgitation Peak Gradient 26 mmHg    PV PEAK VELOCITY 1.26 m/s    PV peak gradient 6 mmHg    Pulmonary Valve Mean Velocity 0.84 m/s    Sinus 2.91 cm    STJ 2.50 cm    Ascending aorta 2.68 cm    IVC diameter 1.87 cm    Mean e' 0.06 m/s    ZLVIDS -4.50     ZLVIDD -7.27     TV resting pulmonary artery pressure 29 mmHg    RV  TB RVSP 6 mmHg    Est. RA pres 3 mmHg    LA Volume Index 24.4 mL/m2    LA volume 56.28 cm3    LA WIDTH 3.5 cm    TAPSE 2.14 cm    EF 55 %    RV-henning mid d 3.3 cm    RV-henning length 7.7 cm    RV Wall Thickness 0.4 cm    TASV 13.0 cm/s    Narrative    Left ventricle: Normal in size with normal systolic function. LV ejection   fraction is 55-60%. No regional wall motion abnormalities are evident.   Normal LV geometry.   Left ventricular diastolic function: Grade 1 diastolic dysfunction with   normal LAP.   Right ventricle: Upper limit of normal in size with normal systolic   function.   Estimated PASP: Inadequate TR jet to provide accurate estimation.   Estimated RAP: indeterminate due to inadequate IVC visualization.  Left atrium: Normal in size.  Right atrium: Enlarged  Valves:  Severe, calcific aortic stenosis. (PV 4.54 m/s, MG 49 mmHg, MEMO   0.74 cm2, DVI 0.19). Aortic annular calcification.  Aorta: NOot well visualized  Pericardium: Normal thickness No effusion present.  Technical quality is challenging due to acoustic windows. Echo contrast   was utilized to improve endocardial visualization. Parasternal images are   uninterpretable.  When compared to the most recent available report, aortic stenosis   severity has progressed.

## 2024-08-13 NOTE — ASSESSMENT & PLAN NOTE
Renan Britton is a 63 y.o. male who presents for evaluation of Chest Pain and Shortness of Breath        Referring physician: Dr. Harvey Palomo      HPI     Renan Britton is a 63 y.o. male referred by Dr Palomo  for evaluation of severe AS (NYHA Class 3 sx).     63-year-old male with past medical history of coronary artery disease, peripheral artery disease, alcoholic liver cirrhosis, who is being currently evaluated for severe aortic stenosis was sent here for TAVR evaluation.  Patient currently has NYHA III symptoms.  Patient also has severe peripheral artery disease with left iliac to femoral bypass with  of left common femoral artery.  Patient was recently admitted to hospital for heart failure exacerbation.  He was already evaluated by CT surgery who deemed not to be a surgical candidate.  He was also follow up with vascular surgery for peripheral artery disease.  He has a left heart catheterization done in April that showed nonobstructive coronary artery disease.     The patient has undergone the following TAVR work-up:   ECHO (Date 4/3/24): MEMO=PV 4.54 m/s, MG 49 mmHg, MEMO 0.74 cm2, DVI 0.19 , EF= 55-60%.   LHC (Date 4/19/24): Non obstructive   STS: 2%   Frailty: 2/4   Iliacs are >7.37 on R and > 2.96 on L   LVOT area by CTA is 4.40 cm2 (28.3 mm X 21.1 mm) and Avg Diameter is 23.7 per my independent review of images on [employer].  Incidental findings on CT: Pulmonary nodule and Pancreatic nodule   CT Surgery risk assessment: High  risk, per Dr Palomo due to Liver cirrhosis   Rhythm issues: NSR     KCCQ/5 meter walk:   Comorbidities: Liver cirrhosis, PAD        Renan Britton is a 27 mm  Navitor valve candidate via RTFA access.    Transcatheter Aortic valve replacement   Valve: 27 mm Navitor  ECMO:  On Stand by  TAVR access: RTFA  Valvuloplasty balloon: 18 CHER FLOW  Viabahn size if needed: 7@5  Antithrombotic therapy: Aspirin,plavix  Temporary pacing wire: No, Will pace  from TAVR wire  Pacemaker risk factors: none   Post op destination: Stepdown  Antibiotics given: (to be done during procedure)  Heart failure on admission?  CONSENTING:  The patient was told that the procedure carries around a 12.5% risk of permanent pacemaker requirement and that risk depends on the patients underlying conduction system.  Prior to the [facility] visit, all available records from referring provider were reviewed.  I have personally reviewed all the lab tests available related to the patient's case  The patient's images were reviewed by myself and the procedural planning was done with my own interpretation of the iliac and aortic anatomy based on CTA, angiography or MERI. I have reviewed all other imaging studies relevant to the patient including echocardiography and coronary angiography.  Patient was educated abut the pathophysiology and natural history of severe aortic stenosis and was educated about the treatment options including surgical and transcatheter valve replacement. She agrees to be full code for the forseable future and to undergo workup for treatment of severe AS.   The risks, benefits, and alternatives of transcatheter aortic valve replacement were discussed with the patient. All questions were answered and informed consent was obtained. I had a detailed discussion with the patient regarding risk of stroke, MI, bleeding access site complications including limb loss, allergy, kidney failure including dialysis and death.  The patient understands the risks and benefits and wishes to go ahead with the procedure.  The referring Cardiologist was notified of the plan  All patient's questions were answered     Shared Decision Making:  This patient was seen today by a multidisciplinary heart team involving both a Structural Heart Specialist (Interventional Cardiologist) and a Cardiothoracic Surgeon. The patient was involved in shared decision-making to define clearer goals for treatment and align  health decisions with their current values.  The patient understands the risks and expected benefits of their treatment options.

## 2024-08-13 NOTE — H&P
[facility] Short Stay Cardiac Unit  Interventional Cardiology  H&P    Patient Name: Renan Britton  MRN: [id number]  Admission Date: 8/13/2024  Code Status: Prior   Attending Provider: Ruslan Xie MD   Primary Care Physician: Eren Becker MD  Principal Problem:Nonrheumatic aortic valve stenosis    Patient information was obtained from patient and ER records.     Subjective:     Chief Complaint:  AS     HPI:  Renan Britton is a 63 y.o. male who presents to [facility] for TAVR with Dr. Xie        Referring physician: Dr. Harvey Palomo      HPI     Renan Britton is a 63 y.o. male referred by Dr Palomo  for evaluation of severe AS (NYHA Class 3 sx).     63-year-old male with past medical history of coronary artery disease, peripheral artery disease, alcoholic liver cirrhosis, who is being currently evaluated for severe aortic stenosis was sent here for TAVR evaluation.  Patient currently has NYHA III symptoms.  Patient also has severe peripheral artery disease with left iliac to femoral bypass with  of left common femoral artery.  Patient was recently admitted to hospital for heart failure exacerbation.  He was already evaluated by CT surgery who deemed not to be a surgical candidate.  He was also follow up with vascular surgery for peripheral artery disease.  He has a left heart catheterization done in April that showed nonobstructive coronary artery disease.     The patient has undergone the following TAVR work-up:   ECHO (Date 4/3/24): MEMO=PV 4.54 m/s, MG 49 mmHg, MEMO 0.74 cm2, DVI 0.19 , EF= 55-60%.   LHC (Date 4/19/24): Non obstructive   STS: 2%   Frailty: 2/4   Iliacs are >7.37 on R and > 2.96 on L   LVOT area by CTA is 4.40 cm2 (28.3 mm X 21.1 mm) and Avg Diameter is 23.7 per my independent review of images on [email or web address].  Incidental findings on CT: Pulmonary nodule and Pancreatic nodule   CT Surgery risk assessment: High  risk, per Dr Palomo due to Liver cirrhosis    Rhythm issues: NSR     KCCQ/5 meter walk:   Comorbidities: Liver cirrhosis, PAD        Renan Britton is a 27 mm  Navitor valve candidate via RTFA access.    Past Medical History:   Diagnosis Date    JJ (acute kidney injury) 12/03/2023    Aortic valve disease     CHF (congestive heart failure)     Cirrhosis 06/10/2024    Diabetes mellitus     Elevated PSA     H/O brain tumor     HLD (hyperlipidemia)     Hypertension     MRSA infection     IN 2020    PAD (peripheral artery disease)     Personal history of colonic polyps 01/08/2021    Pneumonia, unspecified organism     MOST RECENT WEEK OF APRIL 1, 2024    Prostate cancer     Seizures     R/T BRAIN TUMOR- SURGICALLY EXCISED    Sleep apnea     CPAP    Urinary tract infection        Past Surgical History:   Procedure Laterality Date    ANGIOGRAPHY Left 4/6/2020    Procedure: ANGIOGRAM, Left lower extremity;  Surgeon: Kelby Gomez MD;  Location: [facility] OR [address] [place] [address];  Service: Peripheral Vascular;  Laterality: Left;    ANGIOGRAPHY OF LOWER EXTREMITY Left 6/29/2020    Procedure: Angiogram Extremity Unilateral, washout L groin, possible open pseudoaneurysm repair;  Surgeon: Bruce Dallas MD;  Location: [facility] OR [address] [place] [address];  Service: Peripheral Vascular;  Laterality: Left;  contrast 23 ml  fluoro time: 9.9 min  mGy: 1230.26  Gycm2: 146.69    AORTOGRAPHY N/A 6/29/2020    Procedure: AORTOGRAM;  Surgeon: Bruce Dallas MD;  Location: [facility] OR [address] [place] [address];  Service: Peripheral Vascular;  Laterality: N/A;    APPLICATION OF WOUND VACUUM-ASSISTED CLOSURE DEVICE Bilateral 1/1/2020    Procedure: APPLICATION, WOUND VAC;  Surgeon: SEBAS Prieto II, MD;  [facility];  Service: Cardiovascular;  Laterality: Bilateral;    APPLICATION OF WOUND VACUUM-ASSISTED CLOSURE DEVICE N/A 1/3/2020    Procedure: APPLICATION, WOUND VAC;  Surgeon: Brian Wong MD;  Location: [facility] OR [address] [place] [address];  Service: Plastics;  Laterality: N/A;    BRAIN SURGERY  01/2011    TUMOR  EXCISED    COLONOSCOPY N/A 1/8/2021    Procedure: COLONOSCOPY;  Surgeon: Susan Posada MD;  Location: [facility] ENDO;  Service: Endoscopy;  Laterality: N/A;    COLONOSCOPY N/A 11/18/2021    Procedure: COLONOSCOPY;  Surgeon: Susan Posada MD;  Location: [facility] ENDO;  Service: Endoscopy;  Laterality: N/A;    CORONARY ANGIOGRAPHY Left 4/19/2024    Procedure: ANGIOGRAM, CORONARY ARTERY;  Surgeon: Giuseppe Blood MD;  Location: [facility] CATH;  Service: Cardiology;  Laterality: Left;    CREATION OF ILIOFEMORAL ARTERY BYPASS Left 8/12/2020    Procedure: CREATION, BYPASS, ARTERIAL, ILIAC TO FEMORAL;  Surgeon: Kelby Gomez MD;  Location: [facility] OR [facility] FLR;  Service: Peripheral Vascular;  Laterality: Left;  CO-SURGEONS: DR KEO ADHIKARI;  DR WONG, profusion    CREATION OF MUSCLE ROTATIONAL FLAP N/A 1/3/2020    Procedure: CREATION, FLAP, MUSCLE ROTATION;  Surgeon: Brian Wong MD;  Location: [facility] OR [facility] FLR;  Service: Plastics;  Laterality: N/A;    ENDARTERECTOMY OF FEMORAL ARTERY Bilateral 12/20/2019    Procedure: ENDARTERECTOMY, FEMORAL;  Surgeon: Kelby Gomez MD;  [facility];  Service: Peripheral Vascular;  Laterality: Bilateral;  natalya common femoral endarterectomy with natalya. iliac stent placement    ESOPHAGOGASTRODUODENOSCOPY N/A 4/7/2022    Procedure: EGD (ESOPHAGOGASTRODUODENOSCOPY);  Surgeon: Susan Posada MD;  Location: [facility] ENDO;  Service: Endoscopy;  Laterality: N/A;    INTRALUMINAL GASTROINTESTINAL TRACT IMAGING VIA CAPSULE N/A 4/18/2022    Procedure: IMAGING, GI TRACT, INTRALUMINAL, VIA CAPSULE;  Surgeon: Otto Powers MD;  Location: [facility] ENDO;  Service: Endoscopy;  Laterality: N/A;    PERCUTANEOUS TRANSLUMINAL ANGIOPLASTY Left 4/6/2020    Procedure: PTA (ANGIOPLASTY, PERCUTANEOUS, TRANSLUMINAL), left lower extremity;  Surgeon: Kelby Gomez MD;  Location: [facility] OR [facility] FLR;  Service: Peripheral Vascular;  Laterality: Left;    PERCUTANEOUS TRANSLUMINAL ANGIOPLASTY (PTA)  OF PERIPHERAL VESSEL Left 10/17/2019    Procedure: PTA, PERIPHERAL VESSEL;  Surgeon: Rao Fisher MD;  Location: [facility] CATH;  Service: Cardiology;  Laterality: Left;    PSEUDOANEURYSM REPAIR Left 6/29/2020    Procedure: REPAIR, PSEUDOANEURYSM;  Surgeon: Bruce Dallas MD;  [facility];  Service: Peripheral Vascular;  Laterality: Left;    RADICAL RETROPUBIC PROSTATECTOMY N/A 12/13/2021    Procedure: PROSTATECTOMY, RETROPUBIC, RADICAL + Bilateral pelvic lymph node dissection;  Surgeon: Raghav Mitchell MD;  Location: [facility] OR;  Service: Urology;  Laterality: N/A;    RIGHT HEART CATHETERIZATION Right 4/19/2024    Procedure: INSERTION, CATHETER, RIGHT HEART;  Surgeon: Giuseppe Blood MD;  Location: [facility] CATH;  Service: Cardiology;  Laterality: Right;    TRANSESOPHAGEAL ECHOCARDIOGRAPHY N/A 4/19/2024    Procedure: ECHOCARDIOGRAM, TRANSESOPHAGEAL;  Surgeon: Giuseppe Blood MD;  Location: [facility] CATH;  Service: Cardiology;  Laterality: N/A;       Review of patient's allergies indicates:  No Known Allergies    PTA Medications   Medication Sig    aspirin (ECOTRIN) 81 MG EC tablet Take 81 mg by mouth once daily.    atorvastatin (LIPITOR) 40 MG tablet Take 1 tablet (40 mg total) by mouth once daily.    clopidogreL (PLAVIX) 75 mg tablet clopidogreL 75 mg tablet, [RxNorm: 112219]    ferrous sulfate 324 mg (65 mg iron) TbEC ferrous sulfate 324 mg (65 mg iron) tablet,delayed release, [RxNorm: 6073491]    furosemide (LASIX) 20 MG tablet Take 1 tablet (20 mg total) by mouth once daily. You may increase to twice daily if needed for increased shortness of breath or fluid    hydrALAZINE (APRESOLINE) 50 MG tablet Take 1 tablet (50 mg total) by mouth every 8 (eight) hours. (Patient taking differently: Take 50 mg by mouth 2 (two) times a day.)    LYRICA 100 mg capsule Take 100 mg by mouth 2 (two) times daily. Patient takes medication for seizures    NIFEdipine (ADALAT CC) 60 MG TbSR     nitroGLYCERIN (NITROSTAT) 0.4 MG SL  tablet Place 1 tablet (0.4 mg total) under the tongue every 5 (five) minutes as needed for Chest pain.    ascorbic acid, vitamin C, (VITAMIN C) 500 MG tablet Vitamin C 500 mg tablet, [RxNorm: 712175]    hydrALAZINE (APRESOLINE) 50 MG tablet     metFORMIN (GLUCOPHAGE) 500 MG tablet      Family History       Problem Relation (Age of Onset)    Cancer Father, Brother    Diabetes Mother    Heart disease Father, Brother, Brother, Brother    Hypertension Mother    Stroke Mother          Tobacco Use    Smoking status: Former     Types: Cigarettes     Passive exposure: Current    Smokeless tobacco: Never    Tobacco comments:      2-3 daily   Substance and Sexual Activity    Alcohol use: Not Currently     Comment: socially    Drug use: Never    Sexual activity: Yes     Partners: Female     Birth control/protection: None     Review of Systems   Constitutional: Negative for fever and malaise/fatigue.   HENT:  Negative for congestion and sore throat.    Eyes:  Negative for blurred vision, vision loss in left eye and vision loss in right eye.   Cardiovascular:  Positive for dyspnea on exertion. Negative for chest pain, irregular heartbeat, leg swelling, near-syncope, palpitations and syncope.   Respiratory:  Negative for shortness of breath and wheezing.    Endocrine: Negative.    Hematologic/Lymphatic: Negative.    Skin: Negative.    Musculoskeletal:  Negative for arthritis, back pain, joint pain and myalgias.   Gastrointestinal:  Negative for abdominal pain, hematemesis, hematochezia and melena.   Genitourinary: Negative.    Neurological:  Negative for light-headedness and loss of balance.   Psychiatric/Behavioral: Negative.       Objective:     Vital Signs (Most Recent):  Temp: 99 °F (37.2 °C) (08/13/24 0716)  Pulse: 78 (08/13/24 0716)  Resp: 18 (08/13/24 0716)  BP: 120/78 (08/13/24 0717)  SpO2: 98 % (08/13/24 0716) Vital Signs (24h Range):  Temp:  [99 °F (37.2 °C)] 99 °F (37.2 °C)  Pulse:  [78] 78  Resp:  [18] 18  SpO2:  [98  %] 98 %  BP: (120-121)/(74-78) 120/78     Weight: 113.9 kg (251 lb)  Body mass index is 35.01 kg/m².    SpO2: 98 %       No intake or output data in the 24 hours ending 08/13/24 0722    Lines/Drains/Airways       Peripheral Intravenous Line  Duration                  Peripheral IV - Single Lumen 08/13/24 0720 18 G Left Antecubital <1 day                     Physical Exam  Vitals and nursing note reviewed.   Constitutional:       Appearance: Normal appearance. He is normal weight. He is not toxic-appearing.   HENT:      Head: Normocephalic and atraumatic.   Eyes:      General: No scleral icterus.     Extraocular Movements: Extraocular movements intact.   Neck:      Vascular: No carotid bruit.   Cardiovascular:      Rate and Rhythm: Normal rate and regular rhythm.      Pulses: Normal pulses.      Heart sounds: Normal heart sounds. No murmur heard.     No gallop.   Pulmonary:      Effort: Pulmonary effort is normal. No respiratory distress.      Breath sounds: Normal breath sounds. No wheezing or rales.   Abdominal:      General: Abdomen is flat. Bowel sounds are normal.      Palpations: Abdomen is soft. There is no mass.   Musculoskeletal:      Cervical back: Normal range of motion.      Right lower leg: No edema.      Left lower leg: No edema.   Skin:     General: Skin is warm and dry.      Capillary Refill: Capillary refill takes less than 2 seconds.      Findings: No rash.   Neurological:      General: No focal deficit present.      Mental Status: He is alert and oriented to person, place, and time. Mental status is at baseline.            Significant Labs: All pertinent lab results from the last 24 hours have been reviewed.    Significant Imaging: Echocardiogram: Transthoracic echo (TTE) complete (Cupid Only):   Results for orders placed or performed during the hospital encounter of 04/02/24   Echo   Result Value Ref Range    BSA 2.39 m2    Berrios's Biplane MOD Ejection Fraction 59 %    LVOT stroke volume 82.66  cm3    LVIDd 4.39 3.5 - 6.0 cm    LV Systolic Volume 37.94 mL    LV Systolic Volume Index 16.4 mL/m2    LVIDs 3.10 2.1 - 4.0 cm    LV Diastolic Volume 87.11 mL    LV Diastolic Volume Index 37.71 mL/m2    IVS 0.88 0.6 - 1.1 cm    LVOT diameter 2.25 cm    LVOT area 4.0 cm2    FS 29 28 - 44 %    Left Ventricle Relative Wall Thickness 0.46 cm    Posterior Wall 1.0 0.6 - 1.1 cm    LV mass 135.29 g    LV Mass Index 59 g/m2    MV Peak E Toney 1.52 m/s    TDI LATERAL 0.07 m/s    TDI SEPTAL 0.05 m/s    E/E' ratio 25.33 m/s    MV Peak A Toney 0.72 m/s    TR Max Toney 2.53 m/s    E/A ratio 2.11     IVRT 54.23 msec    E wave deceleration time 210.39 msec    LV SEPTAL E/E' RATIO 30.40 m/s    LV LATERAL E/E' RATIO 21.71 m/s    PV Peak S Toney 0.40 m/s    PV Peak D Toney 0.23 m/s    Pulm vein S/D ratio 1.74     LVOT peak toney 0.90 m/s    Left Ventricular Outflow Tract Mean Velocity 0.70 cm/s    Left Ventricular Outflow Tract Mean Gradient 2.12 mmHg    RVDD 4.20 cm    Right ventricular length in diastole (apical 4-chamber view) 7.71 cm    RV mid diameter 3.33 cm    RV S' 13.44 cm/s    LA size 3.76 cm    Left Atrium Minor Axis 5.31 cm    Left Atrium Major Axis 4.78 cm    LA volume (mod) 49.76 cm3    LA Volume Index (Mod) 21.5 mL/m2    RA area 21.9 cm2    RA Major Axis 4.98 cm    RA Width 4.78 cm    Right Atrium Volume Systolic 77.99 mL    AV mean gradient 49 mmHg    AV peak gradient 82 mmHg    Ao peak toney 4.54 m/s    Ao .30 cm    LVOT peak VTI 20.80 cm    AV valve area 0.74 cm²    AV Velocity Ratio 0.20     AV index (prosthetic) 0.19     MEMO by Velocity Ratio 0.79 cm²    MV mean gradient 3 mmHg    MV peak gradient 10 mmHg    MV stenosis pressure 1/2 time 61.01 ms    MV valve area p 1/2 method 3.61 cm2    MV valve area by continuity eq 2.19 cm2    MV VTI 37.7 cm    Triscuspid Valve Regurgitation Peak Gradient 26 mmHg    PV PEAK VELOCITY 1.26 m/s    PV peak gradient 6 mmHg    Pulmonary Valve Mean Velocity 0.84 m/s    Sinus 2.91 cm    STJ  2.50 cm    Ascending aorta 2.68 cm    IVC diameter 1.87 cm    Mean e' 0.06 m/s    ZLVIDS -4.50     ZLVIDD -7.27     TV resting pulmonary artery pressure 29 mmHg    RV TB RVSP 6 mmHg    Est. RA pres 3 mmHg    LA Volume Index 24.4 mL/m2    LA volume 56.28 cm3    LA WIDTH 3.5 cm    TAPSE 2.14 cm    EF 55 %    RV-henning mid d 3.3 cm    RV-henning length 7.7 cm    RV Wall Thickness 0.4 cm    TASV 13.0 cm/s    Narrative    Left ventricle: Normal in size with normal systolic function. LV ejection   fraction is 55-60%. No regional wall motion abnormalities are evident.   Normal LV geometry.   Left ventricular diastolic function: Grade 1 diastolic dysfunction with   normal LAP.   Right ventricle: Upper limit of normal in size with normal systolic   function.   Estimated PASP: Inadequate TR jet to provide accurate estimation.   Estimated RAP: indeterminate due to inadequate IVC visualization.  Left atrium: Normal in size.  Right atrium: Enlarged  Valves:  Severe, calcific aortic stenosis. (PV 4.54 m/s, MG 49 mmHg, MEMO   0.74 cm2, DVI 0.19). Aortic annular calcification.  Aorta: NOot well visualized  Pericardium: Normal thickness No effusion present.  Technical quality is challenging due to acoustic windows. Echo contrast   was utilized to improve endocardial visualization. Parasternal images are   uninterpretable.  When compared to the most recent available report, aortic stenosis   severity has progressed.     Assessment and Plan:     Cardiac/Vascular  * Nonrheumatic aortic valve stenosis  Renan Britton is a 63 y.o. male who presents for evaluation of Chest Pain and Shortness of Breath        Referring physician: Dr. Harvey Palomo      HPI     Renan Britton is a 63 y.o. male referred by Dr Palomo  for evaluation of severe AS (NYHA Class 3 sx).     63-year-old male with past medical history of coronary artery disease, peripheral artery disease, alcoholic liver cirrhosis, who is being currently evaluated  for severe aortic stenosis was sent here for TAVR evaluation.  Patient currently has NYHA III symptoms.  Patient also has severe peripheral artery disease with left iliac to femoral bypass with  of left common femoral artery.  Patient was recently admitted to hospital for heart failure exacerbation.  He was already evaluated by CT surgery who deemed not to be a surgical candidate.  He was also follow up with vascular surgery for peripheral artery disease.  He has a left heart catheterization done in April that showed nonobstructive coronary artery disease.     The patient has undergone the following TAVR work-up:   ECHO (Date 4/3/24): MEOM=PV 4.54 m/s, MG 49 mmHg, MEMO 0.74 cm2, DVI 0.19 , EF= 55-60%.   LHC (Date 4/19/24): Non obstructive   STS: 2%   Frailty: 2/4   Iliacs are >7.37 on R and > 2.96 on L   LVOT area by CTA is 4.40 cm2 (28.3 mm X 21.1 mm) and Avg Diameter is 23.7 per my independent review of images on [employer].  Incidental findings on CT: Pulmonary nodule and Pancreatic nodule   CT Surgery risk assessment: High  risk, per Dr Palomo due to Liver cirrhosis   Rhythm issues: NSR     KCCQ/5 meter walk:   Comorbidities: Liver cirrhosis, PAD        Renan Britton is a 27 mm  Navitor valve candidate via RTFA access.    Transcatheter Aortic valve replacement   Valve: 27 mm Navitor  ECMO:  On Stand by  TAVR access: RTFA  Valvuloplasty balloon: 18 CHER FLOW  Viabahn size if needed: 7@5  Antithrombotic therapy: Aspirin,plavix  Temporary pacing wire: No, Will pace from TAVR wire  Pacemaker risk factors: none   Post op destination: Stepdown  Antibiotics given: (to be done during procedure)  Heart failure on admission?  CONSENTING:  The patient was told that the procedure carries around a 12.5% risk of permanent pacemaker requirement and that risk depends on the patients underlying conduction system.  Prior to the clinc visit, all available records from referring provider were reviewed.  I have personally  reviewed all the lab tests available related to the patient's case  The patient's images were reviewed by myself and the procedural planning was done with my own interpretation of the iliac and aortic anatomy based on CTA, angiography or MERI. I have reviewed all other imaging studies relevant to the patient including echocardiography and coronary angiography.  Patient was educated abut the pathophysiology and natural history of severe aortic stenosis and was educated about the treatment options including surgical and transcatheter valve replacement. She agrees to be full code for the forseable future and to undergo workup for treatment of severe AS.   The risks, benefits, and alternatives of transcatheter aortic valve replacement were discussed with the patient. All questions were answered and informed consent was obtained. I had a detailed discussion with the patient regarding risk of stroke, MI, bleeding access site complications including limb loss, allergy, kidney failure including dialysis and death.  The patient understands the risks and benefits and wishes to go ahead with the procedure.  The referring Cardiologist was notified of the plan  All patient's questions were answered     Shared Decision Making:  This patient was seen today by a multidisciplinary heart team involving both a Structural Heart Specialist (Interventional Cardiologist) and a Cardiothoracic Surgeon. The patient was involved in shared decision-making to define clearer goals for treatment and align health decisions with their current values.  The patient understands the risks and expected benefits of their treatment options.        VTE Risk Mitigation (From admission, onward)      None              Medardo Mcnamara MD  Interventional Cardiology   [facility] - Short Stay Cardiac Unit      IC STAFF  I have seen the patient, reviewed the Fellow's history and physical, assessment and plan. I have personally interviewed and examined the patient  and agree with the findings.     CLYDE Xie MD

## 2024-08-13 NOTE — Clinical Note
The catheter was inserted in the right ventricle. The transvenous pacing lead was placed and capture was confirmed.

## 2024-08-13 NOTE — HPI
Renan Britton is a 63 y.o. male who presents to [facility] for TAVR with Dr. Xie        Referring physician: Dr. Harvey Palomo      HPI     Renan Britton is a 63 y.o. male referred by Dr Palomo  for evaluation of severe AS (NYHA Class 3 sx).     63-year-old male with past medical history of coronary artery disease, peripheral artery disease, alcoholic liver cirrhosis, who is being currently evaluated for severe aortic stenosis was sent here for TAVR evaluation.  Patient currently has NYHA III symptoms.  Patient also has severe peripheral artery disease with left iliac to femoral bypass with  of left common femoral artery.  Patient was recently admitted to hospital for heart failure exacerbation.  He was already evaluated by CT surgery who deemed not to be a surgical candidate.  He was also follow up with vascular surgery for peripheral artery disease.  He has a left heart catheterization done in April that showed nonobstructive coronary artery disease.     The patient has undergone the following TAVR work-up:   ECHO (Date 4/3/24): MEMO=PV 4.54 m/s, MG 49 mmHg, MEMO 0.74 cm2, DVI 0.19 , EF= 55-60%.   LHC (Date 4/19/24): Non obstructive   STS: 2%   Frailty: 2/4   Iliacs are >7.37 on R and > 2.96 on L   LVOT area by CTA is 4.40 cm2 (28.3 mm X 21.1 mm) and Avg Diameter is 23.7 per my independent review of images on [employer].  Incidental findings on CT: Pulmonary nodule and Pancreatic nodule   CT Surgery risk assessment: High  risk, per Dr Palomo due to Liver cirrhosis   Rhythm issues: NSR     KCCQ/5 meter walk:   Comorbidities: Liver cirrhosis, PAD        Renan Britton is a 27 mm  Navitor valve candidate via RTFA access.

## 2024-08-14 VITALS
HEART RATE: 83 BPM | WEIGHT: 251 LBS | BODY MASS INDEX: 35.14 KG/M2 | SYSTOLIC BLOOD PRESSURE: 131 MMHG | HEIGHT: 71 IN | DIASTOLIC BLOOD PRESSURE: 73 MMHG | OXYGEN SATURATION: 99 % | TEMPERATURE: 98 F | RESPIRATION RATE: 20 BRPM

## 2024-08-14 LAB
BASOPHILS # BLD AUTO: 0.04 K/UL (ref 0–0.2)
BASOPHILS NFR BLD: 0.9 % (ref 0–1.9)
DIFFERENTIAL METHOD BLD: ABNORMAL
EOSINOPHIL # BLD AUTO: 0.2 K/UL (ref 0–0.5)
EOSINOPHIL NFR BLD: 3.2 % (ref 0–8)
ERYTHROCYTE [DISTWIDTH] IN BLOOD BY AUTOMATED COUNT: 16.2 % (ref 11.5–14.5)
HCT VFR BLD AUTO: 33.1 % (ref 40–54)
HGB BLD-MCNC: 9.4 G/DL (ref 14–18)
IMM GRANULOCYTES # BLD AUTO: 0.01 K/UL (ref 0–0.04)
IMM GRANULOCYTES NFR BLD AUTO: 0.2 % (ref 0–0.5)
LYMPHOCYTES # BLD AUTO: 0.7 K/UL (ref 1–4.8)
LYMPHOCYTES NFR BLD: 15.8 % (ref 18–48)
MCH RBC QN AUTO: 24.9 PG (ref 27–31)
MCHC RBC AUTO-ENTMCNC: 28.4 G/DL (ref 32–36)
MCV RBC AUTO: 88 FL (ref 82–98)
MONOCYTES # BLD AUTO: 0.8 K/UL (ref 0.3–1)
MONOCYTES NFR BLD: 17.3 % (ref 4–15)
NEUTROPHILS # BLD AUTO: 2.9 K/UL (ref 1.8–7.7)
NEUTROPHILS NFR BLD: 62.6 % (ref 38–73)
NRBC BLD-RTO: 0 /100 WBC
OHS QRS DURATION: 84 MS
OHS QTC CALCULATION: 440 MS
PLATELET # BLD AUTO: 147 K/UL (ref 150–450)
PMV BLD AUTO: 12.6 FL (ref 9.2–12.9)
POC ACTIVATED CLOTTING TIME K: 122 SEC (ref 74–137)
POC ACTIVATED CLOTTING TIME K: 140 SEC (ref 74–137)
POC ACTIVATED CLOTTING TIME K: 275 SEC (ref 74–137)
POCT GLUCOSE: 112 MG/DL (ref 70–110)
POCT GLUCOSE: 114 MG/DL (ref 70–110)
RBC # BLD AUTO: 3.77 M/UL (ref 4.6–6.2)
SAMPLE: ABNORMAL
SAMPLE: ABNORMAL
SAMPLE: NORMAL
WBC # BLD AUTO: 4.68 K/UL (ref 3.9–12.7)

## 2024-08-14 PROCEDURE — 25000003 PHARM REV CODE 250: Performed by: STUDENT IN AN ORGANIZED HEALTH CARE EDUCATION/TRAINING PROGRAM

## 2024-08-14 PROCEDURE — 99233 SBSQ HOSP IP/OBS HIGH 50: CPT | Mod: ,,, | Performed by: INTERNAL MEDICINE

## 2024-08-14 PROCEDURE — 93005 ELECTROCARDIOGRAM TRACING: CPT

## 2024-08-14 RX ADMIN — PREGABALIN 100 MG: 100 CAPSULE ORAL at 08:08

## 2024-08-14 RX ADMIN — HYDRALAZINE HYDROCHLORIDE 50 MG: 50 TABLET ORAL at 08:08

## 2024-08-14 RX ADMIN — NIFEDIPINE 60 MG: 60 TABLET, FILM COATED, EXTENDED RELEASE ORAL at 08:08

## 2024-08-14 RX ADMIN — CLOPIDOGREL BISULFATE 75 MG: 75 TABLET ORAL at 08:08

## 2024-08-14 RX ADMIN — ASPIRIN 81 MG: 81 TABLET, COATED ORAL at 08:08

## 2024-08-14 RX ADMIN — ATORVASTATIN CALCIUM 40 MG: 20 TABLET, FILM COATED ORAL at 08:08

## 2024-08-14 RX ADMIN — FERROUS SULFATE TAB EC 325 MG (65 MG FE EQUIVALENT) 1 EACH: 325 (65 FE) TABLET DELAYED RESPONSE at 08:08

## 2024-08-14 RX ADMIN — FUROSEMIDE 20 MG: 20 TABLET ORAL at 08:08

## 2024-08-14 NOTE — NURSING
Clarified with on call MD if ok for pt to drink water/coffee or if pt should be NPO pending EKG this AM, MD stated ok for pt to eat/drink.

## 2024-08-14 NOTE — NURSING
Patient is ready for discharge. Patient stable alert and oriented. IVs removed. No complaints of pain. Discussed discharge plan with patient. Reviewed medications and side effects, appointments, and answered questions with patient and spouse.

## 2024-08-14 NOTE — PLAN OF CARE
[facility] - [facility] Stepdown  Discharge Final Note    Primary Care Provider: Eren Becker MD    Expected Discharge Date: 8/14/2024    Final Discharge Note (most recent)       Final Note - 08/14/24 1057          Final Note    Assessment Type Final Discharge Note     Anticipated Discharge Disposition Home or Self Care                 Elle Stark LMSW [facility]  [id number]      Future Appointments   Date Time Provider Department Center   8/30/2024 11:20 AM [facility] LAB CHA LAB [place]   9/6/2024  9:30 AM Raghav Mitchell MD DESC URO [place]   9/17/2024  8:00 AM ECHO, [facility] NOM ECHOSTR [facility]   9/17/2024  9:00 AM LAB, [facility] LAB VNP [facility]   9/17/2024 10:30 AM Ruslan Xie MD [facility] CARDVAL [facility]   9/23/2024 11:00 AM Bernarda [place] MD [facility] HEPA [place]   2/11/2025 10:30 AM [facility] LAB [facility] LAB [place]   2/12/2025  3:40 PM Kenneth Mcdowell MD [facility] HEM ONC [facility]   3/18/2025  9:00 AM CHA US1 CHAH ULSOUND [place]   4/1/2025 11:00 AM CV CHAH LYNN CHAH [place] [address]   4/1/2025 12:00 PM Bruce Dallas MD [facility] ED [place]

## 2024-08-14 NOTE — PLAN OF CARE
[facility] - Cardiology Stepdown  Initial Discharge Assessment       Primary Care Provider: Eren Becker MD    Admission Diagnosis: Aortic stenosis, severe [I35.0]    Admission Date: 8/13/2024  Expected Discharge Date: 8/14/2024    Transition of Care Barriers: None    Payor: Sendoid / Plan: Sendoid / Product Type: PPO /     Extended Emergency Contact Information  Primary Emergency Contact: Patricia Britton  Mobile Phone: [phone]  Relation: Spouse   needed? No  Secondary Emergency Contact: Sanjay Britton  Mobile Phone: [phone]  Relation: Son    Discharge Plan A: Home with family  Discharge Plan B: Home      [facility], [place] - [address]  [address] [facility] [address]  Phone: [phone] Fax: [phone]    [facility] - [place], [place] - [address] [facility]  [address] [facility] [address]  Phone: [phone] Fax: [phone]    [facility]  [address]  [place] [address]  Phone: [phone] Fax: [phone]    [id number] - [place], [place] - [address] AT [facility]  [address]  [place] [address]  Phone: [phone] Fax: [phone]      Initial Assessment (most recent)       Adult Discharge Assessment - 08/14/24 1008          Discharge Assessment    Assessment Type Discharge Planning Assessment     Confirmed/corrected address, phone number and insurance Yes     Confirmed Demographics Correct on Facesheet     Source of Information patient;family;health record     Communicated ZOHAIB with patient/caregiver Yes     Reason For Admission Aortic stenosis     People in Home spouse     Facility Arrived From: Home     Do you expect to return to your current living situation? Yes     Do you have help at home or someone to help you manage your care at home? Yes     Who are your caregiver(s) and their phone number(s)? Patricia Britton (spouse) [phone]     Prior to hospitilization cognitive status:  Alert/Oriented     Current cognitive status: Alert/Oriented     Walking or Climbing Stairs Difficulty no     Dressing/Bathing Difficulty no     Equipment Currently Used at Home CPAP     Readmission within 30 days? Yes     Patient currently being followed by outpatient case management? No     Do you currently have service(s) that help you manage your care at home? No     Do you take prescription medications? Yes     Do you have prescription coverage? Yes     Do you have any problems affording any of your prescribed medications? No     Is the patient taking medications as prescribed? yes     Who is going to help you get home at discharge? Patricia Britton (spouse) [phone]     How do you get to doctors appointments? family or friend will provide     Are you on dialysis? No     Do you take coumadin? No     Discharge Plan A Home with family     Discharge Plan B Home     DME Needed Upon Discharge  none     Discharge Plan discussed with: Patient;Spouse/sig other     Name(s) and Number(s) Patricia Britton (spouse) [phone]     Transition of Care Barriers None                     Readmission Assessment (most recent)       Readmission Assessment - 08/14/24 1012          Readmission    Was this a planned readmission? Yes     Why were you hospitalized in the last 30 days? Aortic stenosis     Why were you readmitted? Related to previous admission;Planned readmission     When you left the hospital where did you go? Home with Family     Did patient/caregiver refused recommended DC plan? No     Tell me about what happened between when you left the hospital and the day you returned. Pt admitted for aortic stenosis then readmitted for scheduled TAVR     When did you start not feeling well? n/a                    SW met with pt and wife at bedside to discuss discharge planning.  Pt lives with his wife and is independent with ambulation and ADLs.  No HH, dialysis, or coumadin.  PCP is Dr Eren Becker.  Pt will have transportation  and assistance from his wife at discharge.  Discharge Plan A and Plan B have been determined by review of patient's clinical status, future medical and therapeutic needs, and coverage/benefits for post-acute care in coordination with multidisciplinary team members.  SW name and ext on whiteboard; discharge planning booklet provided.  Will continue to follow.      Elle Stark LMSW  [facility]  [id number]

## 2024-08-14 NOTE — PLAN OF CARE
Groin site drsg intact, no new drainage noted. BL DP pulses dopplerable. NS gtt infusing to cordis per order. TVP in place, settings per order, no pacer spikes noted. Pt NSR throughout shift. Pt NPO at midnight incase pacemaker needed. Plan of care discussed with patient. Patient is free of fall/trauma/injury. Denies CP, SOB, or pain/discomfort. All questions addressed.     Problem: Adult Inpatient Plan of Care  Goal: Plan of Care Review  Outcome: Progressing  Goal: Patient-Specific Goal (Individualized)  Outcome: Progressing  Flowsheets (Taken 8/14/2024 0108)  Individualized Care Needs: TVP, monitor drsg  Anxieties, Fears or Concerns: none stated     Problem: Diabetes Comorbidity  Goal: Blood Glucose Level Within Targeted Range  Outcome: Progressing     Problem: Infection  Goal: Absence of Infection Signs and Symptoms  Outcome: Progressing     Problem: Wound  Goal: Optimal Wound Healing  Outcome: Progressing     Problem: Fall Injury Risk  Goal: Absence of Fall and Fall-Related Injury  Outcome: Progressing

## 2024-08-14 NOTE — HOSPITAL COURSE
Patient brought for planned TAVR that was successful with a 27 mm Navitor valve, please see report for full details. The patient tolerated the procedure well and was instructed to continue home medications. ECG post TAVR revealed no abnormal conduction. The patient was then discharged in stable condition.

## 2024-08-14 NOTE — DISCHARGE SUMMARY
[facility] - Cardiology Stepdown  Interventional Cardiology  Discharge Summary      Patient Name: Renan Britton  MRN: [id number]  Admission Date: 8/13/2024  Hospital Length of Stay: 1 days  Discharge Date and Time:  08/14/2024 10:16 AM  Attending Physician: Ruslan Xie MD  Discharging Provider: Kevin Merritt MD  Primary Care Physician: Eren Becker MD    HPI:  Renan Britton is a 63 y.o. male who presents to [facility] for TAVR with Dr. Xie        Referring physician: Dr. Harvey Palomo      HPI     Renan Britton is a 63 y.o. male referred by Dr Palomo  for evaluation of severe AS (NYHA Class 3 sx).     63-year-old male with past medical history of coronary artery disease, peripheral artery disease, alcoholic liver cirrhosis, who is being currently evaluated for severe aortic stenosis was sent here for TAVR evaluation.  Patient currently has NYHA III symptoms.  Patient also has severe peripheral artery disease with left iliac to femoral bypass with  of left common femoral artery.  Patient was recently admitted to hospital for heart failure exacerbation.  He was already evaluated by CT surgery who deemed not to be a surgical candidate.  He was also follow up with vascular surgery for peripheral artery disease.  He has a left heart catheterization done in April that showed nonobstructive coronary artery disease.     The patient has undergone the following TAVR work-up:   ECHO (Date 4/3/24): MEMO=PV 4.54 m/s, MG 49 mmHg, MEMO 0.74 cm2, DVI 0.19 , EF= 55-60%.   LHC (Date 4/19/24): Non obstructive   STS: 2%   Frailty: 2/4   Iliacs are >7.37 on R and > 2.96 on L   LVOT area by CTA is 4.40 cm2 (28.3 mm X 21.1 mm) and Avg Diameter is 23.7 per my independent review of images on [employer].  Incidental findings on CT: Pulmonary nodule and Pancreatic nodule   CT Surgery risk assessment: High  risk, per Dr Palomo due to Liver cirrhosis   Rhythm issues: NSR     KCCQ/5 meter walk:    Comorbidities: Liver cirrhosis, PAD        Renan Britton is a 27 mm  Navitor valve candidate via RTFA access.    Procedure(s) (LRB):  REPLACEMENT, AORTIC VALVE, TRANSCATHETER (TAVR) (Right)  Cardiac Cath Cosurgeon (N/A)  REPLACEMENT, AORTIC VALVE, TRANSCATHETER (TAVR)     Indwelling Lines/Drains at time of discharge:  Lines/Drains/Airways       Central Venous Catheter Line  Duration             Introducer 08/13/24 0946 Internal Jugular Right 1 day              Line  Duration                  Pacer Wires 08/13/24 0947 1 day                    Hospital Course:  Patient brought for planned TAVR that was successful with a 27 mm Navitor valve, please see report for full details. The patient tolerated the procedure well and was instructed to continue home medications. ECG post TAVR revealed no abnormal conduction. The patient was then discharged in stable condition.         Goals of Care Treatment Preferences:  Code Status: Full Code          Significant Diagnostic Studies: N/A    Pending Diagnostic Studies:       None          No new Assessment & Plan notes have been filed under this hospital service since the last note was generated.  Service: Interventional Cardiology      Discharged Condition: good    Follow Up:    Patient Instructions:      Cardiac rehab phase II   Standing Status: Future Standing Exp. Date: 08/13/25     Order Specific Question Answer Comments   Department [facility] CARDIAC REHAB    Select qualifying diagnosis: Z95.2 - Presence of prosthetic heart valve      Medications:  Reconciled Home Medications:      Medication List        CONTINUE taking these medications      aspirin 81 MG EC tablet  Commonly known as: ECOTRIN  Take 81 mg by mouth once daily.     atorvastatin 40 MG tablet  Commonly known as: LIPITOR  Take 1 tablet (40 mg total) by mouth once daily.     clopidogreL 75 mg tablet  Commonly known as: PLAVIX  clopidogreL 75 mg tablet, [RxNorm: 888109]     ferrous sulfate 324 mg (65 mg iron)  Tbec  ferrous sulfate 324 mg (65 mg iron) tablet,delayed release, [RxNorm: 3729578]     furosemide 20 MG tablet  Commonly known as: LASIX  Take 1 tablet (20 mg total) by mouth once daily. You may increase to twice daily if needed for increased shortness of breath or fluid     * hydrALAZINE 50 MG tablet  Commonly known as: APRESOLINE  Take 1 tablet (50 mg total) by mouth every 8 (eight) hours.     * hydrALAZINE 50 MG tablet  Commonly known as: APRESOLINE     LYRICA 100 mg capsule  Generic drug: pregabalin  Take 100 mg by mouth 2 (two) times daily. Patient takes medication for seizures     metFORMIN 500 MG tablet  Commonly known as: GLUCOPHAGE     NIFEdipine 60 MG Tbsr  Commonly known as: ADALAT CC     nitroGLYCERIN 0.4 MG SL tablet  Commonly known as: NITROSTAT  Place 1 tablet (0.4 mg total) under the tongue every 5 (five) minutes as needed for Chest pain.     VITAMIN C 500 MG tablet  Generic drug: ascorbic acid (vitamin C)  Vitamin C 500 mg tablet, [RxNorm: 356363]           * This list has 2 medication(s) that are the same as other medications prescribed for you. Read the directions carefully, and ask your doctor or other care provider to review them with you.                  Time spent on the discharge of patient: 20 minutes    Kevin Merritt MD  Interventional Cardiology  [facility] - Cardiology Stepdown

## 2024-08-14 NOTE — ANESTHESIA POSTPROCEDURE EVALUATION
Anesthesia Post Evaluation    Patient: Renan Britton    Procedure(s) Performed: Procedure(s) (LRB):  REPLACEMENT, AORTIC VALVE, TRANSCATHETER (TAVR) (Right)  Cardiac Cath Cosurgeon (N/A)  REPLACEMENT, AORTIC VALVE, TRANSCATHETER (TAVR)    Final Anesthesia Type: general      Patient location during evaluation: PACU  Patient participation: Yes- Able to Participate  Level of consciousness: awake and alert and oriented  Post-procedure vital signs: reviewed and stable  Pain management: adequate  Airway patency: patent  KAMALA mitigation strategies: Intraoperative administration of CPAP, nasopharyngeal airway, or oral appliance during sedation, Multimodal analgesia, Extubation while patient is awake and Extubation and recovery carried out in lateral, semiupright, or other nonsupine position  PONV status at discharge: No PONV  Anesthetic complications: no      Cardiovascular status: hemodynamically stable  Respiratory status: unassisted  Hydration status: euvolemic  Follow-up not needed.              Vitals Value Taken Time   /73 08/14/24 1112   Temp 36.6 °C (97.9 °F) 08/14/24 1112   Pulse 69 08/14/24 1131   Resp 20 08/14/24 0735   SpO2 99 % 08/14/24 1112   Vitals shown include unfiled device data.      No case tracking events are documented in the log.      Pain/Cristy Score: Pain Rating Prior to Med Admin: 0 (8/14/2024  8:00 AM)  Cristy Score: 9 (8/13/2024 12:15 PM)

## 2024-08-28 ENCOUNTER — PATIENT MESSAGE (OUTPATIENT)
Dept: CARDIOLOGY | Facility: CLINIC | Age: 64
End: 2024-08-28
Payer: COMMERCIAL

## 2024-09-06 ENCOUNTER — OFFICE VISIT (OUTPATIENT)
Dept: UROLOGY | Facility: CLINIC | Age: 64
End: 2024-09-06
Payer: COMMERCIAL

## 2024-09-06 VITALS
HEIGHT: 71 IN | HEART RATE: 88 BPM | BODY MASS INDEX: 34.88 KG/M2 | DIASTOLIC BLOOD PRESSURE: 70 MMHG | WEIGHT: 249.13 LBS | SYSTOLIC BLOOD PRESSURE: 130 MMHG

## 2024-09-06 DIAGNOSIS — Z90.79 STATUS POST PROSTATECTOMY: ICD-10-CM

## 2024-09-06 DIAGNOSIS — R97.21 RISING PSA FOLLOWING TREATMENT FOR MALIGNANT NEOPLASM OF PROSTATE: ICD-10-CM

## 2024-09-06 DIAGNOSIS — N52.31 ERECTILE DYSFUNCTION AFTER RADICAL PROSTATECTOMY: ICD-10-CM

## 2024-09-06 DIAGNOSIS — R97.20 ELEVATED PSA: Primary | ICD-10-CM

## 2024-09-06 DIAGNOSIS — C61 PROSTATE CANCER: ICD-10-CM

## 2024-09-06 PROCEDURE — 99999 PR PBB SHADOW E&M-EST. PATIENT-LVL IV: CPT | Mod: PBBFAC,,, | Performed by: UROLOGY

## 2024-09-06 RX ORDER — NIFEDIPINE 60 MG/1
60 TABLET, EXTENDED RELEASE ORAL
COMMUNITY
Start: 2024-06-11

## 2024-09-06 NOTE — PROGRESS NOTES
Subjective:      Patient ID: Renan Britton is a 63 y.o. male.    Chief Complaint: prostate cancer     Mr. Britton year old gentleman with a history of prostate cancer.  The patient underwent radical prostatectomy and lymphadenectomy.  There was no evidence of recurrent disease however the patient's PSA  has been rising and is now 2.4.  Eighteen days before was 1.8 and 3 months prior to that it was 1.7.  The patient has undergone multiple tests including bone scan CT scan MRI and PET PSMA CT with no evidence of recurrent disease.  The patient has been seen by hematology oncology as well.  Will repeat his labs in 3 months and repeat PET PSMA scan if PSA is rising.  Patient will also follow-up with hematology oncology for any further recommendations.  Patient recently had aortic valve transplant trans venous approach.  The patient is doing quite well since then and feels better he is not having any complications.  He was to follow up with the cardiologist and see if he can resume his daily Cialis.    Follow-up  This is a chronic (Rising PSA status post radical prostatectomy.) problem. The current episode started more than 1 year ago. The problem occurs constantly. The problem has been gradually worsening. Pertinent negatives include no abdominal pain, anorexia, arthralgias, change in bowel habit, chest pain, chills, congestion, coughing, diaphoresis, fatigue, fever, headaches, joint swelling, myalgias, nausea, neck pain, numbness, rash, sore throat, swollen glands, urinary symptoms, vertigo, visual change, vomiting or weakness. Nothing aggravates the symptoms. He has tried nothing for the symptoms. The treatment provided significant relief.     Review of Systems   Constitutional:  Negative for activity change, appetite change, chills, diaphoresis, fatigue, fever and unexpected weight change.   HENT:  Negative for congestion, hearing loss, sinus pressure, sore throat and trouble swallowing.    Eyes:   Negative for photophobia, pain, discharge and visual disturbance.   Respiratory:  Negative for apnea, cough and shortness of breath.    Cardiovascular:  Negative for chest pain, palpitations and leg swelling.   Gastrointestinal:  Negative for abdominal distention, abdominal pain, anal bleeding, anorexia, blood in stool, change in bowel habit, constipation, diarrhea, nausea, rectal pain and vomiting.   Endocrine: Negative for cold intolerance, heat intolerance, polydipsia, polyphagia and polyuria.   Genitourinary:  Negative for decreased urine volume, difficulty urinating, dysuria, enuresis, flank pain, frequency, genital sores, hematuria, penile discharge, penile pain, penile swelling, scrotal swelling, testicular pain and urgency.   Musculoskeletal:  Negative for arthralgias, back pain, joint swelling, myalgias and neck pain.   Skin:  Negative for color change, pallor, rash and wound.   Allergic/Immunologic: Negative for environmental allergies, food allergies and immunocompromised state.   Neurological:  Negative for dizziness, vertigo, seizures, weakness, numbness and headaches.   Hematological:  Negative for adenopathy. Does not bruise/bleed easily.   Psychiatric/Behavioral: Negative.        Objective:     Physical Exam  Vitals and nursing note reviewed.   Constitutional:       General: He is not in acute distress.     Appearance: Normal appearance. He is well-developed. He is not ill-appearing, toxic-appearing or diaphoretic.   HENT:      Head: Normocephalic and atraumatic.      Right Ear: External ear normal. There is no impacted cerumen.      Left Ear: External ear normal. There is no impacted cerumen.      Nose: Nose normal. No congestion or rhinorrhea.      Mouth/Throat:      Mouth: Mucous membranes are moist.      Pharynx: Oropharynx is clear. No oropharyngeal exudate or posterior oropharyngeal erythema.   Eyes:      General: No scleral icterus.        Right eye: No discharge.         Left eye: No  discharge.      Extraocular Movements: Extraocular movements intact.      Conjunctiva/sclera: Conjunctivae normal.      Pupils: Pupils are equal, round, and reactive to light.   Cardiovascular:      Rate and Rhythm: Normal rate and regular rhythm.      Heart sounds: Normal heart sounds.   Pulmonary:      Effort: Pulmonary effort is normal.   Abdominal:      General: Bowel sounds are normal. There is no distension.      Palpations: Abdomen is soft. There is no mass.      Tenderness: There is no abdominal tenderness. There is no right CVA tenderness, left CVA tenderness, guarding or rebound.      Hernia: No hernia is present.   Genitourinary:     Comments: Patient with no CVA tenderness, normal penis and scrotum.  Bladder nontender.  Musculoskeletal:         General: Normal range of motion.      Cervical back: Normal range of motion and neck supple.      Right lower leg: No edema.      Left lower leg: No edema.   Skin:     General: Skin is warm and dry.      Capillary Refill: Capillary refill takes less than 2 seconds.      Findings: No lesion or rash.   Neurological:      Mental Status: He is alert and oriented to person, place, and time.      Gait: Gait normal.      Deep Tendon Reflexes: Reflexes are normal and symmetric. Reflexes normal.   Psychiatric:         Mood and Affect: Mood normal.         Behavior: Behavior normal.         Thought Content: Thought content normal.         Judgment: Judgment normal.        Assessment:      1. Elevated PSA    2. Hx of prostate adenocarcinoma s/p prostatectomy    3. Rising PSA following treatment for malignant neoplasm of prostate    4. Status post prostatectomy    5. Erectile dysfunction after radical prostatectomy      Plan:     Patient Instructions   PSA test in 3 months  F/U 3 months  If PSA continues to rise will repeat PSMA PET CT scan

## 2024-09-17 ENCOUNTER — OFFICE VISIT (OUTPATIENT)
Dept: CARDIOLOGY | Facility: CLINIC | Age: 64
End: 2024-09-17
Payer: COMMERCIAL

## 2024-09-17 ENCOUNTER — HOSPITAL ENCOUNTER (OUTPATIENT)
Dept: CARDIOLOGY | Facility: HOSPITAL | Age: 64
Discharge: HOME | End: 2024-09-17
Attending: INTERNAL MEDICINE
Payer: COMMERCIAL

## 2024-09-17 VITALS
BODY MASS INDEX: 36.49 KG/M2 | WEIGHT: 254.88 LBS | HEART RATE: 80 BPM | OXYGEN SATURATION: 98 % | HEIGHT: 70 IN | SYSTOLIC BLOOD PRESSURE: 146 MMHG | DIASTOLIC BLOOD PRESSURE: 69 MMHG

## 2024-09-17 VITALS — WEIGHT: 249 LBS | HEIGHT: 71 IN | BODY MASS INDEX: 34.86 KG/M2

## 2024-09-17 DIAGNOSIS — I73.9 PAD (PERIPHERAL ARTERY DISEASE): ICD-10-CM

## 2024-09-17 DIAGNOSIS — E11.9 TYPE 2 DIABETES MELLITUS WITHOUT COMPLICATION, WITHOUT LONG-TERM CURRENT USE OF INSULIN: ICD-10-CM

## 2024-09-17 DIAGNOSIS — Z95.3 STATUS POST TRANSCATHETER AORTIC VALVE REPLACEMENT (TAVR) USING BIOPROSTHESIS: ICD-10-CM

## 2024-09-17 DIAGNOSIS — I35.0 NONRHEUMATIC AORTIC VALVE STENOSIS: Primary | ICD-10-CM

## 2024-09-17 DIAGNOSIS — Z95.3 STATUS POST TRANSCATHETER AORTIC VALVE REPLACEMENT (TAVR) USING BIOPROSTHESIS: Primary | ICD-10-CM

## 2024-09-17 DIAGNOSIS — E78.5 HYPERLIPIDEMIA, UNSPECIFIED HYPERLIPIDEMIA TYPE: ICD-10-CM

## 2024-09-17 DIAGNOSIS — K70.30 ALCOHOLIC CIRRHOSIS OF LIVER WITHOUT ASCITES: ICD-10-CM

## 2024-09-17 DIAGNOSIS — I10 ESSENTIAL HYPERTENSION: ICD-10-CM

## 2024-09-17 LAB
ASCENDING AORTA: 3.44 CM
AV INDEX (PROSTH): 0.58
AV MEAN GRADIENT: 17 MMHG
AV PEAK GRADIENT: 34 MMHG
AV VALVE AREA BY VELOCITY RATIO: 1.87 CM²
AV VALVE AREA: 2.11 CM²
AV VELOCITY RATIO: 0.51
BSA FOR ECHO PROCEDURE: 2.38 M2
CV ECHO LV RWT: 0.48 CM
DOP CALC AO PEAK VEL: 2.9 M/S
DOP CALC AO VTI: 58 CM
DOP CALC LVOT AREA: 3.7 CM2
DOP CALC LVOT DIAMETER: 2.16 CM
DOP CALC LVOT PEAK VEL: 1.48 M/S
DOP CALC LVOT STROKE VOLUME: 122.14 CM3
DOP CALCLVOT PEAK VEL VTI: 33.35 CM
E WAVE DECELERATION TIME: 214.66 MSEC
E/A RATIO: 1.15
E/E' RATIO: 16.11 M/S
ECHO LV POSTERIOR WALL: 1.11 CM (ref 0.6–1.1)
FRACTIONAL SHORTENING: 40 % (ref 28–44)
INTERVENTRICULAR SEPTUM: 1.14 CM (ref 0.6–1.1)
IVC DIAMETER: 1.6 CM
LA MAJOR: 5.13 CM
LA MINOR: 5.32 CM
LA WIDTH: 4.81 CM
LEFT ATRIUM SIZE: 4.04 CM
LEFT ATRIUM VOLUME INDEX MOD: 41.4 ML/M2
LEFT ATRIUM VOLUME INDEX: 37.3 ML/M2
LEFT ATRIUM VOLUME MOD: 95.55 CM3
LEFT ATRIUM VOLUME: 86.28 CM3
LEFT GREAT SAPHENOUS DISTAL THIGH DIA: 0.16 CM
LEFT GREAT SAPHENOUS JUNCTION DIA: 0.65 CM
LEFT GREAT SAPHENOUS KNEE DIA: 0.22 CM
LEFT GREAT SAPHENOUS MIDDLE THIGH DIA: 0.23 CM
LEFT GREAT SAPHENOUS PROXIMAL CALF DIA: 0.2 CM
LEFT INTERNAL DIMENSION IN SYSTOLE: 2.77 CM (ref 2.1–4)
LEFT SMALL SAPHENOUS KNEE DIA: 0.19 CM
LEFT SMALL SAPHENOUS SPJ DIA: 0.22 CM
LEFT VENTRICLE DIASTOLIC VOLUME INDEX: 41.91 ML/M2
LEFT VENTRICLE DIASTOLIC VOLUME: 96.81 ML
LEFT VENTRICLE MASS INDEX: 81 G/M2
LEFT VENTRICLE SYSTOLIC VOLUME INDEX: 12.5 ML/M2
LEFT VENTRICLE SYSTOLIC VOLUME: 28.88 ML
LEFT VENTRICULAR INTERNAL DIMENSION IN DIASTOLE: 4.59 CM (ref 3.5–6)
LEFT VENTRICULAR MASS: 186.39 G
LV LATERAL E/E' RATIO: 14.5 M/S
LV SEPTAL E/E' RATIO: 18.13 M/S
MV PEAK A VEL: 1.26 M/S
MV PEAK E VEL: 1.45 M/S
MV STENOSIS PRESSURE HALF TIME: 62.25 MS
MV VALVE AREA P 1/2 METHOD: 3.53 CM2
RA MAJOR: 5.01 CM
RA PRESSURE ESTIMATED: 3 MMHG
RA WIDTH: 4.4 CM
RIGHT GREAT SAPHENOUS DISTAL THIGH DIA: 0.23 CM
RIGHT GREAT SAPHENOUS JUNCTION DIA: 0.55 CM
RIGHT GREAT SAPHENOUS KNEE DIA: 0.21 CM
RIGHT GREAT SAPHENOUS MIDDLE THIGH DIA: 0.28 CM
RIGHT GREAT SAPHENOUS PROXIMAL CALF DIA: 0.15 CM
RIGHT SMALL SAPHENOUS KNEE DIA: 0.17 CM
RIGHT VENTRICLE DIASTOLIC BASEL DIMENSION: 4.2 CM
SINUS: 3.08 CM
STJ: 2.01 CM
TDI LATERAL: 0.1 M/S
TDI SEPTAL: 0.08 M/S
TDI: 0.09 M/S
TRICUSPID ANNULAR PLANE SYSTOLIC EXCURSION: 2.74 CM
Z-SCORE OF LEFT VENTRICULAR DIMENSION IN END DIASTOLE: -6.82
Z-SCORE OF LEFT VENTRICULAR DIMENSION IN END SYSTOLE: -5.39

## 2024-09-17 PROCEDURE — 93306 TTE W/DOPPLER COMPLETE: CPT

## 2024-09-17 PROCEDURE — 99999 PR PBB SHADOW E&M-EST. PATIENT-LVL IV: CPT | Mod: PBBFAC,,, | Performed by: INTERNAL MEDICINE

## 2024-09-17 PROCEDURE — 93306 TTE W/DOPPLER COMPLETE: CPT | Mod: 26,,, | Performed by: INTERNAL MEDICINE

## 2024-09-17 PROCEDURE — 93970 EXTREMITY STUDY: CPT | Mod: TC

## 2024-09-17 NOTE — ASSESSMENT & PLAN NOTE
S/P 27mm Navitor valve.  There is normal leaflet mobility. Aortic valve area by VTI is 2.11 cm². Aortic valve peak velocity is 2.9 m/s. Mean gradient is 17 mmHg.  Patient currently asymptomatic and feels much better and is able to exercise without having any shortness a breath.  And he states that he wants to return back to work in 2 weeks    Continue with aspirin and Plavix

## 2024-09-17 NOTE — PROGRESS NOTES
Subjective:    Patient ID:  Renan Britton is a 64 y.o. male who presents for follow-up following TAVR      Referring physician: No ref. provider found     HPI  63-year-old male with past medical history of coronary artery disease, peripheral artery disease, alcoholic liver cirrhosis, severe AS s/p TAVR on 8/13/24 presents for one-month follow-up.  Patient currently denies having any symptoms.  States that he is able to exercise without having any difficulty.  Denies having any shortness of breath on exertion, chest pain, syncope.  His post EKG shows narrow QRS.  2D echo from today shows a mean gradient of 21 with a valve area of 2.06    Past Medical History:   Diagnosis Date    JJ (acute kidney injury) 12/03/2023    Aortic valve disease     CHF (congestive heart failure)     Cirrhosis 06/10/2024    Diabetes mellitus     Elevated PSA     H/O brain tumor     HLD (hyperlipidemia)     Hypertension     MRSA infection     IN 2020    PAD (peripheral artery disease)     Personal history of colonic polyps 01/08/2021    Pneumonia, unspecified organism     MOST RECENT WEEK OF APRIL 1, 2024    Prostate cancer     Seizures     R/T BRAIN TUMOR- SURGICALLY EXCISED    Sleep apnea     CPAP    Urinary tract infection        Past Surgical History:   Procedure Laterality Date    ANGIOGRAPHY Left 4/6/2020    Procedure: ANGIOGRAM, Left lower extremity;  Surgeon: Kelby Gomez MD;  Location: [facility] OR [address] [place] [address];  Service: Peripheral Vascular;  Laterality: Left;    ANGIOGRAPHY OF LOWER EXTREMITY Left 6/29/2020    Procedure: Angiogram Extremity Unilateral, washout L groin, possible open pseudoaneurysm repair;  Surgeon: Bruce Dallas MD;  Location: [facility] OR [address] [place] [address];  Service: Peripheral Vascular;  Laterality: Left;  contrast 23 ml  fluoro time: 9.9 min  mGy: 1230.26  Gycm2: 146.69    AORTOGRAPHY N/A 6/29/2020    Procedure: AORTOGRAM;  Surgeon: Bruce Dallas MD;  Location: [facility] OR [address] [place] [address];  Service: Peripheral Vascular;   Laterality: N/A;    APPLICATION OF WOUND VACUUM-ASSISTED CLOSURE DEVICE Bilateral 1/1/2020    Procedure: APPLICATION, WOUND VAC;  Surgeon: SEBAS Prieto II, MD;  [facility];  Service: Cardiovascular;  Laterality: Bilateral;    APPLICATION OF WOUND VACUUM-ASSISTED CLOSURE DEVICE N/A 1/3/2020    Procedure: APPLICATION, WOUND VAC;  Surgeon: Brian Wong MD;  [facility];  Service: Plastics;  Laterality: N/A;    BRAIN SURGERY  01/2011    TUMOR EXCISED    CARDIAC CATH COSURGEON N/A 8/13/2024    Procedure: Cardiac Cath Cosurgeon;  Surgeon: Armand Erazo MD;  Location: [facility] CATH LAB;  Service: Cardiology;  Laterality: N/A;    COLONOSCOPY N/A 1/8/2021    Procedure: COLONOSCOPY;  Surgeon: Susan Posada MD;  Location: [facility] ENDO;  Service: Endoscopy;  Laterality: N/A;    COLONOSCOPY N/A 11/18/2021    Procedure: COLONOSCOPY;  Surgeon: Susan Posada MD;  Location: [facility] ENDO;  Service: Endoscopy;  Laterality: N/A;    CORONARY ANGIOGRAPHY Left 4/19/2024    Procedure: ANGIOGRAM, CORONARY ARTERY;  Surgeon: Giuseppe Blood MD;  Location: [facility] CATH;  Service: Cardiology;  Laterality: Left;    CREATION OF ILIOFEMORAL ARTERY BYPASS Left 8/12/2020    Procedure: CREATION, BYPASS, ARTERIAL, ILIAC TO FEMORAL;  Surgeon: Kelby Gomez MD;  Location: [facility] OR [address] [place] [address];  Service: Peripheral Vascular;  Laterality: Left;  CO-SURGEONS: DR KEO ADHIKARI;  DR WONG, profusion    CREATION OF MUSCLE ROTATIONAL FLAP N/A 1/3/2020    Procedure: CREATION, FLAP, MUSCLE ROTATION;  Surgeon: Brian Wong MD;  [facility];  Service: Plastics;  Laterality: N/A;    ENDARTERECTOMY OF FEMORAL ARTERY Bilateral 12/20/2019    Procedure: ENDARTERECTOMY, FEMORAL;  Surgeon: Kelby Gomez MD;  Location: [facility] OR [address] [place] [address];  Service: Peripheral Vascular;  Laterality: Bilateral;  natalya common femoral endarterectomy with natalya. iliac stent placement    ESOPHAGOGASTRODUODENOSCOPY N/A 4/7/2022     Procedure: EGD (ESOPHAGOGASTRODUODENOSCOPY);  Surgeon: Susan Posada MD;  Location: [facility] ENDO;  Service: Endoscopy;  Laterality: N/A;    INTRALUMINAL GASTROINTESTINAL TRACT IMAGING VIA CAPSULE N/A 4/18/2022    Procedure: IMAGING, GI TRACT, INTRALUMINAL, VIA CAPSULE;  Surgeon: Otto Powers MD;  Location: [facility] ENDO;  Service: Endoscopy;  Laterality: N/A;    PERCUTANEOUS TRANSLUMINAL ANGIOPLASTY Left 4/6/2020    Procedure: PTA (ANGIOPLASTY, PERCUTANEOUS, TRANSLUMINAL), left lower extremity;  Surgeon: Kelby Gomez MD;  Location: [facility] 2ND FLR;  Service: Peripheral Vascular;  Laterality: Left;    PERCUTANEOUS TRANSLUMINAL ANGIOPLASTY (PTA) OF PERIPHERAL VESSEL Left 10/17/2019    Procedure: PTA, PERIPHERAL VESSEL;  Surgeon: Rao Fisher MD;  Location: [facility] CATH;  Service: Cardiology;  Laterality: Left;    PSEUDOANEURYSM REPAIR Left 6/29/2020    Procedure: REPAIR, PSEUDOANEURYSM;  Surgeon: Bruce Dallas MD;  Location: [facility] OR 2ND FLR;  Service: Peripheral Vascular;  Laterality: Left;    RADICAL RETROPUBIC PROSTATECTOMY N/A 12/13/2021    Procedure: PROSTATECTOMY, RETROPUBIC, RADICAL + Bilateral pelvic lymph node dissection;  Surgeon: Raghav Mitchell MD;  Location: [facility] OR;  Service: Urology;  Laterality: N/A;    RIGHT HEART CATHETERIZATION Right 4/19/2024    Procedure: INSERTION, CATHETER, RIGHT HEART;  Surgeon: Giuseppe Blood MD;  Location: [facility] CATH;  Service: Cardiology;  Laterality: Right;    TRANSCATHETER AORTIC VALVE REPLACEMENT (TAVR) Right 8/13/2024    Procedure: REPLACEMENT, AORTIC VALVE, TRANSCATHETER (TAVR);  Surgeon: Ruslan Xie MD;  Location: [facility] CATH LAB;  Service: Cardiology;  Laterality: Right;    TRANSCATHETER AORTIC VALVE REPLACEMENT (TAVR)  8/13/2024    Procedure: REPLACEMENT, AORTIC VALVE, TRANSCATHETER (TAVR);  Surgeon: Armand Erazo MD;  Location: [facility] CATH LAB;  Service: Cardiology;;    TRANSESOPHAGEAL ECHOCARDIOGRAPHY N/A 4/19/2024    Procedure:  ECHOCARDIOGRAM, TRANSESOPHAGEAL;  Surgeon: Giuseppe Blood MD;  Location: [facility];  Service: Cardiology;  Laterality: N/A;       Current Outpatient Medications on File Prior to Visit   Medication Sig Dispense Refill    ascorbic acid, vitamin C, (VITAMIN C) 500 MG tablet Vitamin C 500 mg tablet, [RxNorm: 073176]      aspirin (ECOTRIN) 81 MG EC tablet Take 81 mg by mouth once daily.      atorvastatin (LIPITOR) 40 MG tablet Take 1 tablet (40 mg total) by mouth once daily. 30 tablet 5    clopidogreL (PLAVIX) 75 mg tablet clopidogreL 75 mg tablet, [RxNorm: 885014]      ferrous sulfate 324 mg (65 mg iron) TbEC ferrous sulfate 324 mg (65 mg iron) tablet,delayed release, [RxNorm: 4972382]      furosemide (LASIX) 20 MG tablet Take 1 tablet (20 mg total) by mouth once daily. You may increase to twice daily if needed for increased shortness of breath or fluid 30 tablet 11    hydrALAZINE (APRESOLINE) 50 MG tablet Take 1 tablet (50 mg total) by mouth every 8 (eight) hours. (Patient taking differently: Take 50 mg by mouth 2 (two) times a day.) 90 tablet 3    hydrALAZINE (APRESOLINE) 50 MG tablet       LYRICA 100 mg capsule Take 100 mg by mouth 2 (two) times daily. Patient takes medication for seizures      metFORMIN (GLUCOPHAGE) 500 MG tablet       NIFEdipine (ADALAT CC) 60 MG TbSR       NIFEdipine (PROCARDIA-XL) 60 MG (OSM) 24 hr tablet Take 60 mg by mouth.      nitroGLYCERIN (NITROSTAT) 0.4 MG SL tablet Place 1 tablet (0.4 mg total) under the tongue every 5 (five) minutes as needed for Chest pain. 15 tablet 3     No current facility-administered medications on file prior to visit.       Review of patient's allergies indicates:  No Known Allergies    Social History     Tobacco Use    Smoking status: Former     Types: Cigarettes     Passive exposure: Current    Smokeless tobacco: Never    Tobacco comments:      2-3 daily   Substance Use Topics    Alcohol use: Not Currently     Comment: socially    Drug use: Never       Family  "History   Problem Relation Name Age of Onset    Diabetes Mother      Hypertension Mother      Stroke Mother      Cancer Father          LUNG    Heart disease Father      Cancer Brother      Heart disease Brother      Heart disease Brother      Heart disease Brother      Prostate cancer Neg Hx      Kidney disease Neg Hx           Review of Systems   Constitutional: Negative.   HENT: Negative.     Eyes: Negative.    Cardiovascular: Negative.    Respiratory: Negative.     Endocrine: Negative.    Skin: Negative.    Musculoskeletal: Negative.    Gastrointestinal: Negative.    Neurological: Negative.         Objective:     Vitals:    09/17/24 1022 09/17/24 1023   BP: (!) 149/71 (!) 146/69   BP Location: Left arm Right arm   Patient Position: Sitting Sitting   BP Method: Large (Automatic) Large (Automatic)   Pulse: 83 80   SpO2: 98% 98%   Weight: 115.6 kg (254 lb 13.6 oz)    Height: 5' 10" (1.778 m)         Physical Exam  Constitutional:       Appearance: Normal appearance.   HENT:      Head: Normocephalic and atraumatic.   Eyes:      Extraocular Movements: Extraocular movements intact.      Pupils: Pupils are equal, round, and reactive to light.   Cardiovascular:      Rate and Rhythm: Normal rate and regular rhythm.      Heart sounds: Murmur heard.      Comments: ESM present in right 2 nd ICS  Pulmonary:      Effort: Pulmonary effort is normal.      Breath sounds: Normal breath sounds.   Abdominal:      General: Abdomen is flat.      Palpations: Abdomen is soft.   Musculoskeletal:         General: Normal range of motion.      Cervical back: Normal range of motion.   Skin:     General: Skin is warm.      Capillary Refill: Capillary refill takes less than 2 seconds.   Neurological:      General: No focal deficit present.      Mental Status: He is alert and oriented to person, place, and time.           Assessmen/Plan   Nonrheumatic aortic valve stenosis  S/P 27mm Navitor valve.  There is normal leaflet mobility. Aortic " valve area by VTI is 2.11 cm². Aortic valve peak velocity is 2.9 m/s. Mean gradient is 17 mmHg.  Patient currently asymptomatic and feels much better and is able to exercise without having any shortness a breath.  And he states that he wants to return back to work in 2 weeks    Continue with aspirin and Plavix    PAD (peripheral artery disease)  Continue aspirin, Lipitor    HLD (hyperlipidemia)  Continue Lipitor    Essential hypertension  Blood pressure is well controlled.  Continue with the current blood pressure medications    Type 2 diabetes mellitus, without long-term current use of insulin  Follow up with primary care physician    Cirrhosis  History of alcoholic liver cirrhosis.  Follow up with hepatology      Humberto Rosas       STAFF  I have seen the patient, reviewed the Fellow's history and physical, assessment and plan. I have personally interviewed and examined the patient and agree with the findings.     CLYDE Xie MD

## 2024-09-23 ENCOUNTER — OFFICE VISIT (OUTPATIENT)
Dept: HEPATOLOGY | Facility: CLINIC | Age: 64
End: 2024-09-23
Payer: COMMERCIAL

## 2024-09-23 ENCOUNTER — PATIENT MESSAGE (OUTPATIENT)
Dept: HEPATOLOGY | Facility: CLINIC | Age: 64
End: 2024-09-23

## 2024-09-23 VITALS
OXYGEN SATURATION: 98 % | HEART RATE: 78 BPM | WEIGHT: 252.13 LBS | BODY MASS INDEX: 36.09 KG/M2 | HEIGHT: 70 IN | SYSTOLIC BLOOD PRESSURE: 126 MMHG | TEMPERATURE: 98 F | DIASTOLIC BLOOD PRESSURE: 58 MMHG

## 2024-09-23 DIAGNOSIS — K76.0 METABOLIC DYSFUNCTION-ASSOCIATED STEATOTIC LIVER DISEASE (MASLD): ICD-10-CM

## 2024-09-23 DIAGNOSIS — Z87.898 HISTORY OF HEAVY ALCOHOL CONSUMPTION: ICD-10-CM

## 2024-09-23 DIAGNOSIS — K70.30 ALCOHOLIC CIRRHOSIS OF LIVER WITHOUT ASCITES: Primary | ICD-10-CM

## 2024-09-23 DIAGNOSIS — E61.1 IRON DEFICIENCY: ICD-10-CM

## 2024-09-23 DIAGNOSIS — Z86.010 HISTORY OF COLON POLYPS: ICD-10-CM

## 2024-09-23 PROCEDURE — 99214 OFFICE O/P EST MOD 30 MIN: CPT | Mod: S$GLB,,, | Performed by: INTERNAL MEDICINE

## 2024-09-23 PROCEDURE — 99999 PR PBB SHADOW E&M-EST. PATIENT-LVL V: CPT | Mod: PBBFAC,,, | Performed by: INTERNAL MEDICINE

## 2024-09-23 RX ORDER — METFORMIN HYDROCHLORIDE 500 MG/1
500 TABLET, EXTENDED RELEASE ORAL 2 TIMES DAILY
COMMUNITY
Start: 2024-09-17

## 2024-09-23 NOTE — PROGRESS NOTES
HEPATOLOGY FOLLOW UP    Referring Physician: Eren Becker MD   Current Corresponding Physician: Eren Becker MD     Renan Britton is here for follow up of Cirrhosis and Fatty Liver      HPI  Renan Britton is a 64 y.o. male with a hx of prostate cancer, Aortic stenosis, PVD, DM and sleep apnea who presented 5/30/24 for evaluation of imaging suggestive of cirrhosis. He was being evaluated for surgical aortic valve replacement:     CTA abdo 5/21/24: Liver: Slightly nodular liver contour, suggesting possible cirrhosis.   --denies any symptoms of decompensated cirrhosis, including no ascites or edema, cognitive problems that would suggest hepatic encephalopathy, or GI bleeding from varices.   --is on diuretics for cardiac issues  --Hx heavy alcohol x many years (6-pack per day)  --BMI 37     Labs 4/4/24: plts 148, , creat 0.9, alb 3.5, Tbil 0.5, ALT 21, AST 22, ALKP 40    Interval History  Since Renan Britton's last visit:    MRE 6/25/24: mild steatosis; F3 or F4 fibrosis  MRI abdo 5/31/24: The surface of the liver is nodular compatible with cirrhosis. The liver is relatively homogeneous in signal intensity.   AFP 2.6 5/31/24  EGD 4/7/22: no varices    --due to presence of cirrhosis- surgical valve replacement for severe AS was canceled and decision to pursue TAVR was made  TAVR 8/13/24: successful  -on second round of 8 iron infusions  --lost 25 lbs intentionally  --has been drinking some beer    Labs 9/17/24: ALT 21, AST 23, ALKP 50, Tbil 0.3  MELD 3.0: 8 at 8/9/2024  8:29 AM  MELD-Na: 8 at 8/9/2024  8:29 AM  Calculated from:  Serum Creatinine: 0.9 mg/dL (Using min of 1 mg/dL) at 8/9/2024  8:29 AM  Serum Sodium: 140 mmol/L (Using max of 137 mmol/L) at 8/9/2024  8:29 AM  Total Bilirubin: 1.3 mg/dL at 8/9/2024  8:29 AM  Serum Albumin: 3.8 g/dL (Using max of 3.5 g/dL) at 8/9/2024  8:29 AM  INR(ratio): 1.1 at 8/8/2024 12:25 AM  Age at listing (hypothetical): 63 years  Sex: Male  at 8/9/2024  8:29 AM     .    Outpatient Encounter Medications as of 9/23/2024   Medication Sig Dispense Refill    ascorbic acid, vitamin C, (VITAMIN C) 500 MG tablet Vitamin C 500 mg tablet, [RxNorm: 359697]      aspirin (ECOTRIN) 81 MG EC tablet Take 81 mg by mouth once daily.      atorvastatin (LIPITOR) 40 MG tablet Take 1 tablet (40 mg total) by mouth once daily. 30 tablet 5    clopidogreL (PLAVIX) 75 mg tablet clopidogreL 75 mg tablet, [RxNorm: 227962]      ferrous sulfate 324 mg (65 mg iron) TbEC ferrous sulfate 324 mg (65 mg iron) tablet,delayed release, [RxNorm: 1371957]      furosemide (LASIX) 20 MG tablet Take 1 tablet (20 mg total) by mouth once daily. You may increase to twice daily if needed for increased shortness of breath or fluid 30 tablet 11    hydrALAZINE (APRESOLINE) 50 MG tablet Take 1 tablet (50 mg total) by mouth every 8 (eight) hours. (Patient taking differently: Take 50 mg by mouth 2 (two) times a day.) 90 tablet 3    hydrALAZINE (APRESOLINE) 50 MG tablet       LYRICA 100 mg capsule Take 100 mg by mouth 2 (two) times daily. Patient takes medication for seizures      metFORMIN (GLUCOPHAGE-XR) 500 MG ER 24hr tablet Take 500 mg by mouth 2 (two) times daily.      NIFEdipine (ADALAT CC) 60 MG TbSR       NIFEdipine (PROCARDIA-XL) 60 MG (OSM) 24 hr tablet Take 60 mg by mouth.      nitroGLYCERIN (NITROSTAT) 0.4 MG SL tablet Place 1 tablet (0.4 mg total) under the tongue every 5 (five) minutes as needed for Chest pain. 15 tablet 3    metFORMIN (GLUCOPHAGE) 500 MG tablet  (Patient not taking: Reported on 9/23/2024)       No facility-administered encounter medications on file as of 9/23/2024.     Review of patient's allergies indicates:  No Known Allergies  Past Medical History:   Diagnosis Date    JJ (acute kidney injury) 12/03/2023    Aortic valve disease     CHF (congestive heart failure)     Cirrhosis 06/10/2024    Diabetes mellitus     Elevated PSA     H/O brain tumor     HLD (hyperlipidemia)      Hypertension     MRSA infection     IN 2020    PAD (peripheral artery disease)     Personal history of colonic polyps 01/08/2021    Pneumonia, unspecified organism     MOST RECENT WEEK OF APRIL 1, 2024    Prostate cancer     Seizures     R/T BRAIN TUMOR- SURGICALLY EXCISED    Sleep apnea     CPAP    Urinary tract infection        Review of Systems   Constitutional: Negative.    HENT: Negative.     Eyes: Negative.    Respiratory: Negative.     Cardiovascular: Negative.    Gastrointestinal: Negative.    Genitourinary: Negative.    Musculoskeletal: Negative.    Skin: Negative.    Neurological: Negative.    Psychiatric/Behavioral: Negative.       Vitals:    09/23/24 1039   BP: (!) 126/58   Pulse: 78   Temp: 97.7 °F (36.5 °C)       Physical Exam  Vitals reviewed.   Constitutional:       Appearance: He is well-developed.   HENT:      Head: Normocephalic and atraumatic.   Eyes:      General: No scleral icterus.     Conjunctiva/sclera: Conjunctivae normal.      Pupils: Pupils are equal, round, and reactive to light.   Neck:      Thyroid: No thyromegaly.   Cardiovascular:      Rate and Rhythm: Normal rate and regular rhythm.      Heart sounds: Normal heart sounds.   Pulmonary:      Effort: Pulmonary effort is normal.      Breath sounds: Normal breath sounds. No rales.   Abdominal:      General: Bowel sounds are normal. There is no distension.      Palpations: Abdomen is soft. There is no mass.      Tenderness: There is no abdominal tenderness.   Musculoskeletal:         General: Normal range of motion.      Cervical back: Normal range of motion and neck supple.   Skin:     General: Skin is warm and dry.      Findings: No rash.   Neurological:      Mental Status: He is alert and oriented to person, place, and time.         MELD 3.0: 8 at 8/9/2024  8:29 AM  MELD-Na: 8 at 8/9/2024  8:29 AM  Calculated from:  Serum Creatinine: 0.9 mg/dL (Using min of 1 mg/dL) at 8/9/2024  8:29 AM  Serum Sodium: 140 mmol/L (Using max of 137  mmol/L) at 8/9/2024  8:29 AM  Total Bilirubin: 1.3 mg/dL at 8/9/2024  8:29 AM  Serum Albumin: 3.8 g/dL (Using max of 3.5 g/dL) at 8/9/2024  8:29 AM  INR(ratio): 1.1 at 8/8/2024 12:25 AM  Age at listing (hypothetical): 63 years  Sex: Male at 8/9/2024  8:29 AM      Lab Results   Component Value Date     09/17/2024    BUN 19 09/17/2024    CREATININE 0.9 09/17/2024    CALCIUM 9.9 09/17/2024     09/17/2024    K 3.6 09/17/2024     09/17/2024    PROT 7.3 09/17/2024    CO2 26 09/17/2024    ANIONGAP 8 09/17/2024    WBC 4.84 09/17/2024    RBC 4.96 09/17/2024    HGB 12.9 (L) 09/17/2024    HCT 42.6 09/17/2024    HCT 39 08/12/2020    MCV 86 09/17/2024    MCH 26.0 (L) 09/17/2024    MCHC 30.3 (L) 09/17/2024     Lab Results   Component Value Date    RDW 19.5 (H) 09/17/2024     (L) 09/17/2024    MPV 12.4 09/17/2024    GRAN 3.5 09/17/2024    GRAN 71.9 09/17/2024    LYMPH 0.7 (L) 09/17/2024    LYMPH 14.5 (L) 09/17/2024    MONO 0.4 09/17/2024    MONO 9.1 09/17/2024    EOSINOPHIL 3.3 09/17/2024    BASOPHIL 0.8 09/17/2024    EOS 0.2 09/17/2024    BASO 0.04 09/17/2024    APTT 24.9 08/09/2024    GROUPTRH A NEG 08/13/2024     (H) 08/13/2024    CHOL 109 (L) 05/01/2023    TRIG 78 05/01/2023    HDL 34 (L) 05/01/2023    CHOLHDL 31.2 05/01/2023    TOTALCHOLEST 3.2 05/01/2023    ALBUMIN 4.1 09/17/2024    BILIDIR 0.3 05/31/2024    AST 23 09/17/2024    ALT 21 09/17/2024    ALKPHOS 50 (L) 09/17/2024    MG 2.1 07/02/2024    LABPROT 11.6 08/12/2024    INR 1.1 08/12/2024    PSA 1.8 08/12/2024       Assessment and Plan:  Renan Britton is a 64 y.o. male with alcohol/MASLD-induced cirrhosis. Current recommendations:  Cirrhosis, compensated: monitor labs every 3 months; hcc screening every 6 months with imaging and AFP-due11/24; EGD to screen for varices 2024 (due now)  Hx heavy alcohol: recommend complete alcohol avoidance  Elevated BMI: recommend continued weight loss  Colorectal ca screening: recommend  colonoscopy now  Return 4 months  A total of 35 minutes was spent reviewing the patient's chart, examining the patient, reviewing labs and imaging and coordinating care with the patient's care team.

## 2024-09-23 NOTE — PATIENT INSTRUCTIONS
Labs every 12 weeks- due next 12/24  EGD/colonsocopy now  Abdo US with AFP 11/24  Avoid alcohol  Lose weight  Return 4 months

## 2024-09-24 ENCOUNTER — PATIENT MESSAGE (OUTPATIENT)
Dept: FAMILY MEDICINE | Facility: CLINIC | Age: 64
End: 2024-09-24
Payer: COMMERCIAL

## 2024-09-24 ENCOUNTER — PATIENT MESSAGE (OUTPATIENT)
Dept: HEMATOLOGY/ONCOLOGY | Facility: CLINIC | Age: 64
End: 2024-09-24
Payer: COMMERCIAL

## 2024-09-25 ENCOUNTER — TELEPHONE (OUTPATIENT)
Dept: GASTROENTEROLOGY | Facility: CLINIC | Age: 64
End: 2024-09-25
Payer: COMMERCIAL

## 2024-09-25 ENCOUNTER — PATIENT MESSAGE (OUTPATIENT)
Dept: HEMATOLOGY/ONCOLOGY | Facility: CLINIC | Age: 64
End: 2024-09-25
Payer: COMMERCIAL

## 2024-09-25 ENCOUNTER — TELEPHONE (OUTPATIENT)
Dept: HEPATOLOGY | Facility: CLINIC | Age: 64
End: 2024-09-25
Payer: COMMERCIAL

## 2024-09-25 NOTE — TELEPHONE ENCOUNTER
Spoke with patient wife and scheduled appt per referral for anemia. V/U    ----- Message from Simran Hameed sent at 9/25/2024 10:30 AM CDT -----  Regarding: Appointment  Contact: Patricia/wife [phone]  Patient's wife Patricia calling to schedule appointment due to colonoscopy and endoscopy per referral from Dr. Lindsey. Please contact patient as soon as possible.

## 2024-09-25 NOTE — TELEPHONE ENCOUNTER
Patient and patient's wife wanted to get clarity on the labs scheduled.          ----- Message from Simran Hameed sent at 9/25/2024 10:34 AM CDT -----  Regarding: Appointment  Contact: [phone]  Calling to schedule appointment due to needing labs. Please contact patient as soon as possible.

## 2024-10-02 ENCOUNTER — PATIENT MESSAGE (OUTPATIENT)
Dept: GASTROENTEROLOGY | Facility: CLINIC | Age: 64
End: 2024-10-02

## 2024-10-02 ENCOUNTER — PATIENT MESSAGE (OUTPATIENT)
Dept: CARDIOLOGY | Facility: CLINIC | Age: 64
End: 2024-10-02
Payer: COMMERCIAL

## 2024-10-02 ENCOUNTER — OFFICE VISIT (OUTPATIENT)
Dept: GASTROENTEROLOGY | Facility: CLINIC | Age: 64
End: 2024-10-02
Payer: COMMERCIAL

## 2024-10-02 ENCOUNTER — TELEPHONE (OUTPATIENT)
Dept: GASTROENTEROLOGY | Facility: CLINIC | Age: 64
End: 2024-10-02
Payer: COMMERCIAL

## 2024-10-02 VITALS — WEIGHT: 248.44 LBS | BODY MASS INDEX: 35.65 KG/M2

## 2024-10-02 DIAGNOSIS — Z12.11 SCREENING FOR COLON CANCER: ICD-10-CM

## 2024-10-02 DIAGNOSIS — K74.69 OTHER CIRRHOSIS OF LIVER: ICD-10-CM

## 2024-10-02 DIAGNOSIS — D50.8 OTHER IRON DEFICIENCY ANEMIA: Primary | ICD-10-CM

## 2024-10-02 PROCEDURE — 99215 OFFICE O/P EST HI 40 MIN: CPT | Mod: S$GLB,,, | Performed by: NURSE PRACTITIONER

## 2024-10-02 PROCEDURE — 99999 PR PBB SHADOW E&M-EST. PATIENT-LVL III: CPT | Mod: PBBFAC,,, | Performed by: NURSE PRACTITIONER

## 2024-10-02 RX ORDER — SODIUM, POTASSIUM,MAG SULFATES 17.5-3.13G
1 SOLUTION, RECONSTITUTED, ORAL ORAL DAILY
Qty: 1 KIT | Refills: 0 | Status: SHIPPED | OUTPATIENT
Start: 2024-10-02

## 2024-10-02 NOTE — PATIENT INSTRUCTIONS
IMPORTANT INFORMATION TO KNOW BEFORE YOUR PROCEDURE    [facility] 2nd Floor         If your procedure requires the administration of anesthesia, it is necessary for a responsible adult to drive you home. (Medical Transportation, Uber, Lyft, Taxi, etc. may ONLY be used if a responsible adult is present to accompany you home.  The responsible adult CAN'T be the  of the service).      person must be available to return to pick you up within 15 minutes of being notified of discharge.       Please bring a picture ID, insurance card, & copayment      Take Medications as directed below:      1) Stop taking Plavix (clopidogrel) 5 days (prior to the procedure) on:  NOVEMBER TBD         If you begin taking any blood thinning medications or injectable weight loss/diabetes medications (other than insulin) , please contact the endoscopy scheduling department listed below as soon as possible.    If you are diabetic see the attached instruction sheet regarding your medication.     If you take HEART, BLOOD PRESSURE, SEIZURE, PAIN, LUNG (including inhalers/nebulizers), ANTI-REJECTION (transplant patients), or PSYCHIATRIC medications, please take at your regular times with a sip of water or as directed by the scheduling nurse.     Important contact information:    Endoscopy [phone] Hours of operation Monday-Friday 8:00-4:30pm.    Questions about insurance or financial obligations call [phone] or [phone].    If you have questions regarding the prep or need to reschedule, please call [phone]. After hours questions requiring immediate assistance, contact [facility] nurse line at [phone] or [phone].   NOTE:     On occasion, unforeseen circumstances may cause a delay in your procedure start time. We respect your time and appreciate your patience during these circumstances.    Colonoscopy Procedure Prep Instructions        Date of procedure: NOV  with      Location of Department:   [facility] [address] [place] [address]   Take the Elevators to 2nd Floor Endoscopy Procedural Area      As soon as possible:   your prep from pharmacy and over the counter DULCOLAX LAXATIVE TABLETS     What You Can do:  You may have clear liquids ONLY-see below for list.     Liquids That Are OK to Drink:   Water  Sports drinks (Gatorade, Power-Aid)  Coffee or tea (no cream or nondairy creamer)  Clear juices without pulp (apple, white grape)  Gelatin desserts (no fruit or toppings)  Clear soda (sprite, coke, ginger ale)  Chicken broth (until 12 midnight the night before procedure)    What You CANNOT do:   Do not EAT solid food, drink milk or anything   colored red.  Do not drink alcohol.  Do not take oral medications within 1 hour of starting each dose of SUPREP.  No gum chewing or candy morning of procedure.    Note:   You will be picking up (KIT) from the pharmacy.   Please disregard the insert instructions from pharmacy).  SUPREP Bowel Prep Kit is indicated for cleansing of the colon as a preparation for colonoscopy in adults.   Be sure to tell your doctor about all the medicines you take, including prescription and non-prescription medicines, vitamins, and herbal supplements. SUPREP Bowel Prep Kit may affect how other medicines work.  Medication taken by mouth may not be absorbed properly when taken within 1 hour before the start of each dose of SUPREP Bowel Prep Kit.  It is not uncommon to experience some abdominal cramping, nausea and/or vomiting when taking the prep. If you have nausea and/or vomiting while taking the prep, stop drinking for 20 to 30 minutes then continue.      How to take prep:   DOSE 1--Day Before Colonoscopy    Drink at least 6 to 8 glasses of clear liquids from time you wake up until you begin your prep and then continue until bedtime to avoid dehydration.     12:00 pm (NOON) Take four (4) Dulcolax (Bisacodyl) tablets with  at least 8 oz. or more of clear liquids.    At 6:00 pm:  You must complete Steps 1 through 4 using one (1)   6-ounce bottle before going to bed as shown below:    Step 1-Pour ONE (1) 6-ounce bottle of SUPREP liquid into the mixing container  Step 2-Add cool drinking water to the 16-ounce line on the container and mix  Step 3-Drink ALL the liquid in the container.  Step 4-You must drink two (2) more 16-ounce containers of water over the next 1 hour    DOSE 2--Day of the Colonoscopy at 2-3 AM.    For this dose, repeat Steps 1 through 4 shown above using one (1) 6-ounce bottle.     You may continue drinking water/clear liquids until   4 hours before your colonoscopy or as directed by the scheduling nurse.      For information about your procedure, two (2) things to view prior to colonoscopy:  Please watch this informational video. It is important to watch this animated consent video prior to your arrival. If you haven't watched the video prior to arriving, you are required to watch it during admission which can causes delays.    Options for viewing:   Using a keyboard:  press and hold the control tab (Ctrl) and left mouse click to follow links.           Colonoscopy Instructional Video                                                                                   OR    Type link address into your web browser's address bar:  [email or web address]      Educational Booklet with pictures:      Colonoscopy Prep - Liquid

## 2024-10-02 NOTE — PROGRESS NOTES
GASTROENTEROLOGY CLINIC NOTE    Chief Complaint: The primary encounter diagnosis was Other iron deficiency anemia. Diagnoses of Screening for colon cancer and Other cirrhosis of liver were also pertinent to this visit.  Referring provider/PCP: Eren Becker MD    HPI  Renan Britton is a 64 y.o. male who is a new patient to me who presents to clinic for anemia, esophageal variceal screening,  and colon cancer screening. He is accompanied by his wife.   Underwent full workup for anemia in 2022. No cause found.   Labs stable until recently. 8/8/2024 H/H 7.5/26.7  He is receiving IV iron infusions and being followed by hematology. Most recent H/H 12.9/42.6 obtained on 9/17/2024  Celiac serology negative 5/2024. Biopsies negative for H.pylori with EGD 2022  Also being followed by hepatology for cirrhosis; recommended screening for esophageal varices.   He currently does not have any GI complaints.   8/2024 TAVR; no other recent procedures    Reflux: No  Dysphagia/Odynophagia: No  Nausea/Vomiting: No  Appetite Changes: No  Abdominal Pain: No  Bowel Habits: regular bowel movements. No change in bowel habits.  GI Bleeding: Denies hematochezia, hematemesis, melena, BRBPR, Black/tarry stool, coffee ground emesis  Unexplained Weight Loss: No       GLP-1s: No  NSAIDs: No  Anticoagulation or Antiplatelet: Plavix      History of H.pylori:  no  H.pylori Treatment:    Video Capsule Endoscopy: 4/2022  Impression:            - No obvious explanation of iron def on capsule.   Recommendation:        - Discharge patient to home.                          - Resume previous diet.                          - Return to referring physician as previously                          scheduled.     Prior Upper Endoscopy:  4/2022 with Dr. Posada  Impression:            - Normal middle third of esophagus.                          - Erythematous mucosa in the antrum. Biopsied.                          - Normal examined duodenum.  "Biopsied.                          - The tonsils are touching.                          - A single plaque in the upper third of the                          esophagus. Biopsied. This is consistent with a                          benign inlet patch.                          - Z-line irregular, at the gastroesophageal                          junction. No changes c/w Neal's seen.     Recommendation: - Continue present medications including daily PPI.                          - Await pathology results.                          - Repeat upper endoscopy PRN.                          - Return to GI clinic PRN.                          - Resume Plavix (clopidogrel) at prior dose                          tomorrow.     Pathology:   1. Duodenum, biopsy:   - Duodenal mucosa without significant histopathologic alteration   - Negative for active duodenitis or celiac-like injury   - Negative for dysplasia or malignancy   2. Stomach, antrum, biopsy:   - Mild chronic inactive gastritis   - Negative for Helicobacter pylori on H&E stain and immunostain   - Negative for intestinal metaplasia, dysplasia or malignancy   NOTE: Positive control and internal negative control for Helicobacter pylori   immunostain were examined and were appropriate.   3. Esophagus, designated "proximal plaque", biopsy:   - Benign ectopic gastric mucosa, consistent with endoscopically identified   inlet patch   - Negative for infectious organisms on H&E stain   - Negative for intestinal metaplasia, dysplasia or malignancy     Prior Colonoscopy: 11/2021 with Dr. Posada  Impression:            - This was a much better exam than last                          colonoscopy.                          - Significant colonic spasm.                          - Two 8 to 9 mm polyps in the descending colon,                          removed with a hot snare. Resected and retrieved.                          - One 5 mm polyp in the descending colon. Treated                  "         with a hot snare.                          - One 10 mm polyp in the sigmoid colon, removed                          with a hot snare. Resected and retrieved.                          - One diminutive polyp in the rectum, removed with                          a cold biopsy forceps. Resected and retrieved.                          - Redundant colon.                          - Internal hemorrhoids.     Recommendation: - High fiber diet.                          - Continue present medications.                          - Await pathology results.                          - Repeat colonoscopy in 3 years for surveillance     Pathology:   1. Descending colon polyps x2, biopsy:  Fragments of tubular adenoma   2. Sigmoid colon polyp, biopsy:  Tubular adenoma   3. Rectal polyp, biopsy:  Hyperplastic polyp     1/2021 with Dr. Posada  Impression:           - Preparation of the colon was fair.                         - Significant colonic spasm.                         - Redundant colon.                         - One 8 mm polyp at the hepatic flexure, removed                         with a cold biopsy forceps. Resected and retrieved.                         - One 10 mm polyp in the proximal transverse colon,                         removed with a hot snare. Resected and retrieved.                         - One 18 mm polyp in the proximal descending colon,                         removed piecemeal using a hot snare. Resected and                         retrieved. Injected.                         - One 10 mm polyp in the sigmoid colon, removed                         with a hot snare. Complete resection. Polyp tissue                         not retrieved.                         - One 18 mm polyp in the rectum, removed with a hot                         snare. Resected and retrieved.                         - Internal hemorrhoids.     Pathology:   1. Colon, hepatic flexure, biopsy:       - Tubular adenoma   2. Proximal  transverse colon, biopsy:       - Tubular adenoma   3. Proximal descending colon, biopsy:       - Fragments of tubular adenoma(s)   4. Rectum, biopsy:       - Tubular adenoma     Family h/o Colon Cancer: No  Family h/o Crohn's Disease or Ulcerative Colitis: No  Family h/o Celiac Sprue: No  Abdominal Surgeries: no      Review of Systems   Constitutional:  Negative for malaise/fatigue and weight loss.   HENT:  Negative for sore throat.    Eyes:  Negative for blurred vision.   Respiratory:  Negative for cough.    Cardiovascular:  Negative for chest pain.   Gastrointestinal:  Negative for abdominal pain, blood in stool, constipation, diarrhea, heartburn, melena, nausea and vomiting.   Genitourinary:  Negative for dysuria.   Musculoskeletal:  Negative for myalgias.   Skin:  Negative for rash.   Neurological:  Negative for headaches.   Endo/Heme/Allergies:  Negative for environmental allergies.   Psychiatric/Behavioral:  Negative for suicidal ideas. The patient is not nervous/anxious.        Past Medical History: has a past medical history of JJ (acute kidney injury), Aortic valve disease, CHF (congestive heart failure), Cirrhosis, Diabetes mellitus, Elevated PSA, H/O brain tumor, History of heavy alcohol consumption, HLD (hyperlipidemia), Hypertension, Metabolic dysfunction-associated steatotic liver disease (MASLD), MRSA infection, PAD (peripheral artery disease), Personal history of colonic polyps, Pneumonia, unspecified organism, Prostate cancer, Seizures, Sleep apnea, and Urinary tract infection.    Past Surgical History: has a past surgical history that includes Brain surgery (01/2011); Percutaneous transluminal angioplasty (PTA) of peripheral vessel (Left, 10/17/2019); Endarterectomy of femoral artery (Bilateral, 12/20/2019); Application of wound vacuum-assisted closure device (Bilateral, 1/1/2020); Creation of muscle rotational flap (N/A, 1/3/2020); Application of wound vacuum-assisted closure device (N/A,  1/3/2020); Angiography (Left, 4/6/2020); Percutaneous transluminal angioplasty (Left, 4/6/2020); Angiography of lower extremity (Left, 6/29/2020); Aortography (N/A, 6/29/2020); Pseudoaneurysm repair (Left, 6/29/2020); Creation of iliofemoral artery bypass (Left, 8/12/2020); Colonoscopy (N/A, 1/8/2021); Colonoscopy (N/A, 11/18/2021); Radical retropubic prostatectomy (N/A, 12/13/2021); Esophagogastroduodenoscopy (N/A, 4/7/2022); Intraluminal gastrointestinal tract imaging via capsule (N/A, 4/18/2022); Transesophageal echocardiography (N/A, 4/19/2024); Coronary angiography (Left, 4/19/2024); Right heart catheterization (Right, 4/19/2024); Transcatheter aortic valve replacement (TAVR) (Right, 8/13/2024); Cardiac Cath [facility] (N/A, 8/13/2024); and Transcatheter aortic valve replacement (TAVR) (8/13/2024).    Family History:family history includes Cancer in his brother and father; Diabetes in his mother; Heart disease in his brother, brother, brother, and father; Hypertension in his mother; Stroke in his mother.    Allergies: Review of patient's allergies indicates:  No Known Allergies    Social History: reports that he has quit smoking. His smoking use included cigarettes. He has been exposed to tobacco smoke. He has never used smokeless tobacco. He reports that he does not currently use alcohol. He reports that he does not use drugs.    Home medications:   Current Outpatient Medications on File Prior to Visit   Medication Sig Dispense Refill    ascorbic acid, vitamin C, (VITAMIN C) 500 MG tablet Vitamin C 500 mg tablet, [RxNorm: 030317]      aspirin (ECOTRIN) 81 MG EC tablet Take 81 mg by mouth once daily.      atorvastatin (LIPITOR) 40 MG tablet Take 1 tablet (40 mg total) by mouth once daily. 30 tablet 5    clopidogreL (PLAVIX) 75 mg tablet clopidogreL 75 mg tablet, [RxNorm: 184176]      ferrous sulfate 324 mg (65 mg iron) TbEC ferrous sulfate 324 mg (65 mg iron) tablet,delayed release, [RxNorm: 6462171]       furosemide (LASIX) 20 MG tablet Take 1 tablet (20 mg total) by mouth once daily. You may increase to twice daily if needed for increased shortness of breath or fluid 30 tablet 11    hydrALAZINE (APRESOLINE) 50 MG tablet       LYRICA 100 mg capsule Take 100 mg by mouth 2 (two) times daily. Patient takes medication for seizures      NIFEdipine (ADALAT CC) 60 MG TbSR       nitroGLYCERIN (NITROSTAT) 0.4 MG SL tablet Place 1 tablet (0.4 mg total) under the tongue every 5 (five) minutes as needed for Chest pain. 15 tablet 3     Current Facility-Administered Medications on File Prior to Visit   Medication Dose Route Frequency Provider Last Rate Last Admin    0.9% NaCl 250 mL flush bag   Intravenous PRN Kenneth Mcdowell MD   Stopped at 10/02/24 0841    [COMPLETED] ferric gluconate (FERRLECIT) 125 mg in 0.9% NaCl 100 mL IVPB  125 mg Intravenous 1 time in Clinic/HOD Kenneth Mcdowell MD   Stopped at 10/02/24 0833    sodium chloride 0.9% flush 10 mL  10 mL Intravenous PRN Kenneth Mcdwoell MD   10 mL at 10/02/24 0846       Vital signs:  Wt 112.7 kg (248 lb 7.3 oz)   BMI 35.65 kg/m²     Physical Exam  Vitals reviewed.   Constitutional:       General: He is not in acute distress.     Appearance: Normal appearance. He is not ill-appearing.   HENT:      Head: Normocephalic.   Cardiovascular:      Rate and Rhythm: Normal rate and regular rhythm.      Heart sounds: Normal heart sounds. No murmur heard.  Pulmonary:      Effort: Pulmonary effort is normal. No respiratory distress.      Breath sounds: Normal breath sounds.   Chest:      Chest wall: No tenderness.   Abdominal:      General: Bowel sounds are normal. There is no distension.      Palpations: Abdomen is soft.      Tenderness: There is no abdominal tenderness. Negative signs include Nicholas's sign.      Hernia: No hernia is present.   Skin:     General: Skin is warm.   Neurological:      Mental Status: He is alert and oriented to person, place, and time.   Psychiatric:          "Mood and Affect: Mood normal.         Behavior: Behavior normal.         Routine labs:  Lab Results   Component Value Date    WBC 4.84 09/17/2024    HGB 12.9 (L) 09/17/2024    HCT 42.6 09/17/2024    MCV 86 09/17/2024     (L) 09/17/2024     Lab Results   Component Value Date    INR 1.1 08/12/2024     Lab Results   Component Value Date    IRON 15 (L) 08/08/2024    FERRITIN 54 08/08/2024    TIBC 481 (H) 08/08/2024    FESATURATED 3 (L) 08/08/2024     Lab Results   Component Value Date     09/17/2024    K 3.6 09/17/2024     09/17/2024    CO2 26 09/17/2024    BUN 19 09/17/2024    CREATININE 0.9 09/17/2024     Lab Results   Component Value Date    ALBUMIN 4.1 09/17/2024    ALT 21 09/17/2024    AST 23 09/17/2024    ALKPHOS 50 (L) 09/17/2024    BILITOT 0.3 09/17/2024     No results found for: "GLUCOSE"  Lab Results   Component Value Date    TSH 1.091 08/08/2024     Lab Results   Component Value Date    CALCIUM 9.9 09/17/2024    PHOS 3.2 07/02/2024       Imaging:      I have reviewed prior labs, imaging, and notes.      Assessment:  1. Other iron deficiency anemia    2. Screening for colon cancer    3. Other cirrhosis of liver      Iron deficiency anemia. Workup 2022 unrevealing. Anemia improved and has now returned.   No obvious signs of bleeding. Receiving IV iron infusions. Following with hematology.   Due for screening colonoscopy.   Screening for esophageal varices.     Plan:  Orders Placed This Encounter    sodium,potassium,mag sulfates (SUPREP BOWEL PREP KIT) 17.5-3.13-1.6 gram SolR    Case Request Endoscopy: COLONOSCOPY, EGD (ESOPHAGOGASTRODUODENOSCOPY)       EGD  Colonoscopy. Suprep.   Clearance to hold Plavix    Plan of care discussed with patient who is in agreement and verbalized understanding.     I have explained the planned procedures to the patient.The risks, benefits and alternatives of the procedure were also explained in detail. Patient verbalized understanding, all questions were " answered. The patient agrees to proceed as planned    Follow Up: PRN    Higher than average risk given patient is on anticoagulant and will need clearance to hold Plavix      Sophie Núñez, ANDREW,FNP-BC  [facility]    (Portions of this note were dictated using voice recognition software and may contain dictation related errors in spelling/grammar/syntax not found on text review)

## 2024-10-03 ENCOUNTER — TELEPHONE (OUTPATIENT)
Dept: GASTROENTEROLOGY | Facility: CLINIC | Age: 64
End: 2024-10-03
Payer: COMMERCIAL

## 2024-10-03 NOTE — TELEPHONE ENCOUNTER
----- Message from Kathleen sent at 10/3/2024 11:14 AM CDT -----  Type:  Patient Returning Call    Who Called:Patricia   Who Left Message for Patient:  Does the patient know what this is regarding?:yes   Would the patient rather a call back or a response via [facility]? Call back   Best Call Back Number:[phone]  Additional Information: requesting to speak with Dilma

## 2024-10-03 NOTE — TELEPHONE ENCOUNTER
Returned call. Rescheduled nov 22 st [place] with dr quintanilla. Ok to hold plavix 5 days prior per dr wade. Verbal understanding

## 2024-10-07 ENCOUNTER — PATIENT MESSAGE (OUTPATIENT)
Dept: UROLOGY | Facility: CLINIC | Age: 64
End: 2024-10-07
Payer: COMMERCIAL

## 2024-10-25 ENCOUNTER — TELEPHONE (OUTPATIENT)
Dept: UROLOGY | Facility: CLINIC | Age: 64
End: 2024-10-25
Payer: COMMERCIAL

## 2024-11-18 ENCOUNTER — TELEPHONE (OUTPATIENT)
Dept: GASTROENTEROLOGY | Facility: CLINIC | Age: 64
End: 2024-11-18
Payer: COMMERCIAL

## 2024-11-18 DIAGNOSIS — K74.69 OTHER CIRRHOSIS OF LIVER: ICD-10-CM

## 2024-11-18 DIAGNOSIS — Z12.11 SCREENING FOR COLON CANCER: ICD-10-CM

## 2024-11-18 DIAGNOSIS — D50.8 OTHER IRON DEFICIENCY ANEMIA: Primary | ICD-10-CM

## 2024-11-18 NOTE — TELEPHONE ENCOUNTER
Rescheduled to alicia dec 13 with dr quintanilla. Wife stated she have the instructions. Verbal understanding

## 2024-11-26 ENCOUNTER — PATIENT MESSAGE (OUTPATIENT)
Dept: UROLOGY | Facility: CLINIC | Age: 64
End: 2024-11-26
Payer: COMMERCIAL

## 2024-11-26 DIAGNOSIS — R97.20 ELEVATED PSA: ICD-10-CM

## 2024-11-26 DIAGNOSIS — R97.21 RISING PSA FOLLOWING TREATMENT FOR MALIGNANT NEOPLASM OF PROSTATE: Primary | ICD-10-CM

## 2024-11-26 DIAGNOSIS — C61 PROSTATE CANCER: ICD-10-CM

## 2024-11-26 DIAGNOSIS — Z90.79 STATUS POST PROSTATECTOMY: ICD-10-CM

## 2024-12-02 ENCOUNTER — PATIENT MESSAGE (OUTPATIENT)
Dept: HEMATOLOGY/ONCOLOGY | Facility: CLINIC | Age: 64
End: 2024-12-02
Payer: COMMERCIAL

## 2024-12-03 ENCOUNTER — TELEPHONE (OUTPATIENT)
Dept: HEPATOLOGY | Facility: CLINIC | Age: 64
End: 2024-12-03
Payer: COMMERCIAL

## 2024-12-03 ENCOUNTER — PATIENT MESSAGE (OUTPATIENT)
Dept: HEPATOLOGY | Facility: CLINIC | Age: 64
End: 2024-12-03
Payer: COMMERCIAL

## 2024-12-05 ENCOUNTER — HOSPITAL ENCOUNTER (OUTPATIENT)
Dept: RADIOLOGY | Facility: HOSPITAL | Age: 64
Discharge: HOME | End: 2024-12-05
Attending: UROLOGY
Payer: COMMERCIAL

## 2024-12-05 DIAGNOSIS — C61 PROSTATE CANCER: ICD-10-CM

## 2024-12-05 DIAGNOSIS — R97.20 ELEVATED PSA: ICD-10-CM

## 2024-12-05 DIAGNOSIS — R97.21 RISING PSA FOLLOWING TREATMENT FOR MALIGNANT NEOPLASM OF PROSTATE: ICD-10-CM

## 2024-12-05 DIAGNOSIS — Z90.79 STATUS POST PROSTATECTOMY: ICD-10-CM

## 2024-12-05 PROCEDURE — A9595 HC PIFLUFOLASTAT F-18, DX, PER 1 MCI: HCPCS | Mod: TB | Performed by: UROLOGY

## 2024-12-05 PROCEDURE — 78815 PET IMAGE W/CT SKULL-THIGH: CPT | Mod: TC

## 2024-12-05 PROCEDURE — 78815 PET IMAGE W/CT SKULL-THIGH: CPT | Mod: 26,PS,, | Performed by: STUDENT IN AN ORGANIZED HEALTH CARE EDUCATION/TRAINING PROGRAM

## 2024-12-05 RX ORDER — FLUDEOXYGLUCOSE F18 500 MCI/ML
9.18 INJECTION INTRAVENOUS
Status: DISCONTINUED | OUTPATIENT
Start: 2024-12-05 | End: 2024-12-06 | Stop reason: HOSPADM

## 2024-12-05 RX ADMIN — PIFLUFOLASTAT F-18 9.18 MILLICURIE: 80 INJECTION INTRAVENOUS at 02:12

## 2024-12-06 ENCOUNTER — TELEPHONE (OUTPATIENT)
Dept: ENDOSCOPY | Facility: HOSPITAL | Age: 64
End: 2024-12-06
Payer: COMMERCIAL

## 2024-12-06 ENCOUNTER — PATIENT MESSAGE (OUTPATIENT)
Dept: UROLOGY | Facility: CLINIC | Age: 64
End: 2024-12-06
Payer: COMMERCIAL

## 2024-12-06 DIAGNOSIS — K74.60 HEPATIC CIRRHOSIS, UNSPECIFIED HEPATIC CIRRHOSIS TYPE, UNSPECIFIED WHETHER ASCITES PRESENT: Primary | ICD-10-CM

## 2024-12-10 ENCOUNTER — NURSE TRIAGE (OUTPATIENT)
Dept: ADMINISTRATIVE | Facility: CLINIC | Age: 64
End: 2024-12-10
Payer: COMMERCIAL

## 2024-12-10 ENCOUNTER — OFFICE VISIT (OUTPATIENT)
Dept: UROLOGY | Facility: CLINIC | Age: 64
End: 2024-12-10
Payer: COMMERCIAL

## 2024-12-10 VITALS
SYSTOLIC BLOOD PRESSURE: 154 MMHG | HEART RATE: 70 BPM | OXYGEN SATURATION: 99 % | WEIGHT: 242 LBS | DIASTOLIC BLOOD PRESSURE: 72 MMHG | HEIGHT: 70 IN | BODY MASS INDEX: 34.65 KG/M2

## 2024-12-10 DIAGNOSIS — C61 PROSTATE CANCER: ICD-10-CM

## 2024-12-10 DIAGNOSIS — R97.20 ELEVATED PSA: ICD-10-CM

## 2024-12-10 DIAGNOSIS — R97.21 RISING PSA FOLLOWING TREATMENT FOR MALIGNANT NEOPLASM OF PROSTATE: ICD-10-CM

## 2024-12-10 DIAGNOSIS — N52.31 ERECTILE DYSFUNCTION AFTER RADICAL PROSTATECTOMY: Primary | ICD-10-CM

## 2024-12-10 PROCEDURE — 99999 PR PBB SHADOW E&M-EST. PATIENT-LVL IV: CPT | Mod: PBBFAC,,, | Performed by: UROLOGY

## 2024-12-10 PROCEDURE — 99215 OFFICE O/P EST HI 40 MIN: CPT | Mod: S$GLB,,, | Performed by: UROLOGY

## 2024-12-10 NOTE — PROGRESS NOTES
Subjective:      Patient ID: Renan Britton is a 64 y.o. male.    Chief Complaint: No chief complaint on file.    Mr. Britton is a 65 yo WM with a history of CAP with rising PSA post RRP and BPLND.  PSA has risen from 2.4 to 2.9 over the last 3 months.  All recent studies including PSMA scan on December 5, 2024 are negative and show no evidence of recurrent disease.  Not sure where a PSA is coming from have been unable to identify.  Will order post prostatectomy MRI with and without contrast to look at pelvis.  Path report was negative with no evidence of extension outside the prostate no evidence of positive margins however PSA continues to rise.  If unable to determine where PSA is coming from will defer to Dr. Mcdowell for further advice otherwise will repeat PSA and free and total level in 3 months and the decide if we need to do any further studies at that time.  Will check patient's testosterone level with next PSA to see where he is.  He has lost 40 lb and has maintain a hemoglobin A1c of 4.8..  Patient's energy level has increased and he feels better however PSA number keeps rising.  Iron levels were stable and last visit for Dr. Mcdowell and the patient has not required another iron transfusion as of yet.  Will continue to monitor.    Follow-up  This is a chronic problem. The current episode started more than 1 year ago. The problem occurs constantly. The problem has been gradually worsening (PSA rising). Pertinent negatives include no abdominal pain, anorexia, arthralgias, change in bowel habit, chest pain, chills, congestion, coughing, diaphoresis, fatigue, fever, headaches, joint swelling, myalgias, nausea, neck pain, numbness, rash, sore throat, swollen glands, urinary symptoms, vertigo, visual change, vomiting or weakness. Nothing aggravates the symptoms. Treatments tried: RRP and BPLND. The treatment provided significant relief.     Review of Systems   Constitutional:  Negative for chills,  diaphoresis, fatigue and fever.   HENT:  Negative for congestion and sore throat.    Respiratory:  Negative for cough.    Cardiovascular:  Negative for chest pain.   Gastrointestinal:  Negative for abdominal pain, anorexia, change in bowel habit, nausea and vomiting.   Musculoskeletal:  Negative for arthralgias, joint swelling, myalgias and neck pain.   Skin:  Negative for rash.   Neurological:  Negative for vertigo, weakness, numbness and headaches.      Objective:     Physical Exam  Vitals and nursing note reviewed.   Constitutional:       General: He is not in acute distress.     Appearance: Normal appearance. He is well-developed. He is obese. He is not ill-appearing, toxic-appearing or diaphoretic.   HENT:      Head: Normocephalic and atraumatic.      Right Ear: External ear normal.      Left Ear: External ear normal.      Nose: Nose normal.      Mouth/Throat:      Mouth: Mucous membranes are moist.      Pharynx: Oropharynx is clear. No oropharyngeal exudate or posterior oropharyngeal erythema.   Eyes:      General: No scleral icterus.        Right eye: No discharge.         Left eye: No discharge.      Extraocular Movements: Extraocular movements intact.      Conjunctiva/sclera: Conjunctivae normal.      Pupils: Pupils are equal, round, and reactive to light.   Cardiovascular:      Rate and Rhythm: Normal rate and regular rhythm.      Heart sounds: Normal heart sounds.   Pulmonary:      Effort: Pulmonary effort is normal.   Abdominal:      General: Bowel sounds are normal. There is no distension.      Palpations: Abdomen is soft. There is no mass.      Tenderness: There is no abdominal tenderness. There is no right CVA tenderness, left CVA tenderness, guarding or rebound.      Hernia: No hernia is present.   Genitourinary:     Penis: Normal.       Testes: Normal.      Comments: Patient with no CVA tenderness, normal penis and scrotum.  Bladder nontender.  Musculoskeletal:         General: Normal range of  motion.      Cervical back: Normal range of motion and neck supple.      Right lower leg: No edema.      Left lower leg: No edema.   Skin:     General: Skin is warm and dry.      Capillary Refill: Capillary refill takes less than 2 seconds.   Neurological:      General: No focal deficit present.      Mental Status: He is alert and oriented to person, place, and time.      Gait: Gait normal.      Deep Tendon Reflexes: Reflexes are normal and symmetric. Reflexes normal.   Psychiatric:         Mood and Affect: Mood normal.         Behavior: Behavior normal.         Thought Content: Thought content normal.         Judgment: Judgment normal.        Assessment:      1. Erectile dysfunction after radical prostatectomy    2. Rising PSA following treatment for malignant neoplasm of prostate    3. Elevated PSA    4. Hx of prostate adenocarcinoma s/p prostatectomy      Plan:     Patient Instructions   Schedule MRI Prostatectomy w/wo Contrast ASAP.  Notify Pt of results.  Follow up in 3 months with free and total PSA  Continue follow up with Dr. Mcdowell.  Will pass notes to Dr. Mcdowell on my plan and consult with him for any further evaluation that we may need to perform.

## 2024-12-10 NOTE — TELEPHONE ENCOUNTER
Patient transferred from patient access. Patient has EGD/Colonoscopy scheduled 12/13. He then has an MRI scheduled on 12/16. He just wants to make sure those are OK to have back to back. Reassured re nurses scheduling, but I will send message to ordering provider for MRI just to double check. Also just wants to remind them he has a Pig Valve. I will send message to Dr. Mitchell. Verb understanding.   Reason for Disposition   Nursing judgment    Protocols used: Information Only Call - No Triage-A-OH

## 2024-12-10 NOTE — LETTER
December 10, 2024        No Recipients             [facility]  [address]  [address]  [place] [address]  Phone: [phone]  Fax: [phone]   Patient: Renan Britton   MR Number: [id number]   YOB: 1960   Date of Visit: 12/10/2024       Dear Dr. Morales Recipients:    Thank you for referring Renan Britton to me for evaluation. Below are the relevant portions of my assessment and plan of care.            If you have questions, please do not hesitate to call me. I look forward to following Renan along with you.    Sincerely,      Raghav Mitchell MD           CC  No Recipients

## 2024-12-10 NOTE — TELEPHONE ENCOUNTER
Spoke to patient wife and notified her that  stated patient should be fine but they should also check with patient cardiologist as well. She understood

## 2024-12-10 NOTE — PATIENT INSTRUCTIONS
Schedule MRI Prostatectomy w/wo Contrast ASAP.  Notify Pt of results.  Follow up in 3 months with free and total PSA  Continue follow up with Dr. Mcdowell.  Will pass notes to Dr. Mcdowell on my plan and consult with him for any further evaluation that we may need to perform.

## 2024-12-12 ENCOUNTER — TELEPHONE (OUTPATIENT)
Dept: GASTROENTEROLOGY | Facility: CLINIC | Age: 64
End: 2024-12-12
Payer: COMMERCIAL

## 2024-12-12 ENCOUNTER — ANESTHESIA EVENT (OUTPATIENT)
Dept: ENDOSCOPY | Facility: HOSPITAL | Age: 64
End: 2024-12-12
Payer: COMMERCIAL

## 2024-12-12 NOTE — TELEPHONE ENCOUNTER
Spoke with pt wife and went over prep instructions. All questions answered. V/U    ----- Message from Addis sent at 12/12/2024  8:54 AM CST -----  Type:  Call    Who Called:Wife  Does the patient know what this is regarding?:Questions  Would the patient rather a call back or a response via [facility]? call  Best Call Back Number:[phone]  Additional Information:

## 2024-12-12 NOTE — ANESTHESIA PREPROCEDURE EVALUATION
[facility]  Anesthesia Pre-Operative Evaluation         Patient Name: Renan Britton  YOB: 1960  MRN: [id number]    SUBJECTIVE:     12/12/2024    Procedure(s) (LRB):  COLONOSCOPY (N/A)  EGD (ESOPHAGOGASTRODUODENOSCOPY) (N/A)    Renan Britton is a 64 y.o. male here for Procedure(s) (LRB):  COLONOSCOPY (N/A)  EGD (ESOPHAGOGASTRODUODENOSCOPY) (N/A)    Drips:     Patient Active Problem List   Diagnosis    Claudication    PVD (peripheral vascular disease)    Surgical wound breakdown    Type 2 diabetes mellitus, without long-term current use of insulin    Seizures    History of brain tumor    Systolic murmur    Essential hypertension    Proteus infection    Femoral artery occlusion, left    Pseudoaneurysm of left femoral artery    Pseudoaneurysm    PAD (peripheral artery disease)    Enterococcus faecalis infection    History of penicillin allergy    Personal history of colonic polyps    Elevated PSA, less than 10 ng/ml    Elevated PSA    Hx of prostate adenocarcinoma s/p prostatectomy    Erectile dysfunction after radical prostatectomy    Iron deficiency anemia    Melena    Iron deficiency anemia due to chronic blood loss    Status post prostatectomy    Influenza A with respiratory manifestations    HLD (hyperlipidemia)    Hyperphosphatemia associated with renal failure    Sleep apnea, unspecified    Rising PSA following treatment for malignant neoplasm of prostate    KAMALA (obstructive sleep apnea)    Nonrheumatic aortic valve stenosis    Acute on chronic diastolic heart failure    Coronary artery disease involving native coronary artery of native heart without angina pectoris    Cirrhosis    A-fib    Anemia    Metabolic dysfunction-associated steatotic liver disease (MASLD)    History of heavy alcohol consumption       Review of patient's allergies indicates:  No Known Allergies    No current facility-administered medications on file prior to encounter.     Current Outpatient  Medications on File Prior to Encounter   Medication Sig Dispense Refill    ascorbic acid, vitamin C, (VITAMIN C) 500 MG tablet Vitamin C 500 mg tablet, [RxNorm: 819999]      aspirin (ECOTRIN) 81 MG EC tablet Take 81 mg by mouth once daily.      atorvastatin (LIPITOR) 40 MG tablet Take 1 tablet (40 mg total) by mouth once daily. 30 tablet 5    clopidogreL (PLAVIX) 75 mg tablet clopidogreL 75 mg tablet, [RxNorm: 698337]      ferrous sulfate 324 mg (65 mg iron) TbEC ferrous sulfate 324 mg (65 mg iron) tablet,delayed release, [RxNorm: 5674316]      furosemide (LASIX) 20 MG tablet Take 1 tablet (20 mg total) by mouth once daily. You may increase to twice daily if needed for increased shortness of breath or fluid 30 tablet 11    hydrALAZINE (APRESOLINE) 50 MG tablet       LYRICA 100 mg capsule Take 100 mg by mouth 2 (two) times daily. Patient takes medication for seizures      NIFEdipine (ADALAT CC) 60 MG TbSR       nitroGLYCERIN (NITROSTAT) 0.4 MG SL tablet Place 1 tablet (0.4 mg total) under the tongue every 5 (five) minutes as needed for Chest pain. 15 tablet 3    sodium,potassium,mag sulfates (SUPREP BOWEL PREP KIT) 17.5-3.13-1.6 gram SolR Take 177 mLs by mouth once daily. 1 kit 0       Past Surgical History:   Procedure Laterality Date    ANGIOGRAPHY Left 4/6/2020    Procedure: ANGIOGRAM, Left lower extremity;  Surgeon: Kelby Gomez MD;  Location: [facility] OR [address] [place] [address];  Service: Peripheral Vascular;  Laterality: Left;    ANGIOGRAPHY OF LOWER EXTREMITY Left 6/29/2020    Procedure: Angiogram Extremity Unilateral, washout L groin, possible open pseudoaneurysm repair;  Surgeon: Bruce Dallas MD;  Location: [facility] OR [address] [place] [address];  Service: Peripheral Vascular;  Laterality: Left;  contrast 23 ml  fluoro time: 9.9 min  mGy: 1230.26  Gycm2: 146.69    AORTOGRAPHY N/A 6/29/2020    Procedure: AORTOGRAM;  Surgeon: Bruce Dallas MD;  Location: [facility] OR [address] [place] [address];  Service: Peripheral Vascular;  Laterality: N/A;    APPLICATION OF  WOUND VACUUM-ASSISTED CLOSURE DEVICE Bilateral 1/1/2020    Procedure: APPLICATION, WOUND VAC;  Surgeon: SEBAS Prieto II, MD;  [facility];  Service: Cardiovascular;  Laterality: Bilateral;    APPLICATION OF WOUND VACUUM-ASSISTED CLOSURE DEVICE N/A 1/3/2020    Procedure: APPLICATION, WOUND VAC;  Surgeon: Brian Wong MD;  Location: [facility] OR [address] [place] [address];  Service: Plastics;  Laterality: N/A;    BRAIN SURGERY  01/2011    TUMOR EXCISED    CARDIAC CATH COSURGEON N/A 8/13/2024    Procedure: Cardiac Cath Cosurgeon;  Surgeon: Armand Erazo MD;  Location: [facility] CATH LAB;  Service: Cardiology;  Laterality: N/A;    COLONOSCOPY N/A 1/8/2021    Procedure: COLONOSCOPY;  Surgeon: Susan Posada MD;  Location: [facility] ENDO;  Service: Endoscopy;  Laterality: N/A;    COLONOSCOPY N/A 11/18/2021    Procedure: COLONOSCOPY;  Surgeon: Susan Posada MD;  Location: [facility] ENDO;  Service: Endoscopy;  Laterality: N/A;    CORONARY ANGIOGRAPHY Left 4/19/2024    Procedure: ANGIOGRAM, CORONARY ARTERY;  Surgeon: Giuseppe Blood MD;  [facility] CATH;  Service: Cardiology;  Laterality: Left;    CREATION OF ILIOFEMORAL ARTERY BYPASS Left 8/12/2020    Procedure: CREATION, BYPASS, ARTERIAL, ILIAC TO FEMORAL;  Surgeon: Kelby Gomez MD;  Location: [facility] OR [address] [place] [address];  Service: Peripheral Vascular;  Laterality: Left;  CO-SURGEONS: DR KEO ADHIKARI;  DR WONG, profusion    CREATION OF MUSCLE ROTATIONAL FLAP N/A 1/3/2020    Procedure: CREATION, FLAP, MUSCLE ROTATION;  Surgeon: Brian Wong MD;  [facility];  Service: Plastics;  Laterality: N/A;    ENDARTERECTOMY OF FEMORAL ARTERY Bilateral 12/20/2019    Procedure: ENDARTERECTOMY, FEMORAL;  Surgeon: Kelby Gomez MD;  Location: [facility] OR [address] [place] [address];  Service: Peripheral Vascular;  Laterality: Bilateral;  natalya common femoral endarterectomy with natalya. iliac stent placement    ESOPHAGOGASTRODUODENOSCOPY N/A 4/7/2022    Procedure: EGD  (ESOPHAGOGASTRODUODENOSCOPY);  Surgeon: Susan Posada MD;  Location: [facility] ENDO;  Service: Endoscopy;  Laterality: N/A;    INTRALUMINAL GASTROINTESTINAL TRACT IMAGING VIA CAPSULE N/A 4/18/2022    Procedure: IMAGING, GI TRACT, INTRALUMINAL, VIA CAPSULE;  Surgeon: Otto Powers MD;  Location: [facility] ENDO;  Service: Endoscopy;  Laterality: N/A;    PERCUTANEOUS TRANSLUMINAL ANGIOPLASTY Left 4/6/2020    Procedure: PTA (ANGIOPLASTY, PERCUTANEOUS, TRANSLUMINAL), left lower extremity;  Surgeon: Kelby Gomez MD;  Location: [facility] 2ND FLR;  Service: Peripheral Vascular;  Laterality: Left;    PERCUTANEOUS TRANSLUMINAL ANGIOPLASTY (PTA) OF PERIPHERAL VESSEL Left 10/17/2019    Procedure: PTA, PERIPHERAL VESSEL;  Surgeon: Rao Fisher MD;  Location: [facility] CATH;  Service: Cardiology;  Laterality: Left;    PSEUDOANEURYSM REPAIR Left 6/29/2020    Procedure: REPAIR, PSEUDOANEURYSM;  Surgeon: Bruce Dallas MD;  Location: [facility] OR 2ND FLR;  Service: Peripheral Vascular;  Laterality: Left;    RADICAL RETROPUBIC PROSTATECTOMY N/A 12/13/2021    Procedure: PROSTATECTOMY, RETROPUBIC, RADICAL + Bilateral pelvic lymph node dissection;  Surgeon: Raghav Mitchell MD;  Location: [facility] OR;  Service: Urology;  Laterality: N/A;    RIGHT HEART CATHETERIZATION Right 4/19/2024    Procedure: INSERTION, CATHETER, RIGHT HEART;  Surgeon: Giuseppe Blood MD;  Location: [facility] CATH;  Service: Cardiology;  Laterality: Right;    TRANSCATHETER AORTIC VALVE REPLACEMENT (TAVR) Right 8/13/2024    Procedure: REPLACEMENT, AORTIC VALVE, TRANSCATHETER (TAVR);  Surgeon: Ruslan Xie MD;  Location: [facility] CATH LAB;  Service: Cardiology;  Laterality: Right;    TRANSCATHETER AORTIC VALVE REPLACEMENT (TAVR)  8/13/2024    Procedure: REPLACEMENT, AORTIC VALVE, TRANSCATHETER (TAVR);  Surgeon: Armand Erazo MD;  Location: [facility] CATH LAB;  Service: Cardiology;;    TRANSESOPHAGEAL ECHOCARDIOGRAPHY N/A 4/19/2024    Procedure: ECHOCARDIOGRAM,  TRANSESOPHAGEAL;  Surgeon: Giuseppe Blood MD;  Location: [facility] CATH;  Service: Cardiology;  Laterality: N/A;       Social History     Socioeconomic History    Marital status:    Tobacco Use    Smoking status: Former     Types: Cigarettes     Passive exposure: Current    Smokeless tobacco: Never    Tobacco comments:      2-3 daily   Substance and Sexual Activity    Alcohol use: Not Currently     Comment: socially    Drug use: Never    Sexual activity: Yes     Partners: Female     Birth control/protection: None     Social Drivers of Health     Financial Resource Strain: Low Risk  (7/2/2024)    Overall Financial Resource Strain (CARDIA)     Difficulty of Paying Living Expenses: Not hard at all   Food Insecurity: No Food Insecurity (7/2/2024)    Hunger Vital Sign     Worried About Running Out of Food in the Last Year: Never true     Ran Out of Food in the Last Year: Never true   Transportation Needs: No Transportation Needs (12/6/2023)    PRAPARE - Transportation     Lack of Transportation (Medical): No     Lack of Transportation (Non-Medical): No   Physical Activity: Inactive (7/2/2024)    Exercise Vital Sign     Days of Exercise per Week: 0 days     Minutes of Exercise per Session: 0 min   Stress: No Stress Concern Present (7/2/2024)    [employer] [employer] - Occupational Stress Questionnaire     Feeling of Stress : Not at all   Housing Stability: Low Risk  (7/2/2024)    Housing Stability Vital Sign     Unable to Pay for Housing in the Last Year: No     Homeless in the Last Year: No         OBJECTIVE:     Vital Signs Range (Last 24H):       Significant Labs:  Lab Results   Component Value Date    WBC 3.34 (L) 11/25/2024    HGB 15.0 11/25/2024    HCT 45.6 11/25/2024     (L) 11/25/2024    CHOL 91 (L) 10/25/2024    TRIG 37 10/25/2024    HDL 45 10/25/2024    ALT 29 10/25/2024    AST 28 10/25/2024     10/25/2024    K 4.0 10/25/2024     10/25/2024    CREATININE 1.0 10/25/2024     BUN 20 10/25/2024    CO2 25 10/25/2024    TSH 0.915 10/25/2024    PSA 2.3 10/25/2024    INR 1.1 08/12/2024    HGBA1C 4.8 10/25/2024       Diagnostic Studies:    EKG:   Results for orders placed or performed during the hospital encounter of 08/13/24   EKG 12-LEAD starting tomorrow    Collection Time: 08/14/24  4:40 AM   Result Value Ref Range    QRS Duration 84 ms    OHS QTC Calculation 440 ms    Narrative    Test Reason : Z95.2,    Vent. Rate : 072 BPM     Atrial Rate : 072 BPM     P-R Int : 164 ms          QRS Dur : 084 ms      QT Int : 402 ms       P-R-T Axes : 063 057 182 degrees     QTc Int : 440 ms    Normal sinus rhythm  Low septal forces ; Abnormal R wave progression in the precordial leads   -Probable  Anteroseptal infarct (cited on or before 14-AUG-2024)  ST and T wave abnormality, consider inferolateral ischemia  Abnormal ECG  When compared with ECG of 13-AUG-2024 11:20,  More evidence of Arnie-septal MI  Confirmed by Satya MOULTON MD ([id number]) on 8/14/2024 9:48:25 AM    Referred By: MARISELA GOINS           Confirmed By:Satya MOULTON MD       2D ECHO:  TTE:  No results found. However, due to the size of the patient record, not all encounters were searched. Please check Results Review for a complete set of results.  Results for orders placed or performed during the hospital encounter of 09/17/24   Echo Saline Bubble? No   Result Value Ref Range    BSA 2.38 m2    LVOT stroke volume 122.14 cm3    LVIDd 4.59 3.5 - 6.0 cm    LV Systolic Volume 28.88 mL    LV Systolic Volume Index 12.5 mL/m2    LVIDs 2.77 2.1 - 4.0 cm    LV Diastolic Volume 96.81 mL    LV Diastolic Volume Index 41.91 mL/m2    IVS 1.14 (A) 0.6 - 1.1 cm    LVOT diameter 2.16 cm    LVOT area 3.7 cm2    FS 40 28 - 44 %    Left Ventricle Relative Wall Thickness 0.48 cm    PW 1.11 (A) 0.6 - 1.1 cm    LV mass 186.39 g    LV Mass Index 81 g/m2    MV Peak E Toney 1.45 m/s    TDI LATERAL 0.10 m/s    TDI SEPTAL 0.08 m/s    E/E' ratio 16.11 m/s    MV Peak A Toney  1.26 m/s    E/A ratio 1.15     E wave deceleration time 214.66 msec    LV SEPTAL E/E' RATIO 18.13 m/s    ALEC 37.3 mL/m2    LV LATERAL E/E' RATIO 14.50 m/s    LA Vol 86.28 cm3    LVOT peak yo 1.48 m/s    RV- henning basal diam 4.2 cm    TAPSE 2.74 cm    LA size 4.04 cm    Left Atrium Minor Axis 5.32 cm    Left Atrium Major Axis 5.13 cm    LA Vol (MOD) 95.55 cm3    LA WIDTH 4.81 cm    ALEC (MOD) 41.4 mL/m2    RA Major Axis 5.01 cm    RA Width 4.40 cm    AV mean gradient 17 mmHg    AV peak gradient 34 mmHg    Ao peak yo 2.9 m/s    Ao VTI 58.00 cm    LVOT peak VTI 33.35 cm    AV valve area 2.11 cm²    AV Velocity Ratio 0.51     AV index (prosthetic) 0.58     MEMO by Velocity Ratio 1.87 cm²    MV stenosis pressure 1/2 time 62.25 ms    MV valve area p 1/2 method 3.53 cm2    Sinus 3.08 cm    STJ 2.01 cm    Ascending aorta 3.44 cm    Mean e' 0.09 m/s    ZLVIDS -5.39     ZLVIDD -6.82     Est. RA pres 3 mmHg    IVC diameter 1.6 cm    Narrative      challening study given poor acoustic windows. Contrast was not used.    Left Ventricle: The left ventricle is normal in size. Normal wall   thickness. There is concentric remodeling. There is normal systolic   function with a visually estimated ejection fraction of 60 - 65%.   Diastolic function cannot be reliably determined in the presence of mitral   annular calcification.    Right Ventricle: Normal right ventricular cavity size. Wall thickness   is normal. Systolic function is normal.    Left Atrium: Left atrium is mildly dilated.    Aortic Valve: There is a transcatheter valve replacement in the aortic   position. It is reported to be a 27 mm Navitor valve. There is normal   leaflet mobility. Aortic valve area by VTI is 2.11 cm². Aortic valve peak   velocity is 2.9 m/s. Mean gradient is 17 mmHg. The dimensionless index is   0.58. There is no significant regurgitation. No paravalvular   regurgitation.    Mitral Valve: Moderately calcified posterior leaflet. There is moderate    mitral annular calcification present. There is normal leaflet mobility.   There is no stenosis. There is no significant regurgitation.    Tricuspid Valve: There is no significant regurgitation.    IVC/SVC: Normal venous pressure at 3 mmHg.               Pre-op Assessment    I have reviewed the Patient Summary Reports.     I have reviewed the Nursing Notes. I have reviewed the NPO Status.   I have reviewed the Medications.     Review of Systems  Anesthesia Hx:  No problems with previous Anesthesia   History of prior surgery of interest to airway management or planning:            Denies Personal Hx of Anesthesia complications.                    Social:  Former Smoker, Social Alcohol Use       Hematology/Oncology:       -- Anemia:                                  Cardiovascular:     Hypertension   CAD (Non obstructive)       CHF   PVD hyperlipidemia    S/p TAVR 8/13/2024      Coronary Artery Disease:               Congestive Heart Failure (CHF)                Hypertension     Atrial Fibrillation     Pulmonary:  Pneumonia      Sleep Apnea     Obstructive Sleep Apnea (KAMALA).       Pulmonary Infection:  Pneumonia.     Renal/:       Kidney Function/Disease             Hepatic/GI:      Liver Disease,            Liver Disease        Neurological:       Seizures           Seizure Disorder                          Endocrine:  Diabetes    Diabetes                    Obesity / BMI > 30      Physical Exam  General: Well nourished, Cooperative, Alert and Oriented    Airway:  Mallampati: III / III  Mouth Opening: Small, but > 3cm  TM Distance: < 4 cm  Tongue: Normal  Neck: Girth Increased    Dental:  Intact        Anesthesia Plan  Type of Anesthesia, risks & benefits discussed:    Anesthesia Type: Gen Natural Airway  Intra-op Monitoring Plan: Standard ASA Monitors  Post Op Pain Control Plan: multimodal analgesia  Induction:  IV  Informed Consent: Informed consent signed with the Patient and all parties understand the risks and  agree with anesthesia plan.  All questions answered.   ASA Score: 3  Day of Surgery Review of History & Physical: H&P Update referred to the surgeon/provider.    Ready For Surgery From Anesthesia Perspective.     .

## 2024-12-13 ENCOUNTER — ANESTHESIA (OUTPATIENT)
Dept: ENDOSCOPY | Facility: HOSPITAL | Age: 64
End: 2024-12-13
Payer: COMMERCIAL

## 2024-12-13 ENCOUNTER — HOSPITAL ENCOUNTER (OUTPATIENT)
Facility: HOSPITAL | Age: 64
Discharge: HOME | End: 2024-12-13
Attending: INTERNAL MEDICINE | Admitting: INTERNAL MEDICINE
Payer: COMMERCIAL

## 2024-12-13 VITALS
OXYGEN SATURATION: 95 % | TEMPERATURE: 98 F | DIASTOLIC BLOOD PRESSURE: 74 MMHG | HEART RATE: 83 BPM | SYSTOLIC BLOOD PRESSURE: 131 MMHG | RESPIRATION RATE: 13 BRPM

## 2024-12-13 DIAGNOSIS — Z86.0100 PERSONAL HISTORY OF COLONIC POLYPS: ICD-10-CM

## 2024-12-13 LAB
GLUCOSE SERPL-MCNC: 122 MG/DL (ref 70–110)
POCT GLUCOSE: 122 MG/DL (ref 70–110)

## 2024-12-13 PROCEDURE — 37000009 HC ANESTHESIA EA ADD 15 MINS: Performed by: INTERNAL MEDICINE

## 2024-12-13 PROCEDURE — 88305 TISSUE EXAM BY PATHOLOGIST: CPT | Performed by: STUDENT IN AN ORGANIZED HEALTH CARE EDUCATION/TRAINING PROGRAM

## 2024-12-13 PROCEDURE — 43239 EGD BIOPSY SINGLE/MULTIPLE: CPT | Performed by: INTERNAL MEDICINE

## 2024-12-13 PROCEDURE — 45385 COLONOSCOPY W/LESION REMOVAL: CPT | Mod: 33,59 | Performed by: INTERNAL MEDICINE

## 2024-12-13 PROCEDURE — 27200997: Performed by: INTERNAL MEDICINE

## 2024-12-13 PROCEDURE — 45385 COLONOSCOPY W/LESION REMOVAL: CPT | Mod: 33,59,, | Performed by: INTERNAL MEDICINE

## 2024-12-13 PROCEDURE — 63600175 PHARM REV CODE 636 W HCPCS: Performed by: NURSE ANESTHETIST, CERTIFIED REGISTERED

## 2024-12-13 PROCEDURE — 27201012 HC FORCEPS, HOT/COLD, DISP: Performed by: INTERNAL MEDICINE

## 2024-12-13 PROCEDURE — 27202087 HC PROBE, APC: Performed by: INTERNAL MEDICINE

## 2024-12-13 PROCEDURE — 37000008 HC ANESTHESIA 1ST 15 MINUTES: Performed by: INTERNAL MEDICINE

## 2024-12-13 PROCEDURE — 25000003 PHARM REV CODE 250: Performed by: NURSE ANESTHETIST, CERTIFIED REGISTERED

## 2024-12-13 PROCEDURE — 43239 EGD BIOPSY SINGLE/MULTIPLE: CPT | Mod: 51,,, | Performed by: INTERNAL MEDICINE

## 2024-12-13 PROCEDURE — 45388 COLONOSCOPY W/ABLATION: CPT | Mod: 33,,, | Performed by: INTERNAL MEDICINE

## 2024-12-13 PROCEDURE — 45388 COLONOSCOPY W/ABLATION: CPT | Mod: 33 | Performed by: INTERNAL MEDICINE

## 2024-12-13 PROCEDURE — 27201089 HC SNARE, DISP (ANY): Performed by: INTERNAL MEDICINE

## 2024-12-13 RX ORDER — PROPOFOL 10 MG/ML
VIAL (ML) INTRAVENOUS
Status: DISCONTINUED | OUTPATIENT
Start: 2024-12-13 | End: 2024-12-13

## 2024-12-13 RX ORDER — LIDOCAINE HYDROCHLORIDE 20 MG/ML
INJECTION, SOLUTION EPIDURAL; INFILTRATION; INTRACAUDAL; PERINEURAL
Status: DISCONTINUED | OUTPATIENT
Start: 2024-12-13 | End: 2024-12-13

## 2024-12-13 RX ORDER — CILOSTAZOL 100 MG/1
50 TABLET ORAL 2 TIMES DAILY
COMMUNITY

## 2024-12-13 RX ORDER — PANTOPRAZOLE SODIUM 40 MG/1
40 TABLET, DELAYED RELEASE ORAL DAILY
Qty: 30 TABLET | Refills: 11 | Status: SHIPPED | OUTPATIENT
Start: 2024-12-13 | End: 2025-12-13

## 2024-12-13 RX ORDER — SODIUM CHLORIDE 9 MG/ML
INJECTION, SOLUTION INTRAVENOUS CONTINUOUS
Status: DISCONTINUED | OUTPATIENT
Start: 2024-12-13 | End: 2024-12-13 | Stop reason: HOSPADM

## 2024-12-13 RX ORDER — PROPOFOL 10 MG/ML
VIAL (ML) INTRAVENOUS CONTINUOUS PRN
Status: DISCONTINUED | OUTPATIENT
Start: 2024-12-13 | End: 2024-12-13

## 2024-12-13 RX ORDER — TOPICAL ANESTHETIC 200 MG/ML
SPRAY DENTAL; PERIODONTAL
Status: DISCONTINUED | OUTPATIENT
Start: 2024-12-13 | End: 2024-12-13

## 2024-12-13 RX ORDER — SODIUM CHLORIDE 0.9 % (FLUSH) 0.9 %
10 SYRINGE (ML) INJECTION
Status: DISCONTINUED | OUTPATIENT
Start: 2024-12-13 | End: 2024-12-13 | Stop reason: HOSPADM

## 2024-12-13 RX ORDER — DEXMEDETOMIDINE HYDROCHLORIDE 100 UG/ML
INJECTION, SOLUTION INTRAVENOUS
Status: DISCONTINUED | OUTPATIENT
Start: 2024-12-13 | End: 2024-12-13

## 2024-12-13 RX ADMIN — TOPICAL ANESTHETIC 1 EACH: 200 SPRAY DENTAL; PERIODONTAL at 08:12

## 2024-12-13 RX ADMIN — DEXMEDETOMIDINE HYDROCHLORIDE 8 MCG: 100 INJECTION, SOLUTION, CONCENTRATE INTRAVENOUS at 08:12

## 2024-12-13 RX ADMIN — SODIUM CHLORIDE: 0.9 INJECTION, SOLUTION INTRAVENOUS at 08:12

## 2024-12-13 RX ADMIN — DEXMEDETOMIDINE HYDROCHLORIDE 4 MCG: 100 INJECTION, SOLUTION, CONCENTRATE INTRAVENOUS at 08:12

## 2024-12-13 RX ADMIN — PROPOFOL 50 MG: 10 INJECTION, EMULSION INTRAVENOUS at 08:12

## 2024-12-13 RX ADMIN — PROPOFOL 125 MCG/KG/MIN: 10 INJECTION, EMULSION INTRAVENOUS at 08:12

## 2024-12-13 RX ADMIN — LIDOCAINE HYDROCHLORIDE 50 MG: 20 INJECTION, SOLUTION EPIDURAL; INFILTRATION; INTRACAUDAL; PERINEURAL at 08:12

## 2024-12-13 RX ADMIN — GLYCOPYRROLATE 0.2 MG: 0.2 INJECTION, SOLUTION INTRAMUSCULAR; INTRAVITREAL at 08:12

## 2024-12-13 NOTE — TRANSFER OF CARE
Anesthesia Transfer of Care Note    Patient: Renan Britton    Procedure(s) Performed: Procedure(s) (LRB):  COLONOSCOPY (N/A)  EGD (ESOPHAGOGASTRODUODENOSCOPY) (N/A)    Patient location: GI    Anesthesia Type: general    Transport from OR: Transported from OR on 6-10 L/min O2 by face mask with adequate spontaneous ventilation    Post pain: adequate analgesia    Post assessment: no apparent anesthetic complications and tolerated procedure well    Post vital signs: stable    Level of consciousness: awake and alert    Nausea/Vomiting: no nausea/vomiting    Complications: none    Transfer of care protocol was followed      Last vitals: Visit Vitals  BP (!) 147/70 (BP Location: Left arm, Patient Position: Lying)   Pulse 75   Temp 36.8 °C (98.2 °F) (Skin)   Resp 18   SpO2 97%

## 2024-12-13 NOTE — PROVATION PATIENT INSTRUCTIONS
Discharge Summary/Instructions after an Endoscopic Procedure  Patient Name: Renan Britton  Patient MRN: [id number]  Patient YOB: 1960 Friday, December 13, 2024  Otto Powers MD  Dear patient,  As a result of recent federal legislation (The Federal Cures Act), you may   receive lab or pathology results from your procedure in your [facility]   account before your physician is able to contact you. Your physician or   their representative will relay the results to you with their   recommendations at their soonest availability.  Thank you,  Your health is very important to us during the Covid Crisis. Following your   procedure today, you will receive a daily text for 2 weeks asking about   signs or symptoms of Covid 19.  Please respond to this text when you   receive it so we can follow up and keep you as safe as possible.   RESTRICTIONS:  During your procedure today, you received medications for sedation.  These   medications may affect your judgment, balance and coordination.  Therefore,   for 24 hours, you have the following restrictions:   - DO NOT drive a car, operate machinery, make legal/financial decisions,   sign important papers or drink alcohol.    ACTIVITY:  Today: no heavy lifting, straining or running due to procedural   sedation/anesthesia.  The following day: return to full activity including work.  DIET:  Eat and drink normally unless instructed otherwise.     TREATMENT FOR COMMON SIDE EFFECTS:  - Mild abdominal pain, nausea, belching, bloating or excessive gas:  rest,   eat lightly and use a heating pad.  - Sore Throat: treat with throat lozenges and/or gargle with warm salt   water.  - Because air was used during the procedure, expelling large amounts of air   from your rectum or belching is normal.  - If a bowel prep was taken, you may not have a bowel movement for 1-3 days.    This is normal.  SYMPTOMS TO WATCH FOR AND REPORT TO YOUR PHYSICIAN:  1. Abdominal pain or bloating, other  than gas cramps.  2. Chest pain.  3. Back pain.  4. Signs of infection such as: chills or fever occurring within 24 hours   after the procedure.  5. Rectal bleeding, which would show as bright red, maroon, or black stools.   (A tablespoon of blood from the rectum is not serious, especially if   hemorrhoids are present.)  6. Vomiting.  7. Weakness or dizziness.  GO DIRECTLY TO THE NEAREST EMERGENCY ROOM IF YOU HAVE ANY OF THE FOLLOWING:      Difficulty breathing              Chills and/or fever over 101 F   Persistent vomiting and/or vomiting blood   Severe abdominal pain   Severe chest pain   Black, tarry stools   Bleeding- more than one tablespoon   Any other symptom or condition that you feel may need urgent attention  Your doctor recommends these additional instructions:  If any biopsies were taken, your doctors clinic will contact you in 1 to 2   weeks with any results.  - Discharge patient to home.   - Patient has a contact number available for emergencies.  The signs and   symptoms of potential delayed complications were discussed with the   patient.  Return to normal activities tomorrow.  Written discharge   instructions were provided to the patient.   - Resume previous diet.   - Continue present medications.   - Await pathology results.   - Repeat colonoscopy in 3 years for surveillance.   - Resume Plavix (clopidogrel) in 5 days and Pletal (cilostazol) in 5 days at   prior doses.  For questions, problems or results please call your physician - Otto Powers MD.  EMERGENCY PHONE NUMBER: [phone],  LAB RESULTS: [phone]  IF A COMPLICATION OR EMERGENCY SITUATION ARISES AND YOU ARE UNABLE TO REACH   YOUR PHYSICIAN - GO DIRECTLY TO THE EMERGENCY ROOM.  Otto Powers MD  12/13/2024 9:02:02 AM  This report has been verified and signed electronically.  Dear patient,  As a result of recent federal legislation (The Federal Cures Act), you may   receive lab or pathology results from your procedure  in your [facility]   account before your physician is able to contact you. Your physician or   their representative will relay the results to you with their   recommendations at their soonest availability.  Thank you,  PROVATION

## 2024-12-13 NOTE — H&P
Short Stay Endoscopy History and Physical    PCP - Eren Becker MD    Procedure - Colonoscopy  + EGD  ASA - per anesthesia  Mallampati - per anesthesia  History of Anesthesia problems - no  Family history Anesthesia problems - no   Plan of anesthesia - General    HPI:  This is a 64 y.o. male here for evaluation of : personal history of colon polyps    Egd for GINA , varices screen      ROS:  Constitutional: No fevers, chills, No weight loss  CV: No chest pain  Pulm: No cough, No shortness of breath  GI: see HPI  Derm: No rash    Medical History:  has a past medical history of JJ (acute kidney injury) (12/03/2023), Aortic valve disease, CHF (congestive heart failure), Cirrhosis (06/10/2024), Diabetes mellitus, Elevated PSA, H/O brain tumor, History of heavy alcohol consumption (09/23/2024), HLD (hyperlipidemia), Hypertension, Metabolic dysfunction-associated steatotic liver disease (MASLD) (09/23/2024), MRSA infection, PAD (peripheral artery disease), Personal history of colonic polyps (01/08/2021), Pneumonia, unspecified organism, Prostate cancer, Seizures, Sleep apnea, and Urinary tract infection.    Surgical History:  has a past surgical history that includes Brain surgery (01/2011); Percutaneous transluminal angioplasty (PTA) of peripheral vessel (Left, 10/17/2019); Endarterectomy of femoral artery (Bilateral, 12/20/2019); Application of wound vacuum-assisted closure device (Bilateral, 1/1/2020); Creation of muscle rotational flap (N/A, 1/3/2020); Application of wound vacuum-assisted closure device (N/A, 1/3/2020); Angiography (Left, 4/6/2020); Percutaneous transluminal angioplasty (Left, 4/6/2020); Angiography of lower extremity (Left, 6/29/2020); Aortography (N/A, 6/29/2020); Pseudoaneurysm repair (Left, 6/29/2020); Creation of iliofemoral artery bypass (Left, 8/12/2020); Colonoscopy (N/A, 1/8/2021); Colonoscopy (N/A, 11/18/2021); Radical retropubic prostatectomy (N/A, 12/13/2021);  Esophagogastroduodenoscopy (N/A, 4/7/2022); Intraluminal gastrointestinal tract imaging via capsule (N/A, 4/18/2022); Transesophageal echocardiography (N/A, 4/19/2024); Coronary angiography (Left, 4/19/2024); Right heart catheterization (Right, 4/19/2024); Transcatheter aortic valve replacement (TAVR) (Right, 8/13/2024); Cardiac Cath [facility] (N/A, 8/13/2024); and Transcatheter aortic valve replacement (TAVR) (8/13/2024).    Family History: family history includes Cancer in his brother and father; Diabetes in his mother; Heart disease in his brother, brother, brother, and father; Hypertension in his mother; Stroke in his mother.. Otherwise no colon cancer, inflammatory bowel disease, or GI malignancies.    Social History:  reports that he has quit smoking. His smoking use included cigarettes. He has been exposed to tobacco smoke. He has never used smokeless tobacco. He reports current alcohol use. He reports that he does not use drugs.    Review of patient's allergies indicates:  No Known Allergies    Medications:   Medications Prior to Admission   Medication Sig Dispense Refill Last Dose/Taking    ascorbic acid, vitamin C, (VITAMIN C) 500 MG tablet Vitamin C 500 mg tablet, [RxNorm: 896055]   12/12/2024    aspirin (ECOTRIN) 81 MG EC tablet Take 81 mg by mouth once daily.   12/12/2024    atorvastatin (LIPITOR) 40 MG tablet Take 1 tablet (40 mg total) by mouth once daily. 30 tablet 5 12/12/2024    cilostazoL (PLETAL) 100 MG Tab Take 50 mg by mouth 2 (two) times daily.   12/12/2024    ferrous sulfate 324 mg (65 mg iron) TbEC ferrous sulfate 324 mg (65 mg iron) tablet,delayed release, [RxNorm: 3293614]   12/12/2024    furosemide (LASIX) 20 MG tablet Take 1 tablet (20 mg total) by mouth once daily. You may increase to twice daily if needed for increased shortness of breath or fluid 30 tablet 11 12/12/2024    hydrALAZINE (APRESOLINE) 50 MG tablet    12/13/2024 Morning    LYRICA 100 mg capsule Take 100 mg by mouth 2 (two)  times daily. Patient takes medication for seizures   12/13/2024 Morning    NIFEdipine (ADALAT CC) 60 MG TbSR    12/13/2024 Morning    sodium,potassium,mag sulfates (SUPREP BOWEL PREP KIT) 17.5-3.13-1.6 gram SolR Take 177 mLs by mouth once daily. 1 kit 0 12/13/2024 Morning    clopidogreL (PLAVIX) 75 mg tablet clopidogreL 75 mg tablet, [RxNorm: 220211]   12/6/2024    nitroGLYCERIN (NITROSTAT) 0.4 MG SL tablet Place 1 tablet (0.4 mg total) under the tongue every 5 (five) minutes as needed for Chest pain. 15 tablet 3 More than a month         Physical Exam:    Vital Signs:   Vitals:    12/13/24 0731   BP: (!) 147/70   Pulse: 75   Resp: 18   Temp: 98.2 °F (36.8 °C)       General Appearance: Well appearing in no acute distress  Eyes:    No scleral icterus  ENT: Neck supple, Lips, mucosa, and tongue normal; teeth and gums normal  Abdomen: Soft, non tender, non distended with positive bowel sounds. No hepatosplenomegaly, ascites, or mass.  Extremities: 2+ pulses, no clubbing, cyanosis or edema  Skin: No rash      Labs:  Lab Results   Component Value Date    WBC 3.34 (L) 11/25/2024    HGB 15.0 11/25/2024    HCT 45.6 11/25/2024     (L) 11/25/2024    CHOL 91 (L) 10/25/2024    TRIG 37 10/25/2024    HDL 45 10/25/2024    ALT 29 10/25/2024    AST 28 10/25/2024     10/25/2024    K 4.0 10/25/2024     10/25/2024    CREATININE 1.0 10/25/2024    BUN 20 10/25/2024    CO2 25 10/25/2024    TSH 0.915 10/25/2024    PSA 2.3 10/25/2024    INR 1.1 08/12/2024    HGBA1C 4.8 10/25/2024       I have explained the risks and benefits of endoscopy procedures to the patient including but not limited to bleeding, perforation, infection, and death.  The patient was asked if they understand and allowed to ask any further questions to their satisfaction.    Otto Powers MD

## 2024-12-13 NOTE — ANESTHESIA POSTPROCEDURE EVALUATION
Anesthesia Post Evaluation    Patient: Renan Britton    Procedure(s) Performed: Procedure(s) (LRB):  COLONOSCOPY (N/A)  EGD (ESOPHAGOGASTRODUODENOSCOPY) (N/A)    Final Anesthesia Type: general      Patient location during evaluation: PACU  Patient participation: Yes- Able to Participate  Level of consciousness: awake  Post-procedure vital signs: reviewed and stable  Pain management: adequate  Airway patency: patent    PONV status at discharge: No PONV  Anesthetic complications: no      Cardiovascular status: blood pressure returned to baseline  Respiratory status: unassisted  Hydration status: euvolemic  Follow-up not needed.              Vitals Value Taken Time   /74 12/13/24 0935   Temp 36.7 °C (98.1 °F) 12/13/24 0905   Pulse 83 12/13/24 0935   Resp 13 12/13/24 0935   SpO2 95 % 12/13/24 0935         Event Time   Out of Recovery 09:36:06         Pain/Cristy Score: Cristy Score: 10 (12/13/2024  9:35 AM)

## 2024-12-13 NOTE — PROVATION PATIENT INSTRUCTIONS
Discharge Summary/Instructions after an Endoscopic Procedure  Patient Name: Renan Britton  Patient MRN: [id number]  Patient YOB: 1960 Friday, December 13, 2024  Otto Powers MD  Dear patient,  As a result of recent federal legislation (The Federal Cures Act), you may   receive lab or pathology results from your procedure in your [facility]   account before your physician is able to contact you. Your physician or   their representative will relay the results to you with their   recommendations at their soonest availability.  Thank you,  Your health is very important to us during the Covid Crisis. Following your   procedure today, you will receive a daily text for 2 weeks asking about   signs or symptoms of Covid 19.  Please respond to this text when you   receive it so we can follow up and keep you as safe as possible.   RESTRICTIONS:  During your procedure today, you received medications for sedation.  These   medications may affect your judgment, balance and coordination.  Therefore,   for 24 hours, you have the following restrictions:   - DO NOT drive a car, operate machinery, make legal/financial decisions,   sign important papers or drink alcohol.    ACTIVITY:  Today: no heavy lifting, straining or running due to procedural   sedation/anesthesia.  The following day: return to full activity including work.  DIET:  Eat and drink normally unless instructed otherwise.     TREATMENT FOR COMMON SIDE EFFECTS:  - Mild abdominal pain, nausea, belching, bloating or excessive gas:  rest,   eat lightly and use a heating pad.  - Sore Throat: treat with throat lozenges and/or gargle with warm salt   water.  - Because air was used during the procedure, expelling large amounts of air   from your rectum or belching is normal.  - If a bowel prep was taken, you may not have a bowel movement for 1-3 days.    This is normal.  SYMPTOMS TO WATCH FOR AND REPORT TO YOUR PHYSICIAN:  1. Abdominal pain or bloating, other  than gas cramps.  2. Chest pain.  3. Back pain.  4. Signs of infection such as: chills or fever occurring within 24 hours   after the procedure.  5. Rectal bleeding, which would show as bright red, maroon, or black stools.   (A tablespoon of blood from the rectum is not serious, especially if   hemorrhoids are present.)  6. Vomiting.  7. Weakness or dizziness.  GO DIRECTLY TO THE NEAREST EMERGENCY ROOM IF YOU HAVE ANY OF THE FOLLOWING:      Difficulty breathing              Chills and/or fever over 101 F   Persistent vomiting and/or vomiting blood   Severe abdominal pain   Severe chest pain   Black, tarry stools   Bleeding- more than one tablespoon   Any other symptom or condition that you feel may need urgent attention  Your doctor recommends these additional instructions:  If any biopsies were taken, your doctors clinic will contact you in 1 to 2   weeks with any results.  - Discharge patient to home.   - Patient has a contact number available for emergencies.  The signs and   symptoms of potential delayed complications were discussed with the   patient.  Return to normal activities tomorrow.  Written discharge   instructions were provided to the patient.   - Resume previous diet.   - Continue present medications.   - Await pathology results.   - Perform a colonoscopy today.   - See the other procedure note for documentation of additional   recommendations.   - Would add protonix daily for mild esophagitis seen today.  For questions, problems or results please call your physician - Otto Powers MD.  EMERGENCY PHONE NUMBER: [phone],  LAB RESULTS: [phone]  IF A COMPLICATION OR EMERGENCY SITUATION ARISES AND YOU ARE UNABLE TO REACH   YOUR PHYSICIAN - GO DIRECTLY TO THE EMERGENCY ROOM.  Otto Powers MD  12/13/2024 9:03:42 AM  This report has been verified and signed electronically.  Dear patient,  As a result of recent federal legislation (The Federal Cures Act), you may   receive lab or  pathology results from your procedure in your [facility]   account before your physician is able to contact you. Your physician or   their representative will relay the results to you with their   recommendations at their soonest availability.  Thank you,  PROVATION

## 2024-12-16 ENCOUNTER — PATIENT MESSAGE (OUTPATIENT)
Dept: UROLOGY | Facility: CLINIC | Age: 64
End: 2024-12-16
Payer: COMMERCIAL

## 2024-12-16 PROBLEM — K92.2 LOWER GI BLEED: Status: ACTIVE | Noted: 2024-12-16

## 2024-12-16 LAB
FINAL PATHOLOGIC DIAGNOSIS: NORMAL
GROSS: NORMAL
Lab: NORMAL

## 2024-12-17 ENCOUNTER — OFFICE VISIT (OUTPATIENT)
Dept: HEPATOLOGY | Facility: CLINIC | Age: 64
End: 2024-12-17
Payer: COMMERCIAL

## 2024-12-17 DIAGNOSIS — K76.0 METABOLIC DYSFUNCTION-ASSOCIATED STEATOTIC LIVER DISEASE (MASLD): ICD-10-CM

## 2024-12-17 DIAGNOSIS — K70.30 ALCOHOLIC CIRRHOSIS OF LIVER WITHOUT ASCITES: Primary | ICD-10-CM

## 2024-12-17 PROCEDURE — 99213 OFFICE O/P EST LOW 20 MIN: CPT | Mod: 95,,, | Performed by: INTERNAL MEDICINE

## 2024-12-17 NOTE — PATIENT INSTRUCTIONS
Keep weight off  Abdo US w AFP 5/25  Continue lasix as outpt 20 mg daily  Continue off alcohol  Labs every 3 months  EGD 12/26  Return 4 months

## 2024-12-17 NOTE — PROGRESS NOTES
The [facility]: [facility] [place]  The chief complaint leading to consultation is: f/u of cirrhosis    Visit type: audiovisual    Face to Face time with patient: 15 minutes  30 minutes of total time spent on the encounter, which includes face to face time and non-face to face time preparing to see the patient (eg, review of tests), Obtaining and/or reviewing separately obtained history, Documenting clinical information in the electronic or other health record, Independently interpreting results (not separately reported) and communicating results to the patient/family/caregiver, or Care coordination (not separately reported).         Each patient to whom he or she provides medical services by telemedicine is:  (1) informed of the relationship between the physician and patient and the respective role of any other health care provider with respect to management of the patient; and (2) notified that he or she may decline to receive medical services by telemedicine and may withdraw from such care at any time.    Notes: HEPATOLOGY FOLLOW UP    Referring Physician: Eren Becker MD   Current Corresponding Physician: Eren Becker MD     Renan Britton is here for follow up of Cirrhosis      HPI  Renan Britton is a 64 y.o. male with a hx of prostate cancer, Aortic stenosis, PVD, DM and sleep apnea who presented 5/30/24 for evaluation of imaging suggestive of cirrhosis. He was being evaluated for surgical aortic valve replacement:     CTA abdo 5/21/24: Liver: Slightly nodular liver contour, suggesting possible cirrhosis.   --denies any symptoms of decompensated cirrhosis, including no ascites or edema, cognitive problems that would suggest hepatic encephalopathy, or GI bleeding from varices.   --is on diuretics for cardiac issues  --Hx heavy alcohol x many years (6-pack per day)  --BMI 37     Labs 4/4/24: plts 148, , creat 0.9, alb 3.5, Tbil 0.5, ALT 21, AST 22, ALKP 40    Interval  History  Since Renan Britton's last visit:  --colonoscopy last Friday- admitted now with BRBPR; no transfusions needed yet-will likely be discharged tomorrow  -- PSA rising- due to have MRI over the holidays    Abdo US 11/25/24: no liver cancer  MRE 6/25/24: mild steatosis; F3 or F4 fibrosis  MRI abdo 5/31/24: The surface of the liver is nodular compatible with cirrhosis. The liver is relatively homogeneous in signal intensity.   AFP 2.6 5/31/24  EGD 12/13/24: no varices;esophagitis and gastritis- protonix recommended    --due to presence of cirrhosis- surgical valve replacement for severe AS was canceled and decision to pursue TAVR was made  TAVR 8/13/24: successful  -on second round of 8 iron infusions  --lost 41 lbs intentionally  --no alcohol since last visit  --no edema/ascites  --no HE symptoms    Labs 12/16/24: ALT 22, AST 20, ALKP 36, Tbil 0.5  MELD 3.0: 7 at 12/17/2024  4:36 AM  MELD-Na: 7 at 12/17/2024  4:36 AM  Calculated from:  Serum Creatinine: 0.8 mg/dL (Using min of 1 mg/dL) at 12/17/2024  4:36 AM  Serum Sodium: 140 mmol/L (Using max of 137 mmol/L) at 12/17/2024  4:36 AM  Total Bilirubin: 0.5 mg/dL (Using min of 1 mg/dL) at 12/16/2024  8:46 AM  Serum Albumin: 3.4 g/dL at 12/16/2024  8:46 AM  INR(ratio): 1.1 at 12/16/2024  4:40 AM  Age at listing (hypothetical): 64 years  Sex: Male at 12/17/2024  4:36 AM     .    Facility-Administered Encounter Medications as of 12/17/2024   Medication Dose Route Frequency Provider Last Rate Last Admin    acetaminophen tablet 650 mg  650 mg Oral Q8H PRN Rosalba Coburn NP        ascorbic acid (vitamin C) tablet 500 mg  500 mg Oral Daily Rosalba Coburn, NP   500 mg at 12/17/24 1143    atorvastatin tablet 40 mg  40 mg Oral QHS Rosalba Coburn NP   40 mg at 12/16/24 2119    bisacodyL suppository 10 mg  10 mg Rectal Daily PRN Rosalba Coburn, NP        dextrose 50% injection 12.5 g  12.5 g Intravenous PRN Rosalba Coburn, NP        dextrose 50% injection  25 g  25 g Intravenous PRN Rosalba Coburn NP        ferrous sulfate tablet 1 each  1 tablet Oral Every other day Gela Hammond MD   1 each at 12/17/24 1320    glucagon (human recombinant) injection 1 mg  1 mg Intramuscular PRN Rosalba Coburn NP        glucose chewable tablet 16 g  16 g Oral PRN Rosalba Coburn, NP        glucose chewable tablet 24 g  24 g Oral PRN Rosalba Coburn NP        naloxone 0.4 mg/mL injection 0.02 mg  0.02 mg Intravenous PRN Rosalba Coburn NP        ondansetron injection 4 mg  4 mg Intravenous Q8H PRN Rosalba Coburn NP        pantoprazole EC tablet 40 mg  40 mg Oral Daily Rosalba Coburn NP   40 mg at 12/17/24 1143    [COMPLETED] polyethylene glycol (GoLYTELY) solution  2,000 mL Oral Once Rosalba Coburn NP   2,000 mL at 12/16/24 1631    [COMPLETED] polyethylene glycol (GoLYTELY) solution  2,000 mL Oral Once Rosalba Coburn NP   2,000 mL at 12/17/24 0300    pregabalin capsule 100 mg  100 mg Oral BID Rosalba Coburn NP   100 mg at 12/17/24 1143    prochlorperazine injection Soln 5 mg  5 mg Intravenous Q6H PRN Rosalba Coburn NP        [DISCONTINUED] ferrous sulfate tablet 1 each  1 tablet Oral Every other day Rosalba Coburn NP        [DISCONTINUED] ferrous sulfate tablet 1 each  1 tablet Oral Every other day Gela Hammond MD        [DISCONTINUED] fludeoxyglucose F-18 injection 9.18 millicurie  9.18 millicurie Intravenous ONCE PRN Raghav Mitchell MD        [DISCONTINUED] LIDOcaine (PF) 20 mg/mL (2%) injection   Intravenous PRN Jeremias Lakhani CRNA   80 mg at 12/17/24 1017    [DISCONTINUED] propofol (DIPRIVAN) 10 mg/mL infusion   Intravenous PRN Jeremias Lakhani CRNA   40 mg at 12/17/24 1029    [DISCONTINUED] sodium chloride 0.9% infusion   Intravenous Continuous PRN Jeremias Lakhani CRNA   Stopped at 12/17/24 1046     Outpatient Encounter Medications as of 12/17/2024   Medication Sig Dispense Refill    ascorbic acid, vitamin C, (VITAMIN C) 500 MG tablet Vitamin C  500 mg tablet, [RxNorm: 303621]      aspirin (ECOTRIN) 81 MG EC tablet Take 81 mg by mouth once daily.      atorvastatin (LIPITOR) 40 MG tablet Take 1 tablet (40 mg total) by mouth once daily. 30 tablet 5    cilostazoL (PLETAL) 100 MG Tab Take 50 mg by mouth 2 (two) times daily.      clopidogreL (PLAVIX) 75 mg tablet clopidogreL 75 mg tablet, [RxNorm: 804713]      ferrous sulfate 324 mg (65 mg iron) TbEC ferrous sulfate 324 mg (65 mg iron) tablet,delayed release, [RxNorm: 4168739]      furosemide (LASIX) 20 MG tablet Take 1 tablet (20 mg total) by mouth once daily. You may increase to twice daily if needed for increased shortness of breath or fluid 30 tablet 11    hydrALAZINE (APRESOLINE) 50 MG tablet       LYRICA 100 mg capsule Take 100 mg by mouth 2 (two) times daily. Patient takes medication for seizures      NIFEdipine (ADALAT CC) 60 MG TbSR       nitroGLYCERIN (NITROSTAT) 0.4 MG SL tablet Place 1 tablet (0.4 mg total) under the tongue every 5 (five) minutes as needed for Chest pain. 15 tablet 3    pantoprazole (PROTONIX) 40 MG tablet Take 1 tablet (40 mg total) by mouth once daily. 30 tablet 11    sodium,potassium,mag sulfates (SUPREP BOWEL PREP KIT) 17.5-3.13-1.6 gram SolR Take 177 mLs by mouth once daily. 1 kit 0     Review of patient's allergies indicates:  No Known Allergies  Past Medical History:   Diagnosis Date    JJ (acute kidney injury) 12/03/2023    Aortic valve disease     CHF (congestive heart failure)     Cirrhosis 06/10/2024    Diabetes mellitus     Elevated PSA     H/O brain tumor     History of heavy alcohol consumption 09/23/2024    HLD (hyperlipidemia)     Hypertension     Metabolic dysfunction-associated steatotic liver disease (MASLD) 09/23/2024    MRSA infection     IN 2020    PAD (peripheral artery disease)     Personal history of colonic polyps 01/08/2021    Pneumonia, unspecified organism     MOST RECENT WEEK OF APRIL 1, 2024    Prostate cancer     Seizures     R/T BRAIN TUMOR-  SURGICALLY EXCISED    Sleep apnea     CPAP    Urinary tract infection        Review of Systems   Constitutional: Negative.    HENT: Negative.     Eyes: Negative.    Respiratory: Negative.     Cardiovascular: Negative.    Gastrointestinal: Negative.    Genitourinary: Negative.    Musculoskeletal: Negative.    Skin: Negative.    Neurological: Negative.    Psychiatric/Behavioral: Negative.       There were no vitals filed for this visit.          MELD 3.0: 7 at 12/17/2024  4:36 AM  MELD-Na: 7 at 12/17/2024  4:36 AM  Calculated from:  Serum Creatinine: 0.8 mg/dL (Using min of 1 mg/dL) at 12/17/2024  4:36 AM  Serum Sodium: 140 mmol/L (Using max of 137 mmol/L) at 12/17/2024  4:36 AM  Total Bilirubin: 0.5 mg/dL (Using min of 1 mg/dL) at 12/16/2024  8:46 AM  Serum Albumin: 3.4 g/dL at 12/16/2024  8:46 AM  INR(ratio): 1.1 at 12/16/2024  4:40 AM  Age at listing (hypothetical): 64 years  Sex: Male at 12/17/2024  4:36 AM      Lab Results   Component Value Date     (H) 12/17/2024    BUN 15 12/17/2024    CREATININE 0.8 12/17/2024    CALCIUM 8.0 (L) 12/17/2024     12/17/2024    K 4.7 12/17/2024     12/17/2024    PROT 6.0 12/16/2024    CO2 26 12/17/2024    ANIONGAP 6 (L) 12/17/2024    WBC 3.42 (L) 12/17/2024    RBC 2.88 (L) 12/17/2024    HGB 9.3 (L) 12/17/2024    HCT 25.7 (L) 12/17/2024    HCT 39 08/12/2020    MCV 89 12/17/2024    MCH 29.9 12/17/2024    MCHC 33.5 12/17/2024     Lab Results   Component Value Date    RDW 15.0 (H) 12/17/2024    PLT 75 (L) 12/17/2024    MPV 11.9 12/17/2024    GRAN 2.2 12/17/2024    GRAN 64.0 12/17/2024    LYMPH 0.7 (L) 12/17/2024    LYMPH 19.0 12/17/2024    MONO 0.5 12/17/2024    MONO 13.2 12/17/2024    EOSINOPHIL 2.9 12/17/2024    BASOPHIL 0.6 12/17/2024    EOS 0.1 12/17/2024    BASO 0.02 12/17/2024    APTT 23.9 12/16/2024    GROUPTRH A NEG 12/16/2024     (H) 08/13/2024    CHOL 91 (L) 10/25/2024    TRIG 37 10/25/2024    HDL 45 10/25/2024    CHOLHDL 49.5 10/25/2024     TOTALCHOLEST 2.0 10/25/2024    ALBUMIN 3.4 (L) 12/16/2024    BILIDIR 0.2 12/16/2024    AST 20 12/16/2024    ALT 22 12/16/2024    ALKPHOS 36 (L) 12/16/2024    MG 1.8 12/16/2024    LABPROT 12.0 12/16/2024    INR 1.1 12/16/2024    PSA 2.3 10/25/2024       Assessment and Plan:  Renan Britton is a 64 y.o. male with alcohol/MASLD-induced cirrhosis. Current recommendations:  Cirrhosis, compensated: monitor labs every 3 months; hcc screening every 6 months with imaging and AFP-due 05/24; EGD to screen for varices 12/2026  Hx heavy alcohol: continue alcohol avoidance  Elevated BMI, improving: recommend continued weight loss  Colorectal ca screening: s/p polyp removal-repeat colonoscopy in 3 years  Return 4 months  A total of 35 minutes was spent reviewing the patient's chart, examining the patient, reviewing labs and imaging and coordinating care with the patient's care team.

## 2024-12-24 ENCOUNTER — TELEPHONE (OUTPATIENT)
Dept: HEPATOLOGY | Facility: CLINIC | Age: 64
End: 2024-12-24
Payer: COMMERCIAL

## 2024-12-24 NOTE — TELEPHONE ENCOUNTER
Spoke with patient's wife. Scheduled labs in March, follow up appointment in April, AFP labs and US in May. Mailed appointment reminder to patient.

## 2024-12-24 NOTE — TELEPHONE ENCOUNTER
----- Message from Bernarda Lindsey MD sent at 12/17/2024  3:35 PM CST -----  Labs every 3 months-due end of march 2025  Anitha paniagua AFP 5/25  Return 4 months

## 2024-12-26 ENCOUNTER — HOSPITAL ENCOUNTER (OUTPATIENT)
Dept: RADIOLOGY | Facility: HOSPITAL | Age: 64
Discharge: HOME | End: 2024-12-26
Attending: UROLOGY
Payer: COMMERCIAL

## 2024-12-26 DIAGNOSIS — R97.21 RISING PSA FOLLOWING TREATMENT FOR MALIGNANT NEOPLASM OF PROSTATE: ICD-10-CM

## 2024-12-26 DIAGNOSIS — R97.20 ELEVATED PSA: ICD-10-CM

## 2024-12-26 DIAGNOSIS — N52.31 ERECTILE DYSFUNCTION AFTER RADICAL PROSTATECTOMY: ICD-10-CM

## 2024-12-26 DIAGNOSIS — C61 PROSTATE CANCER: ICD-10-CM

## 2024-12-26 PROCEDURE — 25500020 PHARM REV CODE 255: Performed by: UROLOGY

## 2024-12-26 PROCEDURE — A9585 GADOBUTROL INJECTION: HCPCS | Performed by: UROLOGY

## 2024-12-26 PROCEDURE — 72197 MRI PELVIS W/O & W/DYE: CPT | Mod: TC

## 2024-12-26 PROCEDURE — 72197 MRI PELVIS W/O & W/DYE: CPT | Mod: 26,,, | Performed by: INTERNAL MEDICINE

## 2024-12-26 RX ORDER — GADOBUTROL 604.72 MG/ML
10 INJECTION INTRAVENOUS
Status: COMPLETED | OUTPATIENT
Start: 2024-12-26 | End: 2024-12-26

## 2024-12-26 RX ADMIN — GADOBUTROL 10 ML: 604.72 INJECTION INTRAVENOUS at 12:12

## 2024-12-30 ENCOUNTER — PATIENT MESSAGE (OUTPATIENT)
Dept: UROLOGY | Facility: CLINIC | Age: 64
End: 2024-12-30
Payer: COMMERCIAL

## 2024-12-30 DIAGNOSIS — R97.21 RISING PSA FOLLOWING TREATMENT FOR MALIGNANT NEOPLASM OF PROSTATE: Primary | ICD-10-CM

## 2024-12-30 DIAGNOSIS — R93.89 ABNORMAL MRI: ICD-10-CM

## 2024-12-30 DIAGNOSIS — Z85.46 PERSONAL HISTORY OF PROSTATE CANCER: ICD-10-CM

## 2024-12-30 DIAGNOSIS — Z90.79 STATUS POST PROSTATECTOMY: ICD-10-CM

## 2025-01-03 ENCOUNTER — PATIENT MESSAGE (OUTPATIENT)
Dept: UROLOGY | Facility: CLINIC | Age: 65
End: 2025-01-03
Payer: COMMERCIAL

## 2025-01-18 ENCOUNTER — PATIENT MESSAGE (OUTPATIENT)
Dept: HEMATOLOGY/ONCOLOGY | Facility: CLINIC | Age: 65
End: 2025-01-18
Payer: COMMERCIAL

## 2025-01-18 RX ORDER — EPINEPHRINE 0.3 MG/.3ML
0.3 INJECTION SUBCUTANEOUS ONCE AS NEEDED
OUTPATIENT
Start: 2025-03-04

## 2025-01-18 RX ORDER — SODIUM CHLORIDE 0.9 % (FLUSH) 0.9 %
10 SYRINGE (ML) INJECTION
OUTPATIENT
Start: 2025-02-25

## 2025-01-18 RX ORDER — SODIUM CHLORIDE 0.9 % (FLUSH) 0.9 %
10 SYRINGE (ML) INJECTION
OUTPATIENT
Start: 2025-01-28

## 2025-01-18 RX ORDER — HEPARIN 100 UNIT/ML
500 SYRINGE INTRAVENOUS
OUTPATIENT
Start: 2025-02-18

## 2025-01-18 RX ORDER — SODIUM CHLORIDE 0.9 % (FLUSH) 0.9 %
10 SYRINGE (ML) INJECTION
OUTPATIENT
Start: 2025-03-04

## 2025-01-18 RX ORDER — HEPARIN 100 UNIT/ML
500 SYRINGE INTRAVENOUS
OUTPATIENT
Start: 2025-03-04

## 2025-01-18 RX ORDER — EPINEPHRINE 0.3 MG/.3ML
0.3 INJECTION SUBCUTANEOUS ONCE AS NEEDED
OUTPATIENT
Start: 2025-03-11

## 2025-01-18 RX ORDER — HEPARIN 100 UNIT/ML
500 SYRINGE INTRAVENOUS
OUTPATIENT
Start: 2025-02-25

## 2025-01-18 RX ORDER — EPINEPHRINE 0.3 MG/.3ML
0.3 INJECTION SUBCUTANEOUS ONCE AS NEEDED
OUTPATIENT
Start: 2025-02-11

## 2025-01-18 RX ORDER — DIPHENHYDRAMINE HYDROCHLORIDE 50 MG/ML
50 INJECTION INTRAMUSCULAR; INTRAVENOUS ONCE AS NEEDED
OUTPATIENT
Start: 2025-02-04

## 2025-01-18 RX ORDER — EPINEPHRINE 0.3 MG/.3ML
0.3 INJECTION SUBCUTANEOUS ONCE AS NEEDED
OUTPATIENT
Start: 2025-02-18

## 2025-01-18 RX ORDER — HEPARIN 100 UNIT/ML
500 SYRINGE INTRAVENOUS
OUTPATIENT
Start: 2025-03-11

## 2025-01-18 RX ORDER — SODIUM CHLORIDE 0.9 % (FLUSH) 0.9 %
10 SYRINGE (ML) INJECTION
OUTPATIENT
Start: 2025-02-18

## 2025-01-18 RX ORDER — DIPHENHYDRAMINE HYDROCHLORIDE 50 MG/ML
50 INJECTION INTRAMUSCULAR; INTRAVENOUS ONCE AS NEEDED
OUTPATIENT
Start: 2025-03-11

## 2025-01-18 RX ORDER — DIPHENHYDRAMINE HYDROCHLORIDE 50 MG/ML
50 INJECTION INTRAMUSCULAR; INTRAVENOUS ONCE AS NEEDED
OUTPATIENT
Start: 2025-02-11

## 2025-01-18 RX ORDER — HEPARIN 100 UNIT/ML
500 SYRINGE INTRAVENOUS
OUTPATIENT
Start: 2025-02-11

## 2025-01-18 RX ORDER — SODIUM CHLORIDE 0.9 % (FLUSH) 0.9 %
10 SYRINGE (ML) INJECTION
OUTPATIENT
Start: 2025-01-21

## 2025-01-18 RX ORDER — EPINEPHRINE 0.3 MG/.3ML
0.3 INJECTION SUBCUTANEOUS ONCE AS NEEDED
OUTPATIENT
Start: 2025-02-04

## 2025-01-18 RX ORDER — DIPHENHYDRAMINE HYDROCHLORIDE 50 MG/ML
50 INJECTION INTRAMUSCULAR; INTRAVENOUS ONCE AS NEEDED
OUTPATIENT
Start: 2025-01-21

## 2025-01-18 RX ORDER — HEPARIN 100 UNIT/ML
500 SYRINGE INTRAVENOUS
OUTPATIENT
Start: 2025-02-04

## 2025-01-18 RX ORDER — SODIUM CHLORIDE 0.9 % (FLUSH) 0.9 %
10 SYRINGE (ML) INJECTION
OUTPATIENT
Start: 2025-03-11

## 2025-01-18 RX ORDER — EPINEPHRINE 0.3 MG/.3ML
0.3 INJECTION SUBCUTANEOUS ONCE AS NEEDED
OUTPATIENT
Start: 2025-01-28

## 2025-01-18 RX ORDER — DIPHENHYDRAMINE HYDROCHLORIDE 50 MG/ML
50 INJECTION INTRAMUSCULAR; INTRAVENOUS ONCE AS NEEDED
OUTPATIENT
Start: 2025-03-04

## 2025-01-18 RX ORDER — HEPARIN 100 UNIT/ML
500 SYRINGE INTRAVENOUS
OUTPATIENT
Start: 2025-01-21

## 2025-01-18 RX ORDER — EPINEPHRINE 0.3 MG/.3ML
0.3 INJECTION SUBCUTANEOUS ONCE AS NEEDED
OUTPATIENT
Start: 2025-01-21

## 2025-01-18 RX ORDER — HEPARIN 100 UNIT/ML
500 SYRINGE INTRAVENOUS
OUTPATIENT
Start: 2025-01-28

## 2025-01-18 RX ORDER — SODIUM CHLORIDE 0.9 % (FLUSH) 0.9 %
10 SYRINGE (ML) INJECTION
OUTPATIENT
Start: 2025-02-04

## 2025-01-18 RX ORDER — SODIUM CHLORIDE 0.9 % (FLUSH) 0.9 %
10 SYRINGE (ML) INJECTION
OUTPATIENT
Start: 2025-02-11

## 2025-01-18 RX ORDER — DIPHENHYDRAMINE HYDROCHLORIDE 50 MG/ML
50 INJECTION INTRAMUSCULAR; INTRAVENOUS ONCE AS NEEDED
OUTPATIENT
Start: 2025-02-25

## 2025-01-18 RX ORDER — DIPHENHYDRAMINE HYDROCHLORIDE 50 MG/ML
50 INJECTION INTRAMUSCULAR; INTRAVENOUS ONCE AS NEEDED
OUTPATIENT
Start: 2025-01-28

## 2025-01-18 RX ORDER — EPINEPHRINE 0.3 MG/.3ML
0.3 INJECTION SUBCUTANEOUS ONCE AS NEEDED
OUTPATIENT
Start: 2025-02-25

## 2025-01-18 RX ORDER — DIPHENHYDRAMINE HYDROCHLORIDE 50 MG/ML
50 INJECTION INTRAMUSCULAR; INTRAVENOUS ONCE AS NEEDED
OUTPATIENT
Start: 2025-02-18

## 2025-01-21 ENCOUNTER — OFFICE VISIT (OUTPATIENT)
Dept: RADIATION ONCOLOGY | Facility: CLINIC | Age: 65
End: 2025-01-21
Payer: COMMERCIAL

## 2025-01-21 DIAGNOSIS — R97.21 RISING PSA FOLLOWING TREATMENT FOR MALIGNANT NEOPLASM OF PROSTATE: ICD-10-CM

## 2025-01-21 DIAGNOSIS — C61 PROSTATE CANCER: Primary | ICD-10-CM

## 2025-01-21 DIAGNOSIS — Z90.79 STATUS POST PROSTATECTOMY: ICD-10-CM

## 2025-01-21 DIAGNOSIS — R93.89 ABNORMAL MRI: ICD-10-CM

## 2025-01-22 RX ORDER — BICALUTAMIDE 50 MG/1
50 TABLET, FILM COATED ORAL DAILY
Qty: 30 TABLET | Refills: 2 | Status: SHIPPED | OUTPATIENT
Start: 2025-01-22 | End: 2026-01-22

## 2025-01-22 NOTE — PROGRESS NOTES
The patient location is: home   The chief complaint leading to consultation is: prostate cancer     Visit type: audiovisual    Face to Face time with patient: 30 minutes    60 minutes of total time spent on the encounter, which includes face to face time and non-face to face time preparing to see the patient (eg, review of tests), Obtaining and/or reviewing separately obtained history, Documenting clinical information in the electronic or other health record, Independently interpreting results (not separately reported) and communicating results to the patient/family/caregiver, or Care coordination (not separately reported).     Each patient to whom he or she provides medical services by telemedicine is:  (1) informed of the relationship between the physician and patient and the respective role of any other health care provider with respect to management of the patient; and (2) notified that he or she may decline to receive medical services by telemedicine and may withdraw from such care at any time.    Multidisciplinary Uro-Oncology Clinic  [facility] [place] - Radiation Oncology     HISTORY OF PRESENT ILLNESS:   This patient presents for discussion of salvage therapy for his prostate cancer.      Mr. Britton has recurrent prostate cancer.  He initially presented in 2021 with PSA of 6.6 ng/ml.  Biopsies revealed [place] 6 (3+3) adenocarcinoma.  He underwent RALP in December of 2021.  Pathology revealed a 21 g prostate measruing 4 x 3 x 2.7 cm with a 1.6 x 1.7 cm focus of [place] 7 (3+4) adenocarcinoma.  The [place] pattern 4 accounted for < 5% of the tumor.  There was no extraprostatic extension, lymphovascular, bladder neck or seminal vesicle invasion.  The margins of resection and 17 (6 Lt., 11 Rt.) pelvic nodes were negative. Postoperative PSA returned at 0.11 ng/ml.  PSA has been increasing since that time.  PSMA scans in  December/2022, November/2023 and   June/December 2024 have remained  negative.  His most recent PSA on 11/25/24 was 2.9 ng/ml.  MRI on 12/26/24 revealed a wall thickening and mural hyperenhancement along the posterior aspect of the vesicourethral anastomosis of unclear significance.  The patient presents for discussion of salvage therapy.      REVIEW OF SYSTEMS:   Review of Systems   Constitutional:  Negative for chills, fever, malaise/fatigue and weight loss.   Gastrointestinal:  Negative for abdominal pain, constipation and diarrhea.   Genitourinary:  Negative for dysuria, frequency, hematuria and urgency.        Denies significant incontinence.  Erectile dysfunction following surgery.     PAST MEDICAL HISTORY:  Past Medical History:   Diagnosis Date    JJ (acute kidney injury) 12/03/2023    Aortic valve disease     CHF (congestive heart failure)     Cirrhosis 06/10/2024    Diabetes mellitus     Elevated PSA     Encounter for blood transfusion     H/O brain tumor     History of heavy alcohol consumption 09/23/2024    HLD (hyperlipidemia)     Hypertension     Metabolic dysfunction-associated steatotic liver disease (MASLD) 09/23/2024    MRSA infection     IN 2020    PAD (peripheral artery disease)     Personal history of colonic polyps 01/08/2021    Pneumonia, unspecified organism     MOST RECENT WEEK OF APRIL 1, 2024    Prostate cancer     Seizures     R/T BRAIN TUMOR- SURGICALLY EXCISED    Sleep apnea     CPAP    Urinary tract infection        PAST SURGICAL HISTORY:  Past Surgical History:   Procedure Laterality Date    ANGIOGRAPHY Left 4/6/2020    Procedure: ANGIOGRAM, Left lower extremity;  Surgeon: Kelby Gomez MD;  Location: [facility] OR [address] [place] [address];  Service: Peripheral Vascular;  Laterality: Left;    ANGIOGRAPHY OF LOWER EXTREMITY Left 6/29/2020    Procedure: Angiogram Extremity Unilateral, washout L groin, possible open pseudoaneurysm repair;  Surgeon: Bruce Dallas MD;  Location: [facility] OR [address] [place] [address];  Service: Peripheral Vascular;  Laterality: Left;  contrast 23 ml  fluoro  time: 9.9 min  mGy: 1230.26  Gycm2: 146.69    AORTOGRAPHY N/A 6/29/2020    Procedure: AORTOGRAM;  Surgeon: Bruce Dallas MD;  [facility];  Service: Peripheral Vascular;  Laterality: N/A;    APPLICATION OF WOUND VACUUM-ASSISTED CLOSURE DEVICE Bilateral 1/1/2020    Procedure: APPLICATION, WOUND VAC;  Surgeon: SEBAS Prieto II, MD;  [facility];  Service: Cardiovascular;  Laterality: Bilateral;    APPLICATION OF WOUND VACUUM-ASSISTED CLOSURE DEVICE N/A 1/3/2020    Procedure: APPLICATION, WOUND VAC;  Surgeon: Brian Wong MD;  [facility];  Service: Plastics;  Laterality: N/A;    BRAIN SURGERY  01/2011    TUMOR EXCISED    CARDIAC CATH COSURGEON N/A 8/13/2024    Procedure: Cardiac Cath Cosurgeon;  Surgeon: Armand Erazo MD;  Location: [facility] CATH LAB;  Service: Cardiology;  Laterality: N/A;    COLONOSCOPY N/A 1/8/2021    Procedure: COLONOSCOPY;  Surgeon: Susan Posada MD;  Location: [facility] ENDO;  Service: Endoscopy;  Laterality: N/A;    COLONOSCOPY N/A 11/18/2021    Procedure: COLONOSCOPY;  Surgeon: Susan Posada MD;  Location: [facility];  Service: Endoscopy;  Laterality: N/A;    COLONOSCOPY N/A 12/13/2024    Procedure: COLONOSCOPY;  Surgeon: Otto Powers MD;  Location: [facility] ENDO;  Service: Endoscopy;  Laterality: N/A;  12/6/24-Suprep, cirrhosis labs morning of procedure, holding Plavix starting 12/8/24, precall complete-DS    COLONOSCOPY N/A 12/17/2024    Procedure: COLONOSCOPY;  Surgeon: Igor Zaldivar MD;  Location: [facility] ENDO;  Service: Endoscopy;  Laterality: N/A;    CORONARY ANGIOGRAPHY Left 4/19/2024    Procedure: ANGIOGRAM, CORONARY ARTERY;  Surgeon: Giuseppe Blood MD;  Location: [facility] CATH;  Service: Cardiology;  Laterality: Left;    CREATION OF ILIOFEMORAL ARTERY BYPASS Left 8/12/2020    Procedure: CREATION, BYPASS, ARTERIAL, ILIAC TO FEMORAL;  Surgeon: Kelby Gomez MD;  Location: [facility] OR [address] [place] [address];  Service: Peripheral  Vascular;  Laterality: Left;  CO-SURGEONS: DR KEO ADHIKARI;  DR WONG, profusion    CREATION OF MUSCLE ROTATIONAL FLAP N/A 1/3/2020    Procedure: CREATION, FLAP, MUSCLE ROTATION;  Surgeon: Brian Wong MD;  Location: [facility] OR [address] [place] [address];  Service: Plastics;  Laterality: N/A;    ENDARTERECTOMY OF FEMORAL ARTERY Bilateral 12/20/2019    Procedure: ENDARTERECTOMY, FEMORAL;  Surgeon: Kelby Gomez MD;  Location: [address];  Service: Peripheral Vascular;  Laterality: Bilateral;  natalya common femoral endarterectomy with natalya. iliac stent placement    ESOPHAGOGASTRODUODENOSCOPY N/A 4/7/2022    Procedure: EGD (ESOPHAGOGASTRODUODENOSCOPY);  Surgeon: Susan Posada MD;  Location: [facility] ENDO;  Service: Endoscopy;  Laterality: N/A;    ESOPHAGOGASTRODUODENOSCOPY N/A 12/13/2024    Procedure: EGD (ESOPHAGOGASTRODUODENOSCOPY);  Surgeon: Otto Powers MD;  Location: [facility] ENDO;  Service: Endoscopy;  Laterality: N/A;    INTRALUMINAL GASTROINTESTINAL TRACT IMAGING VIA CAPSULE N/A 4/18/2022    Procedure: IMAGING, GI TRACT, INTRALUMINAL, VIA CAPSULE;  Surgeon: Otto Powers MD;  Location: [facility];  Service: Endoscopy;  Laterality: N/A;    PERCUTANEOUS TRANSLUMINAL ANGIOPLASTY Left 4/6/2020    Procedure: PTA (ANGIOPLASTY, PERCUTANEOUS, TRANSLUMINAL), left lower extremity;  Surgeon: Kelby Gomez MD;  Location: [address];  Service: Peripheral Vascular;  Laterality: Left;    PERCUTANEOUS TRANSLUMINAL ANGIOPLASTY (PTA) OF PERIPHERAL VESSEL Left 10/17/2019    Procedure: PTA, PERIPHERAL VESSEL;  Surgeon: Rao Fisher MD;  Location: [facility] CATH;  Service: Cardiology;  Laterality: Left;    PSEUDOANEURYSM REPAIR Left 6/29/2020    Procedure: REPAIR, PSEUDOANEURYSM;  Surgeon: Bruce Dallas MD;  Location: [address];  Service: Peripheral Vascular;  Laterality: Left;    RADICAL RETROPUBIC PROSTATECTOMY N/A 12/13/2021    Procedure: PROSTATECTOMY, RETROPUBIC, RADICAL + Bilateral pelvic lymph node dissection;   Surgeon: Raghav Mitchell MD;  Location: [facility] OR;  Service: Urology;  Laterality: N/A;    RIGHT HEART CATHETERIZATION Right 4/19/2024    Procedure: INSERTION, CATHETER, RIGHT HEART;  Surgeon: Giuseppe Blood MD;  Location: [facility] CATH;  Service: Cardiology;  Laterality: Right;    TRANSCATHETER AORTIC VALVE REPLACEMENT (TAVR) Right 8/13/2024    Procedure: REPLACEMENT, AORTIC VALVE, TRANSCATHETER (TAVR);  Surgeon: Ruslan Xie MD;  Location: [facility] CATH LAB;  Service: Cardiology;  Laterality: Right;    TRANSCATHETER AORTIC VALVE REPLACEMENT (TAVR)  8/13/2024    Procedure: REPLACEMENT, AORTIC VALVE, TRANSCATHETER (TAVR);  Surgeon: Armand Erazo MD;  Location: [facility] CATH LAB;  Service: Cardiology;;    TRANSESOPHAGEAL ECHOCARDIOGRAPHY N/A 4/19/2024    Procedure: ECHOCARDIOGRAM, TRANSESOPHAGEAL;  Surgeon: iGuseppe Blood MD;  Location: [facility] CATH;  Service: Cardiology;  Laterality: N/A;       ALLERGIES:   Review of patient's allergies indicates:  No Known Allergies    MEDICATIONS:  Current Outpatient Medications   Medication Sig    ascorbic acid, vitamin C, (VITAMIN C) 500 MG tablet Vitamin C 500 mg tablet, [RxNorm: 642628]    atorvastatin (LIPITOR) 40 MG tablet Take 1 tablet (40 mg total) by mouth once daily.    clopidogreL (PLAVIX) 75 mg tablet clopidogreL 75 mg tablet, [RxNorm: 224495]    ferrous sulfate 324 mg (65 mg iron) TbEC ferrous sulfate 324 mg (65 mg iron) tablet,delayed release, [RxNorm: 8953667]    furosemide (LASIX) 20 MG tablet Take 1 tablet (20 mg total) by mouth once daily. You may increase to twice daily if needed for increased shortness of breath or fluid    hydrALAZINE (APRESOLINE) 50 MG tablet Take 50 mg by mouth every 12 (twelve) hours.    inulin (FIBER GUMMIES) 2 gram Chew Take by mouth.    LYRICA 100 mg capsule Take 100 mg by mouth 2 (two) times daily. Patient takes medication for seizures    metFORMIN (GLUCOPHAGE) 500 MG tablet Take 500 mg by mouth 2 (two) times daily with meals.     NIFEdipine (ADALAT CC) 60 MG TbSR Take 60 mg by mouth once daily.    nitroGLYCERIN (NITROSTAT) 0.4 MG SL tablet Place 1 tablet (0.4 mg total) under the tongue every 5 (five) minutes as needed for Chest pain.    pantoprazole (PROTONIX) 40 MG tablet Take 1 tablet (40 mg total) by mouth once daily.     No current facility-administered medications for this visit.       SOCIAL HISTORY:  Social History     Socioeconomic History    Marital status:    Tobacco Use    Smoking status: Former     Types: Cigarettes     Passive exposure: Current    Smokeless tobacco: Never    Tobacco comments:      2-3 daily   Substance and Sexual Activity    Alcohol use: Not Currently     Comment: socially, rarely    Drug use: Never    Sexual activity: Yes     Partners: Female     Birth control/protection: None     Social Drivers of Health     Financial Resource Strain: Low Risk  (1/14/2025)    Overall Financial Resource Strain (CARDIA)     Difficulty of Paying Living Expenses: Not hard at all   Food Insecurity: No Food Insecurity (1/14/2025)    Hunger Vital Sign     Worried About Running Out of Food in the Last Year: Never true     Ran Out of Food in the Last Year: Never true   Transportation Needs: No Transportation Needs (12/17/2024)    TRANSPORTATION NEEDS     Transportation : No   Physical Activity: Inactive (1/14/2025)    Exercise Vital Sign     Days of Exercise per Week: 0 days     Minutes of Exercise per Session: 0 min   Stress: No Stress Concern Present (1/14/2025)    [employer] [place] [employer] - Occupational Stress Questionnaire     Feeling of Stress : Only a little   Housing Stability: Low Risk  (1/14/2025)    Housing Stability Vital Sign     Unable to Pay for Housing in the Last Year: No     Homeless in the Last Year: No       FAMILY HISTORY:  Family History   Problem Relation Name Age of Onset    Diabetes Mother      Hypertension Mother      Stroke Mother      Cancer Father          LUNG    Heart disease Father       Cancer Brother      Heart disease Brother      Heart disease Brother      Heart disease Brother      Prostate cancer Neg Hx      Kidney disease Neg Hx       PHYSICAL EXAMINATION:  There were no vitals filed for this visit.  Physical Exam  Constitutional:       General: He is not in acute distress.     Appearance: Normal appearance.   Neurological:      Mental Status: He is alert and oriented to person, place, and time.   Psychiatric:         Mood and Affect: Mood normal.         Judgment: Judgment normal.       ASSESSMENT/PLAN:  History of pathologic stage IIB (T2, N0, M0, GG2, PSA < 10) prostate cancer with evidence of biochemical failure.      ECO    I had a long discussion with the patient and his wife.  We reviewed his pathology and discussed the significance of his increasing PSA.  Discussed the differences in local / regional vs distant metastatic disease.  Discussed the significance of his PSMA and MRI finding.  Currently his calculated PSA doubling time is 7 months.  Explained shorter PSA doubling times are typically associated with distant recurrence but his scans remain negative.  Explained it is possible his MRI findings represent a local recurrence.  This area would be difficult of access on the PSMA scan given the expected tracer uptake in the urine.  Reviewed the rational for consideration of salvage therapy in this setting.  Discussed the use of radiotherapy and short course hormonal therapy in this setting.  Reviewed the procedures, risks and benefits of therapy.  Discussed the acute and long term side effects of therapy.  Given his age and health, I would consider offering salvage therapy.  Recommend combined therapy with radiotherapy to the prostate bed and pelvic nodes combined with 6 - 12 month of hormonal deprivation therapy.  May consider repeating his MRI after 3 - 6 months of hormonal therapy to access possible response.      I spent approximately 60 minutes reviewing the available  records and evaluating the patient, out of which over 50% of the time was spent face to face with the patient in counseling and coordinating this patient's care.

## 2025-01-24 ENCOUNTER — PATIENT MESSAGE (OUTPATIENT)
Dept: UROLOGY | Facility: CLINIC | Age: 65
End: 2025-01-24
Payer: COMMERCIAL

## 2025-01-24 DIAGNOSIS — C61 PROSTATE CANCER: ICD-10-CM

## 2025-01-24 DIAGNOSIS — R97.21 RISING PSA FOLLOWING TREATMENT FOR MALIGNANT NEOPLASM OF PROSTATE: Primary | ICD-10-CM

## 2025-01-24 RX ORDER — BICALUTAMIDE 50 MG/1
50 TABLET, FILM COATED ORAL DAILY
Qty: 30 TABLET | Refills: 11 | Status: SHIPPED | OUTPATIENT
Start: 2025-01-24 | End: 2025-01-24 | Stop reason: SDUPTHER

## 2025-02-05 ENCOUNTER — PATIENT MESSAGE (OUTPATIENT)
Dept: UROLOGY | Facility: CLINIC | Age: 65
End: 2025-02-05
Payer: COMMERCIAL

## 2025-02-07 NOTE — PROGRESS NOTES
PATIENT: Renan Britton  MRN: [id number]  DATE: 2/12/2025    Diagnosis:   1. Iron deficiency anemia due to chronic blood loss    2. Recurrent prostate cancer    3. Elevated PSA      Chief Complaint: Anemia    Oncologic History:      Oncologic History 1. Prostate cancer      Oncologic Treatment 1. Radical prostatectomy (12/13/21)      Pathology 12/13/21:  1. Right iliac, obturator, and hypogastric lymph nodes, lymphadenectomy:  Eleven lymph nodes, negative for carcinoma (0/11).  2. Left iliac, obturator, and hypogastric lymph nodes, lymphadenectomy:  Six lymph nodes, negative for carcinoma (0/6).  2. Prostate, radical prostatectomy:  Prostatic adenocarcinoma, grade group 2, [place] score = 7 (3+4), with involvement of bilateral prostate  lobes.  Surgical margins are negative for carcinoma.  Uninvolved prostate with benign glandular hyperplasia and minimal chronic inflammation.  AJCC 8th edition Pathologic stage: [id number].        Telemedicine visit:  The patient location is: home  The chief complaint leading to consultation is: anemia    Visit type: audiovisual    Face to Face time with patient: 8 minutes  10 minutes of total time spent on the encounter, which includes face to face time and non-face to face time preparing to see the patient (eg, review of tests), Obtaining and/or reviewing separately obtained history, Documenting clinical information in the electronic or other health record, Independently interpreting results (not separately reported) and communicating results to the patient/family/caregiver, or Care coordination (not separately reported).     Each patient to whom he or she provides medical services by telemedicine is:  (1) informed of the relationship between the physician and patient and the respective role of any other health care provider with respect to management of the patient; and (2) notified that he or she may decline to receive medical services by telemedicine and may withdraw from  such care at any time.    Notes: see below    Subjective:    History of Present Illness: Mr. Britton is a 64 y.o. male who presented in April 2022 for evaluation and management of iron deficiency anemia. He was referred by Dr. Posada.    - labs since December 2021 reveal a mild-to-severe anemia, now microcytic  - iron studies 94/7/22) reveal a low-normal ferritin, decreased iron/saturated iron, and increased total iron binding capacity  - he underwent radical prostatectomy on 12/13/21.  - he has undergone colonoscopy (11/18/21) and upper GI endoscopy (4/7/22).   - he received iron sucrose x 4 doses in April/May 2022.  He also underwent a video capsule endoscopy (4/18/22) that was relatively normal.  - he received iron sucrose x 4 doses in November/December 2022.  - in December, he was hospitalized for pneumonia/renal failure. He states his renal function is improving (he gets outside labs).  - he underwent PSMA scan on 11/22/23.  - he received ferric gluconate x 8 doses in June/July 2024.     Interval history:  - he presents for a follow-up appointment for his iron deficiency anemia  - he underwent PSMA scan on 12/5/24.  - he underwent colonoscopy on 12/17/24. Following that, he had several episodes of gastrointestinal bleeding.  - he has received 2 doses of ferric gluconate in January/February 2025.  - today, he endorses fatigue. He denies chest pain, nausea, vomiting, diarrhea, constipation.    Past medical, surgical, family, and social histories have been reviewed and updated below.    Past Medical History:   Past Medical History:   Diagnosis Date    JJ (acute kidney injury) 12/03/2023    Aortic valve disease     CHF (congestive heart failure)     Cirrhosis 06/10/2024    Diabetes mellitus     Elevated PSA     Encounter for blood transfusion     H/O brain tumor     History of heavy alcohol consumption 09/23/2024    HLD (hyperlipidemia)     Hypertension     Metabolic dysfunction-associated steatotic liver  disease (MASLD) 09/23/2024    MRSA infection     IN 2020    PAD (peripheral artery disease)     Personal history of colonic polyps 01/08/2021    Pneumonia, unspecified organism     MOST RECENT WEEK OF APRIL 1, 2024    Prostate cancer     Seizures     R/T BRAIN TUMOR- SURGICALLY EXCISED    Sleep apnea     CPAP    Urinary tract infection        Past Surgical History:   Past Surgical History:   Procedure Laterality Date    ANGIOGRAPHY Left 4/6/2020    Procedure: ANGIOGRAM, Left lower extremity;  Surgeon: Kelby Gomez MD;  Location: [address];  Service: Peripheral Vascular;  Laterality: Left;    ANGIOGRAPHY OF LOWER EXTREMITY Left 6/29/2020    Procedure: Angiogram Extremity Unilateral, washout L groin, possible open pseudoaneurysm repair;  Surgeon: Bruce Dallas MD;  Location: [facility] OR [address] [place] [address];  Service: Peripheral Vascular;  Laterality: Left;  contrast 23 ml  fluoro time: 9.9 min  mGy: 1230.26  Gycm2: 146.69    ANGIOGRAPHY OF LOWER EXTREMITY Left 1/24/2025    Procedure: Angiogram Extremity Unilateral;  Surgeon: Cesario Becker MD;  [facility]: [facility] CATH;  Service: Cardiology;  Laterality: Left;  +/- PTA/Stent  Via left brachial and right ferrmoral access    AORTOGRAPHY N/A 6/29/2020    Procedure: AORTOGRAM;  Surgeon: Bruce Dallas MD;  Location: [address];  Service: Peripheral Vascular;  Laterality: N/A;    APPLICATION OF WOUND VACUUM-ASSISTED CLOSURE DEVICE Bilateral 1/1/2020    Procedure: APPLICATION, WOUND VAC;  Surgeon: SEBAS Prieto II, MD;  Location: [address];  Service: Cardiovascular;  Laterality: Bilateral;    APPLICATION OF WOUND VACUUM-ASSISTED CLOSURE DEVICE N/A 1/3/2020    Procedure: APPLICATION, WOUND VAC;  Surgeon: Brian Wong MD;  Location: [address];  Service: Plastics;  Laterality: N/A;    BRAIN SURGERY  01/2011    TUMOR EXCISED    CARDIAC CATH COSURGEON N/A 8/13/2024    Procedure: Cardiac Cath Cosurgeon;  Surgeon: Armand Erazo MD;  Location: [facility]  CATH LAB;  Service: Cardiology;  Laterality: N/A;    COLONOSCOPY N/A 1/8/2021    Procedure: COLONOSCOPY;  Surgeon: Susan Posada MD;  Location: [facility] ENDO;  Service: Endoscopy;  Laterality: N/A;    COLONOSCOPY N/A 11/18/2021    Procedure: COLONOSCOPY;  Surgeon: Susan Posada MD;  Location: [facility] ENDO;  Service: Endoscopy;  Laterality: N/A;    COLONOSCOPY N/A 12/13/2024    Procedure: COLONOSCOPY;  Surgeon: Otto Powers MD;  Location: [facility] ENDO;  Service: Endoscopy;  Laterality: N/A;  12/6/24-Suprep, cirrhosis labs morning of procedure, holding Plavix starting 12/8/24, precall complete-DS    COLONOSCOPY N/A 12/17/2024    Procedure: COLONOSCOPY;  Surgeon: Igor Zaldivar MD;  Location: [facility];  Service: Endoscopy;  Laterality: N/A;    CORONARY ANGIOGRAPHY Left 4/19/2024    Procedure: ANGIOGRAM, CORONARY ARTERY;  Surgeon: Giuseppe Blood MD;  [facility]: [facility] CATH;  Service: Cardiology;  Laterality: Left;    CREATION OF ILIOFEMORAL ARTERY BYPASS Left 8/12/2020    Procedure: CREATION, BYPASS, ARTERIAL, ILIAC TO FEMORAL;  Surgeon: Kelby Gomez MD;  Location: [facility] OR [address] [place] [address];  Service: Peripheral Vascular;  Laterality: Left;  CO-SURGEONS: DR KEO ADHIKARI;  DR WONG, profusion    CREATION OF MUSCLE ROTATIONAL FLAP N/A 1/3/2020    Procedure: CREATION, FLAP, MUSCLE ROTATION;  Surgeon: Brian Wong MD;  Location: [facility] OR [facility] FLR;  Service: Plastics;  Laterality: N/A;    ENDARTERECTOMY OF FEMORAL ARTERY Bilateral 12/20/2019    Procedure: ENDARTERECTOMY, FEMORAL;  Surgeon: Kelby Gomez MD;  [facility];  Service: Peripheral Vascular;  Laterality: Bilateral;  natalya common femoral endarterectomy with natalya. iliac stent placement    ESOPHAGOGASTRODUODENOSCOPY N/A 4/7/2022    Procedure: EGD (ESOPHAGOGASTRODUODENOSCOPY);  Surgeon: Susan Posada MD;  Location: [facility];  Service: Endoscopy;  Laterality: N/A;    ESOPHAGOGASTRODUODENOSCOPY N/A 12/13/2024    Procedure: EGD  (ESOPHAGOGASTRODUODENOSCOPY);  Surgeon: Otto Powers MD;  Location: [facility] ENDO;  Service: Endoscopy;  Laterality: N/A;    INJECTION, PSEUDOANEURYSM Left 1/28/2025    Procedure: Injection, Pseudoaneurysm;  Surgeon: Madi Calero MD;  Location: [facility] CATH;  Service: Cardiology;  Laterality: Left;  L brachial artery- Radial access balloon occlusion and US guidance    INTRALUMINAL GASTROINTESTINAL TRACT IMAGING VIA CAPSULE N/A 4/18/2022    Procedure: IMAGING, GI TRACT, INTRALUMINAL, VIA CAPSULE;  Surgeon: Otto Powers MD;  Location: [facility] ENDO;  Service: Endoscopy;  Laterality: N/A;    PERCUTANEOUS TRANSLUMINAL ANGIOPLASTY Left 4/6/2020    Procedure: PTA (ANGIOPLASTY, PERCUTANEOUS, TRANSLUMINAL), left lower extremity;  Surgeon: Kelby Gomez MD;  Location: [address] FLR;  Service: Peripheral Vascular;  Laterality: Left;    PERCUTANEOUS TRANSLUMINAL ANGIOPLASTY (PTA) OF PERIPHERAL VESSEL Left 10/17/2019    Procedure: PTA, PERIPHERAL VESSEL;  Surgeon: Rao Fisher MD;  Location: [facility]placed [facility] CATH;  Service: Cardiology;  Laterality: Left;    PSEUDOANEURYSM REPAIR Left 6/29/2020    Procedure: REPAIR, PSEUDOANEURYSM;  Surgeon: Bruce Dallas MD;  Location: [facility] OR 2ND FLR;  Service: Peripheral Vascular;  Laterality: Left;    RADICAL RETROPUBIC PROSTATECTOMY N/A 12/13/2021    Procedure: PROSTATECTOMY, RETROPUBIC, RADICAL + Bilateral pelvic lymph node dissection;  Surgeon: Raghav Mitchell MD;  Location: [facility] OR;  Service: Urology;  Laterality: N/A;    RIGHT HEART CATHETERIZATION Right 4/19/2024    Procedure: INSERTION, CATHETER, RIGHT HEART;  Surgeon: Giuseppe Blood MD;  Location: [facility] CATH;  Service: Cardiology;  Laterality: Right;    TRANSCATHETER AORTIC VALVE REPLACEMENT (TAVR) Right 8/13/2024    Procedure: REPLACEMENT, AORTIC VALVE, TRANSCATHETER (TAVR);  Surgeon: Ruslan Xie MD;  Location: [facility] CATH LAB;  Service: Cardiology;  Laterality: Right;    TRANSCATHETER AORTIC VALVE REPLACEMENT (TAVR)   8/13/2024    Procedure: REPLACEMENT, AORTIC VALVE, TRANSCATHETER (TAVR);  Surgeon: Armand Erazo MD;  Location: [facility] CATH LAB;  Service: Cardiology;;    TRANSESOPHAGEAL ECHOCARDIOGRAPHY N/A 4/19/2024    Procedure: ECHOCARDIOGRAM, TRANSESOPHAGEAL;  Surgeon: Giuseppe Blood MD;  Location: [facility] CATH;  Service: Cardiology;  Laterality: N/A;       Family History:   Family History   Problem Relation Name Age of Onset    Diabetes Mother      Hypertension Mother      Stroke Mother      Cancer Father          LUNG    Heart disease Father      Cancer Brother      Heart disease Brother      Heart disease Brother      Heart disease Brother      Prostate cancer Neg Hx      Kidney disease Neg Hx         Social History:  reports that he has quit smoking. His smoking use included cigarettes. He has been exposed to tobacco smoke. He has never used smokeless tobacco. He reports that he does not currently use alcohol. He reports that he does not use drugs.    Allergies:  Review of patient's allergies indicates:  No Known Allergies    Medications:  No current facility-administered medications for this visit.     No current outpatient medications on file.     Facility-Administered Medications Ordered in Other Visits   Medication Dose Route Frequency Provider Last Rate Last Admin    0.9% NaCl infusion   Intravenous Once Idania Rose FNP           Review of Systems   Constitutional:  Positive for fatigue. Negative for appetite change and unexpected weight change.   HENT:  Negative for mouth sores and sore throat.    Eyes:  Negative for visual disturbance.   Respiratory:  Negative for cough and shortness of breath.    Cardiovascular:  Negative for chest pain.   Gastrointestinal:  Negative for abdominal pain and diarrhea.   Genitourinary:  Negative for dysuria.   Musculoskeletal:  Negative for back pain.   Skin:  Negative for rash.   Neurological:  Negative for headaches.   Hematological:  Negative for adenopathy.    Psychiatric/Behavioral:  The patient is not nervous/anxious.        ECOG Performance Status:   ECOG SCORE 1            Objective:      Vitals:   There were no vitals filed for this visit.  BMI: There is no height or weight on file to calculate BMI.  Deferred due to telemedicine visit.    Physical Exam   Deferred due to telemedicine visit.    Laboratory Data:  Labs have been reviewed.    Lab Results   Component Value Date    WBC 4.98 02/11/2025    HGB 10.5 (L) 02/11/2025    HCT 36.7 (L) 02/11/2025    MCV 82 02/11/2025     02/11/2025     Lab Results   Component Value Date    FERRITIN 67 02/11/2025     Lab Results   Component Value Date    IRON 29 (L) 02/11/2025    TRANSFERRIN 334 02/11/2025    TIBC 494 (H) 02/11/2025    FESATURATED 6 (L) 02/11/2025            Imaging:     PSMA (11/22/23):  No suspicious radiotracer avid lesion to suggest disease recurrence or metastasis.     Upper GI endoscopy (4/7/22):  Impression:            - Normal middle third of esophagus.                          - Erythematous mucosa in the antrum. Biopsied.                          - Normal examined duodenum. Biopsied.                          - The tonsils are touching.                          - A single plaque in the upper third of the                          esophagus. Biopsied. This is consistent with a                          benign inlet patch.                          - Z-line irregular, at the gastroesophageal                          junction. No changes c/w Neal's seen.     Colonoscopy (11/18/21):   - This was a much better exam than last                          colonoscopy.                          - Significant colonic spasm.                          - Two 8 to 9 mm polyps in the descending colon,                          removed with a hot snare. Resected and retrieved.                          - One 5 mm polyp in the descending colon. Treated                          with a hot snare.                          -  One 10 mm polyp in the sigmoid colon, removed                          with a hot snare. Resected and retrieved.                          - One diminutive polyp in the rectum, removed with                          a cold biopsy forceps. Resected and retrieved.                          - Redundant colon.                          - Internal hemorrhoids.     Assessment:       1. Iron deficiency anemia due to chronic blood loss    2. Recurrent prostate cancer    3. Elevated PSA           Plan:     1. Iron deficiency anemia  - I have reviewed his chart  - labs since December 2021 reveal a mild-to-severe anemia, now microcytic  - iron studies 94/7/22) reveal a low-normal ferritin, decreased iron/saturated iron, and increased total iron binding capacity  - he has undergone colonoscopy (11/18/21) and upper GI endoscopy (4/7/22). No clear source for GI blood loss. He also underwent a video capsule endoscopy (4/18/22) that was relatively normal.  - given the severity of his iron deficiency anemia, I recommended iron sucrose x 4 doses.   - he received iron sucrose x 4 doses in April/May 2022.  - he received iron sucrose x 4 doses in November/December 2022.  - he received ferric gluconate x 8 doses in June/July 2024.  - he has received 2 doses of ferric gluconate in January/February 2025.  - Labs have been reviewed. Hemoglobin is 10.5g/dL. Total iron binding capacity is elevated, and ferritin is 67 ng/mL.  - continue ferric gluconate x 8 doses.   - return to clinic in 6 months with repeat labs.    2. Severe obesity  - no weight since telemedicine visit.  - I encouraged weight loss  - continue to monitor    3. Recurrent prostate cancer  - he underwent radical prostatectomy on 12/13/21.  - PSMA (11/22/23): No suspicious radiotracer avid lesion to suggest disease recurrence or metastasis.   - PSA (11/25/24) was 2.9 ng/mL.    - PSMA (12/5/24):   No focal abnormal tracer uptake to suggest recurrent or metastatic prostate cancer.   -  he started androgen deprivation therapy. May receive radiation therapy in upcoming months.  - defer management to urology and radiation oncology.     4. Advance Care Planning     Power of   After our discussion (at previous visit), the patient decided to complete a HCPOA and appointed his  wife Patricia Britton ([phone]) .        - return to clinic in 6 months with repeat labs.    Kneneth Mcdowell M.D.  Hematology/Oncology  [facility] - [address], [address]  [place] [address]  Phone: [phone]  Fax: [phone]

## 2025-02-12 ENCOUNTER — OFFICE VISIT (OUTPATIENT)
Dept: HEMATOLOGY/ONCOLOGY | Facility: CLINIC | Age: 65
End: 2025-02-12
Payer: COMMERCIAL

## 2025-02-12 ENCOUNTER — CLINICAL SUPPORT (OUTPATIENT)
Dept: UROLOGY | Facility: CLINIC | Age: 65
End: 2025-02-12
Payer: COMMERCIAL

## 2025-02-12 ENCOUNTER — PATIENT MESSAGE (OUTPATIENT)
Dept: TRANSPLANT | Facility: CLINIC | Age: 65
End: 2025-02-12
Payer: COMMERCIAL

## 2025-02-12 VITALS — DIASTOLIC BLOOD PRESSURE: 73 MMHG | HEART RATE: 72 BPM | SYSTOLIC BLOOD PRESSURE: 144 MMHG

## 2025-02-12 DIAGNOSIS — R97.20 ELEVATED PSA: ICD-10-CM

## 2025-02-12 DIAGNOSIS — C61 PROSTATE CANCER: Primary | ICD-10-CM

## 2025-02-12 DIAGNOSIS — C61 RECURRENT PROSTATE CANCER: ICD-10-CM

## 2025-02-12 DIAGNOSIS — D50.0 IRON DEFICIENCY ANEMIA DUE TO CHRONIC BLOOD LOSS: Primary | ICD-10-CM

## 2025-02-12 PROCEDURE — 99999 PR PBB SHADOW E&M-EST. PATIENT-LVL II: CPT | Mod: PBBFAC,,,

## 2025-02-12 PROCEDURE — 96402 CHEMO HORMON ANTINEOPL SQ/IM: CPT | Mod: S$GLB,,, | Performed by: UROLOGY

## 2025-02-12 NOTE — Clinical Note
1. Schedule cbc, ferritin, iron/tibc in 1,3,6 months. 2. Schedule virtual visit in 6 months.  Thanks!

## 2025-02-12 NOTE — PROGRESS NOTES
Patient given 11.25 mg of Trelstar in right upper outer quad of right buttock. He remained in clinic for 15 min to observe for s/s of adverse reaction. None was reported or observed.

## 2025-02-14 ENCOUNTER — TELEPHONE (OUTPATIENT)
Dept: HEPATOLOGY | Facility: CLINIC | Age: 65
End: 2025-02-14
Payer: COMMERCIAL

## 2025-02-14 ENCOUNTER — PATIENT MESSAGE (OUTPATIENT)
Dept: RADIATION ONCOLOGY | Facility: CLINIC | Age: 65
End: 2025-02-14
Payer: COMMERCIAL

## 2025-02-14 NOTE — TELEPHONE ENCOUNTER
Contacted the patient to reschedule appointment on 04/7/25. Talked to patient's wife and she stated that will do virtual appointment since is sooner than in person. Patient's wife agreed to do it on 04/8/25 as a virtual.

## 2025-02-19 ENCOUNTER — TELEPHONE (OUTPATIENT)
Dept: ENDOSCOPY | Facility: HOSPITAL | Age: 65
End: 2025-02-19
Payer: COMMERCIAL

## 2025-02-19 NOTE — TELEPHONE ENCOUNTER
Pt  wife returned call for pat appt request placed in  2024. Pt has completed egd and colonoscopy and wife stated does not need any procedures at this time. Endo scheduling consult discontinued

## 2025-02-21 DIAGNOSIS — R97.21 RISING PSA FOLLOWING TREATMENT FOR MALIGNANT NEOPLASM OF PROSTATE: Primary | ICD-10-CM

## 2025-03-19 ENCOUNTER — RESULTS FOLLOW-UP (OUTPATIENT)
Dept: TRANSPLANT | Facility: HOSPITAL | Age: 65
End: 2025-03-19

## 2025-04-08 ENCOUNTER — OFFICE VISIT (OUTPATIENT)
Dept: HEPATOLOGY | Facility: CLINIC | Age: 65
End: 2025-04-08
Payer: COMMERCIAL

## 2025-04-08 ENCOUNTER — TELEPHONE (OUTPATIENT)
Dept: HEPATOLOGY | Facility: CLINIC | Age: 65
End: 2025-04-08

## 2025-04-08 DIAGNOSIS — K76.0 METABOLIC DYSFUNCTION-ASSOCIATED STEATOTIC LIVER DISEASE (MASLD): ICD-10-CM

## 2025-04-08 DIAGNOSIS — K70.30 ALCOHOLIC CIRRHOSIS OF LIVER WITHOUT ASCITES: Primary | ICD-10-CM

## 2025-04-08 NOTE — TELEPHONE ENCOUNTER
----- Message from Bernarda Lindsey MD sent at 4/8/2025  3:21 PM CDT -----  1. Meld labs every 3 months  2. Anitha US w AFP 5/25  Return 4 months

## 2025-04-08 NOTE — TELEPHONE ENCOUNTER
Contacted pt and talked to pt's wife and scheduled him for labs including AFP on May and Ultrasound on May was already scheduled.  Pt scheduled for his 4 months for  a virtual f/u on August.

## 2025-04-08 NOTE — PATIENT INSTRUCTIONS
Meld labs every 3 months  Abdo US w AFP 5/25  EGD 12/26  Colonoscopy 2027  Continue off alcohol  Return 4 months

## 2025-04-08 NOTE — PROGRESS NOTES
The patient location is: [facility]  The chief complaint leading to consultation is: f/u of cirrhosis    Visit type: audiovisual    Face to Face time with patient: 15 minutes  30 minutes of total time spent on the encounter, which includes face to face time and non-face to face time preparing to see the patient (eg, review of tests), Obtaining and/or reviewing separately obtained history, Documenting clinical information in the electronic or other health record, Independently interpreting results (not separately reported) and communicating results to the patient/family/caregiver, or Care coordination (not separately reported).         Each patient to whom he or she provides medical services by telemedicine is:  (1) informed of the relationship between the physician and patient and the respective role of any other health care provider with respect to management of the patient; and (2) notified that he or she may decline to receive medical services by telemedicine and may withdraw from such care at any time.    Notes: HEPATOLOGY FOLLOW UP    Referring Physician: Eren Becker MD   Current Corresponding Physician: Eren Becker MD     Renan Britton is here for follow up of Cirrhosis and Fatty Liver      HPI  Renan Britton is a 64 y.o. male with a hx of prostate cancer, Aortic stenosis, PVD, DM and sleep apnea who presented 5/30/24 for evaluation of imaging suggestive of cirrhosis. He was being evaluated for surgical aortic valve replacement:     CTA abdo 5/21/24: Liver: Slightly nodular liver contour, suggesting possible cirrhosis.   --denies any symptoms of decompensated cirrhosis, including no ascites or edema, cognitive problems that would suggest hepatic encephalopathy, or GI bleeding from varices.   --is on diuretics for cardiac issues  --Hx heavy alcohol x many years (6-pack per day)  --BMI 37     Labs 4/4/24: plts 148, , creat 0.9, alb 3.5, Tbil 0.5, ALT 21, AST 22, ALKP  40    Interval History  Since Renan Britton's last visit:  --colonoscopy last Friday- admitted now with BRBPR; no transfusions needed yet-will likely be discharged tomorrow  -- PSA rising- had MRI over the holidays-recurrent prostate ca- nonaggressive-on hormones to lower PSA- plan is radiation in the future if PSA comes down    Abdo US 11/25/24: no liver cancer  MRE 6/25/24: mild steatosis; F3 or F4 fibrosis  MRI abdo 5/31/24: The surface of the liver is nodular compatible with cirrhosis. The liver is relatively homogeneous in signal intensity.   AFP 2.6 5/31/24  EGD 12/13/24: no varices;esophagitis and gastritis- protonix recommended    --due to presence of cirrhosis- surgical valve replacement for severe AS was canceled and decision to pursue TAVR was made  TAVR 8/13/24: successful  --getting stents in the lower extremity   --has completed 8 iron infusions (last week- early April 2025)  --lost 41 lbs intentionally-gained 5 back  --continues off alcohol  --no edema/ascites  --no HE symptoms    Labs 3/19/25: ALT 28, AST 30, ALKP 53, Tbil 0.3  MELD 3.0: 6 at 3/19/2025  7:25 AM  MELD-Na: 6 at 3/19/2025  7:25 AM  Calculated from:  Serum Creatinine: 0.7 mg/dL (Using min of 1 mg/dL) at 3/19/2025  7:25 AM  Serum Sodium: 141 mmol/L (Using max of 137 mmol/L) at 3/19/2025  7:25 AM  Total Bilirubin: 0.3 mg/dL (Using min of 1 mg/dL) at 3/19/2025  7:25 AM  Serum Albumin: 3.9 g/dL (Using max of 3.5 g/dL) at 3/19/2025  7:25 AM  INR(ratio): 1 at 3/19/2025  7:25 AM  Age at listing (hypothetical): 64 years  Sex: Male at 3/19/2025  7:25 AM     .    Outpatient Encounter Medications as of 4/8/2025   Medication Sig Dispense Refill    ascorbic acid, vitamin C, (VITAMIN C) 500 MG tablet once daily.      atorvastatin (LIPITOR) 40 MG tablet Take 1 tablet (40 mg total) by mouth once daily. 30 tablet 5    bicalutamide (CASODEX) 50 MG Tab Take 1 tablet (50 mg total) by mouth once daily. 30 tablet 2    ferrous sulfate 324 mg (65 mg  iron) TbEC ferrous sulfate 324 mg (65 mg iron) tablet,delayed release, [RxNorm: 1029407]      furosemide (LASIX) 20 MG tablet Take 1 tablet (20 mg total) by mouth once daily. You may increase to twice daily if needed for increased shortness of breath or fluid 30 tablet 11    hydrALAZINE (APRESOLINE) 50 MG tablet Take 50 mg by mouth every 12 (twelve) hours.      inulin (FIBER GUMMIES) 2 gram Chew Take by mouth.      LYRICA 100 mg capsule Take 100 mg by mouth 2 (two) times daily. Patient takes medication for seizures      metFORMIN (GLUCOPHAGE) 500 MG tablet Take 1 tablet (500 mg total) by mouth 2 (two) times daily with meals.      NIFEdipine (ADALAT CC) 60 MG TbSR Take 60 mg by mouth once daily. (Patient taking differently: Take 90 mg by mouth once daily.)      nitroGLYCERIN (NITROSTAT) 0.4 MG SL tablet Place 1 tablet (0.4 mg total) under the tongue every 5 (five) minutes as needed for Chest pain. 15 tablet 3    prasugreL (EFFIENT) 10 mg Tab Take 1 tablet (10 mg total) by mouth once daily.       No facility-administered encounter medications on file as of 4/8/2025.     Review of patient's allergies indicates:  No Known Allergies  Past Medical History:   Diagnosis Date    JJ (acute kidney injury) 12/03/2023    Aortic valve disease     CHF (congestive heart failure)     Cirrhosis 06/10/2024    Diabetes mellitus     Elevated PSA     Encounter for blood transfusion     H/O brain tumor     History of heavy alcohol consumption 09/23/2024    HLD (hyperlipidemia)     Hypertension     Metabolic dysfunction-associated steatotic liver disease (MASLD) 09/23/2024    MRSA infection     IN 2020    PAD (peripheral artery disease)     Personal history of colonic polyps 01/08/2021    Pneumonia, unspecified organism     MOST RECENT WEEK OF APRIL 1, 2024    Prostate cancer     Seizures     R/T BRAIN TUMOR- SURGICALLY EXCISED    Sleep apnea     CPAP    Urinary tract infection        Review of Systems   Constitutional: Negative.    HENT:  Negative.     Eyes: Negative.    Respiratory: Negative.     Cardiovascular: Negative.    Gastrointestinal: Negative.    Genitourinary: Negative.    Musculoskeletal: Negative.    Skin: Negative.    Neurological: Negative.    Psychiatric/Behavioral: Negative.       There were no vitals filed for this visit.          MELD 3.0: 6 at 3/19/2025  7:25 AM  MELD-Na: 6 at 3/19/2025  7:25 AM  Calculated from:  Serum Creatinine: 0.7 mg/dL (Using min of 1 mg/dL) at 3/19/2025  7:25 AM  Serum Sodium: 141 mmol/L (Using max of 137 mmol/L) at 3/19/2025  7:25 AM  Total Bilirubin: 0.3 mg/dL (Using min of 1 mg/dL) at 3/19/2025  7:25 AM  Serum Albumin: 3.9 g/dL (Using max of 3.5 g/dL) at 3/19/2025  7:25 AM  INR(ratio): 1 at 3/19/2025  7:25 AM  Age at listing (hypothetical): 64 years  Sex: Male at 3/19/2025  7:25 AM      Lab Results   Component Value Date     (H) 03/19/2025    BUN 21 03/19/2025    CREATININE 0.7 03/19/2025    CALCIUM 9.7 03/19/2025     03/19/2025    K 3.6 03/19/2025     03/19/2025    PROT 7.4 03/19/2025    CO2 24 03/19/2025    ANIONGAP 10 03/19/2025    WBC 3.51 (L) 03/19/2025    RBC 5.14 03/19/2025    HGB 12.9 (L) 03/19/2025    HCT 41.6 03/19/2025    HCT 39 08/12/2020    MCV 81 (L) 03/19/2025    MCH 25.1 (L) 03/19/2025    MCHC 31.0 (L) 03/19/2025     Lab Results   Component Value Date    RDW 19.4 (H) 03/19/2025     (L) 03/19/2025    MPV 10.5 03/19/2025    GRAN 2.4 03/19/2025    GRAN 68.2 03/19/2025    LYMPH 0.6 (L) 03/19/2025    LYMPH 17.9 (L) 03/19/2025    MONO 0.3 03/19/2025    MONO 9.1 03/19/2025    EOSINOPHIL 3.7 03/19/2025    BASOPHIL 1.1 03/19/2025    EOS 0.1 03/19/2025    BASO 0.04 03/19/2025    APTT 23.9 12/16/2024    GROUPTRH A NEG 12/16/2024     (H) 08/13/2024    CHOL 91 (L) 10/25/2024    TRIG 37 10/25/2024    HDL 45 10/25/2024    CHOLHDL 49.5 10/25/2024    TOTALCHOLEST 2.0 10/25/2024    ALBUMIN 3.9 03/19/2025    BILIDIR 0.1 03/19/2025    AST 30 03/19/2025    ALT 28 03/19/2025     ALKPHOS 53 03/19/2025    MG 1.8 12/16/2024    LABPROT 11.4 03/19/2025    INR 1.0 03/19/2025    PSA 2.3 10/25/2024       Assessment and Plan:  Renan Britton is a 64 y.o. male with alcohol/MASLD-induced cirrhosis. Current recommendations:  Cirrhosis, compensated: monitor labs every 3 months; hcc screening every 6 months with imaging and AFP-due 05/24; EGD to screen for varices 12/2026  Hx heavy alcohol: continue alcohol avoidance  Elevated BMI, improving: recommend continued weight loss  Colorectal ca screening: s/p polyp removal-repeat colonoscopy in 3 years (2027)  Return 4 months  .

## 2025-04-21 ENCOUNTER — PATIENT MESSAGE (OUTPATIENT)
Dept: RADIATION ONCOLOGY | Facility: CLINIC | Age: 65
End: 2025-04-21
Payer: COMMERCIAL

## 2025-04-28 ENCOUNTER — PATIENT MESSAGE (OUTPATIENT)
Dept: HEPATOLOGY | Facility: CLINIC | Age: 65
End: 2025-04-28
Payer: COMMERCIAL

## 2025-04-28 ENCOUNTER — TELEPHONE (OUTPATIENT)
Dept: HEPATOLOGY | Facility: CLINIC | Age: 65
End: 2025-04-28
Payer: COMMERCIAL

## 2025-04-28 NOTE — TELEPHONE ENCOUNTER
Pt was rescheduled for a new date appointment due to provider being out of the office on his previous appointment on August 12th.

## 2025-05-02 ENCOUNTER — RESULTS FOLLOW-UP (OUTPATIENT)
Dept: TRANSPLANT | Facility: HOSPITAL | Age: 65
End: 2025-05-02

## 2025-05-05 ENCOUNTER — TELEPHONE (OUTPATIENT)
Dept: UROLOGY | Facility: CLINIC | Age: 65
End: 2025-05-05
Payer: COMMERCIAL

## 2025-05-05 NOTE — TELEPHONE ENCOUNTER
Patient wife answered the called was excited and verbally understood the results. Patient has an appt on Friday, May 9th. The wife has a lot of questions about if he has to stop any medication or if he's to keep taking everything as is. She says its fine If no one gets back to the questions because they will be here on Friday.

## 2025-05-05 NOTE — TELEPHONE ENCOUNTER
----- Message from Raghav Mitchell MD sent at 5/3/2025  1:29 PM CDT -----  T is low at 10 and PSA is <0.01. Good result of Tx.  ----- Message -----  From: Lab, Background User  Sent: 5/2/2025   3:32 PM CDT  To: Raghav Mitchell MD

## 2025-05-09 ENCOUNTER — OFFICE VISIT (OUTPATIENT)
Dept: UROLOGY | Facility: CLINIC | Age: 65
End: 2025-05-09
Payer: COMMERCIAL

## 2025-05-09 VITALS
DIASTOLIC BLOOD PRESSURE: 62 MMHG | WEIGHT: 226 LBS | SYSTOLIC BLOOD PRESSURE: 108 MMHG | RESPIRATION RATE: 18 BRPM | HEART RATE: 77 BPM | BODY MASS INDEX: 32.35 KG/M2 | HEIGHT: 70 IN

## 2025-05-09 DIAGNOSIS — C61 PROSTATE CANCER: ICD-10-CM

## 2025-05-09 DIAGNOSIS — R97.20 ELEVATED PSA, LESS THAN 10 NG/ML: ICD-10-CM

## 2025-05-09 DIAGNOSIS — D72.819 LEUKOPENIA, UNSPECIFIED TYPE: ICD-10-CM

## 2025-05-09 DIAGNOSIS — N52.31 ERECTILE DYSFUNCTION AFTER RADICAL PROSTATECTOMY: Primary | ICD-10-CM

## 2025-05-09 PROCEDURE — 99999 PR PBB SHADOW E&M-EST. PATIENT-LVL IV: CPT | Mod: PBBFAC,,, | Performed by: UROLOGY

## 2025-05-09 NOTE — PROGRESS NOTES
Patient was given Trelstar 11.25 mg IM in the left upper quad of the left gluteus muscle. Patient remained in clinic for 15 min post administration to monitor for s/s of adverse reaction. None was reported or observed.

## 2025-05-09 NOTE — PATIENT INSTRUCTIONS
F/U 3 months with PSA and T level  Trelstar injection in 3 months if indicated  Patient to follow up with  and Jeremias

## 2025-05-09 NOTE — PROGRESS NOTES
Subjective:      Patient ID: Renan Britton is a 64 y.o. male.    Chief Complaint: Follow-up (Discuss blood work - testosterone and PSA levels and MRI)    Mr. Britton is a 65 yo WM with a history of CAP with rising PSA post RRP and BPLND.  PSA has risen from 2.4 to 2.9 over the last 3 months.  All recent studies including PSMA scan on December 5, 2024 are negative and show no evidence of recurrent disease.  Not sure where a PSA is coming from have been unable to identify.  Will order post prostatectomy MRI with and without contrast to look at pelvis.  Path report was negative with no evidence of extension outside the prostate no evidence of positive margins however PSA continues to rise.  MRI revealed the patient to have thickening of the posterior anastomosis area at the bladder urethral anastomosis.  This was the only abnormality seen.  Patient was seen by Dr. Mcdowell and Dr. Dangelo.  He was started on hormonal ablation.  His PSA has dropped down finally to less than 0.01.  His testosterone level is around 10.  The patient is scheduled to have another MRI and then follow up with Dr. Dangelo for radiation treatment.  Will monitor patient on three-month basis for his PSA and he will complete his hormonal ablation after radiation therapy.    Follow-up  Pertinent negatives include no abdominal pain, arthralgias, chest pain, chills, congestion, coughing, diaphoresis, fatigue, fever, headaches, myalgias, nausea, rash, vomiting or weakness. Nothing aggravates the symptoms. Treatments tried: RRP, Trelstar. The treatment provided significant relief.     Review of Systems   Constitutional:  Negative for activity change, appetite change, chills, diaphoresis, fatigue, fever and unexpected weight change.   HENT:  Negative for congestion, hearing loss, sinus pressure and trouble swallowing.    Eyes:  Negative for photophobia, pain, discharge and visual disturbance.   Respiratory:  Negative for apnea, cough and  shortness of breath.    Cardiovascular:  Negative for chest pain, palpitations and leg swelling.   Gastrointestinal:  Negative for abdominal distention, abdominal pain, anal bleeding, blood in stool, constipation, diarrhea, nausea, rectal pain and vomiting.   Endocrine: Negative for cold intolerance, heat intolerance, polydipsia, polyphagia and polyuria.   Genitourinary:  Negative for decreased urine volume, difficulty urinating, dysuria, enuresis, flank pain, frequency, genital sores, hematuria, penile discharge, penile pain, penile swelling, scrotal swelling, testicular pain and urgency.   Musculoskeletal:  Negative for arthralgias, back pain and myalgias.   Skin:  Negative for color change, pallor, rash and wound.   Allergic/Immunologic: Negative for environmental allergies, food allergies and immunocompromised state.   Neurological:  Negative for dizziness, seizures, weakness and headaches.   Hematological:  Negative for adenopathy. Does not bruise/bleed easily.   Psychiatric/Behavioral: Negative.        Objective:     Physical Exam  Vitals and nursing note reviewed.   Constitutional:       General: He is not in acute distress.     Appearance: Normal appearance. He is well-developed. He is obese. He is not ill-appearing, toxic-appearing or diaphoretic.   HENT:      Head: Normocephalic and atraumatic.      Right Ear: External ear normal. There is no impacted cerumen.      Left Ear: External ear normal. There is no impacted cerumen.      Nose: Nose normal. No congestion or rhinorrhea.      Mouth/Throat:      Pharynx: No oropharyngeal exudate or posterior oropharyngeal erythema.   Eyes:      General: No scleral icterus.        Right eye: No discharge.         Left eye: No discharge.      Extraocular Movements: Extraocular movements intact.      Conjunctiva/sclera: Conjunctivae normal.      Pupils: Pupils are equal, round, and reactive to light.   Cardiovascular:      Rate and Rhythm: Normal rate and regular rhythm.       Heart sounds: Normal heart sounds.   Pulmonary:      Effort: Pulmonary effort is normal.   Abdominal:      General: Bowel sounds are normal. There is no distension.      Palpations: Abdomen is soft. There is no mass.      Tenderness: There is no abdominal tenderness. There is no right CVA tenderness, left CVA tenderness, guarding or rebound.      Hernia: No hernia is present.   Genitourinary:     Penis: Normal.       Testes: Normal.      Comments: Patient with no CVA tenderness, normal penis and scrotum.  Bladder nontender.  Musculoskeletal:         General: Normal range of motion.      Cervical back: Normal range of motion and neck supple.   Skin:     General: Skin is warm and dry.      Capillary Refill: Capillary refill takes 2 to 3 seconds.   Neurological:      Mental Status: He is alert and oriented to person, place, and time.      Deep Tendon Reflexes: Reflexes are normal and symmetric.   Psychiatric:         Mood and Affect: Mood normal.         Behavior: Behavior normal.         Thought Content: Thought content normal.         Judgment: Judgment normal.        Assessment:      1. Erectile dysfunction after radical prostatectomy    2. Elevated PSA, less than 10 ng/ml    3. Hx of prostate adenocarcinoma s/p prostatectomy      Plan:     There are no Patient Instructions on file for this visit.

## 2025-05-09 NOTE — LETTER
May 9, 2025        No Recipients             [facility]  [address]  [address]  [place] [address]  Phone: [phone]  Fax: [phone]   Patient: Renan Britton   MR Number: [id number]   YOB: 1960   Date of Visit: 5/9/2025       Dear Dr. Morales Recipients:    Thank you for referring Renan Britton to me for evaluation. Below are the relevant portions of my assessment and plan of care.    Mr. Britton has a white blood cell count that appears to be decreasing.  Wanted to notify you to look at it as you did not order the tests.  Patient is doing well from urologic standpoint and responding well to androgen blockade.  Will follow up with you and Dr. Dangelo.              If you have questions, please do not hesitate to call me. I look forward to following Renan along with you.    Sincerely,      Raghav Mitchell MD           CC  No Recipients

## 2025-05-12 ENCOUNTER — PATIENT MESSAGE (OUTPATIENT)
Dept: HEMATOLOGY/ONCOLOGY | Facility: CLINIC | Age: 65
End: 2025-05-12
Payer: COMMERCIAL

## 2025-05-12 RX ORDER — HEPARIN 100 UNIT/ML
500 SYRINGE INTRAVENOUS
OUTPATIENT
Start: 2025-05-20

## 2025-05-12 RX ORDER — SODIUM CHLORIDE 0.9 % (FLUSH) 0.9 %
10 SYRINGE (ML) INJECTION
OUTPATIENT
Start: 2025-05-20

## 2025-05-12 RX ORDER — SODIUM FERRIC GLUCONATE COMPLEX IN SUCROSE 12.5 MG/ML
125 INJECTION INTRAVENOUS
OUTPATIENT
Start: 2025-05-20

## 2025-05-12 RX ORDER — EPINEPHRINE 0.3 MG/.3ML
0.3 INJECTION SUBCUTANEOUS ONCE AS NEEDED
OUTPATIENT
Start: 2025-05-20

## 2025-05-13 NOTE — Clinical Note
1. Schedule labs cbc, ferritin, iron/tibc at [facility] in 6 months. 2. Schedule virtual appt 2-3 days after labs.  Thanks! Vital Signs Last 24 Hrs  T(C): 36.5 (05-13-25 @ 12:00), Max: 36.5 (05-13-25 @ 12:00)  T(F): 97.7 (05-13-25 @ 12:00), Max: 97.7 (05-13-25 @ 12:00)  HR: --  BP: --  BP(mean): --  RR: 17 (05-13-25 @ 12:00) (17 - 17)  SpO2: 100% (05-13-25 @ 12:00) (100% - 100%)    Orthostatic VS  05-13-25 @ 12:00  Lying BP: --/-- HR: --  Sitting BP: 124/92 HR: 88  Standing BP: 123/94 HR: 96  Site: --  Mode: --

## 2025-05-21 ENCOUNTER — TELEPHONE (OUTPATIENT)
Dept: HEMATOLOGY/ONCOLOGY | Facility: CLINIC | Age: 65
End: 2025-05-21
Payer: COMMERCIAL

## 2025-05-21 ENCOUNTER — PATIENT MESSAGE (OUTPATIENT)
Dept: HEMATOLOGY/ONCOLOGY | Facility: CLINIC | Age: 65
End: 2025-05-21
Payer: COMMERCIAL

## 2025-05-21 NOTE — TELEPHONE ENCOUNTER
----- Message from Kenneth Mcdowell MD sent at 5/19/2025  3:51 PM CDT -----  Regarding: peer to peer  Good afternoon, I received a call from Dr. Marcelo and he states that the iron referral was denied and if they provider would like to speak about a peer to peer you can call [phone]. The reason for denial was not enough evidence to support iron infusion and patient may benefit from oral iron. Thanks, Eloise Walker LPN [employer]ertification Nurse [facility]

## 2025-05-21 NOTE — TELEPHONE ENCOUNTER
I called to try to perform the peer to peer. I was told that the peer to peer was closed. Person could not tell me the decision from the peer to peer.    Kenneth Mcdowell M.D.  Hematology/Oncology  [facility] [address], [address]  [place] [address]  Phone: [phone]  Fax: [phone]

## 2025-06-05 ENCOUNTER — HOSPITAL ENCOUNTER (OUTPATIENT)
Dept: RADIOLOGY | Facility: HOSPITAL | Age: 65
Discharge: HOME | End: 2025-06-05
Attending: RADIOLOGY
Payer: COMMERCIAL

## 2025-06-05 DIAGNOSIS — R97.21 RISING PSA FOLLOWING TREATMENT FOR MALIGNANT NEOPLASM OF PROSTATE: ICD-10-CM

## 2025-06-05 PROCEDURE — 25500020 PHARM REV CODE 255: Performed by: RADIOLOGY

## 2025-06-05 PROCEDURE — 72197 MRI PELVIS W/O & W/DYE: CPT | Mod: 26,,, | Performed by: RADIOLOGY

## 2025-06-05 PROCEDURE — A9585 GADOBUTROL INJECTION: HCPCS | Performed by: RADIOLOGY

## 2025-06-05 PROCEDURE — 72197 MRI PELVIS W/O & W/DYE: CPT | Mod: TC

## 2025-06-05 RX ORDER — GADOBUTROL 604.72 MG/ML
233 INJECTION INTRAVENOUS
Status: DISCONTINUED | OUTPATIENT
Start: 2025-06-05 | End: 2025-06-05

## 2025-06-05 RX ORDER — GADOBUTROL 604.72 MG/ML
10 INJECTION INTRAVENOUS
Status: COMPLETED | OUTPATIENT
Start: 2025-06-05 | End: 2025-06-05

## 2025-06-05 RX ADMIN — GADOBUTROL 10 ML: 604.72 INJECTION INTRAVENOUS at 10:06

## 2025-06-11 ENCOUNTER — OFFICE VISIT (OUTPATIENT)
Dept: RADIATION ONCOLOGY | Facility: CLINIC | Age: 65
End: 2025-06-11
Payer: COMMERCIAL

## 2025-06-11 VITALS
HEART RATE: 70 BPM | BODY MASS INDEX: 36.61 KG/M2 | WEIGHT: 255.75 LBS | SYSTOLIC BLOOD PRESSURE: 153 MMHG | DIASTOLIC BLOOD PRESSURE: 75 MMHG | HEIGHT: 70 IN | OXYGEN SATURATION: 100 %

## 2025-06-11 DIAGNOSIS — C61 PROSTATE CANCER: Primary | ICD-10-CM

## 2025-06-11 PROCEDURE — 99212 OFFICE O/P EST SF 10 MIN: CPT | Mod: S$GLB,,, | Performed by: RADIOLOGY

## 2025-06-11 PROCEDURE — 99999 PR PBB SHADOW E&M-EST. PATIENT-LVL IV: CPT | Mod: PBBFAC,,, | Performed by: RADIOLOGY

## 2025-06-11 NOTE — PROGRESS NOTES
[facility] - Radiation Oncology     Patient ID: Renan Britton is a 64 y.o. male.    Chief Complaint: No chief complaint on file.      This patient presents for discussion of initiation of radiotherapy.      Mr. Britton has recurrent prostate cancer.  He initially presented in 2021 with PSA of 6.6 ng/ml.  Biopsies revealed Soha 6 (3+3) adenocarcinoma.  He underwent RALP in December of 2021.  Pathology revealed a 21 g prostate measruing 4 x 3 x 2.7 cm with a 1.6 x 1.7 cm focus of Iuka 7 (3+4) adenocarcinoma.  The Iuka pattern 4 accounted for < 5% of the tumor.  There was no extraprostatic extension, lymphovascular, bladder neck or seminal vesicle invasion.  The margins of resection and 17 (6 Lt., 11 Rt.) pelvic nodes were negative. Postoperative PSA returned at 0.11 ng/ml.   PSA in Novermber 2024 returned at 2.9 ng/ml.  MRI on 12/26/24 revealed a wall thickening and mural hyperenhancement along the posterior aspect of the vesicourethral anastomosis of unclear significance.  We discussed salvage therapy.  He began hormonal therapy and presents for follow up       Review of Systems   Constitutional:  Negative for activity change, appetite change, chills, fatigue and fever.   Gastrointestinal:  Negative for constipation, diarrhea and fecal incontinence.   Genitourinary:  Positive for frequency and urgency. Negative for bladder incontinence, difficulty urinating, dysuria and hematuria.       Physical Exam  Constitutional:       General: He is not in acute distress.     Appearance: Normal appearance.   Neurological:      Mental Status: He is alert and oriented to person, place, and time.   Psychiatric:         Mood and Affect: Mood normal.         Judgment: Judgment normal.        Latest Reference Range & Units 11/25/24 07:27 05/02/25 08:04   PSA Total 0.00 - 4.00 ng/mL 2.9    Prostate Specific Antigen Free <=1.50 ng/mL 0.12 <0.10          Assessment and Plan   1 Recurrent  prostate cancer.  responding well to therapy. Discussed the results of his MRI.  No change noted.  Will plan to proceed with treatment.  Likely plan 70.2 to prostate bed with 50.4 Gy to pelvic nodes.  Discussed the procedures, risks and benefits of therapy. Reviewed the acute and long term side effects.  Plan simulation.

## 2025-06-13 ENCOUNTER — HOSPITAL ENCOUNTER (OUTPATIENT)
Dept: RADIATION THERAPY | Facility: HOSPITAL | Age: 65
Discharge: HOME | End: 2025-06-13
Payer: COMMERCIAL

## 2025-06-13 PROCEDURE — 77334 RADIATION TREATMENT AID(S): CPT | Mod: TC | Performed by: RADIOLOGY

## 2025-06-13 PROCEDURE — 77014 HC CT GUIDANCE RADIATION THERAPY FLDS PLACEMENT: CPT | Mod: TC | Performed by: RADIOLOGY

## 2025-06-13 PROCEDURE — 77334 RADIATION TREATMENT AID(S): CPT | Mod: 26,,, | Performed by: RADIOLOGY

## 2025-06-13 PROCEDURE — 77263 THER RADIOLOGY TX PLNG CPLX: CPT | Mod: ,,, | Performed by: RADIOLOGY

## 2025-06-13 PROCEDURE — 77014 PR  CT GUIDANCE PLACEMENT RAD THERAPY FIELDS: CPT | Mod: 26,,, | Performed by: RADIOLOGY

## 2025-06-24 ENCOUNTER — DOCUMENTATION ONLY (OUTPATIENT)
Dept: RADIATION ONCOLOGY | Facility: CLINIC | Age: 65
End: 2025-06-24
Payer: COMMERCIAL

## 2025-06-26 ENCOUNTER — PATIENT MESSAGE (OUTPATIENT)
Dept: UROLOGY | Facility: CLINIC | Age: 65
End: 2025-06-26
Payer: COMMERCIAL

## 2025-06-26 ENCOUNTER — TELEPHONE (OUTPATIENT)
Dept: UROLOGY | Facility: CLINIC | Age: 65
End: 2025-06-26
Payer: COMMERCIAL

## 2025-06-26 NOTE — PLAN OF CARE
Day 1 of outpatient radiation to the prostate bed. Accompanied by his wife. Doing well. Denies pain. No  or GI issues.

## 2025-07-01 ENCOUNTER — DOCUMENTATION ONLY (OUTPATIENT)
Dept: RADIATION ONCOLOGY | Facility: CLINIC | Age: 65
End: 2025-07-01
Payer: COMMERCIAL

## 2025-07-01 ENCOUNTER — HOSPITAL ENCOUNTER (OUTPATIENT)
Dept: RADIATION THERAPY | Facility: HOSPITAL | Age: 65
Discharge: HOME | End: 2025-07-01
Attending: RADIOLOGY
Payer: COMMERCIAL

## 2025-07-01 NOTE — PLAN OF CARE
Day 6 of outpatient radiation to the prostate bed. Accompanied by his wife. Doing well. Denies pain. No  or GI issues.

## 2025-07-08 ENCOUNTER — DOCUMENTATION ONLY (OUTPATIENT)
Dept: RADIATION ONCOLOGY | Facility: CLINIC | Age: 65
End: 2025-07-08
Payer: COMMERCIAL

## 2025-07-08 NOTE — PLAN OF CARE
Day 10 of outpatient radiation to prostate bed. Accompanied by spouse. Denies GI or  issues. Tolerating treatment.

## 2025-07-15 ENCOUNTER — DOCUMENTATION ONLY (OUTPATIENT)
Dept: RADIATION ONCOLOGY | Facility: CLINIC | Age: 65
End: 2025-07-15
Payer: COMMERCIAL

## 2025-07-22 ENCOUNTER — DOCUMENTATION ONLY (OUTPATIENT)
Dept: RADIATION ONCOLOGY | Facility: CLINIC | Age: 65
End: 2025-07-22
Payer: COMMERCIAL

## 2025-07-29 ENCOUNTER — DOCUMENTATION ONLY (OUTPATIENT)
Dept: RADIATION ONCOLOGY | Facility: CLINIC | Age: 65
End: 2025-07-29
Payer: COMMERCIAL

## 2025-07-29 NOTE — PLAN OF CARE
Pt on 25 day of out patient radiation treatment for prostate bed cancer.  Pt reports up to the bathroom 1x/night.  Pt reports increased fatigue and states he is not experiencing any other related symptoms.  Pt seen by MD to discuss symptoms and treatment progression

## 2025-07-31 ENCOUNTER — TELEPHONE (OUTPATIENT)
Dept: UROLOGY | Facility: CLINIC | Age: 65
End: 2025-07-31
Payer: COMMERCIAL

## 2025-07-31 DIAGNOSIS — Z85.46 PERSONAL HISTORY OF PROSTATE CANCER: Primary | ICD-10-CM

## 2025-07-31 NOTE — TELEPHONE ENCOUNTER
I returned pt's spouse's call, notified her that I ordered a testosterone and PSA and linked them to current lab appt on 8/12/25.

## 2025-07-31 NOTE — TELEPHONE ENCOUNTER
Patient verbally understood results but spouse stated she's not sure if his labs was off due to him eating that morning, Spouse also wants to know why he didn't do labs for PSA at that lab appointment. She states he has labs 8/13 if PSA can be attached to that if possible to put her mind at ease.

## 2025-07-31 NOTE — TELEPHONE ENCOUNTER
----- Message from Raghav Mitchell MD sent at 7/31/2025 12:39 PM CDT -----  Sugar slightly elevated the patient's white blood cell count is slightly low with some anemia from CBC  ----- Message -----  From: Lab, Background User  Sent: 7/31/2025  10:14 AM CDT  To: Raghav Mitchell MD

## 2025-08-01 ENCOUNTER — HOSPITAL ENCOUNTER (OUTPATIENT)
Dept: RADIATION THERAPY | Facility: HOSPITAL | Age: 65
Discharge: HOME | End: 2025-08-01
Attending: RADIOLOGY
Payer: COMMERCIAL

## 2025-08-01 PROCEDURE — 77014 PR  CT GUIDANCE PLACEMENT RAD THERAPY FIELDS: CPT | Mod: 26,,, | Performed by: RADIOLOGY

## 2025-08-01 PROCEDURE — 77385 HC IMRT, SIMPLE: CPT | Performed by: RADIOLOGY

## 2025-08-04 PROCEDURE — 77385 HC IMRT, SIMPLE: CPT | Performed by: RADIOLOGY

## 2025-08-04 PROCEDURE — 77014 PR  CT GUIDANCE PLACEMENT RAD THERAPY FIELDS: CPT | Mod: 26,,, | Performed by: RADIOLOGY

## 2025-08-05 ENCOUNTER — DOCUMENTATION ONLY (OUTPATIENT)
Dept: RADIATION ONCOLOGY | Facility: CLINIC | Age: 65
End: 2025-08-05
Payer: COMMERCIAL

## 2025-08-05 ENCOUNTER — OFFICE VISIT (OUTPATIENT)
Dept: HEPATOLOGY | Facility: CLINIC | Age: 65
End: 2025-08-05
Payer: COMMERCIAL

## 2025-08-05 DIAGNOSIS — K70.30 ALCOHOLIC CIRRHOSIS OF LIVER WITHOUT ASCITES: Primary | ICD-10-CM

## 2025-08-05 PROCEDURE — 77385 HC IMRT, SIMPLE: CPT | Performed by: RADIOLOGY

## 2025-08-05 PROCEDURE — 77014 PR  CT GUIDANCE PLACEMENT RAD THERAPY FIELDS: CPT | Mod: 26,,, | Performed by: RADIOLOGY

## 2025-08-05 PROCEDURE — 98005 SYNCH AUDIO-VIDEO EST LOW 20: CPT | Mod: 95,,, | Performed by: INTERNAL MEDICINE

## 2025-08-05 PROCEDURE — 77336 RADIATION PHYSICS CONSULT: CPT | Performed by: RADIOLOGY

## 2025-08-05 NOTE — PROGRESS NOTES
The patient location is: \[facility]\""  The chief complaint leading to consultation is: f/u of cirrhosis    Visit type: audiovisual    Face to Face time with patient: 15 minutes  30 minutes of total time spent on the encounter, which includes face to face time and non-face to face time preparing to see the patient (eg, review of tests), Obtaining and/or reviewing separately obtained history, Documenting clinical information in the electronic or other health record, Independently interpreting results (not separately reported) and communicating results to the patient/family/caregiver, or Care coordination (not separately reported).         Each patient to whom he or she provides medical services by telemedicine is:  (1) informed of the relationship between the physician and patient and the respective role of any other health care provider with respect to management of the patient; and (2) notified that he or she may decline to receive medical services by telemedicine and may withdraw from such care at any time.    Notes: HEPATOLOGY FOLLOW UP    Referring Physician: Eren Becker MD   Current Corresponding Physician: Eren Becker MD     Renan Britton is here for follow up of Cirrhosis      HPI  Renan Britton is a 64 y.o. male with a hx of prostate cancer, Aortic stenosis, PVD, DM and sleep apnea who presented 5/30/24 for evaluation of imaging suggestive of cirrhosis. He was being evaluated for surgical aortic valve replacement:     CTA abdo 5/21/24: Liver: Slightly nodular liver contour, suggesting possible cirrhosis.   --denies any symptoms of decompensated cirrhosis, including no ascites or edema, cognitive problems that would suggest hepatic encephalopathy, or GI bleeding from varices.   --is on diuretics for cardiac issues  --Hx heavy alcohol x many years (6-pack per day)  --BMI 37     Labs 4/4/24: plts 148, , creat 0.9, alb 3.5, Tbil 0.5, ALT 21, AST 22, ALKP 40    Interval  History  Since Renan Britton's last visit:  --colonoscopy 12/17/24- admitted with BRBPR; colonoscopy with clipping of polypectomy site  -- PSA rising- had MRI over the holidays-recurrent prostate ca- nonaggressive-on hormones to lower PSA- undergoing radiation tx- 30/39 txs      Abdo US 5/2/25: no liver cancer  MRE 6/25/24: mild steatosis; F3 or F4 fibrosis  MRI abdo 5/31/24: The surface of the liver is nodular compatible with cirrhosis. The liver is relatively homogeneous in signal intensity.   AFP 2.4 5/2/25  EGD 12/13/24: no varices;esophagitis and gastritis- protonix recommended    --due to presence of cirrhosis- surgical valve replacement for severe AS was canceled and decision to pursue TAVR was made  TAVR 8/13/24: successful  --got 5 stents in the lower extremity   --has completed 8 iron infusions (last week- early April 2025)-awaiting next iron studies to determine timing of next infusion  --lost 41 lbs intentionally-gained 5 back  --continues off alcohol  --no edema/ascites  --no HE symptoms    Labs 7/31/25: ALT 22, AST 25, ALKP 66, Tbil 0.3  MELD 3.0: 6 at 5/2/2025  8:04 AM  MELD-Na: 6 at 5/2/2025  8:04 AM  Calculated from:  Serum Creatinine: 0.7 mg/dL (Using min of 1 mg/dL) at 5/2/2025  8:04 AM  Serum Sodium: 142 mmol/L (Using max of 137 mmol/L) at 5/2/2025  8:04 AM  Total Bilirubin: 0.4 mg/dL (Using min of 1 mg/dL) at 5/2/2025  8:04 AM  Serum Albumin: 4 g/dL (Using max of 3.5 g/dL) at 5/2/2025  8:04 AM  INR(ratio): 1 at 5/2/2025  8:04 AM  Age at listing (hypothetical): 64 years  Sex: Male at 5/2/2025  8:04 AM     .    Outpatient Encounter Medications as of 8/5/2025   Medication Sig Dispense Refill    ascorbic acid, vitamin C, (VITAMIN C) 500 MG tablet once daily.      aspirin (ECOTRIN) 81 MG EC tablet Take 1 tablet (81 mg total) by mouth once daily.      atorvastatin (LIPITOR) 40 MG tablet Take 1 tablet (40 mg total) by mouth once daily. 30 tablet 5    ferrous sulfate 324 mg (65 mg iron) TbEC  ferrous sulfate 324 mg (65 mg iron) tablet,delayed release, [RxNorm: 4827864]      hydrALAZINE (APRESOLINE) 50 MG tablet Take 50 mg by mouth every 12 (twelve) hours.      inulin (FIBER GUMMIES) 2 gram Chew Take by mouth.      LYRICA 100 mg capsule Take 100 mg by mouth 2 (two) times daily. Patient takes medication for seizures      metFORMIN (GLUCOPHAGE) 500 MG tablet Take 1 tablet (500 mg total) by mouth 2 (two) times daily with meals.      NIFEdipine (ADALAT CC) 90 MG TbSR Take 1 tablet (90 mg total) by mouth once daily.      nitroGLYCERIN (NITROSTAT) 0.4 MG SL tablet Place 1 tablet (0.4 mg total) under the tongue every 5 (five) minutes as needed for Chest pain. 15 tablet 3    prasugreL (EFFIENT) 10 mg Tab Take 1 tablet (10 mg total) by mouth once daily.      bicalutamide (CASODEX) 50 MG Tab Take 1 tablet (50 mg total) by mouth once daily. 30 tablet 2    furosemide (LASIX) 20 MG tablet Take 1 tablet (20 mg total) by mouth once daily. You may increase to twice daily if needed for increased shortness of breath or fluid 30 tablet 11     No facility-administered encounter medications on file as of 8/5/2025.     Review of patient's allergies indicates:  No Known Allergies  Past Medical History:   Diagnosis Date    JJ (acute kidney injury) 12/03/2023    Aortic valve disease     CHF (congestive heart failure)     Cirrhosis 06/10/2024    Diabetes mellitus     Elevated PSA     Encounter for blood transfusion     H/O brain tumor     History of heavy alcohol consumption 09/23/2024    HLD (hyperlipidemia)     Hypertension     Metabolic dysfunction-associated steatotic liver disease (MASLD) 09/23/2024    MRSA infection     IN 2020    PAD (peripheral artery disease)     Personal history of colonic polyps 01/08/2021    Pneumonia, unspecified organism     MOST RECENT WEEK OF APRIL 1, 2024    Prostate cancer     Seizures     R/T BRAIN TUMOR- SURGICALLY EXCISED    Sleep apnea     CPAP    Urinary tract infection        Review of  Systems   Constitutional: Negative.    HENT: Negative.     Eyes: Negative.    Respiratory: Negative.     Cardiovascular: Negative.    Gastrointestinal: Negative.    Genitourinary: Negative.    Musculoskeletal: Negative.    Skin: Negative.    Neurological: Negative.    Psychiatric/Behavioral: Negative.       There were no vitals filed for this visit.          MELD 3.0: 6 at 5/2/2025  8:04 AM  MELD-Na: 6 at 5/2/2025  8:04 AM  Calculated from:  Serum Creatinine: 0.7 mg/dL (Using min of 1 mg/dL) at 5/2/2025  8:04 AM  Serum Sodium: 142 mmol/L (Using max of 137 mmol/L) at 5/2/2025  8:04 AM  Total Bilirubin: 0.4 mg/dL (Using min of 1 mg/dL) at 5/2/2025  8:04 AM  Serum Albumin: 4 g/dL (Using max of 3.5 g/dL) at 5/2/2025  8:04 AM  INR(ratio): 1 at 5/2/2025  8:04 AM  Age at listing (hypothetical): 64 years  Sex: Male at 5/2/2025  8:04 AM      Lab Results   Component Value Date     (H) 07/31/2025     (H) 03/19/2025    BUN 19 07/31/2025    CREATININE 0.7 07/31/2025    CALCIUM 9.4 07/31/2025    CALCIUM 9.7 03/19/2025     07/31/2025     03/19/2025    K 3.9 07/31/2025    K 3.6 03/19/2025     07/31/2025     03/19/2025    PROT 6.5 07/31/2025    PROT 7.4 03/19/2025    CO2 25 07/31/2025    CO2 24 03/19/2025    ANIONGAP 8 07/31/2025    WBC 2.98 (L) 07/31/2025    RBC 4.00 (L) 07/31/2025    RBC 5.14 03/19/2025    HGB 10.1 (L) 07/31/2025    HGB 12.9 (L) 03/19/2025    HCT 34.0 (L) 07/31/2025    HCT 41.6 03/19/2025    HCT 39 08/12/2020    MCV 85 07/31/2025    MCV 81 (L) 03/19/2025    MCH 25.3 (L) 07/31/2025    MCHC 29.7 (L) 07/31/2025    MCHC 31.0 (L) 03/19/2025     Lab Results   Component Value Date    RDW 17.9 (H) 07/31/2025    RDW 19.4 (H) 03/19/2025     (L) 07/31/2025     (L) 03/19/2025    MPV 11.7 07/31/2025    GRAN 2.4 03/19/2025    GRAN 68.2 03/19/2025    LYMPH 12.1 (L) 07/31/2025    LYMPH 0.36 (L) 07/31/2025    LYMPH 0.6 (L) 03/19/2025    LYMPH 17.9 (L) 03/19/2025    MONO 18.1 (H)  07/31/2025    MONO 0.54 07/31/2025    MONO 0.3 03/19/2025    MONO 9.1 03/19/2025    EOSINOPHIL 3.7 03/19/2025    BASOPHIL 1.0 07/31/2025    BASOPHIL 0.03 07/31/2025    BASOPHIL 1.1 03/19/2025    EOS 3.4 07/31/2025    EOS 0.10 07/31/2025    EOS 0.1 03/19/2025    BASO 0.04 03/19/2025    APTT 23.9 12/16/2024    GROUPTRH A NEG 12/16/2024     (H) 08/13/2024    CHOL 91 (L) 10/25/2024    TRIG 37 10/25/2024    HDL 45 10/25/2024    CHOLHDL 49.5 10/25/2024    TOTALCHOLEST 2.0 10/25/2024    ALBUMIN 4.0 07/31/2025    ALBUMIN 3.9 03/19/2025    BILIDIR 0.2 05/02/2025    BILIDIR 0.1 03/19/2025    AST 25 07/31/2025    AST 30 03/19/2025    ALT 22 07/31/2025    ALT 28 03/19/2025    ALKPHOS 66 07/31/2025    ALKPHOS 53 03/19/2025    MG 1.8 12/16/2024    LABPROT 11.4 03/19/2025    INR 1.0 05/02/2025    INR 1.0 03/19/2025    PSA <0.01 05/02/2025    PSA 2.3 10/25/2024       Assessment and Plan:  Renan Britton is a 64 y.o. male with alcohol/MASLD-induced cirrhosis. Current recommendations:  Cirrhosis, compensated: monitor labs every 3 months; hcc screening every 6 months with imaging and AFP-due 11/25; EGD to screen for varices 12/2026  Hx heavy alcohol: continue alcohol avoidance  Elevated BMI, improving: recommend continued weight loss  Colorectal ca screening: s/p polyp removal-repeat colonoscopy in 3 years (2027)  Return 6 months  .

## 2025-08-05 NOTE — PLAN OF CARE
Day 30 of outpatient radiation to the prostate bed. Accompanied his wife. Doing well. Slight burning upon urination, no hematuria. Nocturia x 4. No diarrhea.

## 2025-08-05 NOTE — PATIENT INSTRUCTIONS
Liver stable and no liver cancer  Labs every 3 months - add my labs to the labs scheduled on 8/12  Abso US w AFP 11/25  Return 6 months

## 2025-08-05 NOTE — Clinical Note
1. Liver stable and no liver cancer 2. Labs every 3 months - add my labs to the labs scheduled on 8/12 3. Abso US w AFP 11/25 4. Return 6 months

## 2025-08-06 ENCOUNTER — TELEPHONE (OUTPATIENT)
Dept: HEPATOLOGY | Facility: CLINIC | Age: 65
End: 2025-08-06
Payer: COMMERCIAL

## 2025-08-06 ENCOUNTER — PATIENT MESSAGE (OUTPATIENT)
Dept: HEPATOLOGY | Facility: CLINIC | Age: 65
End: 2025-08-06
Payer: COMMERCIAL

## 2025-08-06 PROCEDURE — 77014 PR  CT GUIDANCE PLACEMENT RAD THERAPY FIELDS: CPT | Mod: 26,,, | Performed by: RADIOLOGY

## 2025-08-06 PROCEDURE — 77385 HC IMRT, SIMPLE: CPT | Performed by: RADIOLOGY

## 2025-08-06 NOTE — TELEPHONE ENCOUNTER
Pt scheduled for labs every 3 months 8/12/25 and again in November with US same day and AFP. Pt placed in 6 months recall. A message via [facility] was sent informing pt about these upcoming appointments.

## 2025-08-06 NOTE — TELEPHONE ENCOUNTER
----- Message from Bernarda Lindsey MD sent at 8/5/2025  3:50 PM CDT -----  1. Liver stable and no liver cancer  2. Labs every 3 months - add my labs to the labs scheduled on 8/12  3. Abso US w AFP 11/25  4. Return 6 months

## 2025-08-12 ENCOUNTER — DOCUMENTATION ONLY (OUTPATIENT)
Dept: RADIATION ONCOLOGY | Facility: CLINIC | Age: 65
End: 2025-08-12
Payer: COMMERCIAL

## 2025-08-13 ENCOUNTER — OFFICE VISIT (OUTPATIENT)
Dept: HEMATOLOGY/ONCOLOGY | Facility: CLINIC | Age: 65
End: 2025-08-13
Payer: COMMERCIAL

## 2025-08-13 DIAGNOSIS — R97.20 ELEVATED PSA: ICD-10-CM

## 2025-08-13 DIAGNOSIS — D50.0 IRON DEFICIENCY ANEMIA DUE TO CHRONIC BLOOD LOSS: Primary | ICD-10-CM

## 2025-08-13 DIAGNOSIS — C61 RECURRENT PROSTATE CANCER: ICD-10-CM

## 2025-08-13 PROCEDURE — 98002 SYNCH AUDIO-VIDEO NEW MOD 45: CPT | Mod: 95,,, | Performed by: INTERNAL MEDICINE

## 2025-08-13 RX ORDER — EPINEPHRINE 0.3 MG/.3ML
0.3 INJECTION SUBCUTANEOUS ONCE AS NEEDED
OUTPATIENT
Start: 2025-08-19

## 2025-08-13 RX ORDER — SODIUM FERRIC GLUCONATE COMPLEX IN SUCROSE 12.5 MG/ML
125 INJECTION INTRAVENOUS
OUTPATIENT
Start: 2025-08-19

## 2025-08-13 RX ORDER — SODIUM CHLORIDE 0.9 % (FLUSH) 0.9 %
10 SYRINGE (ML) INJECTION
OUTPATIENT
Start: 2025-08-19

## 2025-08-13 RX ORDER — HEPARIN 100 UNIT/ML
500 SYRINGE INTRAVENOUS
OUTPATIENT
Start: 2025-08-19

## 2025-08-14 ENCOUNTER — TELEPHONE (OUTPATIENT)
Dept: UROLOGY | Facility: CLINIC | Age: 65
End: 2025-08-14
Payer: COMMERCIAL

## 2025-08-19 ENCOUNTER — DOCUMENTATION ONLY (OUTPATIENT)
Dept: RADIATION ONCOLOGY | Facility: CLINIC | Age: 65
End: 2025-08-19
Payer: COMMERCIAL

## 2025-08-20 ENCOUNTER — OFFICE VISIT (OUTPATIENT)
Dept: UROLOGY | Facility: CLINIC | Age: 65
End: 2025-08-20
Payer: COMMERCIAL

## 2025-08-20 VITALS — SYSTOLIC BLOOD PRESSURE: 136 MMHG | DIASTOLIC BLOOD PRESSURE: 73 MMHG | OXYGEN SATURATION: 97 % | HEART RATE: 75 BPM

## 2025-08-20 DIAGNOSIS — R97.21 RISING PSA FOLLOWING TREATMENT FOR MALIGNANT NEOPLASM OF PROSTATE: ICD-10-CM

## 2025-08-20 DIAGNOSIS — C61 PROSTATE CANCER: ICD-10-CM

## 2025-08-20 DIAGNOSIS — R97.20 ELEVATED PSA: Primary | ICD-10-CM

## 2025-08-20 PROCEDURE — 99999 PR PBB SHADOW E&M-EST. PATIENT-LVL III: CPT | Mod: PBBFAC,,, | Performed by: UROLOGY

## 2025-08-26 DIAGNOSIS — Z95.3 S/P TAVR (TRANSCATHETER AORTIC VALVE REPLACEMENT): Primary | ICD-10-CM

## (undated) DEVICE — SYR ONLY LUER LOCK 20CC

## (undated) DEVICE — GUIDEWIRE CONFIDA BECKER CURVE

## (undated) DEVICE — SHEATH INTRODUCER 8FR 11CM

## (undated) DEVICE — SYR MED RAD 150ML

## (undated) DEVICE — GOWN SURGICAL X-LARGE

## (undated) DEVICE — COVER INSTR ELASTIC BAND 40X20

## (undated) DEVICE — SET DECANTER MEDICHOICE

## (undated) DEVICE — SEE MEDLINE ITEM 157128

## (undated) DEVICE — CABLE PACER

## (undated) DEVICE — STOPCOCK 3-WAY

## (undated) DEVICE — GOWN AERO CHROME W/ TOWEL XL

## (undated) DEVICE — CATH DXTERITY MPASHA 110CM 6FR

## (undated) DEVICE — CATH ALL PUR URTHL RR 20FR

## (undated) DEVICE — STOPCOCK 3 WAY MED PRESSURE

## (undated) DEVICE — STOCKINET TUBULAR 1 PLY 6X60IN

## (undated) DEVICE — PACK UNIVERSAL SPLIT II

## (undated) DEVICE — SYR 3CC LUER LOC

## (undated) DEVICE — VISE RADIFOCUS MULTI TORQUE

## (undated) DEVICE — SEE MEDLINE ITEM 156911

## (undated) DEVICE — GOWN SURG 2XL DISP TIE BACK

## (undated) DEVICE — CONNECTOR Y T.R.A.C.

## (undated) DEVICE — SYS FLEXNAV DELIVERY 15F LARGE

## (undated) DEVICE — DRESSING TRANS 4X4 TEGADERM

## (undated) DEVICE — DRAPE INCISE IOBAN 2 23X33IN

## (undated) DEVICE — CATH IMPULSE FR4 5FR 125CM

## (undated) DEVICE — SNAP CAP 18 DOME COVERS

## (undated) DEVICE — DRESSING ABSRBNT ISLAND 3.6X8

## (undated) DEVICE — DILATOR VESSEL 8FR

## (undated) DEVICE — PENCIL ROCKER SWITCH 10FT CORD

## (undated) DEVICE — LOOP VESSEL BLUE MAXI

## (undated) DEVICE — KIT INTRO MICRO NIT VSI 4FR

## (undated) DEVICE — GUIDEWIRE STD .035X180CM ANG

## (undated) DEVICE — SUT SILK 2-0 PS 18IN BLACK

## (undated) DEVICE — SHEATH 8FR 25CM

## (undated) DEVICE — CATH TEMP PACER 5.0FR

## (undated) DEVICE — DRAPE ANGIO BRACH 38X44IN

## (undated) DEVICE — INTRODUCER CHECK FLO 14FR

## (undated) DEVICE — HEMOSTAT SURGICEL 4X8IN

## (undated) DEVICE — Device

## (undated) DEVICE — GAUZE SPONGE 4X4 12PLY

## (undated) DEVICE — SUT PROLENE 4-0 SH BLU 36IN

## (undated) DEVICE — DRESSING ADH ISLAND 3.6 X 14

## (undated) DEVICE — SKINMARKER & RULER REGULAR X-F

## (undated) DEVICE — SPONGE IV DRAIN 4X4 STERILE

## (undated) DEVICE — ELECTRODE REM PLYHSV RETURN 9

## (undated) DEVICE — EVACUATOR WOUND BULB 100CC

## (undated) DEVICE — CLIP MED TICALL

## (undated) DEVICE — SEE MEDLINE ITEM 152622

## (undated) DEVICE — DRESSING AQUACEL SACRAL LARGE

## (undated) DEVICE — DRAIN CHANNEL ROUND 19FR

## (undated) DEVICE — STAPLER SKIN ROTATING HEAD

## (undated) DEVICE — TRAY FOLEY 16FR INFECTION CONT

## (undated) DEVICE — GAUZE SPONGE PEANUT STRL

## (undated) DEVICE — CATH ALII 4FR INFINITY

## (undated) DEVICE — BOWL CEMENT

## (undated) DEVICE — GUIDEWIRE AMPLATZ 180 46-525

## (undated) DEVICE — INTRODUCER R/O II 7FX10 W/.038

## (undated) DEVICE — NDL ONE PART 21G 7CM

## (undated) DEVICE — BLLN CATH TRUE DILATION 18MM

## (undated) DEVICE — SUT 1 48IN PDS II VIO MONO

## (undated) DEVICE — SUT SILK 2-0 STRANDS 30IN

## (undated) DEVICE — SUT MONOCRYL 3-0 PS-2 UND

## (undated) DEVICE — SUT PROLENE 4-0 RB-1 BL MO

## (undated) DEVICE — SUT VICRYL PLUS 0 CT1 18IN

## (undated) DEVICE — TAPE UMBILICAL 10X1/8

## (undated) DEVICE — WIRE ROSEN .035X260

## (undated) DEVICE — CATH EMBOLECTOMY 4F 80CM

## (undated) DEVICE — DRAPE PLASTIC U 60X72

## (undated) DEVICE — GUIDEWIRE ROSEN VAS JTIP 180CM

## (undated) DEVICE — CATH 5FR OMNIFLUSH 65CM .038

## (undated) DEVICE — APPLICATOR CHLORAPREP ORN 26ML

## (undated) DEVICE — SUT SILK 0 STRANDS 30IN BLK

## (undated) DEVICE — NDL HYPO REG 25G X 1 1/2

## (undated) DEVICE — SUT MONOCRYL 3-0 SH U/D

## (undated) DEVICE — SUT SILK 3-0 BLK BR SH 30IN

## (undated) DEVICE — BLADE SURG #15 CARBON STEEL

## (undated) DEVICE — APPLIER CLIP LIAGCLIP 9.375IN

## (undated) DEVICE — INFLATOR ENCORE

## (undated) DEVICE — INTRODUCER VASC RADPQ 5FRX10CM

## (undated) DEVICE — SHEATH 7FR 25CM

## (undated) DEVICE — INTRODUCER VASC RADPQ 8FRX10CM

## (undated) DEVICE — HEMOSTAT SURGICEL NUKNIT 3X4IN

## (undated) DEVICE — SPONGE LAP 18X18 PREWASHED

## (undated) DEVICE — KIT MNTR POLE MT DUL 12&48 MAC

## (undated) DEVICE — INSERTS STEALTH FIBRA SIZE 1.

## (undated) DEVICE — SUT SILK 3-0 STRANDS 30IN

## (undated) DEVICE — CATH ANGLED GLIDE CATH 5FR

## (undated) DEVICE — KIT SAHARA DRAPE DRAW/LIFT

## (undated) DEVICE — COVER LIGHT HANDLE 80/CA

## (undated) DEVICE — ELECTRODE EXTENDED BLADE

## (undated) DEVICE — SPIKE SHORT LG BORE 1-WAY 2IN

## (undated) DEVICE — SUT PROLENE 6-0 24 BV-1

## (undated) DEVICE — SEE MEDLINE ITEM 154981

## (undated) DEVICE — KIT MICROINTRO 4F .018X40X7CM

## (undated) DEVICE — INTRODUCER VASC RADPQ 6FRX10CM

## (undated) DEVICE — SYS LABEL CORRECT MED

## (undated) DEVICE — SUT LIGACLIP SMALL XTRA

## (undated) DEVICE — SUT 2-0 VICRYL / CT-1

## (undated) DEVICE — SOL 9P NACL IRR PIC IL

## (undated) DEVICE — SUT MCRYL PLUS 4-0 PS2 27IN

## (undated) DEVICE — SUT PDS II 2-0 CT1

## (undated) DEVICE — SUT SILK 2-0 SH 18IN BLACK

## (undated) DEVICE — KIT PERCUTANEOUS SHEATH

## (undated) DEVICE — INFLATION DEVICE ENCORE 26

## (undated) DEVICE — DILATOR VESSEL 7FR

## (undated) DEVICE — STRIP PACKING ANTIMIC 1/4X1

## (undated) DEVICE — HEMOSTAT VASC BAND LONG 27CM

## (undated) DEVICE — HOOK STAY ELAS 5MM 8EA/PK

## (undated) DEVICE — CATH GLIDE ANGLED 5FR 65CM

## (undated) DEVICE — DRESSING AQUACEL AG ADV 3.5X12

## (undated) DEVICE — KIT GLIDESHEATH SLEND 6FR 10CM

## (undated) DEVICE — TUBING HPCIL ROT M/F ADPT 10IN

## (undated) DEVICE — TUBING HIGH PRESSURE

## (undated) DEVICE — STAPLER SKIN PROXIMATE WIDE

## (undated) DEVICE — BLADE SCALP OPHTL BEVEL STR

## (undated) DEVICE — SUT PROLENE 5-0 36IN C-1

## (undated) DEVICE — DRESSING INFOVAC SMALL BLK

## (undated) DEVICE — GUIDEWIRE AMPLATZ STIFF 260X7

## (undated) DEVICE — DRAPE FEMORAL 82 X 124 IN

## (undated) DEVICE — GUIDEWIRE AMPLATZ .035X260 SS

## (undated) DEVICE — CATH VALVE BALLOON 23X4

## (undated) DEVICE — GUIDEWIRE EMERALD 150CM PTFE

## (undated) DEVICE — PAD K-THERMIA 24IN X 60IN

## (undated) DEVICE — KIT CUSTOM MANIFOLD

## (undated) DEVICE — PAD DEFIB CADENCE ADULT R2

## (undated) DEVICE — TRAY CATH LAB OMC

## (undated) DEVICE — DEVICE PERCLOSE SUT CLSR 6FR

## (undated) DEVICE — SUT 2/0 30IN SILK BLK BRAI

## (undated) DEVICE — SUT 2-0 12-18IN SILK

## (undated) DEVICE — SUT RD QUIK LOAD POLY 2-0 53IN

## (undated) DEVICE — SYS PANNUS RETENTION

## (undated) DEVICE — SEE MEDLINE ITEM 146298

## (undated) DEVICE — SPONGE DERMACEA GAUZE 4X4

## (undated) DEVICE — CATH GLIDE 5FR 100CM 5/BOX

## (undated) DEVICE — INSERTS STEALTH FIBRA SIZE 2

## (undated) DEVICE — DRESSING VAC GRANUFOAM SILVER

## (undated) DEVICE — GUIDEWIRE ADVNTG 035X260CM ANG

## (undated) DEVICE — OMNIPAQUE CONTRAST 350MG/100ML

## (undated) DEVICE — TUBING PRSS MON M/M LL 72IN

## (undated) DEVICE — GUIDEWIRE AMPLATZ .035X260

## (undated) DEVICE — CABLE PACING ALLGTR CLIP 12FT

## (undated) DEVICE — SUT PROLENE 5/0 RB-1 36 IN

## (undated) DEVICE — TOWEL OR XRAY WHITE 17X26IN

## (undated) DEVICE — CART STAPLE RELD 45MM WHT

## (undated) DEVICE — CATH IMPULSE PIGTAIL 6F 110CM

## (undated) DEVICE — SPONGE DERMACEA 4X4IN 12PLY

## (undated) DEVICE — SUT MONOCRYL 2-0 CT-1 VIL

## (undated) DEVICE — SYS FLEXNAV LOADING LARGE

## (undated) DEVICE — KIT PREVENA PLUS

## (undated) DEVICE — APPLIER LIGACLIP SM 9.38IN

## (undated) DEVICE — SUT VICRYL 3-0 27 SH

## (undated) DEVICE — SHEATH 8FR ANSEL 45CM

## (undated) DEVICE — WARMER DRAPE STERILE LF

## (undated) DEVICE — SUT CTD VICRYL VIL BR CR/SH

## (undated) DEVICE — SEE MEDLINE ITEM 153688

## (undated) DEVICE — CATH SEEKER .035IN 90CM

## (undated) DEVICE — GUIDEWIRE SUPRA CORE 035 190CM

## (undated) DEVICE — SUT 3-0 12-18IN SILK

## (undated) DEVICE — BLADE SURGICAL 15C

## (undated) DEVICE — COVERS PROBE NR-48 STERILE

## (undated) DEVICE — DRESSING AQUACEL AG 3.5X10IN

## (undated) DEVICE — SPONGE GAUZE 16PLY 4X4

## (undated) DEVICE — SOL NS 1000CC

## (undated) DEVICE — BLLN MUSTANG 6 X 40 X 75

## (undated) DEVICE — CATH PERFORMA IMA .046IN 65CM

## (undated) DEVICE — CANISTER INFOV.A.C WOUND 500ML

## (undated) DEVICE — SUT PROLENE 5-0 24 C-1 BL

## (undated) DEVICE — SUT ETHILON 2-0 BLK PS-2

## (undated) DEVICE — TRAY MINOR GEN SURG

## (undated) DEVICE — GUIDEWIRE X SPORT .014IN 190CM

## (undated) DEVICE — BLADE SURG CARBON STEEL #10

## (undated) DEVICE — SET MICROPUNCTURE

## (undated) DEVICE — CONTAINER SPECIMEN STRL 4OZ

## (undated) DEVICE — SEE MEDLINE ITEM 152512

## (undated) DEVICE — GUIDEWIRE STF .035X260CM ANG

## (undated) DEVICE — SEE MEDLINE ITEM 146417

## (undated) DEVICE — SUT SILK 3-0 SH DETACH 30IN

## (undated) DEVICE — SUT 2/0 36IN COATED VICRYL

## (undated) DEVICE — PAD RADI FEMORAL

## (undated) DEVICE — CATH MPA2 INFINITI 4FR 100CM